# Patient Record
Sex: FEMALE | Race: WHITE | NOT HISPANIC OR LATINO | Employment: OTHER | URBAN - METROPOLITAN AREA
[De-identification: names, ages, dates, MRNs, and addresses within clinical notes are randomized per-mention and may not be internally consistent; named-entity substitution may affect disease eponyms.]

---

## 2017-03-03 ENCOUNTER — CONVERSION ENCOUNTER (OUTPATIENT)
Dept: RADIOLOGY | Facility: IMAGING CENTER | Age: 82
End: 2017-03-03

## 2017-05-29 ENCOUNTER — HOSPITAL ENCOUNTER (OUTPATIENT)
Dept: RADIOLOGY | Facility: MEDICAL CENTER | Age: 82
Discharge: HOME | End: 2017-05-29
Admitting: FAMILY MEDICINE
Payer: MEDICARE

## 2017-05-29 ENCOUNTER — OFFICE VISIT (OUTPATIENT)
Dept: URGENT CARE | Facility: MEDICAL CENTER | Age: 82
End: 2017-05-29
Payer: MEDICARE

## 2017-05-29 DIAGNOSIS — R06.02 SHORTNESS OF BREATH: ICD-10-CM

## 2017-05-29 DIAGNOSIS — Z85.3 PERSONAL HISTORY OF MALIGNANT NEOPLASM OF BREAST: ICD-10-CM

## 2017-05-29 DIAGNOSIS — J90 PLEURAL EFFUSION, NOT ELSEWHERE CLASSIFIED: ICD-10-CM

## 2017-05-29 DIAGNOSIS — C34.90 MALIGNANT NEOPLASM OF UNSPECIFIED PART OF UNSPECIFIED BRONCHUS OR LUNG (HCC): ICD-10-CM

## 2017-05-29 PROCEDURE — 99213 OFFICE O/P EST LOW 20 MIN: CPT

## 2017-05-29 PROCEDURE — 71020 HB CHEST X-RAY 2VW FRONTAL&LATL: CPT

## 2017-05-29 PROCEDURE — G0463 HOSPITAL OUTPT CLINIC VISIT: HCPCS

## 2017-06-02 ENCOUNTER — GENERIC CONVERSION - ENCOUNTER (OUTPATIENT)
Dept: OTHER | Facility: OTHER | Age: 82
End: 2017-06-02

## 2017-06-08 ENCOUNTER — ALLSCRIPTS OFFICE VISIT (OUTPATIENT)
Dept: OTHER | Facility: OTHER | Age: 82
End: 2017-06-08

## 2017-06-08 ENCOUNTER — TRANSCRIBE ORDERS (OUTPATIENT)
Dept: ADMINISTRATIVE | Facility: HOSPITAL | Age: 82
End: 2017-06-08

## 2017-06-08 DIAGNOSIS — Z85.3 PERSONAL HISTORY OF MALIGNANT NEOPLASM OF BREAST: Primary | ICD-10-CM

## 2017-06-12 ENCOUNTER — APPOINTMENT (OUTPATIENT)
Dept: LAB | Age: 82
End: 2017-06-12
Payer: MEDICARE

## 2017-06-12 ENCOUNTER — TRANSCRIBE ORDERS (OUTPATIENT)
Dept: ADMINISTRATIVE | Age: 82
End: 2017-06-12

## 2017-06-12 ENCOUNTER — HOSPITAL ENCOUNTER (OUTPATIENT)
Dept: RADIOLOGY | Age: 82
Discharge: HOME | End: 2017-06-12
Payer: MEDICARE

## 2017-06-12 ENCOUNTER — APPOINTMENT (OUTPATIENT)
Dept: RADIOLOGY | Age: 82
End: 2017-06-12
Payer: MEDICARE

## 2017-06-12 DIAGNOSIS — J90 PLEURAL EFFUSION: Primary | ICD-10-CM

## 2017-06-12 DIAGNOSIS — Z85.3 PERSONAL HISTORY OF MALIGNANT NEOPLASM OF BREAST: ICD-10-CM

## 2017-06-12 DIAGNOSIS — J90 PLEURAL EFFUSION, NOT ELSEWHERE CLASSIFIED: ICD-10-CM

## 2017-06-12 DIAGNOSIS — C34.90 CARCINOMA OF LUNG, UNSPECIFIED LATERALITY (HCC): ICD-10-CM

## 2017-06-12 DIAGNOSIS — J90 PLEURAL EFFUSION: ICD-10-CM

## 2017-06-12 DIAGNOSIS — Z85.3 PERSONAL HISTORY OF MALIGNANT NEOPLASM OF BREAST: Primary | ICD-10-CM

## 2017-06-12 LAB
ALBUMIN SERPL BCP-MCNC: 3.2 G/DL (ref 3.5–5)
ALP SERPL-CCNC: 70 U/L (ref 46–116)
ALT SERPL W P-5'-P-CCNC: 42 U/L (ref 12–78)
ANION GAP SERPL CALCULATED.3IONS-SCNC: 9 MMOL/L (ref 4–13)
AST SERPL W P-5'-P-CCNC: 36 U/L (ref 5–45)
BASOPHILS # BLD AUTO: 0.06 THOUSANDS/ΜL (ref 0–0.1)
BASOPHILS NFR BLD AUTO: 1 % (ref 0–1)
BILIRUB SERPL-MCNC: 0.4 MG/DL (ref 0.2–1)
BUN SERPL-MCNC: 15 MG/DL (ref 5–25)
CALCIUM SERPL-MCNC: 9 MG/DL (ref 8.3–10.1)
CANCER AG27-29 SERPL-ACNC: 32.4 U/ML (ref 0–42.3)
CHLORIDE SERPL-SCNC: 108 MMOL/L (ref 100–108)
CO2 SERPL-SCNC: 23 MMOL/L (ref 21–32)
CREAT SERPL-MCNC: 0.57 MG/DL (ref 0.6–1.3)
EOSINOPHIL # BLD AUTO: 0.64 THOUSAND/ΜL (ref 0–0.61)
EOSINOPHIL NFR BLD AUTO: 5 % (ref 0–6)
ERYTHROCYTE [DISTWIDTH] IN BLOOD BY AUTOMATED COUNT: 13.5 % (ref 11.6–15.1)
GFR SERPL CREATININE-BSD FRML MDRD: >60 ML/MIN/1.73SQ M
GLUCOSE SERPL-MCNC: 119 MG/DL (ref 65–140)
GLUCOSE SERPL-MCNC: 131 MG/DL (ref 65–140)
HCT VFR BLD AUTO: 42.4 % (ref 34.8–46.1)
HGB BLD-MCNC: 13.5 G/DL (ref 11.5–15.4)
LYMPHOCYTES # BLD AUTO: 2.99 THOUSANDS/ΜL (ref 0.6–4.47)
LYMPHOCYTES NFR BLD AUTO: 23 % (ref 14–44)
MCH RBC QN AUTO: 30.1 PG (ref 26.8–34.3)
MCHC RBC AUTO-ENTMCNC: 31.8 G/DL (ref 31.4–37.4)
MCV RBC AUTO: 95 FL (ref 82–98)
MONOCYTES # BLD AUTO: 0.87 THOUSAND/ΜL (ref 0.17–1.22)
MONOCYTES NFR BLD AUTO: 7 % (ref 4–12)
NEUTROPHILS # BLD AUTO: 8.48 THOUSANDS/ΜL (ref 1.85–7.62)
NEUTS SEG NFR BLD AUTO: 64 % (ref 43–75)
NRBC BLD AUTO-RTO: 0 /100 WBCS
PLATELET # BLD AUTO: 403 THOUSANDS/UL (ref 149–390)
PMV BLD AUTO: 10.9 FL (ref 8.9–12.7)
POTASSIUM SERPL-SCNC: 4.3 MMOL/L (ref 3.5–5.3)
PROT SERPL-MCNC: 6.6 G/DL (ref 6.4–8.2)
RBC # BLD AUTO: 4.48 MILLION/UL (ref 3.81–5.12)
SODIUM SERPL-SCNC: 140 MMOL/L (ref 136–145)
WBC # BLD AUTO: 13.12 THOUSAND/UL (ref 4.31–10.16)

## 2017-06-12 PROCEDURE — 80053 COMPREHEN METABOLIC PANEL: CPT

## 2017-06-12 PROCEDURE — 86300 IMMUNOASSAY TUMOR CA 15-3: CPT

## 2017-06-12 PROCEDURE — 81162 BRCA1&2 GEN FULL SEQ DUP/DEL: CPT

## 2017-06-12 PROCEDURE — A9552 F18 FDG: HCPCS

## 2017-06-12 PROCEDURE — 36415 COLL VENOUS BLD VENIPUNCTURE: CPT

## 2017-06-12 PROCEDURE — 71020 HB CHEST X-RAY 2VW FRONTAL&LATL: CPT

## 2017-06-12 PROCEDURE — 85025 COMPLETE CBC W/AUTO DIFF WBC: CPT

## 2017-06-12 PROCEDURE — 82948 REAGENT STRIP/BLOOD GLUCOSE: CPT

## 2017-06-12 PROCEDURE — 78815 PET IMAGE W/CT SKULL-THIGH: CPT

## 2017-06-12 RX ADMIN — IOHEXOL 5 ML: 240 INJECTION, SOLUTION INTRATHECAL; INTRAVASCULAR; INTRAVENOUS; ORAL at 09:50

## 2017-06-20 ENCOUNTER — APPOINTMENT (OUTPATIENT)
Dept: RADIOLOGY | Age: 82
End: 2017-06-20
Payer: MEDICARE

## 2017-06-20 ENCOUNTER — GENERIC CONVERSION - ENCOUNTER (OUTPATIENT)
Dept: OTHER | Facility: OTHER | Age: 82
End: 2017-06-20

## 2017-06-20 ENCOUNTER — TRANSCRIBE ORDERS (OUTPATIENT)
Dept: ADMINISTRATIVE | Age: 82
End: 2017-06-20

## 2017-06-20 DIAGNOSIS — J90 PLEURAL EFFUSION, NOT ELSEWHERE CLASSIFIED: ICD-10-CM

## 2017-06-20 PROCEDURE — 71020 HB CHEST X-RAY 2VW FRONTAL&LATL: CPT

## 2017-06-26 LAB — MISCELLANEOUS LAB TEST RESULT: NORMAL

## 2017-06-27 ENCOUNTER — GENERIC CONVERSION - ENCOUNTER (OUTPATIENT)
Dept: OTHER | Facility: OTHER | Age: 82
End: 2017-06-27

## 2017-06-28 ENCOUNTER — ALLSCRIPTS OFFICE VISIT (OUTPATIENT)
Dept: OTHER | Facility: OTHER | Age: 82
End: 2017-06-28

## 2017-06-30 ENCOUNTER — TRANSCRIBE ORDERS (OUTPATIENT)
Dept: ADMINISTRATIVE | Age: 82
End: 2017-06-30

## 2017-06-30 ENCOUNTER — APPOINTMENT (OUTPATIENT)
Dept: LAB | Age: 82
End: 2017-06-30
Payer: MEDICARE

## 2017-06-30 DIAGNOSIS — C34.90 MALIGNANT NEOPLASM OF UNSPECIFIED PART OF UNSPECIFIED BRONCHUS OR LUNG (HCC): ICD-10-CM

## 2017-06-30 LAB
ALBUMIN SERPL BCP-MCNC: 3.4 G/DL (ref 3.5–5)
ALP SERPL-CCNC: 69 U/L (ref 46–116)
ALT SERPL W P-5'-P-CCNC: 29 U/L (ref 12–78)
ANION GAP SERPL CALCULATED.3IONS-SCNC: 9 MMOL/L (ref 4–13)
AST SERPL W P-5'-P-CCNC: 23 U/L (ref 5–45)
BASOPHILS # BLD AUTO: 0.07 THOUSANDS/ΜL (ref 0–0.1)
BASOPHILS NFR BLD AUTO: 1 % (ref 0–1)
BILIRUB SERPL-MCNC: 0.44 MG/DL (ref 0.2–1)
BUN SERPL-MCNC: 15 MG/DL (ref 5–25)
CALCIUM SERPL-MCNC: 9.2 MG/DL (ref 8.3–10.1)
CEA SERPL-MCNC: 0.7 NG/ML (ref 0–3)
CHLORIDE SERPL-SCNC: 108 MMOL/L (ref 100–108)
CO2 SERPL-SCNC: 23 MMOL/L (ref 21–32)
CREAT SERPL-MCNC: 0.63 MG/DL (ref 0.6–1.3)
EOSINOPHIL # BLD AUTO: 0.85 THOUSAND/ΜL (ref 0–0.61)
EOSINOPHIL NFR BLD AUTO: 7 % (ref 0–6)
ERYTHROCYTE [DISTWIDTH] IN BLOOD BY AUTOMATED COUNT: 13.4 % (ref 11.6–15.1)
GFR SERPL CREATININE-BSD FRML MDRD: >60 ML/MIN/1.73SQ M
GLUCOSE SERPL-MCNC: 129 MG/DL (ref 65–140)
HCT VFR BLD AUTO: 43.3 % (ref 34.8–46.1)
HGB BLD-MCNC: 14 G/DL (ref 11.5–15.4)
LYMPHOCYTES # BLD AUTO: 3.84 THOUSANDS/ΜL (ref 0.6–4.47)
LYMPHOCYTES NFR BLD AUTO: 30 % (ref 14–44)
MCH RBC QN AUTO: 30.4 PG (ref 26.8–34.3)
MCHC RBC AUTO-ENTMCNC: 32.3 G/DL (ref 31.4–37.4)
MCV RBC AUTO: 94 FL (ref 82–98)
MONOCYTES # BLD AUTO: 1.19 THOUSAND/ΜL (ref 0.17–1.22)
MONOCYTES NFR BLD AUTO: 9 % (ref 4–12)
NEUTROPHILS # BLD AUTO: 7.03 THOUSANDS/ΜL (ref 1.85–7.62)
NEUTS SEG NFR BLD AUTO: 53 % (ref 43–75)
NRBC BLD AUTO-RTO: 0 /100 WBCS
PLATELET # BLD AUTO: 285 THOUSANDS/UL (ref 149–390)
PMV BLD AUTO: 12.2 FL (ref 8.9–12.7)
POTASSIUM SERPL-SCNC: 4 MMOL/L (ref 3.5–5.3)
PROT SERPL-MCNC: 6.7 G/DL (ref 6.4–8.2)
RBC # BLD AUTO: 4.61 MILLION/UL (ref 3.81–5.12)
SODIUM SERPL-SCNC: 140 MMOL/L (ref 136–145)
WBC # BLD AUTO: 13.03 THOUSAND/UL (ref 4.31–10.16)

## 2017-06-30 PROCEDURE — 82378 CARCINOEMBRYONIC ANTIGEN: CPT

## 2017-06-30 PROCEDURE — 80053 COMPREHEN METABOLIC PANEL: CPT

## 2017-06-30 PROCEDURE — 36415 COLL VENOUS BLD VENIPUNCTURE: CPT

## 2017-06-30 PROCEDURE — 85025 COMPLETE CBC W/AUTO DIFF WBC: CPT

## 2017-07-12 ENCOUNTER — GENERIC CONVERSION - ENCOUNTER (OUTPATIENT)
Dept: OTHER | Facility: OTHER | Age: 82
End: 2017-07-12

## 2017-07-14 ENCOUNTER — GENERIC CONVERSION - ENCOUNTER (OUTPATIENT)
Dept: OTHER | Facility: OTHER | Age: 82
End: 2017-07-14

## 2017-07-19 ENCOUNTER — APPOINTMENT (OUTPATIENT)
Dept: RADIOLOGY | Age: 82
End: 2017-07-19
Payer: MEDICARE

## 2017-07-19 ENCOUNTER — TRANSCRIBE ORDERS (OUTPATIENT)
Dept: ADMINISTRATIVE | Age: 82
End: 2017-07-19

## 2017-07-19 ENCOUNTER — GENERIC CONVERSION - ENCOUNTER (OUTPATIENT)
Dept: OTHER | Facility: OTHER | Age: 82
End: 2017-07-19

## 2017-07-19 DIAGNOSIS — J90 PLEURAL EFFUSION, NOT ELSEWHERE CLASSIFIED: ICD-10-CM

## 2017-07-19 DIAGNOSIS — C34.90 MALIGNANT NEOPLASM OF UNSPECIFIED PART OF UNSPECIFIED BRONCHUS OR LUNG (HCC): ICD-10-CM

## 2017-07-19 DIAGNOSIS — C34.90 MALIGNANT NEOPLASM OF LUNG, UNSPECIFIED LATERALITY, UNSPECIFIED PART OF LUNG (HCC): Primary | ICD-10-CM

## 2017-07-19 PROCEDURE — 71020 HB CHEST X-RAY 2VW FRONTAL&LATL: CPT

## 2017-07-20 ENCOUNTER — ALLSCRIPTS OFFICE VISIT (OUTPATIENT)
Dept: OTHER | Facility: OTHER | Age: 82
End: 2017-07-20

## 2017-07-20 ENCOUNTER — GENERIC CONVERSION - ENCOUNTER (OUTPATIENT)
Dept: OTHER | Facility: OTHER | Age: 82
End: 2017-07-20

## 2017-07-21 ENCOUNTER — HOSPITAL ENCOUNTER (OUTPATIENT)
Dept: INFUSION CENTER | Facility: CLINIC | Age: 82
Discharge: HOME | End: 2017-07-21
Payer: MEDICARE

## 2017-07-21 ENCOUNTER — HOSPITAL ENCOUNTER (OUTPATIENT)
Dept: RADIOLOGY | Facility: HOSPITAL | Age: 82
Discharge: HOME | End: 2017-07-21
Attending: INTERNAL MEDICINE | Admitting: RADIOLOGY
Payer: MEDICARE

## 2017-07-21 VITALS
SYSTOLIC BLOOD PRESSURE: 133 MMHG | HEART RATE: 60 BPM | RESPIRATION RATE: 18 BRPM | DIASTOLIC BLOOD PRESSURE: 62 MMHG | OXYGEN SATURATION: 96 %

## 2017-07-21 VITALS
RESPIRATION RATE: 16 BRPM | DIASTOLIC BLOOD PRESSURE: 85 MMHG | HEART RATE: 60 BPM | SYSTOLIC BLOOD PRESSURE: 142 MMHG | TEMPERATURE: 96.4 F

## 2017-07-21 DIAGNOSIS — J90 PLEURAL EFFUSION: ICD-10-CM

## 2017-07-21 LAB
ALBUMIN SERPL BCP-MCNC: 3.8 G/DL (ref 3.2–5)
ALP SERPL-CCNC: 65 U/L (ref 46–116)
ALT SERPL W P-5'-P-CCNC: 22 U/L (ref 12–78)
ANION GAP SERPL CALCULATED.3IONS-SCNC: 10 MMOL/L (ref 4–13)
AST SERPL W P-5'-P-CCNC: 33 U/L (ref 5–45)
BASOPHILS # BLD AUTO: 0.28 THOUSAND/UL (ref 0–0.1)
BASOPHILS NFR MAR MANUAL: 2 % (ref 0–1)
BILIRUB SERPL-MCNC: 0.6 MG/DL (ref 0.2–1)
BUN SERPL-MCNC: 16 MG/DL (ref 5–25)
CALCIUM SERPL-MCNC: 9.8 MG/DL (ref 8.3–10.1)
CHLORIDE SERPL-SCNC: 102 MMOL/L (ref 98–108)
CO2 SERPL-SCNC: 22 MMOL/L (ref 21–32)
CREAT SERPL-MCNC: 0.8 MG/DL (ref 0.6–1.3)
EOSINOPHIL # BLD AUTO: 0.28 THOUSAND/UL (ref 0–0.61)
EOSINOPHIL NFR BLD MANUAL: 2 % (ref 0–6)
ERYTHROCYTE [DISTWIDTH] IN BLOOD BY AUTOMATED COUNT: 13.6 % (ref 11.6–15.1)
GFR SERPL CREATININE-BSD FRML MDRD: >60 ML/MIN/1.73SQ M
GLUCOSE SERPL-MCNC: 143 MG/DL (ref 65–140)
HCT VFR BLD AUTO: 46.6 % (ref 34.8–46.1)
HGB BLD-MCNC: 14.9 G/DL (ref 11.5–15.4)
LYMPHOCYTES # BLD AUTO: 28 % (ref 14–44)
LYMPHOCYTES # BLD AUTO: 3.89 THOUSAND/UL (ref 0.6–4.47)
MCH RBC QN AUTO: 28.5 PG (ref 26.8–34.3)
MCHC RBC AUTO-ENTMCNC: 32.1 G/DL (ref 31.4–37.4)
MCV RBC AUTO: 89 FL (ref 82–98)
MONOCYTES # BLD AUTO: 0.42 THOUSAND/UL (ref 0–1.22)
MONOCYTES NFR BLD AUTO: 3 % (ref 4–12)
NEUTS BAND NFR BLD MANUAL: 7 % (ref 0–8)
NEUTS SEG # BLD: 9.04 THOUSAND/UL (ref 1.81–6.82)
NEUTS SEG NFR BLD AUTO: 58 % (ref 43–75)
PLATELET # BLD AUTO: 290 THOUSANDS/UL (ref 149–390)
PLATELET BLD QL SMEAR: ADEQUATE
PMV BLD AUTO: 8.5 FL (ref 8.9–12.7)
POTASSIUM SERPL-SCNC: 4.2 MMOL/L (ref 3.5–5.3)
PROT SERPL-MCNC: 7 G/DL (ref 6.4–8.2)
RBC # BLD AUTO: 5.25 MILLION/UL (ref 3.81–5.12)
RBC MORPH BLD: NORMAL
SODIUM SERPL-SCNC: 134 MMOL/L (ref 136–145)
TOTAL CELLS COUNTED SPEC: 100
WBC # BLD AUTO: 13.9 THOUSAND/UL (ref 4.31–10.16)
WBC NRBC COR # BLD: 13.9 THOUSAND/UL (ref 4.31–10.16)

## 2017-07-21 PROCEDURE — 80053 COMPREHEN METABOLIC PANEL: CPT | Performed by: INTERNAL MEDICINE

## 2017-07-21 PROCEDURE — 32555 ASPIRATE PLEURA W/ IMAGING: CPT

## 2017-07-21 PROCEDURE — 85027 COMPLETE CBC AUTOMATED: CPT | Performed by: INTERNAL MEDICINE

## 2017-07-21 PROCEDURE — 85007 BL SMEAR W/DIFF WBC COUNT: CPT | Performed by: INTERNAL MEDICINE

## 2017-07-21 PROCEDURE — 88342 IMHCHEM/IMCYTCHM 1ST ANTB: CPT | Performed by: INTERNAL MEDICINE

## 2017-07-21 PROCEDURE — 88341 IMHCHEM/IMCYTCHM EA ADD ANTB: CPT | Performed by: INTERNAL MEDICINE

## 2017-07-21 PROCEDURE — 96372 THER/PROPH/DIAG INJ SC/IM: CPT

## 2017-07-21 PROCEDURE — 88305 TISSUE EXAM BY PATHOLOGIST: CPT | Performed by: INTERNAL MEDICINE

## 2017-07-21 PROCEDURE — 88112 CYTOPATH CELL ENHANCE TECH: CPT | Performed by: INTERNAL MEDICINE

## 2017-07-21 RX ORDER — METOPROLOL TARTRATE 50 MG/1
25 TABLET, FILM COATED ORAL EVERY 12 HOURS SCHEDULED
COMMUNITY
End: 2018-08-29 | Stop reason: SDUPTHER

## 2017-07-21 RX ORDER — CYANOCOBALAMIN 1000 UG/ML
1000 INJECTION INTRAMUSCULAR; SUBCUTANEOUS ONCE
Status: COMPLETED | OUTPATIENT
Start: 2017-07-21 | End: 2017-07-21

## 2017-07-21 RX ORDER — OMEGA-3/DHA/EPA/FISH OIL 60 MG-90MG
CAPSULE ORAL DAILY
COMMUNITY
End: 2018-09-20 | Stop reason: ALTCHOICE

## 2017-07-21 RX ORDER — FOLIC ACID 1 MG/1
1 TABLET ORAL DAILY
COMMUNITY
End: 2019-02-15 | Stop reason: HOSPADM

## 2017-07-21 RX ORDER — LANOLIN ALCOHOL/MO/W.PET/CERES
1 CREAM (GRAM) TOPICAL DAILY
COMMUNITY
End: 2017-07-24

## 2017-07-21 RX ORDER — SODIUM CHLORIDE 9 MG/ML
20 INJECTION, SOLUTION INTRAVENOUS CONTINUOUS
Status: DISCONTINUED | OUTPATIENT
Start: 2017-07-24 | End: 2017-07-27 | Stop reason: HOSPADM

## 2017-07-21 RX ADMIN — CYANOCOBALAMIN 1000 MCG: 1000 INJECTION, SOLUTION INTRAMUSCULAR at 13:51

## 2017-07-21 NOTE — PROGRESS NOTES
Patient arrived on unit for Vitamin B12 injection  Patient to start Alimta/Carbo on 7/24/17  Patient had thoracentesis and has pain in L back area  Dr Conn Showyudelka ordered pain medication and daughter to  script from physician's office  Encouraged patient to take pain med and note effectiveness  Encouraged to notify physician if pain unrelieved or increases or any other S/S  Patient verbalized understanding

## 2017-07-24 ENCOUNTER — HOSPITAL ENCOUNTER (OUTPATIENT)
Dept: INFUSION CENTER | Facility: CLINIC | Age: 82
Discharge: HOME | End: 2017-07-24
Payer: MEDICARE

## 2017-07-24 VITALS
DIASTOLIC BLOOD PRESSURE: 75 MMHG | HEIGHT: 61 IN | WEIGHT: 167.55 LBS | BODY MASS INDEX: 31.63 KG/M2 | TEMPERATURE: 97.6 F | SYSTOLIC BLOOD PRESSURE: 126 MMHG | RESPIRATION RATE: 18 BRPM | HEART RATE: 80 BPM

## 2017-07-24 PROCEDURE — 96413 CHEMO IV INFUSION 1 HR: CPT

## 2017-07-24 PROCEDURE — 96375 TX/PRO/DX INJ NEW DRUG ADDON: CPT

## 2017-07-24 PROCEDURE — 96411 CHEMO IV PUSH ADDL DRUG: CPT

## 2017-07-24 RX ORDER — ALPRAZOLAM 0.5 MG/1
0.5 TABLET ORAL
COMMUNITY
End: 2018-11-01 | Stop reason: SDUPTHER

## 2017-07-24 RX ORDER — PROCHLORPERAZINE MALEATE 10 MG
10 TABLET ORAL EVERY 6 HOURS PRN
COMMUNITY
End: 2017-08-17 | Stop reason: ALTCHOICE

## 2017-07-24 RX ORDER — ASPIRIN 81 MG/1
81 TABLET ORAL DAILY
Status: ON HOLD | COMMUNITY
End: 2018-07-23 | Stop reason: SDDI

## 2017-07-24 RX ORDER — HYDROCODONE BITARTRATE AND ACETAMINOPHEN 5; 300 MG/1; MG/1
1 TABLET ORAL EVERY 6 HOURS PRN
COMMUNITY
End: 2017-08-17 | Stop reason: ALTCHOICE

## 2017-07-24 RX ORDER — CITALOPRAM 20 MG/1
20 TABLET ORAL DAILY
COMMUNITY
End: 2019-02-15 | Stop reason: HOSPADM

## 2017-07-24 RX ADMIN — CARBOPLATIN 492 MG: 10 INJECTION, SOLUTION INTRAVENOUS at 14:38

## 2017-07-24 RX ADMIN — SODIUM CHLORIDE 900 MG: 9 INJECTION, SOLUTION INTRAVENOUS at 14:19

## 2017-07-24 RX ADMIN — DEXAMETHASONE SODIUM PHOSPHATE: 10 INJECTION, SOLUTION INTRAMUSCULAR; INTRAVENOUS at 13:45

## 2017-07-24 RX ADMIN — SODIUM CHLORIDE 20 ML/HR: 0.9 INJECTION, SOLUTION INTRAVENOUS at 13:45

## 2017-07-24 NOTE — PLAN OF CARE
Problem: Potential for Falls  Goal: Patient will remain free of falls  INTERVENTIONS:  - Assess patient frequently for physical needs  -  Identify cognitive and physical deficits and behaviors that affect risk of falls    -  [place] fall precautions as indicated by assessment   - Educate patient/family on patient safety including physical limitations  - Instruct patient to call for assistance with activity based on assessment  - Modify environment to reduce risk of injury  - Consider OT/PT consult to assist with strengthening/mobility   Outcome: Progressing

## 2017-07-24 NOTE — PROGRESS NOTES
Patient tolerated chemotherapy infusion well without complications  Pt and daughter are aware of follow up appointment with Dr Bryan Winkler  Pt left the infusion center in stable condition

## 2017-07-27 ENCOUNTER — HOSPITAL ENCOUNTER (OUTPATIENT)
Dept: INFUSION CENTER | Facility: CLINIC | Age: 82
Discharge: HOME | End: 2017-07-27
Payer: MEDICARE

## 2017-07-27 VITALS
SYSTOLIC BLOOD PRESSURE: 125 MMHG | DIASTOLIC BLOOD PRESSURE: 65 MMHG | RESPIRATION RATE: 16 BRPM | TEMPERATURE: 97.5 F | HEART RATE: 62 BPM

## 2017-07-27 PROCEDURE — 96361 HYDRATE IV INFUSION ADD-ON: CPT

## 2017-07-27 PROCEDURE — 96365 THER/PROPH/DIAG IV INF INIT: CPT

## 2017-07-27 RX ORDER — ACETAMINOPHEN 325 MG/1
650 TABLET ORAL ONCE
Status: COMPLETED | OUTPATIENT
Start: 2017-07-27 | End: 2017-07-27

## 2017-07-27 RX ADMIN — ONDANSETRON 8 MG: 2 INJECTION INTRAMUSCULAR; INTRAVENOUS at 13:15

## 2017-07-27 RX ADMIN — ACETAMINOPHEN 650 MG: 325 TABLET, FILM COATED ORAL at 13:45

## 2017-07-27 RX ADMIN — SODIUM CHLORIDE 1000 ML: 0.9 INJECTION, SOLUTION INTRAVENOUS at 13:15

## 2017-07-27 NOTE — PLAN OF CARE
Problem: Potential for Falls  Goal: Patient will remain free of falls  INTERVENTIONS:  - Assess patient frequently for physical needs  -  Identify cognitive and physical deficits and behaviors that affect risk of falls    -  [place] fall precautions as indicated by assessment   - Educate patient/family on patient safety including physical limitations  - Instruct patient to call for assistance with activity based on assessment  - Modify environment to reduce risk of injury  - Consider OT/PT consult to assist with strengthening/mobility   Outcome: Progressing

## 2017-07-27 NOTE — PROGRESS NOTES
Tolerated hydration  Denies any nausea or headache on D/C  AVS given to patient   Aware of next appointment

## 2017-07-27 NOTE — PROGRESS NOTES
Patient arrived on unit  Complaining of feeling nauseated and has a headache  1 Liter NSS initiated  Zofran administered as ordered  Spoke with Sabrina Medina RN with Dr Elizabeth Krueger and made aware of headache  Order received and Tylenol administered   Will note effectiveness

## 2017-08-01 ENCOUNTER — GENERIC CONVERSION - ENCOUNTER (OUTPATIENT)
Dept: OTHER | Facility: OTHER | Age: 82
End: 2017-08-01

## 2017-08-11 ENCOUNTER — TRANSCRIBE ORDERS (OUTPATIENT)
Dept: ADMINISTRATIVE | Age: 82
End: 2017-08-11

## 2017-08-11 ENCOUNTER — APPOINTMENT (OUTPATIENT)
Dept: LAB | Age: 82
End: 2017-08-11
Payer: MEDICARE

## 2017-08-11 DIAGNOSIS — C34.90 MALIGNANT NEOPLASM OF UNSPECIFIED PART OF UNSPECIFIED BRONCHUS OR LUNG (HCC): ICD-10-CM

## 2017-08-11 LAB
ALBUMIN SERPL BCP-MCNC: 3 G/DL (ref 3.5–5)
ALP SERPL-CCNC: 71 U/L (ref 46–116)
ALT SERPL W P-5'-P-CCNC: 31 U/L (ref 12–78)
ANION GAP SERPL CALCULATED.3IONS-SCNC: 9 MMOL/L (ref 4–13)
AST SERPL W P-5'-P-CCNC: 28 U/L (ref 5–45)
BASOPHILS # BLD AUTO: 0.12 THOUSANDS/ΜL (ref 0–0.1)
BASOPHILS NFR BLD AUTO: 1 % (ref 0–1)
BILIRUB SERPL-MCNC: 0.25 MG/DL (ref 0.2–1)
BUN SERPL-MCNC: 18 MG/DL (ref 5–25)
CALCIUM SERPL-MCNC: 9.6 MG/DL (ref 8.3–10.1)
CHLORIDE SERPL-SCNC: 106 MMOL/L (ref 100–108)
CO2 SERPL-SCNC: 23 MMOL/L (ref 21–32)
CREAT SERPL-MCNC: 0.8 MG/DL (ref 0.6–1.3)
EOSINOPHIL # BLD AUTO: 2.06 THOUSAND/ΜL (ref 0–0.61)
EOSINOPHIL NFR BLD AUTO: 19 % (ref 0–6)
ERYTHROCYTE [DISTWIDTH] IN BLOOD BY AUTOMATED COUNT: 13.7 % (ref 11.6–15.1)
GFR SERPL CREATININE-BSD FRML MDRD: 69 ML/MIN/1.73SQ M
GLUCOSE SERPL-MCNC: 166 MG/DL (ref 65–140)
HCT VFR BLD AUTO: 42.1 % (ref 34.8–46.1)
HGB BLD-MCNC: 13.7 G/DL (ref 11.5–15.4)
LYMPHOCYTES # BLD AUTO: 3.41 THOUSANDS/ΜL (ref 0.6–4.47)
LYMPHOCYTES NFR BLD AUTO: 32 % (ref 14–44)
MCH RBC QN AUTO: 29.7 PG (ref 26.8–34.3)
MCHC RBC AUTO-ENTMCNC: 32.5 G/DL (ref 31.4–37.4)
MCV RBC AUTO: 91 FL (ref 82–98)
MONOCYTES # BLD AUTO: 1.07 THOUSAND/ΜL (ref 0.17–1.22)
MONOCYTES NFR BLD AUTO: 10 % (ref 4–12)
NEUTROPHILS # BLD AUTO: 4.14 THOUSANDS/ΜL (ref 1.85–7.62)
NEUTS SEG NFR BLD AUTO: 38 % (ref 43–75)
NRBC BLD AUTO-RTO: 0 /100 WBCS
PLATELET # BLD AUTO: 369 THOUSANDS/UL (ref 149–390)
PMV BLD AUTO: 10.4 FL (ref 8.9–12.7)
POTASSIUM SERPL-SCNC: 4 MMOL/L (ref 3.5–5.3)
PROT SERPL-MCNC: 7.5 G/DL (ref 6.4–8.2)
RBC # BLD AUTO: 4.61 MILLION/UL (ref 3.81–5.12)
SODIUM SERPL-SCNC: 138 MMOL/L (ref 136–145)
WBC # BLD AUTO: 10.84 THOUSAND/UL (ref 4.31–10.16)

## 2017-08-11 PROCEDURE — 80053 COMPREHEN METABOLIC PANEL: CPT

## 2017-08-11 PROCEDURE — 85025 COMPLETE CBC W/AUTO DIFF WBC: CPT

## 2017-08-11 PROCEDURE — 36415 COLL VENOUS BLD VENIPUNCTURE: CPT

## 2017-08-11 RX ORDER — CYANOCOBALAMIN 1000 UG/ML
1000 INJECTION INTRAMUSCULAR; SUBCUTANEOUS ONCE
Status: COMPLETED | OUTPATIENT
Start: 2017-08-14 | End: 2017-08-14

## 2017-08-11 RX ORDER — SODIUM CHLORIDE 9 MG/ML
20 INJECTION, SOLUTION INTRAVENOUS CONTINUOUS
Status: DISCONTINUED | OUTPATIENT
Start: 2017-08-14 | End: 2017-08-17 | Stop reason: HOSPADM

## 2017-08-14 ENCOUNTER — HOSPITAL ENCOUNTER (OUTPATIENT)
Dept: INFUSION CENTER | Facility: CLINIC | Age: 82
Discharge: HOME | End: 2017-08-14
Payer: MEDICARE

## 2017-08-14 VITALS
WEIGHT: 163.14 LBS | TEMPERATURE: 95.8 F | SYSTOLIC BLOOD PRESSURE: 143 MMHG | BODY MASS INDEX: 30.8 KG/M2 | RESPIRATION RATE: 18 BRPM | HEART RATE: 55 BPM | HEIGHT: 61 IN | DIASTOLIC BLOOD PRESSURE: 76 MMHG

## 2017-08-14 PROCEDURE — 96372 THER/PROPH/DIAG INJ SC/IM: CPT

## 2017-08-14 PROCEDURE — 96375 TX/PRO/DX INJ NEW DRUG ADDON: CPT

## 2017-08-14 PROCEDURE — 96411 CHEMO IV PUSH ADDL DRUG: CPT

## 2017-08-14 PROCEDURE — 96413 CHEMO IV INFUSION 1 HR: CPT

## 2017-08-14 RX ORDER — DEXAMETHASONE 4 MG/1
4 TABLET ORAL
COMMUNITY
End: 2018-07-23 | Stop reason: ALTCHOICE

## 2017-08-14 RX ADMIN — CYANOCOBALAMIN 1000 MCG: 1000 INJECTION, SOLUTION INTRAMUSCULAR at 14:17

## 2017-08-14 RX ADMIN — DEXAMETHASONE SODIUM PHOSPHATE: 10 INJECTION, SOLUTION INTRAMUSCULAR; INTRAVENOUS at 12:57

## 2017-08-14 RX ADMIN — SODIUM CHLORIDE 20 ML/HR: 0.9 INJECTION, SOLUTION INTRAVENOUS at 12:57

## 2017-08-14 RX ADMIN — CARBOPLATIN 446 MG: 10 INJECTION, SOLUTION INTRAVENOUS at 14:15

## 2017-08-14 RX ADMIN — SODIUM CHLORIDE 900 MG: 9 INJECTION, SOLUTION INTRAVENOUS at 13:51

## 2017-08-14 NOTE — PROGRESS NOTES
Pt tolerated chemo infusion and B12 injection well  Denies any discomforts  Aware of future appointments

## 2017-08-14 NOTE — PLAN OF CARE
Problem: Potential for Falls  Goal: Patient will remain free of falls  INTERVENTIONS:  - Assess patient frequently for physical needs  -  Identify cognitive and physical deficits and behaviors that affect risk of falls  -  [place] fall precautions as indicated by assessment   - Educate patient/family on patient safety including physical limitations  - Instruct patient to call for assistance with activity based on assessment  - Modify environment to reduce risk of injury  - Consider OT/PT consult to assist with strengthening/mobility   Outcome: Progressing      Problem: Knowledge Deficit  Goal: Patient/family/caregiver demonstrates understanding of disease process, treatment plan, medications, and discharge instructions  Complete learning assessment and assess knowledge base    Interventions:  - Provide teaching at level of understanding  - Provide teaching via preferred learning methods   Outcome: Progressing

## 2017-08-15 ENCOUNTER — HOSPITAL ENCOUNTER (OUTPATIENT)
Dept: INFUSION CENTER | Facility: CLINIC | Age: 82
Discharge: HOME | End: 2017-08-15
Payer: MEDICARE

## 2017-08-15 VITALS
SYSTOLIC BLOOD PRESSURE: 135 MMHG | RESPIRATION RATE: 18 BRPM | HEART RATE: 55 BPM | TEMPERATURE: 97.5 F | DIASTOLIC BLOOD PRESSURE: 70 MMHG

## 2017-08-15 PROCEDURE — 96360 HYDRATION IV INFUSION INIT: CPT

## 2017-08-15 PROCEDURE — 96361 HYDRATE IV INFUSION ADD-ON: CPT

## 2017-08-15 RX ADMIN — SODIUM CHLORIDE 1000 ML: 0.9 INJECTION, SOLUTION INTRAVENOUS at 12:00

## 2017-08-15 NOTE — PROGRESS NOTES
Pt arrived to unit without complaint, denies any nausea or other discomfort  IV hydration administered as ordered, pt tolerated well, left unit in stable condition  Pt declines AVS, pt has MD appt tomorrow and treatment plan will be determined at that time  Pt states she may or may not be coming on Friday for additional hydration, she states she will notify infusion center tomorrow

## 2017-08-16 ENCOUNTER — ALLSCRIPTS OFFICE VISIT (OUTPATIENT)
Dept: OTHER | Facility: OTHER | Age: 82
End: 2017-08-16

## 2017-08-16 RX ORDER — ACETAMINOPHEN 325 MG/1
650 TABLET ORAL ONCE
Status: DISCONTINUED | OUTPATIENT
Start: 2017-08-16 | End: 2017-08-16 | Stop reason: CLARIF

## 2017-08-17 ENCOUNTER — HOSPITAL ENCOUNTER (OUTPATIENT)
Dept: INFUSION CENTER | Facility: CLINIC | Age: 82
Discharge: HOME | End: 2017-08-17
Payer: MEDICARE

## 2017-08-17 PROCEDURE — 96361 HYDRATE IV INFUSION ADD-ON: CPT

## 2017-08-17 PROCEDURE — 96360 HYDRATION IV INFUSION INIT: CPT

## 2017-08-17 RX ORDER — HYDROCODONE BITARTRATE AND ACETAMINOPHEN 5; 300 MG/1; MG/1
1 TABLET ORAL EVERY 6 HOURS PRN
Status: ON HOLD | COMMUNITY
End: 2018-09-05

## 2017-08-17 RX ORDER — ONDANSETRON 4 MG/1
4 TABLET, FILM COATED ORAL EVERY 8 HOURS PRN
COMMUNITY
End: 2018-08-01 | Stop reason: HOSPADM

## 2017-08-17 RX ADMIN — SODIUM CHLORIDE 1000 ML: 0.9 INJECTION, SOLUTION INTRAVENOUS at 11:30

## 2017-08-18 ENCOUNTER — GENERIC CONVERSION - ENCOUNTER (OUTPATIENT)
Dept: OTHER | Facility: OTHER | Age: 82
End: 2017-08-18

## 2017-09-05 ENCOUNTER — TRANSCRIBE ORDERS (OUTPATIENT)
Dept: ADMINISTRATIVE | Age: 82
End: 2017-09-05

## 2017-09-05 ENCOUNTER — APPOINTMENT (OUTPATIENT)
Dept: LAB | Age: 82
End: 2017-09-05
Payer: MEDICARE

## 2017-09-05 DIAGNOSIS — C34.90 MALIGNANT NEOPLASM OF UNSPECIFIED PART OF UNSPECIFIED BRONCHUS OR LUNG (HCC): ICD-10-CM

## 2017-09-05 LAB
ALBUMIN SERPL BCP-MCNC: 3.1 G/DL (ref 3.5–5)
ALP SERPL-CCNC: 90 U/L (ref 46–116)
ALT SERPL W P-5'-P-CCNC: 40 U/L (ref 12–78)
ANION GAP SERPL CALCULATED.3IONS-SCNC: 7 MMOL/L (ref 4–13)
AST SERPL W P-5'-P-CCNC: 42 U/L (ref 5–45)
BASOPHILS # BLD AUTO: 0.06 THOUSANDS/ΜL (ref 0–0.1)
BASOPHILS NFR BLD AUTO: 1 % (ref 0–1)
BILIRUB SERPL-MCNC: 0.31 MG/DL (ref 0.2–1)
BUN SERPL-MCNC: 16 MG/DL (ref 5–25)
CALCIUM SERPL-MCNC: 9.1 MG/DL (ref 8.3–10.1)
CHLORIDE SERPL-SCNC: 106 MMOL/L (ref 100–108)
CO2 SERPL-SCNC: 26 MMOL/L (ref 21–32)
CREAT SERPL-MCNC: 0.69 MG/DL (ref 0.6–1.3)
EOSINOPHIL # BLD AUTO: 0.56 THOUSAND/ΜL (ref 0–0.61)
EOSINOPHIL NFR BLD AUTO: 6 % (ref 0–6)
ERYTHROCYTE [DISTWIDTH] IN BLOOD BY AUTOMATED COUNT: 14.7 % (ref 11.6–15.1)
GFR SERPL CREATININE-BSD FRML MDRD: 81 ML/MIN/1.73SQ M
GLUCOSE SERPL-MCNC: 120 MG/DL (ref 65–140)
HCT VFR BLD AUTO: 42.6 % (ref 34.8–46.1)
HGB BLD-MCNC: 14.1 G/DL (ref 11.5–15.4)
LYMPHOCYTES # BLD AUTO: 3.63 THOUSANDS/ΜL (ref 0.6–4.47)
LYMPHOCYTES NFR BLD AUTO: 40 % (ref 14–44)
MCH RBC QN AUTO: 30.3 PG (ref 26.8–34.3)
MCHC RBC AUTO-ENTMCNC: 33.1 G/DL (ref 31.4–37.4)
MCV RBC AUTO: 91 FL (ref 82–98)
MONOCYTES # BLD AUTO: 1.28 THOUSAND/ΜL (ref 0.17–1.22)
MONOCYTES NFR BLD AUTO: 14 % (ref 4–12)
NEUTROPHILS # BLD AUTO: 3.59 THOUSANDS/ΜL (ref 1.85–7.62)
NEUTS SEG NFR BLD AUTO: 39 % (ref 43–75)
NRBC BLD AUTO-RTO: 0 /100 WBCS
PLATELET # BLD AUTO: 397 THOUSANDS/UL (ref 149–390)
PMV BLD AUTO: 10.6 FL (ref 8.9–12.7)
POTASSIUM SERPL-SCNC: 4 MMOL/L (ref 3.5–5.3)
PROT SERPL-MCNC: 7.2 G/DL (ref 6.4–8.2)
RBC # BLD AUTO: 4.66 MILLION/UL (ref 3.81–5.12)
SODIUM SERPL-SCNC: 139 MMOL/L (ref 136–145)
WBC # BLD AUTO: 9.16 THOUSAND/UL (ref 4.31–10.16)

## 2017-09-05 PROCEDURE — 80053 COMPREHEN METABOLIC PANEL: CPT

## 2017-09-05 PROCEDURE — 85025 COMPLETE CBC W/AUTO DIFF WBC: CPT

## 2017-09-05 PROCEDURE — 36415 COLL VENOUS BLD VENIPUNCTURE: CPT

## 2017-09-07 ENCOUNTER — GENERIC CONVERSION - ENCOUNTER (OUTPATIENT)
Dept: OTHER | Facility: OTHER | Age: 82
End: 2017-09-07

## 2017-09-07 ENCOUNTER — HOSPITAL ENCOUNTER (OUTPATIENT)
Dept: INFUSION CENTER | Facility: CLINIC | Age: 82
Discharge: HOME | End: 2017-09-07
Payer: MEDICARE

## 2017-09-07 VITALS
DIASTOLIC BLOOD PRESSURE: 73 MMHG | HEART RATE: 72 BPM | TEMPERATURE: 96.1 F | BODY MASS INDEX: 29.9 KG/M2 | WEIGHT: 162.48 LBS | HEIGHT: 62 IN | RESPIRATION RATE: 16 BRPM | SYSTOLIC BLOOD PRESSURE: 133 MMHG

## 2017-09-07 DIAGNOSIS — C34.90 MALIGNANT NEOPLASM OF UNSPECIFIED PART OF UNSPECIFIED BRONCHUS OR LUNG (HCC): ICD-10-CM

## 2017-09-07 DIAGNOSIS — J90 PLEURAL EFFUSION, NOT ELSEWHERE CLASSIFIED: ICD-10-CM

## 2017-09-07 PROCEDURE — 96413 CHEMO IV INFUSION 1 HR: CPT

## 2017-09-07 PROCEDURE — 96367 TX/PROPH/DG ADDL SEQ IV INF: CPT

## 2017-09-07 PROCEDURE — 96372 THER/PROPH/DIAG INJ SC/IM: CPT

## 2017-09-07 PROCEDURE — 96417 CHEMO IV INFUS EACH ADDL SEQ: CPT

## 2017-09-07 RX ORDER — SODIUM CHLORIDE 9 MG/ML
20 INJECTION, SOLUTION INTRAVENOUS CONTINUOUS
Status: DISCONTINUED | OUTPATIENT
Start: 2017-09-07 | End: 2017-09-10 | Stop reason: HOSPADM

## 2017-09-07 RX ORDER — CYANOCOBALAMIN 1000 UG/ML
1000 INJECTION INTRAMUSCULAR; SUBCUTANEOUS ONCE
Status: COMPLETED | OUTPATIENT
Start: 2017-09-07 | End: 2017-09-07

## 2017-09-07 RX ADMIN — SODIUM CHLORIDE 900 MG: 9 INJECTION, SOLUTION INTRAVENOUS at 11:56

## 2017-09-07 RX ADMIN — CARBOPLATIN 446 MG: 10 INJECTION, SOLUTION INTRAVENOUS at 12:18

## 2017-09-07 RX ADMIN — CYANOCOBALAMIN 1000 MCG: 1000 INJECTION, SOLUTION INTRAMUSCULAR at 13:18

## 2017-09-07 RX ADMIN — ONDANSETRON HYDROCHLORIDE: 2 INJECTION, SOLUTION INTRAMUSCULAR; INTRAVENOUS at 11:35

## 2017-09-07 RX ADMIN — SODIUM CHLORIDE 20 ML/HR: 0.9 INJECTION, SOLUTION INTRAVENOUS at 11:33

## 2017-09-08 ENCOUNTER — HOSPITAL ENCOUNTER (OUTPATIENT)
Dept: INFUSION CENTER | Facility: CLINIC | Age: 82
Discharge: HOME | End: 2017-09-08
Payer: MEDICARE

## 2017-09-08 VITALS
DIASTOLIC BLOOD PRESSURE: 60 MMHG | TEMPERATURE: 96.6 F | HEART RATE: 50 BPM | RESPIRATION RATE: 18 BRPM | SYSTOLIC BLOOD PRESSURE: 138 MMHG

## 2017-09-08 PROCEDURE — 96360 HYDRATION IV INFUSION INIT: CPT

## 2017-09-08 PROCEDURE — 96361 HYDRATE IV INFUSION ADD-ON: CPT

## 2017-09-08 RX ORDER — SODIUM CHLORIDE 9 MG/ML
500 INJECTION, SOLUTION INTRAVENOUS ONCE
Status: COMPLETED | OUTPATIENT
Start: 2017-09-09 | End: 2017-09-09

## 2017-09-08 RX ORDER — SODIUM CHLORIDE 9 MG/ML
500 INJECTION, SOLUTION INTRAVENOUS ONCE
Status: DISCONTINUED | OUTPATIENT
Start: 2017-09-09 | End: 2017-09-08 | Stop reason: SDDI

## 2017-09-08 RX ADMIN — SODIUM CHLORIDE 1000 ML: 0.9 INJECTION, SOLUTION INTRAVENOUS at 11:55

## 2017-09-08 NOTE — PROGRESS NOTES
Pt arrived to unit without complaint, denied nausea, declined Zofran as ordered to be given prn  IV hydration administered as ordered, pt tolerated well, left unit in stable condition without question or concern  Pt declined AVS, aware of appt tomorrow at [facility] for IV hydration  Pt left unit with NSL intact, covered with sterile cling and stockinette  Pt knowledgeable re: care of NSL at home

## 2017-09-08 NOTE — PLAN OF CARE
Problem: Potential for Falls  Goal: Patient will remain free of falls  INTERVENTIONS:  - Assess patient frequently for physical needs  -  Identify cognitive and physical deficits and behaviors that affect risk of falls    -  [place] fall precautions as indicated by assessment   - Educate patient/family on patient safety including physical limitations  - Instruct patient to call for assistance with activity based on assessment  - Modify environment to reduce risk of injury  - Consider OT/PT consult to assist with strengthening/mobility   Outcome: Progressing

## 2017-09-09 ENCOUNTER — HOSPITAL ENCOUNTER (OUTPATIENT)
Dept: INFUSION CENTER | Facility: HOSPITAL | Age: 82
Discharge: HOME | End: 2017-09-09
Payer: MEDICARE

## 2017-09-09 VITALS
RESPIRATION RATE: 18 BRPM | SYSTOLIC BLOOD PRESSURE: 163 MMHG | DIASTOLIC BLOOD PRESSURE: 70 MMHG | HEART RATE: 49 BPM | TEMPERATURE: 97.3 F

## 2017-09-09 PROCEDURE — 96360 HYDRATION IV INFUSION INIT: CPT

## 2017-09-09 PROCEDURE — 96361 HYDRATE IV INFUSION ADD-ON: CPT

## 2017-09-09 RX ADMIN — SODIUM CHLORIDE 500 ML/HR: 0.9 INJECTION, SOLUTION INTRAVENOUS at 10:09

## 2017-09-11 ENCOUNTER — HOSPITAL ENCOUNTER (OUTPATIENT)
Dept: INFUSION CENTER | Facility: CLINIC | Age: 82
Discharge: HOME | End: 2017-09-11
Payer: MEDICARE

## 2017-09-11 VITALS
HEART RATE: 56 BPM | SYSTOLIC BLOOD PRESSURE: 148 MMHG | DIASTOLIC BLOOD PRESSURE: 82 MMHG | RESPIRATION RATE: 16 BRPM | TEMPERATURE: 96.6 F

## 2017-09-11 PROCEDURE — 96365 THER/PROPH/DIAG IV INF INIT: CPT

## 2017-09-11 PROCEDURE — 96361 HYDRATE IV INFUSION ADD-ON: CPT

## 2017-09-11 RX ADMIN — ONDANSETRON 8 MG: 2 INJECTION INTRAMUSCULAR; INTRAVENOUS at 10:57

## 2017-09-11 RX ADMIN — SODIUM CHLORIDE 1000 ML: 0.9 INJECTION, SOLUTION INTRAVENOUS at 10:56

## 2017-09-11 NOTE — PLAN OF CARE
Problem: Potential for Falls  Goal: Patient will remain free of falls  INTERVENTIONS:  - Assess patient frequently for physical needs  -  Identify cognitive and physical deficits and behaviors that affect risk of falls    -  [place] fall precautions as indicated by assessment   - Educate patient/family on patient safety including physical limitations  - Instruct patient to call for assistance with activity based on assessment  - Modify environment to reduce risk of injury  - Consider OT/PT consult to assist with strengthening/mobility   Outcome: Progressing

## 2017-09-11 NOTE — PROGRESS NOTES
Patient tolerated Hydration  Zofran effective for "queasy feeling"  Denies any other discomforts  Aware of next appointment  AVS given to patient   Aware of next appointment

## 2017-09-19 ENCOUNTER — HOSPITAL ENCOUNTER (OUTPATIENT)
Dept: CT IMAGING | Facility: HOSPITAL | Age: 82
Discharge: HOME | End: 2017-09-19
Attending: INTERNAL MEDICINE
Payer: MEDICARE

## 2017-09-19 DIAGNOSIS — J90 PLEURAL EFFUSION, NOT ELSEWHERE CLASSIFIED: ICD-10-CM

## 2017-09-19 PROCEDURE — 71260 CT THORAX DX C+: CPT

## 2017-09-19 RX ADMIN — IOHEXOL 85 ML: 350 INJECTION, SOLUTION INTRAVENOUS at 09:00

## 2017-09-21 ENCOUNTER — GENERIC CONVERSION - ENCOUNTER (OUTPATIENT)
Dept: OTHER | Facility: OTHER | Age: 82
End: 2017-09-21

## 2017-09-29 ENCOUNTER — APPOINTMENT (OUTPATIENT)
Dept: LAB | Age: 82
End: 2017-09-29
Payer: MEDICARE

## 2017-09-29 ENCOUNTER — TRANSCRIBE ORDERS (OUTPATIENT)
Dept: ADMINISTRATIVE | Age: 82
End: 2017-09-29

## 2017-09-29 DIAGNOSIS — C34.90 MALIGNANT NEOPLASM OF UNSPECIFIED PART OF UNSPECIFIED BRONCHUS OR LUNG (HCC): ICD-10-CM

## 2017-09-29 LAB
ALBUMIN SERPL BCP-MCNC: 3.2 G/DL (ref 3.5–5)
ALP SERPL-CCNC: 83 U/L (ref 46–116)
ALT SERPL W P-5'-P-CCNC: 41 U/L (ref 12–78)
ANION GAP SERPL CALCULATED.3IONS-SCNC: 9 MMOL/L (ref 4–13)
AST SERPL W P-5'-P-CCNC: 33 U/L (ref 5–45)
BASOPHILS # BLD AUTO: 0.05 THOUSANDS/ΜL (ref 0–0.1)
BASOPHILS NFR BLD AUTO: 1 % (ref 0–1)
BILIRUB SERPL-MCNC: 0.44 MG/DL (ref 0.2–1)
BUN SERPL-MCNC: 15 MG/DL (ref 5–25)
CALCIUM SERPL-MCNC: 9.8 MG/DL (ref 8.3–10.1)
CHLORIDE SERPL-SCNC: 107 MMOL/L (ref 100–108)
CO2 SERPL-SCNC: 25 MMOL/L (ref 21–32)
CREAT SERPL-MCNC: 0.67 MG/DL (ref 0.6–1.3)
EOSINOPHIL # BLD AUTO: 0.46 THOUSAND/ΜL (ref 0–0.61)
EOSINOPHIL NFR BLD AUTO: 6 % (ref 0–6)
ERYTHROCYTE [DISTWIDTH] IN BLOOD BY AUTOMATED COUNT: 15.5 % (ref 11.6–15.1)
GFR SERPL CREATININE-BSD FRML MDRD: 82 ML/MIN/1.73SQ M
GLUCOSE SERPL-MCNC: 124 MG/DL (ref 65–140)
HCT VFR BLD AUTO: 43.3 % (ref 34.8–46.1)
HGB BLD-MCNC: 14.7 G/DL (ref 11.5–15.4)
LYMPHOCYTES # BLD AUTO: 2.9 THOUSANDS/ΜL (ref 0.6–4.47)
LYMPHOCYTES NFR BLD AUTO: 41 % (ref 14–44)
MCH RBC QN AUTO: 30.9 PG (ref 26.8–34.3)
MCHC RBC AUTO-ENTMCNC: 33.9 G/DL (ref 31.4–37.4)
MCV RBC AUTO: 91 FL (ref 82–98)
MONOCYTES # BLD AUTO: 1.04 THOUSAND/ΜL (ref 0.17–1.22)
MONOCYTES NFR BLD AUTO: 14 % (ref 4–12)
NEUTROPHILS # BLD AUTO: 2.8 THOUSANDS/ΜL (ref 1.85–7.62)
NEUTS SEG NFR BLD AUTO: 38 % (ref 43–75)
NRBC BLD AUTO-RTO: 0 /100 WBCS
PLATELET # BLD AUTO: 401 THOUSANDS/UL (ref 149–390)
PMV BLD AUTO: 10.8 FL (ref 8.9–12.7)
POTASSIUM SERPL-SCNC: 4.1 MMOL/L (ref 3.5–5.3)
PROT SERPL-MCNC: 7.2 G/DL (ref 6.4–8.2)
RBC # BLD AUTO: 4.75 MILLION/UL (ref 3.81–5.12)
SODIUM SERPL-SCNC: 141 MMOL/L (ref 136–145)
WBC # BLD AUTO: 7.3 THOUSAND/UL (ref 4.31–10.16)

## 2017-09-29 PROCEDURE — 85025 COMPLETE CBC W/AUTO DIFF WBC: CPT

## 2017-09-29 PROCEDURE — 80053 COMPREHEN METABOLIC PANEL: CPT

## 2017-09-29 PROCEDURE — 36415 COLL VENOUS BLD VENIPUNCTURE: CPT

## 2017-09-29 RX ORDER — SODIUM CHLORIDE 9 MG/ML
20 INJECTION, SOLUTION INTRAVENOUS CONTINUOUS
Status: DISCONTINUED | OUTPATIENT
Start: 2017-10-02 | End: 2017-10-05 | Stop reason: HOSPADM

## 2017-09-29 RX ORDER — CYANOCOBALAMIN 1000 UG/ML
1000 INJECTION INTRAMUSCULAR; SUBCUTANEOUS ONCE
Status: COMPLETED | OUTPATIENT
Start: 2017-10-02 | End: 2017-10-02

## 2017-10-02 ENCOUNTER — HOSPITAL ENCOUNTER (OUTPATIENT)
Dept: INFUSION CENTER | Facility: CLINIC | Age: 82
Discharge: HOME | End: 2017-10-02
Payer: MEDICARE

## 2017-10-02 VITALS
BODY MASS INDEX: 30.26 KG/M2 | RESPIRATION RATE: 18 BRPM | DIASTOLIC BLOOD PRESSURE: 78 MMHG | TEMPERATURE: 97.6 F | SYSTOLIC BLOOD PRESSURE: 156 MMHG | HEIGHT: 62 IN | WEIGHT: 164.46 LBS | HEART RATE: 55 BPM

## 2017-10-02 PROCEDURE — 96367 TX/PROPH/DG ADDL SEQ IV INF: CPT

## 2017-10-02 PROCEDURE — 96417 CHEMO IV INFUS EACH ADDL SEQ: CPT

## 2017-10-02 PROCEDURE — 96413 CHEMO IV INFUSION 1 HR: CPT

## 2017-10-02 PROCEDURE — 96372 THER/PROPH/DIAG INJ SC/IM: CPT

## 2017-10-02 RX ADMIN — DEXAMETHASONE SODIUM PHOSPHATE: 10 INJECTION, SOLUTION INTRAMUSCULAR; INTRAVENOUS at 11:18

## 2017-10-02 RX ADMIN — CARBOPLATIN 491 MG: 10 INJECTION, SOLUTION INTRAVENOUS at 12:16

## 2017-10-02 RX ADMIN — CYANOCOBALAMIN 1000 MCG: 1000 INJECTION, SOLUTION INTRAMUSCULAR at 11:29

## 2017-10-02 RX ADMIN — SODIUM CHLORIDE 900 MG: 9 INJECTION, SOLUTION INTRAVENOUS at 11:57

## 2017-10-02 RX ADMIN — SODIUM CHLORIDE 20 ML/HR: 0.9 INJECTION, SOLUTION INTRAVENOUS at 11:18

## 2017-10-02 NOTE — PROGRESS NOTES
Pt tolerated chemo today without incident  Pt requests to keep her IV for hydration tomorrow  IV wrapped  Pt aware of appt tomorrow    Pt declined AVS

## 2017-10-02 NOTE — PLAN OF CARE
Problem: Potential for Falls  Goal: Patient will remain free of falls  INTERVENTIONS:  - Assess patient frequently for physical needs  -  Identify cognitive and physical deficits and behaviors that affect risk of falls    -  [place] fall precautions as indicated by assessment   - Educate patient/family on patient safety including physical limitations  - Instruct patient to call for assistance with activity based on assessment  - Modify environment to reduce risk of injury  - Consider OT/PT consult to assist with strengthening/mobility   Outcome: Progressing

## 2017-10-03 ENCOUNTER — HOSPITAL ENCOUNTER (OUTPATIENT)
Dept: INFUSION CENTER | Facility: CLINIC | Age: 82
Discharge: HOME | End: 2017-10-03
Payer: MEDICARE

## 2017-10-03 VITALS
HEART RATE: 72 BPM | RESPIRATION RATE: 16 BRPM | SYSTOLIC BLOOD PRESSURE: 128 MMHG | DIASTOLIC BLOOD PRESSURE: 62 MMHG | TEMPERATURE: 98.3 F

## 2017-10-03 PROCEDURE — 96361 HYDRATE IV INFUSION ADD-ON: CPT

## 2017-10-03 PROCEDURE — 96360 HYDRATION IV INFUSION INIT: CPT

## 2017-10-03 RX ADMIN — SODIUM CHLORIDE 1000 ML: 0.9 INJECTION, SOLUTION INTRAVENOUS at 11:50

## 2017-10-03 NOTE — PROGRESS NOTES
Pt  Denies new symptoms or concerns at this time  Rt hand IV access intact  IV flushed with excellent blood return  Hydration ordered today  , 1000ml NSS over 2 hrs

## 2017-10-03 NOTE — PLAN OF CARE
Problem: PAIN - ADULT  Goal: Verbalizes/displays adequate comfort level or baseline comfort level  Interventions:  - Encourage patient to monitor pain and request assistance  - Assess pain using appropriate pain scale  - Administer analgesics based on type and severity of pain and evaluate response  - Implement non-pharmacological measures as appropriate and evaluate response  - Consider cultural and social influences on pain and pain management  - Notify physician/advanced practitioner if interventions unsuccessful or patient reports new pain  Outcome: Progressing      Problem: INFECTION - ADULT  Goal: Absence or prevention of progression during hospitalization  INTERVENTIONS:  - Assess and monitor for signs and symptoms of infection  - Monitor lab/diagnostic results  - Monitor all insertion sites, i e  indwelling lines, tubes, and drains  - Monitor endotracheal (as able) and nasal secretions for changes in amount and color  - [place] appropriate cooling/warming therapies per order  - Administer medications as ordered  - Instruct and encourage patient and family to use good hand hygiene technique  - Identify and instruct in appropriate isolation precautions for identified infection/condition  Outcome: Progressing    Goal: Absence of fever/infection during neutropenic period  INTERVENTIONS:  - Monitor WBC  - Implement neutropenic guidelines  Outcome: Progressing      Problem: Knowledge Deficit  Goal: Patient/family/caregiver demonstrates understanding of disease process, treatment plan, medications, and discharge instructions  Complete learning assessment and assess knowledge base    Interventions:  - Provide teaching at level of understanding  - Provide teaching via preferred learning methods  Outcome: Progressing

## 2017-10-03 NOTE — PROGRESS NOTES
Pt  Tolerated hydration without adverse event  Future appointment reviewed  AVS provided   IV reinforced/secured for appointment scheduled 10/4/2017

## 2017-10-04 ENCOUNTER — HOSPITAL ENCOUNTER (OUTPATIENT)
Dept: INFUSION CENTER | Facility: CLINIC | Age: 82
Discharge: HOME | End: 2017-10-04
Payer: MEDICARE

## 2017-10-04 VITALS
HEART RATE: 68 BPM | SYSTOLIC BLOOD PRESSURE: 128 MMHG | TEMPERATURE: 98.8 F | RESPIRATION RATE: 18 BRPM | DIASTOLIC BLOOD PRESSURE: 60 MMHG

## 2017-10-04 PROCEDURE — 96365 THER/PROPH/DIAG IV INF INIT: CPT

## 2017-10-04 PROCEDURE — 96361 HYDRATE IV INFUSION ADD-ON: CPT

## 2017-10-04 RX ADMIN — SODIUM CHLORIDE 1000 ML: 0.9 INJECTION, SOLUTION INTRAVENOUS at 11:50

## 2017-10-04 RX ADMIN — ONDANSETRON 8 MG: 2 INJECTION INTRAMUSCULAR; INTRAVENOUS at 12:00

## 2017-10-04 NOTE — PROGRESS NOTES
1435 Tolerated iv hydration well, will return 10/5 for hydration/ zofran  Iv site flushed & dsd applied

## 2017-10-04 NOTE — PLAN OF CARE
Problem: Potential for Falls  Goal: Patient will remain free of falls  INTERVENTIONS:  - Assess patient frequently for physical needs  -  Identify cognitive and physical deficits and behaviors that affect risk of falls    -  [place] fall precautions as indicated by assessment   - Educate patient/family on patient safety including physical limitations  - Instruct patient to call for assistance with activity based on assessment  - Modify environment to reduce risk of injury  - Consider OT/PT consult to assist with strengthening/mobility   Outcome: Progressing

## 2017-10-05 ENCOUNTER — HOSPITAL ENCOUNTER (OUTPATIENT)
Dept: INFUSION CENTER | Facility: CLINIC | Age: 82
Discharge: HOME | End: 2017-10-05
Payer: MEDICARE

## 2017-10-05 VITALS
HEART RATE: 76 BPM | TEMPERATURE: 98.4 F | DIASTOLIC BLOOD PRESSURE: 70 MMHG | RESPIRATION RATE: 18 BRPM | SYSTOLIC BLOOD PRESSURE: 128 MMHG

## 2017-10-05 PROCEDURE — 96374 THER/PROPH/DIAG INJ IV PUSH: CPT

## 2017-10-05 PROCEDURE — 96361 HYDRATE IV INFUSION ADD-ON: CPT

## 2017-10-05 RX ADMIN — ONDANSETRON 8 MG: 2 INJECTION INTRAMUSCULAR; INTRAVENOUS at 11:30

## 2017-10-05 RX ADMIN — SODIUM CHLORIDE 1000 ML: 0.9 INJECTION, SOLUTION INTRAVENOUS at 11:10

## 2017-10-05 NOTE — PROGRESS NOTES
Patient arrived to the infusion for hydration  Pt c/o of mild nausea without vomiting  Hydration and PRN Zofran given  Pt tolerated well  Pt is aware of next appointment

## 2017-10-20 ENCOUNTER — APPOINTMENT (OUTPATIENT)
Dept: LAB | Age: 82
End: 2017-10-20
Payer: MEDICARE

## 2017-10-20 DIAGNOSIS — C34.90 MALIGNANT NEOPLASM OF UNSPECIFIED PART OF UNSPECIFIED BRONCHUS OR LUNG (HCC): ICD-10-CM

## 2017-10-20 LAB
ALBUMIN SERPL BCP-MCNC: 3.5 G/DL (ref 3.5–5)
ALP SERPL-CCNC: 80 U/L (ref 46–116)
ALT SERPL W P-5'-P-CCNC: 44 U/L (ref 12–78)
ANION GAP SERPL CALCULATED.3IONS-SCNC: 8 MMOL/L (ref 4–13)
AST SERPL W P-5'-P-CCNC: 35 U/L (ref 5–45)
BASOPHILS # BLD AUTO: 0.02 THOUSANDS/ΜL (ref 0–0.1)
BASOPHILS NFR BLD AUTO: 0 % (ref 0–1)
BILIRUB SERPL-MCNC: 0.49 MG/DL (ref 0.2–1)
BUN SERPL-MCNC: 15 MG/DL (ref 5–25)
CALCIUM SERPL-MCNC: 9.8 MG/DL (ref 8.3–10.1)
CHLORIDE SERPL-SCNC: 107 MMOL/L (ref 100–108)
CO2 SERPL-SCNC: 25 MMOL/L (ref 21–32)
CREAT SERPL-MCNC: 0.68 MG/DL (ref 0.6–1.3)
EOSINOPHIL # BLD AUTO: 0.22 THOUSAND/ΜL (ref 0–0.61)
EOSINOPHIL NFR BLD AUTO: 3 % (ref 0–6)
ERYTHROCYTE [DISTWIDTH] IN BLOOD BY AUTOMATED COUNT: 16 % (ref 11.6–15.1)
GFR SERPL CREATININE-BSD FRML MDRD: 82 ML/MIN/1.73SQ M
GLUCOSE SERPL-MCNC: 106 MG/DL (ref 65–140)
HCT VFR BLD AUTO: 42 % (ref 34.8–46.1)
HGB BLD-MCNC: 14 G/DL (ref 11.5–15.4)
LYMPHOCYTES # BLD AUTO: 2.65 THOUSANDS/ΜL (ref 0.6–4.47)
LYMPHOCYTES NFR BLD AUTO: 35 % (ref 14–44)
MCH RBC QN AUTO: 30.8 PG (ref 26.8–34.3)
MCHC RBC AUTO-ENTMCNC: 33.3 G/DL (ref 31.4–37.4)
MCV RBC AUTO: 92 FL (ref 82–98)
MONOCYTES # BLD AUTO: 0.84 THOUSAND/ΜL (ref 0.17–1.22)
MONOCYTES NFR BLD AUTO: 11 % (ref 4–12)
NEUTROPHILS # BLD AUTO: 3.74 THOUSANDS/ΜL (ref 1.85–7.62)
NEUTS SEG NFR BLD AUTO: 51 % (ref 43–75)
NRBC BLD AUTO-RTO: 0 /100 WBCS
PLATELET # BLD AUTO: 297 THOUSANDS/UL (ref 149–390)
PMV BLD AUTO: 10.8 FL (ref 8.9–12.7)
POTASSIUM SERPL-SCNC: 4 MMOL/L (ref 3.5–5.3)
PROT SERPL-MCNC: 7.2 G/DL (ref 6.4–8.2)
RBC # BLD AUTO: 4.55 MILLION/UL (ref 3.81–5.12)
SODIUM SERPL-SCNC: 140 MMOL/L (ref 136–145)
WBC # BLD AUTO: 7.49 THOUSAND/UL (ref 4.31–10.16)

## 2017-10-20 PROCEDURE — 36415 COLL VENOUS BLD VENIPUNCTURE: CPT

## 2017-10-20 PROCEDURE — 80053 COMPREHEN METABOLIC PANEL: CPT

## 2017-10-20 PROCEDURE — 85025 COMPLETE CBC W/AUTO DIFF WBC: CPT

## 2017-10-20 RX ORDER — CYANOCOBALAMIN 1000 UG/ML
1000 INJECTION INTRAMUSCULAR; SUBCUTANEOUS ONCE
Status: COMPLETED | OUTPATIENT
Start: 2017-10-23 | End: 2017-10-23

## 2017-10-20 RX ORDER — SODIUM CHLORIDE 9 MG/ML
20 INJECTION, SOLUTION INTRAVENOUS CONTINUOUS
Status: DISCONTINUED | OUTPATIENT
Start: 2017-10-23 | End: 2017-10-26 | Stop reason: HOSPADM

## 2017-10-23 ENCOUNTER — HOSPITAL ENCOUNTER (OUTPATIENT)
Dept: INFUSION CENTER | Facility: CLINIC | Age: 82
Discharge: HOME | End: 2017-10-23
Payer: MEDICARE

## 2017-10-23 VITALS
HEART RATE: 60 BPM | DIASTOLIC BLOOD PRESSURE: 77 MMHG | BODY MASS INDEX: 30.59 KG/M2 | WEIGHT: 166.23 LBS | HEIGHT: 62 IN | OXYGEN SATURATION: 96 % | SYSTOLIC BLOOD PRESSURE: 145 MMHG | RESPIRATION RATE: 16 BRPM | TEMPERATURE: 97.1 F

## 2017-10-23 PROCEDURE — 96372 THER/PROPH/DIAG INJ SC/IM: CPT

## 2017-10-23 PROCEDURE — 96413 CHEMO IV INFUSION 1 HR: CPT

## 2017-10-23 PROCEDURE — 96411 CHEMO IV PUSH ADDL DRUG: CPT

## 2017-10-23 PROCEDURE — 96367 TX/PROPH/DG ADDL SEQ IV INF: CPT

## 2017-10-23 RX ADMIN — CARBOPLATIN 491 MG: 10 INJECTION, SOLUTION INTRAVENOUS at 13:45

## 2017-10-23 RX ADMIN — CYANOCOBALAMIN 1000 MCG: 1000 INJECTION, SOLUTION INTRAMUSCULAR at 14:49

## 2017-10-23 RX ADMIN — DEXAMETHASONE SODIUM PHOSPHATE: 10 INJECTION, SOLUTION INTRAMUSCULAR; INTRAVENOUS at 12:18

## 2017-10-23 RX ADMIN — SODIUM CHLORIDE 20 ML/HR: 0.9 INJECTION, SOLUTION INTRAVENOUS at 12:14

## 2017-10-23 RX ADMIN — SODIUM CHLORIDE 900 MG: 9 INJECTION, SOLUTION INTRAVENOUS at 13:19

## 2017-10-23 NOTE — PLAN OF CARE
Problem: Potential for Falls  Goal: Patient will remain free of falls  INTERVENTIONS:  - Assess patient frequently for physical needs  -  Identify cognitive and physical deficits and behaviors that affect risk of falls    -  [place] fall precautions as indicated by assessment   - Educate patient/family on patient safety including physical limitations  - Instruct patient to call for assistance with activity based on assessment  - Modify environment to reduce risk of injury  - Consider OT/PT consult to assist with strengthening/mobility   Outcome: Progressing

## 2017-10-23 NOTE — PROGRESS NOTES
Patient tolerated chemotherapy  Denies any discomforts  RA IV access remained intact for tomorrow's treatment per patient request- wrapped with cling and netting   AVS given to patient

## 2017-10-23 NOTE — PROGRESS NOTES
Patient arrived on unit  Exertional dyspnea noted however resolves when patient sits  Pulse ox- 96% on RA  VSS   Cleared for chemotherapy as per  lab parameters on orders- labs drawn on 10/20/17

## 2017-10-24 ENCOUNTER — HOSPITAL ENCOUNTER (OUTPATIENT)
Dept: INFUSION CENTER | Facility: CLINIC | Age: 82
Discharge: HOME | End: 2017-10-24
Payer: MEDICARE

## 2017-10-24 VITALS
DIASTOLIC BLOOD PRESSURE: 70 MMHG | RESPIRATION RATE: 18 BRPM | SYSTOLIC BLOOD PRESSURE: 130 MMHG | HEART RATE: 50 BPM | OXYGEN SATURATION: 95 % | TEMPERATURE: 97.6 F

## 2017-10-24 PROCEDURE — 96361 HYDRATE IV INFUSION ADD-ON: CPT

## 2017-10-24 PROCEDURE — 96360 HYDRATION IV INFUSION INIT: CPT

## 2017-10-24 RX ADMIN — SODIUM CHLORIDE 1000 ML: 0.9 INJECTION, SOLUTION INTRAVENOUS at 12:10

## 2017-10-24 NOTE — PROGRESS NOTES
Patient denies nausea and tolerated her NSS well without adverse reaction  Patient aware of next appointment

## 2017-10-24 NOTE — PLAN OF CARE
Problem: Potential for Falls  Goal: Patient will remain free of falls  INTERVENTIONS:  - Assess patient frequently for physical needs  -  Identify cognitive and physical deficits and behaviors that affect risk of falls    -  [place] fall precautions as indicated by assessment   - Educate patient/family on patient safety including physical limitations  - Instruct patient to call for assistance with activity based on assessment  - Modify environment to reduce risk of injury  - Consider OT/PT consult to assist with strengthening/mobility   Outcome: Progressing

## 2017-10-25 ENCOUNTER — HOSPITAL ENCOUNTER (OUTPATIENT)
Dept: INFUSION CENTER | Facility: CLINIC | Age: 82
Discharge: HOME | End: 2017-10-25
Payer: MEDICARE

## 2017-10-25 VITALS
TEMPERATURE: 98.1 F | RESPIRATION RATE: 18 BRPM | DIASTOLIC BLOOD PRESSURE: 72 MMHG | HEART RATE: 62 BPM | SYSTOLIC BLOOD PRESSURE: 152 MMHG

## 2017-10-25 PROCEDURE — 96361 HYDRATE IV INFUSION ADD-ON: CPT

## 2017-10-25 PROCEDURE — 96360 HYDRATION IV INFUSION INIT: CPT

## 2017-10-25 RX ADMIN — SODIUM CHLORIDE 1000 ML: 0.9 INJECTION, SOLUTION INTRAVENOUS at 11:52

## 2017-10-25 NOTE — PLAN OF CARE

## 2017-10-26 ENCOUNTER — HOSPITAL ENCOUNTER (OUTPATIENT)
Dept: INFUSION CENTER | Facility: CLINIC | Age: 82
Discharge: HOME | End: 2017-10-26
Payer: MEDICARE

## 2017-10-26 ENCOUNTER — ALLSCRIPTS OFFICE VISIT (OUTPATIENT)
Dept: OTHER | Facility: OTHER | Age: 82
End: 2017-10-26

## 2017-10-26 PROCEDURE — 96361 HYDRATE IV INFUSION ADD-ON: CPT

## 2017-10-26 PROCEDURE — 96365 THER/PROPH/DIAG IV INF INIT: CPT

## 2017-10-26 RX ADMIN — ONDANSETRON 8 MG: 2 INJECTION INTRAMUSCULAR; INTRAVENOUS at 11:38

## 2017-10-26 RX ADMIN — SODIUM CHLORIDE 1000 ML: 0.9 INJECTION, SOLUTION INTRAVENOUS at 11:19

## 2017-10-26 NOTE — PLAN OF CARE
Problem: Potential for Falls  Goal: Patient will remain free of falls  INTERVENTIONS:  - Assess patient frequently for physical needs  -  Identify cognitive and physical deficits and behaviors that affect risk of falls    -  [place] fall precautions as indicated by assessment   - Educate patient/family on patient safety including physical limitations  - Instruct patient to call for assistance with activity based on assessment  - Modify environment to reduce risk of injury  - Consider OT/PT consult to assist with strengthening/mobility   Outcome: Progressing

## 2017-10-27 NOTE — PROGRESS NOTES
Assessment  1  Lung cancer (162 9) (C34 90)   2  Pleural effusion (511 9) (J90)    Plan  Lung cancer    · (1) CBC/PLT/DIFF; Status:Active; Requested UAF:[id number];    Perform:[facility]; WYU:[id number]; Ordered; For:Lung cancer; Ordered By:Faroun, Levonia Jolly Carolan Carrel);   · (1) COMPREHENSIVE METABOLIC PANEL; Status:Active; Requested BTJ:[id number];    Perform:[facility]; KEZ:[id number]; Ordered; For:Lung cancer; Ordered By:Faroun, Levonia Jolly Carolan Carrel); · Follow-up visit in 3 weeks Evaluation and Treatment  Follow-up  Status: Complete  Done:  [id number]   Ordered; For: Lung cancer; Ordered By: Jose Shore) Performed:  Due: 97VSM2094; Last Updated By: Kacey Lamar; 10/26/2017 2:22:07 PM    Discussion/Summary  Discussion Summary:   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, biopsy was small, molecular tests by serum analysis were negative except for RB 1 mutationProceed with cycle 6  Out of 6 of Alimta / carboplatin, and repeat CT/PET scan that after, she had a good response, she will continue to be on Alimta maintenance therapy thereaftercontinue folic acid 1 mg p  o  daily and vitamin B12 1000 mcg IM shot every chemotherapy        Chief Complaint  Chief Complaint Free Text Note Form: Malignant left sided pleural effusion      History of Present Illness  HPI: 80-year-old  female who presented [facility] with dyspnea for the past 4-5 days and pleurisy, with occasional dry coughscan of the thorax showed no evidence of PE, it showed a large loculated left side pleural effusion with nodularity concerning for metastatic disease, there is a concern for a mass versus pneumonia in the collapsed left lower lobe lungunderwent left thoracentesis Ã2 yielding 1 4 L of bloody fluid, pathology showed adenocarcinoma, positive for TTF-1, CK 7 most likely consistent with non-small cell lung cancerpatient had a history of left-sided breast cancer in 1992 status post mastectomy she told me she had told lymph node involvement, she was not treated with either chemotherapy, radiation therapy or hormonal therapyhas extensive family history of cancerwas diagnosed with breast cancer, sister was diagnosed with breast cancer at age 36, another sister was diagnosed with breast cancer at age 72, a brother diagnosed with pancreatic cancer and another brother with lung cancerniece was found to have BRCA gene mutationis allergic to sulfa, atorvastatin, latex   Current Therapy: Carboplatin AUC 5, Alimta 500 mg/mÂ² every 3 weeks initiated in July 2017, she finished 5 cycles out of 6 in October 2017   Tumor Markers: BRCA1/2 is negative, CA-27-29 is normal, CEA is normaltest for EGFR mutation is negative, ALK is negative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       Review of Systems  Complete-Female:   Constitutional: no fever,-- not feeling poorly,-- no chills,-- not feeling tired-- and-- no recent weight loss  Eyes: No complaints of eye pain, no red eyes, no eyesight problems, no discharge, no dry eyes, no itching of eyes  ENT: no sore throat-- and-- no hoarseness  Cardiovascular: No complaints of slow heart rate, no fast heart rate, no chest pain, no palpitations, no leg claudication, no lower extremity edema  Respiratory: shortness of breath during exertion-- and-- shortness of breath during exertion, but-- no cough,-- no orthopnea,-- no wheezing,-- no PND,-- not short of breath at rest, shortness of breath does not worsen with lying down and no stridor  Gastrointestinal: no abdominal pain  Genitourinary: No complaints of dysuria, no incontinence, no pelvic pain, no dysmenorrhea, no vaginal discharge or bleeding  Musculoskeletal: No complaints of arthralgias, no myalgias, no joint swelling or stiffness, no limb pain or swelling  Integumentary: No complaints of skin rash or lesions, no itching, no skin wounds, no breast pain or lump     Neurological: No complaints of headache, no confusion, no convulsions, no numbness, no dizziness or fainting, no tingling, no limb weakness, no difficulty walking  Psychiatric: no sleep disturbances,-- no depression-- and-- no emotional problems  Endocrine: No complaints of proptosis, no hot flashes, no muscle weakness, no deepening of the voice, no feelings of weakness  Hematologic/Lymphatic: no swollen glands,-- no tendency for easy bleeding-- and-- no swollen glands in the neck  Active Problems  1  Contusion of face (920) (S00 83XA)   2  Contusion of hand, left (923 20) (S60 222A)   3  Fall (E888 9) [facility])   4  Fracture, metacarpal (815 00) (S62 309A)   5  History of left breast cancer (V10 3) (Z85 3)   6  Hypertension (401 9) (I10)   7  Injury of chest wall (959 11) (S29 9XXA)   8  Lung cancer (162 9) (C34 90)   9  Pleural effusion (511 9) (J90)   10  Shortness of breath (786 05) (R06 02)   11  SOB (shortness of breath) (786 05) (R06 02)    Past Medical History  1  History of Anxiety and depression (300 4) (F41 8)   2  History of breast cancer (V10 3) (Z85 3)   3  History of hyperglycemia (V12 29) (Z86 39)    Surgical History  1  History of Radical Mastectomy Left Breast    Social History   · Non-smoker (V49 89) (Z78 9)    Current Meds   1  Calcium Citrate + D 315-250 MG-UNIT Oral Tablet; Therapy: (Recorded:37Xmj1491) to Recorded   2  Citalopram Hydrobromide 20 MG Oral Tablet; Therapy: (Recorded:97Nan8545) to Recorded   3  Dexamethasone 4 MG Oral Tablet; 1  tab po bid for 3 days  Start day before treatment  Take with food; Therapy: 08Aug2017 to (Last Rx:08Aug2017)  Requested for: 10Aug2017 Ordered   4  Fish Oil OIL; Therapy: (Recorded:91Rix3549) to Recorded   5  MetFORMIN HCl - 500 MG Oral Tablet; Therapy: (Recorded:01Fhh3585) to Recorded   6  Metoprolol Tartrate TABS; 50 MG BID; Therapy: (Roya Cinnamon) to Recorded   7  Ondansetron HCl - 4 MG Oral Tablet; A5uqJHU for nausea;    Therapy: 13GQS8625 to (Last Rx:68Ixz5396)  Requested for: 18Xao1623 Ordered   8  Prochlorperazine Maleate 10 MG Oral Tablet; TAKE 1 TABLET EVERY 6 HOURS AS   NEEDED FOR NAUSEA; Therapy: 92WWX8183 to (Evaluate:20Lrc0143)  Requested for: 29Nct0989; Last   Rx:71Tgs4777 Ordered   9  Vicodin 5-300 MG Oral Tablet; TAKE 1 TABLET EVERY 4 TO 6 HOURS AS NEEDED FOR   PAIN;   Therapy: 95JAL7911 to (Evaluate:21Dty0628); Last Rx:48Tgt8514 Ordered   10  Vitamin B-12 1000 MCG Oral Tablet; Therapy: (Recorded:41Mbp8724) to Recorded    Allergies  1  Sulfa Drugs    Vitals  Vital Signs    Recorded: 77GYO6019 02:00PM   Temperature 97 2 F   Heart Rate 83   Respiration 16   Systolic 181   Diastolic 82   Height 5 ft 2 in   Weight 167 lb 6 0 oz   BMI Calculated 30 61   BSA Calculated 1 77   O2 Saturation 96     Physical Exam    Constitutional   General appearance: No acute distress, well appearing and well nourished  Eyes   Conjunctiva and lids: No swelling, erythema or discharge  Pupils and irises: Equal, round and reactive to light  Ears, Nose, Mouth, and Throat   External inspection of ears and nose: Normal     Oropharynx: Normal with no erythema, edema, exudate or lesions  Pulmonary   Auscultation of lungs: Abnormal   rales/crackles over the left base  Cardiovascular   Auscultation of heart: Normal rate and rhythm, normal S1 and S2, without murmurs  Examination of extremities for edema and/or varicosities: Normal     Carotid pulses: Normal     Abdomen   Abdomen: Non-tender, no masses  Liver and spleen: No hepatomegaly or splenomegaly  Lymphatic   Palpation of lymph nodes in neck: No lymphadenopathy  Musculoskeletal   Gait and station: Normal     Digits and nails: Normal without clubbing or cyanosis  Inspection/palpation of joints, bones, and muscles: Normal     Skin   Skin and subcutaneous tissue: Normal without rashes or lesions  Neurologic   Cranial nerves: Cranial nerves 2-12 intact      Psychiatric Orientation to person, place, and time: Normal     Mood and affect: Normal         ECOG 1       Results/Data  (1) CBC/PLT/DIFF 20Oct2017 12:08PM Marco Alba [facility])    Order Number: [id number]     Test Name Result Flag Reference   WBC COUNT 7 49 Thousand/uL  4 31-10 16   RBC COUNT 4 55 Million/uL  3 81-5 12   HEMOGLOBIN 14 0 g/dL  11 5-15 4   HEMATOCRIT 42 0 %  34 8-46  1   MCV 92 fL  82-98   MCH 30 8 pg  26 8-34 3   MCHC 33 3 g/dL  31 4-37 4   RDW 16 0 % H 11 6-15 1   MPV 10 8 fL  8 9-12 7   PLATELET COUNT 209 Thousands/uL  149-390   nRBC AUTOMATED 0 /100 WBCs     NEUTROPHILS RELATIVE PERCENT 51 %  43-75   LYMPHOCYTES RELATIVE PERCENT 35 %  14-44   MONOCYTES RELATIVE PERCENT 11 %  4-12   EOSINOPHILS RELATIVE PERCENT 3 %  0-6   BASOPHILS RELATIVE PERCENT 0 %  0-1   NEUTROPHILS ABSOLUTE COUNT 3 74 Thousands/? ??L  1 85-7 62   LYMPHOCYTES ABSOLUTE COUNT 2 65 Thousands/? ??L  0 60-4 47   MONOCYTES ABSOLUTE COUNT 0 84 Thousand/? ??L  0 17-1 22   EOSINOPHILS ABSOLUTE COUNT 0 22 Thousand/? ??L  0 00-0 61   BASOPHILS ABSOLUTE COUNT 0 02 Thousands/? ??L  0 00-0 10     (1) COMPREHENSIVE METABOLIC PANEL 51ZCL2296 17:76RX [facility]    Order Number: [id number]     Test Name Result Flag Reference   GLUCOSE,RANDM 106 mg/dL     If the patient is fasting, the ADA then defines impaired fasting glucose as > 100 mg/dL and diabetes as > or equal to 123 mg/dL  Specimen collection should occur prior to Sulfasalazine administration due to the potential for falsely depressed results  Specimen collection should occur prior to Sulfapyridine administration due to the potential for falsely elevated results     SODIUM 140 mmol/L  136-145   POTASSIUM 4 0 mmol/L  3 5-5 3   CHLORIDE 107 mmol/L  100-108   CARBON DIOXIDE 25 mmol/L  21-32   ANION GAP (CALC) 8 mmol/L  4-13   BLOOD UREA NITROGEN 15 mg/dL  5-25   CREATININE 0 68 mg/dL  0 60-1 30   Standardized to IDMS reference method   CALCIUM 9 8 mg/dL  8 3-10 1 BILI, TOTAL 0 49 mg/dL  0 20-1 00   ALK PHOSPHATAS 80 U/L     ALT (SGPT) 44 U/L  12-78   Specimen collection should occur prior to Sulfasalazine and/or Sulfapyridine administration due to the potential for falsely depressed results  AST(SGOT) 35 U/L  5-45   Specimen collection should occur prior to Sulfasalazine administration due to the potential for falsely depressed results  ALBUMIN 3 5 g/dL  3 5-5 0   TOTAL PROTEIN 7 2 g/dL  6 4-8 2   eGFR 82 ml/min/1 73sq m     National Kidney Disease Education Program recommendations are as follows:  GFR calculation is accurate only with a steady state creatinine  Chronic Kidney disease less than 60 ml/min/1 73 sq  meters  Kidney failure less than 15 ml/min/1 73 sq  meters  Signatures   Electronically signed by :  CHAMP Shearer ; Oct 26 2017  4:38PM EST                       (Author)

## 2017-11-09 ENCOUNTER — APPOINTMENT (OUTPATIENT)
Dept: LAB | Age: 82
End: 2017-11-09
Payer: MEDICARE

## 2017-11-09 ENCOUNTER — TRANSCRIBE ORDERS (OUTPATIENT)
Dept: ADMINISTRATIVE | Age: 82
End: 2017-11-09

## 2017-11-09 DIAGNOSIS — C34.90 MALIGNANT NEOPLASM OF UNSPECIFIED PART OF UNSPECIFIED BRONCHUS OR LUNG (HCC): ICD-10-CM

## 2017-11-09 LAB
ALBUMIN SERPL BCP-MCNC: 3.4 G/DL (ref 3.5–5)
ALP SERPL-CCNC: 96 U/L (ref 46–116)
ALT SERPL W P-5'-P-CCNC: 37 U/L (ref 12–78)
ANION GAP SERPL CALCULATED.3IONS-SCNC: 7 MMOL/L (ref 4–13)
AST SERPL W P-5'-P-CCNC: 29 U/L (ref 5–45)
BASOPHILS # BLD AUTO: 0.02 THOUSANDS/ΜL (ref 0–0.1)
BASOPHILS NFR BLD AUTO: 0 % (ref 0–1)
BILIRUB SERPL-MCNC: 0.42 MG/DL (ref 0.2–1)
BUN SERPL-MCNC: 17 MG/DL (ref 5–25)
CALCIUM SERPL-MCNC: 9.6 MG/DL (ref 8.3–10.1)
CHLORIDE SERPL-SCNC: 105 MMOL/L (ref 100–108)
CO2 SERPL-SCNC: 26 MMOL/L (ref 21–32)
CREAT SERPL-MCNC: 0.64 MG/DL (ref 0.6–1.3)
EOSINOPHIL # BLD AUTO: 0.15 THOUSAND/ΜL (ref 0–0.61)
EOSINOPHIL NFR BLD AUTO: 2 % (ref 0–6)
ERYTHROCYTE [DISTWIDTH] IN BLOOD BY AUTOMATED COUNT: 15.6 % (ref 11.6–15.1)
GFR SERPL CREATININE-BSD FRML MDRD: 83 ML/MIN/1.73SQ M
GLUCOSE SERPL-MCNC: 89 MG/DL (ref 65–140)
HCT VFR BLD AUTO: 40 % (ref 34.8–46.1)
HGB BLD-MCNC: 13.2 G/DL (ref 11.5–15.4)
LYMPHOCYTES # BLD AUTO: 2.92 THOUSANDS/ΜL (ref 0.6–4.47)
LYMPHOCYTES NFR BLD AUTO: 37 % (ref 14–44)
MCH RBC QN AUTO: 31.3 PG (ref 26.8–34.3)
MCHC RBC AUTO-ENTMCNC: 33 G/DL (ref 31.4–37.4)
MCV RBC AUTO: 95 FL (ref 82–98)
MONOCYTES # BLD AUTO: 0.97 THOUSAND/ΜL (ref 0.17–1.22)
MONOCYTES NFR BLD AUTO: 12 % (ref 4–12)
NEUTROPHILS # BLD AUTO: 3.74 THOUSANDS/ΜL (ref 1.85–7.62)
NEUTS SEG NFR BLD AUTO: 49 % (ref 43–75)
NRBC BLD AUTO-RTO: 0 /100 WBCS
PLATELET # BLD AUTO: 252 THOUSANDS/UL (ref 149–390)
PMV BLD AUTO: 11 FL (ref 8.9–12.7)
POTASSIUM SERPL-SCNC: 4.2 MMOL/L (ref 3.5–5.3)
PROT SERPL-MCNC: 7.1 G/DL (ref 6.4–8.2)
RBC # BLD AUTO: 4.22 MILLION/UL (ref 3.81–5.12)
SODIUM SERPL-SCNC: 138 MMOL/L (ref 136–145)
WBC # BLD AUTO: 7.82 THOUSAND/UL (ref 4.31–10.16)

## 2017-11-09 PROCEDURE — 80053 COMPREHEN METABOLIC PANEL: CPT

## 2017-11-09 PROCEDURE — 85025 COMPLETE CBC W/AUTO DIFF WBC: CPT

## 2017-11-09 PROCEDURE — 36415 COLL VENOUS BLD VENIPUNCTURE: CPT

## 2017-11-10 RX ORDER — CYANOCOBALAMIN 1000 UG/ML
1000 INJECTION INTRAMUSCULAR; SUBCUTANEOUS ONCE
Status: COMPLETED | OUTPATIENT
Start: 2017-11-13 | End: 2017-11-13

## 2017-11-10 RX ORDER — SODIUM CHLORIDE 9 MG/ML
20 INJECTION, SOLUTION INTRAVENOUS CONTINUOUS
Status: DISCONTINUED | OUTPATIENT
Start: 2017-11-13 | End: 2017-11-16 | Stop reason: HOSPADM

## 2017-11-13 ENCOUNTER — HOSPITAL ENCOUNTER (OUTPATIENT)
Dept: INFUSION CENTER | Facility: CLINIC | Age: 82
Discharge: HOME | End: 2017-11-13
Payer: MEDICARE

## 2017-11-13 VITALS
TEMPERATURE: 96.8 F | HEART RATE: 55 BPM | OXYGEN SATURATION: 94 % | BODY MASS INDEX: 30.43 KG/M2 | SYSTOLIC BLOOD PRESSURE: 152 MMHG | HEIGHT: 62 IN | DIASTOLIC BLOOD PRESSURE: 85 MMHG | RESPIRATION RATE: 18 BRPM | WEIGHT: 165.34 LBS

## 2017-11-13 PROCEDURE — 96372 THER/PROPH/DIAG INJ SC/IM: CPT

## 2017-11-13 PROCEDURE — 96417 CHEMO IV INFUS EACH ADDL SEQ: CPT

## 2017-11-13 PROCEDURE — 96413 CHEMO IV INFUSION 1 HR: CPT

## 2017-11-13 PROCEDURE — 96367 TX/PROPH/DG ADDL SEQ IV INF: CPT

## 2017-11-13 RX ADMIN — DEXAMETHASONE SODIUM PHOSPHATE: 10 INJECTION, SOLUTION INTRAMUSCULAR; INTRAVENOUS at 12:04

## 2017-11-13 RX ADMIN — SODIUM CHLORIDE 900 MG: 9 INJECTION, SOLUTION INTRAVENOUS at 12:48

## 2017-11-13 RX ADMIN — SODIUM CHLORIDE 20 ML/HR: 0.9 INJECTION, SOLUTION INTRAVENOUS at 11:55

## 2017-11-13 RX ADMIN — CYANOCOBALAMIN 1000 MCG: 1000 INJECTION, SOLUTION INTRAMUSCULAR at 14:15

## 2017-11-13 RX ADMIN — CARBOPLATIN 490 MG: 10 INJECTION, SOLUTION INTRAVENOUS at 13:11

## 2017-11-13 NOTE — PROGRESS NOTES
Pt arrived to unit without complaint, tolerated Alimta/Carbo as ordered without any adverse reaction  Pt left unit in stable condition without question or concern  RA IV access remained intact for tomorrow's treatment per patient request- wrapped with sterile gauze and stockinette   AVS given to patient

## 2017-11-13 NOTE — PLAN OF CARE
Problem: Potential for Falls  Goal: Patient will remain free of falls  INTERVENTIONS:  - Assess patient frequently for physical needs  -  Identify cognitive and physical deficits and behaviors that affect risk of falls    -  [place] fall precautions as indicated by assessment   - Educate patient/family on patient safety including physical limitations  - Instruct patient to call for assistance with activity based on assessment  - Modify environment to reduce risk of injury  - Consider OT/PT consult to assist with strengthening/mobility   Outcome: Progressing

## 2017-11-13 NOTE — PLAN OF CARE
Problem: Potential for Falls  Goal: Patient will remain free of falls  INTERVENTIONS:  - Assess patient frequently for physical needs  -  Identify cognitive and physical deficits and behaviors that affect risk of falls    -  [place] fall precautions as indicated by assessment   - Educate patient/family on patient safety including physical limitations  - Instruct patient to call for assistance with activity based on assessment  - Modify environment to reduce risk of injury  - Consider OT/PT consult to assist with strengthening/mobility   Outcome: Progressing

## 2017-11-14 ENCOUNTER — HOSPITAL ENCOUNTER (OUTPATIENT)
Dept: INFUSION CENTER | Facility: CLINIC | Age: 82
Discharge: HOME | End: 2017-11-14
Payer: MEDICARE

## 2017-11-14 VITALS
SYSTOLIC BLOOD PRESSURE: 142 MMHG | DIASTOLIC BLOOD PRESSURE: 80 MMHG | RESPIRATION RATE: 18 BRPM | TEMPERATURE: 96.6 F | HEART RATE: 57 BPM

## 2017-11-14 PROCEDURE — 96360 HYDRATION IV INFUSION INIT: CPT

## 2017-11-14 RX ADMIN — SODIUM CHLORIDE 1000 ML: 0.9 INJECTION, SOLUTION INTRAVENOUS at 12:06

## 2017-11-14 NOTE — PROGRESS NOTES
Pt stated she feels well today and declined Zofran  No blood return noted from IV site however was flushed with 30cc NSS without difficulty and without any evident complications or pt reports of discomfort  At [address] was talking with patient and noted puffiness and coolness at IV insertion site  NSS infusion stopped and site was dc'd  New site obtained and NSS was restarted  Pt completed infusion without additional concerns  Declined AVS and will report tomorrow as scheduled

## 2017-11-14 NOTE — PLAN OF CARE
Problem: Potential for Falls  Goal: Patient will remain free of falls  INTERVENTIONS:  - Assess patient frequently for physical needs  -  Identify cognitive and physical deficits and behaviors that affect risk of falls    -  [place] fall precautions as indicated by assessment   - Educate patient/family on patient safety including physical limitations  - Instruct patient to call for assistance with activity based on assessment  - Modify environment to reduce risk of injury  - Consider OT/PT consult to assist with strengthening/mobility   Outcome: Progressing

## 2017-11-15 ENCOUNTER — HOSPITAL ENCOUNTER (OUTPATIENT)
Dept: INFUSION CENTER | Facility: CLINIC | Age: 82
Discharge: HOME | End: 2017-11-15
Payer: MEDICARE

## 2017-11-15 VITALS
RESPIRATION RATE: 18 BRPM | DIASTOLIC BLOOD PRESSURE: 74 MMHG | HEART RATE: 54 BPM | SYSTOLIC BLOOD PRESSURE: 150 MMHG | TEMPERATURE: 97 F

## 2017-11-15 PROCEDURE — 96360 HYDRATION IV INFUSION INIT: CPT

## 2017-11-15 PROCEDURE — 96361 HYDRATE IV INFUSION ADD-ON: CPT

## 2017-11-15 RX ADMIN — SODIUM CHLORIDE 1000 ML: 0.9 INJECTION, SOLUTION INTRAVENOUS at 11:50

## 2017-11-15 NOTE — PROGRESS NOTES
Patient declines Zofran today, reports feeling well and she took one this morning  No blood return noted from IV but flushes well, caused no pain upon flushing and infusing well  Patient aware to inform us if IV site starts to bother her

## 2017-11-15 NOTE — PLAN OF CARE
Problem: Potential for Falls  Goal: Patient will remain free of falls  INTERVENTIONS:  - Assess patient frequently for physical needs  -  Identify cognitive and physical deficits and behaviors that affect risk of falls    -  [place] fall precautions as indicated by assessment   - Educate patient/family on patient safety including physical limitations  - Instruct patient to call for assistance with activity based on assessment  - Modify environment to reduce risk of injury  - Consider OT/PT consult to assist with strengthening/mobility   Outcome: Progressing

## 2017-11-15 NOTE — PROGRESS NOTES
Patient tolerated hydration without complication  Patient provided with AVS, left unit in stable condition

## 2017-11-16 ENCOUNTER — HOSPITAL ENCOUNTER (OUTPATIENT)
Dept: INFUSION CENTER | Facility: CLINIC | Age: 82
Discharge: HOME | End: 2017-11-16
Payer: MEDICARE

## 2017-11-16 VITALS
DIASTOLIC BLOOD PRESSURE: 76 MMHG | TEMPERATURE: 98 F | RESPIRATION RATE: 18 BRPM | HEART RATE: 76 BPM | SYSTOLIC BLOOD PRESSURE: 146 MMHG

## 2017-11-16 PROCEDURE — 96361 HYDRATE IV INFUSION ADD-ON: CPT

## 2017-11-16 PROCEDURE — 96365 THER/PROPH/DIAG IV INF INIT: CPT

## 2017-11-16 RX ADMIN — SODIUM CHLORIDE 1000 ML: 0.9 INJECTION, SOLUTION INTRAVENOUS at 11:39

## 2017-11-16 RX ADMIN — ONDANSETRON 8 MG: 2 INJECTION INTRAMUSCULAR; INTRAVENOUS at 12:00

## 2017-11-16 NOTE — PROGRESS NOTES
Tolerated hydration  Nausea improved after IV Zofran  Aware of next appointment   Left unit in stable comdition

## 2017-11-16 NOTE — PLAN OF CARE
Problem: Potential for Falls  Goal: Patient will remain free of falls  INTERVENTIONS:  - Assess patient frequently for physical needs  -  Identify cognitive and physical deficits and behaviors that affect risk of falls    -  [place] fall precautions as indicated by assessment   - Educate patient/family on patient safety including physical limitations  - Instruct patient to call for assistance with activity based on assessment  - Modify environment to reduce risk of injury  - Consider OT/PT consult to assist with strengthening/mobility   Outcome: Progressing

## 2017-11-16 NOTE — PROGRESS NOTES
Patient arrived on unit  Stated she threw up X1 today  Denies any other discomforts  IV hydration initiated   Will give prn Zofran today

## 2017-11-29 ENCOUNTER — APPOINTMENT (OUTPATIENT)
Dept: LAB | Age: 82
End: 2017-11-29
Payer: MEDICARE

## 2017-11-29 DIAGNOSIS — J90 PLEURAL EFFUSION, NOT ELSEWHERE CLASSIFIED: ICD-10-CM

## 2017-11-29 LAB
ALBUMIN SERPL BCP-MCNC: 3.6 G/DL (ref 3.5–5)
ALP SERPL-CCNC: 91 U/L (ref 46–116)
ALT SERPL W P-5'-P-CCNC: 46 U/L (ref 12–78)
ANION GAP SERPL CALCULATED.3IONS-SCNC: 6 MMOL/L (ref 4–13)
AST SERPL W P-5'-P-CCNC: 43 U/L (ref 5–45)
BASOPHILS # BLD AUTO: 0.02 THOUSANDS/ΜL (ref 0–0.1)
BASOPHILS NFR BLD AUTO: 0 % (ref 0–1)
BILIRUB SERPL-MCNC: 0.37 MG/DL (ref 0.2–1)
BUN SERPL-MCNC: 24 MG/DL (ref 5–25)
CALCIUM SERPL-MCNC: 10.1 MG/DL (ref 8.3–10.1)
CHLORIDE SERPL-SCNC: 106 MMOL/L (ref 100–108)
CO2 SERPL-SCNC: 28 MMOL/L (ref 21–32)
CREAT SERPL-MCNC: 0.75 MG/DL (ref 0.6–1.3)
EOSINOPHIL # BLD AUTO: 0.07 THOUSAND/ΜL (ref 0–0.61)
EOSINOPHIL NFR BLD AUTO: 1 % (ref 0–6)
ERYTHROCYTE [DISTWIDTH] IN BLOOD BY AUTOMATED COUNT: 14.8 % (ref 11.6–15.1)
GFR SERPL CREATININE-BSD FRML MDRD: 74 ML/MIN/1.73SQ M
GLUCOSE SERPL-MCNC: 89 MG/DL (ref 65–140)
HCT VFR BLD AUTO: 38.5 % (ref 34.8–46.1)
HGB BLD-MCNC: 12.6 G/DL (ref 11.5–15.4)
LYMPHOCYTES # BLD AUTO: 3.65 THOUSANDS/ΜL (ref 0.6–4.47)
LYMPHOCYTES NFR BLD AUTO: 39 % (ref 14–44)
MCH RBC QN AUTO: 31.5 PG (ref 26.8–34.3)
MCHC RBC AUTO-ENTMCNC: 32.7 G/DL (ref 31.4–37.4)
MCV RBC AUTO: 96 FL (ref 82–98)
MONOCYTES # BLD AUTO: 1.02 THOUSAND/ΜL (ref 0.17–1.22)
MONOCYTES NFR BLD AUTO: 11 % (ref 4–12)
NEUTROPHILS # BLD AUTO: 4.59 THOUSANDS/ΜL (ref 1.85–7.62)
NEUTS SEG NFR BLD AUTO: 49 % (ref 43–75)
NRBC BLD AUTO-RTO: 0 /100 WBCS
PLATELET # BLD AUTO: 279 THOUSANDS/UL (ref 149–390)
PMV BLD AUTO: 10.4 FL (ref 8.9–12.7)
POTASSIUM SERPL-SCNC: 4.3 MMOL/L (ref 3.5–5.3)
PROT SERPL-MCNC: 7.5 G/DL (ref 6.4–8.2)
RBC # BLD AUTO: 4 MILLION/UL (ref 3.81–5.12)
SODIUM SERPL-SCNC: 140 MMOL/L (ref 136–145)
WBC # BLD AUTO: 9.39 THOUSAND/UL (ref 4.31–10.16)

## 2017-11-29 PROCEDURE — 85025 COMPLETE CBC W/AUTO DIFF WBC: CPT

## 2017-11-29 PROCEDURE — 80053 COMPREHEN METABOLIC PANEL: CPT

## 2017-11-29 PROCEDURE — 36415 COLL VENOUS BLD VENIPUNCTURE: CPT

## 2017-11-30 ENCOUNTER — ALLSCRIPTS OFFICE VISIT (OUTPATIENT)
Dept: OTHER | Facility: OTHER | Age: 82
End: 2017-11-30

## 2017-11-30 DIAGNOSIS — C34.90 MALIGNANT NEOPLASM OF UNSPECIFIED PART OF UNSPECIFIED BRONCHUS OR LUNG (HCC): ICD-10-CM

## 2017-12-04 ENCOUNTER — HOSPITAL ENCOUNTER (OUTPATIENT)
Dept: INFUSION CENTER | Facility: CLINIC | Age: 82
Discharge: HOME | End: 2017-12-04
Payer: MEDICARE

## 2017-12-04 VITALS
BODY MASS INDEX: 30.02 KG/M2 | TEMPERATURE: 96.7 F | WEIGHT: 163.14 LBS | HEART RATE: 64 BPM | DIASTOLIC BLOOD PRESSURE: 70 MMHG | SYSTOLIC BLOOD PRESSURE: 142 MMHG | HEIGHT: 62 IN | RESPIRATION RATE: 18 BRPM

## 2017-12-04 PROCEDURE — 96409 CHEMO IV PUSH SNGL DRUG: CPT

## 2017-12-04 PROCEDURE — 96372 THER/PROPH/DIAG INJ SC/IM: CPT

## 2017-12-04 PROCEDURE — 96367 TX/PROPH/DG ADDL SEQ IV INF: CPT

## 2017-12-04 RX ORDER — CYANOCOBALAMIN 1000 UG/ML
1000 INJECTION INTRAMUSCULAR; SUBCUTANEOUS ONCE
Status: COMPLETED | OUTPATIENT
Start: 2017-12-04 | End: 2017-12-04

## 2017-12-04 RX ORDER — SODIUM CHLORIDE 9 MG/ML
20 INJECTION, SOLUTION INTRAVENOUS CONTINUOUS
Status: DISCONTINUED | OUTPATIENT
Start: 2017-12-04 | End: 2017-12-07 | Stop reason: HOSPADM

## 2017-12-04 RX ADMIN — SODIUM CHLORIDE 900 MG: 9 INJECTION, SOLUTION INTRAVENOUS at 12:02

## 2017-12-04 RX ADMIN — CYANOCOBALAMIN 1000 MCG: 1000 INJECTION, SOLUTION INTRAMUSCULAR at 12:18

## 2017-12-04 RX ADMIN — SODIUM CHLORIDE 20 ML/HR: 0.9 INJECTION, SOLUTION INTRAVENOUS at 11:36

## 2017-12-04 RX ADMIN — ONDANSETRON 8 MG: 2 INJECTION INTRAMUSCULAR; INTRAVENOUS at 11:37

## 2017-12-04 NOTE — PLAN OF CARE
Problem: Potential for Falls  Goal: Patient will remain free of falls  INTERVENTIONS:  - Assess patient frequently for physical needs  -  Identify cognitive and physical deficits and behaviors that affect risk of falls  -  [place] fall precautions as indicated by assessment   - Educate patient/family on patient safety including physical limitations  - Instruct patient to call for assistance with activity based on assessment  - Modify environment to reduce risk of injury  - Consider OT/PT consult to assist with strengthening/mobility   Outcome: Progressing      Problem: SAFETY ADULT  Goal: Patient will remain free of falls  INTERVENTIONS:  - Assess patient frequently for physical needs  -  Identify cognitive and physical deficits and behaviors that affect risk of falls  -  [place] fall precautions as indicated by assessment   - Educate patient/family on patient safety including physical limitations  - Instruct patient to call for assistance with activity based on assessment  - Modify environment to reduce risk of injury  - Consider OT/PT consult to assist with strengthening/mobility   Outcome: Progressing      Problem: Knowledge Deficit  Goal: Patient/family/caregiver demonstrates understanding of disease process, treatment plan, medications, and discharge instructions  Complete learning assessment and assess knowledge base    Interventions:  - Provide teaching at level of understanding  - Provide teaching via preferred learning methods  Outcome: Progressing

## 2017-12-05 NOTE — PROGRESS NOTES
Assessment    1  Pleural effusion (511 9) (J90)   2  Lung cancer (162 9) (C34 90)    Plan  Lung cancer    · (1) CBC/PLT/DIFF; Status:Active; Requested for:30Nov2017;    Perform:[facility]; Due:30Nov2018; Ordered; For:Lung cancer; Ordered By:Elizabeth Burgos);   · (1) COMPREHENSIVE METABOLIC PANEL; Status:Active; Requested for:30Nov2017;    Perform:[facility]; Due:30Nov2018; Ordered; For:Lung cancer; Ordered By:Sammi Burgos);   · CT CHEST W CONTRAST; Status:Active; Requested for:14Dec2017 03:00PM;    Perform:[facility]; Due:14Dec2018; Last Updated By:Belinda Faustin; 11/30/2017 1:57:09 PM;Ordered; For:Lung cancer; Ordered By:Elizabeth Burgos); Discussion/Summary  Discussion Summary:   1  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 biopsy was so minute, 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 in May 2017 with dyspnea for the past 4-5 days and pleurisy, with occasional dry cough  CT scan of the thorax showed no evidence of PE, it showed a large loculated left side pleural effusion with nodularity concerning for metastatic disease, there is a concern for a mass versus pneumonia in the collapsed left lower lobe lung  She underwent left thoracentesis Ã2 yielding 1 4 L of bloody fluid, pathology showed adenocarcinoma, positive for TTF-1, CK 7 most likely consistent with non-small cell lung cancer  The patient had a history of left-sided breast cancer in 1992 status post mastectomy she told me she had told lymph node involvement, she was not treated with either chemotherapy, radiation therapy or hormonal therapy  She has extensive family history of cancer  Father was diagnosed with breast cancer, sister was diagnosed with breast cancer at age 36, another sister was diagnosed with breast cancer at age 72, a brother diagnosed with pancreatic cancer and another brother with lung cancer  Her niece was found to have BRCA gene mutation  She is allergic to sulfa, atorvastatin, latex   Current Therapy: Carboplatin AUC 5, Alimta 500 mg/m^2 every 3 weeks initiated in July 2017, she finished 6 cycles out of 6 in November 2017   Tumor Markers: BRCA1/2 is negative, CA-27-29 is normal, CEA is normal  Serum test for EGFR mutation is negative, ALK is negative  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       Review of Systems  Complete-Female:   Constitutional: no fever, not feeling poorly, no chills, not feeling tired and no recent weight loss  Eyes: No complaints of eye pain, no red eyes, no eyesight problems, no discharge, no dry eyes, no itching of eyes  ENT: no sore throat and no hoarseness  Cardiovascular: No complaints of slow heart rate, no fast heart rate, no chest pain, no palpitations, no leg claudication, no lower extremity edema  Respiratory: shortness of breath during exertion and shortness of breath during exertion, but no cough, no orthopnea, no wheezing, no PND, not short of breath at rest, shortness of breath does not worsen with lying down and no stridor  Gastrointestinal: no abdominal pain  Genitourinary: No complaints of dysuria, no incontinence, no pelvic pain, no dysmenorrhea, no vaginal discharge or bleeding     Musculoskeletal: No complaints of arthralgias, no myalgias, no joint swelling or stiffness, no limb pain or swelling  Integumentary: No complaints of skin rash or lesions, no itching, no skin wounds, no breast pain or lump  Neurological: No complaints of headache, no confusion, no convulsions, no numbness, no dizziness or fainting, no tingling, no limb weakness, no difficulty walking  Psychiatric: no sleep disturbances, no depression and no emotional problems  Endocrine: No complaints of proptosis, no hot flashes, no muscle weakness, no deepening of the voice, no feelings of weakness  Hematologic/Lymphatic: no swollen glands, no tendency for easy bleeding and no swollen glands in the neck  Active Problems    1  Contusion of face (920) (S00 83XA)   2  Contusion of hand, left (923 20) (S60 222A)   3  Fall (E888 9) [facility])   4  Fracture, metacarpal (815 00) (S62 309A)   5  History of left breast cancer (V10 3) (Z85 3)   6  Hypertension (401 9) (I10)   7  Injury of chest wall (959 11) (S29 9XXA)   8  Lung cancer (162 9) (C34 90)   9  Pleural effusion (511 9) (J90)   10  Shortness of breath (786 05) (R06 02)   11  SOB (shortness of breath) (786 05) (R06 02)    Past Medical History    1  History of Anxiety and depression (300 4) (F41 8)   2  History of breast cancer (V10 3) (Z85 3)   3  History of hyperglycemia (V12 29) (Z86 39)    Surgical History    1  History of Radical Mastectomy Left Breast    Social History    · Non-smoker (V49 89) (Z78 9)    Current Meds   1  Calcium Citrate + D 315-250 MG-UNIT Oral Tablet; Therapy: (Recorded:24Tif7712) to Recorded   2  Citalopram Hydrobromide 20 MG Oral Tablet; Therapy: (Recorded:55Fci2666) to Recorded   3  Dexamethasone 4 MG Oral Tablet; 1  tab po bid for 3 days  Start day before treatment  Take with food; Therapy: 08Aug2017 to (Last Rx:08Aug2017)  Requested for: 10Aug2017 Ordered   4  Fish Oil OIL; Therapy: (Recorded:07Dia6499) to Recorded   5  MetFORMIN HCl - 500 MG Oral Tablet; Therapy: (Recorded:22Ecp6266) to Recorded   6   Metoprolol Tartrate TABS; 50 MG BID; Therapy: (Eulalio Sargentinter) to Recorded   7  Ondansetron HCl - 4 MG Oral Tablet; TAKE 1 TABLET BY MOUTH EVERY 6 HOURS AS   NEEDED FOR NAUSEA; Therapy: 57NLA2309 to (Enid Rodriges)  Requested for: 91FIN4418; Last   Rx:57Ysj6022 Ordered   8  Prochlorperazine Maleate 10 MG Oral Tablet; TAKE 1 TABLET EVERY 6 HOURS AS   NEEDED FOR NAUSEA; Therapy: 04AJY5148 to (Evaluate:32Rhl6063)  Requested for: 55Uvc4243; Last   Rx:98Zjm8458 Ordered   9  Vicodin 5-300 MG Oral Tablet; TAKE 1 TABLET EVERY 4 TO 6 HOURS AS NEEDED FOR   PAIN;   Therapy: 59QZF4151 to (Evaluate:72Lhx1511); Last Rx:85Rrd0539 Ordered   10  Vitamin B-12 1000 MCG Oral Tablet; Therapy: (Recorded:63Tdo3588) to Recorded    Allergies    1  Sulfa Drugs    Vitals  Vital Signs    Recorded: 58JSV8814 01:25PM   Temperature 97 9 F   Heart Rate 83   Respiration 16   Systolic 114   Diastolic 70   Height 5 ft 2 in   Weight 162 lb 6 0 oz   BMI Calculated 29 7   BSA Calculated 1 75   O2 Saturation 96     Physical Exam    Constitutional   General appearance: No acute distress, well appearing and well nourished  Eyes   Conjunctiva and lids: No swelling, erythema or discharge  Pupils and irises: Equal, round and reactive to light  Ears, Nose, Mouth, and Throat   External inspection of ears and nose: Normal     Oropharynx: Normal with no erythema, edema, exudate or lesions  Pulmonary   Auscultation of lungs: Abnormal   rales/crackles over the left base  Cardiovascular   Auscultation of heart: Normal rate and rhythm, normal S1 and S2, without murmurs  Examination of extremities for edema and/or varicosities: Normal     Carotid pulses: Normal     Abdomen   Abdomen: Non-tender, no masses  Liver and spleen: No hepatomegaly or splenomegaly  Lymphatic   Palpation of lymph nodes in neck: No lymphadenopathy  Musculoskeletal   Gait and station: Normal     Digits and nails: Normal without clubbing or cyanosis      Inspection/palpation of joints, bones, and muscles: Normal     Skin   Skin and subcutaneous tissue: Normal without rashes or lesions  Neurologic   Cranial nerves: Cranial nerves 2-12 intact  Psychiatric   Orientation to person, place, and time: Normal     Mood and affect: Normal         ECOG 1       Results/Data  (1) CBC/PLT/DIFF 58HFS9440 02:05PM Andra Wilkinson    Order Number: [id number]     Test Name Result Flag Reference   WBC COUNT 9 39 Thousand/uL  4 31-10 16   RBC COUNT 4 00 Million/uL  3 81-5 12   HEMOGLOBIN 12 6 g/dL  11 5-15 4   HEMATOCRIT 38 5 %  34 8-46  1   MCV 96 fL  82-98   MCH 31 5 pg  26 8-34 3   MCHC 32 7 g/dL  31 4-37 4   RDW 14 8 %  11 6-15 1   MPV 10 4 fL  8 9-12 7   PLATELET COUNT 080 Thousands/uL  149-390   nRBC AUTOMATED 0 /100 WBCs     NEUTROPHILS RELATIVE PERCENT 49 %  43-75   LYMPHOCYTES RELATIVE PERCENT 39 %  14-44   MONOCYTES RELATIVE PERCENT 11 %  4-12   EOSINOPHILS RELATIVE PERCENT 1 %  0-6   BASOPHILS RELATIVE PERCENT 0 %  0-1   NEUTROPHILS ABSOLUTE COUNT 4 59 Thousands/? ??L  1 85-7 62   LYMPHOCYTES ABSOLUTE COUNT 3 65 Thousands/? ??L  0 60-4 47   MONOCYTES ABSOLUTE COUNT 1 02 Thousand/? ??L  0 17-1 22   EOSINOPHILS ABSOLUTE COUNT 0 07 Thousand/? ??L  0 00-0 61   BASOPHILS ABSOLUTE COUNT 0 02 Thousands/? ??L  0 00-0 10     (1) COMPREHENSIVE METABOLIC PANEL 90BCB4856 53:25HJ Andra Wilkinson    Order Number: [id number]     Test Name Result Flag Reference   GLUCOSE,RANDM 89 mg/dL     If the patient is fasting, the ADA then defines impaired fasting glucose as > 100 mg/dL and diabetes as > or equal to 123 mg/dL  Specimen collection should occur prior to Sulfasalazine administration due to the potential for falsely depressed results  Specimen collection should occur prior to Sulfapyridine administration due to the potential for falsely elevated results     SODIUM 140 mmol/L  136-145   POTASSIUM 4 3 mmol/L  3 5-5 3   CHLORIDE 106 mmol/L  100-108   CARBON DIOXIDE 28 mmol/L 21-32   ANION GAP (CALC) 6 mmol/L  4-13   BLOOD UREA NITROGEN 24 mg/dL  5-25   CREATININE 0 75 mg/dL  0 60-1 30   Standardized to IDMS reference method   CALCIUM 10 1 mg/dL  8 3-10 1   BILI, TOTAL 0 37 mg/dL  0 20-1 00   ALK PHOSPHATAS 91 U/L     ALT (SGPT) 46 U/L  12-78   Specimen collection should occur prior to Sulfasalazine and/or Sulfapyridine administration due to the potential for falsely depressed results  AST(SGOT) 43 U/L  5-45   Specimen collection should occur prior to Sulfasalazine administration due to the potential for falsely depressed results  ALBUMIN 3 6 g/dL  3 5-5 0   TOTAL PROTEIN 7 5 g/dL  6 4-8 2   eGFR 74 ml/min/1 73sq [facility] Disease Education Program recommendations are as follows:  GFR calculation is accurate only with a steady state creatinine  Chronic Kidney disease less than 60 ml/min/1 73 sq  meters  Kidney failure less than 15 ml/min/1 73 sq  meters  Future Appointments    Date/Time Provider Specialty Site   12/21/2017 10:20 AM CH[facility] [place]  Hematology Oncology [facility]     Signatures   Electronically signed by :  CHAMP Coyle ; Nov 30 2017  5:34PM EST                       (Author)

## 2017-12-14 ENCOUNTER — HOSPITAL ENCOUNTER (OUTPATIENT)
Dept: CT IMAGING | Facility: HOSPITAL | Age: 82
Discharge: HOME | End: 2017-12-14
Attending: INTERNAL MEDICINE
Payer: MEDICARE

## 2017-12-14 DIAGNOSIS — C34.90 MALIGNANT NEOPLASM OF UNSPECIFIED PART OF UNSPECIFIED BRONCHUS OR LUNG (HCC): ICD-10-CM

## 2017-12-14 PROCEDURE — 71260 CT THORAX DX C+: CPT

## 2017-12-14 RX ADMIN — IOHEXOL 85 ML: 350 INJECTION, SOLUTION INTRAVENOUS at 15:25

## 2017-12-21 ENCOUNTER — GENERIC CONVERSION - ENCOUNTER (OUTPATIENT)
Dept: OTHER | Facility: OTHER | Age: 82
End: 2017-12-21

## 2017-12-22 ENCOUNTER — APPOINTMENT (OUTPATIENT)
Dept: LAB | Age: 82
End: 2017-12-22
Payer: MEDICARE

## 2017-12-22 ENCOUNTER — TRANSCRIBE ORDERS (OUTPATIENT)
Dept: ADMINISTRATIVE | Age: 82
End: 2017-12-22

## 2017-12-22 DIAGNOSIS — C34.90 MALIGNANT NEOPLASM OF UNSPECIFIED PART OF UNSPECIFIED BRONCHUS OR LUNG (HCC): ICD-10-CM

## 2017-12-22 LAB
ALBUMIN SERPL BCP-MCNC: 3.3 G/DL (ref 3.5–5)
ALP SERPL-CCNC: 86 U/L (ref 46–116)
ALT SERPL W P-5'-P-CCNC: 43 U/L (ref 12–78)
ANION GAP SERPL CALCULATED.3IONS-SCNC: 8 MMOL/L (ref 4–13)
AST SERPL W P-5'-P-CCNC: 39 U/L (ref 5–45)
BASOPHILS # BLD AUTO: 0.03 THOUSANDS/ΜL (ref 0–0.1)
BASOPHILS NFR BLD AUTO: 0 % (ref 0–1)
BILIRUB SERPL-MCNC: 0.37 MG/DL (ref 0.2–1)
BUN SERPL-MCNC: 17 MG/DL (ref 5–25)
CALCIUM SERPL-MCNC: 9.9 MG/DL (ref 8.3–10.1)
CEA SERPL-MCNC: 1.9 NG/ML (ref 0–3)
CHLORIDE SERPL-SCNC: 108 MMOL/L (ref 100–108)
CO2 SERPL-SCNC: 26 MMOL/L (ref 21–32)
CREAT SERPL-MCNC: 0.69 MG/DL (ref 0.6–1.3)
EOSINOPHIL # BLD AUTO: 0.18 THOUSAND/ΜL (ref 0–0.61)
EOSINOPHIL NFR BLD AUTO: 2 % (ref 0–6)
ERYTHROCYTE [DISTWIDTH] IN BLOOD BY AUTOMATED COUNT: 15.2 % (ref 11.6–15.1)
GFR SERPL CREATININE-BSD FRML MDRD: 81 ML/MIN/1.73SQ M
GLUCOSE SERPL-MCNC: 105 MG/DL (ref 65–140)
HCT VFR BLD AUTO: 40.6 % (ref 34.8–46.1)
HGB BLD-MCNC: 12.8 G/DL (ref 11.5–15.4)
LYMPHOCYTES # BLD AUTO: 2.61 THOUSANDS/ΜL (ref 0.6–4.47)
LYMPHOCYTES NFR BLD AUTO: 30 % (ref 14–44)
MCH RBC QN AUTO: 30.9 PG (ref 26.8–34.3)
MCHC RBC AUTO-ENTMCNC: 31.5 G/DL (ref 31.4–37.4)
MCV RBC AUTO: 98 FL (ref 82–98)
MONOCYTES # BLD AUTO: 0.99 THOUSAND/ΜL (ref 0.17–1.22)
MONOCYTES NFR BLD AUTO: 11 % (ref 4–12)
NEUTROPHILS # BLD AUTO: 4.97 THOUSANDS/ΜL (ref 1.85–7.62)
NEUTS SEG NFR BLD AUTO: 57 % (ref 43–75)
NRBC BLD AUTO-RTO: 0 /100 WBCS
PLATELET # BLD AUTO: 353 THOUSANDS/UL (ref 149–390)
PMV BLD AUTO: 10.6 FL (ref 8.9–12.7)
POTASSIUM SERPL-SCNC: 4.4 MMOL/L (ref 3.5–5.3)
PROT SERPL-MCNC: 7.3 G/DL (ref 6.4–8.2)
RBC # BLD AUTO: 4.14 MILLION/UL (ref 3.81–5.12)
SODIUM SERPL-SCNC: 142 MMOL/L (ref 136–145)
WBC # BLD AUTO: 8.82 THOUSAND/UL (ref 4.31–10.16)

## 2017-12-22 PROCEDURE — 85025 COMPLETE CBC W/AUTO DIFF WBC: CPT

## 2017-12-22 PROCEDURE — 82378 CARCINOEMBRYONIC ANTIGEN: CPT

## 2017-12-22 PROCEDURE — 36415 COLL VENOUS BLD VENIPUNCTURE: CPT

## 2017-12-22 PROCEDURE — 80053 COMPREHEN METABOLIC PANEL: CPT

## 2017-12-22 RX ORDER — CYANOCOBALAMIN 1000 UG/ML
1000 INJECTION INTRAMUSCULAR; SUBCUTANEOUS ONCE
Status: COMPLETED | OUTPATIENT
Start: 2017-12-26 | End: 2017-12-26

## 2017-12-22 RX ORDER — SODIUM CHLORIDE 9 MG/ML
20 INJECTION, SOLUTION INTRAVENOUS CONTINUOUS
Status: DISCONTINUED | OUTPATIENT
Start: 2017-12-26 | End: 2017-12-29 | Stop reason: HOSPADM

## 2017-12-26 ENCOUNTER — HOSPITAL ENCOUNTER (OUTPATIENT)
Dept: INFUSION CENTER | Facility: CLINIC | Age: 82
Discharge: HOME | End: 2017-12-28
Payer: MEDICARE

## 2017-12-26 VITALS
WEIGHT: 165.57 LBS | RESPIRATION RATE: 18 BRPM | HEIGHT: 62 IN | HEART RATE: 71 BPM | BODY MASS INDEX: 30.47 KG/M2 | SYSTOLIC BLOOD PRESSURE: 162 MMHG | TEMPERATURE: 96.2 F | DIASTOLIC BLOOD PRESSURE: 80 MMHG

## 2017-12-26 PROCEDURE — 96375 TX/PRO/DX INJ NEW DRUG ADDON: CPT

## 2017-12-26 PROCEDURE — 96372 THER/PROPH/DIAG INJ SC/IM: CPT

## 2017-12-26 PROCEDURE — 96409 CHEMO IV PUSH SNGL DRUG: CPT

## 2017-12-26 RX ADMIN — SODIUM CHLORIDE 20 ML/HR: 0.9 INJECTION, SOLUTION INTRAVENOUS at 14:45

## 2017-12-26 RX ADMIN — CYANOCOBALAMIN 1000 MCG: 1000 INJECTION, SOLUTION INTRAMUSCULAR at 15:41

## 2017-12-26 RX ADMIN — SODIUM CHLORIDE 700 MG: 900 INJECTION, SOLUTION INTRAVENOUS at 15:09

## 2017-12-26 RX ADMIN — ONDANSETRON 8 MG: 2 INJECTION INTRAMUSCULAR; INTRAVENOUS at 14:46

## 2017-12-26 NOTE — PROGRESS NOTES
Tolerated chemotherapy  Denies any discomforts   IV angiocath remained intact per patient request for Day #2

## 2017-12-26 NOTE — PLAN OF CARE
Problem: Potential for Falls  Goal: Patient will remain free of falls  INTERVENTIONS:  - Assess patient frequently for physical needs  -  Identify cognitive and physical deficits and behaviors that affect risk of falls    -  [place] fall precautions as indicated by assessment   - Educate patient/family on patient safety including physical limitations  - Instruct patient to call for assistance with activity based on assessment  - Modify environment to reduce risk of injury  - Consider OT/PT consult to assist with strengthening/mobility   Outcome: Progressing

## 2017-12-27 ENCOUNTER — HOSPITAL ENCOUNTER (OUTPATIENT)
Dept: INFUSION CENTER | Facility: CLINIC | Age: 82
Discharge: HOME | End: 2017-12-29
Payer: MEDICARE

## 2017-12-27 VITALS
SYSTOLIC BLOOD PRESSURE: 120 MMHG | HEART RATE: 60 BPM | TEMPERATURE: 97.6 F | RESPIRATION RATE: 18 BRPM | DIASTOLIC BLOOD PRESSURE: 58 MMHG

## 2017-12-27 PROCEDURE — 96360 HYDRATION IV INFUSION INIT: CPT

## 2017-12-27 RX ADMIN — SODIUM CHLORIDE 500 ML: 0.9 INJECTION, SOLUTION INTRAVENOUS at 14:29

## 2017-12-27 NOTE — PROGRESS NOTES
Pt tolerated hydration well, upon completion c/o slight burning at IV site  #24 intracath removed from r forearm  Discharged without c/o

## 2017-12-28 ENCOUNTER — HOSPITAL ENCOUNTER (OUTPATIENT)
Dept: INFUSION CENTER | Facility: CLINIC | Age: 82
Discharge: HOME | End: 2017-12-30
Payer: MEDICARE

## 2017-12-28 VITALS
DIASTOLIC BLOOD PRESSURE: 85 MMHG | TEMPERATURE: 96.9 F | SYSTOLIC BLOOD PRESSURE: 150 MMHG | HEART RATE: 69 BPM | RESPIRATION RATE: 18 BRPM

## 2017-12-28 PROCEDURE — 96360 HYDRATION IV INFUSION INIT: CPT

## 2017-12-28 RX ADMIN — SODIUM CHLORIDE 500 ML: 0.9 INJECTION, SOLUTION INTRAVENOUS at 11:45

## 2017-12-28 NOTE — PROGRESS NOTES
Pt without complaint, tolerated IV hydration well, declines AVS today  Pt states she will be here tomorrow for day 3 hydration

## 2017-12-28 NOTE — PLAN OF CARE
Problem: Potential for Falls  Goal: Patient will remain free of falls  INTERVENTIONS:  - Assess patient frequently for physical needs  -  Identify cognitive and physical deficits and behaviors that affect risk of falls    -  [place] fall precautions as indicated by assessment   - Educate patient/family on patient safety including physical limitations  - Instruct patient to call for assistance with activity based on assessment  - Modify environment to reduce risk of injury  - Consider OT/PT consult to assist with strengthening/mobility   Outcome: Progressing

## 2017-12-29 ENCOUNTER — HOSPITAL ENCOUNTER (OUTPATIENT)
Dept: INFUSION CENTER | Facility: CLINIC | Age: 82
Discharge: HOME | End: 2017-12-29

## 2018-01-09 ENCOUNTER — APPOINTMENT (OUTPATIENT)
Dept: PHYSICAL THERAPY | Facility: CLINIC | Age: 83
End: 2018-01-09
Payer: MEDICARE

## 2018-01-09 PROCEDURE — 97112 NEUROMUSCULAR REEDUCATION: CPT

## 2018-01-09 PROCEDURE — 97162 PT EVAL MOD COMPLEX 30 MIN: CPT

## 2018-01-09 PROCEDURE — G8978 MOBILITY CURRENT STATUS: HCPCS

## 2018-01-09 PROCEDURE — G8979 MOBILITY GOAL STATUS: HCPCS

## 2018-01-10 NOTE — MISCELLANEOUS
Message  Spoke to Fern from [facility] who started the ER, MA and Her 2 will be signed out today  There is a small about of stains left for the Cancer Genetics and it will be sent out by Monday  She stated it will take about 4 days to come back  We ask that EGFR, ALK and PDL1 be the main priority  Was told it would take about 4-5 days for the results to come back  Active Problems    1  Contusion of face (920) (S00 83XA)   2  Contusion of hand, left (923 20) (S60 222A)   3  Fall (E888 9) [facility])   4  Fracture, metacarpal (815 00) (S62 309A)   5  History of left breast cancer (V10 3) (Z85 3)   6  Hypertension (401 9) (I10)   7  Injury of chest wall (959 11) (S29 9XXA)   8  Lung cancer (162 9) (C34 90)   9  Pleural effusion (511 9) (J90)   10  Shortness of breath (786 05) (R06 02)   11  SOB (shortness of breath) (786 05) (R06 02)    Current Meds   1  Calcium Citrate + D 315-250 MG-UNIT Oral Tablet; Therapy: (Recorded:51Ijk0674) to Recorded   2  Citalopram Hydrobromide 20 MG Oral Tablet; Therapy: (Recorded:83Cjk3818) to Recorded   3  Fish Oil OIL; Therapy: (Recorded:19Feo0719) to Recorded   4  MetFORMIN HCl - 500 MG Oral Tablet; Therapy: (Recorded:64Dwm3260) to Recorded   5  Metoprolol Tartrate TABS; 50 MG BID; Therapy: (Careliliana Austin) to Recorded   6  Vitamin B-12 1000 MCG Oral Tablet;    Therapy: (Recorded:37Ydd0169) to Recorded    Signatures   Electronically signed by : Aicha Galvez, ; Jul 14 2017 10:31AM EST                       (Author)

## 2018-01-11 NOTE — MISCELLANEOUS
Message  Spoke with Kamla Aparicio at [facility], molecular testing sent out yesterday prioritizing to EGFR ALK and PD L 1  Reults will be available in 1 week  Active Problems    1  Contusion of face (920) (S00 83XA)   2  Contusion of hand, left (923 20) (S60 222A)   3  Fall (E888 9) [facility])   4  Fracture, metacarpal (815 00) (S62 309A)   5  History of left breast cancer (V10 3) (Z85 3)   6  Hypertension (401 9) (I10)   7  Injury of chest wall (959 11) (S29 9XXA)   8  Lung cancer (162 9) (C34 90)   9  Pleural effusion (511 9) (J90)   10  Shortness of breath (786 05) (R06 02)   11  SOB (shortness of breath) (786 05) (R06 02)    Current Meds   1  Calcium Citrate + D 315-250 MG-UNIT Oral Tablet; Therapy: (Recorded:71Nnu1784) to Recorded   2  Citalopram Hydrobromide 20 MG Oral Tablet; Therapy: (Recorded:26Piw1556) to Recorded   3  Fish Oil OIL; Therapy: (Recorded:40Wcf9545) to Recorded   4  MetFORMIN HCl - 500 MG Oral Tablet; Therapy: (Recorded:14Gmt0656) to Recorded   5  Metoprolol Tartrate TABS; 50 MG BID; Therapy: (Caretha Austin) to Recorded   6  Vitamin B-12 1000 MCG Oral Tablet;    Therapy: (Recorded:77Hbq9878) to Recorded    Signatures   Electronically signed by : Aicha Galvez, ; Jul 19 2017  9:12AM EST                       (Author)

## 2018-01-11 NOTE — MISCELLANEOUS
Message  Called Timi Gardner from [facility] to see if she received my fax from Monday  She stated she did and she will try to expedite the testing as best as she can  She is estimating the ER SC HER 2 to be done by Monday and Cancer genetics hopefully by 7/20  Active Problems    1  Contusion of face (920) (S00 83XA)   2  Contusion of hand, left (923 20) (S60 222A)   3  Fall (E888 9) [facility])   4  Fracture, metacarpal (815 00) (S62 309A)   5  History of left breast cancer (V10 3) (Z85 3)   6  Hypertension (401 9) (I10)   7  Injury of chest wall (959 11) (S29 9XXA)   8  Lung cancer (162 9) (C34 90)   9  Pleural effusion (511 9) (J90)   10  Shortness of breath (786 05) (R06 02)   11  SOB (shortness of breath) (786 05) (R06 02)    Current Meds   1  Calcium Citrate + D 315-250 MG-UNIT Oral Tablet; Therapy: (Recorded:81Qsd0957) to Recorded   2  Citalopram Hydrobromide 20 MG Oral Tablet; Therapy: (Recorded:45Vno2789) to Recorded   3  Fish Oil OIL; Therapy: (Recorded:44Bbp4780) to Recorded   4  MetFORMIN HCl - 500 MG Oral Tablet; Therapy: (Recorded:21Csf9223) to Recorded   5  Metoprolol Tartrate TABS; 50 MG BID; Therapy: (Ketan Gutiérrez) to Recorded   6  Vitamin B-12 1000 MCG Oral Tablet;    Therapy: (Recorded:01Ugb4229) to Recorded    Signatures   Electronically signed by : Court Aguirre, ; Jul 12 2017  8:33AM EST                       (Author)

## 2018-01-12 NOTE — CONSULTS
Chief Complaint  Malignant left sided pleural effusion      History of Present Illness  29-year-old  female who presented to [facility] in May 2017 with dyspnea for the past 4-5 days and pleurisy, with occasional dry cough  CT scan of the thorax showed no evidence of PE, it showed a large loculated left side pleural effusion with nodularity concerning for metastatic disease, there is a concern for a mass versus pneumonia in the collapsed left lower lobe lung  She underwent left thoracentesis Ã2 yielding 1 4 L of bloody fluid, pathology showed adenocarcinoma, positive for TTF-1, CK 7 most likely consistent with non-small cell lung cancer  The patient had a history of left-sided breast cancer in 1992 status post mastectomy she told me she had told lymph node involvement, she was not treated with either chemotherapy, radiation therapy or hormonal therapy  She has extensive family history of cancer  Father was diagnosed with breast cancer, sister was diagnosed with breast cancer at age 36, another sister was diagnosed with breast cancer at age 72, a brother diagnosed with pancreatic cancer and another brother with lung cancer  Her niece was found to have BRCA gene mutation  She is allergic to sulfa, atorvastatin, latex      Review of Systems    Constitutional: recent 2 lb weight loss, but no fever, no chills and not feeling tired  Eyes: No complaints of eye pain, no red eyes, no eyesight problems, no discharge, no dry eyes, no itching of eyes  ENT: no complaints of earache, no loss of hearing, no nose bleeds, no nasal discharge, no sore throat, no hoarseness  Cardiovascular: No complaints of slow heart rate, no fast heart rate, no chest pain, no palpitations, no leg claudication, no lower extremity edema     Respiratory: cough, shortness of breath during exertion and shortness of breath during exertion, but not short of breath at rest, shortness of breath does not worsen with lying down and no stridor  Gastrointestinal: no abdominal pain  Genitourinary: No complaints of dysuria, no incontinence, no pelvic pain, no dysmenorrhea, no vaginal discharge or bleeding  Musculoskeletal: No complaints of arthralgias, no myalgias, no joint swelling or stiffness, no limb pain or swelling  Integumentary: No complaints of skin rash or lesions, no itching, no skin wounds, no breast pain or lump  Neurological: No complaints of headache, no confusion, no convulsions, no numbness, no dizziness or fainting, no tingling, no limb weakness, no difficulty walking  Psychiatric: no sleep disturbances, no depression and no emotional problems  Endocrine: No complaints of proptosis, no hot flashes, no muscle weakness, no deepening of the voice, no feelings of weakness  Hematologic/Lymphatic: no swollen glands, no tendency for easy bleeding and no swollen glands in the neck  Active Problems    1  Contusion of face (920) (S00 83XA)   2  Contusion of hand, left (923 20) (S60 222A)   3  Fall (E888 9) [facility])   4  Fracture, metacarpal (815 00) (S62 309A)   5  Hypertension (401 9) (I10)   6  Injury of chest wall (959 11) (S29 9XXA)   7  Pleural effusion (511 9) (J90)   8  Shortness of breath (786 05) (R06 02)   9  SOB (shortness of breath) (786 05) (R06 02)    Past Medical History    · History of Anxiety and depression (300 4) (F41 8)   · History of breast cancer (V10 3) (Z85 3)   · History of hyperglycemia (V12 29) (Z86 39)    The active problems and past medical history were reviewed and updated today  Surgical History    · History of Radical Mastectomy Left Breast    The surgical history was reviewed and updated today  Family History    The family history was reviewed and updated today  Social History    · Non-smoker (V49 89) (Z78 9)  The social history was reviewed and updated today  Current Meds   1  Calcium Citrate + D 315-250 MG-UNIT Oral Tablet;    Therapy: (Recorded:20Wut2832) to Recorded   2  Citalopram Hydrobromide 20 MG Oral Tablet; Therapy: (Recorded:66Hne4893) to Recorded   3  Fish Oil OIL; Therapy: (Recorded:53Vpz9795) to Recorded   4  HydroCHLOROthiazide 25 MG Oral Tablet; Therapy: (Recorded:30Tdr9314) to Recorded   5  Lisinopril 10 MG Oral Tablet; Therapy: (Recorded:02Yqq7856) to Recorded   6  MetFORMIN HCl - 500 MG Oral Tablet; Therapy: (Recorded:83Lkt3348) to Recorded   7  Metoprolol Tartrate TABS; Therapy: (Recorded:07Gmp7402) to Recorded   8  Vitamin B-12 1000 MCG Oral Tablet; Therapy: (Recorded:18Zkn7212) to Recorded    The medication list was reviewed and updated today  Vitals   Recorded: 08Jun2017 03:42PM   Temperature 98 2 F   Heart Rate 91   Respiration 16   Systolic 970   Diastolic 80   Height 5 ft 2 in   Weight 167 lb 8 0 oz   BMI Calculated 30 64   BSA Calculated 1 77   O2 Saturation 93     Physical Exam    Constitutional   General appearance: No acute distress, well appearing and well nourished  Eyes   Conjunctiva and lids: No swelling, erythema or discharge  Pupils and irises: Equal, round and reactive to light  Ears, Nose, Mouth, and Throat   External inspection of ears and nose: Normal     Oropharynx: Normal with no erythema, edema, exudate or lesions  Pulmonary   Auscultation of lungs: Abnormal   rales/crackles over the left base  Cardiovascular   Auscultation of heart: Normal rate and rhythm, normal S1 and S2, without murmurs  Examination of extremities for edema and/or varicosities: Normal     Carotid pulses: Normal     Abdomen   Abdomen: Non-tender, no masses  Liver and spleen: No hepatomegaly or splenomegaly  Lymphatic   Palpation of lymph nodes in neck: No lymphadenopathy  Musculoskeletal   Gait and station: Normal     Digits and nails: Normal without clubbing or cyanosis  Inspection/palpation of joints, bones, and muscles: Normal     Skin   Skin and subcutaneous tissue: Normal without rashes or lesions  Neurologic   Cranial nerves: Cranial nerves 2-12 intact  Psychiatric   Orientation to person, place, and time: Normal     Mood and affect: Normal         ECOG 1       Assessment    1  SOB (shortness of breath) (786 05) (R06 02)   2  Pleural effusion (511 9) (J90)    Plan  History of left breast cancer    · (LC) BRCAssure Comprehensive Test; Status:Active; Requested YLO:[id number];    Perform:[facility]; HSX:[id number]; Ordered;  For:History of left breast cancer; Ordered By:Jerrod Burgos Minus Nonda Mita);   · * NM PET CT SKULL BASE TO MID THIGH; Status:Need Information - Financial  Authorization; Requested LGT:[id number] 08:00AM;    Perform:[facility]; Order Comments:NO FOOD AFTER MIDNIGHT  PLEASE DRINK WATER; Due:11Lwh9068; Last Updated By:Prema Johnson; 6/8/2017 4:25:27 PM;Ordered;  For:History of left breast cancer; Ordered By:Loretta, Firmmei Minus Nonda Mita); · Follow-up visit in 1 week Evaluation and Treatment  Follow-up  Status: Complete  Done:  16SHS1148 01:20PM   Ordered; For: History of left breast cancer; Ordered By: Andrew Reyes) Performed:  Due: 29YJJ4720; Last Updated By: Joanne Guardado; 6/8/2017 4:32:42 PM  History of left breast cancer, Pleural effusion    · (1) CA 27 29; Status:Active; Requested RSY:[id number];    Perform:[facility]; HXW:50PJN1473; Ordered;  For:History of left breast cancer, Pleural effusion; Ordered By:Loretta, Firmmei Minus Nonda Mita); Pleural effusion    · (1) CBC/PLT/DIFF; Status:Active; Requested THC:[id number];    Perform:[facility]; KMP:[id number]; Ordered; For:Pleural effusion; Ordered By:Farisrael, Firman Minus Nonda Mita);   · (1) COMPREHENSIVE METABOLIC PANEL; Status:Active; Requested QFA:[id number];    Perform:[facility]; GQN:[id number]; Ordered; For:Pleural effusion; Ordered By:Jerrod Burgos Minus Myron Fan); Discussion/Summary    #1   Left sided malignant pleural effusion status post thoracentesis Ã2 in May 2017 at [facility], pathology showed adenocarcinoma positive for TTF-1, CK 7, consistent with lung adenocarcinoma in a patient who never smoked in her life, CAT scan showed loculated left-sided pleural effusion with atelectasis or collapse of the left lower lobe rule out mass in that area versus atelectasis, the patient had previous history of left-sided breast cancer status post mastectomy in 1992, she told me she had 2 lymph node involvement, she did not receive any radiation or chemotherapy or hormonal therapy  Family history significant for breast cancer in her father, sister, sister, brother with pancreatic cancer, niece with BRCA gene mutation  #1  Rule out metastatic breast cancer versus primary lung cancer patient to have CA-27-29, CT/PET scan, I will request second pathological reading in our institution and will add ER, CA, HER-2 staining to the cell block  #2  BRCA1/2 blood test  #3  Reevaluate in one to 2 weeks or when necessary if progression of symptoms such as dyspnea, pleurisy and arrange for thoracenteses or Pleura catheter  #4  She might require pleuroscopy or VATS for definitive diagnosis however depends on the PET scan imaging  Signatures   Electronically signed by :  CHAMP Moon ; Jun 8 2017  5:08PM EST                       (Author)

## 2018-01-13 VITALS
TEMPERATURE: 98.2 F | BODY MASS INDEX: 30.82 KG/M2 | HEART RATE: 91 BPM | DIASTOLIC BLOOD PRESSURE: 80 MMHG | OXYGEN SATURATION: 93 % | SYSTOLIC BLOOD PRESSURE: 130 MMHG | HEIGHT: 62 IN | RESPIRATION RATE: 16 BRPM | WEIGHT: 167.5 LBS

## 2018-01-13 NOTE — MISCELLANEOUS
Message  Return to work or school:   Shanell Sylvester is under my professional care  She was seen in my office on 7/20/17       Please consider providing a wheelchair for this patient as she is undergoing chemotherapy  She is experiencing significant fatigue and activity intolerance          Signatures   Electronically signed by : Edda Elliott, ; Aug  1 2017  3:52PM EST                       (Author)

## 2018-01-13 NOTE — CONSULTS
Assessment    1  SOB (shortness of breath) (786 05) (R06 02)   2  Pleural effusion (511 9) (J90)    Plan  History of left breast cancer    · (LC) BRCAssure Comprehensive Test; Status:Active; Requested TKS:[id number];    Perform:[facility]; DDF:[id number]; Ordered;  For:History of left breast cancer; Ordered By:Elsie Burgos);   · * NM PET CT SKULL BASE TO MID THIGH; Status:Need Information - Financial  Authorization; Requested FJF:[id number] 08:00AM;    Perform:[facility] Radiology; Order Comments:NO FOOD AFTER MIDNIGHT  PLEASE DRINK WATER; Due:15Xir1996; Last Updated By:Jasvir Johnson; 2017 4:25:27 PM;Ordered;  For:History of left breast cancer; Ordered By:Elsie Burgos); · Follow-up visit in 1 week Evaluation and Treatment  Follow-up  Status: Complete  Done:  09GMR6156 01:20PM   Ordered; For: History of left breast cancer; Ordered By: Lynne Short) Performed:  Due: 10HBZ8923; Last Updated By: Marcelo Sparks; 2017 4:32:42 PM  History of left breast cancer, Pleural effusion    · (1) CA 27 29; Status:Active; Requested YGV:[id number];    Perform:[facility]; [id number]; Ordered;  For:History of left breast cancer, Pleural effusion; Ordered By:Elsie Burgos); Pleural effusion    · (1) CBC/PLT/DIFF; Status:Active; Requested EBS:[id number];    Perform:[facility]; DUB:29PSG8301; Ordered; For:Pleural effusion; Ordered By:Elsie Burgos);   · (1) COMPREHENSIVE METABOLIC PANEL; Status:Active; Requested GNJ:[id number];    Perform:[facility]; DGZ:[id number]; Ordered; For:Pleural effusion; Ordered By:Elsie Burgos; Discussion/Summary    #1   Left sided malignant pleural effusion status post thoracentesis Ã2 in May 2017 at [facility], pathology showed adenocarcinoma positive for TTF-1, CK 7, consistent with lung adenocarcinoma in a patient who never smoked in her life, CAT scan showed loculated left-sided pleural effusion with atelectasis or collapse of the left lower lobe rule out mass in that area versus atelectasis, the patient had previous history of left-sided breast cancer status post mastectomy in 1992, she told me she had 2 lymph node involvement, she did not receive any radiation or chemotherapy or hormonal therapy  Family history significant for breast cancer in her father, sister, sister, brother with pancreatic cancer, niece with BRCA gene mutation  #1  Rule out metastatic breast cancer versus primary lung cancer patient to have CA-27-29, CT/PET scan, I will request second pathological reading in our institution and will add ER, MT, HER-2 staining to the cell block  #2  BRCA1/2 blood test  #3  Reevaluate in one to 2 weeks or when necessary if progression of symptoms such as dyspnea, pleurisy and arrange for thoracenteses or Pleura catheter  #4  She might require pleuroscopy or VATS for definitive diagnosis however depends on the PET scan imaging        Chief Complaint  Malignant left sided pleural effusion      History of Present Illness  80-year-old  female who presented to [facility] in May 2017 with dyspnea for the past 4-5 days and pleurisy, with occasional dry cough  CT scan of the thorax showed no evidence of PE, it showed a large loculated left side pleural effusion with nodularity concerning for metastatic disease, there is a concern for a mass versus pneumonia in the collapsed left lower lobe lung  She underwent left thoracentesis Ã2 yielding 1 4 L of bloody fluid, pathology showed adenocarcinoma, positive for TTF-1, CK 7 most likely consistent with non-small cell lung cancer  The patient had a history of left-sided breast cancer in 1992 status post mastectomy she told me she had told lymph node involvement, she was not treated with either chemotherapy, radiation therapy or hormonal therapy  She has extensive family history of cancer  Father was diagnosed with breast cancer, sister was diagnosed with breast cancer at age 36, another sister was diagnosed with breast cancer at age 72, a brother diagnosed with pancreatic cancer and another brother with lung cancer  Her niece was found to have BRCA gene mutation  She is allergic to sulfa, atorvastatin, latex      Review of Systems    Constitutional: recent 2 lb weight loss, but no fever, no chills and not feeling tired  Eyes: No complaints of eye pain, no red eyes, no eyesight problems, no discharge, no dry eyes, no itching of eyes  ENT: no complaints of earache, no loss of hearing, no nose bleeds, no nasal discharge, no sore throat, no hoarseness  Cardiovascular: No complaints of slow heart rate, no fast heart rate, no chest pain, no palpitations, no leg claudication, no lower extremity edema  Respiratory: cough, shortness of breath during exertion and shortness of breath during exertion, but not short of breath at rest, shortness of breath does not worsen with lying down and no stridor  Gastrointestinal: no abdominal pain  Genitourinary: No complaints of dysuria, no incontinence, no pelvic pain, no dysmenorrhea, no vaginal discharge or bleeding  Musculoskeletal: No complaints of arthralgias, no myalgias, no joint swelling or stiffness, no limb pain or swelling  Integumentary: No complaints of skin rash or lesions, no itching, no skin wounds, no breast pain or lump  Neurological: No complaints of headache, no confusion, no convulsions, no numbness, no dizziness or fainting, no tingling, no limb weakness, no difficulty walking  Psychiatric: no sleep disturbances, no depression and no emotional problems  Endocrine: No complaints of proptosis, no hot flashes, no muscle weakness, no deepening of the voice, no feelings of weakness  Hematologic/Lymphatic: no swollen glands, no tendency for easy bleeding and no swollen glands in the neck  Active Problems    1  Contusion of face (920) (S00 83XA)   2   Contusion of hand, left (923 20) (O53 523B)   [facility]  [facility]   4  Fracture, metacarpal (815 00) (S62 309A)   5  Hypertension (401 9) (I10)   6  Injury of chest wall (959 11) (S29 9XXA)   7  Pleural effusion (511 9) (J90)   8  Shortness of breath (786 05) (R06 02)   9  SOB (shortness of breath) (786 05) (R06 02)    Past Medical History    1  History of Anxiety and depression (300 4) (F41 8)   2  History of breast cancer (V10 3) (Z85 3)   3  History of hyperglycemia (V12 29) (Z86 39)    The active problems and past medical history were reviewed and updated today  Surgical History    1  History of Radical Mastectomy Left Breast    The surgical history was reviewed and updated today  Family History    The family history was reviewed and updated today  Social History    · Non-smoker (V49 89) (Z78 9)  The social history was reviewed and updated today  Current Meds   1  Calcium Citrate + D 315-250 MG-UNIT Oral Tablet; Therapy: (Recorded:94Zow6096) to Recorded   2  Citalopram Hydrobromide 20 MG Oral Tablet; Therapy: (Recorded:46Qrf0084) to Recorded   3  Fish Oil OIL; Therapy: (Recorded:90Mbh6113) to Recorded   4  HydroCHLOROthiazide 25 MG Oral Tablet; Therapy: (Recorded:81Mfm7285) to Recorded   5  Lisinopril 10 MG Oral Tablet; Therapy: (Recorded:97Buu3124) to Recorded   6  MetFORMIN HCl - 500 MG Oral Tablet; Therapy: (Recorded:54Mjo6221) to Recorded   7  Metoprolol Tartrate TABS; Therapy: (Recorded:63Khf7600) to Recorded   8  Vitamin B-12 1000 MCG Oral Tablet; Therapy: (Recorded:67Kbz1783) to Recorded    The medication list was reviewed and updated today        Vitals  Vital Signs    Recorded: 08Jun2017 03:42PM   Temperature 98 2 F   Heart Rate 91   Respiration 16   Systolic 193   Diastolic 80   Height 5 ft 2 in   Weight 167 lb 8 0 oz   BMI Calculated 30 64   BSA Calculated 1 77   O2 Saturation 93     Physical Exam    Constitutional   General appearance: No acute distress, well appearing and well nourished  Eyes   Conjunctiva and lids: No swelling, erythema or discharge  Pupils and irises: Equal, round and reactive to light  Ears, Nose, Mouth, and Throat   External inspection of ears and nose: Normal     Oropharynx: Normal with no erythema, edema, exudate or lesions  Pulmonary   Auscultation of lungs: Abnormal   rales/crackles over the left base  Cardiovascular   Auscultation of heart: Normal rate and rhythm, normal S1 and S2, without murmurs  Examination of extremities for edema and/or varicosities: Normal     Carotid pulses: Normal     Abdomen   Abdomen: Non-tender, no masses  Liver and spleen: No hepatomegaly or splenomegaly  Lymphatic   Palpation of lymph nodes in neck: No lymphadenopathy  Musculoskeletal   Gait and station: Normal     Digits and nails: Normal without clubbing or cyanosis  Inspection/palpation of joints, bones, and muscles: Normal     Skin   Skin and subcutaneous tissue: Normal without rashes or lesions  Neurologic   Cranial nerves: Cranial nerves 2-12 intact  Psychiatric   Orientation to person, place, and time: Normal     Mood and affect: Normal         ECOG 1       Future Appointments    Date/Time Provider Specialty Site   06/28/2017 01:20 PM Rajesh [facility] M D  Hematology Oncology [facility] MEDICAL ONCOLOGY     Signatures   Electronically signed by :  CHAMP Redd ; Jun 8 2017  5:08PM EST                       (Author)

## 2018-01-14 ENCOUNTER — APPOINTMENT (OUTPATIENT)
Dept: LAB | Age: 83
End: 2018-01-14
Payer: MEDICARE

## 2018-01-14 ENCOUNTER — TRANSCRIBE ORDERS (OUTPATIENT)
Dept: ADMINISTRATIVE | Age: 83
End: 2018-01-14

## 2018-01-14 VITALS
RESPIRATION RATE: 16 BRPM | HEART RATE: 88 BPM | DIASTOLIC BLOOD PRESSURE: 60 MMHG | WEIGHT: 167 LBS | BODY MASS INDEX: 30.54 KG/M2 | TEMPERATURE: 98 F | SYSTOLIC BLOOD PRESSURE: 110 MMHG

## 2018-01-14 VITALS
WEIGHT: 162.38 LBS | RESPIRATION RATE: 16 BRPM | OXYGEN SATURATION: 96 % | SYSTOLIC BLOOD PRESSURE: 120 MMHG | HEART RATE: 83 BPM | BODY MASS INDEX: 29.88 KG/M2 | DIASTOLIC BLOOD PRESSURE: 70 MMHG | TEMPERATURE: 97.9 F | HEIGHT: 62 IN

## 2018-01-14 VITALS
WEIGHT: 167.38 LBS | SYSTOLIC BLOOD PRESSURE: 120 MMHG | DIASTOLIC BLOOD PRESSURE: 82 MMHG | TEMPERATURE: 97.2 F | OXYGEN SATURATION: 96 % | RESPIRATION RATE: 16 BRPM | HEART RATE: 83 BPM | BODY MASS INDEX: 30.8 KG/M2 | HEIGHT: 62 IN

## 2018-01-14 DIAGNOSIS — C34.90 MALIGNANT NEOPLASM OF UNSPECIFIED PART OF UNSPECIFIED BRONCHUS OR LUNG (HCC): ICD-10-CM

## 2018-01-14 DIAGNOSIS — K85.90 PLEURAL EFFUSION ASSOCIATED WITH PANCREATITIS: Primary | ICD-10-CM

## 2018-01-14 DIAGNOSIS — J91.8 PLEURAL EFFUSION ASSOCIATED WITH PANCREATITIS: Primary | ICD-10-CM

## 2018-01-14 LAB
ALBUMIN SERPL BCP-MCNC: 3.3 G/DL (ref 3.5–5)
ALP SERPL-CCNC: 87 U/L (ref 46–116)
ALT SERPL W P-5'-P-CCNC: 40 U/L (ref 12–78)
ANION GAP SERPL CALCULATED.3IONS-SCNC: 6 MMOL/L (ref 4–13)
AST SERPL W P-5'-P-CCNC: 42 U/L (ref 5–45)
BASOPHILS # BLD AUTO: 0.05 THOUSANDS/ΜL (ref 0–0.1)
BASOPHILS NFR BLD AUTO: 1 % (ref 0–1)
BILIRUB SERPL-MCNC: 0.52 MG/DL (ref 0.2–1)
BUN SERPL-MCNC: 13 MG/DL (ref 5–25)
CALCIUM SERPL-MCNC: 9.6 MG/DL (ref 8.3–10.1)
CHLORIDE SERPL-SCNC: 104 MMOL/L (ref 100–108)
CO2 SERPL-SCNC: 28 MMOL/L (ref 21–32)
CREAT SERPL-MCNC: 0.75 MG/DL (ref 0.6–1.3)
EOSINOPHIL # BLD AUTO: 0.28 THOUSAND/ΜL (ref 0–0.61)
EOSINOPHIL NFR BLD AUTO: 3 % (ref 0–6)
ERYTHROCYTE [DISTWIDTH] IN BLOOD BY AUTOMATED COUNT: 14.3 % (ref 11.6–15.1)
GFR SERPL CREATININE-BSD FRML MDRD: 74 ML/MIN/1.73SQ M
GLUCOSE SERPL-MCNC: 136 MG/DL (ref 65–140)
HCT VFR BLD AUTO: 40.8 % (ref 34.8–46.1)
HGB BLD-MCNC: 13.8 G/DL (ref 11.5–15.4)
LYMPHOCYTES # BLD AUTO: 2.34 THOUSANDS/ΜL (ref 0.6–4.47)
LYMPHOCYTES NFR BLD AUTO: 24 % (ref 14–44)
MCH RBC QN AUTO: 32.1 PG (ref 26.8–34.3)
MCHC RBC AUTO-ENTMCNC: 33.8 G/DL (ref 31.4–37.4)
MCV RBC AUTO: 95 FL (ref 82–98)
MONOCYTES # BLD AUTO: 1.47 THOUSAND/ΜL (ref 0.17–1.22)
MONOCYTES NFR BLD AUTO: 15 % (ref 4–12)
NEUTROPHILS # BLD AUTO: 5.55 THOUSANDS/ΜL (ref 1.85–7.62)
NEUTS SEG NFR BLD AUTO: 57 % (ref 43–75)
NRBC BLD AUTO-RTO: 0 /100 WBCS
PLATELET # BLD AUTO: 361 THOUSANDS/UL (ref 149–390)
PMV BLD AUTO: 10.7 FL (ref 8.9–12.7)
POTASSIUM SERPL-SCNC: 4.2 MMOL/L (ref 3.5–5.3)
PROT SERPL-MCNC: 7.6 G/DL (ref 6.4–8.2)
RBC # BLD AUTO: 4.3 MILLION/UL (ref 3.81–5.12)
SODIUM SERPL-SCNC: 138 MMOL/L (ref 136–145)
WBC # BLD AUTO: 9.73 THOUSAND/UL (ref 4.31–10.16)

## 2018-01-14 PROCEDURE — 85025 COMPLETE CBC W/AUTO DIFF WBC: CPT

## 2018-01-14 PROCEDURE — 80053 COMPREHEN METABOLIC PANEL: CPT

## 2018-01-14 PROCEDURE — 36415 COLL VENOUS BLD VENIPUNCTURE: CPT

## 2018-01-15 ENCOUNTER — APPOINTMENT (OUTPATIENT)
Dept: PHYSICAL THERAPY | Facility: CLINIC | Age: 83
End: 2018-01-15
Payer: MEDICARE

## 2018-01-15 PROCEDURE — 97112 NEUROMUSCULAR REEDUCATION: CPT

## 2018-01-15 RX ORDER — CYANOCOBALAMIN 1000 UG/ML
1000 INJECTION INTRAMUSCULAR; SUBCUTANEOUS ONCE
Status: COMPLETED | OUTPATIENT
Start: 2018-01-16 | End: 2018-01-16

## 2018-01-15 RX ORDER — SODIUM CHLORIDE 9 MG/ML
20 INJECTION, SOLUTION INTRAVENOUS CONTINUOUS
Status: DISCONTINUED | OUTPATIENT
Start: 2018-01-16 | End: 2018-01-19 | Stop reason: HOSPADM

## 2018-01-15 NOTE — PROGRESS NOTES
Assessment    1  Lung cancer (162 9) (C34 90)   2  Pleural effusion (511 9) (J90)    Plan  Lung cancer    · (1) CBC/PLT/DIFF; Status:Active - Retrospective By Protocol Authorization; Requested  for:[id number];    [facility]; Due:28Aug2018; Last Updated By:Nawaf Willson; 8/16/2017 2:11:31 PM;Ordered; For:Lung cancer; Ordered By:Faroun, Delano Dubin Lynna Nations);   · (1) COMPREHENSIVE METABOLIC PANEL; Status:Active - Retrospective By Protocol  Authorization; Requested for:[id number];    [facility]; Due:07Vaq6187; Last Updated By:Nawaf Willson; 8/16/2017 2:11:31 PM;Ordered; For:Lung cancer; Ordered By:Faroun, Delano Dubin Lynna Nations); Pleural effusion    · CT CHEST W CONTRAST; Status:Hold For - Scheduling; Requested for:57Rdl2424;    Perform:[facility]; Due:99Jbq7675; Ordered; For:Pleural effusion; Ordered By:Faroun, Delano Dubin Lynna Nations); · Follow-up visit in 3 weeks Evaluation and Treatment  Follow-up  Status: Hold For -  Scheduling  Requested for: 17KXS0043   Ordered; For: Pleural effusion; Ordered By: Tami Baum) Performed:  Due: 80Yaj3323    Discussion/Summary  Discussion Summary:   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, by serum analysis is negative for ALK, EGFR, negative for K-danielle, HER-2, ROS-1  #1  Proceed with cycle #3 of Alimta 500 mg/m^2, carboplatin AUC 5 and then CAT scan of the chest  #2  Patient because of dyspnea, fatigue she is in need for wheelchair because she cannot Propel herself  #3  Follow-up in 3 weeks        Chief Complaint  Chief Complaint Free Text Note Form: Malignant left sided pleural effusion      History of Present Illness  HPI: 55-year-old  female who presented to [facility] 2017 with dyspnea for the past 4-5 days and pleurisy, with occasional dry cough  CT scan of the thorax showed no evidence of PE, it showed a large loculated left side pleural effusion with nodularity concerning for metastatic disease, there is a concern for a mass versus pneumonia in the collapsed left lower lobe lung  She underwent left thoracentesis Ã2 yielding 1 4 L of bloody fluid, pathology showed adenocarcinoma, positive for TTF-1, CK 7 most likely consistent with non-small cell lung cancer  The patient had a history of left-sided breast cancer in 1992 status post mastectomy she told me she had told lymph node involvement, she was not treated with either chemotherapy, radiation therapy or hormonal therapy  She has extensive family history of cancer  Father was diagnosed with breast cancer, sister was diagnosed with breast cancer at age 36, another sister was diagnosed with breast cancer at age 72, a brother diagnosed with pancreatic cancer and another brother with lung cancer  Her niece was found to have BRCA gene mutation  She is allergic to sulfa, atorvastatin, latex   Current Therapy: Carboplatin AUC 5, Alimta 500 mg/m^2 every 3 weeks she received 2 cycles   Interval History: Unfortunately after one month of back and forth between us and [facility] to send the slides for molecular tests, we got a phone call 2 days ago from [facility] seeing they have not enough material for testing   Tumor Markers: BRCA1/2 is negative, CA-27-29 is normal, CEA is normal  Serum test for EGFR mutation is negative, ALK is negative  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       Review of Systems  Complete-Female:   Constitutional: recent 0 lb weight loss, but no fever, no chills and not feeling tired  Eyes: No complaints of eye pain, no red eyes, no eyesight problems, no discharge, no dry eyes, no itching of eyes  ENT: no sore throat and no hoarseness  Cardiovascular: No complaints of slow heart rate, no fast heart rate, no chest pain, no palpitations, no leg claudication, no lower extremity edema     Respiratory: cough, shortness of breath during exertion and shortness of breath during exertion, but no orthopnea, no wheezing, no PND, not short of breath at rest, shortness of breath does not worsen with lying down and no stridor  Gastrointestinal: no abdominal pain  Genitourinary: No complaints of dysuria, no incontinence, no pelvic pain, no dysmenorrhea, no vaginal discharge or bleeding  Musculoskeletal: No complaints of arthralgias, no myalgias, no joint swelling or stiffness, no limb pain or swelling  Integumentary: No complaints of skin rash or lesions, no itching, no skin wounds, no breast pain or lump  Neurological: No complaints of headache, no confusion, no convulsions, no numbness, no dizziness or fainting, no tingling, no limb weakness, no difficulty walking  Psychiatric: no sleep disturbances, no depression and no emotional problems  Endocrine: No complaints of proptosis, no hot flashes, no muscle weakness, no deepening of the voice, no feelings of weakness  Hematologic/Lymphatic: no swollen glands, no tendency for easy bleeding and no swollen glands in the neck  Active Problems    1  Contusion of face (920) (S00 83XA)   2  Contusion of hand, left (923 20) (S60 222A)   3  Fall (E888 9) [facility])   4  Fracture, metacarpal (815 00) (S62 309A)   5  History of left breast cancer (V10 3) (Z85 3)   6  Hypertension (401 9) (I10)   7  Injury of chest wall (959 11) (S29 9XXA)   8  Lung cancer (162 9) (C34 90)   9  Pleural effusion (511 9) (J90)   10  Shortness of breath (786 05) (R06 02)   11  SOB (shortness of breath) (786 05) (R06 02)    Past Medical History    1  History of Anxiety and depression (300 4) (F41 8)   2  History of breast cancer (V10 3) (Z85 3)   3  History of hyperglycemia (V12 29) (Z86 39)    Surgical History    1  History of Radical Mastectomy Left Breast    Social History    · Non-smoker (V49 89) (Z78 9)    Current Meds   1  Calcium Citrate + D 315-250 MG-UNIT Oral Tablet; Therapy: (Recorded:26Ucs2991) to Recorded   2   Citalopram Hydrobromide 20 MG Oral Tablet; Therapy: (Recorded:58Vld1291) to Recorded   3  Dexamethasone 4 MG Oral Tablet; 1  tab po bid for 3 days  Start day before treatment  Take with food; Therapy: 98Ksy8445 to (Last Rx:93Fyv5039)  Requested for: 10Aug2017 Ordered   4  Fish Oil OIL; Therapy: (Recorded:34Dow4271) to Recorded   5  MetFORMIN HCl - 500 MG Oral Tablet; Therapy: (Recorded:92Olq3959) to Recorded   6  Metoprolol Tartrate TABS; 50 MG BID; Therapy: (Mark Noel) to Recorded   7  Mirtazapine 15 MG Oral Tablet; TAKE 1 TABLET AT BEDTIME; Therapy: 82KBH9259 to (761 209 173)  Requested for: 85Yzh5836; Last   Rx:72Jhn7519 Ordered   8  Ondansetron HCl - 4 MG Oral Tablet; X0cwBKO for nausea; Therapy: 72OQD7860 to (Last Rx:49Ycq1888)  Requested for: 52Wvm4505 Ordered   9  Prochlorperazine Maleate 10 MG Oral Tablet; TAKE 1 TABLET EVERY 6 HOURS AS   NEEDED FOR NAUSEA; Therapy: 76BGH8467 to (Evaluate:29Hof4671)  Requested for: 21Jul2017; Last   Rx:98Bpf6943 Ordered   10  Vicodin 5-300 MG Oral Tablet; TAKE 1 TABLET EVERY 4 TO 6 HOURS AS NEEDED FOR    PAIN;    Therapy: 53LYV6059 to (Evaluate:94Zst6526); Last Rx:57Eyl6909 Ordered   11  Vitamin B-12 1000 MCG Oral Tablet; Therapy: (Recorded:26Wzn3323) to Recorded    Vitals  Vital Signs    Recorded: 16Aug2017 01:44PM   Temperature 98 F   Heart Rate 78   Respiration 16   Systolic 008   Diastolic 88   Height 5 ft 2 in   Weight 167 lb    BMI Calculated 30 54   BSA Calculated 1 77   O2 Saturation 91     Physical Exam    Constitutional   General appearance: No acute distress, well appearing and well nourished  Eyes   Conjunctiva and lids: No swelling, erythema or discharge  Pupils and irises: Equal, round and reactive to light  Ears, Nose, Mouth, and Throat   External inspection of ears and nose: Normal     Oropharynx: Normal with no erythema, edema, exudate or lesions  Pulmonary   Auscultation of lungs: Abnormal   rales/crackles over the left base     Cardiovascular Auscultation of heart: Normal rate and rhythm, normal S1 and S2, without murmurs  Examination of extremities for edema and/or varicosities: Normal     Carotid pulses: Normal     Abdomen   Abdomen: Non-tender, no masses  Liver and spleen: No hepatomegaly or splenomegaly  Lymphatic   Palpation of lymph nodes in neck: No lymphadenopathy  Musculoskeletal   Gait and station: Normal     Digits and nails: Normal without clubbing or cyanosis  Inspection/palpation of joints, bones, and muscles: Normal     Skin   Skin and subcutaneous tissue: Normal without rashes or lesions  Neurologic   Cranial nerves: Cranial nerves 2-12 intact  Psychiatric   Orientation to person, place, and time: Normal     Mood and affect: Normal         ECOG 1       Results/Data  (1) CBC/PLT/DIFF 39HOK7436 03:03PM [facility] [place] [facility] Number: [id number]     Test Name Result Flag Reference   WBC COUNT 10 84 Thousand/uL H 4 31-10 16   RBC COUNT 4 61 Million/uL  3 81-5 12   HEMOGLOBIN 13 7 g/dL  11 5-15 4   HEMATOCRIT 42 1 %  34 8-46  1   MCV 91 fL  82-98   MCH 29 7 pg  26 8-34 3   MCHC 32 5 g/dL  31 4-37 4   RDW 13 7 %  11 6-15 1   MPV 10 4 fL  8 9-12 7   PLATELET COUNT 283 Thousands/uL  149-390   nRBC AUTOMATED 0 /100 WBCs     NEUTROPHILS RELATIVE PERCENT 38 % L 43-75   LYMPHOCYTES RELATIVE PERCENT 32 %  14-44   MONOCYTES RELATIVE PERCENT 10 %  4-12   EOSINOPHILS RELATIVE PERCENT 19 % H 0-6   BASOPHILS RELATIVE PERCENT 1 %  0-1   NEUTROPHILS ABSOLUTE COUNT 4 14 Thousands/? ??L  1 85-7 62   LYMPHOCYTES ABSOLUTE COUNT 3 41 Thousands/? ??L  0 60-4 47   MONOCYTES ABSOLUTE COUNT 1 07 Thousand/? ??L  0 17-1 22   EOSINOPHILS ABSOLUTE COUNT 2 06 Thousand/? ??L H 0 00-0 61   BASOPHILS ABSOLUTE COUNT 0 12 Thousands/? ??L H 0 00-0 10     (1) COMPREHENSIVE METABOLIC PANEL 85GPM5979 93:53UV [facility] [place] Order Number: [id number]     Test Name Result Flag Reference   GLUCOSE,RANDM 166 mg/dL H    If the patient is fasting, the ADA then defines impaired fasting glucose as > 100 mg/dL and diabetes as > or equal to 123 mg/dL  Specimen collection should occur prior to Sulfasalazine administration due to the potential for falsely depressed results  Specimen collection should occur prior to Sulfapyridine administration due to the potential for falsely elevated results  SODIUM 138 mmol/L  136-145   POTASSIUM 4 0 mmol/L  3 5-5 3   CHLORIDE 106 mmol/L  100-108   CARBON DIOXIDE 23 mmol/L  21-32   ANION GAP (CALC) 9 mmol/L  4-13   BLOOD UREA NITROGEN 18 mg/dL  5-25   CREATININE 0 80 mg/dL  0 60-1 30   Standardized to IDMS reference method   CALCIUM 9 6 mg/dL  8 3-10 1   BILI, TOTAL 0 25 mg/dL  0 20-1 00   ALK PHOSPHATAS 71 U/L     ALT (SGPT) 31 U/L  12-78   Specimen collection should occur prior to Sulfasalazine and/or Sulfapyridine administration due to the potential for falsely depressed results  AST(SGOT) 28 U/L  5-45   Specimen collection should occur prior to Sulfasalazine administration due to the potential for falsely depressed results  ALBUMIN 3 0 g/dL L 3 5-5 0   TOTAL PROTEIN 7 5 g/dL  6 4-8 2   eGFR 69 ml/min/1 73sq m     National Kidney Disease Education Program recommendations are as follows:  GFR calculation is accurate only with a steady state creatinine  Chronic Kidney disease less than 60 ml/min/1 73 sq  meters  Kidney failure less than 15 ml/min/1 73 sq  meters  (1) NON-GYNECOLOGIC CYTOLOGY 03Pig3636 01:33PM Darshanavi [facility])     Test Name Result Flag Reference   LAB AP CASE REPORT (Report)     Non-gynecologic Cytology             Case: [id number]                  Authorizing Provider:  Janet Wells MD     Collected:      07/21/2017 1333        Ordering Location:   [facility]    Received:      07/21/2017 [address]                                         Radiology                                   Pathologist:      Faye Ngeron MD Specimens:  A) - Thoracentesis, left                                        B) - Thoracentesis, left, cell button   LAB AP CYTO FINAL DIAGNOSIS (Report)     A,B  Thoracentesis, left (ThinPrpe and cell block preparations):  Conclusive Evidence of Malignancy  Metastatic adenocarcinoma, consistent with lung as primary site of origin  Satisfactory for evaluation  Electronically signed by Vidhya Chiu MD on 7/26/2017 at 8:56 AM   LAB AP NONGYN NOTE (Report)     Immunohistochemical stains are performed using appropriate positive   controls  The tumor cells are   positive for MOC31, TTF-1 and napsin A and are negative for mammoglobin,   GATA3 and p40, supporting the   above diagnosis of metastatic lung adenocarcinoma  Intradepartmental consultation is in agreement  A copy of the final report is faxed by Dr Yasmeen Rodrigues to Dr Alexandre [facility]   office on 07/26/17 at 8:55 am     Interpretation performed at [facility], [address] [place]   [address]   LAB AP GROSS DESCRIPTION      A  Thoracentesis, left: 100ml  Bloody, received in CytoLyt    B  Thoracentesis, left, cell button: minimal cell button, red   LAB AP NONGYN ADDITIONAL INFORMATION (Report)     [employer] FDA approved ,  and ThinPrep Imaging System are   utilized with strict adherence to the 's instruction manual to   prepare gynecologic and non-gynecologic cytology specimens for the   production of ThinPrep slides as well as for gynecologic ThinPrep imaging  These processes have been validated by our laboratory and/or by the     These tests were developed and their performance characteristics   determined by [facility] or [facility]  They may not be cleared or approved by the [place]  Food and   Drug Administration  The FDA has determined that such clearance or   approval is not necessary  These tests are used for clinical purposes     They should not be regarded as investigational or for research  This   laboratory has been approved by [place] [address], designated as a high-complexity   laboratory and is qualified to perform these tests  Please cell block for EGFR ALK AND PDL1 per Dr Courtney Stephenson     (1) CEA 74VJX1289 01:42PM Trey Millin Lock[facility] Sinks)   TW Order Number: [id number]     Test Name Result Flag Reference   CEA 0 7 ng/mL  0 0-3 0   Normal/Non-Smoker: 0 0-3 0 ng/mL   Normal/Smoker:     3 1-5 0 ng/mL     Signatures   Electronically signed by :  CHAMP Moon ; Aug 16 2017  2:14PM EST                       (Author)

## 2018-01-15 NOTE — MISCELLANEOUS
Message  Spoke to [employer] from pathology who stated they do now have the slides  But the see a message from [employer] from 10 days ago who stated the slides were sent  We will be following up  Active Problems    1  Contusion of face (920) (S00 83XA)   2  Contusion of hand, left (923 20) (S60 222A)   3  Fall (E888 9) [facility])   4  Fracture, metacarpal (815 00) (S62 309A)   5  History of left breast cancer (V10 3) (Z85 3)   6  Hypertension (401 9) (I10)   7  Injury of chest wall (959 11) (S29 9XXA)   8  Lung cancer (162 9) (C34 90)   9  Pleural effusion (511 9) (J90)   10  Shortness of breath (786 05) (R06 02)   11  SOB (shortness of breath) (786 05) (R06 02)    Current Meds   1  Calcium Citrate + D 315-250 MG-UNIT Oral Tablet; Therapy: (Recorded:48Mqo7836) to Recorded   2  Citalopram Hydrobromide 20 MG Oral Tablet; Therapy: (Recorded:55Ujc3220) to Recorded   3  Fish Oil OIL; Therapy: (Recorded:47Vjw8468) to Recorded   4  HydroCHLOROthiazide 25 MG Oral Tablet; Therapy: (Recorded:09Xrz7242) to Recorded   5  Lisinopril 10 MG Oral Tablet; Therapy: (Recorded:24Lvo3237) to Recorded   6  MetFORMIN HCl - 500 MG Oral Tablet; Therapy: (Recorded:84Uvo0379) to Recorded   7  Metoprolol Tartrate TABS; Therapy: (Recorded:36Kmi8896) to Recorded   8  Vitamin B-12 1000 MCG Oral Tablet;    Therapy: (Recorded:34Tdo5005) to Recorded    Signatures   Electronically signed by : Flo Shearer, ; Jun 27 2017  8:53AM EST                       (Author)

## 2018-01-15 NOTE — CONSULTS
Chief Complaint  Malignant left sided pleural effusion      History of Present Illness  27-year-old  female who presented to [facility] in May 2017 with dyspnea for the past 4-5 days and pleurisy, with occasional dry cough  CT scan of the thorax showed no evidence of PE, it showed a large loculated left side pleural effusion with nodularity concerning for metastatic disease, there is a concern for a mass versus pneumonia in the collapsed left lower lobe lung  She underwent left thoracentesis Ã2 yielding 1 4 L of bloody fluid, pathology showed adenocarcinoma, positive for TTF-1, CK 7 most likely consistent with non-small cell lung cancer  The patient had a history of left-sided breast cancer in 1992 status post mastectomy she told me she had told lymph node involvement, she was not treated with either chemotherapy, radiation therapy or hormonal therapy  She has extensive family history of cancer  Father was diagnosed with breast cancer, sister was diagnosed with breast cancer at age 36, another sister was diagnosed with breast cancer at age 72, a brother diagnosed with pancreatic cancer and another brother with lung cancer  Her niece was found to have BRCA gene mutation  She is allergic to sulfa, atorvastatin, latex   Unfortunately after one month of back and forth between us and [facility] to send the slides for molecular tests, we got a phone call 2 days ago from [facility] seeing they have not enough material for testing   BRCA1/2 is negative, CA-27-29 is normal, CEA is normal       Review of Systems    Constitutional: recent 0 lb weight loss, but no fever, no chills and not feeling tired  Eyes: No complaints of eye pain, no red eyes, no eyesight problems, no discharge, no dry eyes, no itching of eyes  ENT: no sore throat and no hoarseness     Cardiovascular: No complaints of slow heart rate, no fast heart rate, no chest pain, no palpitations, no leg claudication, no lower extremity edema  Respiratory: cough, shortness of breath during exertion and shortness of breath during exertion, but no orthopnea, no wheezing, no PND, not short of breath at rest, shortness of breath does not worsen with lying down and no stridor  Gastrointestinal: no abdominal pain  Genitourinary: No complaints of dysuria, no incontinence, no pelvic pain, no dysmenorrhea, no vaginal discharge or bleeding  Musculoskeletal: No complaints of arthralgias, no myalgias, no joint swelling or stiffness, no limb pain or swelling  Integumentary: No complaints of skin rash or lesions, no itching, no skin wounds, no breast pain or lump  Neurological: No complaints of headache, no confusion, no convulsions, no numbness, no dizziness or fainting, no tingling, no limb weakness, no difficulty walking  Psychiatric: no sleep disturbances, no depression and no emotional problems  Endocrine: No complaints of proptosis, no hot flashes, no muscle weakness, no deepening of the voice, no feelings of weakness  Hematologic/Lymphatic: no swollen glands, no tendency for easy bleeding and no swollen glands in the neck  Active Problems    1  Contusion of face (920) (S00 83XA)   2  Contusion of hand, left (923 20) (S60 222A)   3  Fall (E888 9) [facility])   4  Fracture, metacarpal (815 00) (S62 309A)   5  History of left breast cancer (V10 3) (Z85 3)   6  Hypertension (401 9) (I10)   7  Injury of chest wall (959 11) (S29 9XXA)   8  Lung cancer (162 9) (C34 90)   9  Pleural effusion (511 9) (J90)   10  Shortness of breath (786 05) (R06 02)   11  SOB (shortness of breath) (786 05) (R06 02)    Past Medical History    · History of Anxiety and depression (300 4) (F41 8)   · History of breast cancer (V10 3) (Z85 3)   · History of hyperglycemia (V12 29) (Z86 39)    Surgical History    · History of Radical Mastectomy Left Breast    Social History    · Non-smoker (V49 89) (Z78 9)    Current Meds   1   Calcium Citrate + D 315-250 MG-UNIT Oral Tablet; Therapy: (Recorded:02Rrm8239) to Recorded   2  Citalopram Hydrobromide 20 MG Oral Tablet; Therapy: (Recorded:46Uet2398) to Recorded   3  Fish Oil OIL; Therapy: (Recorded:80Flk1231) to Recorded   4  MetFORMIN HCl - 500 MG Oral Tablet; Therapy: (Recorded:45Wxu1745) to Recorded   5  Metoprolol Tartrate TABS; 50 MG BID; Therapy: (Juana Fernandez) to Recorded   6  Vitamin B-12 1000 MCG Oral Tablet; Therapy: (Recorded:92Rqj1870) to Recorded    Vitals   Recorded: 91Jex4480 04:05PM   Temperature 98 F   Heart Rate 72   Respiration 16   Systolic 880   Diastolic 80   Height 5 ft 2 in   Weight 166 lb    BMI Calculated 30 36   BSA Calculated 1 77     Physical Exam    Constitutional   General appearance: No acute distress, well appearing and well nourished  Eyes   Conjunctiva and lids: No swelling, erythema or discharge  Pupils and irises: Equal, round and reactive to light  Ears, Nose, Mouth, and Throat   External inspection of ears and nose: Normal     Oropharynx: Normal with no erythema, edema, exudate or lesions  Pulmonary   Auscultation of lungs: Abnormal   rales/crackles over the left base  Cardiovascular   Auscultation of heart: Normal rate and rhythm, normal S1 and S2, without murmurs  Examination of extremities for edema and/or varicosities: Normal     Carotid pulses: Normal     Abdomen   Abdomen: Non-tender, no masses  Liver and spleen: No hepatomegaly or splenomegaly  Lymphatic   Palpation of lymph nodes in neck: No lymphadenopathy  Musculoskeletal   Gait and station: Normal     Digits and nails: Normal without clubbing or cyanosis  Inspection/palpation of joints, bones, and muscles: Normal     Skin   Skin and subcutaneous tissue: Normal without rashes or lesions  Neurologic   Cranial nerves: Cranial nerves 2-12 intact      Psychiatric   Orientation to person, place, and time: Normal     Mood and affect: Normal         ECOG 1 Results/Data  * XR CHEST PA & LATERAL 10YSS0087 06:38PM Alejandro Ruff    Order Number: [id number]     Test Name Result Flag Reference   XR CHEST PA & LATERAL (Report)     CHEST     INDICATION: Follow-up left pleural effusion  COMPARISON: 6/20/2017  VIEWS: Frontal and lateral projections; 2 images     FINDINGS:         The cardiomediastinal silhouette is unremarkable  Small left pleural effusion is identified increased from previous exam  Interval development of loculated left fissure fluid is noted  Osseous structures are age appropriate  IMPRESSION:     Interval increase in left pleural effusion with development of loculated fluid within the left fissure  Workstation performed: [id number]     Signed by: Mukesh Pérez MD   7/20/17     Assessment    1  Lung cancer (162 9) (C34 90)   2  Pleural effusion (511 9) (J90)    Plan  Lung cancer    · *1- [facility] Co-Management  IR for thoracentesis left side   Status: Active - Retrospective By Protocol Authorization  Requested for: 38JWG6960  01:30PM   Ordered; For: Lung cancer; Ordered By: Janett Garcia) Performed:  Order Comments: IR THORACENTESIS LEFT SIDE PLEASE SEND FLUID FOR CYTOLOGY CELL BLOCK FOR EGFR  ALK AND PDL1 Due: 92ELZ9775; Last Updated By: Allie Zamarripa; 7/20/2017 4:42:26 PM  Care Summary provided  : Yes  Pleural effusion    · (1) CBC/PLT/DIFF; Status:Active; Requested for:95Svd1861;    Perform:[facility]; Due:53Vdv9545; Ordered; For:Pleural effusion; Ordered By:Amber Burgos);   · (1) COMPREHENSIVE METABOLIC PANEL; Status:Active; Requested for:84Jyo3421;    Perform:[facility]; Due:70Zsj9544; Ordered; For:Pleural effusion; Ordered By:Amber Burgos); · Follow-up visit in 3 weeks Evaluation and Treatment  Follow-up  Status: Hold For -  Scheduling  Requested for: 86Abh6721   Ordered;  For: Pleural effusion; Ordered By: Janett Garcia) Performed: Due: 97Crn7558    Discussion/Summary    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, stage IV by definition, PET scan showed no distant activity, the patient had a history of breast cancer, tested negative for BRCA1/2 because of strong family history of breast cancer, biopsy did not show any evidence of breast cancer  There was not enough material to test for mutation analysis  Most recent chest x-ray showed reaccumulation of left pleural effusion  We'll ask IR for thoracentesis and cytology and send the spin down said the block for mutation analysis including EGFR mutation, ALK and PDL 1 if the patient tested negative was treated with Alimta 500 mg/m^2, carboplatin AUC 5 every 21 days  Vitamin B 12 1000 Âµg IM shot Ã1  We will send for blood test for EGFR mutation analysis as well through [facility], This test done at home  The patient and her family were shown the chest x-ray  They are aware the thoracentesis might test negative as well  Signatures   Electronically signed by :  CHAMP Little ; Jul 20 2017  4:54PM EST                       (Author)

## 2018-01-15 NOTE — MISCELLANEOUS
Message   Recorded as Task   Date: 06/20/2017 10:23 AM, Created By: Julián Sandoval   Task Name: Call Back   Assigned To: Anastasiya Ware   Regarding Patient: Leonard Flores, Status: In Progress   Hareshjohn Yusuf - 20 Jun 2017 10:23 AM     TASK CREATED  Caller: Jaden, Adult Child; [phone]  Pt's daughter called her mom was tapped in hospital when she had pleural effusion, daughter feels mom is filling up again may need to be tapped again  Anastasiya Ware - 20 Jun 2017 10:35 AM     TASK IN PROGRESS   Anastasiya Ware - 20 Jun 2017 10:48 AM     TASK EDITED  CXR ordered   patient will go today and call me tomorrow for result        Active Problems    1  Contusion of face (920) (S00 83XA)   2  Contusion of hand, left (923 20) (S60 222A)   3  Fall (E888 9) [facility])   4  Fracture, metacarpal (815 00) (S62 309A)   5  History of left breast cancer (V10 3) (Z85 3)   6  Hypertension (401 9) (I10)   7  Injury of chest wall (959 11) (S29 9XXA)   8  Lung cancer (162 9) (C34 90)   9  Pleural effusion (511 9) (J90)   10  Shortness of breath (786 05) (R06 02)   11  SOB (shortness of breath) (786 05) (R06 02)    Current Meds   1  Calcium Citrate + D 315-250 MG-UNIT Oral Tablet; Therapy: (Recorded:65Rdu6297) to Recorded   2  Citalopram Hydrobromide 20 MG Oral Tablet; Therapy: (Recorded:23Kau2501) to Recorded   3  Fish Oil OIL; Therapy: (Recorded:61Jvb2906) to Recorded   4  HydroCHLOROthiazide 25 MG Oral Tablet; Therapy: (Recorded:67Xpx7921) to Recorded   5  Lisinopril 10 MG Oral Tablet; Therapy: (Recorded:12Eak9587) to Recorded   6  MetFORMIN HCl - 500 MG Oral Tablet; Therapy: (Recorded:24Rit2951) to Recorded   7  Metoprolol Tartrate TABS; Therapy: (Recorded:27Pus5136) to Recorded   8  Vitamin B-12 1000 MCG Oral Tablet; Therapy: (Recorded:11Aga6846) to Recorded    Plan  Pleural effusion    · * XR CHEST PA & LATERAL; Status:Active - Retrospective By Protocol Authorization;   Requested HGV:88XWJ4720;     Signatures Electronically signed by : Rebecca Carter, ; Jun 20 2017 10:49AM EST                       (Author)

## 2018-01-16 ENCOUNTER — HOSPITAL ENCOUNTER (OUTPATIENT)
Dept: INFUSION CENTER | Facility: CLINIC | Age: 83
Discharge: HOME | End: 2018-01-16
Payer: MEDICARE

## 2018-01-16 VITALS
RESPIRATION RATE: 16 BRPM | WEIGHT: 166.01 LBS | HEIGHT: 62 IN | BODY MASS INDEX: 30.55 KG/M2 | DIASTOLIC BLOOD PRESSURE: 70 MMHG | SYSTOLIC BLOOD PRESSURE: 132 MMHG | TEMPERATURE: 97 F | HEART RATE: 64 BPM

## 2018-01-16 PROCEDURE — 96409 CHEMO IV PUSH SNGL DRUG: CPT

## 2018-01-16 PROCEDURE — 96367 TX/PROPH/DG ADDL SEQ IV INF: CPT

## 2018-01-16 PROCEDURE — 96361 HYDRATE IV INFUSION ADD-ON: CPT

## 2018-01-16 PROCEDURE — 96372 THER/PROPH/DIAG INJ SC/IM: CPT

## 2018-01-16 RX ADMIN — SODIUM CHLORIDE 700 MG: 9 INJECTION, SOLUTION INTRAVENOUS at 15:09

## 2018-01-16 RX ADMIN — ONDANSETRON 8 MG: 2 INJECTION INTRAMUSCULAR; INTRAVENOUS at 14:41

## 2018-01-16 RX ADMIN — SODIUM CHLORIDE 20 ML/HR: 9 INJECTION, SOLUTION INTRAVENOUS at 14:40

## 2018-01-16 RX ADMIN — CYANOCOBALAMIN 1000 MCG: 1000 INJECTION, SOLUTION INTRAMUSCULAR at 16:07

## 2018-01-16 RX ADMIN — SODIUM CHLORIDE 500 ML: 0.9 INJECTION, SOLUTION INTRAVENOUS at 15:26

## 2018-01-16 NOTE — MISCELLANEOUS
Message   Recorded as Task   Date: 07/20/2017 02:45 PM, Created By: Evette Larry   Task Name: Care Coordination   Assigned To: Anastasiya Ware   Regarding Patient: Erwin Coon, Status: Active   Comment:    Karen Ruiz - 20 Jul 2017 2:45 PM     TASK CREATED  Caller: Alistair; Care Coordination; [phone]  RE: EGFR ALK and PD L 1 testing  They do not have enough specimen to do the tests  Call back with questions  Anastasiya Ware - 20 Jul 2017 2:49 PM     TASK EDITED  spoke with Sylvia Zuniga, confirmed thatthere is not enough tissue for EGFR/PDL1/ALK TESTING        Active Problems    1  Contusion of face (920) (S00 83XA)   2  Contusion of hand, left (923 20) (S60 222A)   3  Fall (E888 9) [facility])   4  Fracture, metacarpal (815 00) (S62 309A)   5  History of left breast cancer (V10 3) (Z85 3)   6  Hypertension (401 9) (I10)   7  Injury of chest wall (959 11) (S29 9XXA)   8  Lung cancer (162 9) (C34 90)   9  Pleural effusion (511 9) (J90)   10  Shortness of breath (786 05) (R06 02)   11  SOB (shortness of breath) (786 05) (R06 02)    Current Meds   1  Calcium Citrate + D 315-250 MG-UNIT Oral Tablet; Therapy: (Recorded:77Ajh3488) to Recorded   2  Citalopram Hydrobromide 20 MG Oral Tablet; Therapy: (Recorded:83Trs5348) to Recorded   3  Fish Oil OIL; Therapy: (Recorded:93Esj7466) to Recorded   4  MetFORMIN HCl - 500 MG Oral Tablet; Therapy: (Recorded:74Evr5371) to Recorded   5  Metoprolol Tartrate TABS; 50 MG BID; Therapy: (Shoaib Read) to Recorded   6  Vitamin B-12 1000 MCG Oral Tablet;    Therapy: (Recorded:28Dxh0649) to Recorded    Plan  Lung cancer    · *1- [facility] Co-Management  IR for thoracentesis left side  please send fluid for CYTOLOGY  Status: Hold For - Scheduling,Retrospective By  Protocol Authorization  Requested for: [address] Summary provided  : Yes    Signatures   Electronically signed by : Samir Francisco, ; Jul 20 2017  2:49PM EST                       (Author)

## 2018-01-16 NOTE — PROGRESS NOTES
Arrived on unit for chemotherapy  Denies any discomforts  Cleared for chemotherapy as per lab parameters on orders  Patient requested hydration today instead of tomorrow due to impending snow  Spoke with Alton Greenwood Rn and made aware  Order to be sent   Patient aware

## 2018-01-16 NOTE — MISCELLANEOUS
Message  patient to IR today for thoracentesis, 100 ml obtained  patient rates pain 7/10  d/w dr Tatiana Herbert  will treat with alimta/carboplatin on 7/24/17  vicodin script given for pain  patient and family verbalize understanding of plan      Active Problems    1  Contusion of face (920) (S00 83XA)   2  Contusion of hand, left (923 20) (S60 222A)   3  Fall (E888 9) [facility])   4  Fracture, metacarpal (815 00) (S62 309A)   5  History of left breast cancer (V10 3) (Z85 3)   6  Hypertension (401 9) (I10)   7  Injury of chest wall (959 11) (S29 9XXA)   8  Lung cancer (162 9) (C34 90)   9  Pleural effusion (511 9) (J90)   10  Shortness of breath (786 05) (R06 02)   11  SOB (shortness of breath) (786 05) (R06 02)    Current Meds   1  Calcium Citrate + D 315-250 MG-UNIT Oral Tablet; Therapy: (Recorded:34Doa8069) to Recorded   2  Citalopram Hydrobromide 20 MG Oral Tablet; Therapy: (Recorded:31Gmr9974) to Recorded   3  Fish Oil OIL; Therapy: (Recorded:38Fke6282) to Recorded   4  MetFORMIN HCl - 500 MG Oral Tablet; Therapy: (Recorded:13Vbb5010) to Recorded   5  Metoprolol Tartrate TABS; 50 MG BID; Therapy: (Karen Kong) to Recorded   6  Vitamin B-12 1000 MCG Oral Tablet; Therapy: (Recorded:07Fui4204) to Recorded    Plan  Health Maintenance    · Prochlorperazine Maleate 10 MG Oral Tablet; TAKE 1 TABLET EVERY 6 HOURS  AS NEEDED FOR NAUSEA  Lung cancer    · Vicodin 5-300 MG Oral Tablet; TAKE 1 TABLET EVERY 4 TO 6 HOURS AS  NEEDED FOR PAIN   · (1) CBC/PLT/DIFF; Status:Active - Retrospective By Protocol Authorization; Requested  for:80Qnl0636;    · (1) COMPREHENSIVE METABOLIC PANEL; Status:Active - Retrospective By Protocol  Authorization; Requested for:12Ngx7922;    · *1- [facility] Co-Management  IR for thoracentesis left side   Status: Active  Requested for: 59Igg3933 01:30PM  Care Summary provided  : Yes  Pleural effusion    · (1) CBC/PLT/DIFF; Status:Active;  Requested for:82Dsq1762;    · (1) COMPREHENSIVE METABOLIC PANEL; Status:Active;  Requested for:21Vzt1443;     Signatures   Electronically signed by : Kevyn De, ; Jul 21 2017  3:18PM EST                       (Author)

## 2018-01-16 NOTE — PLAN OF CARE
Problem: Potential for Falls  Goal: Patient will remain free of falls  INTERVENTIONS:  - Assess patient frequently for physical needs  -  Identify cognitive and physical deficits and behaviors that affect risk of falls    -  [place] fall precautions as indicated by assessment   - Educate patient/family on patient safety including physical limitations  - Instruct patient to call for assistance with activity based on assessment  - Modify environment to reduce risk of injury  - Consider OT/PT consult to assist with strengthening/mobility   Outcome: Progressing

## 2018-01-16 NOTE — MISCELLANEOUS
Message   Recorded as Task   Date: 08/18/2017 01:34 PM, Created By: Roxana Nascimento   Task Name: Follow Up   Assigned To: Olayinka Byrd   Regarding Patient: Leonard Flores, Status: Active   CommentIda Hedrick - 18 Aug 2017 1:34 PM     TASK CREATED  Caller: [address] [place] [address] ; Other  Andersonamalia Chapaer from [address] [place] [address] has an order for transport chair and needs to have this added to the order:    Due to patient's dx she is unable to use a regular wheelchair or lightweight, therefore transport chair is needed  fax:  [phone]  phone  [phone]   Anastasiya Ware - 18 Aug 2017 2:08 PM     TASK EDITED  spoke with Jay Olmedo will get the w/c out to patient ASAP  no need for furhter documentation        Active Problems    1  Contusion of face (920) (S00 83XA)   2  Contusion of hand, left (923 20) (S60 222A)   3  Fall (E888 9) [facility])   4  Fracture, metacarpal (815 00) (S62 309A)   5  History of left breast cancer (V10 3) (Z85 3)   6  Hypertension (401 9) (I10)   7  Injury of chest wall (959 11) (S29 9XXA)   8  Lung cancer (162 9) (C34 90)   9  Pleural effusion (511 9) (J90)   10  Shortness of breath (786 05) (R06 02)   11  SOB (shortness of breath) (786 05) (R06 02)    Current Meds   1  Calcium Citrate + D 315-250 MG-UNIT Oral Tablet; Therapy: (Recorded:98Kvb3921) to Recorded   2  Citalopram Hydrobromide 20 MG Oral Tablet; Therapy: (Recorded:62Mof9650) to Recorded   3  Dexamethasone 4 MG Oral Tablet; 1  tab po bid for 3 days  Start day before treatment  Take with food; Therapy: 08Aug2017 to (Last Rx:68Pnd1278)  Requested for: 10Aug2017 Ordered   4  Fish Oil OIL; Therapy: (Recorded:03Rip1528) to Recorded   5  MetFORMIN HCl - 500 MG Oral Tablet; Therapy: (Recorded:80Uba1349) to Recorded   6  Metoprolol Tartrate TABS; 50 MG BID; Therapy: (Roya Anthony) to Recorded   7  Mirtazapine 15 MG Oral Tablet; TAKE 1 TABLET AT BEDTIME;    Therapy: 35VLT6035 to ([phone])  Requested for: 09TTA1814; Last   Rx:77Kip4417 Ordered   8  Ondansetron HCl - 4 MG Oral Tablet; T1mpHIH for nausea; Therapy: 85FGP5140 to (Last Rx:92Ojm5744)  Requested for: 63Xoe3267 Ordered   9  Prochlorperazine Maleate 10 MG Oral Tablet; TAKE 1 TABLET EVERY 6 HOURS AS   NEEDED FOR NAUSEA; Therapy: 94QCB6558 to (Evaluate:20Eox2312)  Requested for: 18Ttp8565; Last   Rx:68Jtx4722 Ordered   10  Vicodin 5-300 MG Oral Tablet; TAKE 1 TABLET EVERY 4 TO 6 HOURS AS NEEDED    FOR PAIN;    Therapy: 10WFP6330 to (Evaluate:94Rlu9860); Last Rx:16Kgb3781 Ordered   11  Vitamin B-12 1000 MCG Oral Tablet;     Therapy: (Recorded:83Cwe5582) to Recorded    Signatures   Electronically signed by : Alla Garcias, ; Aug 18 2017  2:08PM EST                       (Author)

## 2018-01-16 NOTE — PROGRESS NOTES
Assessment    1  Lung cancer (162 9) (C34 90)   2  Pleural effusion (511 9) (J90)    Plan  Lung cancer    · *1- [facility] INTERVENTIONAL RADIOLOGY Co-Management  IR for thoracentesis left side   Status: Active - Retrospective By Protocol Authorization  Requested for: [id number]  01:30PM   Ordered; For: Lung cancer; Ordered By: Rigoberto Jones) Performed:  Order Comments: IR THORACENTESIS LEFT SIDE PLEASE SEND FLUID FOR CYTOLOGY CELL BLOCK FOR EGFR  ALK AND PDL1 Due: 94HYZ1019; Last Updated By: Susan Ocampo; 7/20/2017 4:42:26 PM  Care Summary provided  : Yes  Pleural effusion    · (1) CBC/PLT/DIFF; Status:Active; Requested for:24Oop9374;    Perform:[facility]; Due:69Nsf4170; Ordered; For:Pleural effusion; Ordered By:Faroun, Marylen Risser [place]);   · (1) COMPREHENSIVE METABOLIC PANEL; Status:Active; Requested for:95Inx9489;    Perform:[facility]; Due:30Ozt8546; Ordered; For:Pleural effusion; Ordered By:Faroun, Marylen Risser [place]); · Follow-up visit in 3 weeks Evaluation and Treatment  Follow-up  Status: Hold For -  Scheduling  Requested for: 80Pqg2701   Ordered;  For: Pleural effusion; Ordered By: Rigoberto Jones) Performed:  Due: 09QYG1588    Discussion/Summary  Discussion Summary:   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, stage IV by definition, PET scan showed no distant activity, the patient had a history of breast cancer, tested negative for BRCA1/2 because of strong family history of breast cancer, biopsy did not show any evidence of breast cancer  There was not enough material to test for mutation analysis  Most recent chest x-ray showed reaccumulation of left pleural effusion  We'll ask IR for thoracentesis and cytology and send the spin down said the block for mutation analysis including EGFR mutation, ALK and PDL 1 if the patient tested negative was treated with Alimta 500 mg/m^2, carboplatin AUC 5 every 21 days  Vitamin B 12 1000 Âµg IM shot Ã1  We will send for blood test for EGFR mutation analysis as well through [employer], This test done at home  The patient and her family were shown the chest x-ray  They are aware the thoracentesis might test negative as well        Chief Complaint  Chief Complaint Free Text Note Form: Malignant left sided pleural effusion      History of Present Illness  HPI: 51-year-old  female who presented to [facility] in May 2017 with dyspnea for the past 4-5 days and pleurisy, with occasional dry cough  CT scan of the thorax showed no evidence of PE, it showed a large loculated left side pleural effusion with nodularity concerning for metastatic disease, there is a concern for a mass versus pneumonia in the collapsed left lower lobe lung  She underwent left thoracentesis Ã2 yielding 1 4 L of bloody fluid, pathology showed adenocarcinoma, positive for TTF-1, CK 7 most likely consistent with non-small cell lung cancer  The patient had a history of left-sided breast cancer in 1992 status post mastectomy she told me she had told lymph node involvement, she was not treated with either chemotherapy, radiation therapy or hormonal therapy  She has extensive family history of cancer  Father was diagnosed with breast cancer, sister was diagnosed with breast cancer at age 36, another sister was diagnosed with breast cancer at age 72, a brother diagnosed with pancreatic cancer and another brother with lung cancer  Her niece was found to have BRCA gene mutation  She is allergic to sulfa, atorvastatin, latex   Interval History: Unfortunately after one month of back and forth between us and [facility] to send the slides for molecular tests, we got a phone call 2 days ago from [facility] seeing they have not enough material for testing   Tumor Markers: BRCA1/2 is negative, CA-27-29 is normal, CEA is normal       Review of Systems  Complete-Female:   Constitutional: recent 0 lb weight loss, but no fever, no chills and not feeling tired  Eyes: No complaints of eye pain, no red eyes, no eyesight problems, no discharge, no dry eyes, no itching of eyes  ENT: no sore throat and no hoarseness  Cardiovascular: No complaints of slow heart rate, no fast heart rate, no chest pain, no palpitations, no leg claudication, no lower extremity edema  Respiratory: cough, shortness of breath during exertion and shortness of breath during exertion, but no orthopnea, no wheezing, no PND, not short of breath at rest, shortness of breath does not worsen with lying down and no stridor  Gastrointestinal: no abdominal pain  Genitourinary: No complaints of dysuria, no incontinence, no pelvic pain, no dysmenorrhea, no vaginal discharge or bleeding  Musculoskeletal: No complaints of arthralgias, no myalgias, no joint swelling or stiffness, no limb pain or swelling  Integumentary: No complaints of skin rash or lesions, no itching, no skin wounds, no breast pain or lump  Neurological: No complaints of headache, no confusion, no convulsions, no numbness, no dizziness or fainting, no tingling, no limb weakness, no difficulty walking  Psychiatric: no sleep disturbances, no depression and no emotional problems  Endocrine: No complaints of proptosis, no hot flashes, no muscle weakness, no deepening of the voice, no feelings of weakness  Hematologic/Lymphatic: no swollen glands, no tendency for easy bleeding and no swollen glands in the neck  Active Problems    1  Contusion of face (920) (S00 83XA)   2  Contusion of hand, left (923 20) (S60 222A)   3  Fall (E888 9) [facility])   4  Fracture, metacarpal (815 00) (S62 309A)   5  History of left breast cancer (V10 3) (Z85 3)   6  Hypertension (401 9) (I10)   7  Injury of chest wall (959 11) (S29 9XXA)   8  Lung cancer (162 9) (C34 90)   9  Pleural effusion (511 9) (J90)   10  Shortness of breath (786 05) (R06 02)   11   SOB (shortness of breath) (786 05) (R06 02)    Past Medical History    1  History of Anxiety and depression (300 4) (F41 8)   2  History of breast cancer (V10 3) (Z85 3)   3  History of hyperglycemia (V12 29) (Z86 39)    Surgical History    1  History of Radical Mastectomy Left Breast    Social History    · Non-smoker (V49 89) (Z78 9)    Current Meds   1  Calcium Citrate + D 315-250 MG-UNIT Oral Tablet; Therapy: (Recorded:41Mxs1766) to Recorded   2  Citalopram Hydrobromide 20 MG Oral Tablet; Therapy: (Recorded:40Oae3367) to Recorded   3  Fish Oil OIL; Therapy: (Recorded:65Jui6702) to Recorded   4  MetFORMIN HCl - 500 MG Oral Tablet; Therapy: (Recorded:36Qzq7067) to Recorded   5  Metoprolol Tartrate TABS; 50 MG BID; Therapy: (Darline Seay) to Recorded   6  Vitamin B-12 1000 MCG Oral Tablet; Therapy: (Recorded:09Pna2539) to Recorded    Vitals  Vital Signs    Recorded: 20Jul2017 04:05PM   Temperature 98 F   Heart Rate 72   Respiration 16   Systolic 273   Diastolic 80   Height 5 ft 2 in   Weight 166 lb    BMI Calculated 30 36   BSA Calculated 1 77     Physical Exam    Constitutional   General appearance: No acute distress, well appearing and well nourished  Eyes   Conjunctiva and lids: No swelling, erythema or discharge  Pupils and irises: Equal, round and reactive to light  Ears, Nose, Mouth, and Throat   External inspection of ears and nose: Normal     Oropharynx: Normal with no erythema, edema, exudate or lesions  Pulmonary   Auscultation of lungs: Abnormal   rales/crackles over the left base  Cardiovascular   Auscultation of heart: Normal rate and rhythm, normal S1 and S2, without murmurs  Examination of extremities for edema and/or varicosities: Normal     Carotid pulses: Normal     Abdomen   Abdomen: Non-tender, no masses  Liver and spleen: No hepatomegaly or splenomegaly  Lymphatic   Palpation of lymph nodes in neck: No lymphadenopathy      Musculoskeletal   Gait and station: Normal     Digits and nails: Normal without clubbing or cyanosis  Inspection/palpation of joints, bones, and muscles: Normal     Skin   Skin and subcutaneous tissue: Normal without rashes or lesions  Neurologic   Cranial nerves: Cranial nerves 2-12 intact  Psychiatric   Orientation to person, place, and time: Normal     Mood and affect: Normal         ECOG 1       Results/Data  * XR CHEST PA & LATERAL [id number] 06:38PM Joanne Vivas    Order Number: [id number]     Test Name Result Flag Reference   XR CHEST PA & LATERAL (Report)     CHEST     INDICATION: Follow-up left pleural effusion  COMPARISON: 6/20/2017  VIEWS: Frontal and lateral projections; 2 images     FINDINGS:         The cardiomediastinal silhouette is unremarkable  Small left pleural effusion is identified increased from previous exam  Interval development of loculated left fissure fluid is noted  Osseous structures are age appropriate  IMPRESSION:     Interval increase in left pleural effusion with development of loculated fluid within the left fissure  Workstation performed: [id number]     Signed by: Richie Kapadia MD   7/20/17     Future Appointments    Date/Time Provider Specialty Site   08/16/2017 01:30 PM CHAMP Be  Hematology Oncology [facility] MEDICAL ONCOLOGY     Signatures   Electronically signed by :  CHAMP Mayfield ; Jul 20 2017  4:54PM EST                       (Author)

## 2018-01-17 ENCOUNTER — HOSPITAL ENCOUNTER (OUTPATIENT)
Dept: INFUSION CENTER | Facility: CLINIC | Age: 83
Discharge: HOME | End: 2018-01-17

## 2018-01-18 ENCOUNTER — HOSPITAL ENCOUNTER (OUTPATIENT)
Dept: INFUSION CENTER | Facility: CLINIC | Age: 83
Discharge: HOME | End: 2018-01-18
Payer: MEDICARE

## 2018-01-18 VITALS
DIASTOLIC BLOOD PRESSURE: 76 MMHG | TEMPERATURE: 97.2 F | SYSTOLIC BLOOD PRESSURE: 148 MMHG | HEART RATE: 62 BPM | RESPIRATION RATE: 18 BRPM

## 2018-01-18 PROCEDURE — 96360 HYDRATION IV INFUSION INIT: CPT

## 2018-01-18 RX ADMIN — SODIUM CHLORIDE 500 ML: 0.9 INJECTION, SOLUTION INTRAVENOUS at 14:33

## 2018-01-18 NOTE — PLAN OF CARE
Problem: Potential for Falls  Goal: Patient will remain free of falls  INTERVENTIONS:  - Assess patient frequently for physical needs  -  Identify cognitive and physical deficits and behaviors that affect risk of falls    -  [place] fall precautions as indicated by assessment   - Educate patient/family on patient safety including physical limitations  - Instruct patient to call for assistance with activity based on assessment  - Modify environment to reduce risk of injury  - Consider OT/PT consult to assist with strengthening/mobility   Outcome: Progressing

## 2018-01-19 ENCOUNTER — HOSPITAL ENCOUNTER (OUTPATIENT)
Dept: INFUSION CENTER | Facility: CLINIC | Age: 83
Discharge: HOME | End: 2018-01-19

## 2018-01-22 VITALS
SYSTOLIC BLOOD PRESSURE: 110 MMHG | BODY MASS INDEX: 30.82 KG/M2 | HEART RATE: 64 BPM | WEIGHT: 166 LBS | TEMPERATURE: 98 F | DIASTOLIC BLOOD PRESSURE: 70 MMHG | OXYGEN SATURATION: 93 % | RESPIRATION RATE: 16 BRPM

## 2018-01-22 VITALS
HEIGHT: 62 IN | TEMPERATURE: 97.7 F | DIASTOLIC BLOOD PRESSURE: 82 MMHG | SYSTOLIC BLOOD PRESSURE: 140 MMHG | WEIGHT: 165 LBS | OXYGEN SATURATION: 93 % | HEART RATE: 58 BPM | BODY MASS INDEX: 30.36 KG/M2 | RESPIRATION RATE: 16 BRPM

## 2018-01-22 VITALS
HEART RATE: 78 BPM | DIASTOLIC BLOOD PRESSURE: 88 MMHG | OXYGEN SATURATION: 91 % | HEIGHT: 62 IN | WEIGHT: 167 LBS | TEMPERATURE: 98 F | SYSTOLIC BLOOD PRESSURE: 140 MMHG | RESPIRATION RATE: 16 BRPM | BODY MASS INDEX: 30.73 KG/M2

## 2018-01-22 VITALS
BODY MASS INDEX: 30.55 KG/M2 | TEMPERATURE: 98 F | WEIGHT: 166 LBS | HEART RATE: 72 BPM | DIASTOLIC BLOOD PRESSURE: 80 MMHG | RESPIRATION RATE: 16 BRPM | HEIGHT: 62 IN | SYSTOLIC BLOOD PRESSURE: 150 MMHG

## 2018-01-24 VITALS
TEMPERATURE: 97.5 F | RESPIRATION RATE: 16 BRPM | WEIGHT: 163 LBS | HEART RATE: 91 BPM | OXYGEN SATURATION: 95 % | HEIGHT: 62 IN | BODY MASS INDEX: 30 KG/M2 | SYSTOLIC BLOOD PRESSURE: 130 MMHG | DIASTOLIC BLOOD PRESSURE: 80 MMHG

## 2018-01-27 ENCOUNTER — OFFICE VISIT (OUTPATIENT)
Dept: URGENT CARE | Age: 83
End: 2018-01-27
Payer: MEDICARE

## 2018-01-27 VITALS
SYSTOLIC BLOOD PRESSURE: 131 MMHG | OXYGEN SATURATION: 96 % | TEMPERATURE: 98.4 F | BODY MASS INDEX: 30.58 KG/M2 | HEART RATE: 89 BPM | RESPIRATION RATE: 18 BRPM | DIASTOLIC BLOOD PRESSURE: 83 MMHG | HEIGHT: 61 IN | WEIGHT: 162 LBS

## 2018-01-27 DIAGNOSIS — R07.81 RIB PAIN ON RIGHT SIDE: ICD-10-CM

## 2018-01-27 DIAGNOSIS — S20.211A CHEST WALL CONTUSION, RIGHT, INITIAL ENCOUNTER: ICD-10-CM
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
## 2018-01-27 PROCEDURE — 99213 OFFICE O/P EST LOW 20 MIN: CPT | Performed by: FAMILY MEDICINE

## 2018-01-27 PROCEDURE — 71101 X-RAY EXAM UNILAT RIBS/CHEST: CPT

## 2018-01-27 PROCEDURE — G0463 HOSPITAL OUTPT CLINIC VISIT: HCPCS | Performed by: FAMILY MEDICINE

## 2018-01-27 NOTE — PATIENT INSTRUCTIONS
No obvious fracture identified on chest x-ray  Small effusion noted left lower lung field but seems to be less than what was noted on an x-ray from July 2017  Warm or cold compresses against chest wall for comfort as needed  Deep breathing exercise every hour while awake  May use your home medication as needed for pain  Follow up with family doctor in the next 5-7 days for further evaluation  If worsening chest pain, shortness of breath proceed to emergency room for immediate attention

## 2018-01-27 NOTE — PROGRESS NOTES
Assessment/Plan:    No problem-specific Assessment & Plan notes found for this encounter  Diagnoses and all orders for this visit:    Fall, initial encounter    Rib pain on right side  -     X-ray ribs right with PA chest    Chest wall contusion, right, initial encounter          Subjective:      Patient ID: Tabatha Cardoso is a 80 y o  female  27-year-old female with right rib cage pain  She fell on Thursday while going to sit down on the [employer] "  She says that she has a history of vertigo and while trying to sit she fell forward landing on her right arm  History of broken right arm with pins and screws  She says her arm is not hurting  Her pain is in the right anterolateral lower ribs  It is hard to breathe  Take a deep breath hurts  She has not been doing any coughing or sneezing  She does have history of lung cancer on the left side  She has had radiation but no surgery or chemo  No swelling in the legs  Patient does have some left over narcotic pain medicine and that is helping her sleep  She is unable to lay on her right side  The following portions of the patient's history were reviewed and updated as appropriate: allergies, current medications, past family history, past medical history, past social history, past surgical history and problem list       Review of Systems   Constitutional: Negative for chills and fever  Respiratory: Negative for chest tightness  Cardiovascular: Positive for chest pain  Musculoskeletal: Positive for arthralgias and back pain  Back pain is not new  It is not worse since falling  Objective:     Physical Exam   Constitutional: She is oriented to person, place, and time  She appears well-developed and well-nourished  No distress  Neck: Normal range of motion  Neck supple  Cardiovascular: Normal rate, regular rhythm and normal heart sounds  Exam reveals no gallop and no friction rub  No murmur heard    Pulmonary/Chest: Effort normal and breath sounds normal  No respiratory distress  She has no wheezes  She has no rales  She exhibits tenderness  Right anterolateral inferior ribs with tenderness to palpation  Unable to do AP compression test due to patient's back pain  No obvious contusion of the chest wall  Neurological: She is alert and oriented to person, place, and time  Skin: Skin is warm and dry  She is not diaphoretic  Contusion right elbow   Psychiatric: She has a normal mood and affect  XR RIBS RIGHT W PA CHEST MIN 3 VIEWS  Preliminary report with review by treating provider does not see any obvious rib fractures  Small effusion noted left lower lung field  Masses in left lung field appear less prominent than July 2017 chest x-ray  Will await radiologist's final report

## 2018-01-28 ENCOUNTER — TELEPHONE (OUTPATIENT)
Dept: URGENT CARE | Age: 83
End: 2018-01-28

## 2018-01-28 NOTE — TELEPHONE ENCOUNTER
Call to patient to inform that final report on chest x-ray rib series showed no fractured ribs  Patient voices understanding

## 2018-01-30 ENCOUNTER — APPOINTMENT (OUTPATIENT)
Dept: PHYSICAL THERAPY | Facility: CLINIC | Age: 83
End: 2018-01-30
Payer: MEDICARE

## 2018-02-04 ENCOUNTER — APPOINTMENT (OUTPATIENT)
Dept: LAB | Age: 83
End: 2018-02-04
Payer: MEDICARE

## 2018-02-04 DIAGNOSIS — K85.90 PLEURAL EFFUSION ASSOCIATED WITH PANCREATITIS: ICD-10-CM

## 2018-02-04 DIAGNOSIS — J91.8 PLEURAL EFFUSION ASSOCIATED WITH PANCREATITIS: ICD-10-CM

## 2018-02-04 LAB
ALBUMIN SERPL BCP-MCNC: 3.3 G/DL (ref 3.5–5)
ALP SERPL-CCNC: 97 U/L (ref 46–116)
ALT SERPL W P-5'-P-CCNC: 36 U/L (ref 12–78)
ANION GAP SERPL CALCULATED.3IONS-SCNC: 6 MMOL/L (ref 4–13)
AST SERPL W P-5'-P-CCNC: 33 U/L (ref 5–45)
BASOPHILS # BLD AUTO: 0.04 THOUSANDS/ΜL (ref 0–0.1)
BASOPHILS NFR BLD AUTO: 0 % (ref 0–1)
BILIRUB SERPL-MCNC: 0.7 MG/DL (ref 0.2–1)
BUN SERPL-MCNC: 18 MG/DL (ref 5–25)
CALCIUM SERPL-MCNC: 9.4 MG/DL (ref 8.3–10.1)
CHLORIDE SERPL-SCNC: 103 MMOL/L (ref 100–108)
CO2 SERPL-SCNC: 30 MMOL/L (ref 21–32)
CREAT SERPL-MCNC: 0.7 MG/DL (ref 0.6–1.3)
EOSINOPHIL # BLD AUTO: 0.42 THOUSAND/ΜL (ref 0–0.61)
EOSINOPHIL NFR BLD AUTO: 5 % (ref 0–6)
ERYTHROCYTE [DISTWIDTH] IN BLOOD BY AUTOMATED COUNT: 14.2 % (ref 11.6–15.1)
GFR SERPL CREATININE-BSD FRML MDRD: 81 ML/MIN/1.73SQ M
GLUCOSE SERPL-MCNC: 105 MG/DL (ref 65–140)
HCT VFR BLD AUTO: 42.8 % (ref 34.8–46.1)
HGB BLD-MCNC: 13.8 G/DL (ref 11.5–15.4)
LYMPHOCYTES # BLD AUTO: 2.54 THOUSANDS/ΜL (ref 0.6–4.47)
LYMPHOCYTES NFR BLD AUTO: 27 % (ref 14–44)
MCH RBC QN AUTO: 31.7 PG (ref 26.8–34.3)
MCHC RBC AUTO-ENTMCNC: 32.2 G/DL (ref 31.4–37.4)
MCV RBC AUTO: 98 FL (ref 82–98)
MONOCYTES # BLD AUTO: 1.52 THOUSAND/ΜL (ref 0.17–1.22)
MONOCYTES NFR BLD AUTO: 16 % (ref 4–12)
NEUTROPHILS # BLD AUTO: 4.83 THOUSANDS/ΜL (ref 1.85–7.62)
NEUTS SEG NFR BLD AUTO: 52 % (ref 43–75)
NRBC BLD AUTO-RTO: 0 /100 WBCS
PLATELET # BLD AUTO: 434 THOUSANDS/UL (ref 149–390)
PMV BLD AUTO: 10.5 FL (ref 8.9–12.7)
POTASSIUM SERPL-SCNC: 4.2 MMOL/L (ref 3.5–5.3)
PROT SERPL-MCNC: 7.7 G/DL (ref 6.4–8.2)
RBC # BLD AUTO: 4.35 MILLION/UL (ref 3.81–5.12)
SODIUM SERPL-SCNC: 139 MMOL/L (ref 136–145)
WBC # BLD AUTO: 9.41 THOUSAND/UL (ref 4.31–10.16)

## 2018-02-04 PROCEDURE — 85025 COMPLETE CBC W/AUTO DIFF WBC: CPT

## 2018-02-04 PROCEDURE — 80053 COMPREHEN METABOLIC PANEL: CPT

## 2018-02-04 PROCEDURE — 36415 COLL VENOUS BLD VENIPUNCTURE: CPT

## 2018-02-05 RX ORDER — CYANOCOBALAMIN 1000 UG/ML
1000 INJECTION INTRAMUSCULAR; SUBCUTANEOUS ONCE
Status: COMPLETED | OUTPATIENT
Start: 2018-02-06 | End: 2018-02-06

## 2018-02-05 RX ORDER — SODIUM CHLORIDE 9 MG/ML
20 INJECTION, SOLUTION INTRAVENOUS CONTINUOUS
Status: DISCONTINUED | OUTPATIENT
Start: 2018-02-06 | End: 2018-02-09 | Stop reason: HOSPADM

## 2018-02-06 ENCOUNTER — HOSPITAL ENCOUNTER (OUTPATIENT)
Dept: INFUSION CENTER | Facility: CLINIC | Age: 83
Discharge: HOME | End: 2018-02-06
Payer: MEDICARE

## 2018-02-06 VITALS
HEART RATE: 68 BPM | RESPIRATION RATE: 16 BRPM | SYSTOLIC BLOOD PRESSURE: 142 MMHG | BODY MASS INDEX: 32.25 KG/M2 | HEIGHT: 60 IN | TEMPERATURE: 97.3 F | WEIGHT: 164.24 LBS | DIASTOLIC BLOOD PRESSURE: 64 MMHG

## 2018-02-06 PROCEDURE — 96372 THER/PROPH/DIAG INJ SC/IM: CPT

## 2018-02-06 PROCEDURE — 96367 TX/PROPH/DG ADDL SEQ IV INF: CPT

## 2018-02-06 PROCEDURE — 96409 CHEMO IV PUSH SNGL DRUG: CPT

## 2018-02-06 RX ADMIN — ONDANSETRON 8 MG: 2 INJECTION INTRAMUSCULAR; INTRAVENOUS at 14:15

## 2018-02-06 RX ADMIN — CYANOCOBALAMIN 1000 MCG: 1000 INJECTION, SOLUTION INTRAMUSCULAR at 14:19

## 2018-02-06 RX ADMIN — SODIUM CHLORIDE 20 ML/HR: 0.9 INJECTION, SOLUTION INTRAVENOUS at 14:05

## 2018-02-06 RX ADMIN — SODIUM CHLORIDE 700 MG: 9 INJECTION, SOLUTION INTRAVENOUS at 14:43

## 2018-02-06 NOTE — PROGRESS NOTES
Patient arrived to unit without complaints  She states she has had a fall recently  Patient states she became dizzy and fell on 1-27-18 and went to urgent care  Patient states her ribs are sore, but fractures not found  Patient does not have an upcoming appt with Dr Esequiel Hager  Spoke with RAIN Melendez who is covering [place] today and made aware for Dr Whitney Valenzuela about patient's recent fall and the office will also reach out to patient to make appt  Patient aware  Patient ok for chemo today  Patient tolerated treatment well without complication    Patient returns next 3 days for hydration and requested to keep IV in   Declined AVS

## 2018-02-06 NOTE — PLAN OF CARE
Problem: Potential for Falls  Goal: Patient will remain free of falls  INTERVENTIONS:  - Assess patient frequently for physical needs  -  Identify cognitive and physical deficits and behaviors that affect risk of falls    -  [place] fall precautions as indicated by assessment   - Educate patient/family on patient safety including physical limitations  - Instruct patient to call for assistance with activity based on assessment  - Modify environment to reduce risk of injury  - Consider OT/PT consult to assist with strengthening/mobility   Outcome: Progressing

## 2018-02-07 ENCOUNTER — HOSPITAL ENCOUNTER (OUTPATIENT)
Dept: INFUSION CENTER | Facility: CLINIC | Age: 83
Discharge: HOME | End: 2018-02-07

## 2018-02-08 ENCOUNTER — HOSPITAL ENCOUNTER (OUTPATIENT)
Dept: INFUSION CENTER | Facility: CLINIC | Age: 83
Discharge: HOME | End: 2018-02-08
Payer: MEDICARE

## 2018-02-08 VITALS
RESPIRATION RATE: 18 BRPM | DIASTOLIC BLOOD PRESSURE: 70 MMHG | HEART RATE: 55 BPM | SYSTOLIC BLOOD PRESSURE: 154 MMHG | TEMPERATURE: 96 F

## 2018-02-08 PROCEDURE — 96360 HYDRATION IV INFUSION INIT: CPT

## 2018-02-08 RX ADMIN — SODIUM CHLORIDE 500 ML: 0.9 INJECTION, SOLUTION INTRAVENOUS at 14:30

## 2018-02-08 NOTE — PLAN OF CARE
Problem: PAIN - ADULT  Goal: Verbalizes/displays adequate comfort level or baseline comfort level  Interventions:  - Encourage patient to monitor pain and request assistance  - Assess pain using appropriate pain scale  - Administer analgesics based on type and severity of pain and evaluate response  - Implement non-pharmacological measures as appropriate and evaluate response  - Consider cultural and social influences on pain and pain management  - Notify physician/advanced practitioner if interventions unsuccessful or patient reports new pain  Outcome: Progressing      Problem: INFECTION - ADULT  Goal: Absence or prevention of progression during hospitalization  INTERVENTIONS:  - Assess and monitor for signs and symptoms of infection  - Monitor lab/diagnostic results  - Monitor all insertion sites, i e  indwelling lines, tubes, and drains  - Monitor endotracheal (as able) and nasal secretions for changes in amount and color  - [place] appropriate cooling/warming therapies per order  - Administer medications as ordered  - Instruct and encourage patient and family to use good hand hygiene technique  - Identify and instruct in appropriate isolation precautions for identified infection/condition  Outcome: Progressing    Goal: Absence of fever/infection during neutropenic period  INTERVENTIONS:  - Monitor WBC  - Implement neutropenic guidelines  Outcome: Progressing      Problem: Knowledge Deficit  Goal: Patient/family/caregiver demonstrates understanding of disease process, treatment plan, medications, and discharge instructions  Complete learning assessment and assess knowledge base    Interventions:  - Provide teaching at level of understanding  - Provide teaching via preferred learning methods  Outcome: Progressing

## 2018-02-08 NOTE — PROGRESS NOTES
IV access from 2/6/2018  Is tender and appears red and edematous  Area felt hardened  Pt  Noted the redness and tenderness started last evening  IV removed and heat applied  Hydration ordered today

## 2018-02-08 NOTE — PROGRESS NOTES
Pt  Tolerated hydration without adverse event  IV access removed  Pt  Will return tomorrow for hydration    Declined AVS

## 2018-02-09 ENCOUNTER — HOSPITAL ENCOUNTER (OUTPATIENT)
Dept: INFUSION CENTER | Facility: CLINIC | Age: 83
Discharge: HOME | End: 2018-02-09
Payer: MEDICARE

## 2018-02-09 PROCEDURE — 96360 HYDRATION IV INFUSION INIT: CPT

## 2018-02-09 RX ADMIN — SODIUM CHLORIDE 500 ML: 0.9 INJECTION, SOLUTION INTRAVENOUS at 14:00

## 2018-02-09 NOTE — PROGRESS NOTES
NSS hydation infused as ordered  Patient tolerated well  Denies any discomfort  Pt is aware of next infusion appointment at [facility] infusion center tomorrow

## 2018-02-09 NOTE — PLAN OF CARE
Problem: Potential for Falls  Goal: Patient will remain free of falls  INTERVENTIONS:  - Assess patient frequently for physical needs  -  Identify cognitive and physical deficits and behaviors that affect risk of falls    -  [place] fall precautions as indicated by assessment   - Educate patient/family on patient safety including physical limitations  - Instruct patient to call for assistance with activity based on assessment  - Modify environment to reduce risk of injury  - Consider OT/PT consult to assist with strengthening/mobility   Outcome: Progressing

## 2018-02-10 ENCOUNTER — HOSPITAL ENCOUNTER (OUTPATIENT)
Dept: INFUSION CENTER | Facility: HOSPITAL | Age: 83
Discharge: HOME | End: 2018-02-10
Payer: MEDICARE

## 2018-02-10 VITALS
SYSTOLIC BLOOD PRESSURE: 132 MMHG | TEMPERATURE: 97.4 F | DIASTOLIC BLOOD PRESSURE: 64 MMHG | RESPIRATION RATE: 18 BRPM | HEART RATE: 76 BPM

## 2018-02-10 PROCEDURE — 96360 HYDRATION IV INFUSION INIT: CPT

## 2018-02-10 RX ADMIN — SODIUM CHLORIDE 500 ML: 0.9 INJECTION, SOLUTION INTRAVENOUS at 13:15

## 2018-02-10 NOTE — PLAN OF CARE
Problem: Potential for Falls  Goal: Patient will remain free of falls  INTERVENTIONS:  - Assess patient frequently for physical needs  -  Identify cognitive and physical deficits and behaviors that affect risk of falls    -  [place] fall precautions as indicated by assessment   - Educate patient/family on patient safety including physical limitations  - Instruct patient to call for assistance with activity based on assessment  - Modify environment to reduce risk of injury  - Consider OT/PT consult to assist with strengthening/mobility   Outcome: Progressing

## 2018-02-16 DIAGNOSIS — C78.00 MALIGNANT NEOPLASM METASTATIC TO LUNG, UNSPECIFIED LATERALITY (HCC): Primary | ICD-10-CM

## 2018-02-19 ENCOUNTER — OFFICE VISIT (OUTPATIENT)
Dept: URGENT CARE | Age: 83
End: 2018-02-19
Payer: MEDICARE

## 2018-02-19 VITALS
HEART RATE: 98 BPM | OXYGEN SATURATION: 95 % | WEIGHT: 165 LBS | TEMPERATURE: 98 F | RESPIRATION RATE: 20 BRPM | DIASTOLIC BLOOD PRESSURE: 80 MMHG | SYSTOLIC BLOOD PRESSURE: 140 MMHG | HEIGHT: 61 IN | BODY MASS INDEX: 31.15 KG/M2

## 2018-02-19 DIAGNOSIS — C34.92 MALIGNANT NEOPLASM OF LEFT LUNG, UNSPECIFIED PART OF LUNG (HCC): ICD-10-CM

## 2018-02-19 DIAGNOSIS — J20.9 ACUTE BRONCHITIS, UNSPECIFIED ORGANISM: Primary | ICD-10-CM

## 2018-02-19 PROCEDURE — G0463 HOSPITAL OUTPT CLINIC VISIT: HCPCS | Performed by: FAMILY MEDICINE

## 2018-02-19 PROCEDURE — 87798 DETECT AGENT NOS DNA AMP: CPT | Performed by: PHYSICIAN ASSISTANT

## 2018-02-19 PROCEDURE — 99213 OFFICE O/P EST LOW 20 MIN: CPT | Performed by: FAMILY MEDICINE

## 2018-02-19 RX ORDER — AZITHROMYCIN 250 MG/1
TABLET, FILM COATED ORAL
Qty: 6 TABLET | Refills: 0 | Status: SHIPPED | OUTPATIENT
Start: 2018-02-19 | End: 2018-02-19

## 2018-02-19 RX ORDER — MOMETASONE FUROATE 1 MG/G
CREAM TOPICAL
COMMUNITY
Start: 2017-03-20 | End: 2018-07-23 | Stop reason: ALTCHOICE

## 2018-02-19 RX ORDER — PROCHLORPERAZINE MALEATE 10 MG
1 TABLET ORAL EVERY 6 HOURS PRN
COMMUNITY
Start: 2017-07-21 | End: 2018-02-19 | Stop reason: ALTCHOICE

## 2018-02-19 RX ORDER — ACETAMINOPHEN 500 MG
500 TABLET ORAL
COMMUNITY
Start: 2016-09-16 | End: 2019-02-15 | Stop reason: HOSPADM

## 2018-02-19 RX ORDER — MECLIZINE HCL 12.5 MG/1
TABLET ORAL
Status: ON HOLD | COMMUNITY
Start: 2018-01-05 | End: 2018-09-05

## 2018-02-19 NOTE — PROGRESS NOTES
[facility] [facility]        NAME: Yumiko Frazier is a 80 y o  female  : 1935    MRN: [id number]  DATE: 2018  TIME: 5:17 PM    Assessment and Plan   Acute bronchitis, unspecified organism [J20 9]  1  Acute bronchitis, unspecified organism  azithromycin (ZITHROMAX) 250 mg tablet    Influenza A/B and RSV by PCR (Indicated for patients > 2 mo of age)   2  Malignant neoplasm of left lung, unspecified part of lung [facility])       Patient is having repeat chest CT scan in 2 days  She is to follow with pulmonology/Oncology/primary care doctor    Patient Instructions     Patient Instructions   Influenza testing initiated  Results take approximately 24 hours to get back  If you have not heard from provider by tomorrow evening, please call phone number atop of clinical summary for your results  Start antibiotic as directed  May continue Coricidin HBP for symptom relief  Rest   Fluids  Follow up with her family doctor as needed  If develops any increased chest pain or difficulty breathing, seek immediate emergency medical attention  Chief Complaint     Chief Complaint   Patient presents with    Cough     x 1 week         History of Present Illness   Yumiko Frazier presents to the clinic c/o    77-year-old female for cough x1 week  She says she is coughing a lot  Nonproductive  Known history of left lung cancer  Gets routine chemotherapy and is supposed to have a repeat CT scan of her chest on    She was seen here a couple weeks ago after falling and injuring her chest wall  She says it does hurt to cough  Denies any runny nose or nasal drainage  Some postnasal drip  Tired  No specific fever or chills  She has some dyspnea on exertion otherwise no shortness of breath  Review of Systems   Review of Systems   Constitutional: Positive for activity change, appetite change and fatigue  Negative for chills, diaphoresis and fever     HENT: Positive for postnasal drip  Negative for dental problem, ear discharge, ear pain, rhinorrhea, sinus pain, sinus pressure, sneezing, sore throat and trouble swallowing  Respiratory: Positive for cough and shortness of breath  Cardiovascular: Positive for chest pain           Current Medications     Long-Term Prescriptions   Medication Sig Dispense Refill    ALPRAZolam (XANAX) 0 5 mg tablet Take 0 5 mg by mouth daily at bedtime as needed for anxiety      aspirin (ECOTRIN LOW STRENGTH) 81 mg EC tablet Take 81 mg by mouth daily      citalopram (CeleXA) 20 mg tablet Take 20 mg by mouth daily      cyanocobalamin 1000 MCG tablet Take 100 mcg by mouth daily      folic acid (FOLVITE) 1 mg tablet Take 1 mg by mouth daily      meclizine (ANTIVERT) 12 5 MG tablet 1-2 tabs 3 x daily as needed for dizziness      metFORMIN (GLUCOPHAGE) 500 mg tablet Take 500 mg by mouth daily with breakfast      metoprolol tartrate (LOPRESSOR) 50 mg tablet Take 25 mg by mouth every 12 (twelve) hours      mometasone (ELOCON) 0 1 % cream Apply topically      Omega-3 Fatty Acids (FISH OIL) 500 MG CAPS Take by mouth daily      ondansetron (ZOFRAN) 4 mg tablet Take 4 mg by mouth every 8 (eight) hours as needed for nausea or vomiting         Current Allergies     Allergies as of 02/19/2018 - Reviewed 02/19/2018   Allergen Reaction Noted    Atorvastatin Other (See Comments) 01/16/2015    Benzonatate Other (See Comments) 02/20/2015    Sulfa antibiotics  09/21/2017    Bactrim [sulfamethoxazole-trimethoprim] Rash 07/21/2017    Latex Rash 09/14/2016    Other Rash 07/21/2017            The following portions of the patient's history were reviewed and updated as appropriate: allergies, current medications, past family history, past medical history, past social history, past surgical history and problem list     Objective   /80   Pulse 98   Temp 98 °F (36 7 °C) (Temporal)   Resp 20   Ht 5' 1" (1 549 m)   Wt 74 8 kg (165 lb)   SpO2 95%   BMI 31 18 kg/m²        Physical Exam     Physical Exam   Constitutional: She is oriented to person, place, and time  She appears well-developed and well-nourished  No distress  Sitting on exam room table with surgical mask covering mouth  Able to talk in full sentences without difficulty  No coughing during exam    HENT:   Head: Normocephalic  Mild cobblestoning of the posterior pharynx   Eyes: Conjunctivae are normal  Pupils are equal, round, and reactive to light  Right eye exhibits no discharge  Left eye exhibits no discharge  Neck: Normal range of motion  Neck supple  Cardiovascular: Normal rate and regular rhythm  Exam reveals friction rub  Exam reveals no gallop  No murmur heard  Pulmonary/Chest: Effort normal  No respiratory distress  She has no wheezes  Decreased breath sounds throughout left lung field  Patient does have known pleural effusion in the bases as well as left lung cancer mass / masses  Lymphadenopathy:     She has no cervical adenopathy  Neurological: She is alert and oriented to person, place, and time  Skin: Skin is warm and dry  She is not diaphoretic  Psychiatric: She has a normal mood and affect

## 2018-02-19 NOTE — PATIENT INSTRUCTIONS
Influenza testing initiated  Results take approximately 24 hours to get back  If you have not heard from provider by tomorrow evening, please call phone number atop of clinical summary for your results  Start antibiotic as directed  May continue Coricidin HBP for symptom relief  Rest   Fluids  Follow up with her family doctor as needed  If develops any increased chest pain or difficulty breathing, seek immediate emergency medical attention

## 2018-02-20 ENCOUNTER — TELEPHONE (OUTPATIENT)
Dept: URGENT CARE | Age: 83
End: 2018-02-20

## 2018-02-20 LAB
FLUAV AG SPEC QL: NORMAL
FLUBV AG SPEC QL: NORMAL
RSV B RNA SPEC QL NAA+PROBE: NORMAL

## 2018-02-20 NOTE — TELEPHONE ENCOUNTER
Call to patient  Informed that influenza testing was negative  She is instructed to follow up with her family doctor  She expresses understanding  She is to continue the antibiotic that was prescribed

## 2018-02-21 DIAGNOSIS — R05.9 COUGH: Primary | ICD-10-CM

## 2018-02-21 RX ORDER — METHYLPREDNISOLONE 4 MG/1
TABLET ORAL
Qty: 21 TABLET | Refills: 0 | Status: SHIPPED | OUTPATIENT
Start: 2018-02-21 | End: 2018-07-23 | Stop reason: ALTCHOICE

## 2018-02-26 ENCOUNTER — OFFICE VISIT (OUTPATIENT)
Dept: PHYSICAL THERAPY | Facility: CLINIC | Age: 83
End: 2018-02-26
Payer: MEDICARE

## 2018-02-26 DIAGNOSIS — H81.13 BPPV (BENIGN PAROXYSMAL POSITIONAL VERTIGO), BILATERAL: Primary | ICD-10-CM

## 2018-02-26 PROCEDURE — 97112 NEUROMUSCULAR REEDUCATION: CPT | Performed by: PHYSICAL THERAPIST

## 2018-02-26 PROCEDURE — 97164 PT RE-EVAL EST PLAN CARE: CPT | Performed by: PHYSICAL THERAPIST

## 2018-02-26 PROCEDURE — G8979 MOBILITY GOAL STATUS: HCPCS | Performed by: PHYSICAL THERAPIST

## 2018-02-26 PROCEDURE — G8978 MOBILITY CURRENT STATUS: HCPCS | Performed by: PHYSICAL THERAPIST

## 2018-02-26 NOTE — LETTER
2018    Merry Louis DO  [address] [facility] [address]    Patient: Kwabena Mitchell   YOB: 1935   Date of Visit: 2018     Encounter Diagnosis     ICD-10-CM    1  BPPV (benign paroxysmal positional vertigo), bilateral H81 13        Dear Dr Young Diaz:    Please review the attached Plan of Care from [address] [place] [address] recent visit  Please verify that you agree therapy should continue by signing the attached document and sending it back to our office  If you have any questions or concerns, please don't hesitate to call  Sincerely,    Mary Anne Espinoza, PT      Referring Provider:      I certify that I have read the below Plan of Care and certify the need for these services furnished under this plan of treatment while under my care  Merry Louis DO  [facility] [id number]  VIA Facsimile: [phone]          PT Evaluation     Today's date: 2018  Patient name: Kwabena Mitchell  : 1935  MRN: [id number]  Referring provider: Nora Alves DO  Dx:   Encounter Diagnosis     ICD-10-CM    1  BPPV (benign paroxysmal positional vertigo), bilateral H81 13                   Assessment    Assessment details: Patient presents for RE with new symptoms of dizziness  Pt had positive left natalie hallpike indicating left posterior canalithiasis  2x Epley completed x2 with second natalie hallpike negative  Plan to complete further occulomotor and balance testing at next session if BPPV has resolved  Understanding of Dx/Px/POC: good  Goals  STG:  Pt will have negative natalie hallpike within 2 weeks - not met  Pt will repor tno near falls within 2 weeks - not met    LTG:  Pt will deny dizziness within 4 weeks -   Pt will report no falls within 4 weeks        Subjective Evaluation    History of Present Illness  Mechanism of injury: Pt as treated for BPPV about one month ago   She fell in the bathroom 2x after getting dizzy and had to cancel appointments due to being sore  She denied all dizziness after second visiti to therapy and now has had dizziness once again  Pain  No pain reported          Objective        Dysequilibrium: No  Lightheadedness: Yes  Vertigo: Yes  Rocking or Swaying: No         Oscillopsia: No  Diplopia: No  Motion sickness: No  Floating, Swimming, Disconnected: No    Exacerbation Factors:  Bending over: No  Turning Head: No  Rolling in bed: Yes  Walking: No  Looking up: Yes  Supine to/from sitting: Yes  Optokinetic movement: No  Walking in busy environment: No    Duration of Symptoms: few seconds     Concurrent Complaints:  Tinnitus:Yes  Aural Fullness: Yes  Known hearing loss:Yes  Nausea, Vomiting: No  Altered Vision: No  Poor Concentration: No  Memory Loss: No  Peripheral Neuropathy:No  Cervical Pain: No   Headache: No      PHYSICAL FINDINGS:      Positional testing: Right Left   [place] Rosario pike negative upbeating torsional nystagmus   Roll test: negative negative             Flowsheet Rows    Flowsheet Row Most Recent Value   PT/OT G-Codes   Current Score  56   Projected Score  72   FOTO information reviewed  Yes   Assessment Type  Evaluation   G code set  Mobility: Walking & Moving Around   Mobility: Walking and Moving Around Current Status ()  CK   Mobility: Walking and Moving Around Goal Status ()  CJ          Precautions: fall risk, [place]    Daily Treatment Diary     Manual  2/27            epley  x2 left posterior canal                                                                    Exercise Diary                                                                                                                                                                                                                                                                                      Modalities

## 2018-02-26 NOTE — PROGRESS NOTES
PT Evaluation     Today's date: 2018  Patient name: Nila Canales  : 1935  MRN: [id number]  Referring provider: Karlee Mccray DO  Dx:   Encounter Diagnosis     ICD-10-CM    1  BPPV (benign paroxysmal positional vertigo), bilateral H81 13                   Assessment    Assessment details: Patient presents for RE with new symptoms of dizziness  Pt had positive left natalie hallpike indicating left posterior canalithiasis  2x Epley completed x2 with second natalie hallpike negative  Plan to complete further occulomotor and balance testing at next session if BPPV has resolved  Understanding of Dx/Px/POC: good  Goals  STG:  Pt will have negative natalie hallpike within 2 weeks - not met  Pt will repor tno near falls within 2 weeks - not met    LTG:  Pt will deny dizziness within 4 weeks -   Pt will report no falls within 4 weeks        Subjective Evaluation    History of Present Illness  Mechanism of injury: Pt as treated for BPPV about one month ago  She fell in the bathroom 2x after getting dizzy and had to cancel appointments due to being sore  She denied all dizziness after second visiti to therapy and now has had dizziness once again  Pain  No pain reported          Objective        Dysequilibrium: No  Lightheadedness: Yes  Vertigo: Yes  Rocking or Swaying: No         Oscillopsia: No  Diplopia: No  Motion sickness: No  Floating, Swimming, Disconnected: No    Exacerbation Factors:  Bending over: No  Turning Head: No  Rolling in bed: Yes  Walking: No  Looking up: Yes  Supine to/from sitting: Yes  Optokinetic movement: No  Walking in busy environment: No    Duration of Symptoms: few seconds     Concurrent Complaints:  Tinnitus:Yes  Aural Fullness: Yes  Known hearing loss:Yes  Nausea, Vomiting: No  Altered Vision: No  Poor Concentration: No  Memory Loss: No  Peripheral Neuropathy:No  Cervical Pain: No   Headache: No      PHYSICAL FINDINGS:      Positional testing: Right Left   [facility] pike negative upbeating torsional nystagmus   Roll test: negative negative             Flowsheet Rows    Flowsheet Row Most Recent Value   PT/OT G-Codes   Current Score  56   Projected Score  72   FOTO information reviewed  Yes   Assessment Type  Evaluation   G code set  Mobility: Walking & Moving Around   Mobility: Walking and Moving Around Current Status ()  CK   Mobility: Walking and Moving Around Goal Status ()  CJ          Precautions: fall risk, CA    Daily Treatment Diary     Manual  2/27            epley  x2 left posterior canal                                                                    Exercise Diary                                                                                                                                                                                                                                                                                      Modalities

## 2018-02-27 ENCOUNTER — HOSPITAL ENCOUNTER (OUTPATIENT)
Dept: INFUSION CENTER | Facility: CLINIC | Age: 83
Discharge: HOME | End: 2018-02-27

## 2018-02-28 ENCOUNTER — OFFICE VISIT (OUTPATIENT)
Dept: PHYSICAL THERAPY | Facility: CLINIC | Age: 83
End: 2018-02-28
Payer: MEDICARE

## 2018-02-28 DIAGNOSIS — H81.13 BPPV (BENIGN PAROXYSMAL POSITIONAL VERTIGO), BILATERAL: Primary | ICD-10-CM

## 2018-02-28 PROCEDURE — 97112 NEUROMUSCULAR REEDUCATION: CPT | Performed by: PHYSICAL THERAPIST

## 2018-02-28 NOTE — PROGRESS NOTES
Daily Note     Today's date: 2018  Patient name: Dhaval Bustillos  : 1935  MRN: [id number]  Referring provider: Dillon Haji DO  Dx:   Encounter Diagnosis     ICD-10-CM    1  BPPV (benign paroxysmal positional vertigo), bilateral H81 13                   Subjective: Pt reports continued dizziness upon return from supine position but has not had violent dizziness  Objective: See treatment diary below      Assessment: Pt had negative testing for BPPV this session for all canals  She did have positive VOR cx and head thrust tests  Unable to pass MCTSIB testing  Due to lack of time further testing was unable to be completed  Plan next session to complete balance and VOR exercises      Plan: Continue per plan of care       Precautions CA, fall risk, chemo therapy    Specialty Daily Treatment Diary     Manual                                                     Exercise Diary         VOR x 1        FTEC floor        HT/HN standing floor        NUstep        Semi tandem stance        Backwards walking                                                                                                                            Modalities

## 2018-03-01 ENCOUNTER — APPOINTMENT (OUTPATIENT)
Dept: PHYSICAL THERAPY | Facility: CLINIC | Age: 83
End: 2018-03-01
Payer: MEDICARE

## 2018-03-05 ENCOUNTER — APPOINTMENT (OUTPATIENT)
Dept: PHYSICAL THERAPY | Facility: CLINIC | Age: 83
End: 2018-03-05
Payer: MEDICARE

## 2018-03-05 ENCOUNTER — TRANSCRIBE ORDERS (OUTPATIENT)
Dept: ADMINISTRATIVE | Facility: HOSPITAL | Age: 83
End: 2018-03-05

## 2018-03-05 ENCOUNTER — TRANSCRIBE ORDERS (OUTPATIENT)
Dept: ADMINISTRATIVE | Age: 83
End: 2018-03-05

## 2018-03-05 ENCOUNTER — TELEPHONE (OUTPATIENT)
Dept: HEMATOLOGY ONCOLOGY | Facility: CLINIC | Age: 83
End: 2018-03-05

## 2018-03-05 ENCOUNTER — APPOINTMENT (OUTPATIENT)
Dept: LAB | Age: 83
End: 2018-03-05
Payer: MEDICARE

## 2018-03-05 DIAGNOSIS — C34.90 MALIGNANT NEOPLASM OF LUNG, UNSPECIFIED LATERALITY, UNSPECIFIED PART OF LUNG (HCC): Primary | ICD-10-CM

## 2018-03-05 DIAGNOSIS — C34.90 MALIGNANT NEOPLASM OF LUNG, UNSPECIFIED LATERALITY, UNSPECIFIED PART OF LUNG (HCC): ICD-10-CM

## 2018-03-05 DIAGNOSIS — H90.A22 SENSORINEURAL HEARING LOSS, UNILATERAL, LEFT EAR, WITH RESTRICTED HEARING ON THE CONTRALATERAL SIDE: Primary | ICD-10-CM

## 2018-03-05 DIAGNOSIS — H93.12 LEFT-SIDED TINNITUS: ICD-10-CM

## 2018-03-05 LAB
ALBUMIN SERPL BCP-MCNC: 3.4 G/DL (ref 3.5–5)
ALP SERPL-CCNC: 113 U/L (ref 46–116)
ALT SERPL W P-5'-P-CCNC: 46 U/L (ref 12–78)
ANION GAP SERPL CALCULATED.3IONS-SCNC: 9 MMOL/L (ref 4–13)
AST SERPL W P-5'-P-CCNC: 35 U/L (ref 5–45)
BASOPHILS # BLD AUTO: 0.04 THOUSANDS/ΜL (ref 0–0.1)
BASOPHILS NFR BLD AUTO: 0 % (ref 0–1)
BILIRUB SERPL-MCNC: 0.64 MG/DL (ref 0.2–1)
BUN SERPL-MCNC: 21 MG/DL (ref 5–25)
CALCIUM SERPL-MCNC: 9.7 MG/DL (ref 8.3–10.1)
CEA SERPL-MCNC: 1.8 NG/ML (ref 0–3)
CHLORIDE SERPL-SCNC: 104 MMOL/L (ref 100–108)
CO2 SERPL-SCNC: 25 MMOL/L (ref 21–32)
CREAT SERPL-MCNC: 0.76 MG/DL (ref 0.6–1.3)
EOSINOPHIL # BLD AUTO: 0.48 THOUSAND/ΜL (ref 0–0.61)
EOSINOPHIL NFR BLD AUTO: 4 % (ref 0–6)
ERYTHROCYTE [DISTWIDTH] IN BLOOD BY AUTOMATED COUNT: 14 % (ref 11.6–15.1)
GFR SERPL CREATININE-BSD FRML MDRD: 73 ML/MIN/1.73SQ M
GLUCOSE SERPL-MCNC: 148 MG/DL (ref 65–140)
HCT VFR BLD AUTO: 44 % (ref 34.8–46.1)
HGB BLD-MCNC: 14.4 G/DL (ref 11.5–15.4)
LYMPHOCYTES # BLD AUTO: 1.84 THOUSANDS/ΜL (ref 0.6–4.47)
LYMPHOCYTES NFR BLD AUTO: 13 % (ref 14–44)
MCH RBC QN AUTO: 31.6 PG (ref 26.8–34.3)
MCHC RBC AUTO-ENTMCNC: 32.7 G/DL (ref 31.4–37.4)
MCV RBC AUTO: 97 FL (ref 82–98)
MONOCYTES # BLD AUTO: 0.51 THOUSAND/ΜL (ref 0.17–1.22)
MONOCYTES NFR BLD AUTO: 4 % (ref 4–12)
NEUTROPHILS # BLD AUTO: 10.76 THOUSANDS/ΜL (ref 1.85–7.62)
NEUTS SEG NFR BLD AUTO: 79 % (ref 43–75)
NRBC BLD AUTO-RTO: 0 /100 WBCS
PLATELET # BLD AUTO: 299 THOUSANDS/UL (ref 149–390)
PMV BLD AUTO: 11.1 FL (ref 8.9–12.7)
POTASSIUM SERPL-SCNC: 4.2 MMOL/L (ref 3.5–5.3)
PROT SERPL-MCNC: 7.8 G/DL (ref 6.4–8.2)
RBC # BLD AUTO: 4.55 MILLION/UL (ref 3.81–5.12)
SODIUM SERPL-SCNC: 138 MMOL/L (ref 136–145)
WBC # BLD AUTO: 13.75 THOUSAND/UL (ref 4.31–10.16)

## 2018-03-05 PROCEDURE — 80053 COMPREHEN METABOLIC PANEL: CPT

## 2018-03-05 PROCEDURE — 85025 COMPLETE CBC W/AUTO DIFF WBC: CPT

## 2018-03-05 PROCEDURE — 36415 COLL VENOUS BLD VENIPUNCTURE: CPT

## 2018-03-05 PROCEDURE — 82378 CARCINOEMBRYONIC ANTIGEN: CPT

## 2018-03-05 RX ORDER — CYANOCOBALAMIN 1000 UG/ML
1000 INJECTION INTRAMUSCULAR; SUBCUTANEOUS ONCE
Status: COMPLETED | OUTPATIENT
Start: 2018-03-06 | End: 2018-03-06

## 2018-03-05 RX ORDER — SODIUM CHLORIDE 9 MG/ML
20 INJECTION, SOLUTION INTRAVENOUS CONTINUOUS
Status: DISCONTINUED | OUTPATIENT
Start: 2018-03-06 | End: 2018-03-09 | Stop reason: HOSPADM

## 2018-03-05 NOTE — TELEPHONE ENCOUNTER
Added Ct chest ordered by Dr Julio Cesar Chowdary to the CT head that was already scheduled for 3 15 18    Appointment will be back to back at 3pm

## 2018-03-06 ENCOUNTER — HOSPITAL ENCOUNTER (OUTPATIENT)
Dept: INFUSION CENTER | Facility: CLINIC | Age: 83
Discharge: HOME | End: 2018-03-06
Payer: MEDICARE

## 2018-03-06 PROCEDURE — 96367 TX/PROPH/DG ADDL SEQ IV INF: CPT

## 2018-03-06 PROCEDURE — 96372 THER/PROPH/DIAG INJ SC/IM: CPT

## 2018-03-06 PROCEDURE — 96361 HYDRATE IV INFUSION ADD-ON: CPT

## 2018-03-06 PROCEDURE — 96409 CHEMO IV PUSH SNGL DRUG: CPT

## 2018-03-06 RX ADMIN — CYANOCOBALAMIN 1000 MCG: 1000 INJECTION, SOLUTION INTRAMUSCULAR at 15:34

## 2018-03-06 RX ADMIN — SODIUM CHLORIDE 700 MG: 9 INJECTION, SOLUTION INTRAVENOUS at 15:34

## 2018-03-06 RX ADMIN — SODIUM CHLORIDE 20 ML/HR: 0.9 INJECTION, SOLUTION INTRAVENOUS at 14:55

## 2018-03-06 RX ADMIN — ONDANSETRON 8 MG: 2 INJECTION INTRAMUSCULAR; INTRAVENOUS at 15:14

## 2018-03-06 RX ADMIN — SODIUM CHLORIDE 500 ML: 0.9 INJECTION, SOLUTION INTRAVENOUS at 14:55

## 2018-03-06 NOTE — PROGRESS NOTES
Pt  Denies new symptoms or concerns at this time  Pt  Is scheduled for CTscan requiring BUN/Creatinine  Copy of labs given to patient at this time  Labs reviewed and within normal parameters for Alimta ordered today

## 2018-03-06 NOTE — PROGRESS NOTES
Pt  Tolerated Alimta, B12 and Hydration as ordered today  Pt  Understands she received hydration today and will not need to come to infusion tomorrow 3/7/18  Pt  Understands she is scheduled for Hydration on 3/8 and 3/9/18  AVS provided  Pt  Discharged to home with her Grandaughter

## 2018-03-06 NOTE — PLAN OF CARE
Problem: PAIN - ADULT  Goal: Verbalizes/displays adequate comfort level or baseline comfort level  Interventions:  - Encourage patient to monitor pain and request assistance  - Assess pain using appropriate pain scale  - Administer analgesics based on type and severity of pain and evaluate response  - Implement non-pharmacological measures as appropriate and evaluate response  - Consider cultural and social influences on pain and pain management  - Notify physician/advanced practitioner if interventions unsuccessful or patient reports new pain  Outcome: Progressing      Problem: INFECTION - ADULT  Goal: Absence or prevention of progression during hospitalization  INTERVENTIONS:  - Assess and monitor for signs and symptoms of infection  - Monitor lab/diagnostic results  - Monitor all insertion sites, i e  indwelling lines, tubes, and drains  - Monitor endotracheal (as able) and nasal secretions for changes in amount and color  - [place] appropriate cooling/warming therapies per order  - Administer medications as ordered  - Instruct and encourage patient and family to use good hand hygiene technique  - Identify and instruct in appropriate isolation precautions for identified infection/condition  Outcome: Progressing    Goal: Absence of fever/infection during neutropenic period  INTERVENTIONS:  - Monitor WBC  - Implement neutropenic guidelines  Outcome: Progressing      Problem: Knowledge Deficit  Goal: Patient/family/caregiver demonstrates understanding of disease process, treatment plan, medications, and discharge instructions  Complete learning assessment and assess knowledge base    Interventions:  - Provide teaching at level of understanding  - Provide teaching via preferred learning methods  Outcome: Progressing

## 2018-03-08 ENCOUNTER — HOSPITAL ENCOUNTER (OUTPATIENT)
Dept: INFUSION CENTER | Facility: CLINIC | Age: 83
Discharge: HOME | End: 2018-03-08
Payer: MEDICARE

## 2018-03-08 VITALS
TEMPERATURE: 97.4 F | RESPIRATION RATE: 18 BRPM | DIASTOLIC BLOOD PRESSURE: 64 MMHG | HEART RATE: 55 BPM | SYSTOLIC BLOOD PRESSURE: 141 MMHG

## 2018-03-08 PROCEDURE — 96360 HYDRATION IV INFUSION INIT: CPT

## 2018-03-08 RX ADMIN — SODIUM CHLORIDE 500 ML: 0.9 INJECTION, SOLUTION INTRAVENOUS at 14:32

## 2018-03-08 NOTE — PLAN OF CARE
Problem: Potential for Falls  Goal: Patient will remain free of falls  INTERVENTIONS:  - Assess patient frequently for physical needs  -  Identify cognitive and physical deficits and behaviors that affect risk of falls    -  [place] fall precautions as indicated by assessment   - Educate patient/family on patient safety including physical limitations  - Instruct patient to call for assistance with activity based on assessment  - Modify environment to reduce risk of injury  - Consider OT/PT consult to assist with strengthening/mobility   Outcome: Progressing

## 2018-03-09 ENCOUNTER — HOSPITAL ENCOUNTER (OUTPATIENT)
Dept: INFUSION CENTER | Facility: CLINIC | Age: 83
Discharge: HOME | End: 2018-03-09
Payer: MEDICARE

## 2018-03-09 VITALS
RESPIRATION RATE: 18 BRPM | HEART RATE: 54 BPM | SYSTOLIC BLOOD PRESSURE: 130 MMHG | DIASTOLIC BLOOD PRESSURE: 70 MMHG | TEMPERATURE: 97.9 F

## 2018-03-09 PROCEDURE — 96360 HYDRATION IV INFUSION INIT: CPT

## 2018-03-09 RX ADMIN — SODIUM CHLORIDE 500 ML: 0.9 INJECTION, SOLUTION INTRAVENOUS at 14:42

## 2018-03-09 NOTE — PLAN OF CARE
Problem: Potential for Falls  Goal: Patient will remain free of falls  INTERVENTIONS:  - Assess patient frequently for physical needs  -  Identify cognitive and physical deficits and behaviors that affect risk of falls    -  [place] fall precautions as indicated by assessment   - Educate patient/family on patient safety including physical limitations  - Instruct patient to call for assistance with activity based on assessment  - Modify environment to reduce risk of injury  - Consider OT/PT consult to assist with strengthening/mobility   Outcome: Progressing

## 2018-03-09 NOTE — PROGRESS NOTES
Pt arrived to unit without complaint, tolerated IV hydration without incident, left unit in stable condition without question or concern   AVS provided

## 2018-03-15 ENCOUNTER — HOSPITAL ENCOUNTER (OUTPATIENT)
Dept: CT IMAGING | Facility: HOSPITAL | Age: 83
Discharge: HOME | End: 2018-03-15
Attending: OTOLARYNGOLOGY
Payer: MEDICARE

## 2018-03-15 DIAGNOSIS — H90.A22 SENSORINEURAL HEARING LOSS, UNILATERAL, LEFT EAR, WITH RESTRICTED HEARING ON THE CONTRALATERAL SIDE: ICD-10-CM

## 2018-03-15 DIAGNOSIS — H93.12 LEFT-SIDED TINNITUS: ICD-10-CM

## 2018-03-15 PROCEDURE — 71260 CT THORAX DX C+: CPT

## 2018-03-15 PROCEDURE — 70470 CT HEAD/BRAIN W/O & W/DYE: CPT

## 2018-03-15 RX ADMIN — IOHEXOL 85 ML: 350 INJECTION, SOLUTION INTRAVENOUS at 15:41

## 2018-03-23 ENCOUNTER — OFFICE VISIT (OUTPATIENT)
Dept: HEMATOLOGY ONCOLOGY | Facility: CLINIC | Age: 83
End: 2018-03-23
Payer: MEDICARE

## 2018-03-23 VITALS
TEMPERATURE: 98 F | RESPIRATION RATE: 20 BRPM | SYSTOLIC BLOOD PRESSURE: 130 MMHG | WEIGHT: 164 LBS | BODY MASS INDEX: 30.99 KG/M2 | HEART RATE: 80 BPM | DIASTOLIC BLOOD PRESSURE: 80 MMHG

## 2018-03-23 DIAGNOSIS — C34.32 MALIGNANT NEOPLASM OF LOWER LOBE OF LEFT LUNG (HCC): Primary | ICD-10-CM

## 2018-03-23 PROCEDURE — 99214 OFFICE O/P EST MOD 30 MIN: CPT | Performed by: INTERNAL MEDICINE

## 2018-03-23 NOTE — PROGRESS NOTES
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   At this time I will send the patient to interventional Radiology for core biopsy of the large left pleural mass,  To test for mutational status on the sample of the biopsy  Of the patient did not have any mutation status I will rechallenge her with carboplatin/ Alimta as before  Follow-up after the core biopsy, will sent for Caris DX          HPI:   44-year-old  female who presented [facility] 2017 with dyspnea for the past 4-5 days and pleurisy, with occasional dry cough scan of the thorax showed no evidence of PE, it showed a large loculated left side pleural effusion with nodularity concerning for metastatic disease, there is a concern for a mass versus pneumonia in the collapsed left lower lobe lung underwent left thoracentesis Ã--2 yielding 1 4 L of bloody fluid, pathology showed adenocarcinoma, positive for TTF-1, CK 7 most likely consistent with non-small cell lung cancer patient had a history of left-sided breast cancer in 1992 status post mastectomy she told me she had told lymph node involvement, she was not treated with either chemotherapy, radiation therapy or hormonal therapy has extensive family history of cancer was diagnosed with breast cancer, sister was diagnosed with breast cancer at age 36, another sister was diagnosed with breast cancer at age 72, a brother diagnosed with pancreatic cancer and another brother with lung cancer niece was found to have BRCA gene mutation is allergic to sulfa, atorvastatin, latex    Current Therapy: Carboplatin AUC 5, Alimta 500 mg/mÂ² every 3 weeks initiated in July 2017, she finished 6 cycles out of 6 in November 2017   CURRENTLY ON MAINTENANCE ALIMTA 500 MILLIGRAM/METER SQUARED EVERY 3 WEEKS   Tumor Markers: BRCA1/2 is negative, CA-27-29 is normal, CEA is normal test for EGFR mutation is negative, ALK is negative on the blood sample however the tissue was 2 minutes you to get mutational status, 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     Interval History:   No changes from the last visit              Test Results:    Imaging: Ct Head W Wo Contrast    Result Date: 3/16/2018  Narrative: CT BRAIN - WITH AND WITHOUT CONTRAST INDICATION:   Follow-up lung cancer  Left-sided tinnitus  COMPARISON:  None  TECHNIQUE:  CT examination of the brain was performed both prior to and after the administration of intravenous contrast   In addition to axial images, coronal reformatted images were created and submitted for interpretation  Radiation dose length product (DLP) for this visit:  2334 mGy-cm   This examination, like all CT scans performed in the [facility], was performed utilizing techniques to minimize radiation dose exposure, including the use of iterative reconstruction and automated exposure control  IV Contrast:  85 mL of iohexol (OMNIPAQUE)  IMAGE QUALITY:  Diagnostic  FINDINGS: PARENCHYMA:  Scattered decreased attenuation is noted in the supratentorial white matter demonstrating an appearance most consistent with moderate microangiopathic change  No intracranial mass, mass effect or midline shift  No acute intracranial hemorrhage  No CT signs of acute infarction  VENTRICLES AND EXTRA-AXIAL SPACES:  Normal for patient's age  VISUALIZED ORBITS AND PARANASAL SINUSES:  Unremarkable   CALVARIUM:  Normal      Impression: No evidence of metastatic disease  Moderate chronic microangiopathic changes are noted  Workstation performed: [id number]     Ct Chest W Contrast    Result Date: 3/21/2018  Narrative: CT CHEST WITH IV CONTRAST INDICATION:   C78 00: Secondary malignant neoplasm of unspecified lung  Follow-up lung cancer  COMPARISON: 12/14/2017 TECHNIQUE: CT examination of the chest was performed  Axial, sagittal, and coronal 2D reformatted images were created from the source data and submitted for interpretation  Radiation dose length product (DLP) for this visit:  427 mGy-cm   This examination, like all CT scans performed in the [facility], was performed utilizing techniques to minimize radiation dose exposure, including the use of iterative reconstruction and automated exposure control  IV Contrast:  85 mL of iohexol (OMNIPAQUE) FINDINGS: LUNGS:  No acute consolidation  Mild background reticular interstitial thickening mostly in the periphery of the lungs appearing stable  There is also mild background of centrilobular emphysema in the upper lungs  PLEURA:  Interval progression in both number and size of the prior left-sided metastatic pleural implants  Specifically, the largest is again seen in the posterior lateral lung base currently measuring 3 9 x 2 1 cm this is becoming increasingly confluent with multiple adjacent smaller nodules in the posterior costophrenic angle  There are numerous small new lesions seen throughout the pleura along the lateral and posterior aspect of the upper thorax  The nodule previously measured on image 25  in the posterior mid upper thorax has increased from 8 mm to 15 mm  New large lesions are also seen along the medial aspect of the thorax abutting the mediastinal pleura, 2 5 x 1 6 cm  HEART/GREAT VESSELS:  Unremarkable for patient's age   MEDIASTINUM AND RAE:  Increasing adenopathy within the superior mediastinum, precarinal and AP window having previously been mostly normal there are now multiple nodes ranging from 10 mm to the largest which is subcarinal in position, 1 8 cm  Pleural implants are also seen partially encasing the left suprahilar region  CHEST WALL AND LOWER NECK:   Prior left mastectomy and implant reconstruction  VISUALIZED STRUCTURES IN THE UPPER ABDOMEN:  Diffuse low hepatic attenuation consistent with steatosis  Stable small nodular foci adjacent to the gallbladder appearing stable and favoring focal fatty sparing  Borderline abnormal 10 mm lymph node with central low attenuation and appears significantly increased in size since the prior  This is seen along the left lateral aspect of the SMA  Likely representing metastatic disease  OSSEOUS STRUCTURES:  New round sclerotic focus at T6 posterior superior endplate  Impression: Interval worsening of metastatic pleural implants throughout the left hemithorax  New mediastinal and left hilar adenopathy  Likely new abdominal metastatic lymph node as well  New sclerotic focus in T6 superior endplate  Hepatic steatosis  Workstation performed: [id number]       Labs:   Lab Results   Component Value Date    WBC 13 75 (H) 03/05/2018    HGB 14 4 03/05/2018    HCT 44 0 03/05/2018    MCV 97 03/05/2018     03/05/2018     Lab Results   Component Value Date     03/05/2018    K 4 2 03/05/2018     03/05/2018    CO2 25 03/05/2018    ANIONGAP 9 03/05/2018    BUN 21 03/05/2018    CREATININE 0 76 03/05/2018    GLUCOSE 148 (H) 03/05/2018    CALCIUM 9 7 03/05/2018    AST 35 03/05/2018    ALT 46 03/05/2018    ALKPHOS 113 03/05/2018    PROT 7 8 03/05/2018    BILITOT 0 64 03/05/2018    EGFR 73 03/05/2018       No results found for: IRON, TIBC, FERRITIN    No results found for: [id number]      ROS:   Review of Systems   Constitutional: Negative  HENT: Negative  Eyes: Negative for photophobia, pain and redness  Respiratory: Negative  Cardiovascular: Negative  Gastrointestinal: Negative      Endocrine: Negative  Genitourinary: Negative  Musculoskeletal: Negative  Skin: Negative  Neurological: Negative  Hematological: Negative  All other systems reviewed and are negative  Current Medications: Reviewed  Allergies: Reviewed  PMH/FH/SH:  Reviewed      Physical Exam:    Body surface area is 1 74 meters squared  Wt Readings from Last 3 Encounters:   03/23/18 74 4 kg (164 lb)   02/19/18 74 8 kg (165 lb)   02/06/18 74 5 kg (164 lb 3 9 oz)        Temp Readings from Last 3 Encounters:   03/23/18 98 °F (36 7 °C)   03/09/18 97 9 °F (36 6 °C) (Tympanic)   03/08/18 (!) 97 4 °F (36 3 °C) (Tympanic)        BP Readings from Last 3 Encounters:   03/23/18 130/80   03/09/18 130/70   03/08/18 141/64         Pulse Readings from Last 3 Encounters:   03/23/18 80   03/09/18 (!) 54   03/08/18 55        Physical Exam   Constitutional: She is oriented to person, place, and time  She appears well-developed and well-nourished  No distress  HENT:   Head: Normocephalic and atraumatic  Mouth/Throat: Oropharynx is clear and moist  No oropharyngeal exudate  Eyes: Conjunctivae and EOM are normal  Pupils are equal, round, and reactive to light  Neck: Normal range of motion  Neck supple  No tracheal deviation present  No thyromegaly present  Cardiovascular: Normal rate and regular rhythm  Exam reveals no gallop and no friction rub  No murmur heard  Pulmonary/Chest: Effort normal and breath sounds normal  No respiratory distress  She has no wheezes  She has no rales  She exhibits no tenderness  Abdominal: Soft  Bowel sounds are normal  She exhibits no distension and no mass  There is no tenderness  There is no rebound and no guarding  Musculoskeletal: Normal range of motion  Lymphadenopathy:     She has no cervical adenopathy  Neurological: She is alert and oriented to person, place, and time  Skin: Skin is warm and dry  No rash noted  She is not diaphoretic  No erythema  No pallor     Psychiatric: She has a normal mood and affect  Her behavior is normal  Judgment and thought content normal    Vitals reviewed  Goals and Barriers:  Current Goal: Minimize effects of disease  Barriers: None  Patient's Capacity to Self Care:  Patient is able to self care      Code Status: [unfilled]

## 2018-03-23 NOTE — LETTER
March 23, 2018     Jose Garcia DO  [address] [facility] [address]    Patient: Nasima Redmond   YOB: 1935   Date of Visit: 3/23/2018       Dear Dr Barb Harvey: Thank you for referring Per Kaur to me for evaluation  Below are my notes for this consultation  If you have questions, please do not hesitate to call me  I look forward to following your patient along with you           Sincerely,        Esther Mixon MD        CC: No Recipients  Esther Mixon MD  3/23/2018  2:12 PM  Sign at close encounter  Hematology Outpatient Follow - Up Note  Nasima Redmond 80 y o  female MRN: @ Encounter: [id number]        Date:  3/23/2018        Assessment/ Plan:   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 in a patient who never smoked in her life, she received induction Alimta /carboplatin followed by maintenance Alimta for almost a year, now with progression of disease with increasing in the left pleural based masses, left hilar and mediastinal lymphadenopathy  The patient was tested negative on the plasma samples for EGFR mutation, ALK gene rearrangement, Ross 1 mutation 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   At this time I will send the patient to interventional Radiology for core biopsy of the large left pleural mass,  To test for mutational status on the sample of the biopsy  Of the patient did not have any mutation status I will rechallenge her with carboplatin/ Alimta as before  Follow-up after the core biopsy, will sent for Caris DX          HPI:   28-year-old  female who presented to [facility] in May 2017 with dyspnea for the past 4-5 days and pleurisy, with occasional dry cough scan of the thorax showed no evidence of PE, it showed a large loculated left side pleural effusion with nodularity concerning for metastatic disease, there is a concern for a mass versus pneumonia in the collapsed left lower lobe lung underwent left thoracentesis Ã2 yielding 1 4 L of bloody fluid, pathology showed adenocarcinoma, positive for TTF-1, CK 7 most likely consistent with non-small cell lung cancer patient had a history of left-sided breast cancer in 1992 status post mastectomy she told me she had told lymph node involvement, she was not treated with either chemotherapy, radiation therapy or hormonal therapy has extensive family history of cancer was diagnosed with breast cancer, sister was diagnosed with breast cancer at age 36, another sister was diagnosed with breast cancer at age 72, a brother diagnosed with pancreatic cancer and another brother with lung cancer niece was found to have BRCA gene mutation is allergic to sulfa, atorvastatin, latex    Current Therapy: Carboplatin AUC 5, Alimta 500 mg/mÂ² every 3 weeks initiated in July 2017, she finished 6 cycles out of 6 in November 2017   CURRENTLY ON MAINTENANCE ALIMTA 500 MILLIGRAM/METER SQUARED EVERY 3 WEEKS   Tumor Markers: BRCA1/2 is negative, CA-27-29 is normal, CEA is normal test for EGFR mutation is negative, ALK is negative on the blood sample however the tissue was 2 minutes you to get mutational status, 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     Interval History:   No changes from the last visit              Test Results:    Imaging: Ct Head W Wo Contrast    Result Date: 3/16/2018  Narrative: CT BRAIN - WITH AND WITHOUT CONTRAST INDICATION:   Follow-up lung cancer  Left-sided tinnitus  COMPARISON:  None  TECHNIQUE:  CT examination of the brain was performed both prior to and after the administration of intravenous contrast   In addition to axial images, coronal reformatted images were created and submitted for interpretation  Radiation dose length product (DLP) for this visit:  2334 mGy-cm     This examination, like all CT scans performed in the [facility], was performed utilizing techniques to minimize radiation dose exposure, including the use of iterative reconstruction and automated exposure control  IV Contrast:  85 mL of iohexol (OMNIPAQUE)  IMAGE QUALITY:  Diagnostic  FINDINGS: PARENCHYMA:  Scattered decreased attenuation is noted in the supratentorial white matter demonstrating an appearance most consistent with moderate microangiopathic change  No intracranial mass, mass effect or midline shift  No acute intracranial hemorrhage  No CT signs of acute infarction  VENTRICLES AND EXTRA-AXIAL SPACES:  Normal for patient's age  VISUALIZED ORBITS AND PARANASAL SINUSES:  Unremarkable  CALVARIUM:  Normal      Impression: No evidence of metastatic disease  Moderate chronic microangiopathic changes are noted  Workstation performed: [id number]     Ct Chest W Contrast    Result Date: 3/21/2018  Narrative: CT CHEST WITH IV CONTRAST INDICATION:   C78 00: Secondary malignant neoplasm of unspecified lung  Follow-up lung cancer  COMPARISON: 12/14/2017 TECHNIQUE: CT examination of the chest was performed  Axial, sagittal, and coronal 2D reformatted images were created from the source data and submitted for interpretation  Radiation dose length product (DLP) for this visit:  427 mGy-cm   This examination, like all CT scans performed in the [facility], was performed utilizing techniques to minimize radiation dose exposure, including the use of iterative reconstruction and automated exposure control  IV Contrast:  85 mL of iohexol (OMNIPAQUE) FINDINGS: LUNGS:  No acute consolidation  Mild background reticular interstitial thickening mostly in the periphery of the lungs appearing stable  There is also mild background of centrilobular emphysema in the upper lungs  PLEURA:  Interval progression in both number and size of the prior left-sided metastatic pleural implants    Specifically, the largest is again seen in the posterior lateral lung base currently measuring 3 9 x 2 1 cm this is becoming increasingly confluent with multiple adjacent smaller nodules in the posterior costophrenic angle  There are numerous small new lesions seen throughout the pleura along the lateral and posterior aspect of the upper thorax  The nodule previously measured on image 25  in the posterior mid upper thorax has increased from 8 mm to 15 mm  New large lesions are also seen along the medial aspect of the thorax abutting the mediastinal pleura, 2 5 x 1 6 cm  HEART/GREAT VESSELS:  Unremarkable for patient's age  MEDIASTINUM AND RAE:  Increasing adenopathy within the superior mediastinum, precarinal and AP window having previously been mostly normal there are now multiple nodes ranging from 10 mm to the largest which is subcarinal in position, 1 8 cm  Pleural implants are also seen partially encasing the left suprahilar region  CHEST WALL AND LOWER NECK:   Prior left mastectomy and implant reconstruction  VISUALIZED STRUCTURES IN THE UPPER ABDOMEN:  Diffuse low hepatic attenuation consistent with steatosis  Stable small nodular foci adjacent to the gallbladder appearing stable and favoring focal fatty sparing  Borderline abnormal 10 mm lymph node with central low attenuation and appears significantly increased in size since the prior  This is seen along the left lateral aspect of the SMA  Likely representing metastatic disease  OSSEOUS STRUCTURES:  New round sclerotic focus at T6 posterior superior endplate  Impression: Interval worsening of metastatic pleural implants throughout the left hemithorax  New mediastinal and left hilar adenopathy  Likely new abdominal metastatic lymph node as well  New sclerotic focus in T6 superior endplate  Hepatic steatosis   Workstation performed: [id number]       Labs:   Lab Results   Component Value Date    WBC 13 75 (H) 03/05/2018    HGB 14 4 03/05/2018    HCT 44 0 03/05/2018    MCV 97 03/05/2018     03/05/2018     Lab Results   Component Value Date     03/05/2018    K 4 2 03/05/2018     03/05/2018    CO2 25 03/05/2018    ANIONGAP 9 03/05/2018    BUN 21 03/05/2018    CREATININE 0 76 03/05/2018    GLUCOSE 148 (H) 03/05/2018    CALCIUM 9 7 03/05/2018    AST 35 03/05/2018    ALT 46 03/05/2018    ALKPHOS 113 03/05/2018    PROT 7 8 03/05/2018    BILITOT 0 64 03/05/2018    EGFR 73 03/05/2018       No results found for: IRON, TIBC, FERRITIN    No results found for: [id number]      ROS:   Review of Systems   Constitutional: Negative  HENT: Negative  Eyes: Negative for photophobia, pain and redness  Respiratory: Negative  Cardiovascular: Negative  Gastrointestinal: Negative  Endocrine: Negative  Genitourinary: Negative  Musculoskeletal: Negative  Skin: Negative  Neurological: Negative  Hematological: Negative  All other systems reviewed and are negative  Current Medications: Reviewed  Allergies: Reviewed  PMH/FH/SH:  Reviewed      Physical Exam:    Body surface area is 1 74 meters squared  Wt Readings from Last 3 Encounters:   03/23/18 74 4 kg (164 lb)   02/19/18 74 8 kg (165 lb)   02/06/18 74 5 kg (164 lb 3 9 oz)        Temp Readings from Last 3 Encounters:   03/23/18 98 °F (36 7 °C)   03/09/18 97 9 °F (36 6 °C) (Tympanic)   03/08/18 (!) 97 4 °F (36 3 °C) (Tympanic)        BP Readings from Last 3 Encounters:   03/23/18 130/80   03/09/18 130/70   03/08/18 141/64         Pulse Readings from Last 3 Encounters:   03/23/18 80   03/09/18 (!) 54   03/08/18 55        Physical Exam   Constitutional: She is oriented to person, place, and time  She appears well-developed and well-nourished  No distress  HENT:   Head: Normocephalic and atraumatic  Mouth/Throat: Oropharynx is clear and moist  No oropharyngeal exudate  Eyes: Conjunctivae and EOM are normal  Pupils are equal, round, and reactive to light  Neck: Normal range of motion  Neck supple  No tracheal deviation present  No thyromegaly present  Cardiovascular: Normal rate and regular rhythm  Exam reveals no gallop and no friction rub      No murmur heard   Pulmonary/Chest: Effort normal and breath sounds normal  No respiratory distress  She has no wheezes  She has no rales  She exhibits no tenderness  Abdominal: Soft  Bowel sounds are normal  She exhibits no distension and no mass  There is no tenderness  There is no rebound and no guarding  Musculoskeletal: Normal range of motion  Lymphadenopathy:     She has no cervical adenopathy  Neurological: She is alert and oriented to person, place, and time  Skin: Skin is warm and dry  No rash noted  She is not diaphoretic  No erythema  No pallor  Psychiatric: She has a normal mood and affect  Her behavior is normal  Judgment and thought content normal    Vitals reviewed  Goals and Barriers:  Current Goal: Minimize effects of disease  Barriers: None  Patient's Capacity to Self Care:  Patient is able to self care      Code Status: [unfilled]

## 2018-03-28 ENCOUNTER — HOSPITAL ENCOUNTER (OUTPATIENT)
Dept: RADIOLOGY | Facility: HOSPITAL | Age: 83
Discharge: HOME | End: 2018-03-28
Attending: INTERNAL MEDICINE | Admitting: RADIOLOGY
Payer: MEDICARE

## 2018-03-28 VITALS
SYSTOLIC BLOOD PRESSURE: 131 MMHG | OXYGEN SATURATION: 97 % | DIASTOLIC BLOOD PRESSURE: 75 MMHG | HEIGHT: 61 IN | BODY MASS INDEX: 30.96 KG/M2 | TEMPERATURE: 97.9 F | HEART RATE: 85 BPM | WEIGHT: 164 LBS | RESPIRATION RATE: 18 BRPM

## 2018-03-28 DIAGNOSIS — C34.32 MALIGNANT NEOPLASM OF LOWER LOBE OF LEFT LUNG (HCC): ICD-10-CM

## 2018-03-28 LAB
GLUCOSE SERPL-MCNC: 134 MG/DL (ref 65–140)
INR PPP: 1.08 (ref 0.86–1.16)
PROTHROMBIN TIME: 14 SECONDS (ref 12.1–14.4)

## 2018-03-28 PROCEDURE — 88333 PATH CONSLTJ SURG CYTO XM 1: CPT | Performed by: PATHOLOGY

## 2018-03-28 PROCEDURE — 88342 IMHCHEM/IMCYTCHM 1ST ANTB: CPT | Performed by: PATHOLOGY

## 2018-03-28 PROCEDURE — 99153 MOD SED SAME PHYS/QHP EA: CPT

## 2018-03-28 PROCEDURE — 88341 IMHCHEM/IMCYTCHM EA ADD ANTB: CPT | Performed by: PATHOLOGY

## 2018-03-28 PROCEDURE — 85610 PROTHROMBIN TIME: CPT | Performed by: RADIOLOGY

## 2018-03-28 PROCEDURE — 32405 PR BIOPSY LUNG/MEDIASTINUM PERCUTANEOUS NEEDLE: CPT | Performed by: RADIOLOGY

## 2018-03-28 PROCEDURE — 77012 CT SCAN FOR NEEDLE BIOPSY: CPT

## 2018-03-28 PROCEDURE — 99152 MOD SED SAME PHYS/QHP 5/>YRS: CPT

## 2018-03-28 PROCEDURE — 82948 REAGENT STRIP/BLOOD GLUCOSE: CPT

## 2018-03-28 PROCEDURE — 88305 TISSUE EXAM BY PATHOLOGIST: CPT | Performed by: PATHOLOGY

## 2018-03-28 PROCEDURE — 77012 CT SCAN FOR NEEDLE BIOPSY: CPT | Performed by: RADIOLOGY

## 2018-03-28 PROCEDURE — 99152 MOD SED SAME PHYS/QHP 5/>YRS: CPT | Performed by: RADIOLOGY

## 2018-03-28 PROCEDURE — 32405 HB PERCUT BX LUNG/MEDIASTINUM: CPT

## 2018-03-28 RX ORDER — SODIUM CHLORIDE 9 MG/ML
75 INJECTION, SOLUTION INTRAVENOUS CONTINUOUS
Status: DISCONTINUED | OUTPATIENT
Start: 2018-03-28 | End: 2018-03-28 | Stop reason: HOSPADM

## 2018-03-28 RX ORDER — MIDAZOLAM HYDROCHLORIDE 1 MG/ML
INJECTION INTRAMUSCULAR; INTRAVENOUS CODE/TRAUMA/SEDATION MEDICATION
Status: DISCONTINUED | OUTPATIENT
Start: 2018-03-28 | End: 2018-03-28 | Stop reason: HOSPADM

## 2018-03-28 RX ORDER — FENTANYL CITRATE 50 UG/ML
INJECTION, SOLUTION INTRAMUSCULAR; INTRAVENOUS CODE/TRAUMA/SEDATION MEDICATION
Status: DISCONTINUED | OUTPATIENT
Start: 2018-03-28 | End: 2018-03-28 | Stop reason: HOSPADM

## 2018-03-28 RX ADMIN — MIDAZOLAM 1 MG: 1 INJECTION INTRAMUSCULAR; INTRAVENOUS at 10:14

## 2018-03-28 RX ADMIN — SODIUM CHLORIDE 75 ML/HR: 0.9 INJECTION, SOLUTION INTRAVENOUS at 08:08

## 2018-03-28 RX ADMIN — FENTANYL CITRATE 50 MCG: 50 INJECTION, SOLUTION INTRAMUSCULAR; INTRAVENOUS at 10:14

## 2018-03-28 RX ADMIN — FENTANYL CITRATE 50 MCG: 50 INJECTION, SOLUTION INTRAMUSCULAR; INTRAVENOUS at 10:37

## 2018-03-28 RX ADMIN — MIDAZOLAM 1 MG: 1 INJECTION INTRAMUSCULAR; INTRAVENOUS at 10:37

## 2018-03-28 RX ADMIN — FENTANYL CITRATE 50 MCG: 50 INJECTION, SOLUTION INTRAMUSCULAR; INTRAVENOUS at 10:21

## 2018-03-28 RX ADMIN — MIDAZOLAM 1 MG: 1 INJECTION INTRAMUSCULAR; INTRAVENOUS at 10:21

## 2018-03-28 NOTE — BRIEF OP NOTE (RAD/CATH)
INTERVENTIONAL RADIOLOGY PROCEDURE NOTE    Preoperative diagnosis:  Left pleural based lung lesion    Postoperative diagnosis: Same    Procedure:  CT-guided lung biopsy    Surgeon: Ovi Farris MD     Assistant: None  No qualified resident was available  Blood loss:  Minimal    Specimens:  five 18 g cores    Findings:  Adequate specimen      Complications:  None immediate    Anesthesia: IV conscious sedation

## 2018-03-28 NOTE — DISCHARGE INSTRUCTIONS
Needle Biopsy of the Lung    WHAT YOU NEED TO KNOW:  A needle biopsy of the lung is a procedure to remove cells or tissue from your lung  You may have a fine needle aspiration biopsy (FNAB), or a core needle biopsy (CNB)  A FNAB is used to remove cells through a thin needle  CNB uses a thicker needle to remove lung tissue  The samples are collected and tested for inflammation, infection, or cancer  DISCHARGE INSTRUCTIONS:   Resume your normal diet  Small sips of flat soda will help with nausea  Limit your activity for 24 hours  Wound Care:      - Remove band aid in 24 hours      Contact Interventional Radiology at [phone] Oscar PATIENTS: Contact Interventional Radiology at [phone]) Jj Cano PATIENTS: Contact Interventional Radiology at [phone]) if any of the following occur:    - You have a fever greater than 101*    - You cough up large amounts of bright red blood     - You have chest pain with breathing    - You have shortness of breath    -You have persistent nausea and vomiting    - You have pus, redness or swelling around your biopsy site    - You have questions or concerns about your condition or care

## 2018-04-11 ENCOUNTER — TELEPHONE (OUTPATIENT)
Dept: HEMATOLOGY ONCOLOGY | Facility: CLINIC | Age: 83
End: 2018-04-11

## 2018-04-11 NOTE — TELEPHONE ENCOUNTER
Dr Omer Barnes has a call into pathologist to discuss patient's case patient updated   Patient is not symptomatic, will wait for pathologist opinion , patient may need another biopsy

## 2018-04-17 ENCOUNTER — APPOINTMENT (OUTPATIENT)
Dept: RADIOLOGY | Age: 83
End: 2018-04-17
Payer: MEDICARE

## 2018-04-17 DIAGNOSIS — J90 PLEURAL EFFUSION: Primary | ICD-10-CM

## 2018-04-17 DIAGNOSIS — J90 PLEURAL EFFUSION: ICD-10-CM

## 2018-04-17 PROCEDURE — 71046 X-RAY EXAM CHEST 2 VIEWS: CPT

## 2018-04-18 ENCOUNTER — TELEPHONE (OUTPATIENT)
Dept: HEMATOLOGY ONCOLOGY | Facility: CLINIC | Age: 83
End: 2018-04-18

## 2018-04-18 ENCOUNTER — APPOINTMENT (OUTPATIENT)
Dept: LAB | Facility: HOSPITAL | Age: 83
End: 2018-04-18
Attending: INTERNAL MEDICINE
Payer: MEDICARE

## 2018-04-18 ENCOUNTER — OFFICE VISIT (OUTPATIENT)
Dept: HEMATOLOGY ONCOLOGY | Facility: CLINIC | Age: 83
End: 2018-04-18
Payer: MEDICARE

## 2018-04-18 DIAGNOSIS — C34.32 MALIGNANT NEOPLASM OF LOWER LOBE OF LEFT LUNG (HCC): ICD-10-CM

## 2018-04-18 DIAGNOSIS — C34.32 MALIGNANT NEOPLASM OF LOWER LOBE OF LEFT LUNG (HCC): Primary | ICD-10-CM

## 2018-04-18 PROBLEM — C34.90 LUNG CANCER (HCC): Status: ACTIVE | Noted: 2018-04-18

## 2018-04-18 LAB
ALBUMIN SERPL BCP-MCNC: 3.3 G/DL (ref 3.5–5)
ALP SERPL-CCNC: 91 U/L (ref 46–116)
ALT SERPL W P-5'-P-CCNC: 26 U/L (ref 12–78)
ANION GAP SERPL CALCULATED.3IONS-SCNC: 9 MMOL/L (ref 4–13)
AST SERPL W P-5'-P-CCNC: 30 U/L (ref 5–45)
BASOPHILS # BLD AUTO: 0.06 THOUSANDS/ΜL (ref 0–0.1)
BASOPHILS NFR BLD AUTO: 0 % (ref 0–1)
BILIRUB SERPL-MCNC: 0.5 MG/DL (ref 0.2–1)
BUN SERPL-MCNC: 13 MG/DL (ref 5–25)
CALCIUM SERPL-MCNC: 9.6 MG/DL (ref 8.3–10.1)
CHLORIDE SERPL-SCNC: 108 MMOL/L (ref 100–108)
CO2 SERPL-SCNC: 24 MMOL/L (ref 21–32)
CREAT SERPL-MCNC: 0.6 MG/DL (ref 0.6–1.3)
EOSINOPHIL # BLD AUTO: 1.01 THOUSAND/ΜL (ref 0–0.61)
EOSINOPHIL NFR BLD AUTO: 8 % (ref 0–6)
ERYTHROCYTE [DISTWIDTH] IN BLOOD BY AUTOMATED COUNT: 13.3 % (ref 11.6–15.1)
GFR SERPL CREATININE-BSD FRML MDRD: 85 ML/MIN/1.73SQ M
GLUCOSE P FAST SERPL-MCNC: 101 MG/DL (ref 65–99)
HCT VFR BLD AUTO: 47.2 % (ref 34.8–46.1)
HGB BLD-MCNC: 14.9 G/DL (ref 11.5–15.4)
LYMPHOCYTES # BLD AUTO: 3.43 THOUSANDS/ΜL (ref 0.6–4.47)
LYMPHOCYTES NFR BLD AUTO: 25 % (ref 14–44)
MCH RBC QN AUTO: 31 PG (ref 26.8–34.3)
MCHC RBC AUTO-ENTMCNC: 31.6 G/DL (ref 31.4–37.4)
MCV RBC AUTO: 98 FL (ref 82–98)
MONOCYTES # BLD AUTO: 1.31 THOUSAND/ΜL (ref 0.17–1.22)
MONOCYTES NFR BLD AUTO: 10 % (ref 4–12)
NEUTROPHILS # BLD AUTO: 7.62 THOUSANDS/ΜL (ref 1.85–7.62)
NEUTS SEG NFR BLD AUTO: 57 % (ref 43–75)
NRBC BLD AUTO-RTO: 0 /100 WBCS
PLATELET # BLD AUTO: 256 THOUSANDS/UL (ref 149–390)
PMV BLD AUTO: 11.8 FL (ref 8.9–12.7)
POTASSIUM SERPL-SCNC: 4 MMOL/L (ref 3.5–5.3)
PROT SERPL-MCNC: 7.5 G/DL (ref 6.4–8.2)
RBC # BLD AUTO: 4.8 MILLION/UL (ref 3.81–5.12)
SODIUM SERPL-SCNC: 141 MMOL/L (ref 136–145)
WBC # BLD AUTO: 13.5 THOUSAND/UL (ref 4.31–10.16)

## 2018-04-18 PROCEDURE — 36415 COLL VENOUS BLD VENIPUNCTURE: CPT

## 2018-04-18 PROCEDURE — 85025 COMPLETE CBC W/AUTO DIFF WBC: CPT

## 2018-04-18 PROCEDURE — 80053 COMPREHEN METABOLIC PANEL: CPT

## 2018-04-18 PROCEDURE — 99215 OFFICE O/P EST HI 40 MIN: CPT | Performed by: INTERNAL MEDICINE

## 2018-04-18 RX ORDER — SODIUM CHLORIDE 9 MG/ML
20 INJECTION, SOLUTION INTRAVENOUS CONTINUOUS
Status: DISCONTINUED | OUTPATIENT
Start: 2018-04-19 | End: 2018-04-22 | Stop reason: HOSPADM

## 2018-04-18 RX ORDER — CYANOCOBALAMIN 1000 UG/ML
1000 INJECTION INTRAMUSCULAR; SUBCUTANEOUS ONCE
Status: COMPLETED | OUTPATIENT
Start: 2018-04-19 | End: 2018-04-19

## 2018-04-18 NOTE — TELEPHONE ENCOUNTER
Sarah at  has pt scheduled for Lung bx on Friday, April 20th tentative arrival is 8:30 in [place] with Dr Irina Gonsalves  Phone consult to pt will be tomorrow anytime between 8 - 4 and nurse will provide specific arrival time and prep during that call

## 2018-04-18 NOTE — PROGRESS NOTES
Hematology Outpatient Follow - Up Note  Obi Vaca 80 y o  female MRN: @ Encounter: [id number]        Date:  4/18/2018        Assessment/ Plan:   1  Stage IV adenocarcinoma of the lung primary in the left lower lobe of the lung with malignant pleural effusion, patient never smoked in her life when she presented in May 2017 with dyspnea, biopsy showed adenocarcinoma it was too small for molecular tests, 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   She was treated with 6 cycles of carboplatin / Alimta with excellent clinical response finished in November 2017, and subsequently had been on Alimta maintenance 500 milligram/meter squared every 3 weeks, however in March 2018 CT scan showed more studying of the left pleura consistent with progression of disease, she went to IR a core biopsy confirmed adenocarcinoma of the lung positive for TTF1 however again the biopsy was small and we sent for molecular test the test failed because of lack of cancer tissue in the specimen  The patient now is symptomatic  2  I will treat the patient again with Alimta /carboplatin, there is a risk of allergic reaction to carboplatin beyond cycle 6 about 27 percent, the patient and her family is aware of that hoping to have response with a combination  2  I will request a 2nd core biopsy by IR by Dr Sindy Carrel and will send for molecular tests as soon as feasible  3  Dexamethasone the day before a day on a day after treatment with Alimta and 4 milligram on day 3  And 4 milligram on day 4   4  Hydration with IV fluid on day 1  And 2  5  Vitamin B12 1000 microgram IM shot every chemotherapy, folic acid 1 milligram p  O  Daily  6   Follow-up in 3 weeks with CBC, CMP          HPI:  80-year-old  female who presented to [facility] in May 2017 with dyspnea for the past 4-5 days and pleurisy, with occasional dry cough scan of the thorax showed no evidence of PE, it showed a large loculated left side pleural effusion with nodularity concerning for metastatic disease, there is a concern for a mass versus pneumonia in the collapsed left lower lobe lung underwent left thoracentesis Ã--2 yielding 1 4 L of bloody fluid, pathology showed adenocarcinoma, positive for TTF-1, CK 7 most likely consistent with non-small cell lung cancer patient had a history of left-sided breast cancer in 1992 status post mastectomy she told me she had told lymph node involvement, she was not treated with either chemotherapy, radiation therapy or hormonal therapy has extensive family history of cancer was diagnosed with breast cancer, sister was diagnosed with breast cancer at age 36, another sister was diagnosed with breast cancer at age 72, a brother diagnosed with pancreatic cancer and another brother with lung cancer niece was found to have BRCA gene mutation is allergic to sulfa, atorvastatin, latex    Current Therapy: Carboplatin AUC 5, Alimta 500 mg/mÂ² every 3 weeks initiated in July 2017, she finished 6 cycles out of 6 in November 2017   CURRENTLY ON MAINTENANCE ALIMTA 500 MILLIGRAM/METER SQUARED EVERY 3 WEEKS   Tumor Markers: BRCA1/2 is negative, CA-27-29 is normal, CEA is normal test for EGFR mutation is negative, ALK is negative on the blood sample however the tissue was 2 minutes you to get mutational status, 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     Interval History:   Progression of dyspnea, she had a core biopsy of the left pleural based mass unfortunately it diagnosed lung adenocarcinoma however not sufficient tissue to send for molecular tests      Previous Treatment:         Test Results:    Imaging: Xr Chest Pa & Lateral    Result Date: 4/17/2018  Narrative: CHEST INDICATION:   J90: Pleural effusion, not elsewhere classified  Shortness of breath  On chemotherapy for lung cancer  History of breast cancer  Former smoker   COMPARISON:  1/27/2018 EXAM PERFORMED/VIEWS:  XR CHEST PA & LATERAL  The frontal view was performed utilizing dual energy radiographic technique  FINDINGS: Cardiomediastinal silhouette appears stable  Studding of the pleura in the left hemithorax in keeping with pleural carcinomatosis and left pleural effusion essentially unchanged  Osseous structures appear within normal limits for patient age  Impression: Studding of the pleura in the left hemithorax in keeping with pleural carcinomatosis and left pleural effusion essentially unchanged  Workstation performed: [id number]     Ct Needle Biopsy Lung    Result Date: 3/28/2018  Narrative: CT-scan guided core biopsy of a left lung lesion History: Patient with a history of left-sided breast cancer and numerous pleural-based lung masses on the left  Concious sedation time: 30 minutes Technique: This examination, like all CT scans performed in the [facility], was performed utilizing techniques to minimize radiation dose exposure, including the use of iterative reconstruction and automated exposure control  The patient was brought to the CT scanner and placed prone on the table  After axial images were obtained through the region of interest an area of the skin was then marked, prepped, and draped in usual sterile fashion  Lidocaine was administered to the skin and a small skin incision was made  A 17 gauge cannula was advanced up to the lesion  Through the cannula, an18-gauge core biopsy was performed  3 additional passes were made  The specimen was found to be adequate for evaluation  The patient tolerated the procedure well and suffered no complications  Impression: Impression: Successful percutaneous core biopsy of a left-sided pleural-based lung lesion  A full pathology report will follow   Workstation performed: [id number]       Labs:   Lab Results   Component Value Date    WBC 13 75 (H) 03/05/2018    HGB 14 4 03/05/2018    HCT 44 0 03/05/2018    MCV 97 03/05/2018     03/05/2018     Lab Results   Component Value Date     03/05/2018    K 4 2 03/05/2018     03/05/2018    CO2 25 03/05/2018    ANIONGAP 9 03/05/2018    BUN 21 03/05/2018    CREATININE 0 76 03/05/2018    GLUCOSE 148 (H) 03/05/2018    CALCIUM 9 7 03/05/2018    AST 35 03/05/2018    ALT 46 03/05/2018    ALKPHOS 113 03/05/2018    PROT 7 8 03/05/2018    BILITOT 0 64 03/05/2018    EGFR 73 03/05/2018       No results found for: IRON, TIBC, FERRITIN    No results found for: [id number]      ROS:   Review of Systems   Constitutional: Negative for activity change, appetite change, diaphoresis, fatigue, fever and unexpected weight change  HENT: Negative for facial swelling, hearing loss, rhinorrhea, sinus pain, sinus pressure, sneezing, sore throat and tinnitus  Eyes: Negative for photophobia, pain, discharge, redness, itching and visual disturbance  Respiratory: Positive for shortness of breath  Negative for apnea and chest tightness  Cardiovascular: Positive for chest pain ( pleuritic like chest pain on the left lateral area)  Negative for palpitations and leg swelling  Gastrointestinal: Negative for abdominal distention, abdominal pain, blood in stool, constipation, diarrhea, nausea, rectal pain and vomiting  Endocrine: Negative for cold intolerance, heat intolerance, polydipsia and polyphagia  Genitourinary: Negative for difficulty urinating, dyspareunia, frequency, hematuria, pelvic pain and urgency  Musculoskeletal: Negative for arthralgias, back pain, gait problem, joint swelling and myalgias  Skin: Negative for color change, pallor and rash  Allergic/Immunologic: Negative for environmental allergies and food allergies  Neurological: Negative for dizziness, tremors, seizures, syncope, speech difficulty, numbness and headaches  Hematological: Negative for adenopathy  Does not bruise/bleed easily  Psychiatric/Behavioral: Negative for agitation, confusion, dysphoric mood, hallucinations and suicidal ideas           Current Medications: Reviewed  Allergies: Reviewed  PMH/FH/SH:  Reviewed      Physical Exam:    There is no height or weight on file to calculate BSA  Wt Readings from Last 3 Encounters:   03/28/18 74 4 kg (164 lb)   03/23/18 74 4 kg (164 lb)   02/19/18 74 8 kg (165 lb)        Temp Readings from Last 3 Encounters:   03/28/18 97 9 °F (36 6 °C) (Oral)   03/23/18 98 °F (36 7 °C)   03/09/18 97 9 °F (36 6 °C) (Tympanic)        BP Readings from Last 3 Encounters:   03/28/18 131/75   03/23/18 130/80   03/09/18 130/70         Pulse Readings from Last 3 Encounters:   03/28/18 85   03/23/18 80   03/09/18 (!) 54        Physical Exam   Constitutional: She is oriented to person, place, and time  She appears well-developed and well-nourished  No distress  HENT:   Head: Normocephalic and atraumatic  Mouth/Throat: Oropharynx is clear and moist  No oropharyngeal exudate  Eyes: Conjunctivae and EOM are normal  Pupils are equal, round, and reactive to light  Neck: Normal range of motion  Neck supple  No tracheal deviation present  No thyromegaly present  Cardiovascular: Normal rate and regular rhythm  Exam reveals no gallop and no friction rub  No murmur heard  Pulmonary/Chest: Effort normal  No respiratory distress  She has no wheezes  She has no rales  She exhibits no tenderness  Decreased breath sounds in the left base   Abdominal: Soft  Bowel sounds are normal  She exhibits no distension and no mass  There is no tenderness  There is no rebound and no guarding  Musculoskeletal: Normal range of motion  Lymphadenopathy:     She has no cervical adenopathy  Neurological: She is alert and oriented to person, place, and time  Skin: Skin is warm and dry  No rash noted  She is not diaphoretic  No erythema  No pallor  Psychiatric: She has a normal mood and affect  Her behavior is normal  Judgment and thought content normal    Vitals reviewed  Goals and Barriers:  Current Goal: Minimize effects of disease  Barriers: None        Patient's Capacity to Self Care:  Patient is able to self care      Code Status: [unfilled]

## 2018-04-18 NOTE — LETTER
April 18, 2018     Sola Radha, DO  [address] 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, patient never smoked in her life when she presented in May 2017 with dyspnea, biopsy showed adenocarcinoma it was too small for molecular tests, 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   She was treated with 6 cycles of carboplatin / Alimta with excellent clinical response finished in November 2017, and subsequently had been on Alimta maintenance 500 milligram/meter squared every 3 weeks, however in March 2018 CT scan showed more studying of the left pleura consistent with progression of disease, she went to IR a core biopsy confirmed adenocarcinoma of the lung positive for TTF1 however again the biopsy was small and we sent for molecular test the test failed because of lack of cancer tissue in the specimen  The patient now is symptomatic  2  I will treat the patient again with Alimta /carboplatin, there is a risk of allergic reaction to carboplatin beyond cycle 6 about 27 percent, the patient and her family is aware of that hoping to have response with a combination  2  I will request a 2nd core biopsy by IR by Dr Sly Odonnell and will send for molecular tests as soon as feasible  3   Dexamethasone the day before a day on a day after treatment with Alimta and 4 milligram on day 3  And 4 milligram on day 4   4  Hydration with IV fluid on day 1  And 2  5  Vitamin B12 1000 microgram IM shot every chemotherapy, folic acid 1 milligram p  O  Daily  6   Follow-up in 3 weeks with CBC, CMP          HPI:  49-year-old  female who presented to [facility] in May 2017 with dyspnea for the past 4-5 days and pleurisy, with occasional dry cough scan of the thorax showed no evidence of PE, it showed a large loculated left side pleural effusion with nodularity concerning for metastatic disease, there is a concern for a mass versus pneumonia in the collapsed left lower lobe lung underwent left thoracentesis Ã2 yielding 1 4 L of bloody fluid, pathology showed adenocarcinoma, positive for TTF-1, CK 7 most likely consistent with non-small cell lung cancer patient had a history of left-sided breast cancer in 1992 status post mastectomy she told me she had told lymph node involvement, she was not treated with either chemotherapy, radiation therapy or hormonal therapy has extensive family history of cancer was diagnosed with breast cancer, sister was diagnosed with breast cancer at age 36, another sister was diagnosed with breast cancer at age 72, a brother diagnosed with pancreatic cancer and another brother with lung cancer niece was found to have BRCA gene mutation is allergic to sulfa, atorvastatin, latex    Current Therapy: Carboplatin AUC 5, Alimta 500 mg/mÂ² every 3 weeks initiated in July 2017, she finished 6 cycles out of 6 in November 2017   CURRENTLY ON MAINTENANCE ALIMTA 500 MILLIGRAM/METER SQUARED EVERY 3 WEEKS   Tumor Markers: BRCA1/2 is negative, CA-27-29 is normal, CEA is normal test for EGFR mutation is negative, ALK is negative on the blood sample however the tissue was 2 minutes you to get mutational status, 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     Interval History:   Progression of dyspnea, she had a core biopsy of the left pleural based mass unfortunately it diagnosed lung adenocarcinoma however not sufficient tissue to send for molecular tests      Previous Treatment:         Test Results:    Imaging: Xr Chest Pa & Lateral    Result Date: 4/17/2018  Narrative: CHEST INDICATION:   J90: Pleural effusion, not elsewhere classified  Shortness of breath  On chemotherapy for lung cancer  History of breast cancer  Former smoker  COMPARISON:  1/27/2018 EXAM PERFORMED/VIEWS:  XR CHEST PA & LATERAL  The frontal view was performed utilizing dual energy radiographic technique  FINDINGS: Cardiomediastinal silhouette appears stable  Studding of the pleura in the left hemithorax in keeping with pleural carcinomatosis and left pleural effusion essentially unchanged  Osseous structures appear within normal limits for patient age  Impression: Studding of the pleura in the left hemithorax in keeping with pleural carcinomatosis and left pleural effusion essentially unchanged  Workstation performed: [id number]     Ct Needle Biopsy Lung    Result Date: 3/28/2018  Narrative: CT-scan guided core biopsy of a left lung lesion History: Patient with a history of left-sided breast cancer and numerous pleural-based lung masses on the left  Concious sedation time: 30 minutes Technique: This examination, like all CT scans performed in the [facility], was performed utilizing techniques to minimize radiation dose exposure, including the use of iterative reconstruction and automated exposure control  The patient was brought to the CT scanner and placed prone on the table  After axial images were obtained through the region of interest an area of the skin was then marked, prepped, and draped in usual sterile fashion  Lidocaine was administered to the skin and a small skin incision was made  A 17 gauge cannula was advanced up to the lesion  Through the cannula, an18-gauge core biopsy was performed    3 additional passes were made  The specimen was found to be adequate for evaluation  The patient tolerated the procedure well and suffered no complications  Impression: Impression: Successful percutaneous core biopsy of a left-sided pleural-based lung lesion  A full pathology report will follow  Workstation performed: [id number]       Labs:   Lab Results   Component Value Date    WBC 13 75 (H) 03/05/2018    HGB 14 4 03/05/2018    HCT 44 0 03/05/2018    MCV 97 03/05/2018     03/05/2018     Lab Results   Component Value Date     03/05/2018    K 4 2 03/05/2018     03/05/2018    CO2 25 03/05/2018    ANIONGAP 9 03/05/2018    BUN 21 03/05/2018    CREATININE 0 76 03/05/2018    GLUCOSE 148 (H) 03/05/2018    CALCIUM 9 7 03/05/2018    AST 35 03/05/2018    ALT 46 03/05/2018    ALKPHOS 113 03/05/2018    PROT 7 8 03/05/2018    BILITOT 0 64 03/05/2018    EGFR 73 03/05/2018       No results found for: IRON, TIBC, FERRITIN    No results found for: [id number]      ROS:   Review of Systems   Constitutional: Negative for activity change, appetite change, diaphoresis, fatigue, fever and unexpected weight change  HENT: Negative for facial swelling, hearing loss, rhinorrhea, sinus pain, sinus pressure, sneezing, sore throat and tinnitus  Eyes: Negative for photophobia, pain, discharge, redness, itching and visual disturbance  Respiratory: Positive for shortness of breath  Negative for apnea and chest tightness  Cardiovascular: Positive for chest pain ( pleuritic like chest pain on the left lateral area)  Negative for palpitations and leg swelling  Gastrointestinal: Negative for abdominal distention, abdominal pain, blood in stool, constipation, diarrhea, nausea, rectal pain and vomiting  Endocrine: Negative for cold intolerance, heat intolerance, polydipsia and polyphagia  Genitourinary: Negative for difficulty urinating, dyspareunia, frequency, hematuria, pelvic pain and urgency     Musculoskeletal: Negative for arthralgias, back pain, gait problem, joint swelling and myalgias  Skin: Negative for color change, pallor and rash  Allergic/Immunologic: Negative for environmental allergies and food allergies  Neurological: Negative for dizziness, tremors, seizures, syncope, speech difficulty, numbness and headaches  Hematological: Negative for adenopathy  Does not bruise/bleed easily  Psychiatric/Behavioral: Negative for agitation, confusion, dysphoric mood, hallucinations and suicidal ideas  Current Medications: Reviewed  Allergies: Reviewed  PMH/FH/SH:  Reviewed      Physical Exam:    There is no height or weight on file to calculate BSA  Wt Readings from Last 3 Encounters:   03/28/18 74 4 kg (164 lb)   03/23/18 74 4 kg (164 lb)   02/19/18 74 8 kg (165 lb)        Temp Readings from Last 3 Encounters:   03/28/18 97 9 °F (36 6 °C) (Oral)   03/23/18 98 °F (36 7 °C)   03/09/18 97 9 °F (36 6 °C) (Tympanic)        BP Readings from Last 3 Encounters:   03/28/18 131/75   03/23/18 130/80   03/09/18 130/70         Pulse Readings from Last 3 Encounters:   03/28/18 85   03/23/18 80   03/09/18 (!) 54        Physical Exam   Constitutional: She is oriented to person, place, and time  She appears well-developed and well-nourished  No distress  HENT:   Head: Normocephalic and atraumatic  Mouth/Throat: Oropharynx is clear and moist  No oropharyngeal exudate  Eyes: Conjunctivae and EOM are normal  Pupils are equal, round, and reactive to light  Neck: Normal range of motion  Neck supple  No tracheal deviation present  No thyromegaly present  Cardiovascular: Normal rate and regular rhythm  Exam reveals no gallop and no friction rub  No murmur heard  Pulmonary/Chest: Effort normal  No respiratory distress  She has no wheezes  She has no rales  She exhibits no tenderness  Decreased breath sounds in the left base   Abdominal: Soft  Bowel sounds are normal  She exhibits no distension and no mass   There is no tenderness  There is no rebound and no guarding  Musculoskeletal: Normal range of motion  Lymphadenopathy:     She has no cervical adenopathy  Neurological: She is alert and oriented to person, place, and time  Skin: Skin is warm and dry  No rash noted  She is not diaphoretic  No erythema  No pallor  Psychiatric: She has a normal mood and affect  Her behavior is normal  Judgment and thought content normal    Vitals reviewed  Goals and Barriers:  Current Goal: Minimize effects of disease  Barriers: None  Patient's Capacity to Self Care:  Patient is able to self care      Code Status: @[facility]@

## 2018-04-19 ENCOUNTER — HOSPITAL ENCOUNTER (OUTPATIENT)
Dept: INFUSION CENTER | Facility: CLINIC | Age: 83
Discharge: HOME | End: 2018-04-19
Payer: MEDICARE

## 2018-04-19 VITALS
HEART RATE: 76 BPM | RESPIRATION RATE: 16 BRPM | WEIGHT: 164.02 LBS | HEIGHT: 62 IN | SYSTOLIC BLOOD PRESSURE: 130 MMHG | BODY MASS INDEX: 30.18 KG/M2 | DIASTOLIC BLOOD PRESSURE: 76 MMHG | TEMPERATURE: 96.4 F

## 2018-04-19 PROCEDURE — 96417 CHEMO IV INFUS EACH ADDL SEQ: CPT

## 2018-04-19 PROCEDURE — 96372 THER/PROPH/DIAG INJ SC/IM: CPT

## 2018-04-19 PROCEDURE — 96367 TX/PROPH/DG ADDL SEQ IV INF: CPT

## 2018-04-19 PROCEDURE — 96413 CHEMO IV INFUSION 1 HR: CPT

## 2018-04-19 RX ORDER — ACETAMINOPHEN 325 MG/1
650 TABLET ORAL ONCE
Status: COMPLETED | OUTPATIENT
Start: 2018-04-19 | End: 2018-04-19

## 2018-04-19 RX ADMIN — ACETAMINOPHEN 650 MG: 325 TABLET, FILM COATED ORAL at 15:18

## 2018-04-19 RX ADMIN — SODIUM CHLORIDE 20 ML/HR: 9 INJECTION, SOLUTION INTRAVENOUS at 14:43

## 2018-04-19 RX ADMIN — SODIUM CHLORIDE 900 MG: 9 INJECTION, SOLUTION INTRAVENOUS at 15:11

## 2018-04-19 RX ADMIN — CARBOPLATIN 488 MG: 10 INJECTION, SOLUTION INTRAVENOUS at 15:32

## 2018-04-19 RX ADMIN — ONDANSETRON 16 MG: 2 INJECTION INTRAMUSCULAR; INTRAVENOUS at 14:44

## 2018-04-19 RX ADMIN — CYANOCOBALAMIN 1000 MCG: 1000 INJECTION, SOLUTION INTRAMUSCULAR at 15:08

## 2018-04-19 NOTE — PROGRESS NOTES
Patient stated her pain in L side radiating to back is at a "8"  Spoke with Ping Jin RN who made Dr Fabien Araya aware  Tylenol order received and administered   Will monitor effectiveness

## 2018-04-19 NOTE — PROGRESS NOTES
Patient arrived on unit  Stated she has been having some R sided back pain and SOB on exertion  Patient seen by Dr Yessenia Curry yesterday and he is aware  Patient to have another lung biopsy tomorrow at IR and her hydration is scheduled for after biopsy in IR  Patient aware and understands  Cleared for chemotherapy as per lab parameters on orders

## 2018-04-19 NOTE — PROGRESS NOTES
Patient tolerated chemotherapy  Denies any discomforts  Aware of next appointment for hydration  Left unit in stable condition with daughter   AVS given to patient

## 2018-04-19 NOTE — PLAN OF CARE
Problem: Potential for Falls  Goal: Patient will remain free of falls  INTERVENTIONS:  - Assess patient frequently for physical needs  -  Identify cognitive and physical deficits and behaviors that affect risk of falls    -  [place] fall precautions as indicated by assessment   - Educate patient/family on patient safety including physical limitations  - Instruct patient to call for assistance with activity based on assessment  - Modify environment to reduce risk of injury  - Consider OT/PT consult to assist with strengthening/mobility   Outcome: Progressing

## 2018-04-20 ENCOUNTER — HOSPITAL ENCOUNTER (OUTPATIENT)
Dept: INFUSION CENTER | Facility: CLINIC | Age: 83
Discharge: HOME | End: 2018-04-20

## 2018-04-20 ENCOUNTER — HOSPITAL ENCOUNTER (OUTPATIENT)
Dept: RADIOLOGY | Facility: HOSPITAL | Age: 83
Discharge: HOME | End: 2018-04-20
Attending: INTERNAL MEDICINE | Admitting: RADIOLOGY
Payer: MEDICARE

## 2018-04-20 VITALS
WEIGHT: 165 LBS | HEART RATE: 60 BPM | HEIGHT: 62 IN | TEMPERATURE: 99.6 F | BODY MASS INDEX: 30.36 KG/M2 | RESPIRATION RATE: 18 BRPM | OXYGEN SATURATION: 97 % | DIASTOLIC BLOOD PRESSURE: 65 MMHG | SYSTOLIC BLOOD PRESSURE: 115 MMHG

## 2018-04-20 DIAGNOSIS — C34.32 MALIGNANT NEOPLASM OF LOWER LOBE OF LEFT LUNG (HCC): ICD-10-CM

## 2018-04-20 PROCEDURE — 88333 PATH CONSLTJ SURG CYTO XM 1: CPT | Performed by: PATHOLOGY

## 2018-04-20 PROCEDURE — 88305 TISSUE EXAM BY PATHOLOGIST: CPT | Performed by: PATHOLOGY

## 2018-04-20 PROCEDURE — 99152 MOD SED SAME PHYS/QHP 5/>YRS: CPT | Performed by: RADIOLOGY

## 2018-04-20 PROCEDURE — 77012 CT SCAN FOR NEEDLE BIOPSY: CPT | Performed by: RADIOLOGY

## 2018-04-20 PROCEDURE — 88381 MICRODISSECTION MANUAL: CPT

## 2018-04-20 PROCEDURE — 77012 CT SCAN FOR NEEDLE BIOPSY: CPT

## 2018-04-20 PROCEDURE — 32400 NEEDLE BIOPSY CHEST LINING: CPT

## 2018-04-20 PROCEDURE — 32405 PR BIOPSY LUNG/MEDIASTINUM PERCUTANEOUS NEEDLE: CPT | Performed by: RADIOLOGY

## 2018-04-20 PROCEDURE — 88360 TUMOR IMMUNOHISTOCHEM/MANUAL: CPT

## 2018-04-20 PROCEDURE — 99153 MOD SED SAME PHYS/QHP EA: CPT

## 2018-04-20 PROCEDURE — 99152 MOD SED SAME PHYS/QHP 5/>YRS: CPT

## 2018-04-20 RX ORDER — MIDAZOLAM HYDROCHLORIDE 1 MG/ML
INJECTION INTRAMUSCULAR; INTRAVENOUS CODE/TRAUMA/SEDATION MEDICATION
Status: COMPLETED | OUTPATIENT
Start: 2018-04-20 | End: 2018-04-20

## 2018-04-20 RX ORDER — FENTANYL CITRATE 50 UG/ML
INJECTION, SOLUTION INTRAMUSCULAR; INTRAVENOUS CODE/TRAUMA/SEDATION MEDICATION
Status: COMPLETED | OUTPATIENT
Start: 2018-04-20 | End: 2018-04-20

## 2018-04-20 RX ORDER — SODIUM CHLORIDE 9 MG/ML
75 INJECTION, SOLUTION INTRAVENOUS CONTINUOUS
Status: DISCONTINUED | OUTPATIENT
Start: 2018-04-20 | End: 2018-04-21 | Stop reason: HOSPADM

## 2018-04-20 RX ORDER — SODIUM CHLORIDE 9 MG/ML
500 INJECTION, SOLUTION INTRAVENOUS CONTINUOUS
Status: DISPENSED | OUTPATIENT
Start: 2018-04-21 | End: 2018-04-23

## 2018-04-20 RX ORDER — ACETAMINOPHEN 325 MG/1
975 TABLET ORAL ONCE
Status: DISCONTINUED | OUTPATIENT
Start: 2018-04-20 | End: 2018-04-21 | Stop reason: HOSPADM

## 2018-04-20 RX ADMIN — MIDAZOLAM 1 MG: 1 INJECTION INTRAMUSCULAR; INTRAVENOUS at 08:30

## 2018-04-20 RX ADMIN — FENTANYL CITRATE 50 MCG: 50 INJECTION INTRAMUSCULAR; INTRAVENOUS at 08:38

## 2018-04-20 RX ADMIN — FENTANYL CITRATE 50 MCG: 50 INJECTION INTRAMUSCULAR; INTRAVENOUS at 08:30

## 2018-04-20 RX ADMIN — SODIUM CHLORIDE 75 ML/HR: 0.9 INJECTION, SOLUTION INTRAVENOUS at 10:25

## 2018-04-20 RX ADMIN — MIDAZOLAM 1 MG: 1 INJECTION INTRAMUSCULAR; INTRAVENOUS at 08:38

## 2018-04-20 NOTE — PROGRESS NOTES
Assumed care of patient at this time  Report received from [address] in [address]  Patient in supine position with HOB elevated to 60 degrees  In NAD  Noted tolerated light snack PO  Pt's  [place] at bedside

## 2018-04-20 NOTE — BRIEF OP NOTE (RAD/CATH)
CT NEEDLE BIOPSY LUNG  Procedure Note    PATIENT NAME: Jannie Hernandez  : 1935  MRN: [id number]     Pre-op Diagnosis:   1  Malignant neoplasm of lower lobe of left lung (HCC)      Post-op Diagnosis:   1   Malignant neoplasm of lower lobe of left lung [facility])        Surgeon:   Maury Jurado MD  Assistants:     No qualified resident was available, Resident is only observing    Estimated Blood Loss: none  Findings: CT guided core biopsy of left pleural metastasis for genetic testing    Specimens: 18 gauge core x 5    Complications:  none    Anesthesia: Conscious sedation and Local    Maury Jurado MD     Date: 2018  Time: 9:00 AM

## 2018-04-20 NOTE — DISCHARGE INSTRUCTIONS
Needle Biopsy of the Lung    WHAT YOU NEED TO KNOW:  A needle biopsy of the lung is a procedure to remove cells or tissue from your lung  You may have a fine needle aspiration biopsy (FNAB), or a core needle biopsy (CNB)  A FNAB is used to remove cells through a thin needle  CNB uses a thicker needle to remove lung tissue  The samples are collected and tested for inflammation, infection, or cancer  DISCHARGE INSTRUCTIONS:   Resume your normal diet  Small sips of flat soda will help with nausea  Limit your activity for 24 hours  Wound Care:      - Remove band aid in 24 hours      Contact Interventional Radiology at [phone] [place] PATIENTS: Contact Interventional Radiology at [phone]) [place] PATIENTS: Contact Interventional Radiology at [phone]) if any of the following occur:    - You have a fever greater than 101*    - You cough up large amounts of bright red blood     - You have chest pain with breathing    - You have shortness of breath    -You have persistent nausea and vomiting    - You have pus, redness or swelling around your biopsy site    - You have questions or concerns about your condition or care

## 2018-04-21 ENCOUNTER — HOSPITAL ENCOUNTER (OUTPATIENT)
Dept: INFUSION CENTER | Facility: HOSPITAL | Age: 83
Discharge: HOME | End: 2018-04-21
Payer: MEDICARE

## 2018-04-21 VITALS
HEART RATE: 60 BPM | TEMPERATURE: 98.2 F | RESPIRATION RATE: 18 BRPM | DIASTOLIC BLOOD PRESSURE: 80 MMHG | SYSTOLIC BLOOD PRESSURE: 140 MMHG

## 2018-04-21 PROCEDURE — 96360 HYDRATION IV INFUSION INIT: CPT

## 2018-04-21 PROCEDURE — 96361 HYDRATE IV INFUSION ADD-ON: CPT

## 2018-04-21 RX ADMIN — SODIUM CHLORIDE 500 ML/HR: 0.9 INJECTION, SOLUTION INTRAVENOUS at 12:11

## 2018-04-21 NOTE — PLAN OF CARE
Problem: Potential for Falls  Goal: Patient will remain free of falls  INTERVENTIONS:  - Assess patient frequently for physical needs  -  Identify cognitive and physical deficits and behaviors that affect risk of falls    -  [place] fall precautions as indicated by assessment   - Educate patient/family on patient safety including physical limitations  - Instruct patient to call for assistance with activity based on assessment  - Modify environment to reduce risk of injury  - Consider OT/PT consult to assist with strengthening/mobility   Outcome: Progressing

## 2018-04-23 ENCOUNTER — HOSPITAL ENCOUNTER (OUTPATIENT)
Dept: INFUSION CENTER | Facility: CLINIC | Age: 83
Discharge: HOME | End: 2018-04-23
Payer: MEDICARE

## 2018-04-23 VITALS
SYSTOLIC BLOOD PRESSURE: 131 MMHG | TEMPERATURE: 97.9 F | DIASTOLIC BLOOD PRESSURE: 86 MMHG | RESPIRATION RATE: 18 BRPM | HEART RATE: 58 BPM

## 2018-04-23 PROCEDURE — 96361 HYDRATE IV INFUSION ADD-ON: CPT

## 2018-04-23 PROCEDURE — 96360 HYDRATION IV INFUSION INIT: CPT

## 2018-04-23 RX ADMIN — SODIUM CHLORIDE 1000 ML: 0.9 INJECTION, SOLUTION INTRAVENOUS at 14:32

## 2018-04-23 NOTE — PROGRESS NOTES
Patient arrived for hydration today  Tolerated well  Has no future appts, is waiting on biopsy results

## 2018-04-23 NOTE — PLAN OF CARE
Problem: Potential for Falls  Goal: Patient will remain free of falls  INTERVENTIONS:  - Assess patient frequently for physical needs  -  Identify cognitive and physical deficits and behaviors that affect risk of falls    -  [place] fall precautions as indicated by assessment   - Educate patient/family on patient safety including physical limitations  - Instruct patient to call for assistance with activity based on assessment  - Modify environment to reduce risk of injury  - Consider OT/PT consult to assist with strengthening/mobility   Outcome: Progressing

## 2018-04-24 NOTE — PLAN OF CARE
Problem: Potential for Falls  Goal: Patient will remain free of falls  INTERVENTIONS:  - Assess patient frequently for physical needs  -  Identify cognitive and physical deficits and behaviors that affect risk of falls    -  [place] fall precautions as indicated by assessment   - Educate patient/family on patient safety including physical limitations  - Instruct patient to call for assistance with activity based on assessment  - Modify environment to reduce risk of injury  - Consider OT/PT consult to assist with strengthening/mobility   Outcome: [facility]ing [facility]    Progress Note    Jorge Alberto Chou Patient Status:  Inpatient    3/1/1925 MRN [id number]   Location [facility] 2W/SW Attending Marina Woodard MD   Hosp Day # 2 PCP Greg Pascual MD     Subjective:     Constitutional: P have palliative care consult           Results:     Lab Results  Component Value Date   WBC 16.1 (H) 04/24/2018   HGB 7.9 (L) 04/24/2018   HCT 24.6 (L) 04/24/2018    04/24/2018   CREATSERUM 1.39 04/24/2018   BUN 48 (H) 04/24/2018    (L) 04/24/ branches; 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 Patient rotation towards the right limits evaluation. 2. Stable cardiomegaly with dual chamber pacemaker. No acute CHF.  3. Partial

## 2018-04-25 LAB — SCAN RESULT: NORMAL

## 2018-05-10 ENCOUNTER — APPOINTMENT (OUTPATIENT)
Dept: RADIOLOGY | Facility: MEDICAL CENTER | Age: 83
End: 2018-05-10
Payer: MEDICARE

## 2018-05-10 ENCOUNTER — APPOINTMENT (OUTPATIENT)
Dept: LAB | Facility: CLINIC | Age: 83
End: 2018-05-10
Payer: MEDICARE

## 2018-05-10 ENCOUNTER — OFFICE VISIT (OUTPATIENT)
Dept: HEMATOLOGY ONCOLOGY | Facility: CLINIC | Age: 83
End: 2018-05-10
Payer: MEDICARE

## 2018-05-10 VITALS
WEIGHT: 162 LBS | SYSTOLIC BLOOD PRESSURE: 132 MMHG | BODY MASS INDEX: 27.66 KG/M2 | HEIGHT: 64 IN | TEMPERATURE: 97.9 F | HEART RATE: 80 BPM | DIASTOLIC BLOOD PRESSURE: 80 MMHG | RESPIRATION RATE: 16 BRPM | OXYGEN SATURATION: 93 %

## 2018-05-10 DIAGNOSIS — C34.90 MALIGNANT NEOPLASM OF UNSPECIFIED PART OF UNSPECIFIED BRONCHUS OR LUNG (HCC): ICD-10-CM

## 2018-05-10 DIAGNOSIS — R06.02 SHORTNESS OF BREATH: ICD-10-CM

## 2018-05-10 DIAGNOSIS — C34.32 MALIGNANT NEOPLASM OF LOWER LOBE OF LEFT LUNG (HCC): Primary | ICD-10-CM

## 2018-05-10 DIAGNOSIS — C34.32 MALIGNANT NEOPLASM OF LOWER LOBE OF LEFT LUNG (HCC): ICD-10-CM

## 2018-05-10 LAB
ALBUMIN SERPL BCP-MCNC: 3.6 G/DL (ref 3.5–5.7)
ALP SERPL-CCNC: 95 U/L (ref 46–116)
ALT SERPL W P-5'-P-CCNC: 28 U/L (ref 12–78)
ANION GAP SERPL CALCULATED.3IONS-SCNC: 15 MMOL/L (ref 4–13)
ANISOCYTOSIS BLD QL SMEAR: PRESENT
AST SERPL W P-5'-P-CCNC: 30 U/L (ref 5–45)
BASOPHILS # BLD AUTO: 0 THOUSAND/UL (ref 0–0.1)
BASOPHILS NFR MAR MANUAL: 0 % (ref 0–1)
BILIRUB SERPL-MCNC: 0.7 MG/DL (ref 0.2–1)
BUN SERPL-MCNC: 13 MG/DL (ref 5–25)
CALCIUM SERPL-MCNC: 9.9 MG/DL (ref 8.3–10.1)
CHLORIDE SERPL-SCNC: 104 MMOL/L (ref 98–108)
CO2 SERPL-SCNC: 24 MMOL/L (ref 21–32)
CREAT SERPL-MCNC: 0.8 MG/DL (ref 0.6–1.3)
EOSINOPHIL # BLD AUTO: 0.08 THOUSAND/UL (ref 0–0.61)
EOSINOPHIL NFR BLD MANUAL: 1 % (ref 0–6)
ERYTHROCYTE [DISTWIDTH] IN BLOOD BY AUTOMATED COUNT: 14.1 % (ref 11.6–15.1)
GFR SERPL CREATININE-BSD FRML MDRD: 69 ML/MIN/1.73SQ M
GLUCOSE SERPL-MCNC: 204 MG/DL (ref 65–140)
HCT VFR BLD AUTO: 45.6 % (ref 34.8–46.1)
HGB BLD-MCNC: 14.2 G/DL (ref 11.5–15.4)
LYMPHOCYTES # BLD AUTO: 1.8 THOUSAND/UL (ref 0.6–4.47)
LYMPHOCYTES # BLD AUTO: 24 % (ref 14–44)
MCH RBC QN AUTO: 29.3 PG (ref 26.8–34.3)
MCHC RBC AUTO-ENTMCNC: 31.2 G/DL (ref 31.4–37.4)
MCV RBC AUTO: 94 FL (ref 82–98)
MONOCYTES # BLD AUTO: 0.3 THOUSAND/UL (ref 0–1.22)
MONOCYTES NFR BLD AUTO: 4 % (ref 4–12)
NEUTS BAND NFR BLD MANUAL: 11 % (ref 0–8)
NEUTS SEG # BLD: 5.25 THOUSAND/UL (ref 1.81–6.82)
NEUTS SEG NFR BLD AUTO: 59 % (ref 43–75)
PLATELET # BLD AUTO: 348 THOUSANDS/UL (ref 149–390)
PLATELET BLD QL SMEAR: ADEQUATE
PMV BLD AUTO: 7.5 FL (ref 8.9–12.7)
POTASSIUM SERPL-SCNC: 4.2 MMOL/L (ref 3.5–5.3)
PROT SERPL-MCNC: 7.5 G/DL (ref 6.4–8.2)
RBC # BLD AUTO: 4.86 MILLION/UL (ref 3.81–5.12)
SODIUM SERPL-SCNC: 143 MMOL/L (ref 136–145)
TOTAL CELLS COUNTED SPEC: 100
VARIANT LYMPHS # BLD AUTO: 1 % (ref 0–0)
WBC # BLD AUTO: 7.5 THOUSAND/UL (ref 4.31–10.16)
WBC NRBC COR # BLD: 7.5 THOUSAND/UL (ref 4.31–10.16)

## 2018-05-10 PROCEDURE — 85027 COMPLETE CBC AUTOMATED: CPT

## 2018-05-10 PROCEDURE — 71046 X-RAY EXAM CHEST 2 VIEWS: CPT

## 2018-05-10 PROCEDURE — 85007 BL SMEAR W/DIFF WBC COUNT: CPT

## 2018-05-10 PROCEDURE — 80053 COMPREHEN METABOLIC PANEL: CPT

## 2018-05-10 PROCEDURE — 99215 OFFICE O/P EST HI 40 MIN: CPT | Performed by: INTERNAL MEDICINE

## 2018-05-10 PROCEDURE — 36415 COLL VENOUS BLD VENIPUNCTURE: CPT

## 2018-05-10 RX ORDER — CYANOCOBALAMIN 1000 UG/ML
1000 INJECTION INTRAMUSCULAR; SUBCUTANEOUS ONCE
Status: DISCONTINUED | OUTPATIENT
Start: 2018-05-11 | End: 2018-05-11 | Stop reason: ALTCHOICE

## 2018-05-10 RX ORDER — SODIUM CHLORIDE 9 MG/ML
20 INJECTION, SOLUTION INTRAVENOUS CONTINUOUS
Status: DISCONTINUED | OUTPATIENT
Start: 2018-05-11 | End: 2018-05-11 | Stop reason: ALTCHOICE

## 2018-05-10 NOTE — PROGRESS NOTES
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 and studding of the left pleura in a patient who never smoked in her life when she presented in July 2017, she was treated with Alimta /carboplatin until November 2017 for total of 6 cycles and continued on maintenance Alimta with excellent response however she has progression of disease in the left pleura area in April 2018 with dyspnea, repeat biopsy confirmed adenocarcinoma the lung, negative for targeted mutation such as EGFR, ALK gene rearrangement, Ross 1, B Darian, PD L1 expression of 10%  She was re-initiated on Alimta 500 milligram/meter squared , carboplatin AUC 5 for cycle 1  And after having the results of the PD L1 expression of 10% will add Pembrolizumab 200 mg flat dose every 3 weeks with Alimta / carboplatin hoping to have response   I will order chest x-ray to rule out increasing pleural effusion or disease, she has more disease we might consider palliative radiation therapy to the pleural based large mass  Vitamin B12 1000 mcg with every chemotherapy  Folic acid 1 mg p o   Daily  She is not using the pain medicine          HPI: 70-year-old  female who presented to [facility] in May 2017 with dyspnea for the past 4-5 days and pleurisy, with occasional dry cough scan of the thorax showed no evidence of PE, it showed a large loculated left side pleural effusion with nodularity concerning for metastatic disease, there is a concern for a mass versus pneumonia in the collapsed left lower lobe lung underwent left thoracentesis yielding 1 4 L of bloody fluid, pathology showed adenocarcinoma, positive for TTF-1, CK 7 most likely consistent with non-small cell lung cancer patient had a history of left-sided breast cancer in 1992 status post mastectomy she told me she had told lymph node involvement, she was not treated with either chemotherapy, radiation therapy or hormonal therapy has extensive family history of cancer, sister was diagnosed with breast cancer at age 36, another sister was diagnosed with breast cancer at age 72, a brother diagnosed with pancreatic cancer and another brother with lung cancer niece was found to have BRCA gene mutation    sheis allergic to sulfa, atorvastatin, latex     previous therapy: Carboplatin AUC 5, Alimta 500 mg/m² every 3 weeks initiated in July 2017, she finished 6 cycles out of 6 in November 2017          Maintenance Alimta from November 2017 until April 2018 when she had progression of disease  She underwent core biopsy, she was found to have negative EGFR mutation, Ross 1 mutation, B Darian mutation, ALK gene rearrangement, PDL-1 expression of 10%, no evidence of BRCA 1 /2 mutation      Interval History:        Previous Treatment:         Test Results:    Imaging: Xr Chest Pa & Lateral    Result Date: 4/17/2018  Narrative: CHEST INDICATION:   J90: Pleural effusion, not elsewhere classified  Shortness of breath  On chemotherapy for lung cancer  History of breast cancer  Former smoker  COMPARISON:  1/27/2018 EXAM PERFORMED/VIEWS:  XR CHEST PA & LATERAL  The frontal view was performed utilizing dual energy radiographic technique  FINDINGS: Cardiomediastinal silhouette appears stable  Studding of the pleura in the left hemithorax in keeping with pleural carcinomatosis and left pleural effusion essentially unchanged  Osseous structures appear within normal limits for patient age  Impression: Studding of the pleura in the left hemithorax in keeping with pleural carcinomatosis and left pleural effusion essentially unchanged  Workstation performed: [id number]     Ct Needle Biopsy Lung    Result Date: 4/20/2018  Narrative: CT-guided lung biopsy Clinical History: Adenocarcinoma of lung origin requiring genetic testing  Previous sample was inadequate  Procedure: After explaining the risks and benefits of the procedure to the patient, informed consent was obtained  CT was used to localize the left lower lobe pleural mass   Radiation dose length product (DLP) for this visit:  1222 38 mGy-cm   This examination, like all CT scans performed in the [facility], was performed utilizing techniques to minimize radiation dose exposure, including the use of iterative reconstruction and automated exposure control  The overlying skin was prepped and draped in the usual sterile fashion  Local anesthesia was obtained with a 1% lidocaine solution  Using CT guidance, a 17-gauge coaxial needle was advance  5 passes with a 18 gauge core biopsy needle were performed  The  preliminary interpretation is malignant cells present   The patient tolerated the procedure well and left the department in stable condition  Impression: Impression: Successful biopsy of the left lower lobe pleural mass   Workstation performed: [id number]       Labs:   Lab Results   Component Value Date    WBC 7 50 05/10/2018    HGB 14 2 05/10/2018    HCT 45 6 05/10/2018    MCV 94 05/10/2018     05/10/2018     Lab Results   Component Value Date     05/10/2018    K 4 2 05/10/2018     05/10/2018    CO2 24 05/10/2018    ANIONGAP 15 (H) 05/10/2018    BUN 13 05/10/2018    CREATININE 0 80 05/10/2018    GLUCOSE 204 (H) 05/10/2018    GLUF 101 (H) 04/18/2018    CALCIUM 9 9 05/10/2018    AST 30 05/10/2018    ALT 28 05/10/2018    ALKPHOS 95 05/10/2018    PROT 7 5 05/10/2018    BILITOT 0 70 05/10/2018    EGFR 69 05/10/2018       No results found for: IRON, TIBC, FERRITIN    No results found for: [id number]      ROS:   Review of Systems   Constitutional: Positive for unexpected weight change ( 3 lb weight loss)  Negative for activity change, appetite change, diaphoresis, fatigue and fever     HENT: Negative for facial swelling, hearing loss, rhinorrhea, sinus pain, sinus pressure, sneezing, sore throat and tinnitus  Eyes: Negative for photophobia, pain, discharge, redness, itching and visual disturbance  Respiratory: Positive for shortness of breath ( exertional dyspnea)  Negative for apnea and chest tightness  Cardiovascular: Negative for chest pain, palpitations and leg swelling  Gastrointestinal: Negative for abdominal distention, abdominal pain, blood in stool, constipation, diarrhea, nausea, rectal pain and vomiting  Endocrine: Negative for cold intolerance, heat intolerance, polydipsia and polyphagia  Genitourinary: Negative for difficulty urinating, dyspareunia, frequency, hematuria, pelvic pain and urgency  Musculoskeletal: Negative for arthralgias, back pain, gait problem, joint swelling and myalgias  Skin: Negative for color change, pallor and rash  Allergic/Immunologic: Negative for environmental allergies and food allergies  Neurological: Negative for dizziness, tremors, seizures, syncope, speech difficulty, numbness and headaches  Hematological: Negative for adenopathy  Does not bruise/bleed easily  Psychiatric/Behavioral: Negative for agitation, confusion, dysphoric mood, hallucinations and suicidal ideas  Current Medications: Reviewed  Allergies: Reviewed  PMH/FH/SH:  Reviewed      Physical Exam:    Body surface area is 1 79 meters squared  Wt Readings from Last 3 Encounters:   05/10/18 73 5 kg (162 lb)   04/20/18 74 8 kg (165 lb)   04/19/18 74 4 kg (164 lb 0 4 oz)        Temp Readings from Last 3 Encounters:   05/10/18 97 9 °F (36 6 °C) (Tympanic)   04/23/18 97 9 °F (36 6 °C) (Tympanic)   04/21/18 98 2 °F (36 8 °C) (Temporal)        BP Readings from Last 3 Encounters:   05/10/18 132/80   04/23/18 131/86   04/21/18 140/80         Pulse Readings from Last 3 Encounters:   05/10/18 80   04/23/18 58   04/21/18 60        Physical Exam   Constitutional: She is oriented to person, place, and time   She appears well-developed and well-nourished  No distress  HENT:   Head: Normocephalic and atraumatic  Mouth/Throat: Oropharynx is clear and moist  No oropharyngeal exudate  Eyes: Conjunctivae and EOM are normal  Pupils are equal, round, and reactive to light  Neck: Normal range of motion  Neck supple  No tracheal deviation present  No thyromegaly present  Cardiovascular: Normal rate and regular rhythm  Exam reveals no gallop and no friction rub  No murmur heard  Pulmonary/Chest: Effort normal  No respiratory distress  She has no wheezes  She has no rales  She exhibits no tenderness  Decreased breath sounds in the left base   Abdominal: Soft  Bowel sounds are normal  She exhibits no distension and no mass  There is no tenderness  There is no rebound and no guarding  Musculoskeletal: Normal range of motion  Lymphadenopathy:     She has no cervical adenopathy  Neurological: She is alert and oriented to person, place, and time  Skin: Skin is warm and dry  No rash noted  She is not diaphoretic  No erythema  No pallor  Psychiatric: She has a normal mood and affect  Her behavior is normal  Judgment and thought content normal    Vitals reviewed  Goals and Barriers:  Current Goal: Minimize effects of disease  Barriers: None  Patient's Capacity to Self Care:  Patient is able to self care      Code Status: [unfilled]

## 2018-05-10 NOTE — LETTER
May 10, 2018     [facility] [id number]    Patient: Rupal Cruz   YOB: 1935   Date of Visit: 5/10/2018       Dear Dr Ivania Trujillo: Thank you for referring Zuleykaradha Chen to me for evaluation  Below are my notes for this consultation  If you have questions, please do not hesitate to call me  I look forward to following your patient along with you  Sincerely,        Raisa Clarke MD        CC: No Recipients  Raisa Clarke MD  5/10/2018  4:57 PM  Sign at close encounter  Hematology Outpatient Follow - Up Note  Rupal Cruz 80 y o  female MRN: @ Encounter: [id number]        Date:  5/10/2018        Assessment/ Plan:   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 effusion and studding of the left pleura in a patient who never smoked in her life when she presented in July 2017, she was treated with Alimta /carboplatin until November 2017 for total of 6 cycles and continued on maintenance Alimta with excellent response however she has progression of disease in the left pleura area in April 2018 with dyspnea, repeat biopsy confirmed adenocarcinoma the lung, negative for targeted mutation such as EGFR, ALK gene rearrangement, Ross 1, B Darian, PD L1 expression of 10%  She was re-initiated on Alimta 500 milligram/meter squared , carboplatin AUC 5 for cycle 1  And after having the results of the PD L1 expression of 10% will add Pembrolizumab 200 mg flat dose every 3 weeks with Alimta / carboplatin hoping to have response   I will order chest x-ray to rule out increasing pleural effusion or disease, she has more disease we might consider palliative radiation therapy to the pleural based large mass  Vitamin B12 1000 mcg with every chemotherapy  Folic acid 1 mg p o   Daily  She is not using the pain medicine          HPI: 80-year-old  female who presented to [facility] in May 2017 with dyspnea for the past 4-5 days and pleurisy, with occasional dry cough scan of the thorax showed no evidence of PE, it showed a large loculated left side pleural effusion with nodularity concerning for metastatic disease, there is a concern for a mass versus pneumonia in the collapsed left lower lobe lung underwent left thoracentesis yielding 1 4 L of bloody fluid, pathology showed adenocarcinoma, positive for TTF-1, CK 7 most likely consistent with non-small cell lung cancer patient had a history of left-sided breast cancer in 1992 status post mastectomy she told me she had told lymph node involvement, she was not treated with either chemotherapy, radiation therapy or hormonal therapy has extensive family history of cancer, sister was diagnosed with breast cancer at age 36, another sister was diagnosed with breast cancer at age 72, a brother diagnosed with pancreatic cancer and another brother with lung cancer niece was found to have BRCA gene mutation    sheis allergic to sulfa, atorvastatin, latex     previous therapy: Carboplatin AUC 5, Alimta 500 mg/m² every 3 weeks initiated in July 2017, she finished 6 cycles out of 6 in November 2017          Maintenance Alimta from November 2017 until April 2018 when she had progression of disease  She underwent core biopsy, she was found to have negative EGFR mutation, Ross 1 mutation, B Darian mutation, ALK gene rearrangement, PDL-1 expression of 10%, no evidence of BRCA 1 /2 mutation      Interval History:        Previous Treatment:         Test Results:    Imaging: Xr Chest Pa & Lateral    Result Date: 4/17/2018  Narrative: CHEST INDICATION:   J90: Pleural effusion, not elsewhere classified  Shortness of breath  On chemotherapy for lung cancer  History of breast cancer  Former smoker  COMPARISON:  1/27/2018 EXAM PERFORMED/VIEWS:  XR CHEST PA & LATERAL  The frontal view was performed utilizing dual energy radiographic technique   FINDINGS: Cardiomediastinal silhouette appears stable  Studding of the pleura in the left hemithorax in keeping with pleural carcinomatosis and left pleural effusion essentially unchanged  Osseous structures appear within normal limits for patient age  Impression: Studding of the pleura in the left hemithorax in keeping with pleural carcinomatosis and left pleural effusion essentially unchanged  Workstation performed: [id number]     Ct Needle Biopsy Lung    Result Date: 4/20/2018  Narrative: CT-guided lung biopsy Clinical History: Adenocarcinoma of lung origin requiring genetic testing  Previous sample was inadequate  Procedure: After explaining the risks and benefits of the procedure to the patient, informed consent was obtained  CT was used to localize the left lower lobe pleural mass   Radiation dose length product (DLP) for this visit:  1222 38 mGy-cm   This examination, like all CT scans performed in the [facility], was performed utilizing techniques to minimize radiation dose exposure, including the use of iterative reconstruction and automated exposure control  The overlying skin was prepped and draped in the usual sterile fashion  Local anesthesia was obtained with a 1% lidocaine solution  Using CT guidance, a 17-gauge coaxial needle was advance  5 passes with a 18 gauge core biopsy needle were performed  The  preliminary interpretation is malignant cells present   The patient tolerated the procedure well and left the department in stable condition  Impression: Impression: Successful biopsy of the left lower lobe pleural mass    Workstation performed: [id number]       Labs:   Lab Results   Component Value Date    WBC 7 50 05/10/2018    HGB 14 2 05/10/2018    HCT 45 6 05/10/2018    MCV 94 05/10/2018     05/10/2018     Lab Results   Component Value Date     05/10/2018    K 4 2 05/10/2018     05/10/2018    CO2 24 05/10/2018    ANIONGAP 15 (H) 05/10/2018    BUN 13 05/10/2018    CREATININE 0 80 05/10/2018 GLUCOSE 204 (H) 05/10/2018    GLUF 101 (H) 04/18/2018    CALCIUM 9 9 05/10/2018    AST 30 05/10/2018    ALT 28 05/10/2018    ALKPHOS 95 05/10/2018    PROT 7 5 05/10/2018    BILITOT 0 70 05/10/2018    EGFR 69 05/10/2018       No results found for: IRON, TIBC, FERRITIN    No results found for: [id number]      ROS:   Review of Systems   Constitutional: Positive for unexpected weight change ( 3 lb weight loss)  Negative for activity change, appetite change, diaphoresis, fatigue and fever  HENT: Negative for facial swelling, hearing loss, rhinorrhea, sinus pain, sinus pressure, sneezing, sore throat and tinnitus  Eyes: Negative for photophobia, pain, discharge, redness, itching and visual disturbance  Respiratory: Positive for shortness of breath ( exertional dyspnea)  Negative for apnea and chest tightness  Cardiovascular: Negative for chest pain, palpitations and leg swelling  Gastrointestinal: Negative for abdominal distention, abdominal pain, blood in stool, constipation, diarrhea, nausea, rectal pain and vomiting  Endocrine: Negative for cold intolerance, heat intolerance, polydipsia and polyphagia  Genitourinary: Negative for difficulty urinating, dyspareunia, frequency, hematuria, pelvic pain and urgency  Musculoskeletal: Negative for arthralgias, back pain, gait problem, joint swelling and myalgias  Skin: Negative for color change, pallor and rash  Allergic/Immunologic: Negative for environmental allergies and food allergies  Neurological: Negative for dizziness, tremors, seizures, syncope, speech difficulty, numbness and headaches  Hematological: Negative for adenopathy  Does not bruise/bleed easily  Psychiatric/Behavioral: Negative for agitation, confusion, dysphoric mood, hallucinations and suicidal ideas  Current Medications: Reviewed  Allergies: Reviewed  PMH/FH/SH:  Reviewed      Physical Exam:    Body surface area is 1 79 meters squared      Wt Readings from Last 3 Encounters:   05/10/18 73 5 kg (162 lb)   04/20/18 74 8 kg (165 lb)   04/19/18 74 4 kg (164 lb 0 4 oz)        Temp Readings from Last 3 Encounters:   05/10/18 97 9 °F (36 6 °C) (Tympanic)   04/23/18 97 9 °F (36 6 °C) (Tympanic)   04/21/18 98 2 °F (36 8 °C) (Temporal)        BP Readings from Last 3 Encounters:   05/10/18 132/80   04/23/18 131/86   04/21/18 140/80         Pulse Readings from Last 3 Encounters:   05/10/18 80   04/23/18 58   04/21/18 60        Physical Exam   Constitutional: She is oriented to person, place, and time  She appears well-developed and well-nourished  No distress  HENT:   Head: Normocephalic and atraumatic  Mouth/Throat: Oropharynx is clear and moist  No oropharyngeal exudate  Eyes: Conjunctivae and EOM are normal  Pupils are equal, round, and reactive to light  Neck: Normal range of motion  Neck supple  No tracheal deviation present  No thyromegaly present  Cardiovascular: Normal rate and regular rhythm  Exam reveals no gallop and no friction rub  No murmur heard  Pulmonary/Chest: Effort normal  No respiratory distress  She has no wheezes  She has no rales  She exhibits no tenderness  Decreased breath sounds in the left base   Abdominal: Soft  Bowel sounds are normal  She exhibits no distension and no mass  There is no tenderness  There is no rebound and no guarding  Musculoskeletal: Normal range of motion  Lymphadenopathy:     She has no cervical adenopathy  Neurological: She is alert and oriented to person, place, and time  Skin: Skin is warm and dry  No rash noted  She is not diaphoretic  No erythema  No pallor  Psychiatric: She has a normal mood and affect  Her behavior is normal  Judgment and thought content normal    Vitals reviewed  Goals and Barriers:  Current Goal: Minimize effects of disease  Barriers: None  Patient's Capacity to Self Care:  Patient is able to self care      Code Status: [unfilled]

## 2018-05-11 ENCOUNTER — HOSPITAL ENCOUNTER (OUTPATIENT)
Dept: RADIOLOGY | Facility: HOSPITAL | Age: 83
Discharge: HOME | End: 2018-05-11
Attending: INTERNAL MEDICINE | Admitting: RADIOLOGY
Payer: MEDICARE

## 2018-05-11 ENCOUNTER — HOSPITAL ENCOUNTER (OUTPATIENT)
Dept: INFUSION CENTER | Facility: CLINIC | Age: 83
Discharge: HOME | End: 2018-05-11

## 2018-05-11 VITALS
DIASTOLIC BLOOD PRESSURE: 67 MMHG | HEART RATE: 78 BPM | SYSTOLIC BLOOD PRESSURE: 142 MMHG | RESPIRATION RATE: 20 BRPM | OXYGEN SATURATION: 94 %

## 2018-05-11 DIAGNOSIS — J90 PLEURAL EFFUSION: ICD-10-CM

## 2018-05-11 DIAGNOSIS — J90 PLEURAL EFFUSION: Primary | ICD-10-CM

## 2018-05-11 PROCEDURE — 32555 ASPIRATE PLEURA W/ IMAGING: CPT | Performed by: RADIOLOGY

## 2018-05-11 PROCEDURE — 32555 ASPIRATE PLEURA W/ IMAGING: CPT

## 2018-05-11 RX ORDER — SODIUM CHLORIDE 9 MG/ML
20 INJECTION, SOLUTION INTRAVENOUS CONTINUOUS
Status: DISCONTINUED | OUTPATIENT
Start: 2018-05-14 | End: 2018-05-17 | Stop reason: HOSPADM

## 2018-05-11 RX ORDER — CYANOCOBALAMIN 1000 UG/ML
1000 INJECTION INTRAMUSCULAR; SUBCUTANEOUS ONCE
Status: COMPLETED | OUTPATIENT
Start: 2018-05-14 | End: 2018-05-14

## 2018-05-11 NOTE — SEDATION DOCUMENTATION
Called Dr Eva Gusman office for clarification on next step in the care for the patient  Talked to Doug Zaldivar RN from the office and the instructions for the patient was to go to her appointment at the \[facility]\"" Infusion center on Monday the 14th of May 2018 at 1300  Pt verbalized that she understood the instructions and her family was present as well

## 2018-05-12 ENCOUNTER — HOSPITAL ENCOUNTER (OUTPATIENT)
Dept: INFUSION CENTER | Facility: HOSPITAL | Age: 83
Discharge: HOME | End: 2018-05-12

## 2018-05-14 ENCOUNTER — HOSPITAL ENCOUNTER (OUTPATIENT)
Dept: INFUSION CENTER | Facility: CLINIC | Age: 83
Discharge: HOME | End: 2018-05-14
Payer: MEDICARE

## 2018-05-14 VITALS
HEIGHT: 62 IN | HEART RATE: 79 BPM | DIASTOLIC BLOOD PRESSURE: 90 MMHG | WEIGHT: 165.34 LBS | SYSTOLIC BLOOD PRESSURE: 160 MMHG | RESPIRATION RATE: 18 BRPM | OXYGEN SATURATION: 96 % | BODY MASS INDEX: 30.43 KG/M2 | TEMPERATURE: 97.8 F

## 2018-05-14 LAB — SCAN RESULT: NORMAL

## 2018-05-14 PROCEDURE — 96367 TX/PROPH/DG ADDL SEQ IV INF: CPT

## 2018-05-14 PROCEDURE — 96361 HYDRATE IV INFUSION ADD-ON: CPT

## 2018-05-14 PROCEDURE — 96413 CHEMO IV INFUSION 1 HR: CPT

## 2018-05-14 PROCEDURE — 96375 TX/PRO/DX INJ NEW DRUG ADDON: CPT

## 2018-05-14 PROCEDURE — 96372 THER/PROPH/DIAG INJ SC/IM: CPT

## 2018-05-14 PROCEDURE — 96417 CHEMO IV INFUS EACH ADDL SEQ: CPT

## 2018-05-14 RX ORDER — DIPHENHYDRAMINE HYDROCHLORIDE 50 MG/ML
25 INJECTION INTRAMUSCULAR; INTRAVENOUS
Status: ACTIVE | OUTPATIENT
Start: 2018-05-14 | End: 2018-05-14

## 2018-05-14 RX ADMIN — SODIUM CHLORIDE 20 ML/HR: 0.9 INJECTION, SOLUTION INTRAVENOUS at 13:46

## 2018-05-14 RX ADMIN — CYANOCOBALAMIN 1000 MCG: 1000 INJECTION, SOLUTION INTRAMUSCULAR at 14:10

## 2018-05-14 RX ADMIN — SODIUM CHLORIDE 200 MG: 9 INJECTION, SOLUTION INTRAVENOUS at 14:25

## 2018-05-14 RX ADMIN — HYDROCORTISONE SODIUM SUCCINATE 50 MG: 100 INJECTION, POWDER, FOR SOLUTION INTRAMUSCULAR; INTRAVENOUS at 16:28

## 2018-05-14 RX ADMIN — FAMOTIDINE 20 MG: 10 INJECTION, SOLUTION INTRAVENOUS at 16:01

## 2018-05-14 RX ADMIN — CARBOPLATIN 487 MG: 10 INJECTION, SOLUTION INTRAVENOUS at 15:22

## 2018-05-14 RX ADMIN — SODIUM CHLORIDE 500 ML: 0.9 INJECTION, SOLUTION INTRAVENOUS at 16:00

## 2018-05-14 RX ADMIN — DIPHENHYDRAMINE HYDROCHLORIDE 25 MG: 50 INJECTION, SOLUTION INTRAMUSCULAR; INTRAVENOUS at 16:00

## 2018-05-14 RX ADMIN — SODIUM CHLORIDE 900 MG: 9 INJECTION, SOLUTION INTRAVENOUS at 15:01

## 2018-05-14 RX ADMIN — ONDANSETRON 16 MG: 2 INJECTION INTRAMUSCULAR; INTRAVENOUS at 13:46

## 2018-05-14 NOTE — PROGRESS NOTES
bp at this time 168/88, temp of 98 5  Pt states she is starting to feel a little better  Angie Callejas, RN notified of drug reaction  Per Carolina Raymond for today  Add carboplatin to allergies list   Observe pt in infusion center until symptoms resolve and then can d/c home

## 2018-05-14 NOTE — PROGRESS NOTES
Temp 97 5, bp 170/90  Pt ambulated to the bathroom without difficulty and states she feels better at this time  Pt wishing to go home  Pt escorted home with her friend

## 2018-05-14 NOTE — PROGRESS NOTES
Pt c/o throat itching and flushing  Carboplatin stopped immediately, NSS wide open running  Hypersensitivity kit initiated per protocol  /96  Will continue to follow

## 2018-05-14 NOTE — PLAN OF CARE
Problem: Potential for Falls  Goal: Patient will remain free of falls  INTERVENTIONS:  - Assess patient frequently for physical needs  -  Identify cognitive and physical deficits and behaviors that affect risk of falls    -  [place] fall precautions as indicated by assessment   - Educate patient/family on patient safety including physical limitations  - Instruct patient to call for assistance with activity based on assessment  - Modify environment to reduce risk of injury  - Consider OT/PT consult to assist with strengthening/mobility   Outcome: Progressing

## 2018-05-15 ENCOUNTER — TELEPHONE (OUTPATIENT)
Dept: HEMATOLOGY ONCOLOGY | Facility: CLINIC | Age: 83
End: 2018-05-15

## 2018-05-15 ENCOUNTER — HOSPITAL ENCOUNTER (OUTPATIENT)
Dept: INFUSION CENTER | Facility: CLINIC | Age: 83
Discharge: HOME | End: 2018-05-15
Payer: MEDICARE

## 2018-05-15 VITALS
DIASTOLIC BLOOD PRESSURE: 62 MMHG | RESPIRATION RATE: 18 BRPM | TEMPERATURE: 97.7 F | SYSTOLIC BLOOD PRESSURE: 128 MMHG | HEART RATE: 62 BPM

## 2018-05-15 DIAGNOSIS — R52 PAIN: Primary | ICD-10-CM

## 2018-05-15 PROCEDURE — 96360 HYDRATION IV INFUSION INIT: CPT

## 2018-05-15 PROCEDURE — 96361 HYDRATE IV INFUSION ADD-ON: CPT

## 2018-05-15 RX ORDER — OXYCODONE HYDROCHLORIDE AND ACETAMINOPHEN 5; 325 MG/1; MG/1
1 TABLET ORAL EVERY 6 HOURS PRN
Qty: 60 TABLET | Refills: 0 | Status: SHIPPED | OUTPATIENT
Start: 2018-05-15 | End: 2018-07-23 | Stop reason: ALTCHOICE

## 2018-05-15 RX ADMIN — SODIUM CHLORIDE 1000 ML: 0.9 INJECTION, SOLUTION INTRAVENOUS at 14:28

## 2018-05-15 NOTE — PROGRESS NOTES
Patient tolerated hydration well  Denies any discomfort  Pt is aware to return tomorrow   Refused AVS

## 2018-05-15 NOTE — PLAN OF CARE
Problem: Potential for Falls  Goal: Patient will remain free of falls  INTERVENTIONS:  - Assess patient frequently for physical needs  -  Identify cognitive and physical deficits and behaviors that affect risk of falls    -  [place] fall precautions as indicated by assessment   - Educate patient/family on patient safety including physical limitations  - Instruct patient to call for assistance with activity based on assessment  - Modify environment to reduce risk of injury  - Consider OT/PT consult to assist with strengthening/mobility   Outcome: Progressing

## 2018-05-16 ENCOUNTER — HOSPITAL ENCOUNTER (OUTPATIENT)
Dept: INFUSION CENTER | Facility: CLINIC | Age: 83
Discharge: HOME | End: 2018-05-16
Payer: MEDICARE

## 2018-05-16 VITALS
DIASTOLIC BLOOD PRESSURE: 80 MMHG | TEMPERATURE: 95.1 F | SYSTOLIC BLOOD PRESSURE: 132 MMHG | RESPIRATION RATE: 18 BRPM | HEART RATE: 58 BPM | OXYGEN SATURATION: 97 %

## 2018-05-16 PROCEDURE — 96360 HYDRATION IV INFUSION INIT: CPT

## 2018-05-16 PROCEDURE — 96361 HYDRATE IV INFUSION ADD-ON: CPT

## 2018-05-16 RX ADMIN — SODIUM CHLORIDE 1000 ML: 0.9 INJECTION, SOLUTION INTRAVENOUS at 14:02

## 2018-05-16 NOTE — PLAN OF CARE
Problem: Potential for Falls  Goal: Patient will remain free of falls  INTERVENTIONS:  - Assess patient frequently for physical needs  -  Identify cognitive and physical deficits and behaviors that affect risk of falls    -  [place] fall precautions as indicated by assessment   - Educate patient/family on patient safety including physical limitations  - Instruct patient to call for assistance with activity based on assessment  - Modify environment to reduce risk of injury  - Consider OT/PT consult to assist with strengthening/mobility   Outcome: Progressing

## 2018-05-17 ENCOUNTER — HOSPITAL ENCOUNTER (OUTPATIENT)
Dept: INFUSION CENTER | Facility: CLINIC | Age: 83
Discharge: HOME | End: 2018-05-17
Payer: MEDICARE

## 2018-05-17 VITALS
SYSTOLIC BLOOD PRESSURE: 116 MMHG | RESPIRATION RATE: 18 BRPM | HEART RATE: 86 BPM | TEMPERATURE: 97.4 F | DIASTOLIC BLOOD PRESSURE: 73 MMHG

## 2018-05-17 PROCEDURE — 96361 HYDRATE IV INFUSION ADD-ON: CPT

## 2018-05-17 PROCEDURE — 96360 HYDRATION IV INFUSION INIT: CPT

## 2018-05-17 RX ADMIN — SODIUM CHLORIDE 1000 ML: 0.9 INJECTION, SOLUTION INTRAVENOUS at 14:33

## 2018-05-18 DIAGNOSIS — R52 PAIN: Primary | ICD-10-CM

## 2018-05-18 RX ORDER — FENTANYL 12 UG/H
1 PATCH TRANSDERMAL
Qty: 10 PATCH | Refills: 0 | Status: SHIPPED | OUTPATIENT
Start: 2018-05-18 | End: 2018-06-18 | Stop reason: SDUPTHER

## 2018-05-29 ENCOUNTER — TELEPHONE (OUTPATIENT)
Dept: HEMATOLOGY ONCOLOGY | Facility: CLINIC | Age: 83
End: 2018-05-29

## 2018-05-31 ENCOUNTER — EVALUATION (OUTPATIENT)
Dept: PHYSICAL THERAPY | Facility: CLINIC | Age: 83
End: 2018-05-31
Payer: MEDICARE

## 2018-05-31 ENCOUNTER — OFFICE VISIT (OUTPATIENT)
Dept: HEMATOLOGY ONCOLOGY | Facility: CLINIC | Age: 83
End: 2018-05-31
Payer: MEDICARE

## 2018-05-31 VITALS
DIASTOLIC BLOOD PRESSURE: 78 MMHG | OXYGEN SATURATION: 96 % | RESPIRATION RATE: 18 BRPM | TEMPERATURE: 98.4 F | WEIGHT: 160 LBS | SYSTOLIC BLOOD PRESSURE: 122 MMHG | HEIGHT: 62 IN | HEART RATE: 72 BPM | BODY MASS INDEX: 29.44 KG/M2

## 2018-05-31 DIAGNOSIS — C34.32 MALIGNANT NEOPLASM OF LOWER LOBE OF LEFT LUNG (HCC): Primary | ICD-10-CM

## 2018-05-31 DIAGNOSIS — R42 DIZZINESS: Primary | ICD-10-CM

## 2018-05-31 DIAGNOSIS — H81.13 BPPV (BENIGN PAROXYSMAL POSITIONAL VERTIGO), BILATERAL: ICD-10-CM

## 2018-05-31 PROCEDURE — G8978 MOBILITY CURRENT STATUS: HCPCS | Performed by: PHYSICAL THERAPIST

## 2018-05-31 PROCEDURE — 99214 OFFICE O/P EST MOD 30 MIN: CPT | Performed by: INTERNAL MEDICINE

## 2018-05-31 PROCEDURE — G8979 MOBILITY GOAL STATUS: HCPCS | Performed by: PHYSICAL THERAPIST

## 2018-05-31 PROCEDURE — 97162 PT EVAL MOD COMPLEX 30 MIN: CPT | Performed by: PHYSICAL THERAPIST

## 2018-05-31 PROCEDURE — 97140 MANUAL THERAPY 1/> REGIONS: CPT | Performed by: PHYSICAL THERAPIST

## 2018-05-31 NOTE — PROGRESS NOTES
PT Evaluation     Today's date: 2018  Patient name: Rupal Cruz  : 1935  MRN: [id number]  Referring provider: Gege Gibbs DO  Dx:   Encounter Diagnoses   Name Primary?  Dizziness Yes    BPPV (benign paroxysmal positional vertigo), bilateral        Start Time: 1605  Stop Time: 1700  Total time in clinic (min): 55 minutes    Subjective Evaluation    History of Present Illness  Date of onset: 2018  Mechanism of injury: About 1 week ago was getting out of bed and fell back on her bed  [place] the room was spinning  The other day was cooking at the stove and felt dizzy, grabbed onto oven door, otherwise would have fallen, felt spinning sensation  currently using SPC and RW in her home  Was using prior to dizziness incident     Currently with active lung cancer, seeking treatment, underwent chemo since last year every three week  Now doing immune therapy  suspicions that the patch was causing dizziness, however dizziness onset with position changes in the doctors offices  currently not driving, was able to drive prior     Pain  Current pain ratin  At best pain ratin  At worst pain ratin  Location: [facility]  Quality: dull ache  Relieving factors: medications    Social Support  Steps to enter house: yes (no rail)  1  Stairs in house: yes (to the baseement, hasn't been down since )   12  Lives in: one-story house  Lives with: spouse      Diagnostic Tests  CT scan: normal (head and neck from ENT (Dr Kathleen Nova))  Treatments  Previous treatment: physical therapy  Patient Goals  Patient goals for therapy: improved balance  Patient goal: Resolve dizziness         Objective  PT/OT Neuro Exam       Dysequilibrium: Yes  Lightheadedness: Yes  Vertigo: Yes  Rocking or Swaying: Yes         Oscillopsia: NO  Diplopia: No  Motion sickness: No  Floating, Swimming, Disconnected: No    Exacerbation Factors:  Bending over: Yes  Turning Head: Yes  Rolling in bed: Yes  Walking: No, is careful, tries to avoid falls  Looking up: No  Supine to/from sitting: Yes  Optokinetic movement: No  Walking in busy environment: No uses WC in mall, daughter goes grocery shopping for her     Duration of Symptoms: couple of seconds  Frequency of symptoms: three times per week/worse in the AM     Concurrent Complaints:  Tinnitus:Yes, left year - "birds tweeting"  Aural Fullness: Yes  Known hearing loss:Yes - left side - spontaneous loss 6-7 years ago  Nausea, Vomiting: Not due to dizziness, but after chemo   Altered Vision: No  Poor Concentration: No  Memory Loss: No  Peripheral Neuropathy:No  Cervical Pain: Yes   Headache: No      PHYSICAL FINDINGS:  Oculomotor ROM :  Resting nystagmus: No  Gaze holding nystagmus No   Smooth pursuit Normal    Vertical Saccades:Normal  Horizontal Saccades:Normal  Convergence: Normal    Cover/Uncover/Crosscover Test: NT    Head thrust (room light): Normal    Dynamic Visual Acuity:NT  Dynamic Head: 20/  Static Head: 20/      MCTSIB:NT      DHI: 48  0-30 mild , 30-60 moderate,  severe disability      Positional testing: Right Left   [facility] subjective symptoms of dizziness  (+) upward left torsional   Lasting 30seconds   Roll test: Neg Neg   Epley maneuver x 1   Left [place]-hallpike - Denied subjective of  Dizziness slightly nystagmus  Epley maneuver x 1 performed     Cervical ROM:  Flexion: WNL  Extension: WNL  Right rotation:70  Left rotation: 80  Right lateral flexion:35  Left lateral flexion:30    mVBI: WNL          Assessment  Impairments: lacks appropriate home exercise program and safety issue  Other impairment: dizziness   Understanding of Dx/Px/POC: excellent  Goals  STG  1  Patient will present with negative natalie-hallpike within 3 visits or less  2  Patient will demonstrate a reduction in [facility] [address] [facility] [address] score to 30 or less within 4 weeks   3   Patient will be independent with hep within 4 weeks       Plan  Patient would benefit from: skilled physical therapy  Planned therapy interventions: balance, canalith repositioning, patient education, therapeutic activities, neuromuscular re-education, gait training and home exercise program  Frequency: 2x week  Duration in weeks: 4  Treatment plan discussed with: patient and family        Precautions: Lung CA - active chemo, Fall Risk, Dizziness     Daily Treatment Diary     Manual                                                                                   Exercise Diary                                                                                                                                                                                                                                                                                      Modalities                                                               Flowsheet Rows      Most Recent Value   PT/OT G-Codes   Current Score  43   Projected Score  71   FOTO information reviewed  Yes   Assessment Type  Evaluation   G code set  Mobility: Walking & Moving Around   Mobility: Walking and Moving Around Current Status ()  CL   Mobility: Walking and Moving Around Goal Status ()  CJ

## 2018-05-31 NOTE — PROGRESS NOTES
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 in a patient who never smoked in her life, molecular test came back negative for EGFR mutation, ALK gene rearrangement, Ross 1 mutation, PD L1 expression of 10%, no evidence of B Darian or her 2 mutation  She was treated initially with Alimta /carboplatin with excellent response as mentioned in the history of present illness and maintained on Alimta with progression of disease in April 2018 clinically and radiographically, repeat biopsy showed no mutational target  She was re-treated with Alimta/carboplatin with allergic reaction to carboplatin  1  Continue Pembrolizumab 200 mg flat dose every 3 weeks proceed with dose 2    2  Fentanyl patch 12 mcg every 72 hr, pain is 3/10 in intensity  3   Follow-up in 3 weeks with CBC, CMP and TSH          HPI: 80-year-old  female who presented to [facility] in [facility] 2017 with dyspnea for the past 4-5 days and pleurisy, with occasional dry cough scan of the thorax showed no evidence of PE, it showed a large loculated left side pleural effusion with nodularity concerning for metastatic disease, there is a concern for a mass versus pneumonia in the collapsed left lower lobe lung underwent left thoracentesis yielding 1 4 L of bloody fluid, pathology showed adenocarcinoma, positive for TTF-1, CK 7 most likely consistent with non-small cell lung cancer patient had a history of left-sided breast cancer in 1992 status post mastectomy she told me she had told lymph node involvement, she was not treated with either chemotherapy, radiation therapy or hormonal therapy has extensive family history of cancer, sister was diagnosed with breast cancer at age 36, another sister was diagnosed with breast cancer at age 72, a brother diagnosed with pancreatic cancer and another brother with lung cancer niece was found to have BRCA gene mutation    sheis allergic to sulfa, atorvastatin, latex    Previous therapy: Carboplatin AUC 5, Alimta 500 mg/m² every 3 weeks initiated in July 2017, she finished    6 cycles out of 6 in November 2017          Maintenance Alimta from November 2017 until April 2018 when she had progression of disease  She underwent core biopsy, she was found to have negative EGFR mutation, Ross 1 mutation, B Darian mutation, ALK gene rearrangement, PDL-1 expression of 10%, no evidence of BRCA 1 /2 mutation  Treated again with carboplatin / Alimta with allergic reaction to carboplatin  Initiated on Pembrolizumab 200 mg flat dose every 3 weeks the 1st dose in mid May 2018     Interval History: fentanyl patch 12 mcg every 3 days       ECOG score 1        Test Results:    Imaging: Xr Chest Pa & Lateral    Result Date: 5/11/2018  Narrative: CHEST INDICATION:   C34 32: Malignant neoplasm of lower lobe, left bronchus or lung  Ages-year-old woman with shortness of breath x1 week  History of lung cancer  COMPARISON:  Chest radiograph 4/17/2018  Chest CT 3/15/2018  EXAM PERFORMED/VIEWS:  XR CHEST PA & LATERAL FINDINGS: Cardiomediastinal silhouette appears unremarkable  Right lung is clear  Left base opacity and multiple pleural-based nodules are unchanged since 4/17/2018  Osseous structures appear within normal limits for patient age  Impression: Left base opacity, likely combination of effusion and atelectasis, and multiple left pleural nodules are unchanged since 4/17/2018  Workstation performed: [id number]     Ir Thoracentesis    Result Date: 5/11/2018  Narrative: Ultrasound-guided thoracentesis Clinical History: Symptomatic left pleural effusion Procedure: After explaining the risks and benefits of the procedure to the patient, informed consent was obtained  Ultrasound was used to localize the pleural effusion   The overlying skin was prepped and draped in usual sterile fashion and local anesthesia was obtained with the 1% lidocaine solution  ULTRASOUND evaluation shows a small loculated pleural effusion with increased echoes suggesting complex fluid or scar tissue  A 5-[employer] [employer] needle was advanced into the suspected effusion with positioning confirmed by ultrasound  No fluid could be aspirated indicating this is pleural scarring rather than fluid  The patient tolerated the procedure well and left the department in stable condition  Impression: Impression: Attempted thoracentesis demonstrates pleural scarring rather than fluid Workstation performed: [id number]       Labs:   Lab Results   Component Value Date    WBC 7 50 05/10/2018    HGB 14 2 05/10/2018    HCT 45 6 05/10/2018    MCV 94 05/10/2018     05/10/2018     Lab Results   Component Value Date     05/10/2018    K 4 2 05/10/2018     05/10/2018    CO2 24 05/10/2018    ANIONGAP 15 (H) 05/10/2018    BUN 13 05/10/2018    CREATININE 0 80 05/10/2018    GLUCOSE 204 (H) 05/10/2018    GLUF 101 (H) 04/18/2018    CALCIUM 9 9 05/10/2018    AST 30 05/10/2018    ALT 28 05/10/2018    ALKPHOS 95 05/10/2018    PROT 7 5 05/10/2018    BILITOT 0 70 05/10/2018    EGFR 69 05/10/2018       No results found for: IRON, TIBC, FERRITIN    No results found for: [id number]      ROS:   Review of Systems   Constitutional: Negative for activity change, appetite change, diaphoresis, fatigue, fever and unexpected weight change  HENT: Negative for facial swelling, hearing loss, rhinorrhea, sinus pain, sinus pressure, sneezing, sore throat and tinnitus  Eyes: Negative for photophobia, pain, discharge, redness, itching and visual disturbance  Respiratory: Positive for chest tightness ( on the left lateral chest area) and shortness of breath  Negative for apnea  Cardiovascular: Negative for chest pain, palpitations and leg swelling     Gastrointestinal: Negative for abdominal distention, abdominal pain, blood in stool, constipation, diarrhea, nausea, rectal pain and vomiting  Endocrine: Negative for cold intolerance, heat intolerance, polydipsia and polyphagia  Genitourinary: Negative for difficulty urinating, dyspareunia, frequency, hematuria, pelvic pain and urgency  Musculoskeletal: Negative for arthralgias, back pain, gait problem, joint swelling and myalgias  Skin: Negative for color change, pallor and rash  Allergic/Immunologic: Negative for environmental allergies and food allergies  Neurological: Negative for dizziness, tremors, seizures, syncope, speech difficulty, numbness and headaches  Hematological: Negative for adenopathy  Does not bruise/bleed easily  Psychiatric/Behavioral: Negative for agitation, confusion, dysphoric mood, hallucinations and suicidal ideas  Current Medications: Reviewed  Allergies: Reviewed  PMH/FH/SH:  Reviewed      Physical Exam:    Body surface area is 1 73 meters squared  Wt Readings from Last 3 Encounters:   05/31/18 72 6 kg (160 lb)   05/14/18 75 kg (165 lb 5 5 oz)   05/10/18 73 5 kg (162 lb)        Temp Readings from Last 3 Encounters:   05/31/18 98 4 °F (36 9 °C)   05/17/18 (!) 97 4 °F (36 3 °C) (Tympanic)   05/16/18 (!) 95 1 °F (35 1 °C) (Tympanic)        BP Readings from Last 3 Encounters:   05/31/18 122/78   05/17/18 116/73   05/16/18 132/80         Pulse Readings from Last 3 Encounters:   05/31/18 72   05/17/18 86   05/16/18 58        Physical Exam   Constitutional: She is oriented to person, place, and time  She appears well-developed and well-nourished  No distress  HENT:   Head: Normocephalic and atraumatic  Mouth/Throat: Oropharynx is clear and moist  No oropharyngeal exudate  Eyes: Conjunctivae and EOM are normal  Pupils are equal, round, and reactive to light  Neck: Normal range of motion  Neck supple  No tracheal deviation present  No thyromegaly present  Cardiovascular: Normal rate and regular rhythm    Exam reveals no gallop and no friction rub  No murmur heard  Pulmonary/Chest: Effort normal  No respiratory distress  She has no wheezes  She has rales ( dry crackles of the left lower base)  She exhibits no tenderness  Abdominal: Soft  Bowel sounds are normal  She exhibits no distension and no mass  There is no tenderness  There is no rebound and no guarding  Musculoskeletal: Normal range of motion  Lymphadenopathy:     She has no cervical adenopathy  Neurological: She is alert and oriented to person, place, and time  Skin: Skin is warm and dry  No rash noted  She is not diaphoretic  No erythema  No pallor  Psychiatric: She has a normal mood and affect  Her behavior is normal  Judgment and thought content normal    Vitals reviewed  Goals and Barriers:  Current Goal: Minimize effects of disease  Barriers: None  Patient's Capacity to Self Care:  Patient is able to self care      Code Status: [unfilled]

## 2018-05-31 NOTE — LETTER
May 31, 2018     Manuel Pizarro DO  [address] [facility] [address]    Patient: Obi Vaca   YOB: 1935   Date of Visit: 5/31/2018       Dear Dr Cecilia Ronquillo: Thank you for referring Noam Oleary to me for evaluation  Below are my notes for this consultation  If you have questions, please do not hesitate to call me  I look forward to following your patient along with you  Sincerely,        Twyla Davidson MD        CC: No Recipients  Twyla Davidson MD  5/31/2018  3:03 PM  Sign at close encounter  Hematology Outpatient Follow - Up Note  Obi Vaca 80 y o  female MRN: @ Encounter: [id number]        Date:  5/31/2018        Assessment/ Plan:   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 in a patient who never smoked in her life, molecular test came back negative for EGFR mutation, ALK gene rearrangement, Ross 1 mutation, PD L1 expression of 10%, no evidence of B Darian or her 2 mutation  She was treated initially with Alimta /carboplatin with excellent response as mentioned in the history of present illness and maintained on Alimta with progression of disease in April 2018 clinically and radiographically, repeat biopsy showed no mutational target  She was re-treated with Alimta/carboplatin with allergic reaction to carboplatin  1  Continue Pembrolizumab 200 mg flat dose every 3 weeks proceed with dose 2    2  Fentanyl patch 12 mcg every 72 hr, pain is 3/10 in intensity  3   Follow-up in 3 weeks with CBC, CMP and TSH          HPI: 44-year-old  female who presented to [facility] in May 2017 with dyspnea for the past 4-5 days and pleurisy, with occasional dry cough scan of the thorax showed no evidence of PE, it showed a large loculated left side pleural effusion with nodularity concerning for metastatic disease, there is a concern for a mass versus pneumonia in the collapsed left lower lobe lung underwent left thoracentesis yielding 1 4 L of bloody fluid, pathology showed adenocarcinoma, positive for TTF-1, CK 7 most likely consistent with non-small cell lung cancer patient had a history of left-sided breast cancer in 1992 status post mastectomy she told me she had told lymph node involvement, she was not treated with either chemotherapy, radiation therapy or hormonal therapy has extensive family history of cancer, sister was diagnosed with breast cancer at age 36, another sister was diagnosed with breast cancer at age 72, a brother diagnosed with pancreatic cancer and another brother with lung cancer niece was found to have BRCA gene mutation    sheis allergic to sulfa, atorvastatin, latex    Previous therapy: Carboplatin AUC 5, Alimta 500 mg/m² every 3 weeks initiated in July 2017, she finished    6 cycles out of 6 in November 2017          Maintenance Alimta from November 2017 until April 2018 when she had progression of disease  She underwent core biopsy, she was found to have negative EGFR mutation, Ross 1 mutation, B Darian mutation, ALK gene rearrangement, PDL-1 expression of 10%, no evidence of BRCA 1 /2 mutation  Treated again with carboplatin / Alimta with allergic reaction to carboplatin  Initiated on Pembrolizumab 200 mg flat dose every 3 weeks the 1st dose in mid May 2018     Interval History: fentanyl patch 12 mcg every 3 days       ECOG score 1        Test Results:    Imaging: Xr Chest Pa & Lateral    Result Date: 5/11/2018  Narrative: CHEST INDICATION:   C34 32: Malignant neoplasm of lower lobe, left bronchus or lung  Ages-year-old woman with shortness of breath x1 week  History of lung cancer  COMPARISON:  Chest radiograph 4/17/2018  Chest CT 3/15/2018  EXAM PERFORMED/VIEWS:  XR CHEST PA & LATERAL FINDINGS: Cardiomediastinal silhouette appears unremarkable  Right lung is clear  Left base opacity and multiple pleural-based nodules are unchanged since 4/17/2018   Osseous structures appear within normal limits for patient age  Impression: Left base opacity, likely combination of effusion and atelectasis, and multiple left pleural nodules are unchanged since 4/17/2018  Workstation performed: [id number]     Ir Thoracentesis    Result Date: 5/11/2018  Narrative: Ultrasound-guided thoracentesis Clinical History: Symptomatic left pleural effusion Procedure: After explaining the risks and benefits of the procedure to the patient, informed consent was obtained  Ultrasound was used to localize the pleural effusion  The overlying skin was prepped and draped in usual sterile fashion and local anesthesia was obtained with the 1% lidocaine solution  ULTRASOUND evaluation shows a small loculated pleural effusion with increased echoes suggesting complex fluid or scar tissue  A 5-[place] [employer] needle was advanced into the suspected effusion with positioning confirmed by ultrasound  No fluid could be aspirated indicating this is pleural scarring rather than fluid  The patient tolerated the procedure well and left the department in stable condition       Impression: Impression: Attempted thoracentesis demonstrates pleural scarring rather than fluid Workstation performed: [id number]       Labs:   Lab Results   Component Value Date    WBC 7 50 05/10/2018    HGB 14 2 05/10/2018    HCT 45 6 05/10/2018    MCV 94 05/10/2018     05/10/2018     Lab Results   Component Value Date     05/10/2018    K 4 2 05/10/2018     05/10/2018    CO2 24 05/10/2018    ANIONGAP 15 (H) 05/10/2018    BUN 13 05/10/2018    CREATININE 0 80 05/10/2018    GLUCOSE 204 (H) 05/10/2018    GLUF 101 (H) 04/18/2018    CALCIUM 9 9 05/10/2018    AST 30 05/10/2018    ALT 28 05/10/2018    ALKPHOS 95 05/10/2018    PROT 7 5 05/10/2018    BILITOT 0 70 05/10/2018    EGFR 69 05/10/2018       No results found for: IRON, TIBC, FERRITIN    No results found for: [id number]      ROS:   Review of Systems   Constitutional: Negative for activity change, appetite change, diaphoresis, fatigue, fever and unexpected weight change  HENT: Negative for facial swelling, hearing loss, rhinorrhea, sinus pain, sinus pressure, sneezing, sore throat and tinnitus  Eyes: Negative for photophobia, pain, discharge, redness, itching and visual disturbance  Respiratory: Positive for chest tightness ( on the left lateral chest area) and shortness of breath  Negative for apnea  Cardiovascular: Negative for chest pain, palpitations and leg swelling  Gastrointestinal: Negative for abdominal distention, abdominal pain, blood in stool, constipation, diarrhea, nausea, rectal pain and vomiting  Endocrine: Negative for cold intolerance, heat intolerance, polydipsia and polyphagia  Genitourinary: Negative for difficulty urinating, dyspareunia, frequency, hematuria, pelvic pain and urgency  Musculoskeletal: Negative for arthralgias, back pain, gait problem, joint swelling and myalgias  Skin: Negative for color change, pallor and rash  Allergic/Immunologic: Negative for environmental allergies and food allergies  Neurological: Negative for dizziness, tremors, seizures, syncope, speech difficulty, numbness and headaches  Hematological: Negative for adenopathy  Does not bruise/bleed easily  Psychiatric/Behavioral: Negative for agitation, confusion, dysphoric mood, hallucinations and suicidal ideas  Current Medications: Reviewed  Allergies: Reviewed  PMH/FH/SH:  Reviewed      Physical Exam:    Body surface area is 1 73 meters squared      Wt Readings from Last 3 Encounters:   05/31/18 72 6 kg (160 lb)   05/14/18 75 kg (165 lb 5 5 oz)   05/10/18 73 5 kg (162 lb)        Temp Readings from Last 3 Encounters:   05/31/18 98 4 °F (36 9 °C)   05/17/18 (!) 97 4 °F (36 3 °C) (Tympanic)   05/16/18 (!) 95 1 °F (35 1 °C) (Tympanic)        BP Readings from Last 3 Encounters:   05/31/18 122/78   05/17/18 116/73   05/16/18 132/80         Pulse Readings from Last 3 Encounters:   05/31/18 72   05/17/18 86   05/16/18 58        Physical Exam   Constitutional: She is oriented to person, place, and time  She appears well-developed and well-nourished  No distress  HENT:   Head: Normocephalic and atraumatic  Mouth/Throat: Oropharynx is clear and moist  No oropharyngeal exudate  Eyes: Conjunctivae and EOM are normal  Pupils are equal, round, and reactive to light  Neck: Normal range of motion  Neck supple  No tracheal deviation present  No thyromegaly present  Cardiovascular: Normal rate and regular rhythm  Exam reveals no gallop and no friction rub  No murmur heard  Pulmonary/Chest: Effort normal  No respiratory distress  She has no wheezes  She has rales ( dry crackles of the left lower base)  She exhibits no tenderness  Abdominal: Soft  Bowel sounds are normal  She exhibits no distension and no mass  There is no tenderness  There is no rebound and no guarding  Musculoskeletal: Normal range of motion  Lymphadenopathy:     She has no cervical adenopathy  Neurological: She is alert and oriented to person, place, and time  Skin: Skin is warm and dry  No rash noted  She is not diaphoretic  No erythema  No pallor  Psychiatric: She has a normal mood and affect  Her behavior is normal  Judgment and thought content normal    Vitals reviewed  Goals and Barriers:  Current Goal: Minimize effects of disease  Barriers: None  Patient's Capacity to Self Care:  Patient is able to self care      Code Status: [unfilled]

## 2018-06-01 ENCOUNTER — OFFICE VISIT (OUTPATIENT)
Dept: PHYSICAL THERAPY | Facility: CLINIC | Age: 83
End: 2018-06-01
Payer: MEDICARE

## 2018-06-01 DIAGNOSIS — H81.13 BPPV (BENIGN PAROXYSMAL POSITIONAL VERTIGO), BILATERAL: Primary | ICD-10-CM

## 2018-06-01 PROCEDURE — 97112 NEUROMUSCULAR REEDUCATION: CPT | Performed by: PHYSICAL THERAPIST

## 2018-06-01 PROCEDURE — G8979 MOBILITY GOAL STATUS: HCPCS | Performed by: PHYSICAL THERAPIST

## 2018-06-01 PROCEDURE — G8978 MOBILITY CURRENT STATUS: HCPCS | Performed by: PHYSICAL THERAPIST

## 2018-06-01 RX ORDER — SODIUM CHLORIDE 9 MG/ML
20 INJECTION, SOLUTION INTRAVENOUS CONTINUOUS
Status: DISCONTINUED | OUTPATIENT
Start: 2018-06-04 | End: 2018-06-07 | Stop reason: HOSPADM

## 2018-06-01 NOTE — PROGRESS NOTES
Daily Note     Today's date: 2018  Patient name: Savi Rose  : 1935  MRN: [id number]  Referring provider: Jean Paul Coates DO  Dx:   Encounter Diagnosis     ICD-10-CM    1  BPPV (benign paroxysmal positional vertigo), bilateral H81 13                   Subjective Reports she was with increased dizziness last night and this morning upon waking used her RW this am        Objective: See treatment diary below  Daily Treatment Diary     Manual                                                     Exercise Diary         Cantrall-hallpike L (-), R (+)x2         epley R x 3                                                                                                                                                                     Assessment: Patient with negative testing to the left, however with subjective symptoms of doziness with testing to the right  R Epley performed  Retested slight dizizness 2nd epley performed - increased dizziness upon sitting up post maneuver  Retest x 3 patient without symptoms performed epley, with dizziness during last position change  Continue to assess at NV  Plan: Continue per plan of care

## 2018-06-04 ENCOUNTER — HOSPITAL ENCOUNTER (OUTPATIENT)
Dept: INFUSION CENTER | Facility: CLINIC | Age: 83
Discharge: HOME | End: 2018-06-04
Payer: MEDICARE

## 2018-06-04 VITALS
TEMPERATURE: 97.2 F | BODY MASS INDEX: 29.75 KG/M2 | RESPIRATION RATE: 16 BRPM | WEIGHT: 160.05 LBS | SYSTOLIC BLOOD PRESSURE: 118 MMHG | HEART RATE: 89 BPM | DIASTOLIC BLOOD PRESSURE: 76 MMHG

## 2018-06-04 PROCEDURE — 96413 CHEMO IV INFUSION 1 HR: CPT

## 2018-06-04 RX ADMIN — SODIUM CHLORIDE 200 MG: 9 INJECTION, SOLUTION INTRAVENOUS at 13:50

## 2018-06-04 RX ADMIN — SODIUM CHLORIDE 20 ML/HR: 0.9 INJECTION, SOLUTION INTRAVENOUS at 13:40

## 2018-06-04 NOTE — PLAN OF CARE
Problem: Potential for Falls  Goal: Patient will remain free of falls  INTERVENTIONS:  - Assess patient frequently for physical needs  -  Identify cognitive and physical deficits and behaviors that affect risk of falls    -  [place] fall precautions as indicated by assessment   - Educate patient/family on patient safety including physical limitations  - Instruct patient to call for assistance with activity based on assessment  - Modify environment to reduce risk of injury  - Consider OT/PT consult to assist with strengthening/mobility   Outcome: Progressing

## 2018-06-04 NOTE — PROGRESS NOTES
Patient tolerated infusion well  Denies any discomfort  Pt is aware to return tomorrow for hydration

## 2018-06-05 ENCOUNTER — HOSPITAL ENCOUNTER (OUTPATIENT)
Dept: INFUSION CENTER | Facility: CLINIC | Age: 83
Discharge: HOME | End: 2018-06-05
Payer: MEDICARE

## 2018-06-05 ENCOUNTER — OFFICE VISIT (OUTPATIENT)
Dept: PHYSICAL THERAPY | Facility: CLINIC | Age: 83
End: 2018-06-05
Payer: MEDICARE

## 2018-06-05 VITALS
RESPIRATION RATE: 16 BRPM | HEART RATE: 68 BPM | DIASTOLIC BLOOD PRESSURE: 82 MMHG | SYSTOLIC BLOOD PRESSURE: 130 MMHG | TEMPERATURE: 97.8 F

## 2018-06-05 DIAGNOSIS — R42 DIZZINESS: ICD-10-CM

## 2018-06-05 DIAGNOSIS — H81.13 BPPV (BENIGN PAROXYSMAL POSITIONAL VERTIGO), BILATERAL: Primary | ICD-10-CM

## 2018-06-05 PROCEDURE — 97112 NEUROMUSCULAR REEDUCATION: CPT | Performed by: PHYSICAL THERAPIST

## 2018-06-05 PROCEDURE — 96360 HYDRATION IV INFUSION INIT: CPT

## 2018-06-05 PROCEDURE — 96361 HYDRATE IV INFUSION ADD-ON: CPT

## 2018-06-05 RX ADMIN — SODIUM CHLORIDE 1000 ML: 0.9 INJECTION, SOLUTION INTRAVENOUS at 13:54

## 2018-06-05 NOTE — PROGRESS NOTES
Patient tolerated hydration well  Denies any discomfort  Pt is aware of all future appointments   Refused AVS

## 2018-06-05 NOTE — PROGRESS NOTES
Daily Note     Today's date: 2018  Patient name: Eric Christensen  : 1935  MRN: [id number]  Referring provider: Dona Vyas DO  Dx:   Encounter Diagnosis     ICD-10-CM    1  BPPV (benign paroxysmal positional vertigo), bilateral H81 13    2  Dizziness R42                   Subjective Reports she was with increased dizziness last night and this morning upon waking used her RW this am        Objective: See treatment diary below  Daily Treatment Diary     Manual                                                     Exercise Diary         Natalie-hallpike L (-), R (+)x2         epley R x 3                                                                                                                                                                     Assessment: Patient had positive left natalie hallpike testing  Completed epley x 1  Retested with continued positive  Completed semont x 2  Completed natalie hallpike and roll testing bilaterally with negative for nystagmus or symptoms  Plan to reassess at follow up session  Plan: Continue per plan of care

## 2018-06-05 NOTE — PLAN OF CARE
Problem: Potential for Falls  Goal: Patient will remain free of falls  INTERVENTIONS:  - Assess patient frequently for physical needs  -  Identify cognitive and physical deficits and behaviors that affect risk of falls    -  [place] fall precautions as indicated by assessment   - Educate patient/family on patient safety including physical limitations  - Instruct patient to call for assistance with activity based on assessment  - Modify environment to reduce risk of injury  - Consider OT/PT consult to assist with strengthening/mobility   Outcome: Progressing

## 2018-06-07 ENCOUNTER — APPOINTMENT (OUTPATIENT)
Dept: PHYSICAL THERAPY | Facility: CLINIC | Age: 83
End: 2018-06-07
Payer: MEDICARE

## 2018-06-07 ENCOUNTER — HOSPITAL ENCOUNTER (OUTPATIENT)
Dept: INFUSION CENTER | Facility: CLINIC | Age: 83
Discharge: HOME | End: 2018-06-07
Payer: MEDICARE

## 2018-06-07 VITALS
TEMPERATURE: 97.4 F | DIASTOLIC BLOOD PRESSURE: 73 MMHG | SYSTOLIC BLOOD PRESSURE: 117 MMHG | RESPIRATION RATE: 16 BRPM | HEART RATE: 87 BPM

## 2018-06-07 PROCEDURE — 96361 HYDRATE IV INFUSION ADD-ON: CPT

## 2018-06-07 PROCEDURE — 96360 HYDRATION IV INFUSION INIT: CPT

## 2018-06-07 RX ADMIN — SODIUM CHLORIDE 1000 ML: 0.9 INJECTION, SOLUTION INTRAVENOUS at 13:25

## 2018-06-07 NOTE — PLAN OF CARE
Problem: Potential for Falls  Goal: Patient will remain free of falls  INTERVENTIONS:  - Assess patient frequently for physical needs  -  Identify cognitive and physical deficits and behaviors that affect risk of falls    -  [place] fall precautions as indicated by assessment   - Educate patient/family on patient safety including physical limitations  - Instruct patient to call for assistance with activity based on assessment  - Modify environment to reduce risk of injury  - Consider OT/PT consult to assist with strengthening/mobility   Outcome: Progressing

## 2018-06-08 ENCOUNTER — OFFICE VISIT (OUTPATIENT)
Dept: PHYSICAL THERAPY | Facility: CLINIC | Age: 83
End: 2018-06-08
Payer: MEDICARE

## 2018-06-08 DIAGNOSIS — H81.13 BPPV (BENIGN PAROXYSMAL POSITIONAL VERTIGO), BILATERAL: Primary | ICD-10-CM

## 2018-06-08 DIAGNOSIS — R42 DIZZINESS: ICD-10-CM

## 2018-06-08 PROCEDURE — 97150 GROUP THERAPEUTIC PROCEDURES: CPT | Performed by: PHYSICAL THERAPIST

## 2018-06-08 PROCEDURE — 97112 NEUROMUSCULAR REEDUCATION: CPT | Performed by: PHYSICAL THERAPIST

## 2018-06-11 NOTE — PROGRESS NOTES
Daily Note     Today's date: 6/10/2018  Patient name: Simi Gaming  : 1935  MRN: [id number]  Referring provider: Luz Arriaza DO  Dx:   Encounter Diagnosis     ICD-10-CM    1  BPPV (benign paroxysmal positional vertigo), bilateral H81 13    2  Dizziness R42                   Subjective Reports some dizziness since last session      Objective: See treatment diary below  Daily Treatment Diary     Manual                                                     Exercise Diary         Vinay-hallpike L (-), R (+)x2         epley R x 3                                                                                                                                                                     Assessment: Patient had only lightheaded dizziness with no nystagmus in testing position  Pt will be placed on hold for one week and will reassess symptoms  Plan: Continue per plan of care

## 2018-06-15 ENCOUNTER — APPOINTMENT (OUTPATIENT)
Dept: PHYSICAL THERAPY | Facility: CLINIC | Age: 83
End: 2018-06-15
Payer: MEDICARE

## 2018-06-18 DIAGNOSIS — R52 PAIN: ICD-10-CM

## 2018-06-18 RX ORDER — FENTANYL 12 UG/H
1 PATCH TRANSDERMAL
Qty: 10 PATCH | Refills: 0 | Status: SHIPPED | OUTPATIENT
Start: 2018-06-18 | End: 2018-07-17 | Stop reason: SDUPTHER

## 2018-06-21 ENCOUNTER — TELEPHONE (OUTPATIENT)
Dept: HEMATOLOGY ONCOLOGY | Facility: CLINIC | Age: 83
End: 2018-06-21

## 2018-06-21 ENCOUNTER — APPOINTMENT (OUTPATIENT)
Dept: LAB | Facility: CLINIC | Age: 83
End: 2018-06-21
Payer: MEDICARE

## 2018-06-21 ENCOUNTER — OFFICE VISIT (OUTPATIENT)
Dept: HEMATOLOGY ONCOLOGY | Facility: CLINIC | Age: 83
End: 2018-06-21
Payer: MEDICARE

## 2018-06-21 VITALS
DIASTOLIC BLOOD PRESSURE: 72 MMHG | SYSTOLIC BLOOD PRESSURE: 138 MMHG | OXYGEN SATURATION: 98 % | RESPIRATION RATE: 14 BRPM | HEIGHT: 62 IN | TEMPERATURE: 98.3 F | HEART RATE: 82 BPM | BODY MASS INDEX: 29.44 KG/M2 | WEIGHT: 160 LBS

## 2018-06-21 DIAGNOSIS — C34.32 MALIGNANT NEOPLASM OF LOWER LOBE OF LEFT LUNG (HCC): Primary | ICD-10-CM

## 2018-06-21 DIAGNOSIS — C34.32 MALIGNANT NEOPLASM OF LOWER LOBE OF LEFT LUNG (HCC): ICD-10-CM

## 2018-06-21 LAB
ALBUMIN SERPL BCP-MCNC: 3.2 G/DL (ref 3.5–5.7)
ALP SERPL-CCNC: 78 U/L (ref 46–116)
ALT SERPL W P-5'-P-CCNC: 20 U/L (ref 12–78)
ANION GAP SERPL CALCULATED.3IONS-SCNC: 15 MMOL/L (ref 4–13)
ANISOCYTOSIS BLD QL SMEAR: PRESENT
AST SERPL W P-5'-P-CCNC: 37 U/L (ref 5–45)
BASOPHILS # BLD AUTO: 0 THOUSAND/UL (ref 0–0.1)
BASOPHILS NFR MAR MANUAL: 0 % (ref 0–1)
BILIRUB SERPL-MCNC: 0.7 MG/DL (ref 0.2–1)
BUN SERPL-MCNC: 11 MG/DL (ref 5–25)
CALCIUM SERPL-MCNC: 9.8 MG/DL (ref 8.3–10.1)
CHLORIDE SERPL-SCNC: 102 MMOL/L (ref 98–108)
CO2 SERPL-SCNC: 26 MMOL/L (ref 21–32)
CREAT SERPL-MCNC: 0.6 MG/DL (ref 0.6–1.3)
EOSINOPHIL # BLD AUTO: 0.24 THOUSAND/UL (ref 0–0.61)
EOSINOPHIL NFR BLD MANUAL: 2 % (ref 0–6)
ERYTHROCYTE [DISTWIDTH] IN BLOOD BY AUTOMATED COUNT: 16 % (ref 11.6–15.1)
GFR SERPL CREATININE-BSD FRML MDRD: 85 ML/MIN/1.73SQ M
GLUCOSE SERPL-MCNC: 116 MG/DL (ref 65–140)
HCT VFR BLD AUTO: 47.4 % (ref 34.8–46.1)
HGB BLD-MCNC: 14.6 G/DL (ref 11.5–15.4)
LYMPHOCYTES # BLD AUTO: 3.63 THOUSAND/UL (ref 0.6–4.47)
LYMPHOCYTES # BLD AUTO: 30 % (ref 14–44)
MCH RBC QN AUTO: 29.3 PG (ref 26.8–34.3)
MCHC RBC AUTO-ENTMCNC: 30.7 G/DL (ref 31.4–37.4)
MCV RBC AUTO: 95 FL (ref 82–98)
MONOCYTES # BLD AUTO: 0.48 THOUSAND/UL (ref 0–1.22)
MONOCYTES NFR BLD AUTO: 4 % (ref 4–12)
NEUTS BAND NFR BLD MANUAL: 0 % (ref 0–8)
NEUTS SEG # BLD: 7.74 THOUSAND/UL (ref 1.81–6.82)
NEUTS SEG NFR BLD AUTO: 64 % (ref 43–75)
OVALOCYTES BLD QL SMEAR: PRESENT
PLATELET # BLD AUTO: 208 THOUSANDS/UL (ref 149–390)
PLATELET BLD QL SMEAR: ADEQUATE
PMV BLD AUTO: 9 FL (ref 8.9–12.7)
POTASSIUM SERPL-SCNC: 4.2 MMOL/L (ref 3.5–5.3)
PROT SERPL-MCNC: 7.3 G/DL (ref 6.4–8.2)
RBC # BLD AUTO: 4.97 MILLION/UL (ref 3.81–5.12)
SODIUM SERPL-SCNC: 143 MMOL/L (ref 136–145)
TOTAL CELLS COUNTED SPEC: 100
TSH SERPL DL<=0.05 MIU/L-ACNC: 1.62 UIU/ML (ref 0.36–3.74)
WBC # BLD AUTO: 12.1 THOUSAND/UL (ref 4.31–10.16)
WBC NRBC COR # BLD: 12.1 THOUSAND/UL (ref 4.31–10.16)

## 2018-06-21 PROCEDURE — 84443 ASSAY THYROID STIM HORMONE: CPT

## 2018-06-21 PROCEDURE — 85007 BL SMEAR W/DIFF WBC COUNT: CPT

## 2018-06-21 PROCEDURE — 85027 COMPLETE CBC AUTOMATED: CPT

## 2018-06-21 PROCEDURE — 80053 COMPREHEN METABOLIC PANEL: CPT

## 2018-06-21 PROCEDURE — 36415 COLL VENOUS BLD VENIPUNCTURE: CPT

## 2018-06-21 PROCEDURE — 99214 OFFICE O/P EST MOD 30 MIN: CPT | Performed by: INTERNAL MEDICINE

## 2018-06-21 NOTE — PROGRESS NOTES
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, molecular test came back negative for EGFR mutation, ALK gene rearrangement, Ross 1 mutation, PD expression of 10%, no evidence of B Darian or her 2 mutation, she was treated initially with Alimta /carboplatin with excellent response and later on maintained on Alimta with progression of disease in April 2018 clinically and by CT scan, repeat biopsy showed no mutational target  Currently on Pembrolizumab 200 mg flat dose every 3 weeks, proceed with dose 3   2  CT scan of the chest in 1 month  3  CBC, CMP and TSH every 6 weeks  4   Fentanyl patch 12 mcg every 72 hours for pain management with excellent control           HPI:  41-year-old  female who presented to [facility] in May 2017 with dyspnea for the past 4-5 days and pleurisy, with occasional dry cough scan of the thorax showed no evidence of PE, it showed a large loculated left side pleural effusion with nodularity concerning for metastatic disease, there is a concern for a mass versus pneumonia in the collapsed left lower lobe lung underwent left thoracentesis yielding 1 4 L of bloody fluid, pathology showed adenocarcinoma, positive for TTF-1, CK 7 most likely consistent with non-small cell lung cancer patient had a history of left-sided breast cancer in 1992 status post mastectomy she told me she had told lymph node involvement, she was not treated with either chemotherapy, radiation therapy or hormonal therapy has extensive family history of cancer, sister was diagnosed with breast cancer at age 36, another sister was diagnosed with breast cancer at age 72, a brother diagnosed with pancreatic cancer and another brother with lung cancer niece was found to have BRCA gene mutation    sheis allergic to sulfa, atorvastatin, latex    Previous therapy: Carboplatin AUC 5, Alimta 500 mg/m² every 3 weeks initiated in July 2017, she finished    6 cycles out of 6 in November 2017          Maintenance Alimta from November 2017 until April 2018 when she had progression of disease  She underwent core biopsy, she was found to have negative EGFR mutation, Ross 1 mutation, B Darian mutation, ALK gene rearrangement, PDL-1 expression of 10%, no evidence of BRCA 1 /2 mutation  Treated again with carboplatin / Alimta with allergic reaction to carboplatin  Initiated on Pembrolizumab 200 mg flat dose every 3 weeks the 1st dose in mid May 2018   Fentanyl patch 12 mcg every 3 days with significant improvement in pain scale as well as stamina    Interval History: now she is very active, she goes to the [employer] every Sunday, she cooks for her family        Previous Treatment:         Test Results:    Imaging: No results found  Labs:   Lab Results   Component Value Date    WBC 7 50 05/10/2018    HGB 14 2 05/10/2018    HCT 45 6 05/10/2018    MCV 94 05/10/2018     05/10/2018     Lab Results   Component Value Date     05/10/2018    K 4 2 05/10/2018     05/10/2018    CO2 24 05/10/2018    ANIONGAP 15 (H) 05/10/2018    BUN 13 05/10/2018    CREATININE 0 80 05/10/2018    GLUCOSE 204 (H) 05/10/2018    GLUF 101 (H) 04/18/2018    CALCIUM 9 9 05/10/2018    AST 30 05/10/2018    ALT 28 05/10/2018    ALKPHOS 95 05/10/2018    PROT 7 5 05/10/2018    BILITOT 0 70 05/10/2018    EGFR 69 05/10/2018       No results found for: IRON, TIBC, FERRITIN    No results found for: [id number]      ROS:   Review of Systems   Constitutional: Negative for activity change, appetite change, diaphoresis, fatigue, fever and unexpected weight change  HENT: Negative for facial swelling, hearing loss, rhinorrhea, sinus pain, sinus pressure, sneezing, sore throat and tinnitus  Eyes: Negative for photophobia, pain, discharge, redness, itching and visual disturbance  Respiratory: Negative for apnea and chest tightness  Cardiovascular: Negative for chest pain, palpitations and leg swelling  Gastrointestinal: Negative for abdominal distention, abdominal pain, blood in stool, constipation, diarrhea, nausea, rectal pain and vomiting  Endocrine: Negative for cold intolerance, heat intolerance, polydipsia and polyphagia  Genitourinary: Negative for difficulty urinating, dyspareunia, frequency, hematuria, pelvic pain and urgency  Musculoskeletal: Negative for arthralgias, back pain, gait problem, joint swelling and myalgias  Skin: Negative for color change, pallor and rash  Allergic/Immunologic: Negative for environmental allergies and food allergies  Neurological: Negative for dizziness, tremors, seizures, syncope, speech difficulty, numbness and headaches  Hematological: Negative for adenopathy  Does not bruise/bleed easily  Psychiatric/Behavioral: Negative for agitation, confusion, dysphoric mood, hallucinations and suicidal ideas  Current Medications: Reviewed  Allergies: Reviewed  PMH/FH/SH:  Reviewed      Physical Exam:    Body surface area is 1 73 meters squared  Wt Readings from Last 3 Encounters:   06/21/18 72 6 kg (160 lb)   06/04/18 72 6 kg (160 lb 0 9 oz)   05/31/18 72 6 kg (160 lb)        Temp Readings from Last 3 Encounters:   06/21/18 98 3 °F (36 8 °C)   06/07/18 (!) 97 4 °F (36 3 °C) (Tympanic)   06/05/18 97 8 °F (36 6 °C) (Tympanic)        BP Readings from Last 3 Encounters:   06/21/18 138/72   06/07/18 117/73   06/05/18 130/82         Pulse Readings from Last 3 Encounters:   06/21/18 82   06/07/18 87   06/05/18 68        Physical Exam   Constitutional: She is oriented to person, place, and time  She appears well-developed and well-nourished  No distress  HENT:   Head: Normocephalic and atraumatic  Mouth/Throat: Oropharynx is clear and moist  No oropharyngeal exudate     Eyes: Conjunctivae and EOM are normal  Pupils are equal, round, and reactive to light  Neck: Normal range of motion  Neck supple  No tracheal deviation present  No thyromegaly present  Cardiovascular: Normal rate and regular rhythm  Exam reveals no gallop and no friction rub  No murmur heard  Pulmonary/Chest: Effort normal  No respiratory distress  She has no wheezes  She has no rales  She exhibits no tenderness  Decreased breath sounds in the left base   Abdominal: Soft  Bowel sounds are normal  She exhibits no distension and no mass  There is no tenderness  There is no rebound and no guarding  Musculoskeletal: Normal range of motion  She exhibits no edema or tenderness  Lymphadenopathy:     She has no cervical adenopathy  Neurological: She is alert and oriented to person, place, and time  Skin: Skin is warm and dry  No rash noted  She is not diaphoretic  No erythema  No pallor  Psychiatric: She has a normal mood and affect  Her behavior is normal  Judgment and thought content normal    Vitals reviewed  Goals and Barriers:  Current Goal: Minimize effects of disease  Barriers: None  Patient's Capacity to Self Care:  Patient is able to self care      Code Status: @[facility]@

## 2018-06-22 RX ORDER — SODIUM CHLORIDE 9 MG/ML
20 INJECTION, SOLUTION INTRAVENOUS CONTINUOUS
Status: DISCONTINUED | OUTPATIENT
Start: 2018-06-25 | End: 2018-06-28 | Stop reason: HOSPADM

## 2018-06-25 ENCOUNTER — HOSPITAL ENCOUNTER (OUTPATIENT)
Dept: INFUSION CENTER | Facility: CLINIC | Age: 83
Discharge: HOME | End: 2018-06-25
Payer: MEDICARE

## 2018-06-25 VITALS
HEART RATE: 80 BPM | DIASTOLIC BLOOD PRESSURE: 74 MMHG | TEMPERATURE: 97.8 F | RESPIRATION RATE: 18 BRPM | SYSTOLIC BLOOD PRESSURE: 110 MMHG

## 2018-06-25 PROCEDURE — 96413 CHEMO IV INFUSION 1 HR: CPT

## 2018-06-25 RX ADMIN — SODIUM CHLORIDE 20 ML/HR: 0.9 INJECTION, SOLUTION INTRAVENOUS at 13:30

## 2018-06-25 RX ADMIN — SODIUM CHLORIDE 200 MG: 9 INJECTION, SOLUTION INTRAVENOUS at 13:41

## 2018-06-25 NOTE — PLAN OF CARE
Problem: Potential for Falls  Goal: Patient will remain free of falls  INTERVENTIONS:  - Assess patient frequently for physical needs  -  Identify cognitive and physical deficits and behaviors that affect risk of falls    -  [place] fall precautions as indicated by assessment   - Educate patient/family on patient safety including physical limitations  - Instruct patient to call for assistance with activity based on assessment  - Modify environment to reduce risk of injury  - Consider OT/PT consult to assist with strengthening/mobility   Outcome: Progressing

## 2018-06-26 ENCOUNTER — HOSPITAL ENCOUNTER (OUTPATIENT)
Dept: INFUSION CENTER | Facility: CLINIC | Age: 83
Discharge: HOME | End: 2018-06-26
Payer: MEDICARE

## 2018-06-26 VITALS
HEART RATE: 69 BPM | DIASTOLIC BLOOD PRESSURE: 71 MMHG | RESPIRATION RATE: 18 BRPM | TEMPERATURE: 97.6 F | SYSTOLIC BLOOD PRESSURE: 133 MMHG

## 2018-06-26 PROCEDURE — 96365 THER/PROPH/DIAG IV INF INIT: CPT

## 2018-06-26 PROCEDURE — 96366 THER/PROPH/DIAG IV INF ADDON: CPT

## 2018-06-26 RX ADMIN — SODIUM CHLORIDE 1000 ML: 0.9 INJECTION, SOLUTION INTRAVENOUS at 13:24

## 2018-06-26 NOTE — PLAN OF CARE
Problem: Potential for Falls  Goal: Patient will remain free of falls  INTERVENTIONS:  - Assess patient frequently for physical needs  -  Identify cognitive and physical deficits and behaviors that affect risk of falls    -  [place] fall precautions as indicated by assessment   - Educate patient/family on patient safety including physical limitations  - Instruct patient to call for assistance with activity based on assessment  - Modify environment to reduce risk of injury  - Consider OT/PT consult to assist with strengthening/mobility   Outcome: Progressing

## 2018-06-27 ENCOUNTER — HOSPITAL ENCOUNTER (OUTPATIENT)
Dept: INFUSION CENTER | Facility: CLINIC | Age: 83
Discharge: HOME | End: 2018-06-27
Payer: MEDICARE

## 2018-06-27 VITALS
RESPIRATION RATE: 18 BRPM | DIASTOLIC BLOOD PRESSURE: 80 MMHG | SYSTOLIC BLOOD PRESSURE: 167 MMHG | TEMPERATURE: 98.6 F | HEART RATE: 63 BPM

## 2018-06-27 PROCEDURE — 96360 HYDRATION IV INFUSION INIT: CPT

## 2018-06-27 PROCEDURE — 96361 HYDRATE IV INFUSION ADD-ON: CPT

## 2018-06-27 RX ADMIN — SODIUM CHLORIDE 1000 ML: 0.9 INJECTION, SOLUTION INTRAVENOUS at 13:23

## 2018-07-13 RX ORDER — SODIUM CHLORIDE 9 MG/ML
20 INJECTION, SOLUTION INTRAVENOUS CONTINUOUS
Status: DISCONTINUED | OUTPATIENT
Start: 2018-07-16 | End: 2018-07-19 | Stop reason: HOSPADM

## 2018-07-16 ENCOUNTER — HOSPITAL ENCOUNTER (OUTPATIENT)
Dept: INFUSION CENTER | Facility: CLINIC | Age: 83
Discharge: HOME | End: 2018-07-16
Payer: MEDICARE

## 2018-07-16 VITALS
TEMPERATURE: 98.1 F | SYSTOLIC BLOOD PRESSURE: 130 MMHG | DIASTOLIC BLOOD PRESSURE: 76 MMHG | HEART RATE: 80 BPM | RESPIRATION RATE: 16 BRPM | OXYGEN SATURATION: 94 %

## 2018-07-16 PROCEDURE — 96413 CHEMO IV INFUSION 1 HR: CPT

## 2018-07-16 RX ADMIN — SODIUM CHLORIDE 20 ML/HR: 0.9 INJECTION, SOLUTION INTRAVENOUS at 14:10

## 2018-07-16 RX ADMIN — SODIUM CHLORIDE 200 MG: 9 INJECTION, SOLUTION INTRAVENOUS at 14:21

## 2018-07-16 NOTE — PLAN OF CARE
Problem: Potential for Falls  Goal: Patient will remain free of falls  INTERVENTIONS:  - Assess patient frequently for physical needs  -  Identify cognitive and physical deficits and behaviors that affect risk of falls    -  [place] fall precautions as indicated by assessment   - Educate patient/family on patient safety including physical limitations  - Instruct patient to call for assistance with activity based on assessment  - Modify environment to reduce risk of injury  - Consider OT/PT consult to assist with strengthening/mobility   Outcome: Progressing

## 2018-07-17 ENCOUNTER — HOSPITAL ENCOUNTER (OUTPATIENT)
Dept: INFUSION CENTER | Facility: CLINIC | Age: 83
Discharge: HOME | End: 2018-07-17

## 2018-07-17 DIAGNOSIS — R52 PAIN: ICD-10-CM

## 2018-07-17 RX ORDER — FENTANYL 12 UG/H
1 PATCH TRANSDERMAL
Qty: 10 PATCH | Refills: 0 | Status: SHIPPED | OUTPATIENT
Start: 2018-07-17 | End: 2018-09-10 | Stop reason: SDUPTHER

## 2018-07-23 ENCOUNTER — HOSPITAL ENCOUNTER (EMERGENCY)
Facility: HOSPITAL | Age: 83
DRG: 853 | End: 2018-07-23
Attending: EMERGENCY MEDICINE
Payer: MEDICARE

## 2018-07-23 ENCOUNTER — APPOINTMENT (EMERGENCY)
Dept: RADIOLOGY | Facility: HOSPITAL | Age: 83
DRG: 853 | End: 2018-07-23
Payer: MEDICARE

## 2018-07-23 ENCOUNTER — HOSPITAL ENCOUNTER (INPATIENT)
Facility: HOSPITAL | Age: 83
LOS: 9 days | Discharge: NURSING, REHAB OR LONG-TERM CARE | DRG: 853 | End: 2018-08-01
Attending: INTERNAL MEDICINE | Admitting: INTERNAL MEDICINE
Payer: MEDICARE

## 2018-07-23 ENCOUNTER — APPOINTMENT (INPATIENT)
Dept: RADIOLOGY | Facility: HOSPITAL | Age: 83
DRG: 853 | End: 2018-07-23
Payer: MEDICARE

## 2018-07-23 ENCOUNTER — APPOINTMENT (EMERGENCY)
Dept: CT IMAGING | Facility: HOSPITAL | Age: 83
DRG: 853 | End: 2018-07-23
Payer: MEDICARE

## 2018-07-23 VITALS
HEART RATE: 118 BPM | DIASTOLIC BLOOD PRESSURE: 64 MMHG | OXYGEN SATURATION: 92 % | SYSTOLIC BLOOD PRESSURE: 93 MMHG | TEMPERATURE: 97.4 F | RESPIRATION RATE: 20 BRPM

## 2018-07-23 DIAGNOSIS — I48.0 PAROXYSMAL ATRIAL FIBRILLATION (HCC): ICD-10-CM
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
## 2018-07-23 DIAGNOSIS — I31.3 PERICARDIAL EFFUSION: ICD-10-CM

## 2018-07-23 DIAGNOSIS — C34.32 MALIGNANT NEOPLASM OF LOWER LOBE OF LEFT LUNG (HCC): Primary | ICD-10-CM

## 2018-07-23 DIAGNOSIS — I31.3 PERICARDIAL EFFUSION: Primary | ICD-10-CM

## 2018-07-23 DIAGNOSIS — J18.9 HAP (HOSPITAL-ACQUIRED PNEUMONIA): ICD-10-CM

## 2018-07-23 DIAGNOSIS — I48.91 RAPID ATRIAL FIBRILLATION (HCC): ICD-10-CM

## 2018-07-23 PROBLEM — E11.9 DIABETES MELLITUS (HCC): Status: ACTIVE | Noted: 2018-07-23

## 2018-07-23 PROBLEM — I10 HYPERTENSION: Status: ACTIVE | Noted: 2018-07-23

## 2018-07-23 LAB
ALBUMIN SERPL BCP-MCNC: 3.4 G/DL (ref 3.5–5)
ALP SERPL-CCNC: 76 U/L (ref 46–116)
ALT SERPL W P-5'-P-CCNC: 51 U/L (ref 12–78)
ANION GAP SERPL CALCULATED.3IONS-SCNC: 17 MMOL/L (ref 4–13)
APTT PPP: 32 SECONDS (ref 24–36)
AST SERPL W P-5'-P-CCNC: 42 U/L (ref 5–45)
ATRIAL RATE: 416 BPM
BASOPHILS # BLD AUTO: 0.07 THOUSANDS/ΜL (ref 0–0.1)
BASOPHILS NFR BLD AUTO: 1 % (ref 0–1)
BILIRUB SERPL-MCNC: 1.72 MG/DL (ref 0.2–1)
BUN SERPL-MCNC: 19 MG/DL (ref 5–25)
CALCIUM SERPL-MCNC: 9.9 MG/DL (ref 8.3–10.1)
CHLORIDE SERPL-SCNC: 100 MMOL/L (ref 100–108)
CO2 SERPL-SCNC: 20 MMOL/L (ref 21–32)
CREAT SERPL-MCNC: 1.21 MG/DL (ref 0.6–1.3)
EOSINOPHIL # BLD AUTO: 0.27 THOUSAND/ΜL (ref 0–0.61)
EOSINOPHIL NFR BLD AUTO: 2 % (ref 0–6)
ERYTHROCYTE [DISTWIDTH] IN BLOOD BY AUTOMATED COUNT: 13.8 % (ref 11.6–15.1)
GFR SERPL CREATININE-BSD FRML MDRD: 42 ML/MIN/1.73SQ M
GLUCOSE SERPL-MCNC: 224 MG/DL (ref 65–140)
HCT VFR BLD AUTO: 45.3 % (ref 34.8–46.1)
HGB BLD-MCNC: 14.8 G/DL (ref 11.5–15.4)
INR PPP: 1.13 (ref 0.86–1.17)
LYMPHOCYTES # BLD AUTO: 2.68 THOUSANDS/ΜL (ref 0.6–4.47)
LYMPHOCYTES NFR BLD AUTO: 19 % (ref 14–44)
MAGNESIUM SERPL-MCNC: 1.9 MG/DL (ref 1.6–2.6)
MCH RBC QN AUTO: 31.2 PG (ref 26.8–34.3)
MCHC RBC AUTO-ENTMCNC: 32.7 G/DL (ref 31.4–37.4)
MCV RBC AUTO: 95 FL (ref 82–98)
MONOCYTES # BLD AUTO: 1.4 THOUSAND/ΜL (ref 0.17–1.22)
MONOCYTES NFR BLD AUTO: 10 % (ref 4–12)
NEUTROPHILS # BLD AUTO: 9.87 THOUSANDS/ΜL (ref 1.85–7.62)
NEUTS SEG NFR BLD AUTO: 69 % (ref 43–75)
NRBC BLD AUTO-RTO: 0 /100 WBCS
NT-PROBNP SERPL-MCNC: 339 PG/ML
PLATELET # BLD AUTO: 247 THOUSANDS/UL (ref 149–390)
PMV BLD AUTO: 11.9 FL (ref 8.9–12.7)
POTASSIUM SERPL-SCNC: 4.3 MMOL/L (ref 3.5–5.3)
PROT SERPL-MCNC: 8.1 G/DL (ref 6.4–8.2)
PROTHROMBIN TIME: 14.6 SECONDS (ref 11.8–14.2)
QRS AXIS: 0 DEGREES
QRSD INTERVAL: 74 MS
QT INTERVAL: 252 MS
QTC INTERVAL: 432 MS
RBC # BLD AUTO: 4.75 MILLION/UL (ref 3.81–5.12)
SODIUM SERPL-SCNC: 137 MMOL/L (ref 136–145)
T WAVE AXIS: -23 DEGREES
TROPONIN I SERPL-MCNC: 0.2 NG/ML
TROPONIN I SERPL-MCNC: 0.31 NG/ML
TSH SERPL DL<=0.05 MIU/L-ACNC: 2.19 UIU/ML (ref 0.36–3.74)
VENTRICULAR RATE: 177 BPM
WBC # BLD AUTO: 14.29 THOUSAND/UL (ref 4.31–10.16)

## 2018-07-23 PROCEDURE — 84484 ASSAY OF TROPONIN QUANT: CPT | Performed by: INTERNAL MEDICINE

## 2018-07-23 PROCEDURE — 84484 ASSAY OF TROPONIN QUANT: CPT | Performed by: EMERGENCY MEDICINE

## 2018-07-23 PROCEDURE — 99222 1ST HOSP IP/OBS MODERATE 55: CPT | Performed by: INTERNAL MEDICINE

## 2018-07-23 PROCEDURE — 96365 THER/PROPH/DIAG IV INF INIT: CPT

## 2018-07-23 PROCEDURE — 85025 COMPLETE CBC W/AUTO DIFF WBC: CPT | Performed by: EMERGENCY MEDICINE

## 2018-07-23 PROCEDURE — 96361 HYDRATE IV INFUSION ADD-ON: CPT

## 2018-07-23 PROCEDURE — 83735 ASSAY OF MAGNESIUM: CPT | Performed by: EMERGENCY MEDICINE

## 2018-07-23 PROCEDURE — 93010 ELECTROCARDIOGRAM REPORT: CPT | Performed by: INTERNAL MEDICINE

## 2018-07-23 PROCEDURE — 85610 PROTHROMBIN TIME: CPT | Performed by: EMERGENCY MEDICINE

## 2018-07-23 PROCEDURE — 85730 THROMBOPLASTIN TIME PARTIAL: CPT | Performed by: EMERGENCY MEDICINE

## 2018-07-23 PROCEDURE — 84443 ASSAY THYROID STIM HORMONE: CPT | Performed by: EMERGENCY MEDICINE

## 2018-07-23 PROCEDURE — 71045 X-RAY EXAM CHEST 1 VIEW: CPT

## 2018-07-23 PROCEDURE — 83880 ASSAY OF NATRIURETIC PEPTIDE: CPT | Performed by: EMERGENCY MEDICINE

## 2018-07-23 PROCEDURE — 96376 TX/PRO/DX INJ SAME DRUG ADON: CPT

## 2018-07-23 PROCEDURE — 80053 COMPREHEN METABOLIC PANEL: CPT | Performed by: EMERGENCY MEDICINE

## 2018-07-23 PROCEDURE — 99285 EMERGENCY DEPT VISIT HI MDM: CPT

## 2018-07-23 PROCEDURE — 96366 THER/PROPH/DIAG IV INF ADDON: CPT

## 2018-07-23 PROCEDURE — 93005 ELECTROCARDIOGRAM TRACING: CPT

## 2018-07-23 PROCEDURE — 36415 COLL VENOUS BLD VENIPUNCTURE: CPT | Performed by: EMERGENCY MEDICINE

## 2018-07-23 PROCEDURE — 71275 CT ANGIOGRAPHY CHEST: CPT

## 2018-07-23 RX ORDER — ALBUMIN, HUMAN INJ 5% 5 %
12.5 SOLUTION INTRAVENOUS ONCE
Status: DISCONTINUED | OUTPATIENT
Start: 2018-07-23 | End: 2018-07-23

## 2018-07-23 RX ORDER — ACETAMINOPHEN 325 MG/1
650 TABLET ORAL EVERY 6 HOURS PRN
Status: DISCONTINUED | OUTPATIENT
Start: 2018-07-23 | End: 2018-07-25

## 2018-07-23 RX ORDER — DOCUSATE SODIUM 100 MG/1
100 CAPSULE, LIQUID FILLED ORAL 2 TIMES DAILY
Status: DISCONTINUED | OUTPATIENT
Start: 2018-07-23 | End: 2018-08-01 | Stop reason: HOSPADM

## 2018-07-23 RX ORDER — CALCIUM CARBONATE 500(1250)
1 TABLET ORAL
Status: DISCONTINUED | OUTPATIENT
Start: 2018-07-24 | End: 2018-08-01 | Stop reason: HOSPADM

## 2018-07-23 RX ORDER — ONDANSETRON HYDROCHLORIDE 4 MG/5ML
4 SOLUTION ORAL EVERY 6 HOURS PRN
Status: DISCONTINUED | OUTPATIENT
Start: 2018-07-23 | End: 2018-07-25 | Stop reason: CLARIF

## 2018-07-23 RX ORDER — CHLORAL HYDRATE 500 MG
1000 CAPSULE ORAL DAILY
Status: DISCONTINUED | OUTPATIENT
Start: 2018-07-24 | End: 2018-08-01 | Stop reason: HOSPADM

## 2018-07-23 RX ORDER — METOPROLOL TARTRATE 5 MG/5ML
2.5 INJECTION INTRAVENOUS ONCE
Status: COMPLETED | OUTPATIENT
Start: 2018-07-23 | End: 2018-07-23

## 2018-07-23 RX ORDER — DILTIAZEM HYDROCHLORIDE 5 MG/ML
15 INJECTION INTRAVENOUS ONCE
Status: COMPLETED | OUTPATIENT
Start: 2018-07-23 | End: 2018-07-23

## 2018-07-23 RX ORDER — ALBUMIN, HUMAN INJ 5% 5 %
12.5 SOLUTION INTRAVENOUS ONCE
Status: COMPLETED | OUTPATIENT
Start: 2018-07-23 | End: 2018-07-23

## 2018-07-23 RX ORDER — FENTANYL CITRATE 50 UG/ML
25 INJECTION, SOLUTION INTRAMUSCULAR; INTRAVENOUS ONCE
Status: COMPLETED | OUTPATIENT
Start: 2018-07-23 | End: 2018-07-23

## 2018-07-23 RX ORDER — HYDROCODONE BITARTRATE AND ACETAMINOPHEN 5; 325 MG/1; MG/1
1 TABLET ORAL EVERY 6 HOURS PRN
Status: DISCONTINUED | OUTPATIENT
Start: 2018-07-23 | End: 2018-07-24

## 2018-07-23 RX ORDER — ONDANSETRON 2 MG/ML
4 INJECTION INTRAMUSCULAR; INTRAVENOUS EVERY 6 HOURS PRN
Status: DISCONTINUED | OUTPATIENT
Start: 2018-07-23 | End: 2018-08-01 | Stop reason: HOSPADM

## 2018-07-23 RX ORDER — ALPRAZOLAM 0.25 MG/1
0.5 TABLET ORAL
Status: DISCONTINUED | OUTPATIENT
Start: 2018-07-23 | End: 2018-08-01 | Stop reason: HOSPADM

## 2018-07-23 RX ORDER — ACETAMINOPHEN 325 MG/1
650 TABLET ORAL ONCE
Status: COMPLETED | OUTPATIENT
Start: 2018-07-23 | End: 2018-07-23

## 2018-07-23 RX ORDER — UBIDECARENONE 75 MG
100 CAPSULE ORAL DAILY
Status: DISCONTINUED | OUTPATIENT
Start: 2018-07-24 | End: 2018-08-01 | Stop reason: HOSPADM

## 2018-07-23 RX ORDER — CITALOPRAM 20 MG/1
20 TABLET ORAL DAILY
Status: DISCONTINUED | OUTPATIENT
Start: 2018-07-24 | End: 2018-08-01 | Stop reason: HOSPADM

## 2018-07-23 RX ORDER — FENTANYL 12 UG/H
1 PATCH TRANSDERMAL
Status: DISCONTINUED | OUTPATIENT
Start: 2018-07-23 | End: 2018-08-01 | Stop reason: HOSPADM

## 2018-07-23 RX ORDER — MECLIZINE HCL 12.5 MG/1
25 TABLET ORAL EVERY 8 HOURS SCHEDULED
Status: DISCONTINUED | OUTPATIENT
Start: 2018-07-23 | End: 2018-07-25

## 2018-07-23 RX ORDER — FOLIC ACID 1 MG/1
1 TABLET ORAL DAILY
Status: DISCONTINUED | OUTPATIENT
Start: 2018-07-24 | End: 2018-08-01 | Stop reason: HOSPADM

## 2018-07-23 RX ORDER — ONDANSETRON 2 MG/ML
4 INJECTION INTRAMUSCULAR; INTRAVENOUS ONCE
Status: COMPLETED | OUTPATIENT
Start: 2018-07-23 | End: 2018-07-23

## 2018-07-23 RX ADMIN — DILTIAZEM HYDROCHLORIDE 5 MG/HR: 5 INJECTION INTRAVENOUS at 12:46

## 2018-07-23 RX ADMIN — DILTIAZEM HYDROCHLORIDE 15 MG: 5 INJECTION INTRAVENOUS at 11:38

## 2018-07-23 RX ADMIN — FENTANYL CITRATE 25 MCG: 50 INJECTION INTRAMUSCULAR; INTRAVENOUS at 16:38

## 2018-07-23 RX ADMIN — DILTIAZEM HYDROCHLORIDE 15 MG/HR: 5 INJECTION INTRAVENOUS at 22:30

## 2018-07-23 RX ADMIN — ALBUMIN HUMAN 12.5 G: 0.05 INJECTION, SOLUTION INTRAVENOUS at 23:33

## 2018-07-23 RX ADMIN — MECLIZINE HCL 12.5 MG 25 MG: 12.5 TABLET ORAL at 22:26

## 2018-07-23 RX ADMIN — IODIXANOL 85 ML: 320 INJECTION, SOLUTION INTRAVASCULAR at 12:45

## 2018-07-23 RX ADMIN — FENTANYL 1 PATCH: 12 PATCH, EXTENDED RELEASE TRANSDERMAL at 19:51

## 2018-07-23 RX ADMIN — METOPROLOL TARTRATE 2.5 MG: 5 INJECTION, SOLUTION INTRAVENOUS at 23:38

## 2018-07-23 RX ADMIN — SODIUM CHLORIDE 250 ML: 9 INJECTION, SOLUTION INTRAVENOUS at 12:13

## 2018-07-23 RX ADMIN — ALPRAZOLAM 0.5 MG: 0.25 TABLET ORAL at 22:26

## 2018-07-23 RX ADMIN — ONDANSETRON 4 MG: 2 INJECTION INTRAMUSCULAR; INTRAVENOUS at 16:04

## 2018-07-23 RX ADMIN — ACETAMINOPHEN 650 MG: 325 TABLET, FILM COATED ORAL at 14:46

## 2018-07-23 NOTE — EMTALA/ACUTE CARE TRANSFER
[facility]   [address] [place] [address]  Dept: [phone]      EMTALA TRANSFER CONSENT    NAME Andrei Gomez                                         1935                              MRN [id number]    I have been informed of my rights regarding examination, treatment, and transfer   by Dr Lewis DO    Benefits: Specialized equipment and/or services available at the receiving facility (Include comment)________________________    Risks: Potential for delay in receiving treatment, Potential deterioration of medical condition, Possible worsening of condition or death during transfer      Transfer Request   I acknowledge that my medical condition has been evaluated and explained to me by the emergency department physician or other qualified medical person and/or my attending physician who has recommended and offered to me further medical examination and treatment  I understand the Hospital's obligation with respect to the treatment and stabilization of my emergency medical condition  I nevertheless request to be transferred  I release the Hospital, the doctor, and any other persons caring for me from all responsibility or liability for any injury or ill effects that may result from my transfer and agree to accept all responsibility for the consequences of my choice to transfer, rather than receive stabilizing treatment at the Hospital  I understand that because the transfer is my request, my insurance may not provide reimbursement for the services  The Hospital will assist and direct me and my family in how to make arrangements for transfer, but the hospital is not liable for any fees charged by the transport service  In spite of this understanding, I refuse to consent to further medical examination and treatment which has been offered to me, and request transfer to     I authorize the performance of emergency medical procedures and treatments upon me in both transit and upon arrival at the receiving facility  Additionally, I authorize the release of any and all medical records to the receiving facility and request they be transported with me, if possible  I authorize the performance of emergency medical procedures and treatments upon me in both transit and upon arrival at the receiving facility  Additionally, I authorize the release of any and all medical records to the receiving facility and request they be transported with me, if possible  I understand that the safest mode of transportation during a medical emergency is an ambulance and that the Hospital advocates the use of this mode of transport  Risks of traveling to the receiving facility by car, including absence of medical control, life sustaining equipment, such as oxygen, and medical personnel has been explained to me and I fully understand them  (BRUCE CORRECT BOX BELOW)  [  ]  I consent to the stated transfer and to be transported by ambulance/helicopter  [  ]  I consent to the stated transfer, but refuse transportation by ambulance and accept full responsibility for my transportation by car  I understand the risks of non-ambulance transfers and I exonerate the Hospital and its staff from any deterioration in my condition that results from this refusal     X___________________________________________    DATE  18  TIME________  Signature of patient or legally responsible individual signing on patient behalf           RELATIONSHIP TO PATIENT_________________________          Provider Certification    NAME Chaka Donahue                                        [facility] 1935                              MRN [id number]    A medical screening exam was performed on the above named patient  Based on the examination:    Condition Necessitating Transfer The primary encounter diagnosis was Pericardial effusion   A diagnosis of Rapid atrial fibrillation (HCC) was also pertinent to this visit  Patient Condition: The patient has been stabilized such that within reasonable medical probability, no material deterioration of the patient condition or the condition of the unborn child(bruna) is likely to result from the transfer    Reason for Transfer: Level of Care needed not available at this facility    Transfer Requirements: Facility     · Space available and qualified personnel available for treatment as acknowledged by    · Agreed to accept transfer and to provide appropriate medical treatment as acknowledged by       Ashok Pickard  · Appropriate medical records of the examination and treatment of the patient are provided at the time of transfer   [address] [facility], [address] _______  · Transfer will be performed by qualified personnel from    and appropriate transfer equipment as required, including the use of necessary and appropriate life support measures  Provider Certification: I have examined the patient and explained the following risks and benefits of being transferred/refusing transfer to the patient/family:  General risk, such as traffic hazards, adverse weather conditions, rough terrain or turbulence, possible failure of equipment (including vehicle or aircraft), or consequences of actions of persons outside the control of the transport personnel      Based on these reasonable risks and benefits to the patient and/or the unborn child(bruna), and based upon the information available at the time of the patients examination, I certify that the medical benefits reasonably to be expected from the provision of appropriate medical treatments at another medical facility outweigh the increasing risks, if any, to the individuals medical condition, and in the case of labor to the unborn child, from effecting the transfer      X____________________________________________ DATE 07/23/18        TIME_______      ORIGINAL - SEND TO MEDICAL RECORDS   COPY - SEND WITH PATIENT DURING TRANSFER

## 2018-07-23 NOTE — ED NOTES
Patient reports increased pain and anxiety  Dr Sanchez He made aware       Mikie Lozada RN  07/23/18 [id number]

## 2018-07-23 NOTE — EMTALA/ACUTE CARE TRANSFER
[facility] [address]  [facility] [place] [address]  Dept: [phone]      EMTALA TRANSFER CONSENT    NAME Idris Fernandez                                         1935                              MRN [id number]    I have been informed of my rights regarding examination, treatment, and transfer   by Dr Malathi Chacon DO    Benefits: Specialized equipment and/or services available at the receiving facility (Include comment)________________________    Risks: Potential for delay in receiving treatment, Potential deterioration of medical condition, Possible worsening of condition or death during transfer      Transfer Request   I acknowledge that my medical condition has been evaluated and explained to me by the emergency department physician or other qualified medical person and/or my attending physician who has recommended and offered to me further medical examination and treatment  I understand the Hospital's obligation with respect to the treatment and stabilization of my emergency medical condition  I nevertheless request to be transferred  I release the Hospital, the doctor, and any other persons caring for me from all responsibility or liability for any injury or ill effects that may result from my transfer and agree to accept all responsibility for the consequences of my choice to transfer, rather than receive stabilizing treatment at the Hospital  I understand that because the transfer is my request, my insurance may not provide reimbursement for the services  The Hospital will assist and direct me and my family in how to make arrangements for transfer, but the hospital is not liable for any fees charged by the transport service  In spite of this understanding, I refuse to consent to further medical examination and treatment which has been offered to me, and request transfer to  [address] : [facility]    I authorize the performance of emergency medical procedures and treatments upon me in both transit and upon arrival at the receiving facility  Additionally, I authorize the release of any and all medical records to the receiving facility and request they be transported with me, if possible  I authorize the performance of emergency medical procedures and treatments upon me in both transit and upon arrival at the receiving facility  Additionally, I authorize the release of any and all medical records to the receiving facility and request they be transported with me, if possible  I understand that the safest mode of transportation during a medical emergency is an ambulance and that the Hospital advocates the use of this mode of transport  Risks of traveling to the receiving facility by car, including absence of medical control, life sustaining equipment, such as oxygen, and medical personnel has been explained to me and I fully understand them  (BRUCE CORRECT BOX BELOW)  [ x ]  I consent to the stated transfer and to be transported by ambulance/helicopter  [  ]  I consent to the stated transfer, but refuse transportation by ambulance and accept full responsibility for my transportation by car  I understand the risks of non-ambulance transfers and I exonerate the Hospital and its staff from any deterioration in my condition that results from this refusal     X___________________________________________    DATE  18  TIME________  Signature of patient or legally responsible individual signing on patient behalf           RELATIONSHIP TO PATIENT_________________________          Provider Certification    NAME Shilpa Saenz                                        [facility] 1935                              MRN [id number]    A medical screening exam was performed on the above named patient  Based on the examination:    Condition Necessitating Transfer The primary encounter diagnosis was Pericardial effusion   A diagnosis of Rapid atrial fibrillation [facility]) was also pertinent to this visit  Patient Condition: The patient has been stabilized such that within reasonable medical probability, no material deterioration of the patient condition or the condition of the unborn child(bruna) is likely to result from the transfer    Reason for Transfer: Level of Care needed not available at this facility    Transfer Requirements: Facility [facility]    · Space available and qualified personnel available for treatment as acknowledged by    · Agreed to accept transfer and to provide appropriate medical treatment as acknowledged by       [place]  · Appropriate medical records of the examination and treatment of the patient are provided at the time of transfer   [address] _______  · Transfer will be performed by qualified personnel from    and appropriate transfer equipment as required, including the use of necessary and appropriate life support measures  Provider Certification: I have examined the patient and explained the following risks and benefits of being transferred/refusing transfer to the patient/family:  General risk, such as traffic hazards, adverse weather conditions, rough terrain or turbulence, possible failure of equipment (including vehicle or aircraft), or consequences of actions of persons outside the control of the transport personnel      Based on these reasonable risks and benefits to the patient and/or the unborn child(bruna), and based upon the information available at the time of the patients examination, I certify that the medical benefits reasonably to be expected from the provision of appropriate medical treatments at another medical facility outweigh the increasing risks, if any, to the individuals medical condition, and in the case of labor to the unborn child, from effecting the transfer      X____________________________________________ DATE 07/23/18        TIME_______      ORIGINAL - SEND TO MEDICAL RECORDS   COPY - SEND WITH PATIENT DURING TRANSFER

## 2018-07-23 NOTE — PROGRESS NOTES
Per patient, she receives chemotherapy Keytruda infusions every 28 days  Her last infusion was one week ago, Monday July 16, 2018  Per patient she tolerated infusion well

## 2018-07-23 NOTE — PLAN OF CARE
CARDIOVASCULAR - ADULT     Maintains optimal cardiac output and hemodynamic stability Progressing     Absence of cardiac dysrhythmias or at baseline rhythm Progressing        DISCHARGE PLANNING     Discharge to home or other facility with appropriate resources Progressing        INFECTION - ADULT     Absence or prevention of progression during hospitalization Progressing     Absence of fever/infection during neutropenic period Progressing        METABOLIC, FLUID AND ELECTROLYTES - ADULT     Electrolytes maintained within normal limits Progressing     Fluid balance maintained Progressing     Glucose maintained within target range Progressing        PAIN - ADULT     Verbalizes/displays adequate comfort level or baseline comfort level Progressing        Potential for Falls     Patient will remain free of falls Progressing        RESPIRATORY - ADULT     Achieves optimal ventilation and oxygenation Progressing        SAFETY ADULT     Maintain or return to baseline ADL function Progressing     Maintain or return mobility status to optimal level Progressing

## 2018-07-23 NOTE — ED NOTES
Fentanyl IV given to patient  Dr Livier Sweet aware patient has Fentanyl patch applied  Vitals monitored        Michelle Stephens RN  07/23/18 7090

## 2018-07-23 NOTE — ED PROVIDER NOTES
History  Chief Complaint   Patient presents with    Atrial Fibrillation     80 y o  F w/ h/o lung CA on chemo, DM, HTN p/w SOB x 3-4h  Sudden onset  Felt lightheaded and nauseous with it  States she feels better now  Found to be in rapid afib which is new per patient  History provided by:  Patient   used: No    Atrial Fibrillation   Location:  Heart  Duration:  3 hours  Timing:  Constant  Chronicity:  New  Associated symptoms: nausea and shortness of breath    Associated symptoms: no abdominal pain, no chest pain, no congestion, no cough, no fever and no vomiting        Prior to Admission Medications   Prescriptions Last Dose Informant Patient Reported? Taking?    ALPRAZolam (XANAX) 0 5 mg tablet 7/22/2018 at Unknown time  Yes Yes   Sig: Take 0 5 mg by mouth daily at bedtime as needed for anxiety   Calcium Carb-Cholecalciferol (CALCIUM 600+D3 PO) 7/22/2018 at Unknown time  Yes Yes   Sig: Take 1 tablet by mouth daily   Chlorphen-Pseudoephed-APAP (CORICIDIN D PO) 7/22/2018 at Unknown time  Yes Yes   Sig: Take by mouth   HYDROcodone-acetaminophen (XODOL) 5-300 MG per tablet Unknown at Unknown time  Yes No   Sig: Take 1 tablet by mouth every 6 (six) hours as needed for moderate pain   Omega-3 Fatty Acids (FISH OIL) 500 MG CAPS Unknown at Unknown time  Yes No   Sig: Take by mouth daily   acetaminophen (TYLENOL) 500 mg tablet 7/23/2018 at Unknown time  Yes Yes   Sig: Take 500 mg by mouth   aspirin (ECOTRIN LOW STRENGTH) 81 mg EC tablet Unknown at Unknown time  Yes No   Sig: Take 81 mg by mouth daily   citalopram (CeleXA) 20 mg tablet 7/22/2018 at Unknown time  Yes Yes   Sig: Take 20 mg by mouth daily   cyanocobalamin 1000 MCG tablet Past Week at Unknown time  Yes Yes   Sig: Take 100 mcg by mouth daily   fentaNYL (DURAGESIC) 12 mcg/hr TD 72 hr patch 7/23/2018 at Unknown time  No Yes   Sig: Place 1 patch on the skin every third day Max Daily Amount: 1 patch   folic acid (FOLVITE) 1 mg tablet Unknown at Unknown time  Yes No   Sig: Take 1 mg by mouth daily   meclizine (ANTIVERT) 12 5 MG tablet Unknown at Unknown time  Yes No   Si-2 tabs 3 x daily as needed for dizziness   metFORMIN (GLUCOPHAGE) 500 mg tablet 2018 at Unknown time  Yes Yes   Sig: Take 500 mg by mouth daily with breakfast   metoprolol tartrate (LOPRESSOR) 50 mg tablet 2018 at Unknown time  Yes Yes   Sig: Take 25 mg by mouth every 12 (twelve) hours   ondansetron (ZOFRAN) 4 mg tablet 2018 at Unknown time  Yes Yes   Sig: Take 4 mg by mouth every 8 (eight) hours as needed for nausea or vomiting      Facility-Administered Medications: None       Past Medical History:   Diagnosis Date    Breast cancer ([facility] )     Left    Diabetes mellitus ([facility] )     Hypertension     Lung cancer ([facility] )        Past Surgical History:   Procedure Laterality Date    FRACTURE SURGERY      R arm surgery    MASTECTOMY      L breast with implant    REDUCTION MAMMAPLASTY      R breast       Family History   Problem Relation Age of Onset    Cancer Mother     Heart attack Mother     Cancer Father     Cancer Sister     Cancer Brother      I have reviewed and agree with the history as documented  Social History   Substance Use Topics    Smoking status: Former Smoker    Smokeless tobacco: Former User      Comment: 50 years ago quit    Alcohol use No        Review of Systems   Constitutional: Negative for chills, diaphoresis and fever  HENT: Negative for congestion  Respiratory: Positive for shortness of breath  Negative for cough and chest tightness  Cardiovascular: Negative for chest pain, palpitations and leg swelling  Gastrointestinal: Positive for nausea  Negative for abdominal pain and vomiting  Musculoskeletal: Negative for back pain and neck pain  Skin: Negative for pallor  Neurological: Positive for light-headedness  Negative for dizziness, syncope, facial asymmetry, speech difficulty, weakness and numbness     All other systems reviewed and are negative  Physical Exam  Physical Exam   Constitutional: She is oriented to person, place, and time  She appears well-developed and well-nourished  Non-toxic appearance  She does not have a sickly appearance  She does not appear ill  No distress  HENT:   Head: Normocephalic and atraumatic  Mouth/Throat: Oropharynx is clear and moist    Eyes: EOM are normal  Pupils are equal, round, and reactive to light  Neck: Normal range of motion  Neck supple  No JVD present  Cardiovascular: S1 normal, S2 normal, normal heart sounds, intact distal pulses and normal pulses  An irregularly irregular rhythm present  Tachycardia present  Exam reveals no gallop and no friction rub  No murmur heard  Pulmonary/Chest: Effort normal and breath sounds normal  No respiratory distress  She has no wheezes  She has no rales  She exhibits no tenderness  Abdominal: Soft  Bowel sounds are normal  She exhibits no distension  There is no tenderness  There is no rebound and no guarding  Neurological: She is alert and oriented to person, place, and time  She has normal strength  No cranial nerve deficit or sensory deficit  Skin: Skin is warm and dry  No rash noted  She is not diaphoretic  No pallor  Nursing note and vitals reviewed        Vital Signs  ED Triage Vitals   Temperature Pulse Respirations Blood Pressure SpO2   07/23/18 1145 07/23/18 1116 07/23/18 1116 07/23/18 1116 07/23/18 1116   (!) 97 4 °F (36 3 °C) (!) 144 20 99/67 94 %      Temp Source Heart Rate Source Patient Position - Orthostatic VS BP Location FiO2 (%)   07/23/18 1145 07/23/18 1116 07/23/18 1116 07/23/18 1116 --   Oral Monitor Lying Left arm       Pain Score       07/23/18 1116       No Pain           Vitals:    07/23/18 1615 07/23/18 1633 07/23/18 1645 07/23/18 1700   BP: 91/64 101/68 101/68 93/64   Pulse: (!) 116 (!) 126 (!) 118 (!) 118   Patient Position - Orthostatic VS: Sitting Lying Lying Lying       Visual Acuity      ED Medications  Medications   diltiazem (CARDIZEM) injection 15 mg (15 mg Intravenous Given 7/23/18 1138)   sodium chloride 0 9 % bolus 250 mL (0 mL Intravenous Stopped 7/23/18 1251)   diltiazem (CARDIZEM) 125 mg in sodium chloride 0 9 % 125 mL infusion (15 mg/hr Intravenous Rate/Dose Change 7/23/18 1418)   iodixanol (VISIPAQUE) 320 MG/ML injection 85 mL (85 mL Intravenous Given 7/23/18 1245)   acetaminophen (TYLENOL) tablet 650 mg (650 mg Oral Given 7/23/18 1446)   ondansetron (ZOFRAN) injection 4 mg (4 mg Intravenous Given 7/23/18 1604)   fentanyl citrate (PF) 100 MCG/2ML 25 mcg (25 mcg Intravenous Given 7/23/18 1638)       Diagnostic Studies  Results Reviewed     Procedure Component Value Units Date/Time    TSH, 3rd generation with Free T4 reflex [[id number]]  (Normal) Collected:  07/23/18 1128    Lab Status:  Final result Specimen:  Blood from Arm, Right Updated:  07/23/18 1326     TSH 3RD GENERATON 2 185 uIU/mL     Narrative:         Patients undergoing fluorescein dye angiography may retain small amounts of fluorescein in the body for 48-72 hours post procedure  Samples containing fluorescein can produce falsely depressed TSH values  If the patient had this procedure,a specimen should be resubmitted post fluorescein clearance            The recommended reference ranges for TSH during pregnancy are as follows:  First trimester 0 1 to 2 5 uIU/mL  Second trimester  0 2 to 3 0 uIU/mL  Third trimester 0 3 to 3 0 uIU/m      Magnesium [[id number]]  (Normal) Collected:  07/23/18 1128    Lab Status:  Final result Specimen:  Blood from Arm, Right Updated:  07/23/18 1233     Magnesium 1 9 mg/dL     B-type natriuretic peptide [[id number]]  (Normal) Collected:  07/23/18 1128    Lab Status:  Final result Specimen:  Blood from Arm, Right Updated:  07/23/18 1233     NT-proBNP 339 pg/mL     Troponin I [[id number]]  (Abnormal) Collected:  07/23/18 1128    Lab Status:  Final result Specimen:  Blood from Arm, Right Updated:  07/23/18 115 Troponin I 0 20 (H) ng/mL     Protime-INR [[id number]]  (Abnormal) Collected:  07/23/18 1128    Lab Status:  Final result Specimen:  Blood from Arm, Right Updated:  07/23/18 1151     Protime 14 6 (H) seconds      INR 1 13    APTT [[id number]]  (Normal) Collected:  07/23/18 1128    Lab Status:  Final result Specimen:  Blood from Arm, Right Updated:  07/23/18 1151     PTT 32 seconds     Comprehensive metabolic panel [[id number]]  (Abnormal) Collected:  07/23/18 1128    Lab Status:  Final result Specimen:  Blood from Arm, Right Updated:  07/23/18 1150     Sodium 137 mmol/L      Potassium 4 3 mmol/L      Chloride 100 mmol/L      CO2 20 (L) mmol/L      Anion Gap 17 (H) mmol/L      BUN 19 mg/dL      Creatinine 1 21 mg/dL      Glucose 224 (H) mg/dL      Calcium 9 9 mg/dL      AST 42 U/L      ALT 51 U/L      Alkaline Phosphatase 76 U/L      Total Protein 8 1 g/dL      Albumin 3 4 (L) g/dL      Total Bilirubin 1 72 (H) mg/dL      eGFR 42 ml/min/1 73sq m     Narrative:         National Kidney Disease Education Program recommendations are as follows:  GFR calculation is accurate only with a steady state creatinine  Chronic Kidney disease less than 60 ml/min/1 73 sq  meters  Kidney failure less than 15 ml/min/1 73 sq  meters      CBC and differential [[id number]]  (Abnormal) Collected:  07/23/18 1128    Lab Status:  Final result Specimen:  Blood from Arm, Right Updated:  07/23/18 1134     WBC 14 29 (H) Thousand/uL      RBC 4 75 Million/uL      Hemoglobin 14 8 g/dL      Hematocrit 45 3 %      MCV 95 fL      MCH 31 2 pg      MCHC 32 7 g/dL      RDW 13 8 %      MPV 11 9 fL      Platelets 361 Thousands/uL      nRBC 0 /100 WBCs      Neutrophils Relative 69 %      Lymphocytes Relative 19 %      Monocytes Relative 10 %      Eosinophils Relative 2 %      Basophils Relative 1 %      Neutrophils Absolute 9 87 (H) Thousands/µL      Lymphocytes Absolute 2 68 Thousands/µL      Monocytes Absolute 1 40 (H) Thousand/µL      Eosinophils Absolute 0 27 Thousand/µL      Basophils Absolute 0 07 Thousands/µL                  CTA ED chest PE study   Final Result by Jeffery Stern MD (07/23 9148)      Limited study due to degradation by motion plus streak artifact from the dense contrast in the SVC   There is no occlusive thrombus in the main pulmonary artery and central lobar branches of the pulmonary arteries      Development of moderate to large pericardial effusion  Nodular pleural metastatic disease with the few foci of the nonmeasurable metastatic disease becoming less prominent however there is increasing infiltration in the anterior mediastinum adjacent to the anterior left pleura along with few nodules in the    superior mediastinum suggesting new metastatic disease and progression      Increasing sclerosis noted in the T6 vertebra   Paratracheal lymph nodes are decreasing in size          I personally discussed this study with EDY GARCIA on 7/23/2018 at 2:17 PM                    Workstation performed: [id number]         XR chest portable   Final Result by Alicia Gaucher, MD (07/23 2141)      Abnormal widening of the mediastinum may represent increasing adenopathy  Left basilar airspace opacity may reflect atelectasis or infiltrate  Nodular pleural thickening left chest compatible with known underlying lung malignancy              Workstation performed: [id number]                    Procedures  ECG 12 Lead Documentation  Date/Time: 7/23/2018 11:25 AM  Performed by: Iris Phoenix by: Johanne Fothergill     Indications / Diagnosis:  SOB  ECG reviewed by me, the ED Provider: yes    Patient location:  ED  Previous ECG:     Previous ECG:  Unavailable  Rate:     ECG rate:  177    ECG rate assessment: tachycardic    Rhythm:     Rhythm: atrial fibrillation    QRS:     QRS intervals:  Normal  ST segments:     ST segments:  Normal  T waves:     T waves: normal      CriticalCare Time  Performed by: Iris Phoenix by: RACHEL AWAN     Critical care provider statement:     Critical care time (minutes):  75    Critical care time was exclusive of:  Separately billable procedures and treating other patients and teaching time    Critical care was necessary to treat or prevent imminent or life-threatening deterioration of the following conditions:  Circulatory failure    Critical care was time spent personally by me on the following activities:  Blood draw for specimens, obtaining history from patient or surrogate, development of treatment plan with patient or surrogate, discussions with consultants, evaluation of patient's response to treatment, examination of patient, ordering and performing treatments and interventions, ordering and review of laboratory studies, ordering and review of radiographic studies and re-evaluation of patient's condition    I assumed direction of critical care for this patient from another provider in my specialty: no    Comments:      Pt with new onset afib and pericardial effusion requiring transfer for CT surgery consult  Phone Contacts  ED Phone Contact    ED Course  ED Course as of Jul 23 1721   Mon Jul 23, 2018   1203 Troponin I: (!) 0 20   1429 Pt with large pericardial effusion  Will require transfer to [facility] for CT surgery consult  Transfer request placed to PACS      1455 Accepted by [facility] at [address] Time    Disposition  Final diagnoses:   Pericardial effusion   Rapid atrial fibrillation ([facility] )     Time reflects when diagnosis was documented in both MDM as applicable and the Disposition within this note     Time User Action Codes Description Comment    7/23/2018  2:57 PM Dylan Awan 48 [I31 3] Pericardial effusion     7/23/2018  2:57 PM Rachel Awan Add [I48 91] Rapid atrial fibrillation [facility])       ED Disposition     ED Disposition Condition Comment    Transfer to Another Facility-In Network  Accepted by Keely Gates MD Documentation Most Recent Value   Patient Condition  The patient has been stabilized such that within reasonable medical probability, no material deterioration of the patient condition or the condition of the unborn child(bruna) is likely to result from the transfer   Reason for Transfer  Level of Care needed not available at this facility   Benefits of Transfer  Specialized equipment and/or services available at the receiving facility (Include comment)________________________   Risks of Transfer  Potential for delay in receiving treatment, Potential deterioration of medical condition, Possible worsening of condition or death during transfer   Accepting Physician  [address] Name, Georgette Awan   Provider Certification  General risk, such as traffic hazards, adverse weather conditions, rough terrain or turbulence, possible failure of equipment (including vehicle or aircraft), or consequences of actions of persons outside the control of the transport personnel      RN Documentation      [address] Name, [address]   [facility]       Follow-up Information    None         Patient's Medications   Discharge Prescriptions    No medications on file     No discharge procedures on file      ED Provider  Electronically Signed by           Jaqueline Swanson 24, DO  07/23/18 7671

## 2018-07-23 NOTE — ED NOTES
Fentanyl patch on patients left upper back  Patch was placed at home       Dolores Lee RN  07/23/18 4984

## 2018-07-23 NOTE — ED NOTES
Patient rang call bell, patient vomited x1 and reporting nausea       Francisco Haile RN  07/23/18 2248

## 2018-07-24 ENCOUNTER — ANESTHESIA (INPATIENT)
Dept: PERIOP | Facility: HOSPITAL | Age: 83
DRG: 853 | End: 2018-07-24
Payer: MEDICARE

## 2018-07-24 ENCOUNTER — APPOINTMENT (INPATIENT)
Dept: RADIOLOGY | Facility: HOSPITAL | Age: 83
DRG: 853 | End: 2018-07-24
Payer: MEDICARE

## 2018-07-24 ENCOUNTER — ANESTHESIA EVENT (INPATIENT)
Dept: PERIOP | Facility: HOSPITAL | Age: 83
DRG: 853 | End: 2018-07-24
Payer: MEDICARE

## 2018-07-24 ENCOUNTER — TELEPHONE (OUTPATIENT)
Dept: HEMATOLOGY ONCOLOGY | Facility: CLINIC | Age: 83
End: 2018-07-24

## 2018-07-24 ENCOUNTER — APPOINTMENT (INPATIENT)
Dept: NON INVASIVE DIAGNOSTICS | Facility: HOSPITAL | Age: 83
DRG: 853 | End: 2018-07-24
Payer: MEDICARE

## 2018-07-24 PROBLEM — I21.4 NSTEMI (NON-ST ELEVATED MYOCARDIAL INFARCTION) (HCC): Status: ACTIVE | Noted: 2018-07-24

## 2018-07-24 PROBLEM — R10.11 RIGHT UPPER QUADRANT PAIN: Status: ACTIVE | Noted: 2018-07-24
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
## 2018-07-24 PROBLEM — F11.20 CONTINUOUS OPIOID DEPENDENCE (HCC): Status: ACTIVE | Noted: 2018-07-24

## 2018-07-24 PROBLEM — A41.9 SEPSIS (HCC): Status: ACTIVE | Noted: 2018-07-24

## 2018-07-24 PROBLEM — J18.9 HAP (HOSPITAL-ACQUIRED PNEUMONIA): Status: ACTIVE | Noted: 2018-07-24

## 2018-07-24 PROBLEM — N17.9 AKI (ACUTE KIDNEY INJURY) (HCC): Status: ACTIVE | Noted: 2018-07-24

## 2018-07-24 LAB
ABO GROUP BLD: NORMAL
ALBUMIN SERPL BCP-MCNC: 3.4 G/DL (ref 3.5–5)
ALBUMIN SERPL BCP-MCNC: 3.4 G/DL (ref 3.5–5)
ALP SERPL-CCNC: 61 U/L (ref 46–116)
ALP SERPL-CCNC: 68 U/L (ref 46–116)
ALT SERPL W P-5'-P-CCNC: 40 U/L (ref 12–78)
ALT SERPL W P-5'-P-CCNC: 44 U/L (ref 12–78)
ANION GAP SERPL CALCULATED.3IONS-SCNC: 11 MMOL/L (ref 4–13)
ANION GAP SERPL CALCULATED.3IONS-SCNC: 13 MMOL/L (ref 4–13)
ANION GAP SERPL CALCULATED.3IONS-SCNC: 7 MMOL/L (ref 4–13)
ARTERIAL PATENCY WRIST A: YES
AST SERPL W P-5'-P-CCNC: 26 U/L (ref 5–45)
AST SERPL W P-5'-P-CCNC: 29 U/L (ref 5–45)
ATRIAL RATE: 102 BPM
ATRIAL RATE: 75 BPM
BASE EXCESS BLDA CALC-SCNC: -6 MMOL/L
BASOPHILS # BLD AUTO: 0.07 THOUSANDS/ΜL (ref 0–0.1)
BASOPHILS # BLD AUTO: 0.07 THOUSANDS/ΜL (ref 0–0.1)
BASOPHILS # BLD AUTO: 0.09 THOUSANDS/ΜL (ref 0–0.1)
BASOPHILS NFR BLD AUTO: 1 % (ref 0–1)
BILIRUB DIRECT SERPL-MCNC: 0.41 MG/DL (ref 0–0.2)
BILIRUB SERPL-MCNC: 1.02 MG/DL (ref 0.2–1)
BILIRUB SERPL-MCNC: 1.29 MG/DL (ref 0.2–1)
BLD GP AB SCN SERPL QL: NEGATIVE
BUN SERPL-MCNC: 25 MG/DL (ref 5–25)
BUN SERPL-MCNC: 25 MG/DL (ref 5–25)
BUN SERPL-MCNC: 26 MG/DL (ref 5–25)
CALCIUM SERPL-MCNC: 8.9 MG/DL (ref 8.3–10.1)
CALCIUM SERPL-MCNC: 8.9 MG/DL (ref 8.3–10.1)
CALCIUM SERPL-MCNC: 9 MG/DL (ref 8.3–10.1)
CHLORIDE SERPL-SCNC: 101 MMOL/L (ref 100–108)
CHLORIDE SERPL-SCNC: 101 MMOL/L (ref 100–108)
CHLORIDE SERPL-SCNC: 104 MMOL/L (ref 100–108)
CO2 SERPL-SCNC: 20 MMOL/L (ref 21–32)
CO2 SERPL-SCNC: 20 MMOL/L (ref 21–32)
CO2 SERPL-SCNC: 22 MMOL/L (ref 21–32)
CREAT SERPL-MCNC: 1.03 MG/DL (ref 0.6–1.3)
CREAT SERPL-MCNC: 1.14 MG/DL (ref 0.6–1.3)
CREAT SERPL-MCNC: 1.32 MG/DL (ref 0.6–1.3)
EOSINOPHIL # BLD AUTO: 0.03 THOUSAND/ΜL (ref 0–0.61)
EOSINOPHIL # BLD AUTO: 0.1 THOUSAND/ΜL (ref 0–0.61)
EOSINOPHIL # BLD AUTO: 0.17 THOUSAND/ΜL (ref 0–0.61)
EOSINOPHIL NFR BLD AUTO: 0 % (ref 0–6)
EOSINOPHIL NFR BLD AUTO: 1 % (ref 0–6)
EOSINOPHIL NFR BLD AUTO: 1 % (ref 0–6)
ERYTHROCYTE [DISTWIDTH] IN BLOOD BY AUTOMATED COUNT: 13.3 % (ref 11.6–15.1)
ERYTHROCYTE [DISTWIDTH] IN BLOOD BY AUTOMATED COUNT: 13.3 % (ref 11.6–15.1)
ERYTHROCYTE [DISTWIDTH] IN BLOOD BY AUTOMATED COUNT: 13.4 % (ref 11.6–15.1)
GFR SERPL CREATININE-BSD FRML MDRD: 38 ML/MIN/1.73SQ M
GFR SERPL CREATININE-BSD FRML MDRD: 45 ML/MIN/1.73SQ M
GFR SERPL CREATININE-BSD FRML MDRD: 51 ML/MIN/1.73SQ M
GLUCOSE SERPL-MCNC: 167 MG/DL (ref 65–140)
GLUCOSE SERPL-MCNC: 174 MG/DL (ref 65–140)
GLUCOSE SERPL-MCNC: 182 MG/DL (ref 65–140)
GLUCOSE SERPL-MCNC: 196 MG/DL (ref 65–140)
GLUCOSE SERPL-MCNC: 215 MG/DL (ref 65–140)
GLUCOSE SERPL-MCNC: 233 MG/DL (ref 65–140)
GLUCOSE SERPL-MCNC: 236 MG/DL (ref 65–140)
HCO3 BLDA-SCNC: 16.9 MMOL/L (ref 22–28)
HCT VFR BLD AUTO: 40.1 % (ref 34.8–46.1)
HCT VFR BLD AUTO: 40.4 % (ref 34.8–46.1)
HCT VFR BLD AUTO: 42.5 % (ref 34.8–46.1)
HGB BLD-MCNC: 12.7 G/DL (ref 11.5–15.4)
HGB BLD-MCNC: 12.7 G/DL (ref 11.5–15.4)
HGB BLD-MCNC: 13.3 G/DL (ref 11.5–15.4)
IMM GRANULOCYTES # BLD AUTO: 0.09 THOUSAND/UL (ref 0–0.2)
IMM GRANULOCYTES # BLD AUTO: 0.09 THOUSAND/UL (ref 0–0.2)
IMM GRANULOCYTES # BLD AUTO: 0.15 THOUSAND/UL (ref 0–0.2)
IMM GRANULOCYTES NFR BLD AUTO: 1 % (ref 0–2)
INR PPP: 1.22 (ref 0.86–1.17)
LACTATE SERPL-SCNC: 2 MMOL/L (ref 0.5–2)
LACTATE SERPL-SCNC: 2.5 MMOL/L (ref 0.5–2)
LACTATE SERPL-SCNC: 2.6 MMOL/L (ref 0.5–2)
LACTATE SERPL-SCNC: 2.6 MMOL/L (ref 0.5–2)
LACTATE SERPL-SCNC: 5.4 MMOL/L (ref 0.5–2)
LYMPHOCYTES # BLD AUTO: 2.11 THOUSANDS/ΜL (ref 0.6–4.47)
LYMPHOCYTES # BLD AUTO: 2.49 THOUSANDS/ΜL (ref 0.6–4.47)
LYMPHOCYTES # BLD AUTO: 4.27 THOUSANDS/ΜL (ref 0.6–4.47)
LYMPHOCYTES NFR BLD AUTO: 16 % (ref 14–44)
LYMPHOCYTES NFR BLD AUTO: 19 % (ref 14–44)
LYMPHOCYTES NFR BLD AUTO: 28 % (ref 14–44)
MAGNESIUM SERPL-MCNC: 1.8 MG/DL (ref 1.6–2.6)
MAGNESIUM SERPL-MCNC: 1.8 MG/DL (ref 1.6–2.6)
MCH RBC QN AUTO: 30 PG (ref 26.8–34.3)
MCHC RBC AUTO-ENTMCNC: 31.3 G/DL (ref 31.4–37.4)
MCHC RBC AUTO-ENTMCNC: 31.4 G/DL (ref 31.4–37.4)
MCHC RBC AUTO-ENTMCNC: 31.7 G/DL (ref 31.4–37.4)
MCV RBC AUTO: 95 FL (ref 82–98)
MCV RBC AUTO: 95 FL (ref 82–98)
MCV RBC AUTO: 96 FL (ref 82–98)
MONOCYTES # BLD AUTO: 1.28 THOUSAND/ΜL (ref 0.17–1.22)
MONOCYTES # BLD AUTO: 1.4 THOUSAND/ΜL (ref 0.17–1.22)
MONOCYTES # BLD AUTO: 2.14 THOUSAND/ΜL (ref 0.17–1.22)
MONOCYTES NFR BLD AUTO: 10 % (ref 4–12)
MONOCYTES NFR BLD AUTO: 11 % (ref 4–12)
MONOCYTES NFR BLD AUTO: 14 % (ref 4–12)
NASAL CANNULA: 4
NEUTROPHILS # BLD AUTO: 8.78 THOUSANDS/ΜL (ref 1.85–7.62)
NEUTROPHILS # BLD AUTO: 9.12 THOUSANDS/ΜL (ref 1.85–7.62)
NEUTROPHILS # BLD AUTO: 9.42 THOUSANDS/ΜL (ref 1.85–7.62)
NEUTS SEG NFR BLD AUTO: 55 % (ref 43–75)
NEUTS SEG NFR BLD AUTO: 69 % (ref 43–75)
NEUTS SEG NFR BLD AUTO: 70 % (ref 43–75)
NRBC BLD AUTO-RTO: 0 /100 WBCS
O2 CT BLDA-SCNC: 18.2 ML/DL (ref 16–23)
OXYHGB MFR BLDA: 90.8 % (ref 94–97)
P AXIS: 37 DEGREES
P AXIS: 40 DEGREES
PCO2 BLDA: 27.2 MM HG (ref 36–44)
PH BLDA: 7.41 [PH] (ref 7.35–7.45)
PHOSPHATE SERPL-MCNC: 3.5 MG/DL (ref 2.3–4.1)
PHOSPHATE SERPL-MCNC: 3.8 MG/DL (ref 2.3–4.1)
PLATELET # BLD AUTO: 234 THOUSANDS/UL (ref 149–390)
PLATELET # BLD AUTO: 234 THOUSANDS/UL (ref 149–390)
PLATELET # BLD AUTO: 252 THOUSANDS/UL (ref 149–390)
PMV BLD AUTO: 10.9 FL (ref 8.9–12.7)
PMV BLD AUTO: 11.6 FL (ref 8.9–12.7)
PMV BLD AUTO: 11.7 FL (ref 8.9–12.7)
PO2 BLDA: 63.6 MM HG (ref 75–129)
POTASSIUM SERPL-SCNC: 3.9 MMOL/L (ref 3.5–5.3)
POTASSIUM SERPL-SCNC: 4.2 MMOL/L (ref 3.5–5.3)
POTASSIUM SERPL-SCNC: 4.2 MMOL/L (ref 3.5–5.3)
PR INTERVAL: 138 MS
PR INTERVAL: 152 MS
PROCALCITONIN SERPL-MCNC: 0.43 NG/ML
PROT SERPL-MCNC: 7.1 G/DL (ref 6.4–8.2)
PROT SERPL-MCNC: 7.5 G/DL (ref 6.4–8.2)
PROTHROMBIN TIME: 15.5 SECONDS (ref 11.8–14.2)
QRS AXIS: -12 DEGREES
QRS AXIS: -15 DEGREES
QRSD INTERVAL: 72 MS
QRSD INTERVAL: 72 MS
QT INTERVAL: 336 MS
QT INTERVAL: 410 MS
QTC INTERVAL: 437 MS
QTC INTERVAL: 457 MS
RBC # BLD AUTO: 4.23 MILLION/UL (ref 3.81–5.12)
RBC # BLD AUTO: 4.24 MILLION/UL (ref 3.81–5.12)
RBC # BLD AUTO: 4.43 MILLION/UL (ref 3.81–5.12)
RH BLD: POSITIVE
SODIUM SERPL-SCNC: 132 MMOL/L (ref 136–145)
SODIUM SERPL-SCNC: 133 MMOL/L (ref 136–145)
SODIUM SERPL-SCNC: 134 MMOL/L (ref 136–145)
SPECIMEN EXPIRATION DATE: NORMAL
SPECIMEN SOURCE: ABNORMAL
T WAVE AXIS: 23 DEGREES
T WAVE AXIS: 43 DEGREES
TROPONIN I SERPL-MCNC: 0.29 NG/ML
TROPONIN I SERPL-MCNC: 0.31 NG/ML
VENTRICULAR RATE: 102 BPM
VENTRICULAR RATE: 75 BPM
WBC # BLD AUTO: 13.08 THOUSAND/UL (ref 4.31–10.16)
WBC # BLD AUTO: 13.32 THOUSAND/UL (ref 4.31–10.16)
WBC # BLD AUTO: 15.47 THOUSAND/UL (ref 4.31–10.16)

## 2018-07-24 PROCEDURE — 87040 BLOOD CULTURE FOR BACTERIA: CPT | Performed by: PHYSICIAN ASSISTANT

## 2018-07-24 PROCEDURE — 82948 REAGENT STRIP/BLOOD GLUCOSE: CPT

## 2018-07-24 PROCEDURE — 33025 INCISION OF HEART SAC: CPT | Performed by: THORACIC SURGERY (CARDIOTHORACIC VASCULAR SURGERY)

## 2018-07-24 PROCEDURE — 84100 ASSAY OF PHOSPHORUS: CPT | Performed by: STUDENT IN AN ORGANIZED HEALTH CARE EDUCATION/TRAINING PROGRAM

## 2018-07-24 PROCEDURE — 87081 CULTURE SCREEN ONLY: CPT | Performed by: NURSE PRACTITIONER

## 2018-07-24 PROCEDURE — 71045 X-RAY EXAM CHEST 1 VIEW: CPT

## 2018-07-24 PROCEDURE — 94760 N-INVAS EAR/PLS OXIMETRY 1: CPT

## 2018-07-24 PROCEDURE — 80053 COMPREHEN METABOLIC PANEL: CPT | Performed by: PHYSICIAN ASSISTANT

## 2018-07-24 PROCEDURE — 93010 ELECTROCARDIOGRAM REPORT: CPT | Performed by: INTERNAL MEDICINE

## 2018-07-24 PROCEDURE — 85610 PROTHROMBIN TIME: CPT | Performed by: PHYSICIAN ASSISTANT

## 2018-07-24 PROCEDURE — 83735 ASSAY OF MAGNESIUM: CPT | Performed by: STUDENT IN AN ORGANIZED HEALTH CARE EDUCATION/TRAINING PROGRAM

## 2018-07-24 PROCEDURE — 99222 1ST HOSP IP/OBS MODERATE 55: CPT | Performed by: INTERNAL MEDICINE

## 2018-07-24 PROCEDURE — 94762 N-INVAS EAR/PLS OXIMTRY CONT: CPT

## 2018-07-24 PROCEDURE — 88112 CYTOPATH CELL ENHANCE TECH: CPT | Performed by: PATHOLOGY

## 2018-07-24 PROCEDURE — 87205 SMEAR GRAM STAIN: CPT | Performed by: THORACIC SURGERY (CARDIOTHORACIC VASCULAR SURGERY)

## 2018-07-24 PROCEDURE — 99233 SBSQ HOSP IP/OBS HIGH 50: CPT | Performed by: NURSE PRACTITIONER

## 2018-07-24 PROCEDURE — 93306 TTE W/DOPPLER COMPLETE: CPT | Performed by: INTERNAL MEDICINE

## 2018-07-24 PROCEDURE — 93306 TTE W/DOPPLER COMPLETE: CPT

## 2018-07-24 PROCEDURE — 94002 VENT MGMT INPAT INIT DAY: CPT

## 2018-07-24 PROCEDURE — 80048 BASIC METABOLIC PNL TOTAL CA: CPT | Performed by: STUDENT IN AN ORGANIZED HEALTH CARE EDUCATION/TRAINING PROGRAM

## 2018-07-24 PROCEDURE — 99223 1ST HOSP IP/OBS HIGH 75: CPT | Performed by: THORACIC SURGERY (CARDIOTHORACIC VASCULAR SURGERY)

## 2018-07-24 PROCEDURE — 93005 ELECTROCARDIOGRAM TRACING: CPT

## 2018-07-24 PROCEDURE — 80053 COMPREHEN METABOLIC PANEL: CPT | Performed by: INTERNAL MEDICINE

## 2018-07-24 PROCEDURE — 86901 BLOOD TYPING SEROLOGIC RH(D): CPT | Performed by: PHYSICIAN ASSISTANT

## 2018-07-24 PROCEDURE — 86850 RBC ANTIBODY SCREEN: CPT | Performed by: PHYSICIAN ASSISTANT

## 2018-07-24 PROCEDURE — 82805 BLOOD GASES W/O2 SATURATION: CPT | Performed by: PHYSICIAN ASSISTANT

## 2018-07-24 PROCEDURE — 99024 POSTOP FOLLOW-UP VISIT: CPT | Performed by: STUDENT IN AN ORGANIZED HEALTH CARE EDUCATION/TRAINING PROGRAM

## 2018-07-24 PROCEDURE — 88305 TISSUE EXAM BY PATHOLOGIST: CPT | Performed by: PATHOLOGY

## 2018-07-24 PROCEDURE — 87075 CULTR BACTERIA EXCEPT BLOOD: CPT | Performed by: THORACIC SURGERY (CARDIOTHORACIC VASCULAR SURGERY)

## 2018-07-24 PROCEDURE — 84145 PROCALCITONIN (PCT): CPT | Performed by: PHYSICIAN ASSISTANT

## 2018-07-24 PROCEDURE — 85025 COMPLETE CBC W/AUTO DIFF WBC: CPT | Performed by: INTERNAL MEDICINE

## 2018-07-24 PROCEDURE — 84484 ASSAY OF TROPONIN QUANT: CPT | Performed by: PHYSICIAN ASSISTANT

## 2018-07-24 PROCEDURE — 84100 ASSAY OF PHOSPHORUS: CPT | Performed by: INTERNAL MEDICINE

## 2018-07-24 PROCEDURE — 85025 COMPLETE CBC W/AUTO DIFF WBC: CPT | Performed by: STUDENT IN AN ORGANIZED HEALTH CARE EDUCATION/TRAINING PROGRAM

## 2018-07-24 PROCEDURE — 99222 1ST HOSP IP/OBS MODERATE 55: CPT | Performed by: FAMILY MEDICINE

## 2018-07-24 PROCEDURE — 83735 ASSAY OF MAGNESIUM: CPT | Performed by: INTERNAL MEDICINE

## 2018-07-24 PROCEDURE — 83605 ASSAY OF LACTIC ACID: CPT | Performed by: PHYSICIAN ASSISTANT

## 2018-07-24 PROCEDURE — 86900 BLOOD TYPING SEROLOGIC ABO: CPT | Performed by: PHYSICIAN ASSISTANT

## 2018-07-24 PROCEDURE — 82248 BILIRUBIN DIRECT: CPT | Performed by: PHYSICIAN ASSISTANT

## 2018-07-24 PROCEDURE — 87070 CULTURE OTHR SPECIMN AEROBIC: CPT | Performed by: THORACIC SURGERY (CARDIOTHORACIC VASCULAR SURGERY)

## 2018-07-24 PROCEDURE — 85025 COMPLETE CBC W/AUTO DIFF WBC: CPT | Performed by: PHYSICIAN ASSISTANT

## 2018-07-24 PROCEDURE — 0W9D00Z DRAINAGE OF PERICARDIAL CAVITY WITH DRAINAGE DEVICE, OPEN APPROACH: ICD-10-PCS | Performed by: THORACIC SURGERY (CARDIOTHORACIC VASCULAR SURGERY)

## 2018-07-24 PROCEDURE — 83605 ASSAY OF LACTIC ACID: CPT | Performed by: NURSE PRACTITIONER

## 2018-07-24 PROCEDURE — 93005 ELECTROCARDIOGRAM TRACING: CPT | Performed by: PHYSICIAN ASSISTANT

## 2018-07-24 RX ORDER — ALBUMIN, HUMAN INJ 5% 5 %
12.5 SOLUTION INTRAVENOUS ONCE
Status: COMPLETED | OUTPATIENT
Start: 2018-07-24 | End: 2018-07-24

## 2018-07-24 RX ORDER — MORPHINE SULFATE 2 MG/ML
2 INJECTION, SOLUTION INTRAMUSCULAR; INTRAVENOUS EVERY 4 HOURS PRN
Status: DISCONTINUED | OUTPATIENT
Start: 2018-07-24 | End: 2018-07-24

## 2018-07-24 RX ORDER — MORPHINE SULFATE 2 MG/ML
2 INJECTION, SOLUTION INTRAMUSCULAR; INTRAVENOUS EVERY 4 HOURS PRN
Status: DISCONTINUED | OUTPATIENT
Start: 2018-07-24 | End: 2018-07-25

## 2018-07-24 RX ORDER — FENTANYL CITRATE/PF 50 MCG/ML
50 SYRINGE (ML) INJECTION
Status: DISCONTINUED | OUTPATIENT
Start: 2018-07-24 | End: 2018-07-24

## 2018-07-24 RX ORDER — SODIUM CHLORIDE 9 MG/ML
100 INJECTION, SOLUTION INTRAVENOUS CONTINUOUS
Status: DISCONTINUED | OUTPATIENT
Start: 2018-07-24 | End: 2018-07-24

## 2018-07-24 RX ORDER — VANCOMYCIN HYDROCHLORIDE 1 G/200ML
INJECTION, SOLUTION INTRAVENOUS
Status: DISPENSED
Start: 2018-07-24 | End: 2018-07-24

## 2018-07-24 RX ORDER — MORPHINE SULFATE 4 MG/ML
4 INJECTION, SOLUTION INTRAMUSCULAR; INTRAVENOUS EVERY 4 HOURS PRN
Status: DISCONTINUED | OUTPATIENT
Start: 2018-07-24 | End: 2018-07-24

## 2018-07-24 RX ORDER — TRAMADOL HYDROCHLORIDE 50 MG/1
50 TABLET ORAL EVERY 4 HOURS PRN
Status: DISCONTINUED | OUTPATIENT
Start: 2018-07-24 | End: 2018-07-25

## 2018-07-24 RX ORDER — ROCURONIUM BROMIDE 10 MG/ML
INJECTION, SOLUTION INTRAVENOUS AS NEEDED
Status: DISCONTINUED | OUTPATIENT
Start: 2018-07-24 | End: 2018-07-24 | Stop reason: SURG

## 2018-07-24 RX ORDER — GLYCOPYRROLATE 0.2 MG/ML
INJECTION INTRAMUSCULAR; INTRAVENOUS AS NEEDED
Status: DISCONTINUED | OUTPATIENT
Start: 2018-07-24 | End: 2018-07-24 | Stop reason: SURG

## 2018-07-24 RX ORDER — FENTANYL CITRATE/PF 50 MCG/ML
25 SYRINGE (ML) INJECTION
Status: DISCONTINUED | OUTPATIENT
Start: 2018-07-24 | End: 2018-07-24 | Stop reason: HOSPADM

## 2018-07-24 RX ORDER — FENTANYL CITRATE 50 UG/ML
INJECTION, SOLUTION INTRAMUSCULAR; INTRAVENOUS AS NEEDED
Status: DISCONTINUED | OUTPATIENT
Start: 2018-07-24 | End: 2018-07-24 | Stop reason: SURG

## 2018-07-24 RX ORDER — ONDANSETRON 2 MG/ML
4 INJECTION INTRAMUSCULAR; INTRAVENOUS ONCE AS NEEDED
Status: DISCONTINUED | OUTPATIENT
Start: 2018-07-24 | End: 2018-07-24

## 2018-07-24 RX ORDER — TRAMADOL HYDROCHLORIDE 50 MG/1
50 TABLET ORAL EVERY 6 HOURS PRN
Status: DISCONTINUED | OUTPATIENT
Start: 2018-07-24 | End: 2018-07-24

## 2018-07-24 RX ORDER — ONDANSETRON 2 MG/ML
INJECTION INTRAMUSCULAR; INTRAVENOUS AS NEEDED
Status: DISCONTINUED | OUTPATIENT
Start: 2018-07-24 | End: 2018-07-24 | Stop reason: SURG

## 2018-07-24 RX ORDER — MAGNESIUM SULFATE HEPTAHYDRATE 40 MG/ML
2 INJECTION, SOLUTION INTRAVENOUS ONCE
Status: COMPLETED | OUTPATIENT
Start: 2018-07-24 | End: 2018-07-24

## 2018-07-24 RX ORDER — LIDOCAINE HYDROCHLORIDE 10 MG/ML
INJECTION, SOLUTION INFILTRATION; PERINEURAL AS NEEDED
Status: DISCONTINUED | OUTPATIENT
Start: 2018-07-24 | End: 2018-07-24 | Stop reason: SURG

## 2018-07-24 RX ORDER — SODIUM CHLORIDE 9 MG/ML
50 INJECTION, SOLUTION INTRAVENOUS CONTINUOUS
Status: DISCONTINUED | OUTPATIENT
Start: 2018-07-24 | End: 2018-07-24

## 2018-07-24 RX ORDER — VANCOMYCIN HYDROCHLORIDE 1 G/200ML
12.5 INJECTION, SOLUTION INTRAVENOUS ONCE
Status: COMPLETED | OUTPATIENT
Start: 2018-07-24 | End: 2018-07-24

## 2018-07-24 RX ORDER — PROPOFOL 10 MG/ML
INJECTION, EMULSION INTRAVENOUS AS NEEDED
Status: DISCONTINUED | OUTPATIENT
Start: 2018-07-24 | End: 2018-07-24 | Stop reason: SURG

## 2018-07-24 RX ORDER — SUCCINYLCHOLINE CHLORIDE 20 MG/ML
INJECTION INTRAMUSCULAR; INTRAVENOUS AS NEEDED
Status: DISCONTINUED | OUTPATIENT
Start: 2018-07-24 | End: 2018-07-24 | Stop reason: SURG

## 2018-07-24 RX ADMIN — ONDANSETRON 4 MG: 2 INJECTION, SOLUTION INTRAMUSCULAR; INTRAVENOUS at 00:09

## 2018-07-24 RX ADMIN — SODIUM CHLORIDE 250 ML: 0.9 INJECTION, SOLUTION INTRAVENOUS at 03:33

## 2018-07-24 RX ADMIN — FENTANYL CITRATE 25 MCG: 50 INJECTION, SOLUTION INTRAMUSCULAR; INTRAVENOUS at 15:51

## 2018-07-24 RX ADMIN — MAGNESIUM SULFATE HEPTAHYDRATE 2 G: 40 INJECTION, SOLUTION INTRAVENOUS at 19:33

## 2018-07-24 RX ADMIN — ROCURONIUM BROMIDE 30 MG: 10 INJECTION INTRAVENOUS at 12:31

## 2018-07-24 RX ADMIN — FOLIC ACID 1 MG: 1 TABLET ORAL at 08:15

## 2018-07-24 RX ADMIN — TRAMADOL HYDROCHLORIDE 50 MG: 50 TABLET, FILM COATED ORAL at 23:35

## 2018-07-24 RX ADMIN — Medication 150 MG: at 12:14

## 2018-07-24 RX ADMIN — ALBUMIN HUMAN 12.5 G: 0.05 INJECTION, SOLUTION INTRAVENOUS at 00:55

## 2018-07-24 RX ADMIN — FENTANYL CITRATE 25 MCG: 50 INJECTION, SOLUTION INTRAMUSCULAR; INTRAVENOUS at 15:40

## 2018-07-24 RX ADMIN — Medication 1 TABLET: at 08:14

## 2018-07-24 RX ADMIN — SODIUM CHLORIDE 250 ML: 0.9 INJECTION, SOLUTION INTRAVENOUS at 05:17

## 2018-07-24 RX ADMIN — LIDOCAINE HYDROCHLORIDE 50 MG: 10 INJECTION, SOLUTION INFILTRATION; PERINEURAL at 12:14

## 2018-07-24 RX ADMIN — MORPHINE SULFATE 2 MG: 2 INJECTION, SOLUTION INTRAMUSCULAR; INTRAVENOUS at 17:52

## 2018-07-24 RX ADMIN — VANCOMYCIN HYDROCHLORIDE 1000 MG: 1 INJECTION, SOLUTION INTRAVENOUS at 06:49

## 2018-07-24 RX ADMIN — GLYCOPYRROLATE 0.4 MG: 0.2 INJECTION, SOLUTION INTRAMUSCULAR; INTRAVENOUS at 13:45

## 2018-07-24 RX ADMIN — MECLIZINE HCL 12.5 MG 25 MG: 12.5 TABLET ORAL at 06:46

## 2018-07-24 RX ADMIN — DOCUSATE SODIUM 100 MG: 100 CAPSULE, LIQUID FILLED ORAL at 19:33

## 2018-07-24 RX ADMIN — MECLIZINE HCL 12.5 MG 25 MG: 12.5 TABLET ORAL at 22:33

## 2018-07-24 RX ADMIN — PROPOFOL 40 MG: 10 INJECTION, EMULSION INTRAVENOUS at 14:07

## 2018-07-24 RX ADMIN — SODIUM CHLORIDE 100 ML/HR: 0.9 INJECTION, SOLUTION INTRAVENOUS at 05:22

## 2018-07-24 RX ADMIN — DOCUSATE SODIUM 100 MG: 100 CAPSULE, LIQUID FILLED ORAL at 08:15

## 2018-07-24 RX ADMIN — SUCCINYLCHOLINE CHLORIDE 80 MG: 20 INJECTION, SOLUTION INTRAMUSCULAR; INTRAVENOUS at 12:14

## 2018-07-24 RX ADMIN — CITALOPRAM HYDROBROMIDE 20 MG: 20 TABLET ORAL at 08:15

## 2018-07-24 RX ADMIN — VITAM B12 100 MCG: 100 TAB at 08:15

## 2018-07-24 RX ADMIN — SODIUM CHLORIDE 100 ML/HR: 0.9 INJECTION, SOLUTION INTRAVENOUS at 15:24

## 2018-07-24 RX ADMIN — NEOSTIGMINE METHYLSULFATE 3 MG: 1 INJECTION, SOLUTION INTRAMUSCULAR; INTRAVENOUS; SUBCUTANEOUS at 13:45

## 2018-07-24 RX ADMIN — FENTANYL CITRATE 100 MCG: 50 INJECTION, SOLUTION INTRAMUSCULAR; INTRAVENOUS at 12:14

## 2018-07-24 RX ADMIN — CEFEPIME HYDROCHLORIDE 2000 MG: 2 INJECTION, POWDER, FOR SOLUTION INTRAVENOUS at 23:36

## 2018-07-24 RX ADMIN — FENTANYL CITRATE 25 MCG: 50 INJECTION, SOLUTION INTRAMUSCULAR; INTRAVENOUS at 16:25

## 2018-07-24 RX ADMIN — TRAMADOL HYDROCHLORIDE 50 MG: 50 TABLET, FILM COATED ORAL at 19:33

## 2018-07-24 RX ADMIN — SODIUM CHLORIDE 250 ML: 0.9 INJECTION, SOLUTION INTRAVENOUS at 00:51

## 2018-07-24 RX ADMIN — ONDANSETRON 4 MG: 2 INJECTION INTRAMUSCULAR; INTRAVENOUS at 13:43

## 2018-07-24 RX ADMIN — CEFEPIME HYDROCHLORIDE 1000 MG: 1 INJECTION, SOLUTION INTRAVENOUS at 06:24

## 2018-07-24 NOTE — ASSESSMENT & PLAN NOTE
· Likely type 2 secondary to demand from PNA/sepsis, AFIB, TANYA, pericardial effusion  · Trop 0 31; 0 29; 0 31  · Cards consult pending  · Echo pending   · No complaints of CP   · Repeat EKG now that she converted to NSR

## 2018-07-24 NOTE — CONSULTS
Consultation - Palliative Care   Sancho Colbert 80 y o  female MRN: [id number]  Unit/Bed#: [facility] 717-01 Encounter: [id number]      Assessment/Plan     Assessment:  Lung cancer stage IV  Atrial fib  Pericardial effusion  Cancer related pain  Shortness of breath  Decreased appetite  Hx of Left breast cancer   HTN  DM II    Plan:  Symptom management:  Shortness of breath - will reassess the severity of this symptom after her pericardial effusion is drained    Decreased appetite - likely due to increased SOB  Again will assess after her pericardial effusion is drained  Cancer related pain - continue fentanyl patch  Will make PRN opioids available in the post operative period    Goals of Care:  Level 1 in this abhishek and post operative period  When window is complete, will readdress  Patient and family have done no advanced care planning  Pt lives at home with her  and has 2 children - 1 daughter and 1 son who live locally  5 Wishes given to the daughter to start looking over  History of Present Illness   Physician Requesting Consult: Nanda Gonsales MD  Reason for Consult / Principal Problem: symptom management  HPI: Sancho Colbert is a 80y o  year old female with stage IV left sided lung cancer since May 2017  She is on immunotherapy with Pembrolizumab managed by Dr Kerry Sanford  She presented to the ER with worsening shortness of breath and was found ton be in afib with a pericardial effusion  She is scheduled to go to the OR today for a subxiphoid pericardial window  Today her daughter reports that she is not acting like herself this morning  She is more quiet  She reports that she is not short of breath at rest and only gets short of breath with exertion  She denies anxiety or depression  She states that she has been eating less over the past week    She wears a fentanyl patch on her left chest       Inpatient consult to Palliative Care  Consult performed by: Jenifer Paredes ordered by: Derik Rowell        Review of Systems   Constitutional: Positive for activity change, appetite change and fatigue  Respiratory: Positive for cough and shortness of breath  Cardiovascular: Positive for chest pain  Neurological: Positive for weakness  All other systems reviewed and are negative        Historical Information   Past Medical History:   Diagnosis Date    TANYA (acute kidney injury) ([place] [address] ) 7/24/2018    Breast cancer, left ([place] [address] )     Diabetes mellitus ([place] [address] )     HAP (hospital-acquired pneumonia) 7/24/2018    Hypertension     Lung cancer ([place] [address] )     NSTEMI (non-ST elevated myocardial infarction) ([place] [address] ) 7/24/2018     Past Surgical History:   Procedure Laterality Date    FRACTURE SURGERY      R arm surgery    MASTECTOMY      L breast with implant    REDUCTION MAMMAPLASTY      R breast     Social History     Social History    Marital status: /Civil Union     Spouse name: N/A    Number of children: N/A    Years of education: N/A     Social History Main Topics    Smoking status: Former Smoker    Smokeless tobacco: Former User      Comment: 48 years ago quit    Alcohol use No    Drug use: No    Sexual activity: No     Other Topics Concern    None     Social History Narrative    None     Family History   Problem Relation Age of Onset    Cancer Mother     Heart attack Mother     Cancer Father     Cancer Sister     Cancer Brother      Meds/Allergies   all current active meds have been reviewed, current meds:   Current Facility-Administered Medications   Medication Dose Route Frequency    acetaminophen (TYLENOL) tablet 650 mg  650 mg Oral Q6H PRN    ALPRAZolam (XANAX) tablet 0 5 mg  0 5 mg Oral HS PRN    calcium carbonate (OYSTER SHELL,OSCAL) 500 mg tablet 1 tablet  1 tablet Oral Daily With Breakfast    citalopram (CeleXA) tablet 20 mg  20 mg Oral Daily    cyanocobalamin (VITAMIN B-12) tablet 100 mcg  100 mcg Oral Daily    docusate sodium (COLACE) capsule 100 mg 100 mg Oral BID    fentaNYL (DURAGESIC) 12 mcg/hr TD 72 hr patch 1 patch  1 patch Transdermal Q72H    fish oil capsule 1,000 mg  1,000 mg Oral Daily    folic acid (FOLVITE) tablet 1 mg  1 mg Oral Daily    HYDROcodone-acetaminophen (NORCO) 5-325 mg per tablet 1 tablet  1 tablet Oral Q6H PRN    insulin lispro (HumaLOG) 100 units/mL subcutaneous injection 2-12 Units  2-12 Units Subcutaneous TID AC    meclizine (ANTIVERT) tablet 25 mg  25 mg Oral Q8H [address]    ondansetron (ZOFRAN) injection 4 mg  4 mg Intravenous Q6H PRN    ondansetron (ZOFRAN) oral solution 4 mg  4 mg Oral Q6H PRN    sodium chloride 0 9 % infusion  100 mL/hr Intravenous Continuous    and PTA meds:   Prior to Admission Medications   Prescriptions Last Dose Informant Patient Reported? Taking?    ALPRAZolam (XANAX) 0 5 mg tablet   Yes No   Sig: Take 0 5 mg by mouth daily at bedtime as needed for anxiety   Calcium Carb-Cholecalciferol (CALCIUM 600+D3 PO)   Yes No   Sig: Take 1 tablet by mouth daily   HYDROcodone-acetaminophen (XODOL) 5-300 MG per tablet   Yes No   Sig: Take 1 tablet by mouth every 6 (six) hours as needed for moderate pain   Omega-3 Fatty Acids (FISH OIL) 500 MG CAPS   Yes No   Sig: Take by mouth daily   acetaminophen (TYLENOL) 500 mg tablet   Yes No   Sig: Take 500 mg by mouth   citalopram (CeleXA) 20 mg tablet   Yes No   Sig: Take 20 mg by mouth daily   cyanocobalamin 1000 MCG tablet   Yes No   Sig: Take 100 mcg by mouth daily   fentaNYL (DURAGESIC) 12 mcg/hr TD 72 hr patch   No No   Sig: Place 1 patch on the skin every third day Max Daily Amount: 1 patch   folic acid (FOLVITE) 1 mg tablet   Yes No   Sig: Take 1 mg by mouth daily   meclizine (ANTIVERT) 12 5 MG tablet   Yes No   Si-2 tabs 3 x daily as needed for dizziness   metFORMIN (GLUCOPHAGE) 500 mg tablet   Yes No   Sig: Take 500 mg by mouth daily with breakfast   metoprolol tartrate (LOPRESSOR) 50 mg tablet   Yes No   Sig: Take 25 mg by mouth every 12 (twelve) hours ondansetron (ZOFRAN) 4 mg tablet   Yes No   Sig: Take 4 mg by mouth every 8 (eight) hours as needed for nausea or vomiting      Facility-Administered Medications: None     Allergies   Allergen Reactions    Atorvastatin Other (See Comments)    Benzonatate Other (See Comments)    Carboplatin      Pt had allergic reaction during 8th carbo dose    Sulfa Antibiotics     Bactrim [Sulfamethoxazole-Trimethoprim] Rash    Latex Rash    Other Rash     Pt states she gets a rash from surgical tape, she is ok with paper tape     Objective   Vitals:    07/23/18 2347 07/24/18 0004 07/24/18 0725 07/24/18 0738   BP: 113/73  103/79    BP Location: Right arm  Right arm    Pulse:  94 99    Resp:   20    Temp:   97 9 °F (36 6 °C)    TempSrc:   Oral    SpO2:   93% 93%   Weight:       Height:         Physical Exam  Constitutional: Appears well-developed and well-nourished  In no acute distress  Head: Normocephalic and atraumatic  Eyes: EOM are normal  Right eye exhibits no discharge  Left eye exhibits no discharge  No scleral icterus  Pulmonary/Chest: Mildly labored respirations noted even at rest   Oxygen via NC which is not required at the patients baseline  Abdominal: No distension  Musculoskeletal: No edema  Neurological: Alert, oriented  Skin: Skin is dry, not diaphoretic  Psychiatric: Patient at times tries to re-direct the conversation away from goals and advanced care planning  Pleasant but avoidant  Vitals reviewed  Lab Results:   I have personally reviewed pertinent labs  , CBC:   Lab Results   Component Value Date    WBC 13 08 (H) 07/24/2018    HGB 12 7 07/24/2018    HCT 40 4 07/24/2018    MCV 95 07/24/2018     07/24/2018    MCH 30 0 07/24/2018    MCHC 31 4 07/24/2018    RDW 13 4 07/24/2018    MPV 11 7 07/24/2018    NRBC 0 07/24/2018   , CMP:   Lab Results   Component Value Date     (L) 07/24/2018    K 4 2 07/24/2018     07/24/2018    CO2 20 (L) 07/24/2018    ANIONGAP 13 07/24/2018    BUN 25 07/24/2018    CREATININE 1 14 07/24/2018    GLUCOSE 196 (H) 07/24/2018    CALCIUM 8 9 07/24/2018    AST 26 07/24/2018    ALT 40 07/24/2018    ALKPHOS 61 07/24/2018    PROT 7 1 07/24/2018    BILITOT 1 29 (H) 07/24/2018    EGFR 45 07/24/2018     Imaging Studies: I have personally reviewed pertinent reports  Code Status: Level 1 - Full Code  Advance Directive and Living Will:      Power of :    POLST:      Counseling / Coordination of Care  Total floor / unit time spent today 60 minutes  Greater than 50% of total time was spent with the patient and / or family counseling and / or coordination of care  A description of the counseling / coordination of care: introduction to palliative care services offered to patient and daughter Shanna Mckeon as outlined above in plan

## 2018-07-24 NOTE — PERIOPERATIVE NURSING NOTE
Patient awake, oriented x4, slow to respond  Pt does follow commands  Dr Daksha Trejo at bedside, is ok with patient to go to floor as level 1 stepdown at this time

## 2018-07-24 NOTE — ASSESSMENT & PLAN NOTE
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

## 2018-07-24 NOTE — ASSESSMENT & PLAN NOTE
· Likely prerenal in the setting of sepsis and hypoperfusion   · Creat improved from 1 32 to 1 14 with ivfs   · Avoid nephrotoxic drugs and hypotension   · Continue NSS at 100ml/hr

## 2018-07-24 NOTE — PHYSICAL THERAPY NOTE
Physical Therapy Cancellation Note        PT orders noted and chart reviewed  Pt is currently off unit in OR pool and not available for evaluation  Will continue to follow and evaluate as appropriate     Jer Hernandez, PT,DPT

## 2018-07-24 NOTE — CONSULTS
Consultation - Medical Oncology   Cathy Cuellar 80 y o  female MRN: [id number]  Unit/Bed#: [facility] 926-15 Encounter: [id number]      Assessment/Plan   1) Lung adenocarcinoma, Stage IV - Presented in May 2017 with dyspnea, pleurisy, and dry cough  CTA of the chest demonstrated a large loculated left-sided pleural effusion with nodularity concerning for metastatic lung cancer  Fluid from the thoracentesis was positive for adenocarcinoma  Pathology of the left lower lobe mass was positive for TTF 1 and CK7  From 07/2017-11/2017 she finished 6 cycles of carboplatin and Alimta every 3 weeks  From 11/2017-04/2018 she was maintained on Alimta until there was progression of the disease  Core biopsy in 04/2018 demonstrated PD L1 expression 10%, and was initiated on Keytruda at 200mg every 3 weeks starting in 05/2018 after she was started on Madhavi plus Alimta again but developed an allergic reaction    - The patient had been previously doing well, she last saw Dr Morey Shone, her oncologist, on 06/21/2018 and at that time was very active  She was going to [employer] every Sunday and cooking for family  - CTA of the chest on this admission demonstrated stable lymphadenopathy in the mediastinum and elysia but progression of nodular pleural metastatic disease in the anterior and superior mediastinum    - At this point, we would not recommend changing her chemo regimen  Previous core biopsy demonstrated no mutational target of therapy  Recommend palliative care input for symptomatic management, we will see her in the outpatient setting once her pericardial effusion has resolved      History of Present Illness   Physician Requesting Consult: Bernardo Tinoco MD  Reason for Consult / Principal Problem: NSCLC, Stage IV  HPI: Cathy Cuellar is a 80y o  year old female with history of breast cancer status post mastectomy (not treated with chemo, radiation, or hormonal therapy) as well as stage IV lung adenocarcinoma who presented with worsening shortness of breath to the [facility] ED  She was found to be in AFib and started on a Cardizem drip, CTA of the chest demonstrated a moderate to large pericardial effusion  She was then transferred to [place] for consideration for pericardial window or pericardiocentesis  Denies fevers, chills, night sweats, weight loss, fatigue, head aches, dizziness, chest pain, palpitations, wheezing, abdominal pain, N/V, diarrhea, blood in the stool, hematuria, dysuria, lower extremity swelling  Complains of SOB and cough  Inpatient consult to Oncology     Performed by  Renee Rojas     Authorized by Clifford Toth               Review of Systems   Constitutional: Positive for appetite change and unexpected weight change  Negative for chills and fever  Respiratory: Positive for cough and shortness of breath  Negative for wheezing  Cardiovascular: Negative for chest pain and palpitations  Gastrointestinal: Negative for abdominal pain, blood in stool, constipation, diarrhea, nausea and vomiting  Genitourinary: Negative for dysuria, frequency, hematuria and urgency  Musculoskeletal: Negative for arthralgias and myalgias  Skin: Negative for rash  Neurological: Negative for dizziness, weakness, light-headedness, numbness and headaches         Historical Information   Past Medical History:   Diagnosis Date    [facility] )     Left    Diabetes mellitus ([facility] )     Hypertension     Lung cancer ([facility] )      Past Surgical History:   Procedure Laterality Date    FRACTURE SURGERY      R arm surgery    MASTECTOMY      L breast with implant    REDUCTION MAMMAPLASTY      R breast     Social History   History   Alcohol Use No     History   Drug Use No     History   Smoking Status    Former Smoker   Smokeless Tobacco    Former User     Comment: 48 years ago quit     Family History:   Family History   Problem Relation Age of Onset    Cancer Mother     Heart attack Mother     Cancer Father     Cancer Sister     Cancer Brother        Meds/Allergies   all current active meds have been reviewed, current meds:   Current Facility-Administered Medications   Medication Dose Route Frequency    acetaminophen (TYLENOL) tablet 650 mg  650 mg Oral Q6H PRN    ALPRAZolam (XANAX) tablet 0 5 mg  0 5 mg Oral HS PRN    calcium carbonate (OYSTER SHELL,OSCAL) 500 mg tablet 1 tablet  1 tablet Oral Daily With Breakfast    citalopram (CeleXA) tablet 20 mg  20 mg Oral Daily    cyanocobalamin (VITAMIN B-12) tablet 100 mcg  100 mcg Oral Daily    diltiazem (CARDIZEM) 125 mg in sodium chloride 0 9 % 125 mL infusion  1-15 mg/hr Intravenous Titrated    docusate sodium (COLACE) capsule 100 mg  100 mg Oral BID    fentaNYL (DURAGESIC) 12 mcg/hr TD 72 hr patch 1 patch  1 patch Transdermal Q72H    fish oil capsule 1,000 mg  1,000 mg Oral Daily    folic acid (FOLVITE) tablet 1 mg  1 mg Oral Daily    HYDROcodone-acetaminophen (NORCO) 5-325 mg per tablet 1 tablet  1 tablet Oral Q6H PRN    insulin lispro (HumaLOG) 100 units/mL subcutaneous injection 2-12 Units  2-12 Units Subcutaneous TID AC    meclizine (ANTIVERT) tablet 25 mg  25 mg Oral Q8H [address]    metoprolol tartrate (LOPRESSOR) tablet 25 mg  25 mg Oral Q12H [address]    ondansetron (ZOFRAN) injection 4 mg  4 mg Intravenous Q6H PRN    ondansetron (ZOFRAN) oral solution 4 mg  4 mg Oral Q6H PRN    sodium chloride 0 9 % infusion  100 mL/hr Intravenous Continuous    and PTA meds:   Prior to Admission Medications   Prescriptions Last Dose Informant Patient Reported? Taking?    ALPRAZolam (XANAX) 0 5 mg tablet   Yes No   Sig: Take 0 5 mg by mouth daily at bedtime as needed for anxiety   Calcium Carb-Cholecalciferol (CALCIUM 600+D3 PO)   Yes No   Sig: Take 1 tablet by mouth daily   HYDROcodone-acetaminophen (XODOL) 5-300 MG per tablet   Yes No   Sig: Take 1 tablet by mouth every 6 (six) hours as needed for moderate pain   Omega-3 Fatty Acids (FISH OIL) 500 MG CAPS   Yes No Sig: Take by mouth daily   acetaminophen (TYLENOL) 500 mg tablet   Yes No   Sig: Take 500 mg by mouth   citalopram (CeleXA) 20 mg tablet   Yes No   Sig: Take 20 mg by mouth daily   cyanocobalamin 1000 MCG tablet   Yes No   Sig: Take 100 mcg by mouth daily   fentaNYL (DURAGESIC) 12 mcg/hr TD 72 hr patch   No No   Sig: Place 1 patch on the skin every third day Max Daily Amount: 1 patch   folic acid (FOLVITE) 1 mg tablet   Yes No   Sig: Take 1 mg by mouth daily   meclizine (ANTIVERT) 12 5 MG tablet   Yes No   Si-2 tabs 3 x daily as needed for dizziness   metFORMIN (GLUCOPHAGE) 500 mg tablet   Yes No   Sig: Take 500 mg by mouth daily with breakfast   metoprolol tartrate (LOPRESSOR) 50 mg tablet   Yes No   Sig: Take 25 mg by mouth every 12 (twelve) hours   ondansetron (ZOFRAN) 4 mg tablet   Yes No   Sig: Take 4 mg by mouth every 8 (eight) hours as needed for nausea or vomiting      Facility-Administered Medications: None     Allergies   Allergen Reactions    Atorvastatin Other (See Comments)    Benzonatate Other (See Comments)    Carboplatin      Pt had allergic reaction during 8th carbo dose    Sulfa Antibiotics     Bactrim [Sulfamethoxazole-Trimethoprim] Rash    Latex Rash    Other Rash     Pt states she gets a rash from surgical tape, she is ok with paper tape       Objective   Vitals: Blood pressure 103/79, pulse 99, temperature 97 9 °F (36 6 °C), temperature source Oral, resp  rate 20, height 5' 1" (1 549 m), weight 71 2 kg (156 lb 15 5 oz), SpO2 93 %  Intake/Output Summary (Last 24 hours) at 18 0827  Last data filed at 18 9158   Gross per 24 hour   Intake                0 ml   Output              275 ml   Net             -275 ml     Invasive Devices     Peripheral Intravenous Line            Peripheral IV 18 Right Antecubital 1 day    Peripheral IV 18 Right Antecubital less than 1 day                Physical Exam   Constitutional: She is oriented to person, place, and time  She appears well-nourished  No distress  HENT:   Head: Normocephalic and atraumatic  Eyes: Right eye exhibits no discharge  Left eye exhibits no discharge  Neck: Normal range of motion  Neck supple  Cardiovascular: Normal rate, regular rhythm and normal heart sounds  Exam reveals no gallop and no friction rub  No murmur heard  Pulmonary/Chest: Effort normal and breath sounds normal  No respiratory distress  She has no wheezes  She has no rales  Abdominal: Soft  Bowel sounds are normal  She exhibits no distension  There is tenderness  There is guarding  There is no rebound  New RUQ pain   Musculoskeletal: Normal range of motion  Lymphadenopathy:     She has no cervical adenopathy  Neurological: She is alert and oriented to person, place, and time  Skin: Skin is warm and dry  No rash noted  She is not diaphoretic  No pallor  Psychiatric: She has a normal mood and affect  Her behavior is normal        Lab Results: I have reviewed all pertinent labs    Lab Results   Component Value Date    WBC 13 08 (H) 07/24/2018    HGB 12 7 07/24/2018    HCT 40 4 07/24/2018    MCV 95 07/24/2018     07/24/2018     Lab Results   Component Value Date     (L) 07/24/2018    K 4 2 07/24/2018     07/24/2018    CO2 20 (L) 07/24/2018    ANIONGAP 13 07/24/2018    BUN 25 07/24/2018    CREATININE 1 14 07/24/2018    GLUCOSE 196 (H) 07/24/2018    GLUF 101 (H) 04/18/2018    CALCIUM 8 9 07/24/2018    AST 26 07/24/2018    ALT 40 07/24/2018    ALKPHOS 61 07/24/2018    PROT 7 1 07/24/2018    BILITOT 1 29 (H) 07/24/2018    EGFR 45 07/24/2018     Lab Results   Component Value Date    [id number] 2 185 07/23/2018         Imaging Studies: I have personally reviewed pertinent films in PACS   CTA - CHEST WITH IV CONTRAST - PULMONARY ANGIOGRAM     INDICATION:   SOB, new onset afib, cancer pt      COMPARISON: March 15, 2018     TECHNIQUE: CTA examination of the chest was performed using angiographic technique according to a protocol specifically tailored to evaluate for pulmonary embolism  Axial, sagittal, and coronal 2D reformatted images were created from the source data and   submitted for interpretation  In addition, coronal 3D MIP postprocessing was performed on the acquisition scanner        Radiation dose length product (DLP) for this visit:  379 mGy-cm   This examination, like all CT scans performed in the [facility], was performed utilizing techniques to minimize radiation dose exposure, including the use of iterative   reconstruction and automated exposure control      IV Contrast:  85 mL of iodixanol (VISIPAQUE)     FINDINGS:     PULMONARY ARTERIAL TREE:  Evaluation is degraded by streak artifact from the dense contrast in the SVC  There is no occlusive pulmonary embolism seen in the main pulmonary artery and their central lobar branches  Evaluation for segmental subsegmental vessels limited     LUNGS: No acute consolidation seen      PLEURA:  Patient is known to have nodular the metastatic disease in the left pleura  The nodular implants in the left upper lobe have become more confluent  A nodular implant seen in the left upper lung, in image 76 of series 2, previously is larger  There is increasing consolidation seen at the left lung base  Mild pleural thickening is also noted on the right side     HEART/AORTA:  There is development of moderate large pericardial effusion, new from the previous study this demonstrates attenuation of 13 Hounsfield unit     MEDIASTINUM AND RAE:  Anterior mediastinal lymph nodes are seen  The largest a anterior mediastinal lymph node measures about 8 7 mm    Nodularity in the anterior mediastinum noted at the anterior aspect of the left hemithorax, new from the previous   study  Distention of the superior pericardial recess is seen  Lower right paratracheal lymph nodes minimal 9 mm are stable  Lower left paratracheal lymph nodes are stable  Subcarinal lymph nodes measuring 15 mm are stable     CHEST WALL AND LOWER NECK:   Unremarkable      VISUALIZED STRUCTURES IN THE UPPER ABDOMEN:  Liver, spleen, pancreas, right adrenal gland appear unremarkable     OSSEOUS STRUCTURES:  No acute compression collapse of the vertebra seen  Sclerotic density seen within the in the T6 vertebra suggest bony metastatic disease, this has become larger from the previous study, extending into the left pedicle  Mild sclerosis in the bilateral 2nd ribs is also noted     IMPRESSION:     Limited study due to degradation by motion plus streak artifact from the dense contrast in the SVC  There is no occlusive thrombus in the main pulmonary artery and central lobar branches of the pulmonary arteries     Development of moderate to large pericardial effusion      Nodular pleural metastatic disease with the few foci of the nonmeasurable metastatic disease becoming less prominent however there is increasing infiltration in the anterior mediastinum adjacent to the anterior left pleura along with few nodules in the   superior mediastinum suggesting new metastatic disease and progression     Increasing sclerosis noted in the T6 vertebra  Paratracheal lymph nodes are decreasing in size      EKG, Pathology, and Other Studies: I have personally reviewed pertinent reports  VTE Prophylaxis: Sequential compression device (Venodyne)     Code Status: Level 1 - Full Code    Counseling / Coordination of Care  Total floor / unit time spent today 60 minutes  Greater than 50% of total time was spent with the patient and / or family counseling and / or coordination of care  A description of the counseling / coordination of care:  Natural history of disease, potential treatment options, mediation counseling, setting up follow-up care

## 2018-07-24 NOTE — ASSESSMENT & PLAN NOTE
No results found for: HGBA1C    No results for input(s): POCGLU in the last 72 hours  Blood Sugar Average: Last 72 hrs: Will obtain HgA1C  Fingerstick glucose monitoring  On metformin at home

## 2018-07-24 NOTE — CONSULTS
Consultation - Cardiology   Nehemias Mandel 80 y o  female MRN: [id number]  Unit/Bed#: [facility] 717-01 Encounter: [id number]    Assessment/Plan     1  Pericardial Effusion: likely due to malignancy  -Pericardial window would be the best management in her case, her pericardial effusion is likely to recur given her cancer    -followup echocardiogram  -Cardiothoracic surgery is consulted  2  Atrial Fibrillation: new onset Afib  -On ED admission rapid afib was discovered, HR at 144  -Afib rvr most likely a compensation   -consider anticoagulation chads score above 2, xarelto   -Maintain IV fluids  -avoid drugs that can drop BP  -discontinue diltiazem drip and metoprolol    3  Elevated troponin  Admission 0 20, plateau at 8 70 overnight  Possible Nstemi type 2  -Patient is not having any chest pain right now  4  Hypertension:  -hold metoprolol for now    5  Lung Cancer  -Stage 4 adenocarcinoma diagnosed June 2017  Tx with pembrolizumab, Dr Emily Christiansen is her oncologist     6  Possible Pneumonia  -elevated WBC 14 29 on admission  -imaging showed left infiltrate        History of Present Illness   Physician Requesting Consult: Juan Novoa MD  Reason for Consult / Principal Problem: Afib and Pericardial effusion  HPI: Nehemias Mandel is a 80y o  year old female with PMHx of lung cancer, HTN, DM, who presented with sudden onset SOB, to the [facility] ED  Patient stated she also felt nauseous and light headed  She was found to be in rapid afib and a CT scan of her chest showed a moderate pericardial effusion  She was transferred to [facility] for further management and Cardiothoracic surgery consult  Her afib was treated with a diltiazem drip, which dropped her rate from 140 to 110, but resulted in hypotension down to 89/65 overnight  Since then her BP has been soft but stable, HR around 94-99            Inpatient consult to Cardiology     Performed by  Sonam Caro by Glory Sas Review of Systems   Constitutional: Positive for fatigue  HENT: Negative for congestion, postnasal drip, sinus pain and sore throat  Respiratory: Negative for cough and chest tightness  Cardiovascular: Negative for chest pain, palpitations and leg swelling  Gastrointestinal: Positive for abdominal pain  Negative for abdominal distention and nausea  Musculoskeletal: Negative for arthralgias and back pain         Historical Information   Past Medical History:   Diagnosis Date    Breast cancer ([facility] )     Left    Diabetes mellitus ([facility] )     Hypertension     Lung cancer ([facility] )      Past Surgical History:   Procedure Laterality Date    FRACTURE SURGERY      R arm surgery    MASTECTOMY      L breast with implant    REDUCTION MAMMAPLASTY      R breast     History   Alcohol Use No     History   Drug Use No     History   Smoking Status    Former Smoker   Smokeless Tobacco    Former User     Comment: 48 years ago quit     Family History:   Family History   Problem Relation Age of Onset    Cancer Mother     Heart attack Mother     Cancer Father     Cancer Sister     Cancer Brother        Meds/Allergies   current meds:   Current Facility-Administered Medications   Medication Dose Route Frequency    acetaminophen (TYLENOL) tablet 650 mg  650 mg Oral Q6H PRN    ALPRAZolam (XANAX) tablet 0 5 mg  0 5 mg Oral HS PRN    calcium carbonate (OYSTER SHELL,OSCAL) 500 mg tablet 1 tablet  1 tablet Oral Daily With Breakfast    citalopram (CeleXA) tablet 20 mg  20 mg Oral Daily    cyanocobalamin (VITAMIN B-12) tablet 100 mcg  100 mcg Oral Daily    diltiazem (CARDIZEM) 125 mg in sodium chloride 0 9 % 125 mL infusion  1-15 mg/hr Intravenous Titrated    docusate sodium (COLACE) capsule 100 mg  100 mg Oral BID    fentaNYL (DURAGESIC) 12 mcg/hr TD 72 hr patch 1 patch  1 patch Transdermal Q72H    fish oil capsule 1,000 mg  1,000 mg Oral Daily    folic acid (FOLVITE) tablet 1 mg  1 mg Oral Daily    HYDROcodone-acetaminophen (NORCO) 5-325 mg per tablet 1 tablet  1 tablet Oral Q6H PRN    insulin lispro (HumaLOG) 100 units/mL subcutaneous injection 2-12 Units  2-12 Units Subcutaneous TID AC    meclizine (ANTIVERT) tablet 25 mg  25 mg Oral Q8H [facility]    metoprolol tartrate (LOPRESSOR) tablet 25 mg  25 mg Oral Q12H [facility]    ondansetron (ZOFRAN) injection 4 mg  4 mg Intravenous Q6H PRN    ondansetron (ZOFRAN) oral solution 4 mg  4 mg Oral Q6H PRN    sodium chloride 0 9 % infusion  100 mL/hr Intravenous Continuous    and PTA meds:   Prior to Admission Medications   Prescriptions Last Dose Informant Patient Reported? Taking?    ALPRAZolam (XANAX) 0 5 mg tablet   Yes No   Sig: Take 0 5 mg by mouth daily at bedtime as needed for anxiety   Calcium Carb-Cholecalciferol (CALCIUM 600+D3 PO)   Yes No   Sig: Take 1 tablet by mouth daily   HYDROcodone-acetaminophen (XODOL) 5-300 MG per tablet   Yes No   Sig: Take 1 tablet by mouth every 6 (six) hours as needed for moderate pain   Omega-3 Fatty Acids (FISH OIL) 500 MG CAPS   Yes No   Sig: Take by mouth daily   acetaminophen (TYLENOL) 500 mg tablet   Yes No   Sig: Take 500 mg by mouth   citalopram (CeleXA) 20 mg tablet   Yes No   Sig: Take 20 mg by mouth daily   cyanocobalamin 1000 MCG tablet   Yes No   Sig: Take 100 mcg by mouth daily   fentaNYL (DURAGESIC) 12 mcg/hr TD 72 hr patch   No No   Sig: Place 1 patch on the skin every third day Max Daily Amount: 1 patch   folic acid (FOLVITE) 1 mg tablet   Yes No   Sig: Take 1 mg by mouth daily   meclizine (ANTIVERT) 12 5 MG tablet   Yes No   Si-2 tabs 3 x daily as needed for dizziness   metFORMIN (GLUCOPHAGE) 500 mg tablet   Yes No   Sig: Take 500 mg by mouth daily with breakfast   metoprolol tartrate (LOPRESSOR) 50 mg tablet   Yes No   Sig: Take 25 mg by mouth every 12 (twelve) hours   ondansetron (ZOFRAN) 4 mg tablet   Yes No   Sig: Take 4 mg by mouth every 8 (eight) hours as needed for nausea or vomiting Facility-Administered Medications: None     Allergies   Allergen Reactions    Atorvastatin Other (See Comments)    Benzonatate Other (See Comments)    Carboplatin      Pt had allergic reaction during 8th carbo dose    Sulfa Antibiotics     Bactrim [Sulfamethoxazole-Trimethoprim] Rash    Latex Rash    Other Rash     Pt states she gets a rash from surgical tape, she is ok with paper tape       Objective   Vitals: Blood pressure 103/79, pulse 99, temperature 97 9 °F (36 6 °C), temperature source Oral, resp  rate 20, height 5' 1" (1 549 m), weight 71 2 kg (156 lb 15 5 oz), SpO2 93 %  Orthostatic Blood Pressures      Most Recent Value   Blood Pressure  103/79 filed at 07/24/2018 0725   Patient Position - Orthostatic VS  Lying filed at 07/24/2018 0725            Intake/Output Summary (Last 24 hours) at 07/24/18 0834  Last data filed at 07/24/18 0804   Gross per 24 hour   Intake                0 ml   Output              275 ml   Net             -275 ml       Invasive Devices     Peripheral Intravenous Line            Peripheral IV 07/23/18 Right Antecubital 1 day    Peripheral IV 07/23/18 Right Antecubital less than 1 day                Physical Exam   Constitutional: She is oriented to person, place, and time  She appears well-developed and well-nourished  HENT:   Head: Normocephalic  Neck: No JVD present  Cardiovascular: Normal rate, regular rhythm and intact distal pulses  Exam reveals friction rub  Exam reveals no gallop  Pulmonary/Chest: She has wheezes  She has no rales  She exhibits no tenderness  Abdominal: She exhibits no distension  There is tenderness  There is no rebound and no guarding  Musculoskeletal: She exhibits no edema or tenderness  Neurological: She is alert and oriented to person, place, and time         Lab Results:   CBC with diff:   Results from last 7 days  Lab Units 07/24/18  0556   WBC Thousand/uL 13 08*   RBC Million/uL 4 24   HEMOGLOBIN g/dL 12 7   HEMATOCRIT % 40 4 MCV fL 95   MCH pg 30 0   MCHC g/dL 31 4   RDW % 13 4   MPV fL 11 7   PLATELETS Thousands/uL 234     CMP:   Results from last 7 days  Lab Units 07/24/18  0556   SODIUM mmol/L 134*   POTASSIUM mmol/L 4 2   CHLORIDE mmol/L 101   CO2 mmol/L 20*   ANION GAP mmol/L 13   BUN mg/dL 25   CREATININE mg/dL 1 14   GLUCOSE RANDOM mg/dL 196*   CALCIUM mg/dL 8 9   AST U/L 26   ALT U/L 40   ALK PHOS U/L 61   TOTAL PROTEIN g/dL 7 1   BILIRUBIN TOTAL mg/dL 1 29*   EGFR ml/min/1 73sq m 45     Troponin:   0  Lab Value Date/Time   TROPONINI 0 31 (H) 07/24/2018 0041   TROPONINI 0 29 (H) 07/23/2018 2346   TROPONINI 0 31 (H) 07/23/2018 2048   TROPONINI 0 20 (H) 07/23/2018 1128     BNP:   Results from last 7 days  Lab Units 07/24/18  0556   SODIUM mmol/L 134*   POTASSIUM mmol/L 4 2   CHLORIDE mmol/L 101   CO2 mmol/L 20*   ANION GAP mmol/L 13   BUN mg/dL 25   CREATININE mg/dL 1 14   GLUCOSE RANDOM mg/dL 196*   CALCIUM mg/dL 8 9   EGFR ml/min/1 73sq m 45     Coags:   Results from last 7 days  Lab Units 07/24/18  0041 07/23/18  1128   PTT seconds  --  32   INR  1 22* 1 13     TSH:   Results from last 7 days  Lab Units 07/23/18  1128   TSH 3RD GENERATON uIU/mL 2 185     Magnesium:   Results from last 7 days  Lab Units 07/24/18  0556   MAGNESIUM mg/dL 1 8     Lipid Profile:     Imaging: I have personally reviewed pertinent reports      EKG: reviewed  VTE Prophylaxis: Sequential compression device Ambrocio Morris)     Code Status: Level 1 - Full Code  Advance Directive and Living Will:      Power of :    POLST:

## 2018-07-24 NOTE — ASSESSMENT & PLAN NOTE
· Likely secondary to PNA -- pt presented with leukocytosis, hypotension, tachycardia, tachypnea  · Follow cultures: blood, urine strep/legionella  · Check MRSA swab and sputum culture  · Follow lactic acid levels until 2 0/<  · C/w IVFs   · IV abxs for PNA  · Check RUQ US

## 2018-07-24 NOTE — PERIOPERATIVE NURSING NOTE
Patient tubed, patient attempting to situ up in stretcher  Dr Gerhardt Monks called and at bedside   Patient extubated, tolerated well  8 liter simple face mask applied to patient, patient 02 saturations mid 90s on simple face mask

## 2018-07-24 NOTE — ASSESSMENT & PLAN NOTE
· Hypotensive in the setting of sepsis and pericardial effusion   · Hold BP medications and monitor blood pressure closely

## 2018-07-24 NOTE — ANESTHESIA POSTPROCEDURE EVALUATION
Post-Op Assessment Note      CV Status:  Stable    Hydration Status:  Stable    PONV Controlled:  None    Airway Patency:  Patent  Airway: intubated    Post Op Vitals Reviewed: Yes          Staff: Anesthesiologist, CRNA           /65 (07/24/18 1424)    Temp 97 7 °F (36 5 °C) (07/24/18 1424)    Pulse 100 (07/24/18 1424)   Resp (!) 11 (07/24/18 1424)    SpO2 95 % (07/24/18 1424)

## 2018-07-24 NOTE — ANESTHESIA PROCEDURE NOTES
Arterial Line Insertion  Date/Time: 7/24/2018 12:22 PM  Performed by: Bisi Washington  Authorized by: Bisi Washington   Consent: Verbal consent obtained  Written consent obtained  Risks and benefits: risks, benefits and alternatives were discussed  Consent given by: patient  Patient understanding: patient states understanding of the procedure being performed  Patient consent: the patient's understanding of the procedure matches consent given  Procedure consent: procedure consent matches procedure scheduled  Patient identity confirmed: arm band  Time out: Immediately prior to procedure a "time out" was called to verify the correct patient, procedure, equipment, support staff and site/side marked as required  Preparation: Patient was prepped and draped in the usual sterile fashion    Indications: hemodynamic monitoring  Orientation:  Right  Location: radial artery  Sedation:  Patient sedated: yes  Sedation type: (GETA)      Procedure Details:  Needle gauge: 20  Seldinger technique: Seldinger technique used  Number of attempts: 1    Post-procedure:  Post-procedure: dressing applied  Waveform: good waveform  Patient tolerance: Patient tolerated the procedure well with no immediate complications

## 2018-07-24 NOTE — ASSESSMENT & PLAN NOTE
· Thoracic surgery consult appreciated  · CT scan revealed moderate to large pericardial effusion  · ECHO pending  · Pericardial effusion likely malignant  · Plan for pericardial window today   · Send fluid for cytology and culture

## 2018-07-24 NOTE — OCCUPATIONAL THERAPY NOTE
Occupational Therapy         Patient Name: Simi Gaming  USDNK'L Date: 7/24/2018      OT consult received and chart review completed  Pt  cx'd this PM as pt  In OR will cont  To follow       MICHAEL Mejia, OTR/L

## 2018-07-24 NOTE — PROGRESS NOTES
Patient had been off cardizem gtt for approx 20 mins upon waiting for new bag via pharmacy  SLIM made aware  Upon bag being restarted patient heart rate remained 150s and sustaining  SLIM will contact cardiology  Patient has no complaints at this time  Will continue to monitor

## 2018-07-24 NOTE — ASSESSMENT & PLAN NOTE
· initially on Cardizem gtt, pt converted around midnight and GTT was discontinued at that time   · Cardiology consult pending   · Monitor on telemetry

## 2018-07-24 NOTE — OP NOTE
OPERATIVE REPORT  PATIENT NAME: Geronimo Short    :  1935  MRN: [id number]  Pt Location: [facility] OR ROOM 09    SURGERY DATE: 2018    Surgeon(s) and Role:     * Lydia Betancur MD - Primary     * Jefferson Ganser, MD - Assisting    Preop Diagnosis:  Pericardial effusion [I31 3]    Post-Op Diagnosis Codes:     * Pericardial effusion [I31 3]    Procedure(s) (LRB):  WINDOW PERICARDIAL  Subxyphoid approach  (N/A)    Specimen(s):  ID Type Source Tests Collected by Time Destination   1 : PERICARDIAL FLUID Body Fluid Pericardial Fluid NON-GYNECOLOGIC CYTOLOGY Lydia Betancur MD 2018 1308    A : PERICARDIAL FUID Body Fluid Pericardial Fluid ANAEROBIC CULTURE AND GRAM STAIN, BODY FLUID CULTURE AND GRAM STAIN Lydia Betancur MD 2018 1318        Estimated Blood Loss:   10 mL    Drains:  Closed/Suction Drain Midline Chest Other (Comment) 24 Fr  (Active)   Number of days: 0       Anesthesia Type:   General    Operative Indications:  Pericardial effusion [I31 3]  Rachel vences is a 72-year-old female with past medical history of stage IV left lower lobe adenocarcinoma on chemotherapy  She presented yesterday with shortness of breath, rapid atrial fibrillation and hypotension  She was stabilized medically, started on a Cardizem drip and given IV fluid resuscitation  CT showed a moderate pericardial effusion  Transthoracic echocardiogram today showed a large pericardial effusion with minimal right ventricle collapse  There is no significant tamponade as of yet  Extensive discussions were had with the patient and her family  We recommended surgical intervention for this symptomatic, likely malignant, pericardial effusion  All risks and benefits were explained to the patient and she consented to an operative pericardial window  Operative Findings:  500 mL of a bloody pericardial effusion was drained  This was sent for cytology, Gram stain and culture      Complications:   None    Procedure and Technique:  After obtaining informed consent from the patient she was transferred to the operating room  Bilateral SCDs were placed and general anesthesia was induced  A single-lumen endotracheal tube was placed without incident  Anesthesia placed an arterial line for invasive monitoring  The patient was prepped with ChloraPrep and time was allowed for this to dry  She was draped in standard surgical fashion  A formal time-out was performed at this time verifying patient, date of birth, procedure, perioperative antibiotics, beta-blockers as indicated and the surgical plan  A 4 cm midline subxiphoid incision was made  Electrocautery was used through the soft tissue down to the anterior rectus fascia  Midline linea alba was identified and the fascia was  with electrocautery  The rectus muscles were split in the midline and the peritoneum was pushed away bluntly  A small portion of the patient's xiphoid was removed to aid in visualization  She had a high riding liver requiring mobilization and pushing this structure further down  With a sponge stick retracted down where able to identify the pericardium  This was sharply incised returning a bloody pericardial fluid  50 mL of this was obtained with a Lukens tube and sent for cytology, Gram stain and culture  The pericardial window was opened to about 1 cm x 1 cm  With gentle suction the remainder of the pericardium was suctioned out  In total 500 mL of a bloody pericardial fluid was removed  Satisfied with our drainage a 25 Bel[place]ian channel drain was placed through a separate right lateral stab incision and tunneled into our incision and into the base of the pericardial space  The anterior rectus fascia was then closed over this in a continuous 0 Vicryl layer  Soft tissue was closed in layers with running 3 0 Vicryl and 4 Monocryl  Steri-Strips were placed over the incision as well as a Tegaderm    The pericardial drain was secured with a drain stitch and drain sponge was placed  All needle, instrument, and sponge counts were correct at this time  The drapes were removed and the patient was attempted to be extubated at this time  After some time in the operating room the patient was spontaneously breathing but pulling poor tidal volumes  Anesthesia felt she needed more time to x-ray and was transported to the post operative care unit intubated  Family was updated at this time  I was present for the entire procedure    Patient Disposition:  intubated and hemodynamically stable      SIGNATURE: Stephenie Thibodeaux MD  DATE: July 24, 2018  TIME: 2:34 PM

## 2018-07-24 NOTE — ASSESSMENT & PLAN NOTE
· Pt recieves chemo every 3 weeks-- most recent 7/16  · Chest xray and CT scan concerning for LLL infiltrate   · Given 1x dose of cefepime and vanco this am   · Will continue with cefepime and vanco   · procalcitonin level 0 43-- recheck in am   · Follow cultures: Blood, urine strep/legionella, MRSA swab  · Check sputum culture   · Continuous pulse ox   · Aspiration precautions

## 2018-07-24 NOTE — ASSESSMENT & PLAN NOTE
Noted in [place] ED, with low blood pressures and tachycardic to 144  Started on diltiazem drip, currently rate about 110   Blood pressure is soft  Okay to continue drip at current rate, if blood pressure drops <90 or heart rate is >140s discuss with cardiology  Monitor on telemetry  If symptomatic dizziness, headache, chest pain

## 2018-07-24 NOTE — CONSULTS
Consultation - Genaro Au 80 y o  female MRN: [id number]  Unit/Bed#: [facility] 703-01 Encounter: [id number]    Assessment/Plan     Assessment:  Patient is an 80F With a moderate to large pericardial effusion seen on CTA and new onset shortness breath    Plan: Will discuss case with attending-in rapid afib -110, but vitals currently stable on 2L NC with no respiratory distress or symptoms currently, no emergent surgery needed  Will likely need ECHO to evaluate for tamponade physiology  F/u cardiology  F/u oncology  F/u palliative  Primary care per SLIM      History of Present Illness     HPI:  Jabari Shelby is a 80 y o  female who presents with diabetes,  History of breast cancer status post mastectomy and radiation in 1992, with stage IV adenocarcinoma of her left lung who presents with two days of shortness breath and dizziness with ambulation  She said she also had episode of back and left shoulder pain and clamminess yesterday which resolved on its own, she does have a history of chronic left shoulder pain for which she gets injections  She does not wear oxygen at home, but has had several episodes of shortness of breath in the past   She is currently undergoing chemotherapy for her lung cancer with Pembrolizumab   CTA shows a moderate to large pericardial effusion, she said she has had a 7 lb weight loss last several months  She has new onset of AFib with RVR this admission  She has had a history of malignant pleural effusion in the past which is need to be drained  Inpatient consult to Cardiothoracic Surgery     Date/Time 7/23/2018 9:06 PM     Performed by  Bert ANGUIANO     Authorized by Henry Concepcion              Review of Systems   Constitutional: Positive for activity change and unexpected weight change  Negative for appetite change and fever  HENT: Negative  Eyes: Negative  Respiratory: Positive for shortness of breath   Negative for chest tightness and wheezing  Cardiovascular: Negative for chest pain, palpitations and leg swelling  Gastrointestinal: Positive for vomiting  Negative for abdominal distention, abdominal pain and nausea  Genitourinary: Negative  Musculoskeletal: Positive for arthralgias  Skin: Negative  Neurological: Positive for dizziness  Negative for syncope and numbness  Psychiatric/Behavioral: Negative for agitation         Historical Information   Past Medical History:   Diagnosis Date    [facility] cancer ([facility] )     Left    Diabetes mellitus ([facility] )     Hypertension     Lung cancer ([facility] )      Past Surgical History:   Procedure Laterality Date    FRACTURE SURGERY      R arm surgery    MASTECTOMY      L breast with implant    REDUCTION MAMMAPLASTY      R breast     Social History   History   Alcohol Use No     History   Drug Use No     History   Smoking Status    Former Smoker   Smokeless Tobacco    Former User     Comment: 50 years ago quit     Family History: non-contributory    Meds/Allergies   all current active meds have been reviewed  Allergies   Allergen Reactions    Atorvastatin Other (See Comments)    Benzonatate Other (See Comments)    Carboplatin      Pt had allergic reaction during 8th carbo dose    Sulfa Antibiotics     Bactrim [Sulfamethoxazole-Trimethoprim] Rash    Latex Rash    Other Rash     Pt states she gets a rash from surgical tape, she is ok with paper tape       Objective   First Vitals:   Blood Pressure: 101/69 (07/23/18 1819)  Pulse: (!) 123 (07/23/18 1819)  Temperature: 98 3 °F (36 8 °C) (07/23/18 1819)  Temp Source: Oral (07/23/18 1819)  Respirations: 20 (07/23/18 1819)  Height: 5' 1" (154 9 cm) (07/23/18 1819)  Weight - Scale: 71 2 kg (156 lb 15 5 oz) (Bed scale ) (07/23/18 1819)  SpO2: 95 % (07/23/18 1819)    Current Vitals:   Blood Pressure: 100/76 (07/23/18 2050)  Pulse: 98 (07/23/18 2050)  Temperature: 98 1 °F (36 7 °C) (07/23/18 1908)  Temp Source: Oral (07/23/18 1908)  Respirations: 22 (07/23/18 2050)  Height: 5' 1" (154 9 cm) (07/23/18 1819)  Weight - Scale: 71 2 kg (156 lb 15 5 oz) (Bed scale ) (07/23/18 1819)  SpO2: 95 % (07/23/18 2050)    No intake or output data in the 24 hours ending 07/23/18 2106    Invasive Devices     Peripheral Intravenous Line            Peripheral IV 07/23/18 Right Antecubital less than 1 day    Peripheral IV 07/23/18 Right Antecubital less than 1 day                Physical Exam   Constitutional: She appears well-developed and well-nourished  HENT:   Head: Normocephalic and atraumatic  Eyes: Pupils are equal, round, and reactive to light  Neck: Normal range of motion  Cardiovascular:   Tachycardic, irregularly irregular   Pulmonary/Chest: Effort normal  No respiratory distress  2L NC   Abdominal: Soft  She exhibits no distension  There is no tenderness  Musculoskeletal: She exhibits no edema  Neurological: She is alert  Skin: Skin is warm and dry  Lab Results:   I have personally reviewed pertinent lab results  , CBC:   Lab Results   Component Value Date    WBC 14 29 (H) 07/23/2018    HGB 14 8 07/23/2018    HCT 45 3 07/23/2018    MCV 95 07/23/2018     07/23/2018    MCH 31 2 07/23/2018    MCHC 32 7 07/23/2018    RDW 13 8 07/23/2018    MPV 11 9 07/23/2018    NRBC 0 07/23/2018   , CMP:   Lab Results   Component Value Date     07/23/2018    K 4 3 07/23/2018     07/23/2018    CO2 20 (L) 07/23/2018    ANIONGAP 17 (H) 07/23/2018    BUN 19 07/23/2018    CREATININE 1 21 07/23/2018    GLUCOSE 224 (H) 07/23/2018    CALCIUM 9 9 07/23/2018    AST 42 07/23/2018    ALT 51 07/23/2018    ALKPHOS 76 07/23/2018    PROT 8 1 07/23/2018    BILITOT 1 72 (H) 07/23/2018    EGFR 42 07/23/2018     Imaging: I have personally reviewed pertinent reports  EKG, Pathology, and Other Studies: I have personally reviewed pertinent reports

## 2018-07-24 NOTE — ANESTHESIA PREPROCEDURE EVALUATION
Review of Systems/Medical History  Patient summary reviewed  Chart reviewed  No history of anesthetic complications     Cardiovascular  Hypertension , Dysrhythmias (new-onset) , atrial fibrillation,   Comment: Pericardial effusion,  Pulmonary    Comment: Stage IV left lower lobe adenocarcinoma     GI/Hepatic            Endo/Other  Diabetes type 2 Oral agent,      GYN    Breast cancer        Hematology   Musculoskeletal       Neurology   Psychology           Physical Exam    Airway    Mallampati score: II  TM Distance: >3 FB  Neck ROM: full     Dental       Cardiovascular  Rhythm: regular, No murmur,     Pulmonary  Breath sounds clear to auscultation,     Other Findings      7/24/18 TTE:  LEFT VENTRICLE: The cavity was small  Systolic function was vigorous  Ejection fraction was estimated to be 70 %  Although no diagnostic regional wall motion abnormality was identified, this possibility cannot be completely excluded on the  basis of this study  Wall thickness was mildly increased  The changes were consistent with concentric remodeling (increased wall thickness with normal wall mass)  DOPPLER: Doppler parameters were consistent with abnormal left ventricular  relaxation (grade 1 diastolic dysfunction)      RIGHT VENTRICLE: The size was normal  Systolic function was normal      LEFT ATRIUM: Size was normal      RIGHT ATRIUM: Size was normal      MITRAL VALVE: Valve structure was normal  There was normal leaflet separation  DOPPLER: The transmitral velocity was within the normal range  There was no evidence for stenosis  There was trace regurgitation      AORTIC VALVE: The valve was probably trileaflet  Leaflets exhibited normal cuspal separation and sclerosis  DOPPLER: Transaortic velocity was within the normal range  There was no evidence for stenosis  There was no regurgitation      TRICUSPID VALVE: DOPPLER: The transtricuspid velocity was within the normal range  There was no evidence for stenosis   There was trace regurgitation  The tricuspid jet envelope definition was inadequate for estimation of RV systolic  pressure      PULMONIC VALVE: DOPPLER: The transpulmonic velocity was within the normal range  There was no evidence for stenosis  There was no significant regurgitation      PERICARDIUM: A large, loculated pericardial effusion was identified circumferential to the heart  There was an associated hematomas at the apex and along the right atrium  There was minimal evidence of hemodynamic compromise with very  early diastolic collapse of the right ventricle      AORTA: The root exhibited normal size      SYSTEMIC VEINS: IVC: The inferior vena cava was dilated  Respirophasic changes were blunted (less than 50% variation)  Anesthesia Plan  ASA Score- 3 Emergent    Anesthesia Type- general with ASA Monitors  Additional Monitors: arterial line  Airway Plan: ETT  Comment: Ketamine induction  Plan Factors-    Induction- intravenous  Postoperative Plan- Plan for postoperative opioid use  Planned trial extubation    Informed Consent- Anesthetic plan and risks discussed with patient

## 2018-07-24 NOTE — ASSESSMENT & PLAN NOTE
No results found for: HGBA1C    Recent Labs      07/24/18   0024  07/24/18   0653   POCGLU  215*  233*       Blood Sugar Average: Last 72 hrs:  (P) 224    · Check HgA1C  · SSI and accuchecks   · Hold OHG: metformin

## 2018-07-24 NOTE — CASE MANAGEMENT
Initial Clinical Review    Admission: Date/Time/Statement: 7/23/18 @ 1753     Orders Placed This Encounter   Procedures    Inpatient Admission     Standing Status:   Standing     Number of Occurrences:   1     Order Specific Question:   Admitting Physician     Answer:   Peri Dougherty [[id number]]     Order Specific Question:   Level of Care     Answer:   Med Surg [[id number]]     Order Specific Question:   Estimated length of stay     Answer:   More than 2 Midnights     Order Specific Question:   Certification     Answer:   I certify that inpatient services are medically necessary for this patient for a duration of greater than two midnights  See H&P and MD Progress Notes for additional information about the patient's course of treatment  7/23/2018 (6 hours)  [facility] Emergency Department   Pericardial effusion   Rapid atrial fibrillation ([address] )   TRANSFER TO [facility]  IV DILTIAZEM GTT, IV ZOFRAN , IV FENTANYL, IVF   9%  MG      History of Illness:     Paul Peterson is a 80 y o  female who presents with sob, which was worse than before  She has lung cancer with malignant pleural effusion, follows with Dr Elva Jackson  She is on pembrolizumab as chemotherapy in June 2018  She was taken to [facility] ED  There she was found to be in atrial fibrillation  She was started on diltiazem drip    Her heart rate is under control       She was transferred to [place], for possible pericardial window or cardiocentesis           ED Vital Signs:   ED Triage Vitals [07/23/18 1819]   Temperature Pulse Respirations Blood Pressure SpO2   98 3 °F (36 8 °C) (!) 123 20 101/69 95 %   Pain Score       No Pain       07/23/18 71 2 kg (156 lb 15 5 oz)       Vital Signs (abnormal):    07/23/18 2312  98 3 °F (36 8 °C)   152  22   89/65  93 %  Nasal cannula  Lying       Abnormal Labs/Diagnostic Test Results    Troponin I 0 31 (H)     Troponin I 0 29 (H)     Troponin I 0 31 (H)         Radial, Right     pH, Arterial 7 412    pCO2, Arterial 27 2 (L) mm Hg    pO2, Arterial 63 6 (L) mm Hg    HCO3, Arterial 16 9 (L) mmol/L    O2 HGB,Arterial  90 8 (L) %     WBC 15 47 (H)     Bilirubin, Direct 0 41 (H)     Total Bilirubin 1 29 (H)       LACTIC ACID 2 5 (HH)      Procalcitonin 0 43 (H)     GREATER than 0 5 ng/mL:   increased likelihood for bacterial sepsis; antibiotics ENCOURAGED       CTA  CHEST PE STUDY   IMPRESSION:     Limited study due to degradation by motion plus streak artifact from the dense contrast in the SVC  There is no occlusive thrombus in the main pulmonary artery and central lobar branches of the pulmonary arteries     Development of moderate to large pericardial effusion      Nodular pleural metastatic disease with the few foci of the nonmeasurable metastatic disease becoming less prominent however there is increasing infiltration in the anterior mediastinum adjacent to the anterior left pleura along with few nodules in the superior mediastinum suggesting new metastatic disease and progression     Increasing sclerosis noted in the T6 vertebra  Paratracheal lymph nodes are decreasing in size      Past Medical History:   Diagnosis Date    [facility]ANYA [facility] ) 7/24/2018    [facility], left ([facility] )     Diabetes mellitus ([facility] )     HAP (hospital-acquired pneumonia) 7/24/2018    Hypertension     Lung cancer ([facility] )     NSTEMI (non-ST elevated myocardial infarction) ([facility] ) 7/24/2018       Admitting Diagnosis: Pericardial effusion [I31 3]    Age/Sex: 80 y o  female    Assessment/Plan:   Atrial fibrillation ([facility] )   Assessment & Plan     Noted in [place] ED, with low blood pressures and tachycardic to 144  Started on diltiazem drip, currently rate about 110   Blood pressure is soft  Okay to continue drip at current rate, if blood pressure drops <90 or heart rate is >140s discuss with cardiology  Monitor on telemetry  If symptomatic dizziness, headache, chest pain             Pericardial effusion   Assessment & Plan     She has lung cancer, this might be malignant  She is not feeling dizziness, or having chest pain  Continue with oxygen keep>92%  Monitor in and out closely  Cardiothoracic surgery consulted  Keeping NPO at midnight     Diabetes mellitus ([address] )   Assessment & Plan     No results found for: HGBA1C     No results for input(s): POCGLU in the last 72 hours      Blood Sugar Average: Last 72 hrs:     Will obtain HgA1C  Fingerstick glucose monitoring  On metformin at home           Lung cancer [facility])   Assessment & Plan     Follows with Dr Jessy Garcia outside, Stage IV adenocarcinoma of the lung   She is being treated with Pembrolizumab         CARDIOTHORACIC SURGERY   Assessment:  Patient is an 80F With a moderate to large pericardial effusion seen on CTA and new onset shortness breath     Plan: Will discuss case with attending-in rapid afib -110, but vitals currently stable on 2L NC with no respiratory distress or symptoms currently, no emergent surgery needed  Will likely need ECHO to evaluate for tamponade physiology  F/u cardiology  F/u oncology  F/u palliative  Primary care per SLIM       Anesthesia Start Date/Time: 07/24/18 1209   Procedure: WINDOW PERICARDIAL  Subxyphoid approach  (N/A Chest)   Anesthesia type: general   Diagnosis: Pericardial effusion [I31 3]       Admission Orders:    NPO   TELEMETRY  Case request operating room: WINDOW PERICARDIAL   Subxyphoid approach  ASPIRATION PRECAUTIONS  SEQUENTIAL COMPRESSION DEVICE  CONSULT ONCOLOGY  CONSULT PALLIATIVE CARE   CONTINUOUS PULSE OXIMETRY         Scheduled Meds:   IV ALBUMIN X2  IV CEFEPIME  Q12

## 2018-07-24 NOTE — PERIOPERATIVE NURSING NOTE
Daughter and son at bedside to see patient  Patient able to say son's name, pt admits to being too tired to answer further questions at this time

## 2018-07-24 NOTE — PROGRESS NOTES
Progress Note - Thoracic Surgery   Nick Concepcion 80 y o  female MRN: [id number]  Unit/Bed#: [facility] 717-01 Encounter: [id number]    Assessment:  Pt is an 82y  o  F moderate to large pericardial effusion seen on CTA and new onset shortness breath    Plan:  F/u ECHO to evaluate for tamponade physiology  Monitor vital signs closely  F/u cardiology  F/u oncology  F/u palliative  F/u blood cx and workup for a pneumonia, trend lactic acid  Consider RUQ U/S for new RUQ pain in setting of leukocytosis   Primary per SLIM    Subjective/Objective   Chief Complaint:     Subjective: Patient started the night on Cardizem drip and given dose of lopressor for afib with RVR, which has since resolved and patient is in sinus rhythm  She also had an episode of lethargy and clamminess which resolved with an increase in NC from 2L->5L, repeat labs show worsening TANYA, leukocytosis, a base deficit of -6, and lactic acidosis, increased troponin up to 0 31  She was given 2X 5% albumin and 500cc NS for hypotension  She denies subjective SOB and coughing, but has new onset RUQ abdominal pain this am      Objective:     Blood pressure 113/73, pulse 94, temperature 98 3 °F (36 8 °C), temperature source Oral, resp  rate 22, height 5' 1" (1 549 m), weight 71 2 kg (156 lb 15 5 oz), SpO2 93 %  ,Body mass index is 29 66 kg/m²  Intake/Output Summary (Last 24 hours) at 07/24/18 0454  Last data filed at 07/23/18 2201   Gross per 24 hour   Intake                0 ml   Output              125 ml   Net             -125 ml       Invasive Devices     Peripheral Intravenous Line            Peripheral IV 07/23/18 Right Antecubital 1 day    Peripheral IV 07/23/18 Right Antecubital less than 1 day                Physical Exam: NAD  AAOX3  Normal respiratory effort, 5L NC  RRR  Soft, TTP RUQ, ND  No c/c/e      Lab, Imaging and other studies:  I have personally reviewed pertinent lab results    , CBC:   Lab Results   Component Value Date    WBC 15 47 (H) 07/24/2018    HGB 13 3 07/24/2018    HCT 42 5 07/24/2018    MCV 96 07/24/2018     07/24/2018    MCH 30 0 07/24/2018    MCHC 31 3 (L) 07/24/2018    RDW 13 3 07/24/2018    MPV 11 6 07/24/2018    NRBC 0 07/24/2018   , CMP:   Lab Results   Component Value Date     (L) 07/24/2018    K 4 2 07/24/2018     07/24/2018    CO2 20 (L) 07/24/2018    ANIONGAP 11 07/24/2018    BUN 25 07/24/2018    CREATININE 1 32 (H) 07/24/2018    GLUCOSE 236 (H) 07/24/2018    CALCIUM 9 0 07/24/2018    AST 29 07/24/2018    ALT 44 07/24/2018    ALKPHOS 68 07/24/2018    PROT 7 5 07/24/2018    BILITOT 1 02 (H) 07/24/2018    EGFR 38 07/24/2018     VTE Mechanical Prophylaxis: sequential compression device

## 2018-07-24 NOTE — PROGRESS NOTES
Notified by nursing that pt is lethargic and clammy, unable to obtain manual BP reading  Pt seen and examined  States she feels sleepy  On exam, palpable radial pulses, extremities warm to touch  Pt AAOx3 however falls back asleep quickly  Cardiac RRR, lungs with inspiratory crackles at bases b/l  Moving all 4 extremities, no focal deficits noted  Previously on 2L NC now 5L satting 92%  CBC, CMP, CXR, ECG, lactic acid, trop, ABG ordered given elevated AG noted on most recent BMP  ABG 7 4/27/63/16 9  WBC 15 47 from 14 this AM however pt afebrile  CMP shows worsening TANYA Cr 1 32, baseline appears to be ~0 6  ECG NSR 75 bpm   BP now 106/62 s/p 12 5 g 5% albumin and 250 cc NS bolus  Cardizem gtt on hold  Lactic acid obtained 5 4 however informed that tourniquet was used  Will redraw lactic acid and give additional 250 cc bolus  If repeat lactic elevated will obtain BCs and administer dose of empiric antibiotics  Update level of care to SD2, continuous pulse ox  A/P: Hypotensive episode possibly d/t cardizem gtt and 1x dose of lopressor for Afib RVR however concern for possible infectious process given elevated WBC  Pt also evaluated by thoracic surgery earlier this evening for pericardial effusion on CT chest however no emergent interventions warranted  Addendum: Lactic acid 2 5, will start IVF, f/u repeat lactic  Concern for pneumonia given leukocytosis, worsening L lung base opacity on CXR and hypoxia, will administer dose of empiric antibiotics

## 2018-07-24 NOTE — PROGRESS NOTES
Progress Note - Cathy Cuellar 1935, 80 y o  female MRN: [id number]    Unit/Bed#: [facility] 717-01 Encounter: [id number]    Primary Care Provider: Romaine Lopez DO   Date and time admitted to hospital: 7/23/2018  5:53 PM        * Pericardial effusion   Assessment & Plan    · Thoracic surgery consult appreciated  · CT scan revealed moderate to large pericardial effusion  · ECHO pending  · Pericardial effusion likely malignant  · Plan for pericardial window today   · Send fluid for cytology and culture         HAP (hospital-acquired pneumonia)   Assessment & Plan    · Pt recieves chemo every 3 weeks-- most recent 7/16  · Chest xray and CT scan concerning for LLL infiltrate   · Given 1x dose of cefepime and vanco this am   · Will continue with cefepime and vanco   · procalcitonin level 0 43-- recheck in am   · Follow cultures: Blood, urine strep/legionella, MRSA swab  · Check sputum culture   · Continuous pulse ox   · Aspiration precautions         Sepsis ([facility] )   Assessment & Plan    · Likely secondary to PNA -- pt presented with leukocytosis, hypotension, tachycardia, tachypnea  · Follow cultures: blood, urine strep/legionella  · Check MRSA swab and sputum culture  · Follow lactic acid levels until 2 0/<  · C/w IVFs   · IV abxs for PNA  · Check RUQ US         Atrial fibrillation ([facility] )   Assessment & Plan    · initially on Cardizem gtt, pt converted around midnight and GTT was discontinued at that time   · Cardiology consult pending   · Monitor on telemetry        Continuous opioid dependence ([facility] )   Assessment & Plan    · C/w home regimen: fentanyl patch and norco   · Palliative care consulted         TANYA (acute kidney injury) ([facility] )   Assessment & Plan    · Likely prerenal in the setting of sepsis and hypoperfusion   · Creat improved from 1 32 to 1 14 with ivfs   · Avoid nephrotoxic drugs and hypotension   · Continue NSS at 100ml/hr         NSTEMI (non-ST elevated myocardial infarction) [facility])   Assessment & Plan    · Likely type 2 secondary to demand from PNA/sepsis, AFIB, TANYA, pericardial effusion  · Trop 0 31; 0 29; 0 31  · Cards consult pending  · Echo pending   · No complaints of CP   · Repeat EKG now that she converted to NSR         Right upper quadrant pain   Assessment & Plan    · Check right upper quadrant US   · T bili: 1 29  · Follow LFTs        Hypertension   Assessment & Plan    · Hypotensive in the setting of sepsis and pericardial effusion   · Hold BP medications and monitor blood pressure closely         Diabetes mellitus ([facility] )   Assessment & Plan    No results found for: HGBA1C    Recent Labs      07/24/18   0024  07/24/18   0653   POCGLU  215*  233*       Blood Sugar Average: Last 72 hrs:  (P) 224    · Check HgA1C  · SSI and accuchecks   · Hold OHG: metformin        Lung cancer ([facility] )   Assessment & Plan    · 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  · Follows with Dr Francis Klein   · treated with Chip Wolf every 3 weeks-- last infusion 7/16  · Palliative care consulted               VTE Pharmacologic Prophylaxis:   Pharmacologic: plan for sx today  Mechanical VTE Prophylaxis in Place: Yes    Patient Centered Rounds: I have performed bedside rounds with nursing staff today  Discussions with Specialists or Other Care Team Provider: d/w RN  D/w thoracic surgery     Education and Discussions with Family / Patient: d/w patient and daughter at bedside     Time Spent for Care: 45 minutes  More than 50% of total time spent on counseling and coordination of care as described above  Current Length of Stay: 1 day(s)    Current Patient Status: Inpatient   Certification Statement: The patient will continue to require additional inpatient hospital stay due to pericardial effusion     Discharge Plan: not stable for discharge     Code Status: Level 1 - Full Code      Subjective:   Pt reports she is feeling better now, just tired  Denies any complaints currently  Objective:     Vitals:   Temp (24hrs), Av °F (36 7 °C), Min:97 4 °F (36 3 °C), Max:98 3 °F (36 8 °C)    HR:  [] 99  Resp:  [18-28] 20  BP: ()/(56-86) 103/79  SpO2:  [92 %-95 %] 93 %  Body mass index is 29 66 kg/m²  Input and Output Summary (last 24 hours): Intake/Output Summary (Last 24 hours) at 18 1109  Last data filed at 18 0900   Gross per 24 hour   Intake                0 ml   Output              575 ml   Net             -575 ml       Physical Exam:     Physical Exam   Constitutional: No distress  Cardiovascular: Normal rate, regular rhythm, normal heart sounds and intact distal pulses  No murmur heard  Pulmonary/Chest: Accessory muscle usage present  No respiratory distress  She has decreased breath sounds in the right lower field and the left lower field  She has no wheezes  She has no rales  Abdominal: Soft  Bowel sounds are normal  She exhibits no distension  There is tenderness (right upper quadrant )  There is no rebound  Musculoskeletal: She exhibits no edema or tenderness  Neurological: She is alert  Skin: Skin is warm and dry  No rash noted  She is not diaphoretic  No erythema  Psychiatric: She has a normal mood and affect         Additional Data:     Labs:      Results from last 7 days  Lab Units 18  0556   WBC Thousand/uL 13 08*   HEMOGLOBIN g/dL 12 7   HEMATOCRIT % 40 4   PLATELETS Thousands/uL 234   NEUTROS PCT % 69   LYMPHS PCT % 19   MONOS PCT % 10   EOS PCT % 0       Results from last 7 days  Lab Units 18  0556   SODIUM mmol/L 134*   POTASSIUM mmol/L 4 2   CHLORIDE mmol/L 101   CO2 mmol/L 20*   BUN mg/dL 25   CREATININE mg/dL 1 14   CALCIUM mg/dL 8 9   TOTAL PROTEIN g/dL 7 1   BILIRUBIN TOTAL mg/dL 1 29*   ALK PHOS U/L 61   ALT U/L 40   AST U/L 26   GLUCOSE RANDOM mg/dL 196*       Results from last 7 days  Lab Units 18  0041   INR  1 22*       Results from last 7 days  Lab Units 18  0653 18  0024   POC GLUCOSE mg/dl 233* 215*             * I Have Reviewed All Lab Data Listed Above  * Additional Pertinent Lab Tests Reviewed: All Labs Within Last 24 Hours Reviewed    Imaging:    Imaging Reports Reviewed Today Include: chest xrays, CTA chest     Recent Cultures (last 7 days):           Last 24 Hours Medication List:     Current Facility-Administered Medications:  acetaminophen 650 mg Oral Q6H PRN Nilay Morrell MD    ALPRAZolam 0 5 mg Oral HS PRN Nilay Morrell MD    calcium carbonate 1 tablet Oral Daily With Breakfast Nilay Morrell MD    citalopram 20 mg Oral Daily Nilay Morrell MD    cyanocobalamin 100 mcg Oral Daily Nilay Morrell MD    docusate sodium 100 mg Oral BID Nilay Morrell MD    fentaNYL 1 patch Transdermal Q72H Nilay Morrell MD    fish oil 1,000 mg Oral Daily Nilay Morrell MD    folic acid 1 mg Oral Daily Nilay Morrell MD    HYDROcodone-acetaminophen 1 tablet Oral Q6H PRN Nilay Morrell MD    insulin lispro 2-12 Units Subcutaneous TID AC Nilay Morrell MD    meclizine 25 mg Oral Q8H [facility] Nilay Morrell MD    ondansetron 4 mg Intravenous Q6H PRN Nilay Morrell MD    ondansetron 4 mg Oral Q6H PRN Nilay Morrell MD    sodium chloride 100 mL/hr Intravenous Continuous Sixto Pearce PA-C Last Rate: 100 mL/hr (07/24/18 0522)        Today, Patient Was Seen By: SAMANTHA Cano    ** Please Note: Dictation voice to text software may have been used in the creation of this document   **

## 2018-07-24 NOTE — ASSESSMENT & PLAN NOTE
Follows with Dr Kerry Sanford outside, Stage IV adenocarcinoma of the lung   She is being treated with Pembrolizumab

## 2018-07-24 NOTE — ASSESSMENT & PLAN NOTE
She has lung cancer, this might be malignant  She is not feeling dizziness, or having chest pain  Continue with oxygen keep>92%  Monitor in and out closely  Cardiothoracic surgery consulted  Keeping NPO at midnight

## 2018-07-24 NOTE — MEDICAL STUDENT
79 yo female with sig PMH of stage IV adenocarcinoma of the lung (currently receiving chemo), malignant pleural effusions, Breast CA with mastectomy, and DM admitted 7/23 with pericardial effusion associated with A-fib with RVR  Overnight - pt was found to be lethargic, clammy, increased O2 requirements from 2L NC to 5L NC and hypotensive with BP 89/65  Diltiazem infusion was stopped  NSS bolus 750 ml and 5% Albumin 500 ml total given   ml/hr started  Sepsis protocol initiated  BP after IVF bolus 113/73    Pt states her breathing feels better  She does not remember events from last night  She said she is tired and did not sleep well  Reports an episode of nausea after being given 'a handful of pills '  Denied dyspnea, CP, palpitations, dizziness, syncope, fever, chills, nausea, vomiting, dysuria, abd pain  Physical Exam  T 97 9, HR 99, RR 24, /79, SpO2 93% 5LNC  Gen: well developed, well nourished  CV: S1, S2, regular rate and rhythm, no murmur, rub or gallop, peripheral pulses +2  Resp: b/l bases coarse ronchi, diminished throughout, regular effort, RR 24  Abd: tenderness RUQ, no rebound, normoactive bowel sounds, soft, round  MSK: strength appropriate  Neuro: no focal deficits  Skin: warm, well perfused  Psych: pleasant affect and mood    Labs  Cr 1 14 (1 32)  T Bili 1 29 (1 02)  WBC 13 08 (15 47)  Procalcitonin 0 43  Lactic Acid 2 6 (2 5)  Trop 0 31  POCT -236  Blood Cultures - pending  UA CS - ordered  Urine strep pneum, legionella - ordered  Sputum Culture - ordered    Imaging  7/24 ECHO pending  7/24 RUQ US - ordered  7/24 CXR Pulmonary vasculature appears more prominent than prior study suspicious for mild pulmonary vascular congestion  No other change since earlier study    7/23 CXR No significant interval change since the study performed earlier today  7/23 CXR Abnormal widening of the mediastinum may represent increasing adenopathy    Left basilar airspace opacity may reflect atelectasis or infiltrate    Nodular pleural thickening left chest compatible with known underlying lung malignancy    7/23 CTA PE study   1  There is no occlusive thrombus in the main pulmonary artery and central lobar branches of the pulmonary arteries  2  Development of moderate to large pericardial effusion    3  Nodular pleural metastatic disease with the few foci of the nonmeasurable metastatic disease becoming less prominent however there is increasing infiltration in the anterior mediastinum adjacent to the anterior left pleura along with few nodules in the   superior mediastinum suggesting new metastatic disease and progression   4   Increasing sclerosis noted in the T6 vertebra  Paratracheal lymph nodes are decreasing in size    Tele  NSR - no events  Afib 's - converted around midnight    Pericardial Effusion   - CT scan revealed moderate to large pericardial effusion   - Thoracic surgery following: plan for OR today - subxyphoid pericardial window with drain placement   - suspect malignant contribution with history of pleural effusions requiring thoracentesis   - send fluid for cytology and culture   - no emergent signs of tamponade currently   - required IVF bolus overnight due to hypotension, lethargy, and clammy appearance   - lactic acid 2 6    Atrial Fibrillation with Rapid Ventricular Response   - converted to NSR overnight with associated hypotension   - repeat EKG   - Diltiazem infusion stopped   - home regimen Metoprolol 25 mg Q12, hold for SBP <110, dose held this am   - Cardiology consulted   - suspect pericardial effusion vs malignant involvement given no cardiac history    NSTEMI likely type 2 due to demand of sepsis/PNA, Afib, TANYA, pericardial effusion   - Trop 0 31peak   - Cardiology consulted   - ECHO pending   - no c/o CP   - EKG on admission reveal A-fib 's   - check repeat EKG    Hospital Associated Pneumonia   - Chemo infusion every 3 weeks, most recent 7/16   - peripheral IV access, no central port   - CXR - concern for Left lower lobe infiltrate    - increased O2 requirements, leukocytosis, hypotensive episode, afebrile   - procalcitonin 0 43   - given Cefepime 1,000 mg IV x1, Vancomycin 1,000 mg IV x1   - Sputum culture ordered   - Blood cultures pending   - UA C/S, strep pneum, and legionella ordered   - Obtain MRSA swab   - Order Cefepime 2 gm IV Q12, Vancomycin 1,250 mg IV Q24   - Continuous pulse ox, aspiration precautions    Sepsis with leukocytosis, hypotension, tachycardia, tachypnea in setting of suspected HAP   - empiric IV Abx ordered   - cultures ordered and pending   - remains afebrile   - procalcitonin 0 43, recheck in am   - trend WBC    TANYA likely pre-renal sepsis with hypoperfusion   - Cr 1 14 (1 32 peak), baseline 0 6   - Cr improved with IVF and bolus   - strict I/O   - avoid nephrotoxic drugs and hypotension   - continue  ml/hr    Right Upper Quadrant Pain   - pain reproducible with palpation only   - TBili 1 29 (1 02)   - LFT's normal   - One episode of nausea associated with bulk of medications and NPO status   - RUQ US ordered   - trend CMP    Hypotension with history of hypertension   - hypotensive episode overnight   - hold hypertensive meds   - cont IVF    Diabetes Mellitus Type 2   - no available Hgb A1C   - home regimen Metformin 500 mg PO daily   - hold oral hypoglycemics   - Accuchecks with SSI Alg 1 Q6 with NPO status    Adenocarcinoma Stage IV Lung   - follows with Dr Ramirez Files outpatient   - Oncology and Palliative Care consulted   - receives Keytruda Chemo infusion every 3 weeks, last infusion 7/16     Continuous Opioid Dependence in setting of Lung Cancer   - continue home regimen   - Palliative Care consulted   - Fentanyl Patch 12 mcg/hr TD 72 hr patch   - Norco 5-325 mg PO Q 6 prn    DVT   - none   - order Sequential Compression Device   - hold pharmacologic prophylaxis pre-op    Code Status   - Full Code    Disposition   - Inpatient: Step Down 2, OR today, sepsis

## 2018-07-25 ENCOUNTER — TELEPHONE (OUTPATIENT)
Dept: HEMATOLOGY ONCOLOGY | Facility: CLINIC | Age: 83
End: 2018-07-25

## 2018-07-25 ENCOUNTER — APPOINTMENT (INPATIENT)
Dept: RADIOLOGY | Facility: HOSPITAL | Age: 83
DRG: 853 | End: 2018-07-25
Payer: MEDICARE

## 2018-07-25 PROBLEM — J18.9 HCAP (HEALTHCARE-ASSOCIATED PNEUMONIA): Status: ACTIVE | Noted: 2018-07-25

## 2018-07-25 LAB
ALBUMIN SERPL BCP-MCNC: 3 G/DL (ref 3.5–5)
ALP SERPL-CCNC: 56 U/L (ref 46–116)
ALT SERPL W P-5'-P-CCNC: 29 U/L (ref 12–78)
ANION GAP SERPL CALCULATED.3IONS-SCNC: 11 MMOL/L (ref 4–13)
AST SERPL W P-5'-P-CCNC: 18 U/L (ref 5–45)
BILIRUB SERPL-MCNC: 0.96 MG/DL (ref 0.2–1)
BUN SERPL-MCNC: 26 MG/DL (ref 5–25)
CALCIUM SERPL-MCNC: 9.2 MG/DL (ref 8.3–10.1)
CHLORIDE SERPL-SCNC: 103 MMOL/L (ref 100–108)
CO2 SERPL-SCNC: 22 MMOL/L (ref 21–32)
CREAT SERPL-MCNC: 0.85 MG/DL (ref 0.6–1.3)
ERYTHROCYTE [DISTWIDTH] IN BLOOD BY AUTOMATED COUNT: 13.4 % (ref 11.6–15.1)
EST. AVERAGE GLUCOSE BLD GHB EST-MCNC: 146 MG/DL
GFR SERPL CREATININE-BSD FRML MDRD: 64 ML/MIN/1.73SQ M
GLUCOSE SERPL-MCNC: 131 MG/DL (ref 65–140)
GLUCOSE SERPL-MCNC: 138 MG/DL (ref 65–140)
GLUCOSE SERPL-MCNC: 145 MG/DL (ref 65–140)
GLUCOSE SERPL-MCNC: 182 MG/DL (ref 65–140)
GLUCOSE SERPL-MCNC: 187 MG/DL (ref 65–140)
HBA1C MFR BLD: 6.7 % (ref 4.2–6.3)
HCT VFR BLD AUTO: 39.8 % (ref 34.8–46.1)
HGB BLD-MCNC: 12.7 G/DL (ref 11.5–15.4)
MCH RBC QN AUTO: 30.5 PG (ref 26.8–34.3)
MCHC RBC AUTO-ENTMCNC: 31.9 G/DL (ref 31.4–37.4)
MCV RBC AUTO: 95 FL (ref 82–98)
MRSA NOSE QL CULT: NORMAL
PLATELET # BLD AUTO: 242 THOUSANDS/UL (ref 149–390)
PMV BLD AUTO: 11.7 FL (ref 8.9–12.7)
POTASSIUM SERPL-SCNC: 4.1 MMOL/L (ref 3.5–5.3)
PROCALCITONIN SERPL-MCNC: 0.51 NG/ML
PROT SERPL-MCNC: 6.6 G/DL (ref 6.4–8.2)
RBC # BLD AUTO: 4.17 MILLION/UL (ref 3.81–5.12)
SODIUM SERPL-SCNC: 136 MMOL/L (ref 136–145)
WBC # BLD AUTO: 16.83 THOUSAND/UL (ref 4.31–10.16)

## 2018-07-25 PROCEDURE — 82948 REAGENT STRIP/BLOOD GLUCOSE: CPT

## 2018-07-25 PROCEDURE — 99024 POSTOP FOLLOW-UP VISIT: CPT | Performed by: THORACIC SURGERY (CARDIOTHORACIC VASCULAR SURGERY)

## 2018-07-25 PROCEDURE — 76705 ECHO EXAM OF ABDOMEN: CPT

## 2018-07-25 PROCEDURE — 99232 SBSQ HOSP IP/OBS MODERATE 35: CPT | Performed by: INTERNAL MEDICINE

## 2018-07-25 PROCEDURE — 99024 POSTOP FOLLOW-UP VISIT: CPT | Performed by: STUDENT IN AN ORGANIZED HEALTH CARE EDUCATION/TRAINING PROGRAM

## 2018-07-25 PROCEDURE — 85027 COMPLETE CBC AUTOMATED: CPT | Performed by: PHYSICIAN ASSISTANT

## 2018-07-25 PROCEDURE — 83036 HEMOGLOBIN GLYCOSYLATED A1C: CPT | Performed by: NURSE PRACTITIONER

## 2018-07-25 PROCEDURE — 80053 COMPREHEN METABOLIC PANEL: CPT | Performed by: NURSE PRACTITIONER

## 2018-07-25 PROCEDURE — 84145 PROCALCITONIN (PCT): CPT | Performed by: INTERNAL MEDICINE

## 2018-07-25 PROCEDURE — 99232 SBSQ HOSP IP/OBS MODERATE 35: CPT | Performed by: FAMILY MEDICINE

## 2018-07-25 RX ORDER — TRAMADOL HYDROCHLORIDE 50 MG/1
100 TABLET ORAL EVERY 4 HOURS PRN
Status: DISCONTINUED | OUTPATIENT
Start: 2018-07-25 | End: 2018-07-27

## 2018-07-25 RX ORDER — TRAMADOL HYDROCHLORIDE 50 MG/1
100 TABLET ORAL EVERY 6 HOURS PRN
Status: DISCONTINUED | OUTPATIENT
Start: 2018-07-25 | End: 2018-07-25

## 2018-07-25 RX ORDER — MORPHINE SULFATE 2 MG/ML
2 INJECTION, SOLUTION INTRAMUSCULAR; INTRAVENOUS EVERY 4 HOURS PRN
Status: DISCONTINUED | OUTPATIENT
Start: 2018-07-25 | End: 2018-08-01 | Stop reason: HOSPADM

## 2018-07-25 RX ORDER — KETOROLAC TROMETHAMINE 30 MG/ML
15 INJECTION, SOLUTION INTRAMUSCULAR; INTRAVENOUS ONCE
Status: COMPLETED | OUTPATIENT
Start: 2018-07-25 | End: 2018-07-25

## 2018-07-25 RX ORDER — MORPHINE SULFATE 2 MG/ML
1 INJECTION, SOLUTION INTRAMUSCULAR; INTRAVENOUS EVERY 4 HOURS PRN
Status: DISCONTINUED | OUTPATIENT
Start: 2018-07-25 | End: 2018-07-25

## 2018-07-25 RX ORDER — MECLIZINE HCL 12.5 MG/1
25 TABLET ORAL EVERY 8 HOURS PRN
Status: DISCONTINUED | OUTPATIENT
Start: 2018-07-25 | End: 2018-08-01 | Stop reason: HOSPADM

## 2018-07-25 RX ORDER — KETOROLAC TROMETHAMINE 30 MG/ML
15 INJECTION, SOLUTION INTRAMUSCULAR; INTRAVENOUS EVERY 6 HOURS PRN
Status: DISPENSED | OUTPATIENT
Start: 2018-07-25 | End: 2018-07-27

## 2018-07-25 RX ORDER — ONDANSETRON 4 MG/1
4 TABLET, ORALLY DISINTEGRATING ORAL EVERY 6 HOURS PRN
Status: DISCONTINUED | OUTPATIENT
Start: 2018-07-25 | End: 2018-08-01 | Stop reason: HOSPADM

## 2018-07-25 RX ORDER — KETOROLAC TROMETHAMINE 30 MG/ML
15 INJECTION, SOLUTION INTRAMUSCULAR; INTRAVENOUS ONCE
Status: DISCONTINUED | OUTPATIENT
Start: 2018-07-25 | End: 2018-07-25 | Stop reason: CLARIF

## 2018-07-25 RX ORDER — ACETAMINOPHEN 325 MG/1
650 TABLET ORAL EVERY 6 HOURS SCHEDULED
Status: DISCONTINUED | OUTPATIENT
Start: 2018-07-25 | End: 2018-07-27

## 2018-07-25 RX ORDER — KETOROLAC TROMETHAMINE 10 MG/1
15 TABLET, FILM COATED ORAL ONCE
Status: DISCONTINUED | OUTPATIENT
Start: 2018-07-25 | End: 2018-07-25

## 2018-07-25 RX ADMIN — FOLIC ACID 1 MG: 1 TABLET ORAL at 09:48

## 2018-07-25 RX ADMIN — Medication 1000 MG: at 09:49

## 2018-07-25 RX ADMIN — MORPHINE SULFATE 1 MG: 2 INJECTION, SOLUTION INTRAMUSCULAR; INTRAVENOUS at 02:22

## 2018-07-25 RX ADMIN — ACETAMINOPHEN 650 MG: 325 TABLET, FILM COATED ORAL at 05:55

## 2018-07-25 RX ADMIN — DOCUSATE SODIUM 100 MG: 100 CAPSULE, LIQUID FILLED ORAL at 17:58

## 2018-07-25 RX ADMIN — CEFEPIME HYDROCHLORIDE 2000 MG: 2 INJECTION, POWDER, FOR SOLUTION INTRAVENOUS at 12:15

## 2018-07-25 RX ADMIN — VANCOMYCIN HYDROCHLORIDE 1250 MG: 1 INJECTION, POWDER, LYOPHILIZED, FOR SOLUTION INTRAVENOUS at 14:21

## 2018-07-25 RX ADMIN — DOCUSATE SODIUM 100 MG: 100 CAPSULE, LIQUID FILLED ORAL at 09:48

## 2018-07-25 RX ADMIN — ACETAMINOPHEN 650 MG: 325 TABLET, FILM COATED ORAL at 02:22

## 2018-07-25 RX ADMIN — MECLIZINE HCL 12.5 MG 25 MG: 12.5 TABLET ORAL at 14:19

## 2018-07-25 RX ADMIN — KETOROLAC TROMETHAMINE 15 MG: 30 INJECTION, SOLUTION INTRAMUSCULAR at 12:16

## 2018-07-25 RX ADMIN — Medication 1 TABLET: at 09:48

## 2018-07-25 RX ADMIN — SODIUM CHLORIDE 500 ML: 0.9 INJECTION, SOLUTION INTRAVENOUS at 05:55

## 2018-07-25 RX ADMIN — ACETAMINOPHEN 650 MG: 325 TABLET, FILM COATED ORAL at 12:14

## 2018-07-25 RX ADMIN — VITAM B12 100 MCG: 100 TAB at 09:49

## 2018-07-25 RX ADMIN — ACETAMINOPHEN 650 MG: 325 TABLET, FILM COATED ORAL at 17:58

## 2018-07-25 RX ADMIN — CITALOPRAM HYDROBROMIDE 20 MG: 20 TABLET ORAL at 09:48

## 2018-07-25 RX ADMIN — INSULIN LISPRO 2 UNITS: 100 INJECTION, SOLUTION INTRAVENOUS; SUBCUTANEOUS at 06:39

## 2018-07-25 RX ADMIN — ALPRAZOLAM 0.5 MG: 0.25 TABLET ORAL at 19:59

## 2018-07-25 NOTE — ASSESSMENT & PLAN NOTE
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  Follows with Dr Juju Sheth   treated with Zarina Moody every 3 weeks-- last infusion 7/16  Palliative care on board

## 2018-07-25 NOTE — ASSESSMENT & PLAN NOTE
Symptoms have resolved  US abdomen -- Gallbladder sludge  Hepatomegaly and hepatic steatosis  LFTs improved

## 2018-07-25 NOTE — ASSESSMENT & PLAN NOTE
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  Follows with Dr Castro Hardy   treated with Tonja Lint every 3 weeks-- last infusion 7/16  Palliative care consulted

## 2018-07-25 NOTE — ASSESSMENT & PLAN NOTE
Pt recieves chemo every 3 weeks-- most recent 7/16  Chest xray and CT scan concerning for LLL infiltrate   Continue IV cefepime -- day 7/10 -- get transition to p  o  upon discharge  Discontinue IV vancomycin as MRSA is negative  Procalcitonin trending down at 0 60  Follow blood cultures x 2 no growth so far    Continuous pulse ox, Aspiration precautions

## 2018-07-25 NOTE — ASSESSMENT & PLAN NOTE
Cardiology on board  Continue lopressor, with holding parameters  High risk for bleed hence continue with aspirin alone  Monitor on telemetry

## 2018-07-25 NOTE — ASSESSMENT & PLAN NOTE
Pt recieves chemo every 3 weeks-- most recent 7/16  Chest xray and CT scan concerning for LLL infiltrate   Continue IV Vancomycin + cefepime -- day 2  Procalcitonin elevated at 0 53, repeat in AM   Follow blood cultures x 2 no growth so far    Follow up urine strep/legionella, MRSA swab, sputum culture   Continuous pulse ox, Aspiration precautions

## 2018-07-25 NOTE — PROGRESS NOTES
Progress Note - Yessy Christy 1935, 80 y o  female MRN: [id number]  Unit/Bed#: [facility] 422-01 Encounter: [id number]  Primary Care Provider: Brooke Butt DO   Date and time admitted to hospital: 7/23/2018  5:53 PM        * Pericardial effusion   Assessment & Plan    06/24: S/P subxiphoid pericardial window  Follow up pericardial fluid test/cs results  Thoracic surgery on board  Pericardial effusion likely malignant          HAP (hospital-acquired pneumonia)   Assessment & Plan    Pt recieves chemo every 3 weeks-- most recent 7/16  Chest xray and CT scan concerning for LLL infiltrate   Continue IV Vancomycin + cefepime -- day 2  Procalcitonin elevated at 0 53, repeat in AM   Follow blood cultures x 2 no growth so far  Follow up urine strep/legionella, MRSA swab, sputum culture   Continuous pulse ox, Aspiration precautions         Sepsis ([facility] [address] )   Assessment & Plan    Likely secondary to PNA -- pt presented with leukocytosis, hypotension, tachycardia, tachypnea  Follow cultures of urine strep/legionella, MRSA swab and sputum culture  Refer above  Lung cancer [facility])   Assessment & Plan    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  Follows with Dr Lucas Maher   treated with [place] every 3 weeks-- last infusion 7/16  Palliative care consulted           Diabetes mellitus [facility])   Assessment & Plan    Lab Results   Component Value Date    HGBA1C 6 7 (H) 07/25/2018       Recent Labs      07/24/18   0653  07/24/18   1112  07/24/18   1434  07/25/18   0609   POCGLU  233*  182*  167*  187*       Blood Sugar Average: Last 72 hrs:  (P) 196 8    HgA1C at 6 7  SSI and accuchecks   Hold OHG: metformin        Hypertension   Assessment & Plan    Hypotensive in the setting of sepsis and pericardial effusion   BP improving  Atrial fibrillation [facility])   Assessment & Plan    Cardiology on board  Continue lopressor, with holding parameters  Monitor on telemetry  NSTEMI (non-ST elevated myocardial infarction) [facility])   Assessment & Plan    Likely type 2 secondary to demand from PNA/sepsis, AFIB, TANYA, pericardial effusion  Trop 0 31; 0 29; 0 31  Cardiology on board          Right upper quadrant pain   Assessment & Plan    Check right upper quadrant US   T bili: 1 29  Follow LFTs        TANYA (acute kidney injury) ([facility] [address] )   Assessment & Plan    Likely prerenal in the setting of sepsis and hypoperfusion   TANYA has resolved, creatinine at baseline  Continuous opioid dependence ([facility] [address] )   Assessment & Plan    C/w home regimen: fentanyl patch and norco   Palliative care on board  ______________________________________________________________________    SUBJECTIVE:   Patient seen and examined  Chronically ill  She appears confused  Complaining of pain in operated site  [facility] at bedside  Discussed with Thoracic surgery  OBJECTIVE:     Vitals:   HR:  [] 87  Resp:  [10-26] 18  BP: (103-142)/(65-76) 108/68  SpO2:  [90 %-98 %] 94 %  Temp (24hrs), Av 3 °F (36 8 °C), Min:97 6 °F (36 4 °C), Max:99 2 °F (37 3 °C)  Current: Temperature: 97 6 °F (36 4 °C)    Intake/Output Summary (Last 24 hours) at 18 1133  Last data filed at 18 0600   Gross per 24 hour   Intake              400 ml   Output              860 ml   Net             -460 ml       Physical Exam:   GENERAL: AAO x 1, confused, chronically ill  HEENT: atraumatic, normocephalic  Oral mucosa moist, no icterus, pallor  PERRLA +  Neck supple, no JVD, no lymphadenopathy, no thryomegaly  CHEST: B/L breath sounds heard, occasional wheezing  Chest drain +  CVS: S1, S2   ABDOMEN: Soft/obese/NT/BS heard  NEUROLOGICAL: CN II -XI grossly intact  No focal motor or sensory deficits  No signs of meningeal irritation or cerebellar dysfunction  EXTREMITIES: mild B/L pitting pedal edema      LABS:   2018 05:01   eGFR 64   Sodium 136   Potassium 4 1   Chloride 103   CO2 22   Anion Gap 11   BUN 26 (H) Creatinine 0 85   Glucose 182 (H)   Calcium 9 2   AST 18   ALT 29   Alkaline Phosphatase 56   Total Protein 6 6   Albumin 3 0 (L)   Total Bilirubin 0 96   WBC 16 83 (H)   RBC 4 17   Hemoglobin 12 7   Hematocrit 39 8   MCV 95   MCH 30 5   MCHC 31 9   RDW 13 4   Platelets 076   MPV 56 8   Hemoglobin A1C 6 7 (H)      Procalcitonin 0 51 (H)     Scheduled Meds:    Current Facility-Administered Medications:  acetaminophen 650 mg Oral Q6H Sallie Santiago MD    ALPRAZolam 0 5 mg Oral HS PRN Dominique Gardner MD    calcium carbonate 1 tablet Oral Daily With Breakfast Dominique Gardner MD    cefepime 2,000 mg Intravenous Q12H SAMANTHA Collazo Last Rate: 2,000 mg (07/24/18 2336)   citalopram 20 mg Oral Daily Dominique Gardner MD    cyanocobalamin 100 mcg Oral Daily Dominique Gardner MD    docusate sodium 100 mg Oral BID Dominique Gardner MD    fentaNYL 1 patch Transdermal Q72H Dominique Gardner MD    fish oil 1,000 mg Oral Daily Dominique Gardner MD    folic acid 1 mg Oral Daily Dominique Gardner MD    insulin lispro 2-12 Units Subcutaneous TID AC Dominique Gardner MD    meclizine 25 mg Oral Q8H [facility] Dominique Gardner MD    morphine injection 1 mg Intravenous Q4H PRN Jeraldene Soulier, MD    morphine injection 2 mg Intravenous Q4H PRN Jeraldene Soulier, MD    ondansetron 4 mg Intravenous Q6H PRN Dominique Gardner MD    ondansetron 4 mg Oral Q6H PRN Moni Muñoz MD    traMADol 100 mg Oral Q6H PRN Krish Peter MD    vancomycin 1,250 mg Intravenous Q24H SAMANTHA Todd      PRN Meds:  ALPRAZolam    morphine injection    morphine injection    ondansetron    ondansetron    traMADol    VTE prophylaxis: SCDs      Education and Discussions with Family / Patient: Víctor Roach    Current Length of Stay: Day 2     Current Patient Status: Inpatient     Certification Statement: The patient will continue to require additional inpatient hospital stay due to ongoing goals of care discussion      Discharge Plan / Estimated Discharge Date:  Plan for today discussed with case management and RN      Code Status: Level 1 - Full Code    Time Spent for Care: 20 minutes   More than 50% of total time spent on counseling and coordination of care as described above  [address], MD  HOSPITALIST SERVICES  7/25/2018    PLEASE NOTE:  This encounter was completed utilizing the [employer] Direct Speech Voice Recognition Software  Grammatical errors, random word insertions, pronoun errors and incomplete sentences are occasional consequences of the system due to software limitations, ambient noise and hardware issues  These may be missed by proof reading prior to affixing electronic signature  Any questions or concerns about the content, text or information contained within the body of this dictation should be directly addressed to the physician for clarification  Please do not hesitate to call me directly if you have any any questions or concerns

## 2018-07-25 NOTE — PROGRESS NOTES
Pt complaining of 10/10 pain  Pt PRN pain medication currently unavailable  Pt drowsy  PT /61 with a HR of 110, O2 sat 90% on 6LNC  Notified Tiny with [facility]  Hold off on administering pain medication until pt LOC increases and maintain O2 sat greater than 88%

## 2018-07-25 NOTE — PROGRESS NOTES
Progress Note - Cardiology   Oliva Lawton 80 y o  female MRN: [id number]  Unit/Bed#: [facility] 422-01 Encounter: [id number]    Assessment and Plan:  79 y/o female s/p subxiphoid pericardial window day 1  Presented with pericardial effusion and afib rvr, converted to sinus rhythm with diltiazem and metoprolol  Diltiazem and metoprolol was d/c due to hypotensive episodes  1  Pericardial Effusion  -Cardiothoracic surgery placed subxiphoid pericardial window yesterday, 24 [place] channel drain in until at least 7/27  -drained 500ml of bloody fluid   -awaiting cytology, gram stain, culture  2  Atrial fibrillation resolved  -no episodes of hypotension overnight  -HR overnight averaged between , with max around 112   -sinus rhythm  -continue to hold metoprolol    3  NSTEMI type 2 secondary to sepsis, pericardial effusion  -trops have plateau at 4 14-5 95-9 91   -no chest pain currently              Subjective/Objective    Chief Complaint: Pericardial effusion    Subjective:   Patient reported 10/10 pain overnight around the area of incision, that kept her from sleeping  She also states it is difficult to breathe, secondary to pain  Patient did not get much sleep last night due to pain, she is very fatigued         Objective:    Vitals: /68 (BP Location: Right arm) Comment: [id number]  Pulse 87   Temp 97 6 °F (36 4 °C) (Oral)   Resp 18   Ht 5' 1" (1 549 m)   Wt 71 2 kg (156 lb 15 5 oz) Comment: Bed scale   SpO2 94%   BMI 29 66 kg/m²   Vitals:    07/23/18 1819   Weight: 71 2 kg (156 lb 15 5 oz)     Orthostatic Blood Pressures      Most Recent Value   Blood Pressure  108/68 [[email or web address]] filed at 07/25/2018 7880   Patient Position - Orthostatic VS  Lying filed at 07/25/2018 0657            Intake/Output Summary (Last 24 hours) at 07/25/18 0715  Last data filed at 07/25/18 0205   Gross per 24 hour   Intake              400 ml   Output             1290 ml   Net             -890 ml       Invasive Devices Peripheral Intravenous Line            Peripheral IV 07/24/18 Right Antecubital less than 1 day          Arterial Line            Arterial Line 07/24/18 Right Radial less than 1 day          Drain            Closed/Suction Drain Midline Chest Other (Comment) 24 Fr  less than 1 day                Review of Systems: General ROS: negative for - chills or fever, positive for fatigue  Cardiovascular ROS: positive for - shortness of breath, negative for chest pain  Gastrointestinal ROS: positive for - abdominal pain in RUQ      Physical Exam: /68 (BP Location: Right arm) Comment: [email or web address]  Pulse 87   Temp 97 6 °F (36 4 °C) (Oral)   Resp 18   Ht 5' 1" (1 549 m)   Wt 71 5 kg (157 lb 10 1 oz)   SpO2 94%   BMI 29 78 kg/m²     General Appearance:    Alert, cooperative, no distress, appears stated age   Head:    Normocephalic, without obvious abnormality, atraumatic                   Neck:   Supple, symmetrical, trachea midline, no adenopathy;     thyroid:  no enlargement/tenderness/nodules; no carotid    bruit or JVD       Lungs:     Clear to auscultation bilaterally, no rales or crackles   Chest Wall:    No tenderness or deformity,    Heart:    Regular rate and rhythm, S1 and S2 normal, no murmur, rub   or gallop       Abdomen:     RUQ tenderness, bowel sounds active all four quadrants,     no masses           Extremities:   Extremities normal, atraumatic, no cyanosis or edema   Pulses:   2+ and symmetric all extremities                   Lab Results:   CBC with diff:   Results from last 7 days  Lab Units 07/25/18  0501   WBC Thousand/uL 16 83*   RBC Million/uL 4 17   HEMOGLOBIN g/dL 12 7   HEMATOCRIT % 39 8   MCV fL 95   MCH pg 30 5   MCHC g/dL 31 9   RDW % 13 4   MPV fL 11 7   PLATELETS Thousands/uL 242     CMP:   Results from last 7 days  Lab Units 07/25/18  0501   SODIUM mmol/L 136   POTASSIUM mmol/L 4 1   CHLORIDE mmol/L 103   CO2 mmol/L 22   ANION GAP mmol/L 11   BUN mg/dL 26*   CREATININE mg/dL 0 85   GLUCOSE RANDOM mg/dL 182*   CALCIUM mg/dL 9 2   AST U/L 18   ALT U/L 29   ALK PHOS U/L 56   TOTAL PROTEIN g/dL 6 6   BILIRUBIN TOTAL mg/dL 0 96   EGFR ml/min/1 73sq m 64     Troponin:   0  Lab Value Date/Time   TROPONINI 0 31 (H) 07/24/2018 0041   TROPONINI 0 29 (H) 07/23/2018 2346   TROPONINI 0 31 (H) 07/23/2018 2048   TROPONINI 0 20 (H) 07/23/2018 1128     BNP:   Results from last 7 days  Lab Units 07/25/18  0501   SODIUM mmol/L 136   POTASSIUM mmol/L 4 1   CHLORIDE mmol/L 103   CO2 mmol/L 22   ANION GAP mmol/L 11   BUN mg/dL 26*   CREATININE mg/dL 0 85   GLUCOSE RANDOM mg/dL 182*   CALCIUM mg/dL 9 2   EGFR ml/min/1 73sq m 64     Coags:   Results from last 7 days  Lab Units 07/24/18  0041 07/23/18  1128   PTT seconds  --  32   INR  1 22* 1 13     TSH:   Results from last 7 days  Lab Units 07/23/18  1128   TSH 3RD GENERATON uIU/mL 2 185     Magnesium:   Results from last 7 days  Lab Units 07/24/18  1436   MAGNESIUM mg/dL 1 8     Lipid Profile:     Imaging: I have personally reviewed pertinent reports        VTE Pharmacologic Prophylaxis: Sequential compression device (Venodyne)   VTE Mechanical Prophylaxis: sequential compression device

## 2018-07-25 NOTE — SOCIAL WORK
CM met w/ pt to obtain info  Pt reported she resides w/ her  only in a 1-story house w/ 12 steps down to basement and 2 steps to enter house at front door  Pt's daughter Annabel Adams ([phone]) is primary contact  Pt reported she is independent at baseline w/ performing her ADLS, but uses a cane to ambulate short distances and a rolling walker to ambulate further distances  Pt reported only additional DME in home is a unused portable commode  Pt reported she has recent hx of [address] services in Feb 2018 w/ [facility]  Pt reported no hx of i/p rehab placements  Pt reported no hx of mental health issues nor D&A issues  Pt reported her PCP is Dr Manuel Pizarro through [address] located in [facility]  Pt reported she uses [facility] located on [address] in [facility] for her Rx needs  Pt reported she receives transOMIC benefits as a source of income  Pt is enrolled in Medicare for healthcare coverage and also has supplemental insurance through [facility] for secondary coverage and Rx benefits  Pt reported she does not have a legally pre-designated medical POA appointed for herself  Pt reported her family members can provide transportation for her at time of d/c     CM reviewed d/c planning process including the following: identifying help at home, patient preference for d/c planning needs, Discharge Lounge, [facility] Meds to Bed program, availability of treatment team to discuss questions or concerns patient and/or family may have regarding understanding medications and recognizing signs and symptoms once discharged  CM also encouraged patient to follow up with all recommended appointments after discharge  Patient advised of importance for patient and family to participate in managing patients medical well being  Patient/caregiver received discharge checklist  Content reviewed  Patient/caregiver encouraged to participate in discharge plan of care prior to discharge home      ROCÍO will reassess pt for d/c needs once recommendations for her aftercare are made by the tx team  CM to follow

## 2018-07-25 NOTE — ASSESSMENT & PLAN NOTE
Lab Results   Component Value Date    HGBA1C 6 7 (H) 07/25/2018       Recent Labs      07/24/18   0653  07/24/18   1112  07/24/18   1434  07/25/18   0609   POCGLU  233*  182*  167*  187*       Blood Sugar Average: Last 72 hrs:  (P) 196 8    HgA1C at 6 7    SSI and accuchecks   Hold OHG: metformin

## 2018-07-25 NOTE — ASSESSMENT & PLAN NOTE
This has resolved  Likely secondary to PNA -- pt presented with leukocytosis, hypotension, tachycardia, tachypnea  MRSA swab negative, urine for strep pneumoniae, Legionella antigen awaited  Refer above

## 2018-07-25 NOTE — ASSESSMENT & PLAN NOTE
Likely prerenal in the setting of sepsis and hypoperfusion   TANYA has resolved, creatinine at baseline

## 2018-07-25 NOTE — ASSESSMENT & PLAN NOTE
07/24/18: S/P subxiphoid pericardial window with drain  07/27/18: Pericardial drain discontinued  Follow up pericardial fluid test/cs/cytology -- Metastatic adenocarcinoma  Thoracic surgery have signed off the case  Continue p r n  pain management

## 2018-07-25 NOTE — PROGRESS NOTES
Progress Note - Rupal Cruz 80 y o  female MRN: [id number]    Unit/Bed#: [facility] 422-01 Encounter: [id number]      Assessment/Plan:  1) Lung adenocarcinoma, Stage IV - Presented in May 2017 with dyspnea, pleurisy, and dry cough  CTA of the chest demonstrated a large loculated left-sided pleural effusion with nodularity concerning for metastatic lung cancer  Fluid from the thoracentesis was positive for adenocarcinoma  Pathology of the left lower lobe mass was positive for TTF 1 and CK7  From 07/2017-11/2017 she finished 6 cycles of carboplatin and Alimta every 3 weeks  From 11/2017-04/2018 she was maintained on Alimta until there was progression of the disease  Core biopsy in 04/2018 demonstrated PD L1 expression 10%, and was initiated on Keytruda at 200mg every 3 weeks starting in 05/2018 after she was started on Madhavi plus Alimta again but developed an allergic reaction               - The patient had been previously doing well, she last saw Dr Elizabeth Syed, her oncologist, on 06/21/2018 and at that time was very active  She was going to [employer] every Sunday and cooking for family  - CTA of the chest on this admission demonstrated stable lymphadenopathy in the mediastinum and elysia but progression of nodular pleural metastatic disease in the anterior and superior mediastinum               - At this point, we would not recommend changing her chemo regimen  Previous core biopsy demonstrated no mutational target of therapy  Palliative care input on board for symptomatic management and establishing goals of care  - S/p pericardial window, we will see her in the outpatient setting once discharged  Subjective:   Patient is doing well, no events overnight, complains of pain from surgery    Denies fevers, chills, night sweats, weight loss, fatigue, head aches, dizziness, chest pain, palpitations, SOB, cough, wheezing, abdominal pain, N/V, diarrhea, blood in the stool, hematuria, dysuria, lower extremity swelling  Objective:     Vitals: Blood pressure 108/68, pulse 87, temperature 97 6 °F (36 4 °C), temperature source Oral, resp  rate 18, height 5' 1" (1 549 m), weight 71 5 kg (157 lb 10 1 oz), SpO2 94 %  ,Body mass index is 29 78 kg/m²  Intake/Output Summary (Last 24 hours) at 07/25/18 1101  Last data filed at 07/25/18 0600   Gross per 24 hour   Intake              400 ml   Output              860 ml   Net             -460 ml       Physical Exam: /68 (BP Location: Right arm) Comment: [id number]  Pulse 87   Temp 97 6 °F (36 4 °C) (Oral)   Resp 18   Ht 5' 1" (1 549 m)   Wt 71 5 kg (157 lb 10 1 oz)   SpO2 94%   BMI 29 78 kg/m²     General Appearance:    Alert, cooperative, no distress, appears stated age   Head:    Normocephalic, without obvious abnormality, atraumatic   Neck:   Supple, symmetrical, trachea midline, no adenopathy;     thyroid:  no enlargement/tenderness/nodules; no carotid    bruit or JVD   Lungs:     Clear to auscultation bilaterally, respirations unlabored    Heart:    Regular rate and rhythm, S1 and S2 normal, no murmur, rub   or gallop   Abdomen:     Soft, non-tender, bowel sounds active all four quadrants,     no masses, no organomegaly   Skin:   Skin color, texture, turgor normal, no rashes or lesions   Lymph nodes:   Cervical, supraclavicular, and axillary nodes normal        Invasive Devices     Peripheral Intravenous Line            Peripheral IV 07/24/18 Right Antecubital less than 1 day          Arterial Line            Arterial Line 07/24/18 Right Radial less than 1 day          Drain            Closed/Suction Drain Midline Chest Other (Comment) 24 Fr  less than 1 day                Lab, Imaging and other studies: I have personally reviewed pertinent reports       Lab Results   Component Value Date    WBC 16 83 (H) 07/25/2018    HGB 12 7 07/25/2018    HCT 39 8 07/25/2018    MCV 95 07/25/2018     07/25/2018     Lab Results   Component Value Date     07/25/2018 K 4 1 07/25/2018     07/25/2018    CO2 22 07/25/2018    ANIONGAP 11 07/25/2018    BUN 26 (H) 07/25/2018    CREATININE 0 85 07/25/2018    GLUCOSE 182 (H) 07/25/2018    GLUF 101 (H) 04/18/2018    CALCIUM 9 2 07/25/2018    AST 18 07/25/2018    ALT 29 07/25/2018    ALKPHOS 56 07/25/2018    PROT 6 6 07/25/2018    BILITOT 0 96 07/25/2018    EGFR 64 07/25/2018         VTE Pharmacologic Prophylaxis: Sequential compression device (Venodyne)   VTE Mechanical Prophylaxis: sequential compression device

## 2018-07-25 NOTE — ASSESSMENT & PLAN NOTE
Lab Results   Component Value Date    HGBA1C 6 7 (H) 07/25/2018       Recent Labs      07/24/18   1434  07/25/18   0609  07/25/18   1123  07/25/18   1602   POCGLU  167*  187*  138  131       Blood Sugar Average: Last 72 hrs:  (P) 179  HgA1C at 6 7    SSI and accuchecks   Hold OHG: metformin

## 2018-07-25 NOTE — TELEPHONE ENCOUNTER
Spoke with Jess Stone  Patient is inpatient s/p pericardial window  Emotional support offered  Jess Stone will call the office for f/u appt with Dr Pattie Arias once the patient is discharged

## 2018-07-25 NOTE — PROGRESS NOTES
Progress note - Palliative and Supportive Care   Nick Concepcion 80 y o  female [id number]    Assessment:  Lung cancer stage IV  Atrial fib  Pericardial effusion-cytology pending  Cancer related pain  Shortness of breath  Decreased appetite  Hx of Left breast cancer   HTN  DM II    Plan:  1  Symptom management    - currently using tylenol, tramadol, IV morphine and has fentanyl 12mcg/hr patch  - tramadol dose may be a little excessive for age; suggest max 300mg/day   - for dyspnea she might benefit from albuteral, though not wheezing today   - may use morphine for dyspnea as well, either 1 mg IV or 2-4mg po    2  Goals - family, including  not available this afternoon  Phone system is down, so I left a message with the daughter via mobile and asked that she speak with the pt nurse to connect with CM so we can set up a family meeting to be able to address goals of care  This might be better held off until the results of the pericardial cytology is known [not yet available]  Aware 5 Wishes was given to daughter Shanna Mckeon yesterday pre-procedure  -     Code Status: Level 1 - Full Code    Reason for visit: follow up pain and GOC    Interval history:  Had pericaridal window yesterday with placement of a drain  Cytology pending  Output since return from surgery 175ml  Reviewed nursing notes  Patient complains of dyspnea still but markedly improved from yesterday  Rates the dyspnea at 6 out of 10 severity  Pain is also better, and again she is rating it at a "6"  The pain was mostly in the RUQ, but largely gone at the time of my visit        MEDICATIONS / ALLERGIES:    all current active meds have been reviewed and current meds:   Current Facility-Administered Medications   Medication Dose Route Frequency    acetaminophen (TYLENOL) tablet 650 mg  650 mg Oral Q6H [facility]    ALPRAZolam (XANAX) tablet 0 5 mg  0 5 mg Oral HS PRN    calcium carbonate (OYSTER SHELL,OSCAL) 500 mg tablet 1 tablet  1 tablet Oral Daily With Breakfast    cefepime (MAXIPIME) 2,000 mg in dextrose 5 % 50 mL IVPB  2,000 mg Intravenous Q12H    citalopram (CeleXA) tablet 20 mg  20 mg Oral Daily    cyanocobalamin (VITAMIN B-12) tablet 100 mcg  100 mcg Oral Daily    docusate sodium (COLACE) capsule 100 mg  100 mg Oral BID    fentaNYL (DURAGESIC) 12 mcg/hr TD 72 hr patch 1 patch  1 patch Transdermal Q72H    fish oil capsule 1,000 mg  1,000 mg Oral Daily    folic acid (FOLVITE) tablet 1 mg  1 mg Oral Daily    insulin lispro (HumaLOG) 100 units/mL subcutaneous injection 2-12 Units  2-12 Units Subcutaneous TID AC    meclizine (ANTIVERT) tablet 25 mg  25 mg Oral Q8H [address]    morphine injection 1 mg  1 mg Intravenous Q4H PRN    morphine injection 2 mg  2 mg Intravenous Q4H PRN    ondansetron (ZOFRAN) injection 4 mg  4 mg Intravenous Q6H PRN    ondansetron (ZOFRAN-ODT) dispersible tablet 4 mg  4 mg Oral Q6H PRN    traMADol (ULTRAM) tablet 100 mg  100 mg Oral Q6H PRN    vancomycin (VANCOCIN) 1,250 mg in sodium chloride 0 9 % 250 mL IVPB  1,250 mg Intravenous Q24H       Allergies   Allergen Reactions    Atorvastatin Other (See Comments)    Benzonatate Other (See Comments)    Carboplatin      Pt had allergic reaction during 8th carbo dose    Sulfa Antibiotics     Bactrim [Sulfamethoxazole-Trimethoprim] Rash    Latex Rash    Other Rash     Pt states she gets a rash from surgical tape, she is ok with paper tape       OBJECTIVE:    Physical Exam    Blood pressure 126/71, pulse 90, temperature 97 6 °F (36 4 °C), temperature source Oral, resp  rate 18, height 5' 1" (1 549 m), weight 71 5 kg (157 lb 10 1 oz), SpO2 94 %  Intake/Output Summary (Last 24 hours) at 07/25/18 1436  Last data filed at 07/25/18 1422   Gross per 24 hour   Intake              400 ml   Output              435 ml   Net              -35 ml       Physical Exam   Constitutional: She is oriented to person, place, and time  She appears well-developed     HENT:   Head: Normocephalic and atraumatic  Right Ear: External ear normal    Left Ear: External ear normal    Nose: Nose normal    Eyes: Conjunctivae and EOM are normal  No scleral icterus  Neck: No tracheal deviation present  Cardiovascular: Normal rate  Pulmonary/Chest: No stridor  Dyspnea with conversation,  Productive cough  Using "heart" pillow on chest to cough   Neurological: She is alert and oriented to person, place, and time  Skin: Skin is warm and dry  Psychiatric: She has a normal mood and affect  Her behavior is normal        Lab Results:   I have personally reviewed pertinent labs  , CBC:   Lab Results   Component Value Date    WBC 16 83 (H) 07/25/2018    HGB 12 7 07/25/2018    HCT 39 8 07/25/2018    MCV 95 07/25/2018     07/25/2018    MCH 30 5 07/25/2018    MCHC 31 9 07/25/2018    RDW 13 4 07/25/2018    MPV 11 7 07/25/2018   , CMP:   Lab Results   Component Value Date     07/25/2018    K 4 1 07/25/2018     07/25/2018    CO2 22 07/25/2018    ANIONGAP 11 07/25/2018    BUN 26 (H) 07/25/2018    CREATININE 0 85 07/25/2018    GLUCOSE 182 (H) 07/25/2018    CALCIUM 9 2 07/25/2018    AST 18 07/25/2018    ALT 29 07/25/2018    ALKPHOS 56 07/25/2018    PROT 6 6 07/25/2018    BILITOT 0 96 07/25/2018    EGFR 64 07/25/2018   procalcitonin 0 51  HgbA1c 6 7  Albumin: 3  GFR 64  Imaging Studies: US RUQ:  Mild hepatomegaly, echogenicity consistent with fatty infiltration, no mass  GB with some sludge, left renal cyst present  CXR with enlarged heart, mild left pleural thickening, streaky patchy opacity in LLL thought to be atelectasis or  Pneumonia

## 2018-07-25 NOTE — PROGRESS NOTES
Pt has decreased urine output all night  Pt's output [id number] at 2100 and 150 ml at 0200  Pt bladder scanned for 330 ml  Pt has no current urge to urinate  Notified Tiny with [facility], will give pt light hydration and more time to void  No further action at this time, will continue to monitor

## 2018-07-25 NOTE — ASSESSMENT & PLAN NOTE
06/24: S/P subxiphoid pericardial window  Follow up pericardial fluid test/cs results  Thoracic surgery on board    Pericardial effusion likely malignant

## 2018-07-25 NOTE — PROGRESS NOTES
PGY2 Post-Op Check Note    S: complains of pain  Shortness of breath mildly improved but still present  Did void post op at approx 9pm    O:   Vitals:    07/25/18 0400   BP:    Pulse: 96   Resp:    Temp:    SpO2:        I/O last 3 completed shifts: In: 400 [I V :400]  Out: 1085 [Urine:575; Drains:50; Blood:460]  I/O this shift:   In: 0   Out: 330 [Urine:250; Drains:80]    PE:  NAD  Norm resp effort on RA  Mildly tachycardic  Abd soft, NT/ND  Pericardial drain in place draining serosang fluid   Subxiphoid incision cdi    Lab Results   Component Value Date    WBC 13 32 (H) 07/24/2018    HGB 12 7 07/24/2018    HCT 40 1 07/24/2018    MCV 95 07/24/2018     07/24/2018     Lab Results   Component Value Date    GLUCOSE 174 (H) 07/24/2018    CALCIUM 8 9 07/24/2018     (L) 07/24/2018    K 3 9 07/24/2018    CO2 22 07/24/2018     07/24/2018    BUN 26 (H) 07/24/2018    CREATININE 1 03 07/24/2018         A/P: 80 y o  female w/pericardial effusion likely malignant in the setting of metastatic lung CA s/p subxiphoid pericardial window 7/24  - diet as tolerated  - wean O2 as tolerated  - prn pain control, added low dose morphine  - pericardial drain to stay until at least 7/27  - PT/OT  - SQH/SCDs

## 2018-07-25 NOTE — ASSESSMENT & PLAN NOTE
Likely type 2 secondary to demand from PNA/sepsis, AFIB, TANYA, pericardial effusion  Trop 0 31; 0 29; 0 31  Cardiology on board

## 2018-07-25 NOTE — PROGRESS NOTES
Progress Note - Thoracic Surgery   Santosh Jhaveri 80 y o  female MRN: [id number]  Unit/Bed#: [facility] 422-01 Encounter: [id number]    Assessment:  82F w/pericardial effusion likely 2/2 metastatic lung CA s/p subxiphoid pericardial window 7/24  Plan:  - diet as tolerated  - better pain control, will start scheduled tylenol and increase tramadol  - wean O2 as tolerated  - dc A line  - transfer to [facility]  - transfer back to [facility]      Subjective/Objective   Chief Complaint:     Subjective: Went to OR for pericardial window, continued SOB following procedure     Objective:     Blood pressure 108/68, pulse 87, temperature 97 6 °F (36 4 °C), temperature source Oral, resp  rate 18, height 5' 1" (1 549 m), weight 71 5 kg (157 lb 10 1 oz), SpO2 94 %  ,Body mass index is 29 78 kg/m²  Intake/Output Summary (Last 24 hours) at 07/25/18 0801  Last data filed at 07/25/18 0400   Gross per 24 hour   Intake              400 ml   Output             1160 ml   Net             -760 ml       Invasive Devices     Peripheral Intravenous Line            Peripheral IV 07/24/18 Right Antecubital less than 1 day          Arterial Line            Arterial Line 07/24/18 Right Radial less than 1 day          Drain            Closed/Suction Drain Midline Chest Other (Comment) 24 Fr  less than 1 day                Physical Exam:   NAD  Norm resp effort on 6LNC  Mildly tachy 100s but reg rhythm  Abd soft, NT ND  Subxiphoid incision dressing cdi  IGOR in place with serosang drainage      Lab, Imaging and other studies:  I have personally reviewed pertinent lab results    , CBC:   Lab Results   Component Value Date    WBC 16 83 (H) 07/25/2018    HGB 12 7 07/25/2018    HCT 39 8 07/25/2018    MCV 95 07/25/2018     07/25/2018    MCH 30 5 07/25/2018    MCHC 31 9 07/25/2018    RDW 13 4 07/25/2018    MPV 11 7 07/25/2018    NRBC 0 07/24/2018   , CMP:   Lab Results   Component Value Date     07/25/2018    K 4 1 07/25/2018     07/25/2018    CO2 22 07/25/2018    ANIONGAP 11 07/25/2018    BUN 26 (H) 07/25/2018    CREATININE 0 85 07/25/2018    GLUCOSE 182 (H) 07/25/2018    CALCIUM 9 2 07/25/2018    AST 18 07/25/2018    ALT 29 07/25/2018    ALKPHOS 56 07/25/2018    PROT 6 6 07/25/2018    BILITOT 0 96 07/25/2018    EGFR 64 07/25/2018     VTE Mechanical Prophylaxis: sequential compression device

## 2018-07-25 NOTE — ASSESSMENT & PLAN NOTE
Likely secondary to PNA -- pt presented with leukocytosis, hypotension, tachycardia, tachypnea  Follow cultures of urine strep/legionella, MRSA swab and sputum culture  Refer above

## 2018-07-26 ENCOUNTER — APPOINTMENT (INPATIENT)
Dept: RADIOLOGY | Facility: HOSPITAL | Age: 83
DRG: 853 | End: 2018-07-26
Payer: MEDICARE

## 2018-07-26 PROBLEM — R10.11 RIGHT UPPER QUADRANT PAIN: Status: RESOLVED | Noted: 2018-07-24 | Resolved: 2018-07-26

## 2018-07-26 PROBLEM — N17.9 AKI (ACUTE KIDNEY INJURY) (HCC): Status: RESOLVED | Noted: 2018-07-24 | Resolved: 2018-07-26

## 2018-07-26 LAB
ANION GAP SERPL CALCULATED.3IONS-SCNC: 7 MMOL/L (ref 4–13)
BASOPHILS # BLD AUTO: 0.05 THOUSANDS/ΜL (ref 0–0.1)
BASOPHILS NFR BLD AUTO: 0 % (ref 0–1)
BUN SERPL-MCNC: 30 MG/DL (ref 5–25)
CALCIUM SERPL-MCNC: 9.5 MG/DL (ref 8.3–10.1)
CHLORIDE SERPL-SCNC: 105 MMOL/L (ref 100–108)
CO2 SERPL-SCNC: 24 MMOL/L (ref 21–32)
CREAT SERPL-MCNC: 0.74 MG/DL (ref 0.6–1.3)
EOSINOPHIL # BLD AUTO: 0.55 THOUSAND/ΜL (ref 0–0.61)
EOSINOPHIL NFR BLD AUTO: 4 % (ref 0–6)
ERYTHROCYTE [DISTWIDTH] IN BLOOD BY AUTOMATED COUNT: 13.4 % (ref 11.6–15.1)
GFR SERPL CREATININE-BSD FRML MDRD: 76 ML/MIN/1.73SQ M
GLUCOSE SERPL-MCNC: 124 MG/DL (ref 65–140)
GLUCOSE SERPL-MCNC: 131 MG/DL (ref 65–140)
GLUCOSE SERPL-MCNC: 133 MG/DL (ref 65–140)
GLUCOSE SERPL-MCNC: 190 MG/DL (ref 65–140)
GLUCOSE SERPL-MCNC: 220 MG/DL (ref 65–140)
HCT VFR BLD AUTO: 38.3 % (ref 34.8–46.1)
HGB BLD-MCNC: 11.7 G/DL (ref 11.5–15.4)
IMM GRANULOCYTES # BLD AUTO: 0.07 THOUSAND/UL (ref 0–0.2)
IMM GRANULOCYTES NFR BLD AUTO: 1 % (ref 0–2)
LYMPHOCYTES # BLD AUTO: 2.19 THOUSANDS/ΜL (ref 0.6–4.47)
LYMPHOCYTES NFR BLD AUTO: 16 % (ref 14–44)
MCH RBC QN AUTO: 29.7 PG (ref 26.8–34.3)
MCHC RBC AUTO-ENTMCNC: 30.5 G/DL (ref 31.4–37.4)
MCV RBC AUTO: 97 FL (ref 82–98)
MONOCYTES # BLD AUTO: 1.41 THOUSAND/ΜL (ref 0.17–1.22)
MONOCYTES NFR BLD AUTO: 10 % (ref 4–12)
NEUTROPHILS # BLD AUTO: 9.66 THOUSANDS/ΜL (ref 1.85–7.62)
NEUTS SEG NFR BLD AUTO: 69 % (ref 43–75)
NRBC BLD AUTO-RTO: 0 /100 WBCS
PLATELET # BLD AUTO: 227 THOUSANDS/UL (ref 149–390)
PMV BLD AUTO: 11.6 FL (ref 8.9–12.7)
POTASSIUM SERPL-SCNC: 4 MMOL/L (ref 3.5–5.3)
PROCALCITONIN SERPL-MCNC: 0.89 NG/ML
RBC # BLD AUTO: 3.94 MILLION/UL (ref 3.81–5.12)
SODIUM SERPL-SCNC: 136 MMOL/L (ref 136–145)
WBC # BLD AUTO: 13.93 THOUSAND/UL (ref 4.31–10.16)

## 2018-07-26 PROCEDURE — 99232 SBSQ HOSP IP/OBS MODERATE 35: CPT | Performed by: INTERNAL MEDICINE

## 2018-07-26 PROCEDURE — 99024 POSTOP FOLLOW-UP VISIT: CPT | Performed by: THORACIC SURGERY (CARDIOTHORACIC VASCULAR SURGERY)

## 2018-07-26 PROCEDURE — G8978 MOBILITY CURRENT STATUS: HCPCS

## 2018-07-26 PROCEDURE — 99024 POSTOP FOLLOW-UP VISIT: CPT | Performed by: SURGERY

## 2018-07-26 PROCEDURE — 97163 PT EVAL HIGH COMPLEX 45 MIN: CPT

## 2018-07-26 PROCEDURE — 85025 COMPLETE CBC W/AUTO DIFF WBC: CPT | Performed by: INTERNAL MEDICINE

## 2018-07-26 PROCEDURE — 70450 CT HEAD/BRAIN W/O DYE: CPT

## 2018-07-26 PROCEDURE — G8979 MOBILITY GOAL STATUS: HCPCS

## 2018-07-26 PROCEDURE — 82948 REAGENT STRIP/BLOOD GLUCOSE: CPT

## 2018-07-26 PROCEDURE — 80048 BASIC METABOLIC PNL TOTAL CA: CPT | Performed by: INTERNAL MEDICINE

## 2018-07-26 PROCEDURE — 84145 PROCALCITONIN (PCT): CPT | Performed by: INTERNAL MEDICINE

## 2018-07-26 RX ORDER — DILTIAZEM HYDROCHLORIDE 5 MG/ML
10 INJECTION INTRAVENOUS ONCE
Status: COMPLETED | OUTPATIENT
Start: 2018-07-26 | End: 2018-07-26

## 2018-07-26 RX ORDER — METOPROLOL TARTRATE 5 MG/5ML
5 INJECTION INTRAVENOUS EVERY 6 HOURS PRN
Status: DISCONTINUED | OUTPATIENT
Start: 2018-07-26 | End: 2018-08-01 | Stop reason: HOSPADM

## 2018-07-26 RX ADMIN — KETOROLAC TROMETHAMINE 15 MG: 30 INJECTION, SOLUTION INTRAMUSCULAR at 02:30

## 2018-07-26 RX ADMIN — DEXTROSE 150 MG: 50 INJECTION, SOLUTION INTRAVENOUS at 12:36

## 2018-07-26 RX ADMIN — METOPROLOL TARTRATE 25 MG: 25 TABLET ORAL at 21:40

## 2018-07-26 RX ADMIN — ALPRAZOLAM 0.5 MG: 0.25 TABLET ORAL at 21:40

## 2018-07-26 RX ADMIN — Medication 1 TABLET: at 08:43

## 2018-07-26 RX ADMIN — FOLIC ACID 1 MG: 1 TABLET ORAL at 08:44

## 2018-07-26 RX ADMIN — SODIUM CHLORIDE 500 ML: 0.9 INJECTION, SOLUTION INTRAVENOUS at 09:05

## 2018-07-26 RX ADMIN — SODIUM CHLORIDE 250 ML: 0.9 INJECTION, SOLUTION INTRAVENOUS at 03:36

## 2018-07-26 RX ADMIN — DOCUSATE SODIUM 100 MG: 100 CAPSULE, LIQUID FILLED ORAL at 08:44

## 2018-07-26 RX ADMIN — INSULIN LISPRO 4 UNITS: 100 INJECTION, SOLUTION INTRAVENOUS; SUBCUTANEOUS at 17:38

## 2018-07-26 RX ADMIN — FENTANYL 1 PATCH: 12 PATCH, EXTENDED RELEASE TRANSDERMAL at 19:26

## 2018-07-26 RX ADMIN — DILTIAZEM HYDROCHLORIDE 10 MG: 5 INJECTION INTRAVENOUS at 01:56

## 2018-07-26 RX ADMIN — CEFEPIME HYDROCHLORIDE 2000 MG: 2 INJECTION, POWDER, FOR SOLUTION INTRAVENOUS at 00:31

## 2018-07-26 RX ADMIN — ACETAMINOPHEN 650 MG: 325 TABLET, FILM COATED ORAL at 05:26

## 2018-07-26 RX ADMIN — AMIODARONE HYDROCHLORIDE 1 MG/MIN: 50 INJECTION, SOLUTION INTRAVENOUS at 12:56

## 2018-07-26 RX ADMIN — VITAM B12 100 MCG: 100 TAB at 08:44

## 2018-07-26 RX ADMIN — CITALOPRAM HYDROBROMIDE 20 MG: 20 TABLET ORAL at 08:44

## 2018-07-26 RX ADMIN — DOCUSATE SODIUM 100 MG: 100 CAPSULE, LIQUID FILLED ORAL at 17:39

## 2018-07-26 RX ADMIN — ACETAMINOPHEN 650 MG: 325 TABLET, FILM COATED ORAL at 17:39

## 2018-07-26 RX ADMIN — CEFEPIME HYDROCHLORIDE 2000 MG: 2 INJECTION, POWDER, FOR SOLUTION INTRAVENOUS at 13:13

## 2018-07-26 RX ADMIN — ACETAMINOPHEN 650 MG: 325 TABLET, FILM COATED ORAL at 13:17

## 2018-07-26 NOTE — RESTORATIVE TECHNICIAN NOTE
Restorative Specialist Mobility Note       Activity: Stand at bedside     Assistive Device: Front wheel walker

## 2018-07-26 NOTE — PROGRESS NOTES
Pt  With still elevated HR this morning (rapid a-fib)  However BP low at 87/61  Dr Nelda Mcmahon with cardiology ordered lopressor  Dr Nelda Mcmahon made aware of HR and BP, was advised to hold lopressor/BP meds this morning

## 2018-07-26 NOTE — PROGRESS NOTES
Pt converted to rapid Afib HR sustaining 120's-150's  BP stable with SBP(100-110's)  Notified Cardiology fellow, Placed one time dose of IV Cardizem and a PRN Lopressor  Pt currently asymptomatic, No further action at this time  Will continue to monitor

## 2018-07-26 NOTE — PROGRESS NOTES
Progress Note - Cardiology   Barry Mendoza 80 y o  female MRN: [id number]  Unit/Bed#: [facility] 422-01 Encounter: [id number]    81 y/o female with pericardial effusion secondary to metastatic lung cancer s/p pericardial window day 2    Assessment and Plan:  1  Pericardial effusion  -500 ml of bloody fluid drained  -awaiting cytology, gram stain  -IGOR drain 60ml yesterday  -Thoracic surgery plans to remove IGOR Drain tomorrow  2  Rapid Afib  - patient is currently asymptomatic, no chest pain, palpitations, SOB   -Last night around midnight Hr was normal at 88, 133/85 BP  -this morning at 4am Hr 115, BP 99/67  -7AM Hr 126 and BP 87/61  -metoprolol was held this morning due to hypotension  -started on amiodarone for rhythm control        Subjective/Objective   Chief Complaint: Pericardial effusion and rapid Afib    Subjective: Pt  Denies any overnight events, she said she slept through the whole night, did not have any chest pain or palpitations that caused her to wake up      Objective:     Vitals: BP (!) 87/61 (BP Location: Right arm)   Pulse (!) 126   Temp 97 5 °F (36 4 °C) (Oral)   Resp 20   Ht 5' 1" (1 549 m)   Wt 71 kg (156 lb 8 4 oz)   SpO2 97%   BMI 29 58 kg/m²   Vitals:    07/25/18 0600 07/26/18 0600   Weight: 71 5 kg (157 lb 10 1 oz) 71 kg (156 lb 8 4 oz)     Orthostatic Blood Pressures      Most Recent Value   Blood Pressure   87/61 filed at 07/26/2018 8194   Patient Position - Orthostatic VS  Lying filed at 07/26/2018 2870            Intake/Output Summary (Last 24 hours) at 07/26/18 1050  Last data filed at 07/26/18 3138   Gross per 24 hour   Intake             1370 ml   Output              967 ml   Net              403 ml       Invasive Devices     Peripheral Intravenous Line            Peripheral IV 07/24/18 Right Antecubital 1 day          Drain            Closed/Suction Drain Midline Chest Other (Comment) 24 Fr  1 day                Review of Systems: General ROS: negative  Respiratory ROS: no cough, shortness of breath, or wheezing  Cardiovascular ROS: no chest pain or dyspnea on exertion  Gastrointestinal ROS: no abdominal pain, change in bowel habits, or black or bloody stools  Musculoskeletal ROS: negative    Physical Exam: General appearance: fatigued  Lungs: clear to auscultation bilaterally  Heart: irregularly irregular rhythm  Abdomen: soft, non-tender; bowel sounds normal; no masses,  no organomegaly  Extremities: extremities normal, warm and well-perfused; no cyanosis, clubbing, or edema, no edema, redness or tenderness in the calves or thighs and no ulcers, gangrene or trophic changes    Lab Results:   CBC with diff:   Results from last 7 days  Lab Units 07/26/18  0501   WBC Thousand/uL 13 93*   RBC Million/uL 3 94   HEMOGLOBIN g/dL 11 7   HEMATOCRIT % 38 3   MCV fL 97   MCH pg 29 7   MCHC g/dL 30 5*   RDW % 13 4   MPV fL 11 6   PLATELETS Thousands/uL 227     CMP:   Results from last 7 days  Lab Units 07/26/18  0501 07/25/18  0501   SODIUM mmol/L 136 136   POTASSIUM mmol/L 4 0 4 1   CHLORIDE mmol/L 105 103   CO2 mmol/L 24 22   ANION GAP mmol/L 7 11   BUN mg/dL 30* 26*   CREATININE mg/dL 0 74 0 85   GLUCOSE RANDOM mg/dL 124 182*   CALCIUM mg/dL 9 5 9 2   AST U/L  --  18   ALT U/L  --  29   ALK PHOS U/L  --  56   TOTAL PROTEIN g/dL  --  6 6   BILIRUBIN TOTAL mg/dL  --  0 96   EGFR ml/min/1 73sq m 76 64     Troponin:   0  Lab Value Date/Time   TROPONINI 0 31 (H) 07/24/2018 0041   TROPONINI 0 29 (H) 07/23/2018 2346   TROPONINI 0 31 (H) 07/23/2018 2048   TROPONINI 0 20 (H) 07/23/2018 1128     BNP:   Results from last 7 days  Lab Units 07/26/18  0501   SODIUM mmol/L 136   POTASSIUM mmol/L 4 0   CHLORIDE mmol/L 105   CO2 mmol/L 24   ANION GAP mmol/L 7   BUN mg/dL 30*   CREATININE mg/dL 0 74   GLUCOSE RANDOM mg/dL 124   CALCIUM mg/dL 9 5   EGFR ml/min/1 73sq m 76     Coags:   Results from last 7 days  Lab Units 07/24/18  0041 07/23/18  1128   PTT seconds  --  32   INR  1 22* 1 13     TSH:   Results from last 7 days  Lab Units 07/23/18  1128   TSH 3RD GENERATON uIU/mL 2 185     Magnesium:   Results from last 7 days  Lab Units 07/24/18  1436   MAGNESIUM mg/dL 1 8     Lipid Profile:     Imaging: I have personally reviewed pertinent reports      EKG: rapid Afib  VTE Pharmacologic Prophylaxis: Sequential compression device (Venodyne)   VTE Mechanical Prophylaxis: sequential compression device

## 2018-07-26 NOTE — PROGRESS NOTES
Pt continuing in rapid Afib with 's-130's post administration of IV Cardizem  Pt BP currently 96/60  Notified Cardiology fellow, continue to monitor pt but okay with Pt HR sustaining in 110's-120's  Notified SLIM about Rapid Afib and Low BP, will administer one time 250ml Bolus  No further action at this time, will continue to monitor

## 2018-07-26 NOTE — PROGRESS NOTES
Pt  C/o feeling confused  Pt  Was re-oriented, chair alarm placed on pt  SLIM notified, I was advised this is pt 's base line and continue to monitor

## 2018-07-26 NOTE — PHYSICAL THERAPY NOTE
Physical Therapy Evaluation:    2 forms of pt ID verified:name,birthdate and pt ID [id number]    Patient's Name: Braulio Barger    Admitting Diagnosis  Pericardial effusion [I31 3]    Problem List  Patient Active Problem List   Diagnosis    Lung cancer ([facility] )    Diabetes mellitus ([facility] )    Hypertension    Pericardial effusion    [facility] fibrillation ([facility] )    Sepsis ([facility] )    HAP (hospital-acquired pneumonia)    NSTEMI (non-ST elevated myocardial infarction) ([facility] )    Continuous opioid dependence ([facility] )    HCAP (healthcare-associated pneumonia)       Past Medical History  Past Medical History:   Diagnosis Date    TANYA (acute kidney injury) ([facility] ) 7/24/2018    Breast cancer, left ([place] [id number] )     Diabetes mellitus ([facility] )     HAP (hospital-acquired pneumonia) 7/24/2018    Hypertension     Lung cancer ([facility] )     NSTEMI (non-ST elevated myocardial infarction) ([facility] ) 7/24/2018       Past Surgical History  Past Surgical History:   Procedure Laterality Date    FRACTURE SURGERY      R arm surgery    MASTECTOMY      L breast with implant    TX INCIS HEART SAC WINDW FOR DRAIN N/A 7/24/2018    Procedure: WINDOW PERICARDIAL  Subxyphoid approach ;  Surgeon: Aylin Flannery MD;  [facility]: [facility];  Service: Thoracic    REDUCTION MAMMAPLASTY      R breast      07/26/18 0940   Note Type   Note type Eval only   Pain Assessment   Pain Assessment 0-10   Pain Score 6   Pain Type Acute pain;Surgical pain   Pain Location Chest;Abdomen   Pain Orientation Mid;Lower   Hospital Pain Intervention(s) Repositioned; Ambulation/increased activity; Elevated; Emotional support; Environmental changes; Rest   Home Living   Type of [facility] in basement;Multi-level  (2 JEN)   [address] [facility]  (pt reports ownign [facility])   Additional Comments pt reports being completely I PTA,use of SPC for mobility at times,(+)JEN,reports living with  and reports no recent falls   Prior Function   Level of [place] Independent with ADLs and functional mobility  (per pt PTA)   Lives With Spouse   Receives Help From Family  (per pt PTA as needed)   ADL Assistance Independent   IADLs Independent   Falls in the last 6 months 0   Restrictions/Precautions   Other Precautions Hard of hearing;Pain; Fall Risk;Telemetry;Multiple lines  (body habitus)   General   Additional Pertinent History pericardial effusion;pericardial window with aspiration precautions;metastatic lung CA   Family/Caregiver Present No   Cognition   Overall Cognitive Status Impaired   Arousal/Participation Cooperative   Orientation Level Oriented to person;Oriented to place;Oriented to situation   Following Commands Follows one step commands with increased time or repetition  (2* inc pain and discomfort,slow mobility,larger body habitus)   RLE Assessment   RLE Assessment (3+/5 grossly througout as least)   LLE Assessment   LLE Assessment (3+/5 grossly throughout at least)   Coordination   Movements are Fluid and Coordinated 0   Coordination and Movement Description ataxic and unsteady,slow mobility,inc pain and discomfort,dec BLE step length,wide BRADEN   Sensation WFL   Light Touch   RLE Light Touch Grossly intact   LLE Light Touch Grossly intact   Bed Mobility   Rolling L 3  Moderate assistance   Additional items Assist x 1;Bedrails; Increased time required;Verbal cues;LE management   Sit to Supine 3  Moderate assistance   Additional items Assist x 1;HOB elevated; Bedrails; Increased time required;Verbal cues;LE management   Transfers   Sit to Stand 3  Moderate assistance   Additional items Assist x 1; Armrests; Increased time required;Verbal cues   Stand to Sit 3  Moderate assistance   Additional items Assist x 1;Bedrails; Increased time required;Verbal cues  (for safety,education and control descent)   Ambulation/Elevation   Gait pattern Poor UE support; Improper Weight shift; Antalgic; Forward Flexion; Wide BRADEN;Shuffling; Inconsistent mallika; Foward flexed; Short stride; Ataxia; Step to;Excessively slow   Gait Assistance 3  Moderate assist   Additional items Assist x 1;Verbal cues; Tactile cues   Assistive Device Rolling walker   Distance 5 forward steps and 5 sidesteps with use of RW on tile surface;limited mobility and gait distance 2* inc reports of dizziness,weakness,fatigue,SOB and B knee buckling;inc HR during mobility and static stand->NSG aware and restorative therapy aide present   Balance   Static Sitting Fair  (in chair and at EOB)   Dynamic Sitting Poor   Static Standing Poor   Dynamic Standing Poor   Ambulatory Poor   Endurance Deficit   Endurance Deficit Yes   Endurance Deficit Description weakness,recent surgical procedure,SOB,fatigue,use of NC O2,cont telemtry monitoring   Activity Tolerance   Activity Tolerance Patient limited by fatigue;Patient limited by pain;Treatment limited secondary to medical complications (Comment)  (poor)   Medical Staff Made Aware ROCÍO Leyva and Hema   Nurse Made Aware yes   Assessment   Prognosis Guarded   Problem List Decreased strength;Decreased endurance; Impaired balance;Decreased mobility; Decreased coordination;Decreased safety awareness; Impaired hearing;Obesity; Decreased skin integrity;Pain   Assessment Pt is a 81 y/o female admitted to \[facility]\"" 2* metastatic lung CA,pericardial effusion  Pt lives with  in 1 story home,(+)basement use,(+)JEN,owns and uses SPC for mobility PTA,reports being completely I PTA and reports no recent falls  pt currently is not at functional mobility baseline,needs A x1-2 for mobility,multiple lines,ataxic and unsteady gait pattern,IV medicine management,use of NC O2,reports of dizziness and weakness and ongoing medical care  Pt demonstrates moderate deficits during functional mobility and gait including dec endurance,dec balance,dec BLE strength,ataxic and unsteady gait pattern,inc pain and needs modAx1 for transfers,BM and gait with use of RW   Pt would cont to benefit from skilled inpt PT services to maximize functional independence  Barriers to Discharge Inaccessible home environment  (JEN,use of basement area)   Goals   Patient Goals to return to bed and rest   STG Expiration Date 08/05/18   Short Term Goal #1 in 7-10 days:pt will be able to ambulate >300 feet with use of RW on various surfaces and chair follow minAx1, activity tolerance;45mins/45mins,inc balance 1 grade,BM and transfers minAx1->S consistently to and from various surfaces,I with BLE ther ex HEP in various positions   Treatment Day 0   Plan   Treatment/Interventions Functional transfer training;LE strengthening/ROM; Therapeutic exercise; Endurance training;Patient/family training;Equipment eval/education; Bed mobility;Gait training;Spoke to nursing;Spoke to case management   PT Frequency Other (Comment)  (4-6x/week)   Recommendation   Recommendation Other (Comment)  (inpt rhb upon D/C recommended)   Equipment Recommended Walker  (RW)   Barthel Index   Feeding 10   Bathing 0   Grooming Score 0   Dressing Score 0   Bladder Score 10   Bowels Score 10   Toilet Use Score 5   Transfers (Bed/Chair) Score 5   Mobility (Level Surface) Score 0   Stairs Score 0   Barthel Index Score 40   Skilled PT recommended while in hospital and upon DC to progress pt toward treatment goals           Justin Ruiz, PT, DPT@

## 2018-07-26 NOTE — PROGRESS NOTES
07/26/18 1000   Clinical Encounter Type   Visited With Family   Referral From Family   Referral To Other (Comment)  Bruce Valverde)   Samaritan Encounters   Samaritan Needs ([address])   Sacramental Encounters   Communion Patient wants communion  (Requested by family)

## 2018-07-26 NOTE — PROGRESS NOTES
07/25/18 [address] [facility]   Spiritual Beliefs/Perceptions   Concept of God Accepting   Spiritual Strengths Summer; Prayer   Support Systems Children;Family members   Plan of Care   Comments Family stopped me in the foster, introduced themselves, and we began to establish a caring relationship  Family requested a visit from the  and communion  Pt is on list for communion  Assessment Completed by:  Other (Comment)  (Spontaneous visit)

## 2018-07-26 NOTE — PROGRESS NOTES
Progress Note - Cathy Cuellar 1935, 80 y o  female MRN: [id number]  Unit/Bed#: [facility] 422-01 Encounter: [id number]  Primary Care Provider: Romaine Lopez DO   Date and time admitted to hospital: 7/23/2018  5:53 PM        * Pericardial effusion   Assessment & Plan    06/24: S/P subxiphoid pericardial window with drain  Follow up pericardial fluid test/cs results  Thoracic surgery on board  Pericardial effusion likely malignant          HAP (hospital-acquired pneumonia)   Assessment & Plan    Pt recieves chemo every 3 weeks-- most recent 7/16  Chest xray and CT scan concerning for LLL infiltrate   Continue IV cefepime -- day 3  Discontinue IV vancomycin as MRSA is negative  Procalcitonin elevated at 0 89, repeat in am   Follow blood cultures x 2 no growth so far  Continuous pulse ox, Aspiration precautions         Sepsis ([facility] )   Assessment & Plan    Likely secondary to PNA -- pt presented with leukocytosis, hypotension, tachycardia, tachypnea  MRSA swab negative, urine for strep pneumoniae, Legionella antigen awaited  Refer above  Lung cancer [facility])   Assessment & Plan    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  Follows with Dr Morey Shone   treated with Prisca Mercury every 3 weeks-- last infusion 7/16  Palliative care on board  Diabetes mellitus [facility])   Assessment & Plan    Lab Results   Component Value Date    HGBA1C 6 7 (H) 07/25/2018       Recent Labs      07/24/18   1434  07/25/18   0609  07/25/18   1123  07/25/18   1602   POCGLU  167*  187*  138  131       Blood Sugar Average: Last 72 hrs:  (P) 179    HgA1C at 6 7  SSI and accuchecks   Hold OHG: metformin        Hypertension   Assessment & Plan    Hypotensive in the setting of sepsis and pericardial effusion   BP improving  Atrial fibrillation ([facility] ) with RVR   Assessment & Plan    Cardiology on board  Continue IV amiodarone gtt, lopressor, with holding parameters  ?  Repeat echocardiogram  Monitor on telemetry  NSTEMI (non-ST elevated myocardial infarction) [facility])   Assessment & Plan    Likely type 2 secondary to demand from PNA/sepsis, AFIB, TANYA, pericardial effusion  Trop 0 31; 0 29; 0 31  Cardiology on board      Right upper quadrant painresolved as of 2018   Assessment & Plan    Symptoms have resolved  US abdomen -- Gallbladder sludge  Hepatomegaly and hepatic steatosis  LFTs improved  TANYA (acute kidney injury) (HCC)resolved as of 2018   Assessment & Plan    Likely prerenal in the setting of sepsis and hypoperfusion   TANYA has resolved, creatinine at baseline  Continuous opioid dependence ([facility] )   Assessment & Plan    C/w home regimen: fentanyl patch and norco   Palliative care on board  ______________________________________________________________________    SUBJECTIVE:   Patient seen and examined  Chronically ill  Chest discomfort persist at operated site  HR uncontrolled  She appears little improved compared to yesterday  Continues to have cough  Intermittent confusion persists  OBJECTIVE:     Vitals:   HR:  [] 126  Resp:  [16-20] 20  BP: ()/(56-85) 87/61  SpO2:  [94 %-97 %] 97 %  Temp (24hrs), Av 6 °F (36 4 °C), Min:97 5 °F (36 4 °C), Max:97 7 °F (36 5 °C)  Current: Temperature: 97 5 °F (36 4 °C)    Intake/Output Summary (Last 24 hours) at 18 1056  Last data filed at 18 3823   Gross per 24 hour   Intake             1370 ml   Output              967 ml   Net              403 ml       Physical Exam:   GENERAL: AAO x 1, confused, chronically ill  HEENT: atraumatic, normocephalic  Oral mucosa moist, no icterus, pallor  PERRLA +  Neck supple, no JVD, no lymphadenopathy, no thryomegaly  CHEST: B/L breath sounds heard, occasional wheezing  Chest drain +  CVS: S1, S2   ABDOMEN: Soft/obese/NT/BS heard  NEUROLOGICAL: CN II -XI grossly intact  No focal motor or sensory deficits   No signs of meningeal irritation or cerebellar dysfunction  EXTREMITIES: mild B/L pitting pedal edema      LABS:   7/26/2018 05:01   eGFR 76   Sodium 136   Potassium 4 0   Chloride 105   CO2 24   Anion Gap 7   BUN 30 (H)   Creatinine 0 74   Glucose 124   Calcium 9 5   WBC 13 93 (H)   RBC 3 94   Hemoglobin 11 7   Hematocrit 38 3   MCV 97   MCH 29 7   MCHC 30 5 (L)   RDW 13 4   Platelets 722   MPV 28 9   nRBC 0   Neutrophils Relative 69   Lymphocytes Relative 16   Monocytes Relative 10   Eosinophils Relative 4   Basophils Relative 0   Neutrophils Absolute 9 66 (H)   Lymphocytes Absolute 2 19   Monocytes Absolute 1 41 (H)   Eosinophils Absolute 0 55   Basophils Absolute 0 05   Procalcitonin 0 89 (H)     Scheduled Meds:    Current Facility-Administered Medications:  acetaminophen 650 mg Oral Q6H Nikki Petit MD    ALPRAZolam 0 5 mg Oral HS PRN Thania Rendon MD    amiodarone (CORDARONE) IV bolus 150 mg Intravenous Once Debi Trujillo MD    Followed by        amiodarone 1 mg/min Intravenous Continuous Debi Trujillo MD    Followed by        amiodarone 0 5 mg/min Intravenous Continuous Debi Trujillo MD    calcium carbonate 1 tablet Oral Daily With Breakfast Thania Rendon MD    cefepime 2,000 mg Intravenous Q12H SAMANTHA Collazo Last Rate: 2,000 mg (07/26/18 0031)   citalopram 20 mg Oral Daily Thania Rendon MD    cyanocobalamin 100 mcg Oral Daily Thania Rendon MD    docusate sodium 100 mg Oral BID Thania Rendon MD    fentaNYL 1 patch Transdermal Q72H Thania Rendon MD    fish oil 1,000 mg Oral Daily Thania Rendon MD    folic acid 1 mg Oral Daily Thania Rendon MD    insulin lispro 2-12 Units Subcutaneous TID AC Thania Rendon MD    ketorolac 15 mg Intravenous Q6H PRN Sha Frost MD    meclizine 25 mg Oral Q8H PRN Adolph Weller PA-C    metoprolol 5 mg Intravenous Q6H PRN Alexis Gilbert MD    metoprolol tartrate 25 mg Oral Q12H Sultana Meadows MD    morphine injection 2 mg Intravenous Q4H PRN Jessica Nloasco MD Samantha    ondansetron 4 mg Intravenous Q6H PRN Mavis Roth MD    ondansetron 4 mg Oral Q6H PRN Tee Garcia MD    sodium chloride 500 mL Intravenous Once Ivan Hernadez MD Last Rate: 500 mL (07/26/18 0905)   traMADol 100 mg Oral Q4H PRN Ynes ePreyra PA-C      PRN Meds:  ALPRAZolam    ketorolac    meclizine    metoprolol    morphine injection    ondansetron    ondansetron    traMADol    VTE prophylaxis: SCDs  Education and Discussions with Family / Patient: Will call daughter in afternoon  Current Length of Stay: Day 3     Current Patient Status: Inpatient     Certification Statement: The patient will continue to require additional inpatient hospital stay due to ongoing goals of care discussion      Discharge Plan / Estimated Discharge Date:  Plan for today discussed with case management and RN      Code Status: Level 1 - Full Code    Time Spent for Care: 20 minutes   More than 50% of total time spent on counseling and coordination of care as described above  Katelynn Cooper MD  HOSPITALIST SERVICES  7/26/2018    PLEASE NOTE:  This encounter was completed utilizing the [employer] Direct Speech Voice Recognition Software  Grammatical errors, random word insertions, pronoun errors and incomplete sentences are occasional consequences of the system due to software limitations, ambient noise and hardware issues  These may be missed by proof reading prior to affixing electronic signature  Any questions or concerns about the content, text or information contained within the body of this dictation should be directly addressed to the physician for clarification  Please do not hesitate to call me directly if you have any any questions or concerns

## 2018-07-26 NOTE — PLAN OF CARE
Problem: PHYSICAL THERAPY ADULT  Goal: Performs mobility at highest level of function for planned discharge setting  See evaluation for individualized goals  Treatment/Interventions: Functional transfer training, LE strengthening/ROM, Therapeutic exercise, Endurance training, Patient/family training, Equipment eval/education, Bed mobility, Gait training, Spoke to nursing, Spoke to case management  Equipment Recommended: Adelia Villalpando (ERICA)       See flowsheet documentation for full assessment, interventions and recommendations  Prognosis: Guarded  Problem List: Decreased strength, Decreased endurance, Impaired balance, Decreased mobility, Decreased coordination, Decreased safety awareness, Impaired hearing, Obesity, Decreased skin integrity, Pain  Assessment: Pt is a 81 y/o female admitted to [facility] 2* metastatic lung CA,pericardial effusion  Pt lives with  in 1 story home,(+)basement use,(+)JEN,owns and uses SPC for mobility PTA,reports being completely I PTA and reports no recent falls  pt currently is not at functional mobility baseline,needs A x1-2 for mobility,multiple lines,ataxic and unsteady gait pattern,IV medicine management,use of NC O2,reports of dizziness and weakness and ongoing medical care  Pt demonstrates moderate deficits during functional mobility and gait including dec endurance,dec balance,dec BLE strength,ataxic and unsteady gait pattern,inc pain and needs modAx1 for transfers,BM and gait with use of RW  Pt would cont to benefit from skilled inpt PT services to maximize functional independence  Barriers to Discharge: Inaccessible home environment (JEN,use of basement area)     Recommendation: Other (Comment) (inpt rhb upon D/C recommended)          See flowsheet documentation for full assessment

## 2018-07-26 NOTE — PROGRESS NOTES
Progress Note - Thoracic Surgery   Kathleen Alexis 80 y o  female MRN: [id number]  Unit/Bed#: [facility] 422-01 Encounter: [id number]    Assessment:  82F w/ pericardial effusion likely 2/2 metastatic lung CA s/p xubxiphoid pericardial window 7/24    -A-line d/c'd yesterday  - IGOR out - 60mL approx yesterday    Plan:  - diet as tolerated  - pain control - Tylenol 650 scheduled, Tramadol 100 q6h  - wean 02 as tolerated  - Keep IGOR for 72hrs total - another day  - Continue care per SLIM    Subjective/Objective   Chief Complaint:     Subjective: Patient with no acute events  Pain control via Toradol works better than Tramadol and Tylenol however trying to hold off NSAID use in 81 yo patient  Patient went in to A-fib with RVR last night and received Cardizem however rate in 110-120's  Currently in A-fib  Pressures somewhat low  No complaints of shortness of breath or chest pain  Objective:     Blood pressure 99/67, pulse (!) 115, temperature 97 5 °F (36 4 °C), temperature source Oral, resp  rate 16, height 5' 1" (1 549 m), weight 71 5 kg (157 lb 10 1 oz), SpO2 96 %  ,Body mass index is 29 78 kg/m²  Intake/Output Summary (Last 24 hours) at 07/26/18 0455  Last data filed at 07/26/18 2473   Gross per 24 hour   Intake             1190 ml   Output              802 ml   Net              388 ml       Invasive Devices     Peripheral Intravenous Line            Peripheral IV 07/24/18 Right Antecubital 1 day          Drain            Closed/Suction Drain Midline Chest Other (Comment) 24 Fr  1 day                Physical Exam: General: AAOx3  Respiratory: BS b/l  IGOR - 60 serosanguinous out  Abdomen: Soft, NT, no rebound/guarding  Incision c/d/i  Heart: Tachycardia, Irregularly irregular rhythm  Ext: Warm no cyanosis    Lab, Imaging and other studies:  I have personally reviewed pertinent lab results    , CBC:   Lab Results   Component Value Date    WBC 16 83 (H) 07/25/2018    HGB 12 7 07/25/2018    HCT 39 8 07/25/2018    MCV 95 07/25/2018     07/25/2018    MCH 30 5 07/25/2018    MCHC 31 9 07/25/2018    RDW 13 4 07/25/2018    MPV 11 7 07/25/2018   , CMP:   Lab Results   Component Value Date     07/25/2018    K 4 1 07/25/2018     07/25/2018    CO2 22 07/25/2018    ANIONGAP 11 07/25/2018    BUN 26 (H) 07/25/2018    CREATININE 0 85 07/25/2018    GLUCOSE 182 (H) 07/25/2018    CALCIUM 9 2 07/25/2018    AST 18 07/25/2018    ALT 29 07/25/2018    ALKPHOS 56 07/25/2018    PROT 6 6 07/25/2018    BILITOT 0 96 07/25/2018    EGFR 64 07/25/2018     VTE Pharmacologic Prophylaxis: Sequential compression device (Venodyne)   VTE Mechanical Prophylaxis: sequential compression device

## 2018-07-27 LAB
BACTERIA SPEC ANAEROBE CULT: NO GROWTH
BACTERIA SPEC BFLD CULT: NO GROWTH
BACTERIA UR QL AUTO: ABNORMAL /HPF
BASOPHILS # BLD AUTO: 0.06 THOUSANDS/ΜL (ref 0–0.1)
BASOPHILS NFR BLD AUTO: 1 % (ref 0–1)
BILIRUB UR QL STRIP: NEGATIVE
CLARITY UR: CLEAR
COLOR UR: YELLOW
EOSINOPHIL # BLD AUTO: 0.82 THOUSAND/ΜL (ref 0–0.61)
EOSINOPHIL NFR BLD AUTO: 7 % (ref 0–6)
ERYTHROCYTE [DISTWIDTH] IN BLOOD BY AUTOMATED COUNT: 13.6 % (ref 11.6–15.1)
GLUCOSE SERPL-MCNC: 130 MG/DL (ref 65–140)
GLUCOSE SERPL-MCNC: 135 MG/DL (ref 65–140)
GLUCOSE SERPL-MCNC: 145 MG/DL (ref 65–140)
GLUCOSE SERPL-MCNC: 97 MG/DL (ref 65–140)
GLUCOSE UR STRIP-MCNC: NEGATIVE MG/DL
GRAM STN SPEC: NORMAL
HCT VFR BLD AUTO: 40.3 % (ref 34.8–46.1)
HGB BLD-MCNC: 12.4 G/DL (ref 11.5–15.4)
HGB UR QL STRIP.AUTO: NEGATIVE
HYALINE CASTS #/AREA URNS LPF: ABNORMAL /LPF
IMM GRANULOCYTES # BLD AUTO: 0.06 THOUSAND/UL (ref 0–0.2)
IMM GRANULOCYTES NFR BLD AUTO: 1 % (ref 0–2)
KETONES UR STRIP-MCNC: NEGATIVE MG/DL
LEUKOCYTE ESTERASE UR QL STRIP: NEGATIVE
LYMPHOCYTES # BLD AUTO: 2.74 THOUSANDS/ΜL (ref 0.6–4.47)
LYMPHOCYTES NFR BLD AUTO: 23 % (ref 14–44)
MCH RBC QN AUTO: 30.2 PG (ref 26.8–34.3)
MCHC RBC AUTO-ENTMCNC: 30.8 G/DL (ref 31.4–37.4)
MCV RBC AUTO: 98 FL (ref 82–98)
MONOCYTES # BLD AUTO: 1.16 THOUSAND/ΜL (ref 0.17–1.22)
MONOCYTES NFR BLD AUTO: 10 % (ref 4–12)
NEUTROPHILS # BLD AUTO: 7.14 THOUSANDS/ΜL (ref 1.85–7.62)
NEUTS SEG NFR BLD AUTO: 58 % (ref 43–75)
NITRITE UR QL STRIP: NEGATIVE
NON-SQ EPI CELLS URNS QL MICRO: ABNORMAL /HPF
NRBC BLD AUTO-RTO: 0 /100 WBCS
PH UR STRIP.AUTO: 5.5 [PH] (ref 4.5–8)
PLATELET # BLD AUTO: 267 THOUSANDS/UL (ref 149–390)
PMV BLD AUTO: 11.6 FL (ref 8.9–12.7)
PROCALCITONIN SERPL-MCNC: 0.6 NG/ML
PROT UR STRIP-MCNC: ABNORMAL MG/DL
RBC # BLD AUTO: 4.11 MILLION/UL (ref 3.81–5.12)
RBC #/AREA URNS AUTO: ABNORMAL /HPF
SP GR UR STRIP.AUTO: 1.02 (ref 1–1.03)
UROBILINOGEN UR QL STRIP.AUTO: 0.2 E.U./DL
WBC # BLD AUTO: 11.98 THOUSAND/UL (ref 4.31–10.16)
WBC #/AREA URNS AUTO: ABNORMAL /HPF

## 2018-07-27 PROCEDURE — 85025 COMPLETE CBC W/AUTO DIFF WBC: CPT | Performed by: INTERNAL MEDICINE

## 2018-07-27 PROCEDURE — 99232 SBSQ HOSP IP/OBS MODERATE 35: CPT | Performed by: INTERNAL MEDICINE

## 2018-07-27 PROCEDURE — 87449 NOS EACH ORGANISM AG IA: CPT | Performed by: PHYSICIAN ASSISTANT

## 2018-07-27 PROCEDURE — 81001 URINALYSIS AUTO W/SCOPE: CPT | Performed by: PHYSICIAN ASSISTANT

## 2018-07-27 PROCEDURE — 99024 POSTOP FOLLOW-UP VISIT: CPT | Performed by: THORACIC SURGERY (CARDIOTHORACIC VASCULAR SURGERY)

## 2018-07-27 PROCEDURE — 84145 PROCALCITONIN (PCT): CPT | Performed by: INTERNAL MEDICINE

## 2018-07-27 PROCEDURE — 82948 REAGENT STRIP/BLOOD GLUCOSE: CPT

## 2018-07-27 RX ORDER — ACETAMINOPHEN 325 MG/1
650 TABLET ORAL EVERY 6 HOURS PRN
Status: DISCONTINUED | OUTPATIENT
Start: 2018-07-27 | End: 2018-08-01 | Stop reason: HOSPADM

## 2018-07-27 RX ORDER — AMIODARONE HYDROCHLORIDE 200 MG/1
200 TABLET ORAL 2 TIMES DAILY WITH MEALS
Status: DISCONTINUED | OUTPATIENT
Start: 2018-07-27 | End: 2018-08-01 | Stop reason: HOSPADM

## 2018-07-27 RX ORDER — TRAMADOL HYDROCHLORIDE 50 MG/1
100 TABLET ORAL 3 TIMES DAILY PRN
Status: DISCONTINUED | OUTPATIENT
Start: 2018-07-27 | End: 2018-08-01 | Stop reason: HOSPADM

## 2018-07-27 RX ORDER — TRAMADOL HYDROCHLORIDE 50 MG/1
100 TABLET ORAL 3 TIMES DAILY PRN
Status: DISCONTINUED | OUTPATIENT
Start: 2018-07-27 | End: 2018-07-27

## 2018-07-27 RX ADMIN — FOLIC ACID 1 MG: 1 TABLET ORAL at 09:51

## 2018-07-27 RX ADMIN — Medication 1 TABLET: at 09:48

## 2018-07-27 RX ADMIN — AMIODARONE HYDROCHLORIDE 200 MG: 200 TABLET ORAL at 21:58

## 2018-07-27 RX ADMIN — VITAM B12 100 MCG: 100 TAB at 09:48

## 2018-07-27 RX ADMIN — CITALOPRAM HYDROBROMIDE 20 MG: 20 TABLET ORAL at 09:48

## 2018-07-27 RX ADMIN — METOPROLOL TARTRATE 25 MG: 25 TABLET ORAL at 09:51

## 2018-07-27 RX ADMIN — DOCUSATE SODIUM 100 MG: 100 CAPSULE, LIQUID FILLED ORAL at 09:48

## 2018-07-27 RX ADMIN — CEFEPIME HYDROCHLORIDE 2000 MG: 2 INJECTION, POWDER, FOR SOLUTION INTRAVENOUS at 00:51

## 2018-07-27 RX ADMIN — ACETAMINOPHEN 650 MG: 325 TABLET, FILM COATED ORAL at 05:04

## 2018-07-27 RX ADMIN — Medication 1000 MG: at 09:48

## 2018-07-27 RX ADMIN — ALPRAZOLAM 0.5 MG: 0.25 TABLET ORAL at 22:31

## 2018-07-27 RX ADMIN — CEFEPIME HYDROCHLORIDE 2000 MG: 2 INJECTION, POWDER, FOR SOLUTION INTRAVENOUS at 14:21

## 2018-07-27 RX ADMIN — METOPROLOL TARTRATE 25 MG: 25 TABLET ORAL at 21:58

## 2018-07-27 RX ADMIN — KETOROLAC TROMETHAMINE 15 MG: 30 INJECTION, SOLUTION INTRAMUSCULAR at 21:04

## 2018-07-27 RX ADMIN — TRAMADOL HYDROCHLORIDE 100 MG: 50 TABLET, FILM COATED ORAL at 05:07

## 2018-07-27 NOTE — PROGRESS NOTES
Interval note    Pericardial drain removed at bedside  Dressing applied  No immediate complications  Patient tolerated procedure without difficulty        Sharita Tejada MD

## 2018-07-27 NOTE — PROGRESS NOTES
Progress Note - Thoracic Surgery   Kathleen Remedies 80 y o  female MRN: [id number]  Unit/Bed#: [facility] 422-01 Encounter: [id number]    Assessment:  80 F w/ pericardial effusion likely 2/2 metastatic lung CA s/p subxiphoid pericardial window 7/24    - IGOR out 25 out serous fluid  - Pleural Fluid cytology back revealing metastatic adenocarcinoma      Plan:  - IGOR Out today  - Continue management per SLIM as primary  - Cardiology f/u for Paroxysmal A-fib   - Possible chemical cardioversion  - Wean 02 as tolerated      Subjective/Objective   Chief Complaint:     Subjective: Patient complains of tiredness and agitation  Pain at IGOR site in chest however denies shortness of breath or further chest pain  No nausea vomiting fevers or chills  Patient had elevated HR however at time of exam was in normal sinus rhythm on Amiodarone drip  Objective:     Blood pressure 120/62, pulse 68, temperature 98 °F (36 7 °C), temperature source Oral, resp  rate 18, height 5' 1" (1 549 m), weight 71 kg (156 lb 8 4 oz), SpO2 95 %  ,Body mass index is 29 58 kg/m²  Intake/Output Summary (Last 24 hours) at 07/27/18 0436  Last data filed at 07/27/18 0051   Gross per 24 hour   Intake          1923 89 ml   Output              960 ml   Net           963 89 ml       Invasive Devices     Peripheral Intravenous Line            Peripheral IV 07/26/18 Right Antecubital less than 1 day    Peripheral IV 07/26/18 Right Wrist less than 1 day          Drain            Closed/Suction Drain Midline Chest Other (Comment) 24 Fr  2 days                Physical Exam: General: AAOx3  Respiratory: BS b/l  Abdomen: Soft, NT, no rebound/guarding  Heart: RRR, S1s2 - on amiodarone drip  Ext: Warm no cyanosis    Lab, Imaging and other studies:  I have personally reviewed pertinent lab results    , CBC:   Lab Results   Component Value Date    WBC 13 93 (H) 07/26/2018    HGB 11 7 07/26/2018    HCT 38 3 07/26/2018    MCV 97 07/26/2018     07/26/2018    MCH 29 7 07/26/2018    MCHC 30 5 (L) 07/26/2018    RDW 13 4 07/26/2018    MPV 11 6 07/26/2018    NRBC 0 07/26/2018   , CMP:   Lab Results   Component Value Date     07/26/2018    K 4 0 07/26/2018     07/26/2018    CO2 24 07/26/2018    ANIONGAP 7 07/26/2018    BUN 30 (H) 07/26/2018    CREATININE 0 74 07/26/2018    GLUCOSE 124 07/26/2018    CALCIUM 9 5 07/26/2018    EGFR 76 07/26/2018     VTE Pharmacologic Prophylaxis: Sequential compression device (Venodyne)  - No PPx noted  VTE Mechanical Prophylaxis: sequential compression device

## 2018-07-27 NOTE — PROGRESS NOTES
Pt lethargic  Family at bedside  Unable to give PO meds at this time  SLIM notified  Was advised to hold all evening PO medications at this time  Cardiology made aware hold on Amiodarone  Will continue to monitor

## 2018-07-27 NOTE — PROGRESS NOTES
Progress Note - Cardiology   Jannie Hernandez 80 y o  female MRN: [id number]  Unit/Bed#: [facility] 422-01 Encounter: [id number]    Assessment:  Principal Problem:    Pericardial effusion  Active Problems:    Lung cancer ([place] [address] )    Diabetes mellitus ([place] [address] )    Hypertension    Atrial fibrillation ([facility] )    Sepsis ([place] [address] )    HAP (hospital-acquired pneumonia)    NSTEMI (non-ST elevated myocardial infarction) ([place] [address] )    Continuous opioid dependence ([place] [address] )    HCAP (healthcare-associated pneumonia)    Cardiac tamponade - fluid returned as malignant  S/p pericardial window  PAF in the setting of the above  Changed to oral amiodarone  Discontinue IV  Ultimately, may be candidate for anticoagulation given increased risk of CVA, however currently appears to have poor functional status  Will readdress tomorrow  Subjective/Objective     Subjective:   Feels very tired  With IV amiodarone, she converted to sinus rhythm  She is falling asleep as I spoke with her today  Objective:    Vitals: /67 (BP Location: Right arm) Comment: Map87  Pulse 60   Temp 97 9 °F (36 6 °C) (Oral)   Resp 19   Ht 5' 1" (1 549 m)   Wt 76 5 kg (168 lb 10 4 oz)   SpO2 98%   BMI 31 87 kg/m²   Vitals:    07/26/18 0600 07/27/18 0600   Weight: 71 kg (156 lb 8 4 oz) 76 5 kg (168 lb 10 4 oz)     Orthostatic Blood Pressures      Most Recent Value   Blood Pressure  118/67 [Map87] filed at 07/27/2018 1059   Patient Position - Orthostatic VS  Sitting filed at 07/27/2018 1059            Intake/Output Summary (Last 24 hours) at 07/27/18 1512  Last data filed at 07/27/18 1423   Gross per 24 hour   Intake          1162 09 ml   Output              920 ml   Net           242 09 ml     Physical Exam:   General appearance: Chronically ill appearing, in recliner at bedside  Head: Normocephalic, without obvious abnormality, atraumatic  Lungs: clear to auscultation bilaterally  Normal air entry  Normal effort  Heart: S1, S2 Regular    Abdomen: soft, non-tender; bowel sounds normal; no masses,  no organomegaly  Extremities: no edema  Skin: Skin color, texture, turgor normal  No rashes or lesions  Neurologic: Grossly normal  Alert and oriented      Medications:    Current Facility-Administered Medications:     acetaminophen (TYLENOL) tablet 650 mg, 650 mg, Oral, Q6H PRN, Shelia Cox DO    ALPRAZolam (XANAX) tablet 0 5 mg, 0 5 mg, Oral, HS PRN, Naz Small MD, 0 5 mg at 07/26/18 2140    amiodarone tablet 200 mg, 200 mg, Oral, BID With Meals, Darline Belle MD    calcium carbonate (OYSTER SHELL,OSCAL) 500 mg tablet 1 tablet, 1 tablet, Oral, Daily With Breakfast, Naz Small MD, 1 tablet at 07/27/18 0948    cefepime (MAXIPIME) 2,000 mg in dextrose 5 % 50 mL IVPB, 2,000 mg, Intravenous, Q12H, SAMANTHA Collazo, Last Rate: 100 mL/hr at 07/27/18 1421, 2,000 mg at 07/27/18 1421    citalopram (CeleXA) tablet 20 mg, 20 mg, Oral, Daily, Naz Small MD, 20 mg at 07/27/18 0948    cyanocobalamin (VITAMIN B-12) tablet 100 mcg, 100 mcg, Oral, Daily, Naz Small MD, 100 mcg at 07/27/18 0948    docusate sodium (COLACE) capsule 100 mg, 100 mg, Oral, BID, Naz Small MD, 100 mg at 07/27/18 0948    fentaNYL (DURAGESIC) 12 mcg/hr TD 72 hr patch 1 patch, 1 patch, Transdermal, Q72H, Naz Small MD, 1 patch at 07/26/18 1926    fish oil capsule 1,000 mg, 1,000 mg, Oral, Daily, Naz Small MD, 1,000 mg at 66/46/95 9581    folic acid (FOLVITE) tablet 1 mg, 1 mg, Oral, Daily, Naz Small MD, 1 mg at 07/27/18 0951    insulin lispro (HumaLOG) 100 units/mL subcutaneous injection 2-12 Units, 2-12 Units, Subcutaneous, TID AC, 4 Units at 07/26/18 1738 **AND** Fingerstick Glucose (POCT), , , TID AC, Naz Small MD    ketorolac (TORADOL) injection 15 mg, 15 mg, Intravenous, Q6H PRN, Dylon Noble MD, 15 mg at 07/26/18 0230    meclizine (ANTIVERT) tablet 25 mg, 25 mg, Oral, Q8H PRN, Eva Thao PA-C    metoprolol (LOPRESSOR) injection 5 mg, 5 mg, Intravenous, Q6H PRN, Jennifer Pickard MD    metoprolol tartrate (LOPRESSOR) tablet 25 mg, 25 mg, Oral, Q12H [address], Jennifer Pickard MD, 25 mg at 07/27/18 0951    morphine injection 2 mg, 2 mg, Intravenous, Q4H PRN, Aidee Herrera MD    ondansetron [facility]) injection 4 mg, 4 mg, Intravenous, Q6H PRN, Desiree Shine MD, 4 mg at 07/24/18 0009    ondansetron (ZOFRAN-ODT) dispersible tablet 4 mg, 4 mg, Oral, Q6H PRN, Bryan Ward MD    traMADol Alpheus Schillings) tablet 100 mg, 100 mg, Oral, TID PRN, Daniella Orr DO    Lab Results:    Results from last 7 days  Lab Units 07/24/18  0041 07/23/18  2346 07/23/18  2048   TROPONIN I ng/mL 0 31* 0 29* 0 31*       Results from last 7 days  Lab Units 07/27/18  0511 07/26/18  0501 07/25/18  0501   WBC Thousand/uL 11 98* 13 93* 16 83*   HEMOGLOBIN g/dL 12 4 11 7 12 7   HEMATOCRIT % 40 3 38 3 39 8   PLATELETS Thousands/uL 267 227 242           Results from last 7 days  Lab Units 07/26/18  0501 07/25/18  0501 07/24/18  1436 07/24/18  0556 07/24/18  0041   SODIUM mmol/L 136 136 133* 134* 132*   POTASSIUM mmol/L 4 0 4 1 3 9 4 2 4 2   CHLORIDE mmol/L 105 103 104 101 101   CO2 mmol/L 24 22 22 20* 20*   BUN mg/dL 30* 26* 26* 25 25   CREATININE mg/dL 0 74 0 85 1 03 1 14 1 32*   CALCIUM mg/dL 9 5 9 2 8 9 8 9 9 0   TOTAL PROTEIN g/dL  --  6 6  --  7 1 7 5   BILIRUBIN TOTAL mg/dL  --  0 96  --  1 29* 1 02*   ALK PHOS U/L  --  56  --  61 68   ALT U/L  --  29  --  40 44   AST U/L  --  18  --  26 29   GLUCOSE RANDOM mg/dL 124 182* 174* 196* 236*       Results from last 7 days  Lab Units 07/24/18  0041 07/23/18  1128   INR  1 22* 1 13   PTT seconds  --  32       Results from last 7 days  Lab Units 07/24/18  1436 07/24/18  0556 07/23/18  1128   MAGNESIUM mg/dL 1 8 1 8 1 9       Telemetry: Personally reviewed  Converted to sinus rhythm last night

## 2018-07-27 NOTE — PLAN OF CARE
CARDIOVASCULAR - ADULT     Maintains optimal cardiac output and hemodynamic stability Progressing     Absence of cardiac dysrhythmias or at baseline rhythm Progressing        DISCHARGE PLANNING     Discharge to home or other facility with appropriate resources Progressing        DISCHARGE PLANNING - CARE MANAGEMENT     Discharge to post-acute care or home with appropriate resources Progressing        INFECTION - ADULT     Absence or prevention of progression during hospitalization Progressing     Absence of fever/infection during neutropenic period Progressing        METABOLIC, FLUID AND ELECTROLYTES - ADULT     Electrolytes maintained within normal limits Progressing     Fluid balance maintained Progressing     Glucose maintained within target range Progressing        PAIN - ADULT     Verbalizes/displays adequate comfort level or baseline comfort level Progressing        Potential for Falls     Patient will remain free of falls Progressing        Prexisting or High Potential for Compromised Skin Integrity     Skin integrity is maintained or improved Progressing        RESPIRATORY - ADULT     Achieves optimal ventilation and oxygenation Progressing        SAFETY ADULT     Maintain or return to baseline ADL function Progressing     Maintain or return mobility status to optimal level Progressing

## 2018-07-27 NOTE — PROGRESS NOTES
07/27/18 1800   Congregation Information   Current Mandaen Involvement Other (Comment)  (Pt was a eucharistic )   Spiritual Beliefs/Perceptions   Concept of God Accepting   Relationship with God Close   Support Systems Children;Family members   Coping Responses   Family Coping Accepting; Sadness   Plan of Care   Care Plan Initiated Yes   Comments Request for pastoral care by family  Offered support  Pt has been made DNR/DNI  Daughter Richard Clark and her  present, also pt's son  Family asked for a , but Father Floresita Shaper was in earlier  Explained that to family     Assessment Completed by: Unit visit

## 2018-07-27 NOTE — PROGRESS NOTES
SHORT FOLLOW-UP NOTE  Extensive discussion with the patient's daughter, son-in-law, son -- all have decided in congruence  to change the code status to level 3 DNI/DNR  This was witnessed by bedside RN  Code status change to level 3 DNI DNR

## 2018-07-27 NOTE — PROGRESS NOTES
Spoke with Dr Patel Parent regarding pt  Status through the day  Pt  Lethargic/fatigued more today than yesterday  Pt  Also with continued forgetfulness  Pt  Has spent the day up in her chair but sleeping  Pt  Will open eyes and will converse, will follow commands, but not eating much today and just wants to rest  Family was updated from nursing standpoint  Dr Patel Parent also spoke with family regarding pt  s status  Per SLIM continue to monitor pt  At this time, no interventions needed now  Will continue to monitor

## 2018-07-27 NOTE — CASE MANAGEMENT
Continued Stay Review    Date: 07/27/2018    Vital Signs: /67 (BP Location: Right arm) Comment: Map87  Pulse 60   Temp 97 9 °F (36 6 °C) (Oral)   Resp 19   Ht 5' 1" (1 549 m)   Wt 76 5 kg (168 lb 10 4 oz)   SpO2 98%   BMI 31 87 kg/m²     Medications:   Scheduled Meds:   Current Facility-Administered Medications:  acetaminophen 650 mg Oral Q6H PRN Shelia Cox DO    ALPRAZolam 0 5 mg Oral HS PRN Tiburcio Serrano MD    amiodarone 0 5 mg/min Intravenous Continuous Jennifer Love MD Last Rate: 0 5 mg/min (07/26/18 1923)   calcium carbonate 1 tablet Oral Daily With Breakfast Tiburcio Serrano MD    cefepime 2,000 mg Intravenous Q12H SAMANTHA Collazo Last Rate: 2,000 mg (07/27/18 0051)   citalopram 20 mg Oral Daily Tiburcio Serrano MD    cyanocobalamin 100 mcg Oral Daily Tiburcio Serrano MD    docusate sodium 100 mg Oral BID Tiburcio Serrano MD    fentaNYL 1 patch Transdermal Q72H Tiburcio Serrano MD    fish oil 1,000 mg Oral Daily Tiburcio Serrano MD    folic acid 1 mg Oral Daily Tiburcio Serrano MD    insulin lispro 2-12 Units Subcutaneous TID AC Tiburcio Serrano MD    ketorolac 15 mg Intravenous Q6H PRN Sylwia Posey MD    meclizine 25 mg Oral Q8H PRN Natasha Eli PA-C    metoprolol 5 mg Intravenous Q6H PRN Prasanna Cruz MD    metoprolol tartrate 25 mg Oral Q12H [address], MD    morphine injection 2 mg Intravenous Q4H PRN Patti Valerio MD    ondansetron 4 mg Intravenous Q6H PRN Tiburcio Serrano MD    ondansetron 4 mg Oral Q6H PRN Sylwia Posey MD    traMADol 100 mg Oral TID PRN Coco Cox DO      Continuous Infusions:   amiodarone 0 5 mg/min Last Rate: 0 5 mg/min (07/26/18 1923)       Abnormal Labs/Diagnostic Results:     Age/Sex: 80 y o  female     Assessment/Plan:   * Pericardial effusion   Assessment & Plan     07/24: S/P subxiphoid pericardial window with drain  07/27: Pericardial drain discontinued    Follow up pericardial fluid test/cs -- no growth so far   Thoracic surgery on board  Pericardial effusion cytology -- Metastatic adenocarcinoma        HAP (hospital-acquired pneumonia)   Assessment & Plan     Pt recieves chemo every 3 weeks-- most recent 7/16  Chest xray and CT scan concerning for LLL infiltrate   Continue IV cefepime -- day 4/10  Discontinue IV vancomycin as MRSA is negative  Procalcitonin trending down at 0 60  Follow blood cultures x 2 no growth so far  Continuous pulse ox, Aspiration precautions        Sepsis ([facility] )   Assessment & Plan     Likely secondary to PNA -- pt presented with leukocytosis, hypotension, tachycardia, tachypnea  MRSA swab negative, urine for strep pneumoniae, Legionella antigen awaited  Refer above        L[facility] ([facility] )   Assessment & Plan     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  Follows with Dr Mariposa Bethea   treated with Ab Beams every 3 weeks-- last infusion 7/16  Palliative care on board        Diabetes mellitus [facility])   Assessment & Plan             Lab Results   Component Value Date     HGBA1C 6 7 (H) 07/25/2018                Recent Labs      07/24/18   1434  07/25/18   0609  07/25/18   1123  07/25/18   1602   POCGLU  167*  187*  138  131         Blood Sugar Average: Last 72 hrs:  (P) 179     HgA1C at 6 7  SSI and accuchecks   Hold OHG: metformin          Hypertension   Assessment & Plan     Hypotensive in the setting of sepsis and pericardial effusion   BP improving        Atrial fibrillation ([facility] ) with RVR   Assessment & Plan     Cardiology on board  Continue IV amiodarone gtt, lopressor, with holding parameters  ?  Repeat echocardiogram  Monitor on telemetry        NSTEMI (non-ST elevated myocardial infarction) [facility])   Assessment & Plan     Likely type 2 secondary to demand from PNA/sepsis, AFIB, TANYA, pericardial effusion  Trop 0 31; 0 29; 0 31  Cardiology on board       Right upper quadrant painresolved as of 7/26/2018   Assessment & Plan     Symptoms have resolved  US abdomen -- Gallbladder sludge  Hepatomegaly and hepatic steatosis  LFTs improved        TANYA (acute kidney injury) (HCC)resolved as of 7/26/2018   Assessment & Plan     Likely prerenal in the setting of sepsis and hypoperfusion   TANYA has resolved, creatinine at baseline        Continuous opioid dependence ([facility] )   Assessment & Plan     Palliative medicine on board, pain management as per them       Intermittent confusion  Possibly secondary to delirium, medication induced  CT head from 07/26 --no acute intracranial abnormalities  Continue fall precautions  VTE prophylaxis: SCDs      Current Length of Stay: Day 4     Current Patient Status: Inpatient      Certification Statement: The patient will continue to require additional inpatient hospital stay due to ongoing goals of care discussion      Discharge Plan / Estimated Discharge Date:  Plan for today discussed with case management and RN  Anticipate hospitalization of the weekend

## 2018-07-27 NOTE — RESTORATIVE TECHNICIAN NOTE
Restorative Specialist Mobility Note       Activity: Chair (for breakfast)     Assistive Device: Other (Comment) (A)

## 2018-07-27 NOTE — PROGRESS NOTES
Progress Note - Cathy Cuellar 1935, 80 y o  female MRN: [id number]  Unit/Bed#: [facility] 422-01 Encounter: [id number]  Primary Care Provider: Romaine Lopez DO   Date and time admitted to hospital: 7/23/2018  5:53 PM        * Pericardial effusion   Assessment & Plan    07/24: S/P subxiphoid pericardial window with drain  07/27: Pericardial drain discontinued  Follow up pericardial fluid test/cs -- no growth so far  Thoracic surgery on board  Pericardial effusion cytology -- Metastatic adenocarcinoma  HAP (hospital-acquired pneumonia)   Assessment & Plan    Pt recieves chemo every 3 weeks-- most recent 7/16  Chest xray and CT scan concerning for LLL infiltrate   Continue IV cefepime -- day 4/10  Discontinue IV vancomycin as MRSA is negative  Procalcitonin trending down at 0 60  Follow blood cultures x 2 no growth so far  Continuous pulse ox, Aspiration precautions       Sepsis ([facility] )   Assessment & Plan    Likely secondary to PNA -- pt presented with leukocytosis, hypotension, tachycardia, tachypnea  MRSA swab negative, urine for strep pneumoniae, Legionella antigen awaited  Refer above  Lung cancer [facility])   Assessment & Plan    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  Follows with Dr Morey Shone   treated with [facility] every 3 weeks-- last infusion 7/16  Palliative care on board  Diabetes mellitus [facility])   Assessment & Plan    Lab Results   Component Value Date    HGBA1C 6 7 (H) 07/25/2018       Recent Labs      07/24/18   1434  07/25/18   0609  07/25/18   1123  07/25/18   1602   POCGLU  167*  187*  138  131       Blood Sugar Average: Last 72 hrs:  (P) 179    HgA1C at 6 7  SSI and accuchecks   Hold OHG: metformin        Hypertension   Assessment & Plan    Hypotensive in the setting of sepsis and pericardial effusion   BP improving  Atrial fibrillation ([facility] [address] ) with RVR   Assessment & Plan    Cardiology on board    Continue IV amiodarone gtt, lopressor, with holding parameters  ?  Repeat echocardiogram  Monitor on telemetry  NSTEMI (non-ST elevated myocardial infarction) [facility])   Assessment & Plan    Likely type 2 secondary to demand from PNA/sepsis, AFIB, TANYA, pericardial effusion  Trop 0 31; 0 29; 0 31  Cardiology on board      Right upper quadrant painresolved as of 2018   Assessment & Plan    Symptoms have resolved  US abdomen -- Gallbladder sludge  Hepatomegaly and hepatic steatosis  LFTs improved  TANYA (acute kidney injury) (HCC)resolved as of 2018   Assessment & Plan    Likely prerenal in the setting of sepsis and hypoperfusion   TANYA has resolved, creatinine at baseline  Continuous opioid dependence [facility])   Assessment & Plan    Palliative medicine on board, pain management as per them  Intermittent confusion  Possibly secondary to delirium, medication induced  CT head from  --no acute intracranial abnormalities  Continue fall precautions  ______________________________________________________________________    SUBJECTIVE:   Patient seen and examined  Chronically ill  Chest discomfort much improved     HR better controlled  Intermittent confusion persists  Pericardial drain has been removed  OBJECTIVE:     Vitals:   HR:  [] 68  Resp:  [18-20] 19  BP: ()/(57-76) 107/64  SpO2:  [91 %-96 %] 94 %  Temp (24hrs), Av 9 °F (36 6 °C), Min:97 6 °F (36 4 °C), Max:98 2 °F (36 8 °C)  Current: Temperature: 97 7 °F (36 5 °C)    Intake/Output Summary (Last 24 hours) at 18 1101  Last data filed at 18 0543   Gross per 24 hour   Intake          1410 69 ml   Output             1180 ml   Net           230 69 ml       Physical Exam:   GENERAL: AAO x 1, confused, chronically ill  HEENT: atraumatic, normocephalic  Oral mucosa moist, no icterus, pallor  PERRLA +  Neck supple, no JVD, no lymphadenopathy, no thryomegaly  CHEST: B/L breath sounds heard, occasional wheezing     CVS: S1, S2   ABDOMEN: Soft/obese/NT/BS heard  NEUROLOGICAL: CN II -XI grossly intact  No focal motor or sensory deficits  No signs of meningeal irritation or cerebellar dysfunction  EXTREMITIES: mild B/L pitting pedal edema      LABS:   7/27/2018 05:11   WBC 11 98 (H)   RBC 4 11   Hemoglobin 12 4   Hematocrit 40 3   MCV 98   MCH 30 2   MCHC 30 8 (L)   RDW 13 6   Platelets 392   MPV 74 1   nRBC 0   Neutrophils Relative 58   Lymphocytes Relative 23   Monocytes Relative 10   Eosinophils Relative 7 (H)   Basophils Relative 1   Neutrophils Absolute 7 14   Lymphocytes Absolute 2 74   Monocytes Absolute 1 16   Eosinophils Absolute 0 82 (H)   Basophils Absolute 0 06     Scheduled Meds:    Current Facility-Administered Medications:  acetaminophen 650 mg Oral Q6H PRN Shelia Cox DO    ALPRAZolam 0 5 mg Oral HS PRN Mavis Roth MD    amiodarone 0 5 mg/min Intravenous Continuous Anupama Mendoza MD Last Rate: 0 5 mg/min (07/26/18 1923)   calcium carbonate 1 tablet Oral Daily With Breakfast Mavis Roth MD    cefepime 2,000 mg Intravenous Q12H SAMANTHA Collzao Last Rate: 2,000 mg (07/27/18 0051)   citalopram 20 mg Oral Daily Mavis Roth MD    cyanocobalamin 100 mcg Oral Daily Mavis Roth MD    docusate sodium 100 mg Oral BID Mavis Roth MD    fentaNYL 1 patch Transdermal Q72H Mavis Roth MD    fish oil 1,000 mg Oral Daily Mavis Roth MD    folic acid 1 mg Oral Daily Mavis Roth MD    insulin lispro 2-12 Units Subcutaneous TID AC Mavis Roth MD    ketorolac 15 mg Intravenous Q6H PRN Tee Garcia MD    meclizine 25 mg Oral Q8H PRN Peyton Amaya PA-C    metoprolol 5 mg Intravenous Q6H PRN Maria Del Carmen England MD    metoprolol tartrate 25 mg Oral Q12H Jeff Messina MD    morphine injection 2 mg Intravenous Q4H PRN Stephani Echavarria MD    ondansetron 4 mg Intravenous Q6H PRN Mavis Roth MD    ondansetron 4 mg Oral Q6H PRN Tee Garcia MD    traMADol 100 mg Oral TID PRN Donya Dietz DO [place]      PRN Meds:    acetaminophen    ALPRAZolam    ketorolac    meclizine    metoprolol    morphine injection    ondansetron    ondansetron    traMADol    VTE prophylaxis: SCDs  Education and Discussions with Family / Patient:  patient and daughter    Current Length of Stay: Day 4    Current Patient Status: Inpatient     Certification Statement: The patient will continue to require additional inpatient hospital stay due to ongoing goals of care discussion      Discharge Plan / Estimated Discharge Date:  Plan for today discussed with case management and RN  Anticipate hospitalization of the weekend      Code Status: Level 1 - Full Code    Time Spent for Care: 20 minutes   More than 50% of total time spent on counseling and coordination of care as described above  Salazar Jacobo MD  HOSPITALIST SERVICES  7/27/2018    PLEASE NOTE:  This encounter was completed utilizing the [employer] Direct Speech Voice Recognition Software  Grammatical errors, random word insertions, pronoun errors and incomplete sentences are occasional consequences of the system due to software limitations, ambient noise and hardware issues  These may be missed by proof reading prior to affixing electronic signature  Any questions or concerns about the content, text or information contained within the body of this dictation should be directly addressed to the physician for clarification  Please do not hesitate to call me directly if you have any any questions or concerns

## 2018-07-28 LAB
GLUCOSE SERPL-MCNC: 105 MG/DL (ref 65–140)
GLUCOSE SERPL-MCNC: 133 MG/DL (ref 65–140)
GLUCOSE SERPL-MCNC: 151 MG/DL (ref 65–140)
GLUCOSE SERPL-MCNC: 98 MG/DL (ref 65–140)
L PNEUMO1 AG UR QL IA.RAPID: NEGATIVE
S PNEUM AG UR QL: NEGATIVE

## 2018-07-28 PROCEDURE — 99232 SBSQ HOSP IP/OBS MODERATE 35: CPT | Performed by: INTERNAL MEDICINE

## 2018-07-28 PROCEDURE — 82948 REAGENT STRIP/BLOOD GLUCOSE: CPT

## 2018-07-28 RX ORDER — SODIUM PHOSPHATE, DIBASIC AND SODIUM PHOSPHATE, MONOBASIC 7; 19 G/133ML; G/133ML
1 ENEMA RECTAL DAILY PRN
Status: DISCONTINUED | OUTPATIENT
Start: 2018-07-28 | End: 2018-08-01 | Stop reason: HOSPADM

## 2018-07-28 RX ORDER — BISACODYL 10 MG
10 SUPPOSITORY, RECTAL RECTAL DAILY PRN
Status: DISCONTINUED | OUTPATIENT
Start: 2018-07-28 | End: 2018-08-01 | Stop reason: HOSPADM

## 2018-07-28 RX ADMIN — DOCUSATE SODIUM 100 MG: 100 CAPSULE, LIQUID FILLED ORAL at 09:38

## 2018-07-28 RX ADMIN — METOPROLOL TARTRATE 25 MG: 25 TABLET ORAL at 09:38

## 2018-07-28 RX ADMIN — AMIODARONE HYDROCHLORIDE 200 MG: 200 TABLET ORAL at 06:00

## 2018-07-28 RX ADMIN — DOCUSATE SODIUM 100 MG: 100 CAPSULE, LIQUID FILLED ORAL at 18:23

## 2018-07-28 RX ADMIN — BISACODYL 10 MG: 10 SUPPOSITORY RECTAL at 21:48

## 2018-07-28 RX ADMIN — INSULIN LISPRO 2 UNITS: 100 INJECTION, SOLUTION INTRAVENOUS; SUBCUTANEOUS at 13:13

## 2018-07-28 RX ADMIN — VITAM B12 100 MCG: 100 TAB at 09:43

## 2018-07-28 RX ADMIN — SODIUM PHOSPHATE 1 ENEMA: 7; 19 ENEMA RECTAL at 22:37

## 2018-07-28 RX ADMIN — CEFEPIME HYDROCHLORIDE 2000 MG: 2 INJECTION, POWDER, FOR SOLUTION INTRAVENOUS at 00:57

## 2018-07-28 RX ADMIN — CEFEPIME HYDROCHLORIDE 2000 MG: 2 INJECTION, POWDER, FOR SOLUTION INTRAVENOUS at 13:13

## 2018-07-28 RX ADMIN — AMIODARONE HYDROCHLORIDE 200 MG: 200 TABLET ORAL at 18:23

## 2018-07-28 RX ADMIN — Medication 1 TABLET: at 06:00

## 2018-07-28 RX ADMIN — CITALOPRAM HYDROBROMIDE 20 MG: 20 TABLET ORAL at 09:38

## 2018-07-28 RX ADMIN — METOPROLOL TARTRATE 12.5 MG: 25 TABLET ORAL at 21:17

## 2018-07-28 RX ADMIN — FOLIC ACID 1 MG: 1 TABLET ORAL at 09:38

## 2018-07-28 RX ADMIN — Medication 1000 MG: at 09:43

## 2018-07-28 NOTE — DISCHARGE INSTRUCTIONS
Pericardial Effusion   WHAT YOU NEED TO KNOW:   Pericardial effusion is a buildup of fluid in the pericardium  The pericardium is a 2-layer sac that surrounds the heart  The sac normally contains a small amount of clear fluid between its layers  This allows the heart to move smoothly against other organs in the chest as it beats  The buildup of fluid may affect how the heart works  DISCHARGE INSTRUCTIONS:   Follow up with your healthcare provider as directed:  Write down your questions so you remember to ask them during your visits  Contact your healthcare provider if:   · You have a fever  · You have questions or concerns about your condition or care  Seek care immediately or call 911 if:   · You feel lightheaded or faint  · You have swelling in your legs or feet  · You have shortness of breath  · Your chest pain does not get better or becomes worse  © 2017 [facility] Information is for End User's use only and may not be sold, redistributed or otherwise used for commercial purposes  All illustrations and images included in CareNotes® are the copyrighted property of A D [employer] M [employer]  or Roberto Harris  The above information is an  only  It is not intended as medical advice for individual conditions or treatments  Talk to your doctor, nurse or pharmacist before following any medical regimen to see if it is safe and effective for you

## 2018-07-28 NOTE — PROGRESS NOTES
Progress Note - Cardiology   Santosh Jhaveri 80 y o  female MRN: [id number]  Unit/Bed#: [facility] 422-01 Encounter: [id number]    Assessment:  Principal Problem:    Pericardial effusion  Active Problems:    Lung cancer ([place] [address] )    Diabetes mellitus ([place] [address] )    Hypertension    Atrial fibrillation ([facility] )    Sepsis ([place] [address] )    HAP (hospital-acquired pneumonia)    NSTEMI (non-ST elevated myocardial infarction) ([place] [address] )    Continuous opioid dependence ([place] [address] )    HCAP (healthcare-associated pneumonia)    Cardiac tamponade - fluid returned as malignant  S/p pericardial window  PAF in the setting of the above  Continue PO amiodarone  Decrease metoprolol  She was made DNR/DNI yesterday  I'm concerned about anticoagulation with the recent bloody effusion  Will hold off on this for now, and her overall status appears to be poor  Will continue to readdress  Strictly speaking, would be candidate for anticoagulation given increased risk of CVA  Subjective/Objective     Subjective:   Awake and interactive, but very fatigued  Says she continues to feel unwell overall  Was reportedly lethargic overnight  Heart rates have been in the 60s in sinus rhythm  No atrial fibrillation seen last 24 hours  Had infiltration of her IV, she was changed to p  O  amiodarone        Objective:    Vitals: /58   Pulse 69   Temp 98 2 °F (36 8 °C) (Oral)   Resp 15   Ht 5' 1" (1 549 m)   Wt 75 7 kg (166 lb 14 2 oz)   SpO2 94%   BMI 31 53 kg/m²   Vitals:    07/27/18 0600 07/28/18 0600   Weight: 76 5 kg (168 lb 10 4 oz) 75 7 kg (166 lb 14 2 oz)     Orthostatic Blood Pressures      Most Recent Value   Blood Pressure  113/58 filed at 07/28/2018 0157   Patient Position - Orthostatic VS  Sitting filed at 07/28/2018 0718            Intake/Output Summary (Last 24 hours) at 07/28/18 1112  Last data filed at 07/28/18 1038   Gross per 24 hour   Intake            338 4 ml   Output              765 ml   Net           -426 6 ml     Physical Exam:  GEN: Braulio Barger appears unwell, chronically ill appearing  HEENT: pupils equal, round, and reactive to light; extraocular muscles intact  NECK: supple, no carotid bruits   HEART: regular  No significant murmurs  LUNGS: clear to auscultation bilaterally; no wheezes, rales, or rhonchi   ABDOMEN: tender at surgical site    EXTREMITIES: no edema      Medications:    Current Facility-Administered Medications:     acetaminophen (TYLENOL) tablet 650 mg, 650 mg, Oral, Q6H PRN, Shelia Cox DO    ALPRAZolam (XANAX) tablet 0 5 mg, 0 5 mg, Oral, HS PRN, Arslan Roach MD, 0 5 mg at 07/27/18 2231    amiodarone tablet 200 mg, 200 mg, Oral, BID With Meals, Fabián Castillo MD, 200 mg at 07/28/18 0600    calcium carbonate (OYSTER SHELL,OSCAL) 500 mg tablet 1 tablet, 1 tablet, Oral, Daily With Breakfast, Arslan Roach MD, 1 tablet at 07/28/18 0600    cefepime (MAXIPIME) 2,000 mg in dextrose 5 % 50 mL IVPB, 2,000 mg, Intravenous, Q12H, SAMANTHA Collazo, Stopped at 07/28/18 0200    citalopram (CeleXA) tablet 20 mg, 20 mg, Oral, Daily, Arslan Roach MD, 20 mg at 07/28/18 5579    cyanocobalamin (VITAMIN B-12) tablet 100 mcg, 100 mcg, Oral, Daily, Arslan Roach MD, 100 mcg at 07/28/18 0943    docusate sodium (COLACE) capsule 100 mg, 100 mg, Oral, BID, Arslan Roach MD, 100 mg at 07/28/18 0938    fentaNYL (DURAGESIC) 12 mcg/hr TD 72 hr patch 1 patch, 1 patch, Transdermal, Q72H, Arslan Roach MD, 1 patch at 07/26/18 1926    fish oil capsule 1,000 mg, 1,000 mg, Oral, Daily, Arslan Roach MD, 1,000 mg at 89/16/76 6544    folic acid (FOLVITE) tablet 1 mg, 1 mg, Oral, Daily, Arslan Roach MD, 1 mg at 07/28/18 0938    insulin lispro (HumaLOG) 100 units/mL subcutaneous injection 2-12 Units, 2-12 Units, Subcutaneous, TID AC, 4 Units at 07/26/18 1738 **AND** Fingerstick Glucose (POCT), , , TID AC, Arslan Roach MD    meclizine (ANTIVERT) tablet 25 mg, 25 mg, Oral, Q8H PRN, Jayne Hastings, PA-C    metoprolol (LOPRESSOR) injection 5 mg, 5 mg, Intravenous, Q6H PRN, Dolores Benavidez MD    metoprolol tartrate (LOPRESSOR) partial tablet 12 5 mg, 12 5 mg, Oral, Q12H [address], Suellen Ricci MD    morphine injection 2 mg, 2 mg, Intravenous, Q4H PRN, Thad Bell MD    ondansetron [facility]) injection 4 mg, 4 mg, Intravenous, Q6H PRN, Marine Aguirre MD, 4 mg at 07/24/18 0009    ondansetron (ZOFRAN-ODT) dispersible tablet 4 mg, 4 mg, Oral, Q6H PRN, Ramyond Ovalle MD    traMADol Vivia Meiers) tablet 100 mg, 100 mg, Oral, TID PRN, Kirstin Lopez DO    Lab Results:    Results from last 7 days  Lab Units 07/24/18  0041 07/23/18  2346 07/23/18  2048   TROPONIN I ng/mL 0 31* 0 29* 0 31*       Results from last 7 days  Lab Units 07/27/18  0511 07/26/18  0501 07/25/18  0501   WBC Thousand/uL 11 98* 13 93* 16 83*   HEMOGLOBIN g/dL 12 4 11 7 12 7   HEMATOCRIT % 40 3 38 3 39 8   PLATELETS Thousands/uL 267 227 242           Results from last 7 days  Lab Units 07/26/18  0501 07/25/18  0501 07/24/18  1436 07/24/18  0556 07/24/18  0041   SODIUM mmol/L 136 136 133* 134* 132*   POTASSIUM mmol/L 4 0 4 1 3 9 4 2 4 2   CHLORIDE mmol/L 105 103 104 101 101   CO2 mmol/L 24 22 22 20* 20*   BUN mg/dL 30* 26* 26* 25 25   CREATININE mg/dL 0 74 0 85 1 03 1 14 1 32*   CALCIUM mg/dL 9 5 9 2 8 9 8 9 9 0   TOTAL PROTEIN g/dL  --  6 6  --  7 1 7 5   BILIRUBIN TOTAL mg/dL  --  0 96  --  1 29* 1 02*   ALK PHOS U/L  --  56  --  61 68   ALT U/L  --  29  --  40 44   AST U/L  --  18  --  26 29   GLUCOSE RANDOM mg/dL 124 182* 174* 196* 236*       Results from last 7 days  Lab Units 07/24/18  0041 07/23/18  1128   INR  1 22* 1 13   PTT seconds  --  32       Results from last 7 days  Lab Units 07/24/18  1436 07/24/18  0556 07/23/18  1128   MAGNESIUM mg/dL 1 8 1 8 1 9       Telemetry: Personally reviewed   Sinus rhythm/bradycardia

## 2018-07-29 PROBLEM — I21.A1 TYPE 2 MYOCARDIAL INFARCTION (HCC): Status: ACTIVE | Noted: 2018-07-24

## 2018-07-29 LAB
BACTERIA BLD CULT: NORMAL
BACTERIA BLD CULT: NORMAL
BASOPHILS # BLD AUTO: 0.04 THOUSANDS/ΜL (ref 0–0.1)
BASOPHILS NFR BLD AUTO: 0 % (ref 0–1)
EOSINOPHIL # BLD AUTO: 0.19 THOUSAND/ΜL (ref 0–0.61)
EOSINOPHIL NFR BLD AUTO: 1 % (ref 0–6)
ERYTHROCYTE [DISTWIDTH] IN BLOOD BY AUTOMATED COUNT: 13.3 % (ref 11.6–15.1)
GLUCOSE SERPL-MCNC: 102 MG/DL (ref 65–140)
GLUCOSE SERPL-MCNC: 139 MG/DL (ref 65–140)
GLUCOSE SERPL-MCNC: 146 MG/DL (ref 65–140)
GLUCOSE SERPL-MCNC: 158 MG/DL (ref 65–140)
HCT VFR BLD AUTO: 41.7 % (ref 34.8–46.1)
HGB BLD-MCNC: 13 G/DL (ref 11.5–15.4)
IMM GRANULOCYTES # BLD AUTO: 0.08 THOUSAND/UL (ref 0–0.2)
IMM GRANULOCYTES NFR BLD AUTO: 1 % (ref 0–2)
LYMPHOCYTES # BLD AUTO: 2.2 THOUSANDS/ΜL (ref 0.6–4.47)
LYMPHOCYTES NFR BLD AUTO: 16 % (ref 14–44)
MCH RBC QN AUTO: 29.5 PG (ref 26.8–34.3)
MCHC RBC AUTO-ENTMCNC: 31.2 G/DL (ref 31.4–37.4)
MCV RBC AUTO: 95 FL (ref 82–98)
MONOCYTES # BLD AUTO: 1.13 THOUSAND/ΜL (ref 0.17–1.22)
MONOCYTES NFR BLD AUTO: 8 % (ref 4–12)
NEUTROPHILS # BLD AUTO: 9.86 THOUSANDS/ΜL (ref 1.85–7.62)
NEUTS SEG NFR BLD AUTO: 74 % (ref 43–75)
NRBC BLD AUTO-RTO: 0 /100 WBCS
PLATELET # BLD AUTO: 274 THOUSANDS/UL (ref 149–390)
PMV BLD AUTO: 10.5 FL (ref 8.9–12.7)
RBC # BLD AUTO: 4.41 MILLION/UL (ref 3.81–5.12)
WBC # BLD AUTO: 13.5 THOUSAND/UL (ref 4.31–10.16)

## 2018-07-29 PROCEDURE — 85025 COMPLETE CBC W/AUTO DIFF WBC: CPT | Performed by: INTERNAL MEDICINE

## 2018-07-29 PROCEDURE — 82948 REAGENT STRIP/BLOOD GLUCOSE: CPT

## 2018-07-29 PROCEDURE — 99232 SBSQ HOSP IP/OBS MODERATE 35: CPT | Performed by: INTERNAL MEDICINE

## 2018-07-29 RX ORDER — ASPIRIN 81 MG/1
81 TABLET ORAL DAILY
Status: DISCONTINUED | OUTPATIENT
Start: 2018-07-29 | End: 2018-08-01 | Stop reason: HOSPADM

## 2018-07-29 RX ADMIN — CEFEPIME HYDROCHLORIDE 2000 MG: 2 INJECTION, POWDER, FOR SOLUTION INTRAVENOUS at 14:59

## 2018-07-29 RX ADMIN — VITAM B12 100 MCG: 100 TAB at 09:09

## 2018-07-29 RX ADMIN — DOCUSATE SODIUM 100 MG: 100 CAPSULE, LIQUID FILLED ORAL at 09:10

## 2018-07-29 RX ADMIN — AMIODARONE HYDROCHLORIDE 200 MG: 200 TABLET ORAL at 17:03

## 2018-07-29 RX ADMIN — Medication 1 TABLET: at 09:09

## 2018-07-29 RX ADMIN — ASPIRIN 81 MG: 81 TABLET, COATED ORAL at 12:41

## 2018-07-29 RX ADMIN — Medication 1000 MG: at 09:09

## 2018-07-29 RX ADMIN — ACETAMINOPHEN 650 MG: 325 TABLET, FILM COATED ORAL at 05:02

## 2018-07-29 RX ADMIN — AMIODARONE HYDROCHLORIDE 200 MG: 200 TABLET ORAL at 09:10

## 2018-07-29 RX ADMIN — ALPRAZOLAM 0.5 MG: 0.25 TABLET ORAL at 22:23

## 2018-07-29 RX ADMIN — FENTANYL 1 PATCH: 12 PATCH, EXTENDED RELEASE TRANSDERMAL at 22:24

## 2018-07-29 RX ADMIN — ONDANSETRON 4 MG: 2 INJECTION, SOLUTION INTRAMUSCULAR; INTRAVENOUS at 09:40

## 2018-07-29 RX ADMIN — METOPROLOL TARTRATE 12.5 MG: 25 TABLET ORAL at 09:09

## 2018-07-29 RX ADMIN — CEFEPIME HYDROCHLORIDE 2000 MG: 2 INJECTION, POWDER, FOR SOLUTION INTRAVENOUS at 01:50

## 2018-07-29 RX ADMIN — CITALOPRAM HYDROBROMIDE 20 MG: 20 TABLET ORAL at 09:10

## 2018-07-29 RX ADMIN — DOCUSATE SODIUM 100 MG: 100 CAPSULE, LIQUID FILLED ORAL at 17:03

## 2018-07-29 RX ADMIN — INSULIN LISPRO 2 UNITS: 100 INJECTION, SOLUTION INTRAVENOUS; SUBCUTANEOUS at 06:28

## 2018-07-29 RX ADMIN — METOPROLOL TARTRATE 12.5 MG: 25 TABLET ORAL at 22:22

## 2018-07-29 RX ADMIN — FOLIC ACID 1 MG: 1 TABLET ORAL at 09:10

## 2018-07-29 NOTE — PROGRESS NOTES
Progress Note - Cardiology   Kirstin Zambrano 80 y o  female MRN: [id number]  Unit/Bed#: [facility] 422-01 Encounter: [id number]    Assessment:  Principal Problem:    Pericardial effusion  Active Problems:    Lung cancer ([place] [address] )    Diabetes mellitus ([place] [address] )    Hypertension    Atrial fibrillation ([facility] )    Sepsis ([place] [address] )    HAP (hospital-acquired pneumonia)    Type 2 myocardial infarction ([place] [address] )    Continuous opioid dependence ([place] [address] )    HCAP (healthcare-associated pneumonia)    Cardiac tamponade - fluid returned as malignant  S/p pericardial window  PAF in the setting of the above  Continue PO amiodarone  Continue current dose of metoprolol  Given PAF felt to be in setting of above, and her current status, we discussed aspirin as opposed to anticoagulation at this time  Can be readdressed depending on her clinical course  Subjective/Objective     Subjective:   Felt a little better after having bowel movement yesterday  Did have some brief Vagal episode while disimpacting  Objective:    Vitals: /59 (BP Location: Right arm) Comment: Map 82  Pulse 67   Temp 98 8 °F (37 1 °C) (Oral)   Resp 18   Ht 5' 1" (1 549 m)   Wt 75 7 kg (166 lb 14 2 oz)   SpO2 93%   BMI 31 53 kg/m²   Vitals:    07/27/18 0600 07/28/18 0600   Weight: 76 5 kg (168 lb 10 4 oz) 75 7 kg (166 lb 14 2 oz)     Orthostatic Blood Pressures      Most Recent Value   Blood Pressure  111/59 [Map 82] filed at 07/29/2018 0805   Patient Position - Orthostatic VS  Sitting filed at 07/29/2018 0805          Intake/Output Summary (Last 24 hours) at 07/29/18 1024  Last data filed at 07/29/18 0931   Gross per 24 hour   Intake              375 ml   Output             1750 ml   Net            -1375 ml     Physical Exam:  GEN: Kirstin Zambrano seated in recliner  Slightly more awake and interactive than yesterday    NECK: supple, no carotid bruits   HEART: regular rhythm, normal S1 and S2, no murmurs, clicks, gallops or rubs   LUNGS: decreased air entry   ABDOMEN: soft, nontender    EXTREMITIES: peripheral pulses normal; no clubbing, cyanosis, or edema  SKIN: normal without suspicious lesions on exposed skin    Medications:    Current Facility-Administered Medications:     acetaminophen (TYLENOL) tablet 650 mg, 650 mg, Oral, Q6H PRN, Shelia Cox DO, 650 mg at 07/29/18 0502    ALPRAZolam (XANAX) tablet 0 5 mg, 0 5 mg, Oral, HS PRN, Desiree Shine MD, 0 5 mg at 07/27/18 2231    amiodarone tablet 200 mg, 200 mg, Oral, BID With Meals, Yulia Lilly MD, 200 mg at 07/29/18 0910    bisacodyl (DULCOLAX) rectal suppository 10 mg, 10 mg, Rectal, Daily PRN, Tahira Snyder PA-C, 10 mg at 07/28/18 2148    calcium carbonate (OYSTER SHELL,OSCAL) 500 mg tablet 1 tablet, 1 tablet, Oral, Daily With Breakfast, Desiree Shine MD, 1 tablet at 07/29/18 0909    cefepime (MAXIPIME) 2,000 mg in dextrose 5 % 50 mL IVPB, 2,000 mg, Intravenous, Q12H, SAMANTHA Collazo, Stopped at 07/29/18 0800    citalopram (CeleXA) tablet 20 mg, 20 mg, Oral, Daily, Desiree Shine MD, 20 mg at 07/29/18 0910    cyanocobalamin (VITAMIN B-12) tablet 100 mcg, 100 mcg, Oral, Daily, Desiree Shine MD, 100 mcg at 07/29/18 0909    docusate sodium (COLACE) capsule 100 mg, 100 mg, Oral, BID, Desiree Shine MD, 100 mg at 07/29/18 0910    fentaNYL (DURAGESIC) 12 mcg/hr TD 72 hr patch 1 patch, 1 patch, Transdermal, Q72H, Desiree Shine MD, 1 patch at 07/26/18 1926    fish oil capsule 1,000 mg, 1,000 mg, Oral, Daily, Desiree Shine MD, 1,000 mg at 29/95/50 7815    folic acid (FOLVITE) tablet 1 mg, 1 mg, Oral, Daily, Desiree Shine MD, 1 mg at 07/29/18 0910    insulin lispro (HumaLOG) 100 units/mL subcutaneous injection 2-12 Units, 2-12 Units, Subcutaneous, TID AC, 2 Units at 07/29/18 0628 **AND** Fingerstick Glucose (POCT), , , TID AC, Desiree Shine MD    meclizine (ANTIVERT) tablet 25 mg, 25 mg, Oral, Q8H PRN, Becky Cosme PA-C    metoprolol (LOPRESSOR) injection 5 mg, 5 mg, Intravenous, Q6H PRN, Orquidea Edmond MD    metoprolol tartrate (LOPRESSOR) partial tablet 12 5 mg, 12 5 mg, Oral, Q12H [facility], Deven Ruiz MD, 12 5 mg at 07/29/18 0909    morphine injection 2 mg, 2 mg, Intravenous, Q4H PRN, Tania Harris MD    ondansetron [facility]) injection 4 mg, 4 mg, Intravenous, Q6H PRN, Nilay Morrell MD, 4 mg at 07/29/18 0940    ondansetron (ZOFRAN-ODT) dispersible tablet 4 mg, 4 mg, Oral, Q6H PRN, Ramo Goins MD    sodium phosphate-biphosphate (FLEET) enema 1 enema, 1 enema, Rectal, Daily PRN, Tahira Snyder PA-C, 1 enema at 07/28/18 2237    traMADol (ULTRAM) tablet 100 mg, 100 mg, Oral, TID PRN, Marlene Meehan DO    Lab Results:    Results from last 7 days  Lab Units 07/24/18  0041 07/23/18  2346 07/23/18  2048   TROPONIN I ng/mL 0 31* 0 29* 0 31*       Results from last 7 days  Lab Units 07/29/18  0901 07/27/18  0511 07/26/18  0501   WBC Thousand/uL 13 50* 11 98* 13 93*   HEMOGLOBIN g/dL 13 0 12 4 11 7   HEMATOCRIT % 41 7 40 3 38 3   PLATELETS Thousands/uL 274 267 227           Results from last 7 days  Lab Units 07/26/18  0501 07/25/18  0501 07/24/18  1436 07/24/18  0556 07/24/18  0041   SODIUM mmol/L 136 136 133* 134* 132*   POTASSIUM mmol/L 4 0 4 1 3 9 4 2 4 2   CHLORIDE mmol/L 105 103 104 101 101   CO2 mmol/L 24 22 22 20* 20*   BUN mg/dL 30* 26* 26* 25 25   CREATININE mg/dL 0 74 0 85 1 03 1 14 1 32*   CALCIUM mg/dL 9 5 9 2 8 9 8 9 9 0   TOTAL PROTEIN g/dL  --  6 6  --  7 1 7 5   BILIRUBIN TOTAL mg/dL  --  0 96  --  1 29* 1 02*   ALK PHOS U/L  --  56  --  61 68   ALT U/L  --  29  --  40 44   AST U/L  --  18  --  26 29   GLUCOSE RANDOM mg/dL 124 182* 174* 196* 236*       Results from last 7 days  Lab Units 07/24/18  0041 07/23/18  1128   INR  1 22* 1 13   PTT seconds  --  32       Results from last 7 days  Lab Units 07/24/18  1436 07/24/18  0556 07/23/18  1128   MAGNESIUM mg/dL 1 8 1 8 1 9     Telemetry: Personally reviewed  Sinus rhythm  No afib last 24 hours

## 2018-07-29 NOTE — PROGRESS NOTES
Pt encouraged walking to the bathroom tonight several times and finally had a large BM  Pt felt a lot better  Still tired but not as much  Held giving xanax tonight d/t low intermittent HR and fatigue  Encouraged her more about the importance of walking a little further during the day  O2 weaned down to 1L from 3L and encouraged doing her deep breathing exercises  Tylenol for 5/10 pain instead of tramadol  Will monitor

## 2018-07-29 NOTE — PROGRESS NOTES
Progress Note - Shilpa Saenz 1935, 80 y o  female MRN: [id number]  Unit/Bed#: [facility] 422-01 Encounter: [id number]  Primary Care Provider: Eddie Pinzon DO   Date and time admitted to hospital: 7/23/2018  5:53 PM        * Pericardial effusion   Assessment & Plan    07/24: S/P subxiphoid pericardial window with drain  07/27: Pericardial drain discontinued  Follow up pericardial fluid test/cs -- no growth so far  Thoracic surgery on board  Pericardial effusion cytology -- Metastatic adenocarcinoma  Slow improvement  HAP (hospital-acquired pneumonia)   Assessment & Plan    Pt recieves chemo every 3 weeks-- most recent 7/16  Chest xray and CT scan concerning for LLL infiltrate   Continue IV cefepime -- day 6/10  Discontinue IV vancomycin as MRSA is negative  Procalcitonin trending down at 0 60  Follow blood cultures x 2 no growth so far  Continuous pulse ox, Aspiration precautions       Sepsis ([address] )   Assessment & Plan    Likely secondary to PNA -- pt presented with leukocytosis, hypotension, tachycardia, tachypnea  This has resolved  MRSA swab negative, urine for strep pneumoniae, Legionella antigen negative  Refer above  Lung cancer [facility])   Assessment & Plan    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  Follows with Dr Maria Victoria Barba   treated with Mg Likens every 3 weeks-- last infusion 7/16  Palliative care on board  Diabetes mellitus [facility])   Assessment & Plan    Lab Results   Component Value Date    HGBA1C 6 7 (H) 07/25/2018       Recent Labs      07/24/18   1434  07/25/18   0609  07/25/18   1123  07/25/18   1602   POCGLU  167*  187*  138  131       Blood Sugar Average: Last 72 hrs:  (P) 179    HgA1C at 6 7  SSI and accuchecks   Hold OHG: metformin        Hypertension   Assessment & Plan    Hypotensive in the setting of sepsis and pericardial effusion   BP improving        Atrial fibrillation ([address] ) with RVR   Assessment & Plan    Cardiology on board  Heart rate better controlled -- continue with amiodarone, aspirin, Lopressor  Anticoagulation need to be discussed as per Cardiology  NSTEMI (non-ST elevated myocardial infarction) [facility])   Assessment & Plan    Likely type 2 secondary to demand from PNA/sepsis, AFIB, TANYA, pericardial effusion  Trop 0 31; 0 29; 0 31  Cardiology on board      Right upper quadrant painresolved as of 2018   Assessment & Plan    Symptoms have resolved  US abdomen -- Gallbladder sludge  Hepatomegaly and hepatic steatosis  LFTs improved  TANYA (acute kidney injury) (HCC)resolved as of 2018   Assessment & Plan    Likely prerenal in the setting of sepsis and hypoperfusion   TANYA has resolved, creatinine at baseline  Continuous opioid dependence [facility])   Assessment & Plan    Palliative medicine on board, pain management as per them  Intermittent confusion  This has resolved  Patient is back to baseline  Possibly secondary to delirium, medication induced  CT head from  --no acute intracranial abnormalities  Continue fall precautions  ______________________________________________________________________    SUBJECTIVE:   Patient seen and examined  Chronically ill  Appears a lot comfortable today  She is awake alert oriented x3  Chest discomfort persists  Generalized weakness persists  Updated daughter Disha Jaime at bedside  Patient had a brief vasovagal episode when she was getting a suppository, and result spontaneously  Today morning while taking a medications she had a choking episode and heart rate transiently was elevated -- however this has subsided      OBJECTIVE:     Vitals:   HR:  [55-83] 67  Resp:  [17-18] 18  BP: (104-152)/(59-93) 111/59  SpO2:  [93 %-98 %] 93 %  Temp (24hrs), Av 6 °F (37 °C), Min:98 1 °F (36 7 °C), Max:99 3 °F (37 4 °C)  Current: Temperature: 98 8 °F (37 1 °C)    Intake/Output Summary (Last 24 hours) at 18 1130  Last data filed at 18 0971   Gross per 24 hour   Intake              375 ml   Output             1600 ml   Net            -1225 ml       Physical Exam:   GENERAL: AAO x 2, chronically ill  HEENT: atraumatic, normocephalic  Oral mucosa moist, no icterus, pallor  PERRLA +  Neck supple, no JVD, no lymphadenopathy, no thryomegaly  CHEST: B/L breath sounds heard, occasional wheezing  CVS: S1, S2   ABDOMEN: Soft/obese/NT/BS heard  NEUROLOGICAL: CN II -XI grossly intact  No focal motor or sensory deficits  No signs of meningeal irritation or cerebellar dysfunction  EXTREMITIES: mild B/L pitting pedal edema      LABS:   7/29/2018 09:01   WBC 13 50 (H)   RBC 4 41   Hemoglobin 13 0   Hematocrit 41 7   MCV 95   MCH 29 5   MCHC 31 2 (L)   RDW 13 3   Platelets 273   MPV 11 3   nRBC 0   Neutrophils Relative 74   Lymphocytes Relative 16   Monocytes Relative 8   Eosinophils Relative 1   Basophils Relative 0   Neutrophils Absolute 9 86 (H)   Lymphocytes Absolute 2 20   Monocytes Absolute 1 13   Eosinophils Absolute 0 19   Basophils Absolute 0 04     Scheduled Meds:    Current Facility-Administered Medications:  acetaminophen 650 mg Oral Q6H PRN Shelia Cox DO    ALPRAZolam 0 5 mg Oral HS PRN Francisco Coon MD    amiodarone 200 mg Oral BID With Meals Dale Max MD    aspirin 81 mg Oral Daily Dale Max MD    bisacodyl 10 mg Rectal Daily PRN Tahira Snyder PA-C    calcium carbonate 1 tablet Oral Daily With Breakfast Francisco Coon MD    cefepime 2,000 mg Intravenous Q12H SAMANTHA Collazo Last Rate: Stopped (07/29/18 0800)   citalopram 20 mg Oral Daily Francisco Coon MD    cyanocobalamin 100 mcg Oral Daily Francisco Coon MD    docusate sodium 100 mg Oral BID Francisco Coon MD    fentaNYL 1 patch Transdermal Q72H Francisco Coon MD    fish oil 1,000 mg Oral Daily Francisco Coon MD    folic acid 1 mg Oral Daily Francisco Coon MD    insulin lispro 2-12 Units Subcutaneous TID AC Francisco Coon MD    meclizine 25 mg Oral Q8H PRN Nikhil Ballard PA-C    metoprolol 5 mg Intravenous Q6H PRN Sravanthi Moody MD    metoprolol tartrate 12 5 mg Oral Q12H [facility] Jenny Garland MD    morphine injection 2 mg Intravenous Q4H PRN Ronny Gray MD    ondansetron 4 mg Intravenous Q6H PRN Yonas Diaz MD    ondansetron 4 mg Oral Q6H PRN Gio Foreman MD    sodium phosphate-biphosphate 1 enema Rectal Daily PRN Tahira Snyder PA-C    traMADol 100 mg Oral TID PRN Carolyn Ibarra DO      PRN Meds:    acetaminophen    ALPRAZolam    bisacodyl    meclizine    metoprolol    morphine injection    ondansetron    ondansetron    sodium phosphate-biphosphate    traMADol    VTE prophylaxis: SCDs  Education and Discussions with Family / Patient: Patient and daughter    Current Length of Stay: Day 6    Current Patient Status: Inpatient     Certification Statement: The patient will continue to require additional inpatient hospital stay due to ongoing goals of care discussion      Discharge Plan / Estimated Discharge Date:  Plan for today discussed with case management and RN  Anticipate hospitalization of the weekend      Code Status: Level 1 - Full Code    Time Spent for Care: 20 minutes   More than 50% of total time spent on counseling and coordination of care as described above  Sandy Barry MD  HOSPITALIST SERVICES  7/29/2018    PLEASE NOTE:  This encounter was completed utilizing the [email or web address] Direct Speech Voice Recognition Software  Grammatical errors, random word insertions, pronoun errors and incomplete sentences are occasional consequences of the system due to software limitations, ambient noise and hardware issues  These may be missed by proof reading prior to affixing electronic signature  Any questions or concerns about the content, text or information contained within the body of this dictation should be directly addressed to the physician for clarification   Please do not hesitate to call me directly if you have any any questions or concerns

## 2018-07-29 NOTE — PROGRESS NOTES
Noticed pt uncomfortable after being on the commode a couple of times within the hour  Found out that pt is very impacted through digital assessment  On call belen orosco, had ordered supp and enema without any luck  Disimpacted small amount from pt  HR went down to 45-low 50s intermittently after being given supp & enema  As of now, pt a little bit comfortable and was able to sleep  Will monitor closely

## 2018-07-29 NOTE — PROGRESS NOTES
Progress Note - Shilpa Saenz 1935, 80 y o  female MRN: [id number]  Unit/Bed#: [facility] 422-01 Encounter: [id number]  Primary Care Provider: Eddie Pinzon DO   Date and time admitted to hospital: 7/23/2018  5:53 PM        * Pericardial effusion   Assessment & Plan    07/24: S/P subxiphoid pericardial window with drain  07/27: Pericardial drain discontinued  Follow up pericardial fluid test/cs -- no growth so far  Thoracic surgery on board  Pericardial effusion cytology -- Metastatic adenocarcinoma  HAP (hospital-acquired pneumonia)   Assessment & Plan    Pt recieves chemo every 3 weeks-- most recent 7/16  Chest xray and CT scan concerning for LLL infiltrate   Continue IV cefepime -- day 5/10  Discontinue IV vancomycin as MRSA is negative  Procalcitonin trending down at 0 60  Follow blood cultures x 2 no growth so far  Continuous pulse ox, Aspiration precautions       Sepsis ([address] )   Assessment & Plan    Likely secondary to PNA -- pt presented with leukocytosis, hypotension, tachycardia, tachypnea  This has resolved  MRSA swab negative, urine for strep pneumoniae, Legionella antigen negative  Refer above  Lung cancer [facility])   Assessment & Plan    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  Follows with Dr Maria Victoria Barba   treated with Mg Likens every 3 weeks-- last infusion 7/16  Palliative care on board  Diabetes mellitus [facility])   Assessment & Plan    Lab Results   Component Value Date    HGBA1C 6 7 (H) 07/25/2018       Recent Labs      07/24/18   1434  07/25/18   0609  07/25/18   1123  07/25/18   1602   POCGLU  167*  187*  138  131       Blood Sugar Average: Last 72 hrs:  (P) 179    HgA1C at 6 7  SSI and accuchecks   Hold OHG: metformin        Hypertension   Assessment & Plan    Hypotensive in the setting of sepsis and pericardial effusion   BP improving  Atrial fibrillation ([address] ) with RVR   Assessment & Plan    Cardiology on board    Continue IV amiodarone gtt, lopressor, with holding parameters  ?  Repeat echocardiogram  Monitor on telemetry  NSTEMI (non-ST elevated myocardial infarction) [facility])   Assessment & Plan    Likely type 2 secondary to demand from PNA/sepsis, AFIB, TANYA, pericardial effusion  Trop 0 31; 0 29; 0 31  Cardiology on board      Right upper quadrant painresolved as of 2018   Assessment & Plan    Symptoms have resolved  US abdomen -- Gallbladder sludge  Hepatomegaly and hepatic steatosis  LFTs improved  TANYA (acute kidney injury) (HCC)resolved as of 2018   Assessment & Plan    Likely prerenal in the setting of sepsis and hypoperfusion   TANYA has resolved, creatinine at baseline  Continuous opioid dependence [facility])   Assessment & Plan    Palliative medicine on board, pain management as per them  Intermittent confusion  Possibly secondary to delirium, medication induced  CT head from  --no acute intracranial abnormalities  Continue fall precautions  ______________________________________________________________________    SUBJECTIVE:   Patient seen and examined  Chronically ill  Appears more comfortable compared to yesterday  Intermittent confusion persists  Updated patient's daughter at bedside  OBJECTIVE:     Vitals:   HR:  [55-83] 67  Resp:  [16-18] 18  BP: (104-152)/(59-93) 111/59  SpO2:  [93 %-98 %] 93 %  Temp (24hrs), Av 6 °F (37 °C), Min:98 1 °F (36 7 °C), Max:99 3 °F (37 4 °C)  Current: Temperature: 98 8 °F (37 1 °C)    Intake/Output Summary (Last 24 hours) at 18 1049  Last data filed at 18 0931   Gross per 24 hour   Intake              375 ml   Output             1600 ml   Net            -1225 ml       Physical Exam:   GENERAL: AAO x 1, confused, chronically ill  HEENT: atraumatic, normocephalic  Oral mucosa moist, no icterus, pallor  PERRLA +  Neck supple, no JVD, no lymphadenopathy, no thryomegaly  CHEST: B/L breath sounds heard, occasional wheezing     CVS: S1, S2   ABDOMEN: Soft/obese/NT/BS heard  NEUROLOGICAL: CN II -XI grossly intact  No focal motor or sensory deficits  No signs of meningeal irritation or cerebellar dysfunction  EXTREMITIES: mild B/L pitting pedal edema      LABS:   7/27/2018 05:11   WBC 11 98 (H)   RBC 4 11   Hemoglobin 12 4   Hematocrit 40 3   MCV 98   MCH 30 2   MCHC 30 8 (L)   RDW 13 6   Platelets 975   MPV 75 7   nRBC 0   Neutrophils Relative 58   Lymphocytes Relative 23   Monocytes Relative 10   Eosinophils Relative 7 (H)   Basophils Relative 1   Neutrophils Absolute 7 14   Lymphocytes Absolute 2 74   Monocytes Absolute 1 16   Eosinophils Absolute 0 82 (H)   Basophils Absolute 0 06     Scheduled Meds:    Current Facility-Administered Medications:  acetaminophen 650 mg Oral Q6H PRN Shelia Cox DO    ALPRAZolam 0 5 mg Oral HS PRN Aftab Kebede MD    amiodarone 200 mg Oral BID With Meals Doc Bower MD    aspirin 81 mg Oral Daily Doc Bower MD    bisacodyl 10 mg Rectal Daily PRN Tahira Snyder PA-C    calcium carbonate 1 tablet Oral Daily With Breakfast Aftab Kebede MD    cefepime 2,000 mg Intravenous Q12H SAMANTHA Collazo Last Rate: Stopped (07/29/18 0800)   citalopram 20 mg Oral Daily Aftab Kebede MD    cyanocobalamin 100 mcg Oral Daily Aftab Kebede MD    docusate sodium 100 mg Oral BID Aftab Kebede MD    fentaNYL 1 patch Transdermal Q72H Aftab Kbeede MD    fish oil 1,000 mg Oral Daily Aftab Kebede MD    folic acid 1 mg Oral Daily Aftab Kebede MD    insulin lispro 2-12 Units Subcutaneous TID AC Aftab Kebede MD    meclizine 25 mg Oral Q8H PRN Lennie Dacosta PA-C    metoprolol 5 mg Intravenous Q6H PRN Ju Blackwell MD    metoprolol tartrate 12 5 mg Oral Q12H [facility] Doc Bower MD    morphine injection 2 mg Intravenous Q4H PRN Pete German MD    ondansetron 4 mg Intravenous Q6H PRN Aftab Kebede MD    ondansetron 4 mg Oral Q6H PRN Conner Thomas MD    sodium phosphate-biphosphate 1 enema Rectal Daily PRN Tahira Snyder PA-C    traMADol 100 mg Oral TID PRN Kasey Mcclure DO      PRN Meds:    acetaminophen    ALPRAZolam    bisacodyl    meclizine    metoprolol    morphine injection    ondansetron    ondansetron    sodium phosphate-biphosphate    traMADol    VTE prophylaxis: SCDs  Education and Discussions with Family / Patient:  patient and daughter    Current Length of Stay: Day 5    Current Patient Status: Inpatient     Certification Statement: The patient will continue to require additional inpatient hospital stay due to ongoing goals of care discussion      Discharge Plan / Estimated Discharge Date:  Plan for today discussed with case management and RN  Anticipate hospitalization of the weekend      Code Status: Level 1 - Full Code    Time Spent for Care: 20 minutes   More than 50% of total time spent on counseling and coordination of care as described above  Nalini Portillo MD  HOSPITALIST SERVICES  7/28/2018    PLEASE NOTE:  This encounter was completed utilizing the [employer] Direct Speech Voice Recognition Software  Grammatical errors, random word insertions, pronoun errors and incomplete sentences are occasional consequences of the system due to software limitations, ambient noise and hardware issues  These may be missed by proof reading prior to affixing electronic signature  Any questions or concerns about the content, text or information contained within the body of this dictation should be directly addressed to the physician for clarification  Please do not hesitate to call me directly if you have any any questions or concerns

## 2018-07-29 NOTE — PROGRESS NOTES
Pt vomited undigested food with AM pills at 0945  Stated a pill got caught in her throat and made her nauseas  IV zofran given  Concern for aspiration after  Lung sounds unchanged since AM assessment  Still on RA satting 94%  However, pt tachycardic 100s  Sinus arrhythmia  This morning, pt sinus arrhythmia HR 60-70s  Pt sleeping, easy to arouse, Does not have any complaints at this time  Dr Neil Valle with [facility] made aware  Stated to continue to monitor  No interventions at this time but if tachycardia persists, will notify cards

## 2018-07-30 ENCOUNTER — APPOINTMENT (INPATIENT)
Dept: NON INVASIVE DIAGNOSTICS | Facility: HOSPITAL | Age: 83
DRG: 853 | End: 2018-07-30
Payer: MEDICARE

## 2018-07-30 ENCOUNTER — PATIENT OUTREACH (OUTPATIENT)
Dept: PALLIATIVE MEDICINE | Facility: HOSPITAL | Age: 83
End: 2018-07-30

## 2018-07-30 LAB
ANION GAP SERPL CALCULATED.3IONS-SCNC: 6 MMOL/L (ref 4–13)
BASOPHILS # BLD AUTO: 0.05 THOUSANDS/ΜL (ref 0–0.1)
BASOPHILS NFR BLD AUTO: 0 % (ref 0–1)
BUN SERPL-MCNC: 12 MG/DL (ref 5–25)
CALCIUM SERPL-MCNC: 8.6 MG/DL (ref 8.3–10.1)
CHLORIDE SERPL-SCNC: 107 MMOL/L (ref 100–108)
CO2 SERPL-SCNC: 27 MMOL/L (ref 21–32)
CREAT SERPL-MCNC: 0.62 MG/DL (ref 0.6–1.3)
EOSINOPHIL # BLD AUTO: 0.77 THOUSAND/ΜL (ref 0–0.61)
EOSINOPHIL NFR BLD AUTO: 7 % (ref 0–6)
ERYTHROCYTE [DISTWIDTH] IN BLOOD BY AUTOMATED COUNT: 13.8 % (ref 11.6–15.1)
GFR SERPL CREATININE-BSD FRML MDRD: 84 ML/MIN/1.73SQ M
GLUCOSE SERPL-MCNC: 110 MG/DL (ref 65–140)
GLUCOSE SERPL-MCNC: 114 MG/DL (ref 65–140)
GLUCOSE SERPL-MCNC: 124 MG/DL (ref 65–140)
GLUCOSE SERPL-MCNC: 178 MG/DL (ref 65–140)
HCT VFR BLD AUTO: 41.2 % (ref 34.8–46.1)
HGB BLD-MCNC: 12.8 G/DL (ref 11.5–15.4)
IMM GRANULOCYTES # BLD AUTO: 0.08 THOUSAND/UL (ref 0–0.2)
IMM GRANULOCYTES NFR BLD AUTO: 1 % (ref 0–2)
LYMPHOCYTES # BLD AUTO: 2.32 THOUSANDS/ΜL (ref 0.6–4.47)
LYMPHOCYTES NFR BLD AUTO: 20 % (ref 14–44)
MCH RBC QN AUTO: 29.7 PG (ref 26.8–34.3)
MCHC RBC AUTO-ENTMCNC: 31.1 G/DL (ref 31.4–37.4)
MCV RBC AUTO: 96 FL (ref 82–98)
MONOCYTES # BLD AUTO: 1.05 THOUSAND/ΜL (ref 0.17–1.22)
MONOCYTES NFR BLD AUTO: 9 % (ref 4–12)
NEUTROPHILS # BLD AUTO: 7.45 THOUSANDS/ΜL (ref 1.85–7.62)
NEUTS SEG NFR BLD AUTO: 63 % (ref 43–75)
NRBC BLD AUTO-RTO: 0 /100 WBCS
PLATELET # BLD AUTO: 310 THOUSANDS/UL (ref 149–390)
PMV BLD AUTO: 10.7 FL (ref 8.9–12.7)
POTASSIUM SERPL-SCNC: 4.3 MMOL/L (ref 3.5–5.3)
RBC # BLD AUTO: 4.31 MILLION/UL (ref 3.81–5.12)
SODIUM SERPL-SCNC: 140 MMOL/L (ref 136–145)
WBC # BLD AUTO: 11.72 THOUSAND/UL (ref 4.31–10.16)

## 2018-07-30 PROCEDURE — 93325 DOPPLER ECHO COLOR FLOW MAPG: CPT | Performed by: INTERNAL MEDICINE

## 2018-07-30 PROCEDURE — 93308 TTE F-UP OR LMTD: CPT

## 2018-07-30 PROCEDURE — 82948 REAGENT STRIP/BLOOD GLUCOSE: CPT

## 2018-07-30 PROCEDURE — 93321 DOPPLER ECHO F-UP/LMTD STD: CPT | Performed by: INTERNAL MEDICINE

## 2018-07-30 PROCEDURE — G8988 SELF CARE GOAL STATUS: HCPCS

## 2018-07-30 PROCEDURE — 85025 COMPLETE CBC W/AUTO DIFF WBC: CPT | Performed by: INTERNAL MEDICINE

## 2018-07-30 PROCEDURE — G8987 SELF CARE CURRENT STATUS: HCPCS

## 2018-07-30 PROCEDURE — 99232 SBSQ HOSP IP/OBS MODERATE 35: CPT | Performed by: INTERNAL MEDICINE

## 2018-07-30 PROCEDURE — 97167 OT EVAL HIGH COMPLEX 60 MIN: CPT

## 2018-07-30 PROCEDURE — 93308 TTE F-UP OR LMTD: CPT | Performed by: INTERNAL MEDICINE

## 2018-07-30 PROCEDURE — 80048 BASIC METABOLIC PNL TOTAL CA: CPT | Performed by: INTERNAL MEDICINE

## 2018-07-30 RX ADMIN — AMIODARONE HYDROCHLORIDE 200 MG: 200 TABLET ORAL at 08:30

## 2018-07-30 RX ADMIN — DOCUSATE SODIUM 100 MG: 100 CAPSULE, LIQUID FILLED ORAL at 08:32

## 2018-07-30 RX ADMIN — DOCUSATE SODIUM 100 MG: 100 CAPSULE, LIQUID FILLED ORAL at 17:00

## 2018-07-30 RX ADMIN — CEFEPIME HYDROCHLORIDE 2000 MG: 2 INJECTION, POWDER, FOR SOLUTION INTRAVENOUS at 13:25

## 2018-07-30 RX ADMIN — CITALOPRAM HYDROBROMIDE 20 MG: 20 TABLET ORAL at 08:32

## 2018-07-30 RX ADMIN — METOPROLOL TARTRATE 12.5 MG: 25 TABLET ORAL at 08:32

## 2018-07-30 RX ADMIN — CEFEPIME HYDROCHLORIDE 2000 MG: 2 INJECTION, POWDER, FOR SOLUTION INTRAVENOUS at 03:10

## 2018-07-30 RX ADMIN — FOLIC ACID 1 MG: 1 TABLET ORAL at 08:32

## 2018-07-30 RX ADMIN — ASPIRIN 81 MG: 81 TABLET, COATED ORAL at 08:31

## 2018-07-30 RX ADMIN — VITAM B12 100 MCG: 100 TAB at 08:33

## 2018-07-30 RX ADMIN — Medication 1 TABLET: at 08:30

## 2018-07-30 RX ADMIN — METOPROLOL TARTRATE 5 MG: 1 INJECTION, SOLUTION INTRAVENOUS at 03:19

## 2018-07-30 RX ADMIN — INSULIN LISPRO 2 UNITS: 100 INJECTION, SOLUTION INTRAVENOUS; SUBCUTANEOUS at 16:53

## 2018-07-30 RX ADMIN — METOPROLOL TARTRATE 12.5 MG: 25 TABLET ORAL at 21:57

## 2018-07-30 RX ADMIN — ONDANSETRON 4 MG: 2 INJECTION, SOLUTION INTRAMUSCULAR; INTRAVENOUS at 08:40

## 2018-07-30 RX ADMIN — ALPRAZOLAM 0.5 MG: 0.25 TABLET ORAL at 21:56

## 2018-07-30 RX ADMIN — AMIODARONE HYDROCHLORIDE 200 MG: 200 TABLET ORAL at 16:53

## 2018-07-30 NOTE — PLAN OF CARE
Problem: OCCUPATIONAL THERAPY ADULT  Goal: Performs self-care activities at highest level of function for planned discharge setting  See evaluation for individualized goals  Treatment Interventions: ADL retraining, Functional transfer training, Endurance training, Patient/family training, Equipment evaluation/education, Compensatory technique education, Energy conservation          See flowsheet documentation for full assessment, interventions and recommendations  Limitation: Decreased ADL status, Decreased endurance, Decreased self-care trans, Decreased high-level ADLs  Prognosis: Good  Assessment: Pt is a 80 y o  female who was [facility] to [facility] on 7/23/2018 with Pericardial effusion  Pt is s/p subxiphoid pericardial window  Pt's comorbidities include Afib, HTN, DM, h/o R UE fx and surgery, h/o L breast cancer, and lung cancer  At baseline pt was I w/ ADLs, IADLs, and used a SW for mobility  Pt lives w/ spouse in one level home w/ 2 JEN  Currently pt requires min A for overall ADLS and min A for functional mobility/transfers w/ RW  Pt SOB at start of session and required overall increased time to complete tasks  Pt currently presents with impairments in the following categories -steps to enter environment, limited home support, difficulty performing ADLS, difficulty performing IADLS, activity tolerance, endurance, standing balance/tolerance, sitting balance/tolerance and safety  These impairments, as well as pt's fatigue, pain, decreased caregiver support and risk for falls limit pt's ability to safely engage in all baseline areas of occupation, including grooming, bathing, dressing, toileting, functional mobility/transfers and leisure activities  From OT standpoint, recommend inpatient rehab upon D/C  OT will continue to follow to address the below stated goals  OT Discharge Recommendation: Short Term Rehab  OT - OK to Discharge:  Yes

## 2018-07-30 NOTE — PROGRESS NOTES
Progress Note - Kennedi Sampson 1935, 80 y o  female MRN: [id number]  Unit/Bed#: [facility] 422-01 Encounter: [id number]  Primary Care Provider: Melodie Woodson DO   Date and time admitted to hospital: 7/23/2018  5:53 PM        * Pericardial effusion   Assessment & Plan    07/24/18: S/P subxiphoid pericardial window with drain  07/27/18: Pericardial drain discontinued  Follow up pericardial fluid test/cs/cytology -- Metastatic adenocarcinoma  Thoracic surgery have signed off the case  Continue p r n  pain management  HAP (hospital-acquired pneumonia)   Assessment & Plan    Pt recieves chemo every 3 weeks-- most recent 7/16  Chest xray and CT scan concerning for LLL infiltrate   Continue IV cefepime -- day 7/10 -- get transition to p  o  upon discharge  Discontinue IV vancomycin as MRSA is negative  Procalcitonin trending down at 0 60  Follow blood cultures x 2 no growth so far  Continuous pulse ox, Aspiration precautions        Sepsis [facility])   Assessment & Plan    This has resolved  Likely secondary to PNA -- pt presented with leukocytosis, hypotension, tachycardia, tachypnea  MRSA swab negative, urine for strep pneumoniae, Legionella antigen awaited  Refer above  Lung cancer [facility])   Assessment & Plan    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  Follows with Dr Maldonado Rachna   treated with Jassi Cisco every 3 weeks-- last infusion 7/16  Palliative care on board  Diabetes mellitus [facility])   Assessment & Plan    Lab Results   Component Value Date    HGBA1C 6 7 (H) 07/25/2018       Recent Labs      07/24/18   1434  07/25/18   0609  07/25/18   1123  07/25/18   1602   POCGLU  167*  187*  138  131       Blood Sugar Average: Last 72 hrs:  (P) 179  HgA1C at 6 7  SSI and accuchecks   Hold OHG: metformin        Hypertension   Assessment & Plan    Blood pressure reasonable  Continue Lopressor, at lower doses than home          Atrial fibrillation ([address] ) Assessment & Plan    Cardiology on board  Continue lopressor, with holding parameters  High risk for bleed hence continue with aspirin alone  Monitor on telemetry  Type 2 myocardial infarction [facility])   Assessment & Plan    Likely type 2 secondary to demand from PNA/sepsis, AFIB, TANYA, pericardial effusion  Trop 0 31; 0 29; 0 31  Cardiology on board          Right upper quadrant painresolved as of 7/26/2018   Assessment & Plan    Symptoms have resolved  US abdomen -- Gallbladder sludge  Hepatomegaly and hepatic steatosis  LFTs improved  TANYA (acute kidney injury) (HCC)resolved as of 7/26/2018   Assessment & Plan    Likely prerenal in the setting of sepsis and hypoperfusion   TANYA has resolved, creatinine at baseline  Continuous opioid dependence [address] [address] )   Assessment & Plan    C/w home regimen: fentanyl patch and norco   Palliative care on board  Intermittent confusion  This has resolved  Patient is back to baseline  Possibly secondary to delirium, medication induced  CT head from 07/26 --no acute intracranial abnormalities  Continue fall precautions  SUBJECTIVE:   Patient seen and examined  Chronically ill  She does have choking episodes while taking her pills but she states that she has had this for a long time  Denies any dysphagia  Chest pain subsided  She continues to improve  Await PTOT evaluation        OBJECTIVE:   Vitals:    07/30/18 1053   BP: 104/54   Pulse: 80   Resp: 17   Temp: 98 5 °F (36 9 °C)   SpO2: 95%       Intake/Output Summary (Last 24 hours) at 07/30/18 1114  Last data filed at 07/30/18 1027   Gross per 24 hour   Intake              510 ml   Output             1200 ml   Net             -690 ml     Physical Exam:   GENERAL: AAO x 3, chronically ill  HEENT: atraumatic, normocephalic  Oral mucosa moist, no icterus, pallor  PERRLA +  Neck supple, no JVD, no lymphadenopathy, no thryomegaly  CHEST: B/L breath sounds heard, occasional wheezing     CVS: S1, S2   ABDOMEN: Soft/obese/NT/BS heard  NEUROLOGICAL: CN II -XI grossly intact  No focal motor or sensory deficits  No signs of meningeal irritation or cerebellar dysfunction    EXTREMITIES: mild B/L pitting pedal edema      LABS:   7/30/2018 04:57 7/30/2018 06:35 7/30/2018 08:19   POC Glucose  110    eGFR 84     Sodium 140     Potassium 4 3     Chloride 107     CO2 27     Anion Gap 6     BUN 12     Creatinine 0 62     Glucose 114     Calcium 8 6     WBC   11 72 (H)   RBC   4 31   Hemoglobin   12 8   Hematocrit   41 2   MCV   96   MCH   29 7   MCHC   31 1 (L)   RDW   13 8   Platelets   213   MPV   10 7   nRBC   0   Neutrophils Relative   63   Lymphocytes Relative   20   Monocytes Relative   9   Eosinophils Relative   7 (H)   Basophils Relative   0   Neutrophils Absolute   7 45   Lymphocytes Absolute   2 32   Monocytes Absolute   1 05   Eosinophils Absolute   0 77 (H)   Basophils Absolute   0 05   Immature Grans Absolute   0 08   Immat GRANS %   1     Scheduled Meds:  Current Facility-Administered Medications:  acetaminophen 650 mg Oral Q6H PRN Shelia Cox DO    ALPRAZolam 0 5 mg Oral HS PRN Lai Issa MD    amiodarone 200 mg Oral BID With Meals Tiffany Barnes MD    aspirin 81 mg Oral Daily Tiffany Barnes MD    bisacodyl 10 mg Rectal Daily PRN Tahira Snyder PA-C    calcium carbonate 1 tablet Oral Daily With Breakfast Lai Issa MD    cefepime 2,000 mg Intravenous Q12H SAMANTHA Collazo Last Rate: 2,000 mg (07/30/18 0310)   citalopram 20 mg Oral Daily Lai Issa MD    cyanocobalamin 100 mcg Oral Daily Lai Issa MD    docusate sodium 100 mg Oral BID Lai Issa MD    fentaNYL 1 patch Transdermal Q72H Lai Issa MD    fish oil 1,000 mg Oral Daily Lai Issa MD    folic acid 1 mg Oral Daily Lai Issa MD    insulin lispro 2-12 Units Subcutaneous TID AC Lai Issa MD    meclizine 25 mg Oral Q8H PRN Tano So PA-C    metoprolol 5 mg Intravenous Q6H PRN Jennifer Pickard MD    metoprolol tartrate 12 5 mg Oral Q12H [facility] Yulia Lilly MD    morphine injection 2 mg Intravenous Q4H PRN Aidee Herrera MD    ondansetron 4 mg Intravenous Q6H PRN Desiree Shine MD    ondansetron 4 mg Oral Q6H PRN Bryan Ward MD    sodium phosphate-biphosphate 1 enema Rectal Daily PRN Tahira Snyder PA-C    traMADol 100 mg Oral TID PRN Shelia Cox DO      PRN Meds:   acetaminophen    ALPRAZolam    bisacodyl    meclizine    metoprolol    morphine injection    ondansetron    ondansetron    sodium phosphate-biphosphate    traMADol     VTE prophylaxis: SCDs      Education and Discussions with Family / Patient: Patient and daughter     Current Length of Stay: Day 7     Current Patient Status: Inpatient      Certification Statement: The patient will continue to require additional inpatient hospital stay due to ongoing goals of care discussion      Discharge Plan / Estimated Discharge Date:  Plan for today discussed with case management and RN  Await PTOT evaluation  Patient most likely will require skilled nursing facility       Code Status: Level 3 - DNI/DNR     Time Spent for Care: 20 minutes   More than 50% of total time spent on counseling and coordination of care as described above      [address], MD  HOSPITALIST SERVICES  7/30/2018     PLEASE NOTE:  This encounter was completed utilizing the [employer] Direct Speech Voice Recognition Software  Grammatical errors, random word insertions, pronoun errors and incomplete sentences are occasional consequences of the system due to software limitations, ambient noise and hardware issues  These may be missed by proof reading prior to affixing electronic signature  Any questions or concerns about the content, text or information contained within the body of this dictation should be directly addressed to the physician for clarification   Please do not hesitate to call me directly if you have any any questions or concerns

## 2018-07-30 NOTE — PROGRESS NOTES
Progress note - Palliative and Supportive Care   Santosh Jhaveri 80 y o  female [id number]    Assessment:  · Lung cancer stage IV  · Atrial fib  · Pericardial effusion with conclusive evidence of malignancy (metastatic adenocarcinoma)  · Cancer related pain  · Shortness of breath  · Decreased appetite  · Hx of Left breast cancer   · HTN  · T2DM  · Type 2 NSTEMI    Plan:  1  Symptom management  · Analgesia: Continue current regimen  · Fentanyl 12 mcg TD patch/72H for cancer-related chest pain  · Tylenol 650 mg PO Q6H PRN for mild pain  · Tramadol 100 mg TID PRN for severe pain   · Recommend not exceeding 300 mg/day of tramadol due to advanced age & multiple co-morbidities; also recommend global delirium precautions     · Bowel regimen to prevent OIC:  · Colace 100 mg BID  · Dulcolax rectal supp  10 mg QD PRN  · Recommend close monitoring of patient's BM due to reported constipation    · Anti-emetic:   · Zofran 4 mg PO soln Q6H PRN for nausea/vomiting    · Depression/anxiety/insomnia:  · Alprazolam 0 5 mg PO QHS PRN for anxiety/insomnia  · Celexa 20 mg QD for depression    2  Goals of Care  · Continue disease-directed GOC, including Keytruda    3  Code Status: Level 3, DNAR/DNI   · Per records, code status changed 7/27/18 after primary team's discussion with daughter, son, & son-in-law  Today, patient states she is in agreement with DNAR/DNI status  · Advance Directive / Living Will / POLST:  No advanced directive in place  Patient states she had previously desired her daughter, Jess Stone, to be POA, but Jess Stone told patient she did not want to make "those decisions" for patient  Patient's daughter Jess Stone was given 5 wishes on 7/24/18 by Palliative team, but patient reports she has not reviewed it  Patient is able to verbalize her wishes and states she will have a discussion with her family regarding additional decisions for future care, such as tube feeding         Interval history:  Reports her pain is well-controlled on current regimen, denies needing any changes made  Used 1 dose of tylenol 650 mg 7/29/18 @ 0502, no tylenol used in past 24H  No tramadol needed since 7/27/18  States xanax helping for insomnia & anxiety  Given zofran 4 mg PO soln this a m  For nausea  Reports she would like to discuss her wishes with her family in greater detail      MEDICATIONS / ALLERGIES:    Current Facility-Administered Medications   Medication Dose Route Frequency    acetaminophen (TYLENOL) tablet 650 mg  650 mg Oral Q6H PRN    ALPRAZolam (XANAX) tablet 0 5 mg  0 5 mg Oral HS PRN    amiodarone tablet 200 mg  200 mg Oral BID With Meals    aspirin (ECOTRIN LOW STRENGTH) EC tablet 81 mg  81 mg Oral Daily    bisacodyl (DULCOLAX) rectal suppository 10 mg  10 mg Rectal Daily PRN    calcium carbonate (OYSTER SHELL,OSCAL) 500 mg tablet 1 tablet  1 tablet Oral Daily With Breakfast    cefepime (MAXIPIME) 2,000 mg in dextrose 5 % 50 mL IVPB  2,000 mg Intravenous Q12H    citalopram (CeleXA) tablet 20 mg  20 mg Oral Daily    cyanocobalamin (VITAMIN B-12) tablet 100 mcg  100 mcg Oral Daily    docusate sodium (COLACE) capsule 100 mg  100 mg Oral BID    fentaNYL (DURAGESIC) 12 mcg/hr TD 72 hr patch 1 patch  1 patch Transdermal Q72H    fish oil capsule 1,000 mg  1,000 mg Oral Daily    folic acid (FOLVITE) tablet 1 mg  1 mg Oral Daily    insulin lispro (HumaLOG) 100 units/mL subcutaneous injection 2-12 Units  2-12 Units Subcutaneous TID AC    meclizine (ANTIVERT) tablet 25 mg  25 mg Oral Q8H PRN    metoprolol (LOPRESSOR) injection 5 mg  5 mg Intravenous Q6H PRN    metoprolol tartrate (LOPRESSOR) partial tablet 12 5 mg  12 5 mg Oral Q12H DIPTI    morphine injection 2 mg  2 mg Intravenous Q4H PRN    ondansetron (ZOFRAN) injection 4 mg  4 mg Intravenous Q6H PRN    ondansetron (ZOFRAN-ODT) dispersible tablet 4 mg  4 mg Oral Q6H PRN    sodium phosphate-biphosphate (FLEET) enema 1 enema  1 enema Rectal Daily PRN    traMADol (ULTRAM) tablet 100 mg 100 mg Oral TID PRN       Allergies   Allergen Reactions    Atorvastatin Other (See Comments)    Benzonatate Other (See Comments)    Carboplatin      Pt had allergic reaction during 8th carbo dose    Sulfa Antibiotics     Bactrim [Sulfamethoxazole-Trimethoprim] Rash    Latex Rash    Other Rash     Pt states she gets a rash from surgical tape, she is ok with paper tape       OBJECTIVE:  /61 (BP Location: Right arm) Comment: Map80  Pulse 63   Temp 97 5 °F (36 4 °C) (Oral)   Resp 16   Ht 5' 1" (1 549 m)   Wt 76 4 kg (168 lb 6 9 oz)   SpO2 93%   BMI 31 82 kg/m²     Physical Exam   Constitutional: She is oriented to person, place, and time  She appears well-developed and well-nourished  HENT:   Head: Normocephalic and atraumatic  Eyes: Right eye exhibits no discharge  Left eye exhibits no discharge  Neck: Normal range of motion  Cardiovascular: Normal rate  Pulmonary/Chest: No respiratory distress  Abdominal: Soft  Bowel sounds are normal    Musculoskeletal:   Mild edema B/L LE   Neurological: She is alert and oriented to person, place, and time  Skin: Skin is warm and dry  Capillary refill takes less than 2 seconds  Psychiatric: She has a normal mood and affect  Her behavior is normal    Vitals reviewed        Lab Results:   CBC:   Lab Results   Component Value Date    WBC 11 72 (H) 07/30/2018    HGB 12 8 07/30/2018    HCT 41 2 07/30/2018    MCV 96 07/30/2018     07/30/2018    MCH 29 7 07/30/2018    MCHC 31 1 (L) 07/30/2018    RDW 13 8 07/30/2018    MPV 10 7 07/30/2018    NRBC 0 07/30/2018   CMP:   Lab Results   Component Value Date     07/30/2018    K 4 3 07/30/2018     07/30/2018    CO2 27 07/30/2018    ANIONGAP 6 07/30/2018    BUN 12 07/30/2018    CREATININE 0 62 07/30/2018    GLUCOSE 114 07/30/2018    CALCIUM 8 6 07/30/2018    EGFR 84 07/30/2018     Imaging Studies: No new imaging since 7/26/18  Cytology, Pathology, and Other Studies:   · 7/24/18 Blood cultures x2: NG x5d (result updated 7/29/18)  · 7/27/18 Strep pneumo urine ag: Negative (result updated 7/28/18)  · 7/24/18 Body fluid culture from pericardial fluid: No growth (result updated 7/27/18)  · 7/24/18 Anaerobic culture from pericardial fluid: No growth (result updated 7/27/18)    · 7/24/18 Pericardial fluid Cytology  Non-gynecologic Cytology                          Case: [id number]                                   Collected:           07/24/2018 1308            Received:            07/24/2018 1327        Specimens:   A) - Pericardial Fluid              B) - Pericardial Fluid, cell block                                                Final Diagnosis   A,B  Pericardial Fluid (ThinPrep and cell block preparations):  Conclusive Evidence of Malignancy  Metastatic adenocarcinoma  Satisfactory for evaluation           CHAMP Armstrong  PGY-2  [place] [id number]

## 2018-07-30 NOTE — OCCUPATIONAL THERAPY NOTE
[address] Evaluation     Patient Name: Nahun Landers  UHRDF'G Date: 7/30/2018  Problem List  Patient Active Problem List   Diagnosis    [facility]    Diabetes mellitus [facility]    Hypertension    Pericardial effusion    [facility]    Sepsis [facility] )    HAP (hospital-acquired pneumonia)    Type 2 myocardial infarction ([facility] )    Continuous opioid dependence ([facility] )    HCAP (healthcare-associated pneumonia)     Past Medical History  Past Medical History:   Diagnosis Date    TANYA (acute kidney injury) ([facility] ) 7/24/2018    Breast cancer, left ([facility] )     Diabetes mellitus ([facility] )     HAP (hospital-acquired pneumonia) 7/24/2018    Hypertension     Lung cancer [facility]     NSTEMI (non-ST elevated myocardial infarction) ([facility] ) 7/24/2018     Past Surgical History  Past Surgical History:   Procedure Laterality Date    FRACTURE SURGERY      R arm surgery    MASTECTOMY      L breast with implant    HI INCIS HEART SAC WINDW FOR DRAIN N/A 7/24/2018    Procedure: WINDOW PERICARDIAL   Subxyphoid approach ;  Surgeon: Rene Grimm MD;  [facility]: [facility];  Service: Thoracic    REDUCTION MAMMAPLASTY      R breast         07/30/18 0908   Note Type   Note type Eval/Treat   Restrictions/Precautions   Weight Bearing Precautions Per Order No   Other Precautions Fall Risk;Pain;Telemetry   Pain Assessment   Pain Assessment 0-10   Pain Score 6   Hospital Pain Intervention(s) Ambulation/increased activity   Response to Interventions tolerated   Home Living   Type of [address] One level   Bathroom Shower/Tub Tub/shower unit   Bathroom Toilet Raised   Bathroom Equipment Commode   Bathroom Accessibility Accessible   Home Equipment Cane;Walker  (SW)   Prior Function   Level of [place] Independent with ADLs and functional mobility   Lives With Spouse   Receives Help From Family   ADL Assistance Independent   IADLs Independent   Falls in the last 6 months 1 to 4  (1)   Vocational Retired   Lifestyle   Autonomy Pt I w/ ADLs, IADLs, and mobility w/ SW at baseline  (-) driving   Reciprocal Relationships Pt lives w/ spouse who is home full time   Service to Others Retired   [address] Enjoys time w/ family   Psychosocial   Psychosocial (WDL) WDL   ADL   Where Assessed Chair   Eating Assistance 5  Supervision/Setup   Eating Deficit Setup   Grooming Assistance 5  [address] [facility] 5  Supervision/Setup   [address] 4  [address] 5  Supervision/Setup    [facility] 4  [address]  3  Moderate Assistance   Toileting Deficit Perineal hygiene;Steadying   Functional Assistance 4  Minimal Assistance   Additional Comments Pt required assist for perineal hygiene s/p toileting 2* requiring B/L support of RW for to maintain balance while standing  Bed Mobility   Additional Comments Pt seated OOB in recliner   Transfers   Sit to Stand 4  Minimal assistance   Additional items Assist x 1   Stand to Sit 4  Minimal assistance   Additional items Assist x 1   Toilet transfer 4  Minimal assistance   Additional items Assist x 1   Functional Mobility   Additional items Rolling walker   Balance   Static Sitting Fair   Dynamic Sitting Fair   Static Standing Fair -   Dynamic Standing Poor +   Activity Tolerance   Activity Tolerance Patient limited by fatigue   Nurse Made Aware Okay to see per RN   RUE Assessment   RUE Assessment WFL   LUE Assessment   LUE Assessment WFL   Hand Function   Gross Motor Coordination Functional   Fine Motor Coordination Functional   Cognition   Overall Cognitive Status WFL   Arousal/Participation Alert; Responsive; Cooperative   Attention Within functional limits   Orientation Level Oriented X4   Memory Decreased recall of precautions   Following Commands Follows one step commands without difficulty   Comments Pt is pleasant and cooperative  She requires vc for RW safety  Able to appropriately answer questions and engage in conversation  Assessment   Limitation Decreased ADL status; Decreased endurance;Decreased self-care trans;Decreased high-level ADLs   Prognosis Good   Assessment Pt is a 80 y o  female who was [facility] to [facility] 7/23/2018 with Pericardial effusion  Pt is s/p subxiphoid pericardial window  Pt's comorbidities include Afib, HTN, DM, h/o R UE fx and surgery, h/o L breast cancer, and lung cancer  At baseline pt was I w/ ADLs, IADLs, and used a SW for mobility  Pt lives w/ spouse in one level home w/ 2 JEN  Pt's spouse also in the hospital and is d/c today, per pt's dtr  Currently pt requires min A for overall ADLS and min A for functional mobility/transfers w/ RW  Pt SOB at start of session and required overall increased time to complete tasks  Pt currently presents with impairments in the following categories -steps to enter environment, limited home support, difficulty performing ADLS, difficulty performing IADLS, activity tolerance, endurance, standing balance/tolerance, sitting balance/tolerance and safety  These impairments, as well as pt's fatigue, pain, decreased caregiver support and risk for falls limit pt's ability to safely engage in all baseline areas of occupation, including grooming, bathing, dressing, toileting, functional mobility/transfers and leisure activities  From OT standpoint, recommend inpatient rehab upon D/C  OT will continue to follow to address the below stated goals  Goals   Patient Goals to get better   LTG Time Frame 7-10   Long Term Goal see below goals    Plan   Treatment Interventions ADL retraining;Functional transfer training; Endurance training;Patient/family training;Equipment evaluation/education; Compensatory technique education; Energy conservation   Goal Expiration Date 08/09/18   OT Frequency 3-5x/wk   Recommendation   OT Discharge Recommendation Short Term Rehab   OT - OK to Discharge Yes   Barthel Index Feeding 10   Bathing 0   Grooming Score 5   Dressing Score 5   Bladder Score 10   Bowels Score 10   Toilet Use Score 5   Transfers (Bed/Chair) Score 10   Mobility (Level Surface) Score 0   Stairs Score 0   Barthel Index Score 55   Modified Custer Scale   Modified Custer Scale 4     LTG (7-10 DAYS)    Pt will perform all ADL's at mod I level with G balance and DME/AE as needed      Pt will perform functional transfers, including toilet transfer, at mod I level w/ use of DME/AE as needed       Pt will perform functional mobility at a mod I level w/ use of DME and G balance      Pt will demonstrate good carry over of RW safety and energy conservation techniques      Pt will demonstrate good carry over of pt/family education and training w/ 100% attention to task to assist w/ safe d/c planning      Pt will improve activity tolerance to G for 30 min treatment session      Pt will improve standing balance to G for 8-10 minutes of purposeful activity w/ G sophy Tamaoy, OTR/L

## 2018-07-30 NOTE — RESTORATIVE TECHNICIAN NOTE
Restorative Specialist Mobility Note       Activity: Ambulate in foster, [place] privileges, Chair     Assistive Device: Front wheel walker

## 2018-07-30 NOTE — SOCIAL WORK
Provided pt and family with snf rehab list  Pt  Is now agreeable  Their first choice is [facility] and referral sent  Will be ready for discharge tomorrow  Family will provide subsequent choices

## 2018-07-30 NOTE — PROGRESS NOTES
Cardiology Progress Note - Bernabe Sin 80 y o  female MRN: [id number]    Unit/Bed#: [facility] 422-01 Encounter: [id number]      Assessment:  Principal Problem:    Pericardial effusion  Active Problems:    Lung cancer ([place] [address] )    Diabetes mellitus ([place] [address] )    Hypertension    Atrial fibrillation ([place] [address] )    Sepsis ([place] [address] )    HAP (hospital-acquired pneumonia)    Type 2 myocardial infarction ([place] [address] )    Continuous opioid dependence ([place] [address] )    HCAP (healthcare-associated pneumonia)      Plan:  1  Pericardial effusion with tamponade secondary to malignant effusion(confirmed on cytology) s/p window on 7/24 with drainage of 500 cc  - Drain removed on 7/27  - f/u limited echo today  2   Paroxysmal atrial fibrillation in the setting of tamponade- no further episodes since 7/27  - on amiodarone 200 mg twice daily, metoprolol tartrate 12 5 mg twice daily   - given recent bloody effusion with overall poor prognosis, anticoagulation was deferred at the current time as AFib was in the setting of tamponade  She remains on at 81 mg aspirin  Depending on her further clinical course, anticoagulation can be readdressed due to high risk of stroke- CHADVASC-3    3  Stage IV adenocarcinoma of the lung on chemotherapy with Keytruda    4  Hospital-acquired pneumonia  - on antibiotics  Subjective:   Patient seen and examined  No significant events overnight  Denies symptoms of chest pain or shortness of breath at rest   No swelling of the lower extremities  No dizziness or lightheadedness  Reports fatigue  Telemetry-sinus rhythm  No arrhythmia is noted  No further episodes of AFib  Objective:     Vitals: Blood pressure 115/61, pulse 63, temperature 97 5 °F (36 4 °C), temperature source Oral, resp  rate 16, height 5' 1" (1 549 m), weight 76 4 kg (168 lb 6 9 oz), SpO2 93 %  , Body mass index is 31 82 kg/m² , Orthostatic Blood Pressures      Most Recent Value   Blood Pressure  115/61 [Map80] filed at 07/30/2018 0702   Patient Position - Orthostatic VS  Sitting filed at 07/30/2018 0702            Intake/Output Summary (Last 24 hours) at 07/30/18 1101  Last data filed at 07/30/18 1027   Gross per 24 hour   Intake              510 ml   Output             1200 ml   Net             -690 ml     Physical Exam:    GEN: Lele Sabillon appears well, alert and oriented x 3, pleasant and cooperative   HEENT: anicteric, mucous membranes moist  NECK: no jvd, carotid bruits   HEART: regular rhythm, normal S1 and S2, no murmurs, clicks, gallops or rubs   LUNGS: clear to auscultation bilaterally; no wheezes, rales, or rhonchi   ABDOMEN: normal bowel sounds, soft, no tenderness, no distention  EXTREMITIES: peripheral pulses normal; trace edema  NEURO: no focal findings   SKIN: normal without suspicious lesions on exposed skin      Current Facility-Administered Medications:     acetaminophen (TYLENOL) tablet 650 mg, 650 mg, Oral, Q6H PRN, Shelia Cox DO, 650 mg at 07/29/18 0502    ALPRAZolam (XANAX) tablet 0 5 mg, 0 5 mg, Oral, HS PRN, Jessica Arias MD, 0 5 mg at 07/29/18 2223    amiodarone tablet 200 mg, 200 mg, Oral, BID With Meals, Deisy Villanueva MD, 200 mg at 07/30/18 0830    aspirin (ECOTRIN LOW STRENGTH) EC tablet 81 mg, 81 mg, Oral, Daily, Deisy Villanueva MD, 81 mg at 07/30/18 0831    bisacodyl (DULCOLAX) rectal suppository 10 mg, 10 mg, Rectal, Daily PRN, Tahira Snyder PA-C, 10 mg at 07/28/18 2148    calcium carbonate (OYSTER SHELL,OSCAL) 500 mg tablet 1 tablet, 1 tablet, Oral, Daily With Breakfast, Jessica Arias MD, 1 tablet at 07/30/18 0830    cefepime (MAXIPIME) 2,000 mg in dextrose 5 % 50 mL IVPB, 2,000 mg, Intravenous, Q12H, SAMANTHA Collazo, Last Rate: 100 mL/hr at 07/30/18 0310, 2,000 mg at 07/30/18 0310    citalopram (CeleXA) tablet 20 mg, 20 mg, Oral, Daily, Jessica Arias MD, 20 mg at 07/30/18 4522    cyanocobalamin (VITAMIN B-12) tablet 100 mcg, 100 mcg, Oral, Daily, Jessica Arias MD, 100 mcg at 07/30/18 9025   docusate sodium (COLACE) capsule 100 mg, 100 mg, Oral, BID, Yonas Diaz MD, 100 mg at 07/30/18 0832    fentaNYL (DURAGESIC) 12 mcg/hr TD 72 hr patch 1 patch, 1 patch, Transdermal, Q72H, Yonas Diaz MD, 1 patch at 07/29/18 2224    fish oil capsule 1,000 mg, 1,000 mg, Oral, Daily, Yonas Diaz MD, 1,000 mg at 78/69/50 6988    folic acid (FOLVITE) tablet 1 mg, 1 mg, Oral, Daily, Yonas Diaz MD, 1 mg at 07/30/18 0832    insulin lispro (HumaLOG) 100 units/mL subcutaneous injection 2-12 Units, 2-12 Units, Subcutaneous, TID AC, 2 Units at 07/29/18 0628 **AND** Fingerstick Glucose (POCT), , , TID AC, Yonas Diaz MD    meclizine (ANTIVERT) tablet 25 mg, 25 mg, Oral, Q8H PRN, Nikhil Ballard PA-C    metoprolol (LOPRESSOR) injection 5 mg, 5 mg, Intravenous, Q6H PRN, Sravanthi Mooyd MD, 5 mg at 07/30/18 0319    metoprolol tartrate (LOPRESSOR) partial tablet 12 5 mg, 12 5 mg, Oral, Q12H [address], Jenny Garland MD, 12 5 mg at 07/30/18 9526    morphine injection 2 mg, 2 mg, Intravenous, Q4H PRN, Ronny Gray MD    ondansetron (ZOFRAN) injection 4 mg, 4 mg, Intravenous, Q6H PRN, Yonas Diaz MD, 4 mg at 07/30/18 0840    ondansetron (ZOFRAN-ODT) dispersible tablet 4 mg, 4 mg, Oral, Q6H PRN, Gio Foreman MD    sodium phosphate-biphosphate (FLEET) enema 1 enema, 1 enema, Rectal, Daily PRN, Tahira Snyder PA-C, 1 enema at 07/28/18 2237    traMADol (ULTRAM) tablet 100 mg, 100 mg, Oral, TID PRN, Carolyn Stacey, DO    Labs & Results:    Lab Results   Component Value Date    TROPONINI 0 31 (H) 07/24/2018    TROPONINI 0 29 (H) 07/23/2018    TROPONINI 0 31 (H) 07/23/2018       Lab Results   Component Value Date    GLUCOSE 114 07/30/2018    CALCIUM 8 6 07/30/2018     07/30/2018    K 4 3 07/30/2018    CO2 27 07/30/2018     07/30/2018    BUN 12 07/30/2018    CREATININE 0 62 07/30/2018       Lab Results   Component Value Date    WBC 11 72 (H) 07/30/2018    HGB 12 8 07/30/2018    HCT 41 2 07/30/2018    MCV 96 07/30/2018     07/30/2018       Results from last 7 days  Lab Units 07/24/18  0041   INR  1 22*       No results found for: CHOL  No results found for: HDL  No results found for: LDLCALC  No results found for: TRIG    Lab Results   Component Value Date    ALT 29 07/25/2018    AST 18 07/25/2018

## 2018-07-31 LAB
GLUCOSE SERPL-MCNC: 106 MG/DL (ref 65–140)
GLUCOSE SERPL-MCNC: 110 MG/DL (ref 65–140)
GLUCOSE SERPL-MCNC: 118 MG/DL (ref 65–140)
GLUCOSE SERPL-MCNC: 123 MG/DL (ref 65–140)
GLUCOSE SERPL-MCNC: 135 MG/DL (ref 65–140)

## 2018-07-31 PROCEDURE — 82948 REAGENT STRIP/BLOOD GLUCOSE: CPT

## 2018-07-31 PROCEDURE — 97530 THERAPEUTIC ACTIVITIES: CPT

## 2018-07-31 PROCEDURE — 97116 GAIT TRAINING THERAPY: CPT

## 2018-07-31 PROCEDURE — 99232 SBSQ HOSP IP/OBS MODERATE 35: CPT | Performed by: INTERNAL MEDICINE

## 2018-07-31 PROCEDURE — 99233 SBSQ HOSP IP/OBS HIGH 50: CPT | Performed by: NURSE PRACTITIONER

## 2018-07-31 PROCEDURE — 97535 SELF CARE MNGMENT TRAINING: CPT

## 2018-07-31 RX ADMIN — CEFEPIME HYDROCHLORIDE 2000 MG: 2 INJECTION, POWDER, FOR SOLUTION INTRAVENOUS at 01:25

## 2018-07-31 RX ADMIN — METOPROLOL TARTRATE 12.5 MG: 25 TABLET ORAL at 21:07

## 2018-07-31 RX ADMIN — CEFEPIME HYDROCHLORIDE 2000 MG: 2 INJECTION, POWDER, FOR SOLUTION INTRAVENOUS at 13:49

## 2018-07-31 RX ADMIN — ASPIRIN 81 MG: 81 TABLET, COATED ORAL at 08:55

## 2018-07-31 RX ADMIN — AMIODARONE HYDROCHLORIDE 200 MG: 200 TABLET ORAL at 08:55

## 2018-07-31 RX ADMIN — VITAM B12 100 MCG: 100 TAB at 08:58

## 2018-07-31 RX ADMIN — DOCUSATE SODIUM 100 MG: 100 CAPSULE, LIQUID FILLED ORAL at 08:57

## 2018-07-31 RX ADMIN — FOLIC ACID 1 MG: 1 TABLET ORAL at 08:57

## 2018-07-31 RX ADMIN — CITALOPRAM HYDROBROMIDE 20 MG: 20 TABLET ORAL at 08:56

## 2018-07-31 RX ADMIN — AMIODARONE HYDROCHLORIDE 200 MG: 200 TABLET ORAL at 17:17

## 2018-07-31 RX ADMIN — ALPRAZOLAM 0.5 MG: 0.25 TABLET ORAL at 21:09

## 2018-07-31 RX ADMIN — METOPROLOL TARTRATE 12.5 MG: 25 TABLET ORAL at 08:57

## 2018-07-31 RX ADMIN — DOCUSATE SODIUM 100 MG: 100 CAPSULE, LIQUID FILLED ORAL at 17:17

## 2018-07-31 NOTE — ASSESSMENT & PLAN NOTE
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  Follows with Dr Pattie Arias   treated with Jason Solis every 3 weeks-- last infusion 7/16  Palliative care on board

## 2018-07-31 NOTE — SOCIAL WORK
CM met w/ pt's daughter Ladora Soulier who requested additional referrals for short-term skilled rehab placement for the pt's aftercare  Daughter requested additional referrals in order of preference to 2) -[facility] and 3) [facility]  Daughter stated that 1) [facility] remains 1[facility] choice  CM made additional Ecin referrals to -[facility] and [facility]  CM to follow

## 2018-07-31 NOTE — CASE MANAGEMENT
Continued Stay Review    Date: 07/31/2018    Vital Signs: /55 (BP Location: Right arm)   Pulse 60   Temp 98 °F (36 7 °C) (Oral)   Resp 20   Ht 5' 1" (1 549 m)   Wt 74 5 kg (164 lb 3 9 oz)   SpO2 96%   BMI 31 03 kg/m²     Medications:   Scheduled Meds:   Current Facility-Administered Medications:  acetaminophen 650 mg Oral Q6H PRN Shelia CHAMP Cox, DO    ALPRAZolam 0 5 mg Oral HS PRN Lai Issa MD    amiodarone 200 mg Oral BID With Meals Tiffany Barnes MD    aspirin 81 mg Oral Daily Tiffany Barnes MD    bisacodyl 10 mg Rectal Daily PRN Tahira E Held, PA-C    calcium carbonate 1 tablet Oral Daily With Breakfast Lai Issa MD    cefepime 2,000 mg Intravenous Q12H SAMANTHA Collazo Last Rate: 2,000 mg (07/31/18 1349)   citalopram 20 mg Oral Daily Lai sIsa MD    cyanocobalamin 100 mcg Oral Daily Lai Issa MD    docusate sodium 100 mg Oral BID Lai Issa MD    fentaNYL 1 patch Transdermal Q72H Lai Issa MD    fish oil 1,000 mg Oral Daily Lai Issa MD    folic acid 1 mg Oral Daily Lai Issa MD    insulin lispro 2-12 Units Subcutaneous TID AC Lai Issa MD    meclizine 25 mg Oral Q8H PRN [place], PA-C    metoprolol 5 mg Intravenous Q6H PRN Angela Montes MD    metoprolol tartrate 12 5 mg Oral Q12H [address] Tiffany Barnes MD    morphine injection 2 mg Intravenous Q4H PRN Yousuf Gomez MD    ondansetron 4 mg Intravenous Q6H PRN Lai Issa MD    ondansetron 4 mg Oral Q6H PRN Cynthia Dasilva MD    sodium phosphate-biphosphate 1 enema Rectal Daily PRN Tahira E Held, PA-C    traMADol 100 mg Oral TID PRN Shelia M Bendas, DO      Continuous Infusions:    PRN Meds:   acetaminophen    ALPRAZolam    bisacodyl    meclizine    metoprolol    morphine injection    ondansetron    ondansetron    sodium phosphate-biphosphate    traMADol    Abnormal Labs/Diagnostic Results:     Age/Sex: 80 y o  female     Assessment/Plan: 1400, 1620,  * Pericardial effusion   Assessment & Plan     07/24/18: S/P subxiphoid pericardial window with drain  07/27/18: Pericardial drain discontinued  Follow up pericardial fluid test/cs/cytology -- Metastatic adenocarcinoma  Thoracic surgery have signed off the case  Continue p r n  pain management              HCAP (healthcare-associated pneumonia)   Assessment & Plan     Cefepime          Continuous opioid dependence (HCC)   Assessment & Plan     C/w home regimen: fentanyl patch and norco   Palliative care on board           Type 2 myocardial infarction [facility])   Assessment & Plan     Likely type 2 secondary to demand from PNA/sepsis, AFIB, TANYA, pericardial effusion  Trop 0 31; 0 29; 0 31  Cardiology on board             HAP (hospital-acquired pneumonia)   Assessment & Plan     Pt recieves chemo every 3 weeks-- most recent 7/16  Chest xray and CT scan concerning for LLL infiltrate   Continue IV cefepime -- day 8/10 -- get transition to p  o  upon discharge  Discontinue IV vancomycin as MRSA is negative     Procalcitonin trending down at 0 60  Follow blood cultures x 2 no growth so far  Continuous pulse ox, Aspiration precautions          Sepsis [facility])   Assessment & Plan     This has resolved  Likely secondary to PNA -- pt presented with leukocytosis, hypotension, tachycardia, tachypnea  MRSA swab negative, urine for strep pneumoniae, Legionella antigen awaited  Refer above           Atrial fibrillation [facility])   Assessment & Plan     Cardiology on board  Continue lopressor, with holding parameters  High risk for bleed hence continue with aspirin alone  Monitor on telemetry           Hypertension   Assessment & Plan     Blood pressure reasonable    Continue Lopressor, at lower doses than home           Diabetes mellitus [facility])   Assessment & Plan             Lab Results   Component Value Date     HGBA1C 6 7 (H) 07/25/2018                Recent Labs      07/30/18   1055  07/30/18   1543  07/30/18   2042  07/31/18 0606   POCGLU  123  178*  124  110         Blood Sugar Average: Last 72 hrs:  (P) 129  HgA1C at 6 7  SSI and accuchecks   Hold OHG: metformin          Lung cancer ([facility] )   Assessment & Plan     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  Follows with Dr Pattie Arias   treated with [place] every 3 weeks-- last infusion 7/16  Palliative care on board                    V  Mechanical VTE Prophylaxis in Place: No     Patient Centered Rounds: I have performed bedside rounds with nursing staff today         Time Spent for Care: 15 minutes  More than 50% of total time spent on counseling and coordination of care as described above      Current Length of Stay: 8 day(s)     Current Patient Status: Inpatient   Certification Statement: The patient will continue to require additional inpatient hospital stay due to Need to monitor cardiac status  Patient for tentative discharge to skilled nursing facility once bed available      Discharge Plan:  Possible discharge in next 24 hours  Will likely convert to oral antibiotics tomorrow  Will discuss with palliative care regarding goals of care      ----------------------------------------------------------------------------------------------------------------    07/31/2018  Progress Notes Cardio  Principal Problem:    Pericardial effusion  Active Problems:    Lung cancer (HCC)    Diabetes mellitus ([place] [address] )    Hypertension    Atrial fibrillation (HCC)    Sepsis (HCC)    HAP (hospital-acquired pneumonia)    Type 2 myocardial infarction ([place] [address] )    Continuous opioid dependence ([place] [address] )    HCAP (healthcare-associated pneumonia)        Plan:  1   Pericardial effusion with tamponade secondary to malignant effusion(confirmed on cytology) s/p window on 7/24 with drainage of 500 cc  - Drain removed on 7/27  - f/u limited echo done yesterday showed no recurrent effusion   - She will follow-up with me on August 29 at 4PM at [facility]      2   Paroxysmal atrial fibrillation in the setting of tamponade- no further episodes since 7/27  - on amiodarone 200 mg twice daily for two weeks- until August 3 and then 200mg daily, metoprolol tartrate 12 5 mg twice daily   - given recent bloody effusion with overall poor prognosis, anticoagulation was deferred at the current time as AFib was in the setting of tamponade  She remains on at 81 mg aspirin  Depending on her further clinical course, anticoagulation can be readdressed due to high risk of stroke- CHADVASC-3     3  Stage IV adenocarcinoma of the lung on chemotherapy with Keytruda     4  Hospital-acquired pneumonia  - on antibiotics       -------------------------------------------------------------------------------------------------------------------------------------------    07/31/2018  Progress Note Palliative & Support care  Assessment      Patient Active Problem List   Diagnosis    Lung cancer ([place] [address] )    Diabetes mellitus ([place] [address] )    Hypertension    Pericardial effusion    Atrial fibrillation (HCC)    Sepsis ([place] [address] )    HAP (hospital-acquired pneumonia)    Type 2 myocardial infarction ([place] [address] )    Continuous opioid dependence ([place] [address] )    HCAP (healthcare-associated pneumonia)         Plan:  Goals of Care:               - Met with patient and family at bedside and later with daughter and son-in-law in [facility]  Discussed again the role of palliative care and availability as outpatient  Patient is welcome to follow up in palliative care clinic  Reviewed again the idea of advance care planning and [facility] document, which family is planning to review more closely and complete  - Daughter is under the impression that patient and her  don't fully understand and/or are not fully accepting the severity of her condition  They request emotional support for patient as outpatient without the label of "therapy " Will attempt to connect patient with palliative care  in outpatient setting  - Current plan for rehab followed by outpatient follow up with Dr Ranjan Coronel  Tentatively plan for continuation of current oncologic plan of treatment      Symptom Management: pain- stable  Anxiety, insomnia  No changes to regimen                - fentanyl patch 12mcg/hr              - tramadol PRN              - bowel regimen              - alprazolam              - citalopram

## 2018-07-31 NOTE — OCCUPATIONAL THERAPY NOTE
Occupational Therapy Treatment Note       07/31/18 1808   Restrictions/Precautions   Weight Bearing Precautions Per Order No   Other Precautions Fall Risk;Pain   Lifestyle   Autonomy Pt I w/ ADLs, IADLs, and mobility w/ SW at baseline  (-) driving   Reciprocal Relationships Pt lives w/ spouse who is home full time   Service to Others Retired   [address] Enjoys time w/ family   Pain Assessment   Pain Assessment 0-10   Pain Score No Pain   ADL   Where Assessed Standing at sink  (with chair set up )   Grooming Assistance 5  Supervision/Setup   Grooming Deficit Wash/dry face; Wash/dry hands;Brushing hair   Toileting Assistance  4  Minimal Assistance   Toileting Deficit Perineal hygiene   Transfers   Sit to Stand 4  Minimal assistance   Additional items Assist x 1   Stand to Sit 4  Minimal assistance   Additional items Assist x 1   Toilet transfer 4  Minimal assistance   Additional items Assist x 1; Increased time required   Functional Mobility   Functional Mobility 4  Minimal assistance   Additional items Rolling walker   Cognition   Overall Cognitive Status Impaired   Arousal/Participation Alert   Attention Attends with cues to redirect   Orientation Level Oriented to person;Oriented to place;Oriented to situation   Memory Decreased recall of precautions   Following Commands Follows one step commands with increased time or repetition   Assessment   Assessment Pt participated in occupational therapy with focus on activity tolerance, functional transfers/mob, standing balance and tolerance for pt engagement in functional self-care task/grooming tasks and toilet/toilet transfers  Pt cleared by RN/Aleyda for pt participation in occupational therapy  Pt received sitting out of bed to bedside chair and pt agreeable to participate in OT session following pt identifiers confirmed  Pt reported her goal today to return to home and take walks again    Pt family members/granddakylee Castillo and Virginia Capps present supportive throughout session  Pt requires min A for functional transfers 2* pt decreased overall strength  Pt  Able to complete light grooming in stance at bathroom sink and one seated rest break  Pt recovered well following rest break  Pt will require in-pt rehab to continue to address pt deficits with decreased overall strength and activity tolerance which currently impair pt ADL and functional mob      Plan   Treatment Interventions ADL retraining   Goal Expiration Date 08/09/18   Treatment Day 1   OT Frequency 3-5x/wk   Recommendation   OT Discharge Recommendation Short Term Rehab   Barthel Index   Feeding 10   Bathing 0   Grooming Score 5   Dressing Score 5   Bladder Score 10   Bowels Score 10   Toilet Use Score 5   Transfers (Bed/Chair) Score 10   Mobility (Level Surface) Score 0   Stairs Score 0   Barthel Index Score 55   Modified Mike Scale   Modified [place] Scale 4     Leena HART/LEONORA

## 2018-07-31 NOTE — PROGRESS NOTES
Progress Note - Angel Luis Has 1935, 80 y o  female MRN: [id number]    Unit/Bed#: [facility] 422-01 Encounter: [id number]    Primary Care Provider: Sola Garcia DO   Date and time admitted to hospital: 7/23/2018  5:53 PM        * Pericardial effusion   Assessment & Plan    07/24/18: S/P subxiphoid pericardial window with drain  07/27/18: Pericardial drain discontinued  Follow up pericardial fluid test/cs/cytology -- Metastatic adenocarcinoma  Thoracic surgery have signed off the case  Continue p r n  pain management  HCAP (healthcare-associated pneumonia)   Assessment & Plan    Cefepime        Continuous opioid dependence ([facility] [address] )   Assessment & Plan    C/w home regimen: fentanyl patch and norco   Palliative care on board  Type 2 myocardial infarction [facility])   Assessment & Plan    Likely type 2 secondary to demand from PNA/sepsis, AFIB, TANYA, pericardial effusion  Trop 0 31; 0 29; 0 31  Cardiology on board          HAP (hospital-acquired pneumonia)   Assessment & Plan    Pt recieves chemo every 3 weeks-- most recent 7/16  Chest xray and CT scan concerning for LLL infiltrate   Continue IV cefepime -- day 8/10 -- get transition to p  o  upon discharge  Discontinue IV vancomycin as MRSA is negative  Procalcitonin trending down at 0 60  Follow blood cultures x 2 no growth so far  Continuous pulse ox, Aspiration precautions        Sepsis [facility])   Assessment & Plan    This has resolved  Likely secondary to PNA -- pt presented with leukocytosis, hypotension, tachycardia, tachypnea  MRSA swab negative, urine for strep pneumoniae, Legionella antigen awaited  Refer above  Atrial fibrillation [facility])   Assessment & Plan    Cardiology on board  Continue lopressor, with holding parameters  High risk for bleed hence continue with aspirin alone  Monitor on telemetry  Hypertension   Assessment & Plan    Blood pressure reasonable  Continue Lopressor, at lower doses than home  Diabetes mellitus [facility])   Assessment & Plan    Lab Results   Component Value Date    HGBA1C 6 7 (H) 2018       Recent Labs      18   1055  18   1543  18   2042  18   0606   POCGLU  123  178*  124  110       Blood Sugar Average: Last 72 hrs:  (P) 129  HgA1C at 6 7  SSI and accuchecks   Hold OHG: metformin        Lung cancer 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  Follows with Dr Ebonie Bee   treated with Aminta Reek every 3 weeks-- last infusion   Palliative care on board  V  Mechanical VTE Prophylaxis in Place: No    Patient Centered Rounds: I have performed bedside rounds with nursing staff today  Time Spent for Care: 15 minutes  More than 50% of total time spent on counseling and coordination of care as described above  Current Length of Stay: 8 day(s)    Current Patient Status: Inpatient   Certification Statement: The patient will continue to require additional inpatient hospital stay due to Need to monitor cardiac status  Patient for tentative discharge to skilled nursing facility once bed available  Discharge Plan:  Possible discharge in next 24 hours  Will likely convert to oral antibiotics tomorrow  Will discuss with palliative care regarding goals of care  Code Status: Level 3 - DNAR and DNI      Subjective:   nad    Objective:     Vitals:   Temp (24hrs), Av 1 °F (36 7 °C), Min:97 7 °F (36 5 °C), Max:98 5 °F (36 9 °C)    HR:  [60-80] 67  Resp:  [17-18] 18  BP: (104-139)/(54-69) 129/60  SpO2:  [90 %-95 %] 90 %  Body mass index is 31 03 kg/m²  Input and Output Summary (last 24 hours): Intake/Output Summary (Last 24 hours) at 18 0856  Last data filed at 18 0806   Gross per 24 hour   Intake              680 ml   Output             1170 ml   Net             -490 ml       Physical Exam:     Physical Exam   HENT:   Head: Normocephalic and atraumatic  Eyes: Conjunctivae are normal  Pupils are equal, round, and reactive to light  Neck: Normal range of motion  No tracheal deviation present  No thyromegaly present  Cardiovascular: Regular rhythm  No murmur heard  Pulmonary/Chest: Effort normal and breath sounds normal  No respiratory distress  Abdominal: Soft  Bowel sounds are normal    Skin: Skin is warm and dry  No erythema  Additional Data:     Labs:      Results from last 7 days  Lab Units 07/30/18  0819   WBC Thousand/uL 11 72*   HEMOGLOBIN g/dL 12 8   HEMATOCRIT % 41 2   PLATELETS Thousands/uL 310   NEUTROS PCT % 63   LYMPHS PCT % 20   MONOS PCT % 9   EOS PCT % 7*       Results from last 7 days  Lab Units 07/30/18  0457  07/25/18  0501   SODIUM mmol/L 140  < > 136   POTASSIUM mmol/L 4 3  < > 4 1   CHLORIDE mmol/L 107  < > 103   CO2 mmol/L 27  < > 22   BUN mg/dL 12  < > 26*   CREATININE mg/dL 0 62  < > 0 85   CALCIUM mg/dL 8 6  < > 9 2   TOTAL PROTEIN g/dL  --   --  6 6   BILIRUBIN TOTAL mg/dL  --   --  0 96   ALK PHOS U/L  --   --  56   ALT U/L  --   --  29   AST U/L  --   --  18   GLUCOSE RANDOM mg/dL 114  < > 182*   < > = values in this interval not displayed  * I Have Reviewed All Lab Data Listed Above  * Additional Pertinent Lab Tests Reviewed:  All Labs Within Last 24 Hours Reviewed      Recent Cultures (last 7 days):       Results from last 7 days  Lab Units 07/27/18  2221 07/24/18  1318   GRAM STAIN RESULT   --  Rare Polys  2+ RBC's  No bacteria seen   BODY FLUID CULTURE, STERILE   --  No growth   LEGIONELLA URINARY ANTIGEN  Negative  --        Last 24 Hours Medication List:     Current Facility-Administered Medications:  acetaminophen 650 mg Oral Q6H PRN Shelia Cox DO    ALPRAZolam 0 5 mg Oral HS PRN Bobo David MD    amiodarone 200 mg Oral BID With Meals Tez Monterroso MD    aspirin 81 mg Oral Daily Tez Monterroso MD    bisacodyl 10 mg Rectal Daily PRN Tahira Snyder PA-C    calcium carbonate 1 tablet Oral Daily With Breakfast Teddy Betancur MD    cefepime 2,000 mg Intravenous Q12H SAMANTHA Collazo Last Rate: 2,000 mg (07/31/18 0125)   citalopram 20 mg Oral Daily Teddy Betancur MD    cyanocobalamin 100 mcg Oral Daily Teddy Betancur MD    docusate sodium 100 mg Oral BID Teddy Betancur MD    fentaNYL 1 patch Transdermal Q72H Teddy Betancur MD    fish oil 1,000 mg Oral Daily Teddy Betancur MD    folic acid 1 mg Oral Daily Teddy Betancur MD    insulin lispro 2-12 Units Subcutaneous TID AC Teddy Betancur MD    meclizine 25 mg Oral Q8H PRN Enid Mccormack PA-C    metoprolol 5 mg Intravenous Q6H PRN Lorelei Adler MD    metoprolol tartrate 12 5 mg Oral Q12H [address] Mark Negrete MD    morphine injection 2 mg Intravenous Q4H PRN Ana Ariza MD    ondansetron 4 mg Intravenous Q6H PRN Teddy Betancur MD    ondansetron 4 mg Oral Q6H PRN Allyson Rai MD    sodium phosphate-biphosphate 1 enema Rectal Daily PRN Tahira Snyder PA-C    traMADol 100 mg Oral TID PRN Heide Sainz DO         Today, Patient Was Seen By: Chani Stark DO    ** Please Note: Dictation voice to text software may have been used in the creation of this document   **

## 2018-07-31 NOTE — PLAN OF CARE
Problem: PHYSICAL THERAPY ADULT  Goal: Performs mobility at highest level of function for planned discharge setting  See evaluation for individualized goals  Treatment/Interventions: Functional transfer training, LE strengthening/ROM, Therapeutic exercise, Endurance training, Patient/family training, Equipment eval/education, Bed mobility, Gait training, Spoke to nursing, Spoke to case management  Equipment Recommended: Kevyn Bloom (ERICA)       See flowsheet documentation for full assessment, interventions and recommendations  Outcome: Progressing  Prognosis: Fair  Problem List: Decreased strength, Decreased endurance, Impaired balance, Decreased mobility, Decreased safety awareness, Impaired hearing, Obesity, Decreased skin integrity, Pain  Assessment: Pt able to inc ambulation distance to 100 feet with use of RW on various surfaces needing minAx1  Pt needed x2 static stand rest breaks during mobility with rest time lasting 1-2 minutes each break  Pt reports fatigue and SOB during and following mobility and activity  Pt needed minAx1 for sit to stand transfers and verbal/physical instruction for proper B hand placement and to prevent premature stand to sit transfers  Dec safety awareness throughout mobility  Pt would cont to benefit from skilled inpt PT services to maximize functional independence  Barriers to Discharge: Inaccessible home environment (JEN,use of basement area)     Recommendation: Other (Comment) (inpt rhb upon D/C recommended)          See flowsheet documentation for full assessment

## 2018-07-31 NOTE — PROGRESS NOTES
Cardiology Progress Note - Nick Concepcion 80 y o  female MRN: [id number]    Unit/Bed#: [facility] 422-01 Encounter: [id number]      Assessment:  Principal Problem:    Pericardial effusion  Active Problems:    Lung cancer ([facility] )    Diabetes mellitus ([place] [address] )    Hypertension    Atrial fibrillation ([place] [address] )    Sepsis ([place] [address] )    HAP (hospital-acquired pneumonia)    Type 2 myocardial infarction ([place] [address] )    Continuous opioid dependence ([place] [address] )    HCAP (healthcare-associated pneumonia)      Plan:  1  Pericardial effusion with tamponade secondary to malignant effusion(confirmed on cytology) s/p window on 7/24 with drainage of 500 cc  - Drain removed on 7/27  - f/u limited echo done yesterday showed no recurrent effusion   - She will follow-up with me on August 29 at [address] at [facility]  2   Paroxysmal atrial fibrillation in the setting of tamponade- no further episodes since 7/27  - on amiodarone 200 mg twice daily for two weeks- until August 3 and then 200mg daily, metoprolol tartrate 12 5 mg twice daily   - given recent bloody effusion with overall poor prognosis, anticoagulation was deferred at the current time as AFib was in the setting of tamponade  She remains on at 81 mg aspirin  Depending on her further clinical course, anticoagulation can be readdressed due to high risk of stroke- CHADVASC-3    3  Stage IV adenocarcinoma of the lung on chemotherapy with Keytruda    4  Hospital-acquired pneumonia  - on antibiotics  We will sign off, please call with any questions  Subjective:   Patient seen and examined  No significant events overnight  Denies symptoms of chest pain or shortness of breath at rest   Reports swelling of the right upper extremity due to IV line  Reports fatigue  Telemetry-sinus rhythm  No arrhythmia is noted  No further episodes of AFib  Objective:     Vitals: Blood pressure 105/55, pulse 60, temperature 98 °F (36 7 °C), temperature source Oral, resp   rate 20, height 5' 1" (1 549 m), weight 74 5 kg (164 lb 3 9 oz), SpO2 96 %  , Body mass index is 31 03 kg/m² ,   Orthostatic Blood Pressures      Most Recent Value   Blood Pressure  105/55 filed at 07/31/2018 1124   Patient Position - Orthostatic VS  Sitting filed at 07/31/2018 1124            Intake/Output Summary (Last 24 hours) at 07/31/18 1300  Last data filed at 07/31/18 0930   Gross per 24 hour   Intake              500 ml   Output             1270 ml   Net             -770 ml     Physical Exam:    GEN: Stanley Long appears well, alert and oriented x 3, pleasant and cooperative   HEENT: anicteric, mucous membranes moist  NECK: no jvd, carotid bruits   HEART: regular rhythm, normal S1 and S2, no murmurs, clicks, gallops or rubs   LUNGS: clear to auscultation bilaterally; no wheezes, rales, or rhonchi   ABDOMEN: normal bowel sounds, soft, no tenderness, no distention  EXTREMITIES: peripheral pulses normal; trace edema, swelling of right upper extremity  NEURO: no focal findings   SKIN: normal without suspicious lesions on exposed skin      Current Facility-Administered Medications:     acetaminophen (TYLENOL) tablet 650 mg, 650 mg, Oral, Q6H PRN, Shelia Cox DO, 650 mg at 07/29/18 0502    ALPRAZolam (XANAX) tablet 0 5 mg, 0 5 mg, Oral, HS PRN, Aftab Kebede MD, 0 5 mg at 07/30/18 2156    amiodarone tablet 200 mg, 200 mg, Oral, BID With Meals, Doc Bower MD, 200 mg at 07/31/18 0855    aspirin (ECOTRIN LOW STRENGTH) EC tablet 81 mg, 81 mg, Oral, Daily, Doc Bower MD, 81 mg at 07/31/18 0855    bisacodyl (DULCOLAX) rectal suppository 10 mg, 10 mg, Rectal, Daily PRN, Tahira Snyder PA-C, 10 mg at 07/28/18 2148    calcium carbonate (OYSTER SHELL,OSCAL) 500 mg tablet 1 tablet, 1 tablet, Oral, Daily With Breakfast, Aftab Kebede MD, 1 tablet at 07/30/18 0830    cefepime (MAXIPIME) 2,000 mg in dextrose 5 % 50 mL IVPB, 2,000 mg, Intravenous, Q12H, SAMANTHA Collazo, Last Rate: 100 mL/hr at 07/31/18 0125, 2,000 mg at 07/31/18 0125   citalopram (CeleXA) tablet 20 mg, 20 mg, Oral, Daily, Nanda Gonsales MD, 20 mg at 07/31/18 0856    cyanocobalamin (VITAMIN B-12) tablet 100 mcg, 100 mcg, Oral, Daily, Nanda Gonsales MD, 100 mcg at 07/31/18 0858    docusate sodium (COLACE) capsule 100 mg, 100 mg, Oral, BID, Nanda Gonsales MD, 100 mg at 07/31/18 0857    fentaNYL (DURAGESIC) 12 mcg/hr TD 72 hr patch 1 patch, 1 patch, Transdermal, Q72H, Nanda Gonsales MD, 1 patch at 07/29/18 2224    fish oil capsule 1,000 mg, 1,000 mg, Oral, Daily, Nanda Gonsales MD, 1,000 mg at 29/74/76 7413    folic acid (FOLVITE) tablet 1 mg, 1 mg, Oral, Daily, Nanda Gonsales MD, 1 mg at 07/31/18 0857    insulin lispro (HumaLOG) 100 units/mL subcutaneous injection 2-12 Units, 2-12 Units, Subcutaneous, TID AC, 2 Units at 07/30/18 1653 **AND** Fingerstick Glucose (POCT), , , TID AC, Nanda Gonsales MD    meclizine (ANTIVERT) tablet 25 mg, 25 mg, Oral, Q8H PRN, Abhinav Lozano PA-C    metoprolol (LOPRESSOR) injection 5 mg, 5 mg, Intravenous, Q6H PRN, Carlos A Ge MD, 5 mg at 07/30/18 0319    metoprolol tartrate (LOPRESSOR) partial tablet 12 5 mg, 12 5 mg, Oral, Q12H [address], Marilyn Perez MD, 12 5 mg at 07/31/18 0857    morphine injection 2 mg, 2 mg, Intravenous, Q4H PRN, Ramonita Fuentes MD    ondansetron (ZOFRAN) injection 4 mg, 4 mg, Intravenous, Q6H PRN, Nanda Gonsales MD, 4 mg at 07/30/18 0840    ondansetron (ZOFRAN-ODT) dispersible tablet 4 mg, 4 mg, Oral, Q6H PRN, Lupe Lara MD    sodium phosphate-biphosphate (FLEET) enema 1 enema, 1 enema, Rectal, Daily PRN, Tahira Snyder PA-C, 1 enema at 07/28/18 2233    traMADol (ULTRAM) tablet 100 mg, 100 mg, Oral, TID PRN, Leoncio Lunsford DO    Labs & Results:    Lab Results   Component Value Date    TROPONINI 0 31 (H) 07/24/2018    TROPONINI 0 29 (H) 07/23/2018    TROPONINI 0 31 (H) 07/23/2018       Lab Results   Component Value Date    GLUCOSE 114 07/30/2018    CALCIUM 8 6 07/30/2018     07/30/2018    K 4 3 07/30/2018    CO2 27 07/30/2018     07/30/2018    BUN 12 07/30/2018    CREATININE 0 62 07/30/2018       Lab Results   Component Value Date    WBC 11 72 (H) 07/30/2018    HGB 12 8 07/30/2018    HCT 41 2 07/30/2018    MCV 96 07/30/2018     07/30/2018           No results found for: CHOL  No results found for: HDL  No results found for: LDLCALC  No results found for: TRIG    Lab Results   Component Value Date    ALT 29 07/25/2018    AST 18 07/25/2018

## 2018-07-31 NOTE — PLAN OF CARE
Problem: OCCUPATIONAL THERAPY ADULT  Goal: Performs self-care activities at highest level of function for planned discharge setting  See evaluation for individualized goals  Treatment Interventions: ADL retraining, Functional transfer training, Endurance training, Patient/family training, Equipment evaluation/education, Compensatory technique education, Energy conservation          See flowsheet documentation for full assessment, interventions and recommendations  Outcome: Progressing  Limitation: Decreased ADL status, Decreased endurance, Decreased self-care trans, Decreased high-level ADLs  Prognosis: Good  Assessment: Pt participated in occupational therapy with focus on activity tolerance, functional transfers/mob, standing balance and tolerance for pt engagement in functional self-care task/grooming tasks and toilet/toilet transfers  Pt cleared by RN/Aleyda for pt participation in occupational therapy  Pt received sitting out of bed to bedside chair and pt agreeable to participate in OT session following pt identifiers confirmed  Pt reported her goal today to return to home and take walks again  Pt family members/granddaughters Adelia Henderson and Trista Kapadia present supportive throughout session  Pt requires min A for functional transfers 2* pt decreased overall strength  Pt  Able to complete light grooming in stance at bathroom sink and one seated rest break  Pt recovered well following rest break  Pt will require in-pt rehab to continue to address pt deficits with decreased overall strength and activity tolerance which currently impair pt ADL and functional mob  OT Discharge Recommendation: Short Term Rehab  OT - OK to Discharge:  Yes

## 2018-07-31 NOTE — ASSESSMENT & PLAN NOTE
Lab Results   Component Value Date    HGBA1C 6 7 (H) 07/25/2018       Recent Labs      07/30/18   1055  07/30/18   1543  07/30/18   2042  07/31/18   0606   POCGLU  123  178*  124  110       Blood Sugar Average: Last 72 hrs:  (P) 129  HgA1C at 6 7    SSI and accuchecks   Hold OHG: metformin

## 2018-07-31 NOTE — ASSESSMENT & PLAN NOTE
Pt recieves chemo every 3 weeks-- most recent 7/16  Chest xray and CT scan concerning for LLL infiltrate   Continue IV cefepime -- day 8/10 -- get transition to p  o  upon discharge  Discontinue IV vancomycin as MRSA is negative  Procalcitonin trending down at 0 60  Follow blood cultures x 2 no growth so far    Continuous pulse ox, Aspiration precautions

## 2018-08-01 ENCOUNTER — TELEPHONE (OUTPATIENT)
Dept: HEMATOLOGY ONCOLOGY | Facility: CLINIC | Age: 83
End: 2018-08-01

## 2018-08-01 VITALS
SYSTOLIC BLOOD PRESSURE: 144 MMHG | OXYGEN SATURATION: 97 % | DIASTOLIC BLOOD PRESSURE: 66 MMHG | WEIGHT: 164.02 LBS | HEIGHT: 61 IN | HEART RATE: 65 BPM | RESPIRATION RATE: 18 BRPM | BODY MASS INDEX: 30.97 KG/M2 | TEMPERATURE: 97.7 F

## 2018-08-01 LAB
GLUCOSE SERPL-MCNC: 102 MG/DL (ref 65–140)
GLUCOSE SERPL-MCNC: 114 MG/DL (ref 65–140)

## 2018-08-01 PROCEDURE — 82948 REAGENT STRIP/BLOOD GLUCOSE: CPT

## 2018-08-01 PROCEDURE — 99238 HOSP IP/OBS DSCHRG MGMT 30/<: CPT | Performed by: INTERNAL MEDICINE

## 2018-08-01 RX ORDER — ONDANSETRON 4 MG/1
4 TABLET, ORALLY DISINTEGRATING ORAL EVERY 6 HOURS PRN
Qty: 20 TABLET | Refills: 0 | Status: SHIPPED | OUTPATIENT
Start: 2018-08-01 | End: 2018-09-04 | Stop reason: SDUPTHER

## 2018-08-01 RX ORDER — AMIODARONE HYDROCHLORIDE 200 MG/1
200 TABLET ORAL 2 TIMES DAILY WITH MEALS
Qty: 30 TABLET | Refills: 0 | Status: SHIPPED | OUTPATIENT
Start: 2018-08-01 | End: 2018-08-29 | Stop reason: SDUPTHER

## 2018-08-01 RX ORDER — CEFDINIR 300 MG/1
300 CAPSULE ORAL EVERY 12 HOURS SCHEDULED
Qty: 4 CAPSULE | Refills: 0 | Status: SHIPPED | OUTPATIENT
Start: 2018-08-01 | End: 2018-08-03

## 2018-08-01 RX ADMIN — ASPIRIN 81 MG: 81 TABLET, COATED ORAL at 08:34

## 2018-08-01 RX ADMIN — FOLIC ACID 1 MG: 1 TABLET ORAL at 08:37

## 2018-08-01 RX ADMIN — ONDANSETRON 4 MG: 2 INJECTION, SOLUTION INTRAMUSCULAR; INTRAVENOUS at 08:45

## 2018-08-01 RX ADMIN — VITAM B12 100 MCG: 100 TAB at 08:37

## 2018-08-01 RX ADMIN — DOCUSATE SODIUM 100 MG: 100 CAPSULE, LIQUID FILLED ORAL at 08:36

## 2018-08-01 RX ADMIN — METOPROLOL TARTRATE 12.5 MG: 25 TABLET ORAL at 08:35

## 2018-08-01 RX ADMIN — CITALOPRAM HYDROBROMIDE 20 MG: 20 TABLET ORAL at 08:35

## 2018-08-01 RX ADMIN — AMIODARONE HYDROCHLORIDE 200 MG: 200 TABLET ORAL at 08:33

## 2018-08-01 RX ADMIN — CEFEPIME HYDROCHLORIDE 2000 MG: 2 INJECTION, POWDER, FOR SOLUTION INTRAVENOUS at 02:01

## 2018-08-01 NOTE — ASSESSMENT & PLAN NOTE
Pt recieves chemo every 3 weeks-- most recent 7/16  Chest xray and CT scan concerning for LLL infiltrate   Continue IV cefepime -- day 9/10-- get transition to p  o  upon discharge  Discontinue IV vancomycin as MRSA is negative  Procalcitonin trending down at 0 60  Follow blood cultures x 2 no growth so far    Continuous pulse ox, Aspiration precautions

## 2018-08-01 NOTE — DISCHARGE SUMMARY
Discharge- Angel Luis Has 1935, 80 y o  female MRN: [id number]    Unit/Bed#: [facility] 422-01 Encounter: [id number]    Primary Care Provider: Sola Garcia DO   Date and time admitted to hospital: 7/23/2018  5:53 PM        * Pericardial effusion   Assessment & Plan    07/24/18: S/P subxiphoid pericardial window with drain  07/27/18: Pericardial drain discontinued  Follow up pericardial fluid test/cs/cytology -- Metastatic adenocarcinoma  Thoracic surgery have signed off the case  Continue p r n  pain management  HCAP (healthcare-associated pneumonia)   Assessment & Plan    Cefepime-transition to oral antibiotic        Continuous opioid dependence ([facility] )   Assessment & Plan    C/w home regimen: fentanyl patch and norco   Palliative care on board  Type 2 myocardial infarction [facility])   Assessment & Plan    Likely type 2 secondary to demand from PNA/sepsis, AFIB, TANYA, pericardial effusion  Trop 0 31; 0 29; 0 31  Cardiology on board          HAP (hospital-acquired pneumonia)   Assessment & Plan    Pt recieves chemo every 3 weeks-- most recent 7/16  Chest xray and CT scan concerning for LLL infiltrate   Continue IV cefepime -- day 9/10-- get transition to p  o  upon discharge  Discontinue IV vancomycin as MRSA is negative  Procalcitonin trending down at 0 60  Follow blood cultures x 2 no growth so far  Continuous pulse ox, Aspiration precautions        Sepsis [facility])   Assessment & Plan    This has resolved  Likely secondary to PNA -- pt presented with leukocytosis, hypotension, tachycardia, tachypnea  MRSA swab negative, urine for strep pneumoniae, Legionella antigen awaited  Refer above  Atrial fibrillation [facility])   Assessment & Plan    Follow-up with Cardiology  Hypertension   Assessment & Plan    Blood pressure reasonable  Continue Lopressor, at lower doses than home          Diabetes mellitus [facility])   Assessment & Plan    Lab Results   Component Value Date    HGBA1C 6 7 (H) 07/25/2018       Recent Labs      07/31/18   1055  07/31/18   1520  07/31/18   2106  08/01/18   0641   POCGLU  118  106  135  102       Blood Sugar Average: Last 72 hrs:  (P) 127  HgA1C at 6 7  SSI and accuchecks   Hold OHG: metformin        Lung cancer [facility])   Assessment & Plan    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  Follows with Dr Marine Gilmore   treated with [facility] every 3 weeks-- last infusion 7/16  Palliative care on board  Resolved Problems  Date Reviewed: 7/30/2018          Resolved    Right upper quadrant pain 7/26/2018     Resolved by  Antony Lion MD    TANYA (acute kidney injury) ([facility] ) 7/26/2018     Resolved by  Antony Lion MD          Admission Date:   Admission Orders     Ordered        07/23/18 1859  Inpatient Admission  Once               Admitting Diagnosis: Pericardial effusion [I31 3]        Procedures Performed: No orders of the defined types were placed in this encounter  Summary of Hospital Course80year-old lady with history of stage IV adenocarcinoma of the lung, admitted for atrial fibrillation with RVR secondary pericardial effusion and healthcare associated pneumonia   07/24/18: S/P subxiphoid pericardial window with drain  07/27/18: Pericardial drain discontinued  Cardiothoracic surgery following peripherally post drain removal   Last few days episodes of intermittent confusion which slowly clearing up -- she is now at baseline  After discussion with palliative care and family it was decided that the patient be transition to skilled nursing facility for further rehab needs  Will continue to complete antibiotic course          Condition at Discharge: fair         Discharge instructions/Information to patient and family:   See after visit summary for information provided to patient and family        Provisions for Follow-Up Care:  See after visit summary for information related to follow-up care and any pertinent home health orders  PCP: Yesy Wright DO    Disposition: Home    Planned Readmission: No    Discharge Statement   I spent 35 minutes discharging the patient  This time was spent on the day of discharge  I had direct contact with the patient on the day of discharge  Additional documentation is required if more than 30 minutes were spent on discharge  Discharge Medications:  See after visit summary for reconciled discharge medications provided to patient and family

## 2018-08-01 NOTE — ASSESSMENT & PLAN NOTE
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

## 2018-08-01 NOTE — RESTORATIVE TECHNICIAN NOTE
Restorative Specialist Mobility Note       Activity: Ambulate in foster, Chair     Assistive Device: Front wheel walker

## 2018-08-01 NOTE — TELEPHONE ENCOUNTER
patient transferred to Nursing Home from hospital  Treatment plan will need to be changed due to cancer progression  Bernardo Slater, patient's daughter will call me to set up f/u appt with Dr Deandre Keen

## 2018-08-01 NOTE — SOCIAL WORK
Pt is cleared for d/c by FELICITA Fonseca  RN Nicolette Falcon was notified of d/c  Pt is accepted for short-term skilled rehab placement at [facility] located in [address]  CM arranged BLS ambulance via [facility] for 1:00PM pickup this day to transport pt to SNF  The pt and her daughter Hui Davis were both informed of d/c  IMM signed by pt on 7/31/18  Medicare Bundle Letter given  Chart copy requested for SNF  CM to follow

## 2018-08-01 NOTE — ASSESSMENT & PLAN NOTE
Lab Results   Component Value Date    HGBA1C 6 7 (H) 07/25/2018       Recent Labs      07/31/18   1055  07/31/18   1520  07/31/18   2106  08/01/18   0641   POCGLU  118  106  135  102       Blood Sugar Average: Last 72 hrs:  (P) 127  HgA1C at 6 7    SSI and accuchecks   Hold OHG: metformin

## 2018-08-06 ENCOUNTER — HOSPITAL ENCOUNTER (OUTPATIENT)
Dept: INFUSION CENTER | Facility: CLINIC | Age: 83
Discharge: HOME | End: 2018-08-06

## 2018-08-07 ENCOUNTER — HOSPITAL ENCOUNTER (OUTPATIENT)
Dept: INFUSION CENTER | Facility: CLINIC | Age: 83
Discharge: HOME | End: 2018-08-07

## 2018-08-09 NOTE — PROGRESS NOTES
DISCHARGE:  Pt did not attend any follow up sessions after being placed on 30 day hold  At the time of last session BPPV was resolved and all goals were met  Pt will be discharged at this time

## 2018-08-13 ENCOUNTER — TELEPHONE (OUTPATIENT)
Dept: HEMATOLOGY ONCOLOGY | Facility: CLINIC | Age: 83
End: 2018-08-13

## 2018-08-13 NOTE — TELEPHONE ENCOUNTER
I called Michel Hickman and spoke about bringing in mother on Friday the 17th but mother is still in rehab until Thursday  Pt is to f/u with Dr Kamla Silva, pt can be added to Dr Dwight Landis schedule next week while he is rounding  Either Wed 8/22 or Thurs 8/23

## 2018-08-15 ENCOUNTER — OFFICE VISIT (OUTPATIENT)
Dept: CARDIAC SURGERY | Facility: CLINIC | Age: 83
End: 2018-08-15

## 2018-08-15 VITALS
DIASTOLIC BLOOD PRESSURE: 57 MMHG | BODY MASS INDEX: 29.57 KG/M2 | OXYGEN SATURATION: 91 % | SYSTOLIC BLOOD PRESSURE: 125 MMHG | HEIGHT: 61 IN | WEIGHT: 156.6 LBS | HEART RATE: 54 BPM | TEMPERATURE: 97.4 F

## 2018-08-15 DIAGNOSIS — C34.02 MALIGNANT NEOPLASM OF HILUS OF LEFT LUNG (HCC): ICD-10-CM

## 2018-08-15 DIAGNOSIS — I31.3 PERICARDIAL EFFUSION: Primary | ICD-10-CM

## 2018-08-15 PROCEDURE — 99024 POSTOP FOLLOW-UP VISIT: CPT | Performed by: THORACIC SURGERY (CARDIOTHORACIC VASCULAR SURGERY)

## 2018-08-15 RX ORDER — ASPIRIN 81 MG/1
81 TABLET ORAL DAILY
COMMUNITY
End: 2019-02-15 | Stop reason: HOSPADM

## 2018-08-15 NOTE — PROGRESS NOTES
Thoracic Follow-Up  Assessment/Plan:   61-year-old female with stage IV lung adequate no carcinoma complicated by a malignant pericardial effusion who is now 3 weeks status post subxiphoid pericardial window and drain placement  She has recovered nicely from this procedure and is improved symptomatically  She is no longer in atrial fibrillation and is no longer short of breath  From a thoracic surgical standpoint she has no current needs  I discussed her cancer diagnosis with associated complications at length with the family  I discussed potential recurrence of the pericardial effusion or a potential malignant pleural effusion with them  They understand this could happen and she could benefit from a repeat surgical procedure  Otherwise I advised them to follow up with Dr Lamar Shaikh in an Oncology  I also referred the patient to palliative Medicine as they can often be of assistance with overall needs in the coming future  She should follow up with thoracic surgery only as needed  Simple Tylenol as needed for pain currently  No dressings necessary to her subxiphoid incision  Diagnoses and all orders for this visit:    Pericardial effusion  -     Ambulatory referral to Palliative Care; Future    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 ([address] )  -     Ambulatory referral to Palliative Care; Future    Other orders  -     aspirin (ECOTRIN LOW STRENGTH) 81 mg EC tablet; Take 81 mg by mouth daily          Thoracic History          Subjective:    Patient ID: Simi Gaming is a 80 y o  female  STUART  Adina Horvath is a very pleasant 61-year-old female who I saw in the hospital for a symptomatic pericardial effusion  She has a history of stage IV lung cancer on chemotherapy  Due to her severe shortness of breath and atrial fibrillation she was taken to the OR on 07/24/2018 for a subxiphoid pericardial window and drain placement  Her pericardial drain was removed on 07/27    She was eventually discharged rehab on 08/01  Her pericardial fluid cytology was positive for metastatic adenocarcinoma  At rehab balloon states that she has been recovering nicely  She has minimal pain near her incision site  This is well controlled with oral Tylenol  She has been increasing her physical activity  She denies any chest pain or shortness of breath  This is significantly improved from preop  She is actually scheduled to be discharged from rehab to home tomorrow      The following portions of the patient's history were reviewed and updated as appropriate: allergies, current medications, past family history, past medical history, past social history, past surgical history and problem list     Review of Systems   Constitutional: Negative for activity change, appetite change, chills, diaphoresis, fatigue, fever and unexpected weight change  HENT: Negative  Eyes: Negative  Respiratory: Negative for apnea, cough, chest tightness, shortness of breath, wheezing and stridor  Cardiovascular: Negative for chest pain, palpitations and leg swelling  Gastrointestinal: Negative for abdominal distention, abdominal pain, blood in stool, constipation, diarrhea, nausea and vomiting  Endocrine: Negative  Genitourinary: Negative  Musculoskeletal: Positive for gait problem  Skin: Negative  Allergic/Immunologic: Negative  Neurological: Positive for weakness  Hematological: Negative  Psychiatric/Behavioral: Negative  Objective:   Physical Exam   Constitutional: She is oriented to person, place, and time  She appears well-developed and well-nourished  No distress  Frail appearing     HENT:   Head: Normocephalic and atraumatic  Mouth/Throat: Oropharynx is clear and moist  No oropharyngeal exudate  Eyes: Conjunctivae and EOM are normal  Pupils are equal, round, and reactive to light  No scleral icterus  Neck: Normal range of motion  Neck supple  No JVD present  No tracheal deviation present   No thyromegaly present  Cardiovascular: Normal rate, regular rhythm, normal heart sounds and intact distal pulses  Exam reveals no gallop and no friction rub  No murmur heard  subxyphoid incision is well healed  No erythema or drainage  Pulmonary/Chest: Effort normal and breath sounds normal  No respiratory distress  She has no wheezes  She has no rales  She exhibits no tenderness  Abdominal: Soft  Bowel sounds are normal  She exhibits no distension and no mass  There is no tenderness  There is no rebound and no guarding  Musculoskeletal: Normal range of motion  She exhibits no edema, tenderness or deformity  Neurological: She is alert and oriented to person, place, and time  Skin: Skin is warm and dry  No rash noted  She is not diaphoretic  No erythema  No pallor  Psychiatric: She has a normal mood and affect  Her behavior is normal  Judgment and thought content normal    Nursing note and vitals reviewed  /57 (BP Location: Right arm, Patient Position: Sitting, Cuff Size: Adult)   Pulse (!) 54   Temp (!) 97 4 °F (36 3 °C)   Ht 5' 1" (1 549 m)   Wt 71 kg (156 lb 9 6 oz)   SpO2 91%   BMI 29 59 kg/m²     Xr Chest Pa & Lateral    Result Date: 5/11/2018  Impression Left base opacity, likely combination of effusion and atelectasis, and multiple left pleural nodules are unchanged since 4/17/2018  Workstation performed: [id number]     Xr Chest Pa & Lateral    Result Date: 4/17/2018  Impression Studding of the pleura in the left hemithorax in keeping with pleural carcinomatosis and left pleural effusion essentially unchanged  Workstation performed: [id number]      Ct Chest W Contrast    Result Date: 3/21/2018  Narrative CT CHEST WITH IV CONTRAST INDICATION:   C78 00: Secondary malignant neoplasm of unspecified lung  Follow-up lung cancer  COMPARISON: 12/14/2017 TECHNIQUE: CT examination of the chest was performed   Axial, sagittal, and coronal 2D reformatted images were created from the source data and submitted for interpretation  Radiation dose length product (DLP) for this visit:  427 mGy-cm   This examination, like all CT scans performed in the [facility], was performed utilizing techniques to minimize radiation dose exposure, including the use of iterative reconstruction and automated exposure control  IV Contrast:  85 mL of iohexol (OMNIPAQUE) FINDINGS: LUNGS:  No acute consolidation  Mild background reticular interstitial thickening mostly in the periphery of the lungs appearing stable  There is also mild background of centrilobular emphysema in the upper lungs  PLEURA:  Interval progression in both number and size of the prior left-sided metastatic pleural implants  Specifically, the largest is again seen in the posterior lateral lung base currently measuring 3 9 x 2 1 cm this is becoming increasingly confluent with multiple adjacent smaller nodules in the posterior costophrenic angle  There are numerous small new lesions seen throughout the pleura along the lateral and posterior aspect of the upper thorax  The nodule previously measured on image 25  in the posterior mid upper thorax has increased from 8 mm to 15 mm  New large lesions are also seen along the medial aspect of the thorax abutting the mediastinal pleura, 2 5 x 1 6 cm  HEART/GREAT VESSELS:  Unremarkable for patient's age  MEDIASTINUM AND RAE:  Increasing adenopathy within the superior mediastinum, precarinal and AP window having previously been mostly normal there are now multiple nodes ranging from 10 mm to the largest which is subcarinal in position, 1 8 cm  Pleural implants are also seen partially encasing the left suprahilar region  CHEST WALL AND LOWER NECK:   Prior left mastectomy and implant reconstruction  VISUALIZED STRUCTURES IN THE UPPER ABDOMEN:  Diffuse low hepatic attenuation consistent with steatosis    Stable small nodular foci adjacent to the gallbladder appearing stable and favoring focal fatty sparing  Borderline abnormal 10 mm lymph node with central low attenuation and appears significantly increased in size since the prior  This is seen along the left lateral aspect of the SMA  Likely representing metastatic disease  OSSEOUS STRUCTURES:  New round sclerotic focus at T6 posterior superior endplate  Impression Interval worsening of metastatic pleural implants throughout the left hemithorax  New mediastinal and left hilar adenopathy  Likely new abdominal metastatic lymph node as well  New sclerotic focus in T6 superior endplate  Hepatic steatosis  Workstation performed: [id number]     Ct Chest W Contrast    Result Date: 12/19/2017  Narrative CT CHEST WITH IV CONTRAST INDICATION:  History of lung cancer with malignant left pleural effusion and remote history of breast cancer  Follow up exam  COMPARISON: CT chest 9/19/2017  TECHNIQUE: CT examination of the chest was performed  Reformatted images were created in axial, sagittal, and coronal planes  Radiation dose length product (DLP) for this visit:  283 mGy-cm   This examination, like all CT scans performed in the [facility], was performed utilizing techniques to minimize radiation dose exposure, including the use of iterative reconstruction and automated exposure control  IV Contrast:  85 mL of iohexol (OMNIPAQUE)     FINDINGS: LUNGS:  Soft tissue nodules studding the pleura of the left lung for example in the major fissure there is a nodule measuring 8 mm, in the left major fissure superiorly and posteriorly measuring 8 mm on image 2/25 and in the left lung base nodule measures 2 4 cm on image 2/49 and there is soft tissue enhancement along the medial pleura as seen on image 2/55 consistent with carcinomatosis  Findings are generally stable with slight changes in position due to decreasing left effusion  Stable left apical scarring  Previously noted subpleural nodule in the lingula has resolved   PLEURA:  Decreased but persistent left pleural effusion  HEART/GREAT VESSELS:  Unremarkable for patient's age  MEDIASTINUM AND RAE:  Unremarkable  CHEST WALL AND LOWER NECK:   Stable left breast implant  Stable right thyroid nodule  VISUALIZED STRUCTURES IN THE UPPER ABDOMEN:  Hepatomegaly with fatty infiltration  Upper abdomen otherwise unremarkable  OSSEOUS STRUCTURES:  No acute fracture  No destructive osseous lesion  Impression Soft tissue nodules studding the pleura of the left lung for example in the major fissure there is a nodule measuring 8 mm, in the left major fissure superiorly and posteriorly measuring 8 mm on image 2/25 and in the left lung base a nodule measures 2 4 cm on image 2/49 and there is soft tissue enhancement along the medial pleura as seen on image 2/55 consistent with pleural carcinomatosis  Findings are generally stable with slight changes in position due to decreasing left effusion  Stable hepatomegaly with fatty infiltration  Workstation performed: [id number]     Ct Chest W Contrast    Result Date: 9/21/2017  Narrative CT CHEST WITH IV CONTRAST INDICATION:  Malignant left pleural effusion, newly diagnosed lung carcinoma with remote history of breast cancer  COMPARISON: CT chest 6/2/2017 and 5/29/2017 from [facility] as well as PET/CT from 6/12/2017 TECHNIQUE: CT examination of the chest was performed  Reformatted images were created in axial, sagittal, and coronal planes  Radiation dose length product (DLP) for this visit:  318 mGy-cm   This examination, like all CT scans performed in the [facility], was performed utilizing techniques to minimize radiation dose exposure, including the use of iterative reconstruction and automated exposure control  IV Contrast:  85 mL of iohexol (OMNIPAQUE)     FINDINGS: LUNGS:  There is improving left lower lobe aeration with persistent atelectasis noted    There is a 2 1 x 2 3 cm nodular area of consolidation in the posterolateral left lower lobe which does not appear significantly different than areas of atelectasis more medially although could correspond to an area of abnormal FDG uptake on the prior PET/CT  By previous report this lesion measured 2 9 x 2 6 cm  There is a 4 mm subpleural nodular density in the lingula  This is difficult to accurately compare to prior studies as there was some atelectasis in that region and the study was performed without contrast  Linear scarring left apex again noted  The right lung is clear  Mild centrilobular emphysema  No endobronchial lesions  PLEURA:  Small left pleural effusion has significantly decreased  Small amount of intrafissural fluid  There is some subsegmental atelectasis seen along the medial left upper lobe versus focal pleural thickening  On series 2 images 13 and 14 there is suggestion of subtle plaque-like pleural thickening  HEART/GREAT VESSELS:  Atherosclerotic changes thoracic aorta and coronary arteries  The heart is normal size  No significant pericardial effusion  MEDIASTINUM AND RAE:  Stable shotty mediastinal lymph nodes  11 mm left hilar lymph node is probably stable  The esophagus is unremarkable  CHEST WALL AND LOWER NECK:   Small right thyroid hypodensity, stable  Left breast implant and ipsilateral axillary marialuisa dissection  No pathologic axillary adenopathy seen  VISUALIZED STRUCTURES IN THE UPPER ABDOMEN: Diffuse fatty infiltration with sparing adjacent to gallbladder fossa  OSSEOUS STRUCTURES:  No acute fracture  No destructive osseous lesion  Lumbar dextroscoliosis with multilevel disc disease  Impression Significant decrease in left pleural effusion which is now small in quantity  However, there is suggestion of subtle plaque-like pleural thickening which appears slightly more conspicuous and is suggestive of residual pleural malignancy   Nodular area of consolidation in the lateral left lower lobe does not appear significantly different than areas of atelectasis more medially, however, may correspond to an FDG avid lesion on the prior PET/CT  It does appear smaller in size if this is the corresponding abnormality  4 mm subpleural nodular density in the lingula which cannot be reliably compared given limitations on previous imaging  Stable mild left hilar adenopathy  No evidence of distant metastatic disease detected  Mild COPD  Fatty liver  Workstation performed: [id number]     No CT Chest,Abdomen,Pelvis results available for this patient  No NM PET CT results available for this patient  No Barium Swallow results available for this patient        Lydia Betancur MD  Thoracic Surgeon    [phone]

## 2018-08-21 ENCOUNTER — OFFICE VISIT (OUTPATIENT)
Dept: PALLIATIVE MEDICINE | Facility: CLINIC | Age: 83
End: 2018-08-21
Payer: MEDICARE

## 2018-08-21 ENCOUNTER — OFFICE VISIT (OUTPATIENT)
Dept: HEMATOLOGY ONCOLOGY | Facility: CLINIC | Age: 83
End: 2018-08-21
Payer: MEDICARE

## 2018-08-21 VITALS
RESPIRATION RATE: 18 BRPM | TEMPERATURE: 97.3 F | SYSTOLIC BLOOD PRESSURE: 108 MMHG | OXYGEN SATURATION: 95 % | HEART RATE: 55 BPM | DIASTOLIC BLOOD PRESSURE: 62 MMHG | BODY MASS INDEX: 29.07 KG/M2 | HEIGHT: 61 IN | WEIGHT: 154 LBS

## 2018-08-21 VITALS
RESPIRATION RATE: 18 BRPM | TEMPERATURE: 98 F | OXYGEN SATURATION: 94 % | DIASTOLIC BLOOD PRESSURE: 72 MMHG | SYSTOLIC BLOOD PRESSURE: 140 MMHG | HEART RATE: 59 BPM

## 2018-08-21 DIAGNOSIS — E11.9 TYPE 2 DIABETES MELLITUS WITHOUT COMPLICATION, WITHOUT LONG-TERM CURRENT USE OF INSULIN (HCC): ICD-10-CM

## 2018-08-21 DIAGNOSIS — C34.02 MALIGNANT NEOPLASM OF HILUS OF LEFT LUNG (HCC): ICD-10-CM

## 2018-08-21 DIAGNOSIS — Z71.89 COUNSELING AND COORDINATION OF CARE: Primary | ICD-10-CM

## 2018-08-21 DIAGNOSIS — C34.82 MALIGNANT NEOPLASM OF OVERLAPPING SITES OF LEFT LUNG (HCC): Primary | ICD-10-CM

## 2018-08-21 DIAGNOSIS — I48.0 PAROXYSMAL ATRIAL FIBRILLATION (HCC): Primary | ICD-10-CM

## 2018-08-21 DIAGNOSIS — I31.3 PERICARDIAL EFFUSION: ICD-10-CM

## 2018-08-21 PROBLEM — C78.2 PLEURAL METASTASIS (HCC): Status: ACTIVE | Noted: 2018-08-21

## 2018-08-21 PROCEDURE — 99499 UNLISTED E&M SERVICE: CPT

## 2018-08-21 PROCEDURE — 99214 OFFICE O/P EST MOD 30 MIN: CPT | Performed by: FAMILY MEDICINE

## 2018-08-21 PROCEDURE — 99215 OFFICE O/P EST HI 40 MIN: CPT | Performed by: INTERNAL MEDICINE

## 2018-08-21 RX ORDER — TRAMADOL HYDROCHLORIDE 50 MG/1
50 TABLET ORAL EVERY 6 HOURS PRN
Qty: 60 TABLET | Refills: 0 | Status: SHIPPED | OUTPATIENT
Start: 2018-08-21 | End: 2018-09-20 | Stop reason: ALTCHOICE

## 2018-08-21 RX ORDER — DEXAMETHASONE 1 MG
1 TABLET ORAL
Qty: 30 TABLET | Refills: 0 | Status: SHIPPED | OUTPATIENT
Start: 2018-08-21 | End: 2018-09-16 | Stop reason: SDUPTHER

## 2018-08-21 NOTE — PROGRESS NOTES
Mrs Hattie Winchester presents today for initial outpatient palliative consult with Dr Sammy English  She was accompanied with her  and daughter  All very pleasant and cooperative  LSW explained role of SW in palliative care and offered support  Pt  States she relies on her daughter for "remembering" things and joked she is like her "PCP "  Family appears very supportive  Pt  Has an appointment with Medical Oncology this afternoon to discuss treatment options  Provided contact information for all palliative social workers  All thankful for introduction

## 2018-08-21 NOTE — LETTER
August 21, 2018     Yesy ArguetaDO sherlyn  [address] [facility] [id number]    Patient: Stanley Long   YOB: 1935   Date of Visit: 8/21/2018       Dear Dr Jessica Darby: Thank you for referring Mor Martinez to me for evaluation  Below are my notes for this consultation  If you have questions, please do not hesitate to call me  I look forward to following your patient along with you  Sincerely,        Bishnu Hickman MD        CC: No Recipients  Bishnu Hickman MD  8/21/2018  2:34 PM  Sign at close encounter  Hematology Outpatient Follow - Up Note  Stanley Long 80 y o  female MRN: @ Encounter: [id number]        Date:  8/21/2018        Assessment/ Plan:   Heavily pretreated metastatic lung cancer primary in the left lower lobe of the lung with malignant pleural effusion presenting in April 2017 in a patient who never smoked in her life, molecular tests were negative for EGFR mutation, Ross 1 mutation, B Darian mutation, ALK gene rearrangement, PDA expression of 10%, at that time she was treated with Alimta / carboplatin for 6 cycles and then maintenance Alimta with good response clinically and radiologically however later on she had progression of disease in the left pleura with studding, treated with Alimta/carboplatin but she was allergic to carboplatin, with switch treatment to Pembrolizumab from May until August 2018 with progression of disease with malignant pericardial effusion, atrial fibrillation, more studding of the left pleura  Status post pericardiocentesis  1  The patient to be treated with Taxotere 15 milligram/meter squared weekly 3 weeks on 1 week off, side effects of Taxotere that are but not limited to alopecia, nausea, vomiting, fluid retention, pancytopenia with told he agree to proceed  2  Consult IR for Port-A-Cath placement  3  If the patient has progression of disease will treat with gemcitabine  4   Pain management by Dr Wolfgang Mayesr she is on fentanyl patch and dexamethasone 1 mg p o  Daily  5  Dexamethasone 10 mg prior to each Taxotere IV  6  CBC prior to each Taxotere  7  Filgrastim 300 mcg if ANC below 1000  8   Aranesp 200 mcg every month if hemoglobin below 9 9 for anemia induced by chemotherapy           HPI: 43-year-old  female who presented to [facility] in May 2017 with dyspnea for the past 4-5 days and pleurisy, with occasional dry cough scan of the thorax showed no evidence of PE, it showed a large loculated left side pleural effusion with nodularity concerning for metastatic disease, there is a concern for a mass versus pneumonia in the collapsed left lower lobe lung underwent left thoracentesis yielding 1 4 L of bloody fluid, pathology showed adenocarcinoma, positive for TTF-1, CK 7 most likely consistent with non-small cell lung cancer patient had a history of left-sided breast cancer in 1992 status post mastectomy she told me she had told lymph node involvement, she was not treated with either chemotherapy, radiation therapy or hormonal therapy has extensive family history of cancer, sister was diagnosed with breast cancer at age 36, another sister was diagnosed with breast cancer at age 72, a brother diagnosed with pancreatic cancer and another brother with lung cancer niece was found to have BRCA gene mutation      Interval History:        Previous Treatment:  Carboplatin AUC 5, Alimta 500 milligram/meter squared every 3 weeks initiated in July 2017 finished 6 cycles in November 2017 and then she received maintenance Alimta from November 2017 until April 2018 with progression of disease  A repeat core biopsy negative for EGFR mutation, Ross 1 mutation, B Darian mutation, ALK gene rearrangement, PDL expression of 10%, no evidence of BRCA1/ 2 mutation, she was treated again with short course of carboplatin and Alimta with allergic reaction to carboplatin then initiated on Pembrolizumab 200 mg flat dose every 3 weeks in May 2018 until August 2018 with admission to the hospital with malignant pleural effusion status post pericardiocentesis 400 mL, CT scan showed studding of the left pleura, more small new nodules of the pleura consistent with progression of disease on Pembrolizumab        Test Results:    Imaging: Xr Chest Portable    Result Date: 7/24/2018  Narrative: CHEST INDICATION:   Follow-up pericardial window  COMPARISON:  Chest x-ray from 7/24/2018 0106 hours EXAM PERFORMED/VIEWS:  XR CHEST PORTABLE  7/24/2018 1441 hours FINDINGS:  Patient is slightly rotated to the right  Scattered surgical clips noted in the left axilla  The heart is enlarged  Mild left pleural thickening versus pleural effusion, stable  Streaky patchy opacity in the left lower lung zone may be related to atelectasis or pneumonia  Osseous structures appear within normal limits for patient age  Impression: Streaky patchy opacity in the left lower lung zone may be related to atelectasis or pneumonia, appears unchanged  Mild left pleural thickening versus small effusion, stable  Workstation performed: [id number]     Xr Chest Portable    Result Date: 7/24/2018  Narrative: CHEST INDICATION:   Shortness of breath, tachypnea  COMPARISON:  7/23/2018 EXAM PERFORMED/VIEWS:  XR CHEST PORTABLE FINDINGS: Heart shadow is enlarged but unchanged from prior exam  Left-sided pleural disease is similar to the previous examination  Mediastinal contours are also unchanged  There is no pneumothorax  Pulmonary vasculature appears more prominent  Left humeral hardware noted     Impression: Pulmonary vasculature appears more prominent than prior study suspicious for mild pulmonary vascular congestion  No other change since earlier study Workstation performed: [id number]     Xr Chest Portable    Result Date: 7/23/2018  Narrative: CHEST INDICATION:   chest pain  Shortness of breath  History of lung carcinoma  COMPARISON:  May 10, 2018, April 17, 2018 and July 19, 2017   EXAM PERFORMED/VIEWS:  XR CHEST PORTABLE FINDINGS: There is widening of the mediastinum which appears to have progressed when compared to prior study  This may represent adenopathy or be secondary to vascular ectasia  Abnormal nodular pleural thickening again seen within the left chest compatible with patient's known history of underlying lung carcinoma  Airspace opacity within the left lung base may represent atelectasis or infiltrate  Right lung is clear  Osseous structures appear within normal limits for patient age  Impression: Abnormal widening of the mediastinum may represent increasing adenopathy  Left basilar airspace opacity may reflect atelectasis or infiltrate  Nodular pleural thickening left chest compatible with known underlying lung malignancy  Workstation performed: [id number]     Ct Head Wo Contrast    Result Date: 7/26/2018  Narrative: CT BRAIN - WITHOUT CONTRAST INDICATION:   Confusion/delirium, altered LOC, unexplained  COMPARISON:  CT brain 3/15/2018  TECHNIQUE:  CT examination of the brain was performed  In addition to axial images, coronal 2D reformatted images were created and submitted for interpretation  Radiation dose length product (DLP) for this visit:  1067 43 mGy-cm   This examination, like all CT scans performed in the [facility], was performed utilizing techniques to minimize radiation dose exposure, including the use of iterative reconstruction and automated exposure control  IMAGE QUALITY:  Diagnostic  FINDINGS: PARENCHYMA: Decreased attenuation is noted in periventricular and subcortical white matter demonstrating an appearance that is statistically most likely to represent mild microangiopathic change; this appearance is similar when compared to most recent prior examination  No CT signs of acute infarction  No intracranial mass, mass effect or midline shift  No acute parenchymal hemorrhage  VENTRICLES AND EXTRA-AXIAL SPACES:  Normal for the patient's age  VISUALIZED ORBITS AND PARANASAL SINUSES:  No acute abnormality involving the orbits  Mild scattered sinus mucosal thickening is noted  No fluid levels are seen  CALVARIUM AND EXTRACRANIAL SOFT TISSUES:  Normal      Impression: No acute intracranial abnormality  Workstation performed: [id number]     Xr Chest Pa Only    Result Date: 7/24/2018  Narrative: CHEST INDICATION:   shortness of breath  COMPARISON:  7/23/2018 at 1150 hours EXAM PERFORMED/VIEWS:  XR CHEST 1 VIEW FINDINGS: Heart shadow is enlarged but unchanged from prior exam  Left pleural disease is similar to the previous examination  Widening of mediastinum noted again similar to prior study  No pneumothorax  Right humeral hardware noted     Impression: No significant interval change since the study performed earlier today Workstation performed: [id number]     Us Right Upper Quadrant    Result Date: 7/25/2018  Narrative: RIGHT UPPER QUADRANT ULTRASOUND INDICATION:  Right upper quadrant pain  Elevated bilirubin  COMPARISON: None  TECHNIQUE:   Real-time ultrasound of the right upper quadrant was performed with a curvilinear transducer with both volumetric sweeps and still imaging techniques  FINDINGS: PANCREAS:  Visualized portions show no abnormality  AORTA AND IVC:  Visualized portions are normal for patient age  LIVER: Mild hepatomegaly is present  Liver is echogenic consistent with fatty infiltration  No evidence of mass  Normal directional flow is present within the portal vein  BILIARY: The gallbladder is normal in caliber  No wall thickening or pericholecystic fluid  Gallbladder sludge is present  No sonographic Silverio's sign  No intrahepatic biliary dilatation  CBD measures 2 mm  No choledocholithiasis  KIDNEY: Right kidney measures 11 1 x 5 6 cm  15 mm left renal cyst is noted  Left kidney is otherwise unremarkable    ASCITES:   None  Impression: Gallbladder sludge  Hepatomegaly and hepatic steatosis    Workstation performed: [id number] Cta Ed Chest Pe Study    Result Date: 7/23/2018  Narrative: CTA - CHEST WITH IV CONTRAST - PULMONARY ANGIOGRAM INDICATION:   SOB, new onset afib, cancer pt  COMPARISON: March 15, 2018 TECHNIQUE: CTA examination of the chest was performed using angiographic technique according to a protocol specifically tailored to evaluate for pulmonary embolism  Axial, sagittal, and coronal 2D reformatted images were created from the source data and  submitted for interpretation  In addition, coronal 3D MIP postprocessing was performed on the acquisition scanner  Radiation dose length product (DLP) for this visit:  379 mGy-cm   This examination, like all CT scans performed in the [facility], was performed utilizing techniques to minimize radiation dose exposure, including the use of iterative reconstruction and automated exposure control  IV Contrast:  85 mL of iodixanol (VISIPAQUE)  FINDINGS: PULMONARY ARTERIAL TREE:  Evaluation is degraded by streak artifact from the dense contrast in the SVC  There is no occlusive pulmonary embolism seen in the main pulmonary artery and their central lobar branches Evaluation for segmental subsegmental vessels limited LUNGS: No acute consolidation seen  PLEURA:  Patient is known to have nodular the metastatic disease in the left pleura  The nodular implants in the left upper lobe have become more confluent  A nodular implant seen in the left upper lung, in image 76 of series 2, previously is larger There is increasing consolidation seen at the left lung base Mild pleural thickening is also noted on the right side HEART/AORTA:  There is development of moderate large pericardial effusion, new from the previous study this demonstrates attenuation of 13 Hounsfield unit MEDIASTINUM AND RAE:  Anterior mediastinal lymph nodes are seen  The largest a anterior mediastinal lymph node measures about 8 7 mm    Nodularity in the anterior mediastinum noted at the anterior aspect of the left hemithorax, new from the previous study Distention of the superior pericardial recess is seen Lower right paratracheal lymph nodes minimal 9 mm are stable Lower left paratracheal lymph nodes are stable Subcarinal lymph nodes measuring 15 mm are stable CHEST WALL AND LOWER NECK:   Unremarkable  VISUALIZED STRUCTURES IN THE UPPER ABDOMEN:  Liver, spleen, pancreas, right adrenal gland appear unremarkable OSSEOUS STRUCTURES:  No acute compression collapse of the vertebra seen Sclerotic density seen within the in the T6 vertebra suggest bony metastatic disease, this has become larger from the previous study, extending into the left pedicle  Mild sclerosis in the bilateral 2nd ribs is also noted     Impression: Limited study due to degradation by motion plus streak artifact from the dense contrast in the SVC There is no occlusive thrombus in the main pulmonary artery and central lobar branches of the pulmonary arteries Development of moderate to large pericardial effusion   Nodular pleural metastatic disease with the few foci of the nonmeasurable metastatic disease becoming less prominent however there is increasing infiltration in the anterior mediastinum adjacent to the anterior left pleura along with few nodules in the superior mediastinum suggesting new metastatic disease and progression Increasing sclerosis noted in the T6 vertebra Paratracheal lymph nodes are decreasing in size  I personally discussed this study with EDY GARCIA on 7/23/2018 at 2:17 PM   Workstation performed: [id number]       Labs:   Lab Results   Component Value Date    WBC 11 72 (H) 07/30/2018    HGB 12 8 07/30/2018    HCT 41 2 07/30/2018    MCV 96 07/30/2018     07/30/2018     Lab Results   Component Value Date     07/30/2018    K 4 3 07/30/2018     07/30/2018    CO2 27 07/30/2018    ANIONGAP 6 07/30/2018    BUN 12 07/30/2018    CREATININE 0 62 07/30/2018    GLUCOSE 114 07/30/2018    GLUF 101 (H) 04/18/2018 CALCIUM 8 6 07/30/2018    AST 18 07/25/2018    ALT 29 07/25/2018    ALKPHOS 56 07/25/2018    PROT 6 6 07/25/2018    BILITOT 0 96 07/25/2018    EGFR 84 07/30/2018       No results found for: IRON, TIBC, FERRITIN    No results found for: [id number]      ROS:   Review of Systems   Constitutional: Negative for activity change, appetite change, diaphoresis, fatigue, fever and unexpected weight change  HENT: Negative for facial swelling, hearing loss, rhinorrhea, sinus pain, sinus pressure, sneezing, sore throat and tinnitus  Eyes: Negative for photophobia, pain, discharge, redness, itching and visual disturbance  Respiratory: Negative for apnea and chest tightness  Cardiovascular: Negative for chest pain, palpitations and leg swelling  Gastrointestinal: Negative for abdominal distention, abdominal pain, blood in stool, constipation, diarrhea, nausea, rectal pain and vomiting  Endocrine: Negative for cold intolerance, heat intolerance, polydipsia and polyphagia  Genitourinary: Negative for difficulty urinating, dyspareunia, frequency, hematuria, pelvic pain and urgency  Musculoskeletal: Negative for arthralgias, back pain, gait problem, joint swelling and myalgias  Skin: Negative for color change, pallor and rash  Allergic/Immunologic: Negative for environmental allergies and food allergies  Neurological: Negative for dizziness, tremors, seizures, syncope, speech difficulty, numbness and headaches  Hematological: Negative for adenopathy  Does not bruise/bleed easily  Psychiatric/Behavioral: Negative for agitation, confusion, dysphoric mood, hallucinations and suicidal ideas  Current Medications: Reviewed  Allergies: Reviewed  PMH/FH/SH:  Reviewed      Physical Exam:    Body surface area is 1 69 meters squared      Wt Readings from Last 3 Encounters:   08/21/18 69 9 kg (154 lb)   08/15/18 71 kg (156 lb 9 6 oz)   08/01/18 74 4 kg (164 lb 0 4 oz)        Temp Readings from Last 3 Encounters: 08/21/18 (!) 97 3 °F (36 3 °C) (Tympanic)   08/21/18 98 °F (36 7 °C) (Oral)   08/15/18 (!) 97 4 °F (36 3 °C)        BP Readings from Last 3 Encounters:   08/21/18 108/62   08/21/18 140/72   08/15/18 125/57         Pulse Readings from Last 3 Encounters:   08/21/18 55   08/21/18 59   08/15/18 (!) 54        Physical Exam   Constitutional: She is oriented to person, place, and time  She appears well-developed and well-nourished  No distress  HENT:   Head: Normocephalic and atraumatic  Mouth/Throat: Oropharynx is clear and moist  No oropharyngeal exudate  Eyes: Conjunctivae and EOM are normal  Pupils are equal, round, and reactive to light  No scleral icterus  Neck: Normal range of motion  Neck supple  No tracheal deviation present  No thyromegaly present  Cardiovascular: Normal rate and regular rhythm  Exam reveals no gallop and no friction rub  No murmur heard  Pulmonary/Chest: Effort normal  No respiratory distress  She has no wheezes  She has no rales  She exhibits no tenderness  Dullness to percussion and no breath sounds in the left lower lobe of the lung up to a 1/3 of the feild   Abdominal: Soft  Bowel sounds are normal  She exhibits no distension and no mass  There is no tenderness  There is no rebound and no guarding  Musculoskeletal: Normal range of motion  She exhibits no edema  Lymphadenopathy:     She has no cervical adenopathy  Neurological: She is alert and oriented to person, place, and time  Skin: Skin is warm and dry  No rash noted  She is not diaphoretic  No erythema  No pallor  Psychiatric: She has a normal mood and affect  Her behavior is normal  Judgment and thought content normal    Vitals reviewed  Goals and Barriers:  Current Goal: Minimize effects of disease  Barriers: None  Patient's Capacity to Self Care:  Patient is able to self care      Code Status: [unfilled]

## 2018-08-21 NOTE — PROGRESS NOTES
Hematology Outpatient Follow - Up Note  Stanley Long 80 y o  female MRN: @ Encounter: [id number]        Date:  8/21/2018        Assessment/ Plan:   Heavily pretreated metastatic lung cancer primary in the left lower lobe of the lung with malignant pleural effusion presenting in April 2017 in a patient who never smoked in her life, molecular tests were negative for EGFR mutation, Ross 1 mutation, B Darian mutation, ALK gene rearrangement, PDA expression of 10%, at that time she was treated with Alimta / carboplatin for 6 cycles and then maintenance Alimta with good response clinically and radiologically however later on she had progression of disease in the left pleura with studding, treated with Alimta/carboplatin but she was allergic to carboplatin, with switch treatment to Pembrolizumab from May until August 2018 with progression of disease with malignant pericardial effusion, atrial fibrillation, more studding of the left pleura  Status post pericardiocentesis  1  The patient to be treated with Taxotere 15 milligram/meter squared weekly 3 weeks on 1 week off, side effects of Taxotere that are but not limited to alopecia, nausea, vomiting, fluid retention, pancytopenia with told he agree to proceed  2  Consult IR for Port-A-Cath placement  3  If the patient has progression of disease will treat with gemcitabine  4  Pain management by Dr Wolfgang Mayers she is on fentanyl patch and dexamethasone 1 mg p o  Daily  5  Dexamethasone 10 mg prior to each Taxotere IV  6  CBC prior to each Taxotere  7  Filgrastim 300 mcg if ANC below 1000  8   Aranesp 200 mcg every month if hemoglobin below 9 9 for anemia induced by chemotherapy           HPI: 80-year-old  female who presented to [facility] in May 2017 with dyspnea for the past 4-5 days and pleurisy, with occasional dry cough scan of the thorax showed no evidence of PE, it showed a large loculated left side pleural effusion with nodularity concerning for metastatic disease, there is a concern for a mass versus pneumonia in the collapsed left lower lobe lung underwent left thoracentesis yielding 1 4 L of bloody fluid, pathology showed adenocarcinoma, positive for TTF-1, CK 7 most likely consistent with non-small cell lung cancer patient had a history of left-sided breast cancer in 1992 status post mastectomy she told me she had told lymph node involvement, she was not treated with either chemotherapy, radiation therapy or hormonal therapy has extensive family history of cancer, sister was diagnosed with breast cancer at age 36, another sister was diagnosed with breast cancer at age 72, a brother diagnosed with pancreatic cancer and another brother with lung cancer niece was found to have BRCA gene mutation      Interval History:        Previous Treatment:  Carboplatin AUC 5, Alimta 500 milligram/meter squared every 3 weeks initiated in July 2017 finished 6 cycles in November 2017 and then she received maintenance Alimta from November 2017 until April 2018 with progression of disease  A repeat core biopsy negative for EGFR mutation, Ross 1 mutation, B Darian mutation, ALK gene rearrangement, PDL expression of 10%, no evidence of BRCA1/ 2 mutation, she was treated again with short course of carboplatin and Alimta with allergic reaction to carboplatin then initiated on Pembrolizumab 200 mg flat dose every 3 weeks in May 2018 until August 2018 with admission to the hospital with malignant pleural effusion status post pericardiocentesis 400 mL, CT scan showed studding of the left pleura, more small new nodules of the pleura consistent with progression of disease on Pembrolizumab        Test Results:    Imaging: Xr Chest Portable    Result Date: 7/24/2018  Narrative: CHEST INDICATION:   Follow-up pericardial window   COMPARISON:  Chest x-ray from 7/24/2018 0106 hours EXAM PERFORMED/VIEWS:  XR CHEST PORTABLE  7/24/2018 1441 hours FINDINGS:  Patient is slightly rotated to the right  Scattered surgical clips noted in the left axilla  The heart is enlarged  Mild left pleural thickening versus pleural effusion, stable  Streaky patchy opacity in the left lower lung zone may be related to atelectasis or pneumonia  Osseous structures appear within normal limits for patient age  Impression: Streaky patchy opacity in the left lower lung zone may be related to atelectasis or pneumonia, appears unchanged  Mild left pleural thickening versus small effusion, stable  Workstation performed: [id number]     Xr Chest Portable    Result Date: 7/24/2018  Narrative: CHEST INDICATION:   Shortness of breath, tachypnea  COMPARISON:  7/23/2018 EXAM PERFORMED/VIEWS:  XR CHEST PORTABLE FINDINGS: Heart shadow is enlarged but unchanged from prior exam  Left-sided pleural disease is similar to the previous examination  Mediastinal contours are also unchanged  There is no pneumothorax  Pulmonary vasculature appears more prominent  Left humeral hardware noted     Impression: Pulmonary vasculature appears more prominent than prior study suspicious for mild pulmonary vascular congestion  No other change since earlier study Workstation performed: [id number]     Xr Chest Portable    Result Date: 7/23/2018  Narrative: CHEST INDICATION:   chest pain  Shortness of breath  History of lung carcinoma  COMPARISON:  May 10, 2018, April 17, 2018 and July 19, 2017  EXAM PERFORMED/VIEWS:  XR CHEST PORTABLE FINDINGS: There is widening of the mediastinum which appears to have progressed when compared to prior study  This may represent adenopathy or be secondary to vascular ectasia  Abnormal nodular pleural thickening again seen within the left chest compatible with patient's known history of underlying lung carcinoma  Airspace opacity within the left lung base may represent atelectasis or infiltrate  Right lung is clear  Osseous structures appear within normal limits for patient age       Impression: Abnormal widening of the mediastinum may represent increasing adenopathy  Left basilar airspace opacity may reflect atelectasis or infiltrate  Nodular pleural thickening left chest compatible with known underlying lung malignancy  Workstation performed: [id number]     Ct Head Wo Contrast    Result Date: 7/26/2018  Narrative: CT BRAIN - WITHOUT CONTRAST INDICATION:   Confusion/delirium, altered LOC, unexplained  COMPARISON:  CT brain 3/15/2018  TECHNIQUE:  CT examination of the brain was performed  In addition to axial images, coronal 2D reformatted images were created and submitted for interpretation  Radiation dose length product (DLP) for this visit:  1067 43 mGy-cm   This examination, like all CT scans performed in the [facility], was performed utilizing techniques to minimize radiation dose exposure, including the use of iterative reconstruction and automated exposure control  IMAGE QUALITY:  Diagnostic  FINDINGS: PARENCHYMA: Decreased attenuation is noted in periventricular and subcortical white matter demonstrating an appearance that is statistically most likely to represent mild microangiopathic change; this appearance is similar when compared to most recent prior examination  No CT signs of acute infarction  No intracranial mass, mass effect or midline shift  No acute parenchymal hemorrhage  VENTRICLES AND EXTRA-AXIAL SPACES:  Normal for the patient's age  VISUALIZED ORBITS AND PARANASAL SINUSES:  No acute abnormality involving the orbits  Mild scattered sinus mucosal thickening is noted  No fluid levels are seen  CALVARIUM AND EXTRACRANIAL SOFT TISSUES:  Normal      Impression: No acute intracranial abnormality  Workstation performed: [id number]     Xr Chest Pa Only    Result Date: 7/24/2018  Narrative: CHEST INDICATION:   shortness of breath   COMPARISON:  7/23/2018 at 1150 hours EXAM PERFORMED/VIEWS:  XR CHEST 1 VIEW FINDINGS: Heart shadow is enlarged but unchanged from prior exam  Left pleural disease is similar to the previous examination  Widening of mediastinum noted again similar to prior study  No pneumothorax  Right humeral hardware noted     Impression: No significant interval change since the study performed earlier today Workstation performed: [id number]     Us Right Upper Quadrant    Result Date: 7/25/2018  Narrative: RIGHT UPPER QUADRANT ULTRASOUND INDICATION:  Right upper quadrant pain  Elevated bilirubin  COMPARISON: None  TECHNIQUE:   Real-time ultrasound of the right upper quadrant was performed with a curvilinear transducer with both volumetric sweeps and still imaging techniques  FINDINGS: PANCREAS:  Visualized portions show no abnormality  AORTA AND IVC:  Visualized portions are normal for patient age  LIVER: Mild hepatomegaly is present  Liver is echogenic consistent with fatty infiltration  No evidence of mass  Normal directional flow is present within the portal vein  BILIARY: The gallbladder is normal in caliber  No wall thickening or pericholecystic fluid  Gallbladder sludge is present  No sonographic Silverio's sign  No intrahepatic biliary dilatation  CBD measures 2 mm  No choledocholithiasis  KIDNEY: Right kidney measures 11 1 x 5 6 cm  15 mm left renal cyst is noted  Left kidney is otherwise unremarkable    ASCITES:   None  Impression: Gallbladder sludge  Hepatomegaly and hepatic steatosis  Workstation performed: [id number]     Cta Ed Chest Pe Study    Result Date: 7/23/2018  Narrative: CTA - CHEST WITH IV CONTRAST - PULMONARY ANGIOGRAM INDICATION:   SOB, new onset afib, cancer pt  COMPARISON: March 15, 2018 TECHNIQUE: CTA examination of the chest was performed using angiographic technique according to a protocol specifically tailored to evaluate for pulmonary embolism  Axial, sagittal, and coronal 2D reformatted images were created from the source data and  submitted for interpretation    In addition, coronal 3D MIP postprocessing was performed on the acquisition scanner  Radiation dose length product (DLP) for this visit:  379 mGy-cm   This examination, like all CT scans performed in the [facility], was performed utilizing techniques to minimize radiation dose exposure, including the use of iterative reconstruction and automated exposure control  IV Contrast:  85 mL of iodixanol (VISIPAQUE)  FINDINGS: PULMONARY ARTERIAL TREE:  Evaluation is degraded by streak artifact from the dense contrast in the SVC  There is no occlusive pulmonary embolism seen in the main pulmonary artery and their central lobar branches Evaluation for segmental subsegmental vessels limited LUNGS: No acute consolidation seen  PLEURA:  Patient is known to have nodular the metastatic disease in the left pleura  The nodular implants in the left upper lobe have become more confluent  A nodular implant seen in the left upper lung, in image 76 of series 2, previously is larger There is increasing consolidation seen at the left lung base Mild pleural thickening is also noted on the right side HEART/AORTA:  There is development of moderate large pericardial effusion, new from the previous study this demonstrates attenuation of 13 Hounsfield unit MEDIASTINUM AND RAE:  Anterior mediastinal lymph nodes are seen  The largest a anterior mediastinal lymph node measures about 8 7 mm  Nodularity in the anterior mediastinum noted at the anterior aspect of the left hemithorax, new from the previous study Distention of the superior pericardial recess is seen Lower right paratracheal lymph nodes minimal 9 mm are stable Lower left paratracheal lymph nodes are stable Subcarinal lymph nodes measuring 15 mm are stable CHEST WALL AND LOWER NECK:   Unremarkable   VISUALIZED STRUCTURES IN THE UPPER ABDOMEN:  Liver, spleen, pancreas, right adrenal gland appear unremarkable OSSEOUS STRUCTURES:  No acute compression collapse of the vertebra seen Sclerotic density seen within the in the T6 vertebra suggest bony metastatic disease, this has become larger from the previous study, extending into the left pedicle  Mild sclerosis in the bilateral 2nd ribs is also noted     Impression: Limited study due to degradation by motion plus streak artifact from the dense contrast in the SVC There is no occlusive thrombus in the main pulmonary artery and central lobar branches of the pulmonary arteries Development of moderate to large pericardial effusion  Nodular pleural metastatic disease with the few foci of the nonmeasurable metastatic disease becoming less prominent however there is increasing infiltration in the anterior mediastinum adjacent to the anterior left pleura along with few nodules in the superior mediastinum suggesting new metastatic disease and progression Increasing sclerosis noted in the T6 vertebra Paratracheal lymph nodes are decreasing in size  I personally discussed this study with DEY GARCIA on 7/23/2018 at 2:17 PM   Workstation performed: [id number]       Labs:   Lab Results   Component Value Date    WBC 11 72 (H) 07/30/2018    HGB 12 8 07/30/2018    HCT 41 2 07/30/2018    MCV 96 07/30/2018     07/30/2018     Lab Results   Component Value Date     07/30/2018    K 4 3 07/30/2018     07/30/2018    CO2 27 07/30/2018    ANIONGAP 6 07/30/2018    BUN 12 07/30/2018    CREATININE 0 62 07/30/2018    GLUCOSE 114 07/30/2018    GLUF 101 (H) 04/18/2018    CALCIUM 8 6 07/30/2018    AST 18 07/25/2018    ALT 29 07/25/2018    ALKPHOS 56 07/25/2018    PROT 6 6 07/25/2018    BILITOT 0 96 07/25/2018    EGFR 84 07/30/2018       No results found for: IRON, TIBC, FERRITIN    No results found for: [id number]      ROS:   Review of Systems   Constitutional: Negative for activity change, appetite change, diaphoresis, fatigue, fever and unexpected weight change  HENT: Negative for facial swelling, hearing loss, rhinorrhea, sinus pain, sinus pressure, sneezing, sore throat and tinnitus      Eyes: Negative for photophobia, pain, discharge, redness, itching and visual disturbance  Respiratory: Negative for apnea and chest tightness  Cardiovascular: Negative for chest pain, palpitations and leg swelling  Gastrointestinal: Negative for abdominal distention, abdominal pain, blood in stool, constipation, diarrhea, nausea, rectal pain and vomiting  Endocrine: Negative for cold intolerance, heat intolerance, polydipsia and polyphagia  Genitourinary: Negative for difficulty urinating, dyspareunia, frequency, hematuria, pelvic pain and urgency  Musculoskeletal: Negative for arthralgias, back pain, gait problem, joint swelling and myalgias  Skin: Negative for color change, pallor and rash  Allergic/Immunologic: Negative for environmental allergies and food allergies  Neurological: Negative for dizziness, tremors, seizures, syncope, speech difficulty, numbness and headaches  Hematological: Negative for adenopathy  Does not bruise/bleed easily  Psychiatric/Behavioral: Negative for agitation, confusion, dysphoric mood, hallucinations and suicidal ideas  Current Medications: Reviewed  Allergies: Reviewed  PMH/FH/SH:  Reviewed      Physical Exam:    Body surface area is 1 69 meters squared  Wt Readings from Last 3 Encounters:   08/21/18 69 9 kg (154 lb)   08/15/18 71 kg (156 lb 9 6 oz)   08/01/18 74 4 kg (164 lb 0 4 oz)        Temp Readings from Last 3 Encounters:   08/21/18 (!) 97 3 °F (36 3 °C) (Tympanic)   08/21/18 98 °F (36 7 °C) (Oral)   08/15/18 (!) 97 4 °F (36 3 °C)        BP Readings from Last 3 Encounters:   08/21/18 108/62   08/21/18 140/72   08/15/18 125/57         Pulse Readings from Last 3 Encounters:   08/21/18 55   08/21/18 59   08/15/18 (!) 54        Physical Exam   Constitutional: She is oriented to person, place, and time  She appears well-developed and well-nourished  No distress  HENT:   Head: Normocephalic and atraumatic     Mouth/Throat: Oropharynx is clear and moist  No oropharyngeal exudate  Eyes: Conjunctivae and EOM are normal  Pupils are equal, round, and reactive to light  No scleral icterus  Neck: Normal range of motion  Neck supple  No tracheal deviation present  No thyromegaly present  Cardiovascular: Normal rate and regular rhythm  Exam reveals no gallop and no friction rub  No murmur heard  Pulmonary/Chest: Effort normal  No respiratory distress  She has no wheezes  She has no rales  She exhibits no tenderness  Dullness to percussion and no breath sounds in the left lower lobe of the lung up to a 1/3 of the feild   Abdominal: Soft  Bowel sounds are normal  She exhibits no distension and no mass  There is no tenderness  There is no rebound and no guarding  Musculoskeletal: Normal range of motion  She exhibits no edema  Lymphadenopathy:     She has no cervical adenopathy  Neurological: She is alert and oriented to person, place, and time  Skin: Skin is warm and dry  No rash noted  She is not diaphoretic  No erythema  No pallor  Psychiatric: She has a normal mood and affect  Her behavior is normal  Judgment and thought content normal    Vitals reviewed  Goals and Barriers:  Current Goal: Minimize effects of disease  Barriers: None  Patient's Capacity to Self Care:  Patient is able to self care      Code Status: [unfilled]

## 2018-08-21 NOTE — PROGRESS NOTES
Assessment/Plan:    No problem-specific Assessment & Plan notes found for this encounter  she has some opioid induced constipation and we discussed use of Miralax with goal of having a bowel movement every day  tramadol as needed for breakthrough pain  Acetaminophen 1000 mg q 8  Dexamethasone as adjunct for pain, appetite and low energy  5 Wishes document reviewed and provided  MSW available to provide support  Diagnoses and all orders for this visit:    Paroxysmal atrial fibrillation (HCC)    Pericardial effusion  -     Ambulatory referral to Palliative Care  -     dexamethasone (DECADRON) 1 mg tablet; Take 1 tablet (1 mg total) by mouth daily with breakfast    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 ([address] )  -     Ambulatory referral to Palliative Care  -     traMADol (ULTRAM) 50 mg tablet; Take 1 tablet (50 mg total) by mouth every 6 (six) hours as needed for moderate pain    Type 2 diabetes mellitus without complication, without long-term current use of insulin (HCC)          Subjective:      Patient ID: Marycruz Quiroz is a 80 y o  female  Abimbola Godfrey is an 80year old female who is seen in the outpatient setting at the request of thoracic surgery  Her  and daughter are present  She was seen during her recent hospitalization by the palliative care team  She was diagnosed with NSCLC of the left lower lobe in April of 2017  She has malignant pleural effusion, increasing pulmonary involvement and involvement of T6  She was recently hospitalized for shortness of breath and found to have malignant pericardial effusion with tamponade and required a pericardial window  This occurred despite multiple lines of treatment including most recently with [facility] as immunotherapy  She is feeling better after drainage of her effusion  She verbalizes some understanding of the severity of her stage 4 cancer and that it is not curable   She is hoping to be able to continue some treatment with goal of prolonging her life  She was provided a 5 Wishes document in the hospital but it was never discussed or completed  She is amenable to completing one now  She is agreeable to MSW support  She denies any significant pain other than at the surgical site  She complains mostly about fatigue  She tires easily and cannot do things she used to  Her hope is to regain some endurance and strength  The following portions of the patient's history were reviewed and updated as appropriate: allergies, current medications, past family history, past medical history, past social history, past surgical history and problem list     Review of Systems   Constitutional: Positive for activity change, appetite change and fatigue  Negative for chills, diaphoresis, fever and unexpected weight change  HENT: Negative  Eyes: Negative  Respiratory: Positive for shortness of breath  Cardiovascular: Negative  Gastrointestinal: Positive for constipation  Endocrine: Negative  Genitourinary: Negative  Musculoskeletal: Negative  Skin: Negative  Allergic/Immunologic: Negative  Neurological: Negative  Hematological: Negative  Psychiatric/Behavioral: Negative  Objective:      /72 (BP Location: Right arm, Patient Position: Sitting, Cuff Size: Standard)   Pulse 59   Temp 98 °F (36 7 °C) (Oral)   Resp 18   SpO2 94%          Physical Exam   Constitutional: She appears well-developed and well-nourished  No distress  HENT:   Head: Normocephalic and atraumatic  Right Ear: External ear normal    Left Ear: External ear normal    Nose: Nose normal    Mouth/Throat: Oropharynx is clear and moist  No oropharyngeal exudate  Eyes: Conjunctivae and EOM are normal  Pupils are equal, round, and reactive to light  Right eye exhibits no discharge  Left eye exhibits no discharge  No scleral icterus  Neck: Normal range of motion  Neck supple  No JVD present  No tracheal deviation present  No thyromegaly present  Cardiovascular: Normal rate, regular rhythm, normal heart sounds and intact distal pulses  Pulmonary/Chest: Effort normal and breath sounds normal  No stridor  No respiratory distress  She has no wheezes  She has no rales  She exhibits no tenderness  Abdominal: Soft  Bowel sounds are normal  She exhibits no distension and no mass  There is no tenderness  There is no rebound and no guarding  Musculoskeletal: Normal range of motion  She exhibits no edema, tenderness or deformity  Lymphadenopathy:     She has no cervical adenopathy  Neurological: She is alert  She has normal reflexes  She displays normal reflexes  No cranial nerve deficit  She exhibits normal muscle tone  Coordination normal    Skin: Skin is warm and dry  No rash noted  She is not diaphoretic  No erythema  No pallor  Psychiatric: She has a normal mood and affect  Her behavior is normal  Judgment and thought content normal    Vitals reviewed

## 2018-08-23 NOTE — PATIENT INSTRUCTIONS
Dexamethasone twice a day  Tylenol 1000 mg three times a day  Bowel regimen with Senna or Miralax with goal of daily bowel movement

## 2018-08-27 ENCOUNTER — HOSPITAL ENCOUNTER (OUTPATIENT)
Dept: INFUSION CENTER | Facility: CLINIC | Age: 83
Discharge: HOME | End: 2018-08-27

## 2018-08-27 ENCOUNTER — TELEPHONE (OUTPATIENT)
Dept: CARDIOLOGY CLINIC | Facility: CLINIC | Age: 83
End: 2018-08-27

## 2018-08-27 NOTE — TELEPHONE ENCOUNTER
Received call from [facility], Pts HR 40 today and has been in the low 40s since 8/23, previously in the 50s  BP today 130/74  SOB w/ activity  Denies any dizziness or Lightheadedness  OV 8/29/18  Currently taking Amiodarone 200mg BID, Lopressor 25mg BID  Please advise      C/B [phone]

## 2018-08-27 NOTE — TELEPHONE ENCOUNTER
Spoke with Tejas, patient was supposed to be on metoprolol tartrate 12 5mg twice daily but apparently has been taking 50mg BID  Advised her to hold the dose of metoprolol until she sees me in the office on 8/29  Tejas was going to communicate it to the patient and her   Thank you

## 2018-08-29 ENCOUNTER — OFFICE VISIT (OUTPATIENT)
Dept: CARDIOLOGY CLINIC | Facility: CLINIC | Age: 83
End: 2018-08-29
Payer: MEDICARE

## 2018-08-29 VITALS
SYSTOLIC BLOOD PRESSURE: 122 MMHG | WEIGHT: 152.3 LBS | BODY MASS INDEX: 28.75 KG/M2 | DIASTOLIC BLOOD PRESSURE: 60 MMHG | HEART RATE: 56 BPM | HEIGHT: 61 IN

## 2018-08-29 DIAGNOSIS — I48.0 PAROXYSMAL ATRIAL FIBRILLATION (HCC): Primary | ICD-10-CM

## 2018-08-29 DIAGNOSIS — I31.3 PERICARDIAL EFFUSION: ICD-10-CM

## 2018-08-29 PROCEDURE — 93000 ELECTROCARDIOGRAM COMPLETE: CPT | Performed by: INTERNAL MEDICINE

## 2018-08-29 PROCEDURE — 99214 OFFICE O/P EST MOD 30 MIN: CPT | Performed by: INTERNAL MEDICINE

## 2018-08-29 RX ORDER — AMIODARONE HYDROCHLORIDE 200 MG/1
200 TABLET ORAL DAILY
Qty: 90 TABLET | Refills: 3 | Status: SHIPPED | OUTPATIENT
Start: 2018-08-29 | End: 2018-11-07 | Stop reason: SDUPTHER

## 2018-08-29 NOTE — PROGRESS NOTES
Cardiology Consultation     Lele Sabillon  [id number]  1935  HEART & VASCULAR [facility]  [address] [place] [address] [address]      No diagnosis found  Discussion/Summary: Ms Liz Thomas is a 77-year-old female with past medical history significant for metastatic adenocarcinoma of the lung on chemotherapy, remote history of breast cancer status post lumpectomy in 1992, recent admission in the hospital for malignant pericardial effusion status post window and paroxysmal atrial fibrillation  Patient is here for a post hospital stay follow-up    1  Pericardial effusion with early tamponade secondary to malignant  Effusion status post window on 07/24 with drainage of 500 cc bloody effusion  -  Follow-up echo on 07/30 showed no reaccumulation of effusion  2  Paroxysmal atrial fibrillation in the setting of tamponade  - Patient was in Afib with RVR on presentation and reverted to sinus rhythm and then subsequently had another episode of Afib with RVR at which time she was started on amiodarone and reverted to sinus rhythm and maintained sinus since then  - EKG in the office sinus bradycardia at a rate of 58  Septal infarct  - Patient will be on metoprolol tartrate 25 mg twice daily, amiodarone 200 mg daily  She was taking increased dosages of metoprolol 50 mg twice daily and amiodarone 200 mg twice daily since being discharged from the nursing home  Discussed in detail the current dosage  - Visiting nurse to call my office if she has any further episodes of sinus bradycardia at which time metoprolol dose will be decreased further or stopped  - CHADVASC-3-  Discussed risks benefits of anticoagulation in detail  While she is at increased risk of stroke due to high CHADVASC score, she also has a history of malignant pericardial effusion and is now being started on chemotherapy with Taxotere with known side effects of pancytopenia   Discussed in detail with patient and , they would prefer to continue to defer anticoagulation  Continue Aspirin 81mg daily  3  Metastatic adenocarcinoma of the lung on chemotherapy / left breast cancer status post mastectomy in 80  -  Was on Alimta and carboplatin in the past and most recently on Keytruda and now being started on Taxotere      Follow-up in 3 months  History of Present Illness:  40-year-old female with past medical history significant for Stage IV adenocarcinoma of the lung on chemotherapy with Teresa Vasquez (was on Alimta and Carboplatin in the past)- diagnosed in May 2017 and left breast cancer in 92 s/p mastectomy, hypertension, type 2 diabetes presented to [facility] on 07/23 with symptoms of shortness of breath, also found to be in rapid Afib with RVR, CT chest showed a large pericardial effusion- f/u echo- large loculated circumferential PE with associated hematoma with early diastolic RV collapse- underwent window with drainage of 500cc of blood effusion on 7/24- pathology- malignant pericardial effusion, reverted to sinus rhythm while in hospital but went back to Afib on 7/26, borderline hypotension and did not tolerate CCB/BB and was started on Amiodarone and then metoprolol- given malignant effusion with overall poor prognosis, anticoagulation was deferred and she was maintained on ASA 81mg daily, she reverted back to sinus rhythm and maintained sinus since 7/27  She was discharged on 8/1 to rehab facility and subsequently home  Patient at discharge from [facility] [place] was supposed to be on metoprolol tartrate 25mg BID and Amiodarone 200mg BID until August 3 and then 200mg daily however when she was discharged from rehab, she was sent home on metop 50mg BID and Amio 200mg BID  Received a call from the visiting nurse on 08/27 that the patient was bradycardic in the low 40s  Patient reports no symptoms of chest pain or pressure or palpitations  Reports shortness of breath with exertion which has been stable   No swelling of the lower extremities  No dizziness or lightheadedness  Reports symptoms of fatigue which she says have improved since being on Prednisone and decreasing the dose of metop to 25 BID since Monday  She is also going to be started on chemotherapy with Docetaxel for metastatic adenocarcinoma of the lung  Patient Active Problem List   Diagnosis    Lung cancer ([facility] )    Diabetes mellitus ([place] [address] )    Hypertension    Pericardial effusion    Atrial fibrillation (HCC)    Sepsis ([place] [address] )    HAP (hospital-acquired pneumonia)    Type 2 myocardial infarction ([place] [address] )    Continuous opioid dependence ([place] [address] )    HCAP (healthcare-associated pneumonia)    Pleural metastasis (HCC)     Past Medical History:   Diagnosis Date    TANYA (acute kidney injury) ([place] [address] ) 7/24/2018    Breast cancer, left ([place] [address] )     Diabetes mellitus ([place] [address] )     HAP (hospital-acquired pneumonia) 7/24/2018    Hypertension     Lung cancer ([place] [address] )     Left Lower Lobe     NSTEMI (non-ST elevated myocardial infarction) ([place] [address] ) 7/24/2018    Pericardial effusion     Rapid atrial fibrillation (HCC)      Social History     Social History    Marital status: /Civil Union     Spouse name: N/A    Number of children: N/A    Years of education: N/A     Occupational History    Not on file       Social History Main Topics    Smoking status: Former Smoker     Packs/day: 0 50     Years: 10 00     Quit date: 1960    Smokeless tobacco: Never Used      Comment: Socially     Alcohol use No    Drug use: No    Sexual activity: No     Other Topics Concern    Not on file     Social History Narrative    No narrative on file      Family History   Problem Relation Age of Onset    Heart attack Mother     Breast cancer Father 79    Breast cancer Sister 61    Pancreatic cancer Sister 79    Lung cancer Brother 79        Smoker     Breast cancer Sister 36    Pancreatic cancer Brother 79    Colon cancer Brother 61     Past Surgical History:   Procedure Laterality Date    FRACTURE SURGERY      R arm surgery    MASTECTOMY      L breast with implant    RI INCIS HEART [facility] FOR DRAIN N/A 7/24/2018    Procedure: WINDOW PERICARDIAL   Subxyphoid approach ;  Surgeon: Carlene Stephens MD;  Location: [facility];  Service: Thoracic    REDUCTION MAMMAPLASTY      R breast       Current Outpatient Prescriptions:     acetaminophen (TYLENOL) 500 mg tablet, Take 500 mg by mouth, Disp: , Rfl:     ALPRAZolam (XANAX) 0 5 mg tablet, Take 0 5 mg by mouth daily at bedtime as needed for anxiety, Disp: , Rfl:     amiodarone 200 mg tablet, Take 1 tablet (200 mg total) by mouth 2 (two) times a day with meals, Disp: 30 tablet, Rfl: 0    aspirin (ECOTRIN LOW STRENGTH) 81 mg EC tablet, Take 81 mg by mouth daily, Disp: , Rfl:     citalopram (CeleXA) 20 mg tablet, Take 20 mg by mouth daily, Disp: , Rfl:     cyanocobalamin 1000 MCG tablet, Take 100 mcg by mouth daily, Disp: , Rfl:     dexamethasone (DECADRON) 1 mg tablet, Take 1 tablet (1 mg total) by mouth daily with breakfast, Disp: 30 tablet, Rfl: 0    fentaNYL (DURAGESIC) 12 mcg/hr TD 72 hr patch, Place 1 patch on the skin every third day Max Daily Amount: 1 patch, Disp: 10 patch, Rfl: 0    folic acid (FOLVITE) 1 mg tablet, Take 1 mg by mouth daily, Disp: , Rfl:     HYDROcodone-acetaminophen (XODOL) 5-300 MG per tablet, Take 1 tablet by mouth every 6 (six) hours as needed for moderate pain, Disp: , Rfl:     meclizine (ANTIVERT) 12 5 MG tablet, 1-2 tabs 3 x daily as needed for dizziness, Disp: , Rfl:     metFORMIN (GLUCOPHAGE) 500 mg tablet, Take 500 mg by mouth daily with breakfast, Disp: , Rfl:     metoprolol tartrate (LOPRESSOR) 50 mg tablet, Take 25 mg by mouth every 12 (twelve) hours, Disp: , Rfl:     Omega-3 Fatty Acids (FISH OIL) 500 MG CAPS, Take by mouth daily, Disp: , Rfl:     ondansetron (ZOFRAN-ODT) 4 mg disintegrating tablet, Take 1 tablet (4 mg total) by mouth every 6 (six) hours as needed for nausea or vomiting, Disp: 20 tablet, Rfl: 0    traMADol (ULTRAM) 50 mg tablet, Take 1 tablet (50 mg total) by mouth every 6 (six) hours as needed for moderate pain, Disp: 60 tablet, Rfl: 0  Allergies   Allergen Reactions    Atorvastatin Other (See Comments)    Benzonatate Other (See Comments)    Carboplatin      Pt had allergic reaction during 8th carbo dose    Sulfa Antibiotics     Bactrim [Sulfamethoxazole-Trimethoprim] Rash    Latex Rash    Other Rash     Pt states she gets a rash from surgical tape, she is ok with paper tape         Labs:  Admission on 07/23/2018, Discharged on 08/01/2018   No results displayed because visit has over 200 results        Admission on 07/23/2018, Discharged on 07/23/2018   Component Date Value    WBC 07/23/2018 14 29*    RBC 07/23/2018 4 75     Hemoglobin 07/23/2018 14 8     Hematocrit 07/23/2018 45 3     MCV 07/23/2018 95     MCH 07/23/2018 31 2     MCHC 07/23/2018 32 7     RDW 07/23/2018 13 8     MPV 07/23/2018 11 9     Platelets 10/43/4548 247     nRBC 07/23/2018 0     Neutrophils Relative 07/23/2018 69     Lymphocytes Relative 07/23/2018 19     Monocytes Relative 07/23/2018 10     Eosinophils Relative 07/23/2018 2     Basophils Relative 07/23/2018 1     Neutrophils Absolute 07/23/2018 9 87*    Lymphocytes Absolute 07/23/2018 2 68     Monocytes Absolute 07/23/2018 1 40*    Eosinophils Absolute 07/23/2018 0 27     Basophils Absolute 07/23/2018 0 07     Magnesium 07/23/2018 1 9     Troponin I 07/23/2018 0 20*    TSH 3RD GENERATON 07/23/2018 2 185     Protime 07/23/2018 14 6*    INR 07/23/2018 1 13     PTT 07/23/2018 32     NT-proBNP 07/23/2018 339     Sodium 07/23/2018 137     Potassium 07/23/2018 4 3     Chloride 07/23/2018 100     CO2 07/23/2018 20*    ANION GAP 07/23/2018 17*    BUN 07/23/2018 19     Creatinine 07/23/2018 1 21     Glucose 07/23/2018 224*    Calcium 07/23/2018 9 9     AST 07/23/2018 42     ALT 07/23/2018 51     Alkaline Phosphatase 07/23/2018 76     Total Protein 07/23/2018 8 1     Albumin 07/23/2018 3 4*    Total Bilirubin 07/23/2018 1 72*    eGFR 07/23/2018 42     Ventricular Rate 07/23/2018 177     Atrial Rate 07/23/2018 416     QRSD Interval 07/23/2018 74     QT Interval 07/23/2018 252     QTC Interval 07/23/2018 432     QRS Axis 07/23/2018 0     T Wave Axis 07/23/2018 -23    Appointment on 06/21/2018   Component Date Value    WBC 06/21/2018 12 10*    Adjusted WBC 06/21/2018 12 10*    RBC 06/21/2018 4 97     Hemoglobin 06/21/2018 14 6     Hematocrit 06/21/2018 47 4*    MCV 06/21/2018 95     MCH 06/21/2018 29 3     MCHC 06/21/2018 30 7*    RDW 06/21/2018 16 0*    MPV 06/21/2018 9 0     Platelets 00/54/2617 208     Sodium 06/21/2018 143     Potassium 06/21/2018 4 2     Chloride 06/21/2018 102     CO2 06/21/2018 26     ANION GAP 06/21/2018 15*    BUN 06/21/2018 11     Creatinine 06/21/2018 0 60     Glucose 06/21/2018 116     Calcium 06/21/2018 9 8     AST 06/21/2018 37     ALT 06/21/2018 20     Alkaline Phosphatase 06/21/2018 78     Total Protein 06/21/2018 7 3     Albumin 06/21/2018 3 2*    Total Bilirubin 06/21/2018 0 70     eGFR 06/21/2018 85     TSH 3RD GENERATON 06/21/2018 1 620     Segmented % 06/21/2018 64     Bands % 06/21/2018 0     Lymphocytes % 06/21/2018 30     Monocytes % 06/21/2018 4     Eosinophils % 06/21/2018 2     Basophils % 06/21/2018 0     Neutrophils Absolute 06/21/2018 7 74*    Lymphocytes Absolute 06/21/2018 3 63     Monocytes Absolute 06/21/2018 0 48     Eosinophils Absolute 06/21/2018 0 24     Basophils Absolute 06/21/2018 0 00     Total Counted 06/21/2018 100     Anisocytosis 06/21/2018 Present     Ovalocytes 06/21/2018 Present     Platelet Estimate 13/99/2457 Adequate    Appointment on 05/10/2018   Component Date Value    WBC 05/10/2018 7 50     Adjusted WBC 05/10/2018 7 50     RBC 05/10/2018 4 86     Hemoglobin 05/10/2018 14 2     Hematocrit 05/10/2018 45 6     MCV 05/10/2018 94     MCH 05/10/2018 29 3     MCHC 05/10/2018 31 2*    RDW 05/10/2018 14 1     MPV 05/10/2018 7 5*    Platelets 66/96/4535 348     Sodium 05/10/2018 143     Potassium 05/10/2018 4 2     Chloride 05/10/2018 104     CO2 05/10/2018 24     ANION GAP 05/10/2018 15*    BUN 05/10/2018 13     Creatinine 05/10/2018 0 80     Glucose 05/10/2018 204*    Calcium 05/10/2018 9 9     AST 05/10/2018 30     ALT 05/10/2018 28     Alkaline Phosphatase 05/10/2018 95     Total Protein 05/10/2018 7 5     Albumin 05/10/2018 3 6     Total Bilirubin 05/10/2018 0 70     eGFR 05/10/2018 69     Segmented % 05/10/2018 59     Bands % 05/10/2018 11*    Lymphocytes % 05/10/2018 24     Monocytes % 05/10/2018 4     Eosinophils % 05/10/2018 1     Basophils % 05/10/2018 0     Atypical Lymphocytes % 05/10/2018 1*    Neutrophils Absolute 05/10/2018 5 25     Lymphocytes Absolute 05/10/2018 1 80     Monocytes Absolute 05/10/2018 0 30     Eosinophils Absolute 05/10/2018 0 08     Basophils Absolute 05/10/2018 0 00     Total Counted 05/10/2018 100     Anisocytosis 05/10/2018 Present     Platelet Estimate 61/32/1842 Adequate    Hospital Outpatient Visit on 04/20/2018   Component Date Value    Case Report 04/20/2018                      Value:Surgical Pathology Report                         Case: [id number]                                   Authorizing Provider: Bishnu Hickman MD          Collected:           04/20/2018 0845              Ordering Location:     [address] [place] [address]      Received:            04/20/2018 [address] CAT Scan                                                            Pathologist:           Noé Kaye MD                                                        Specimen:    Lung, Left lower lobe                                                                      Addendum 04/20/2018                      Value: This result contains rich text formatting which cannot be displayed here   Final Diagnosis 04/20/2018                      Value: This result contains rich text formatting which cannot be displayed here   Additional Information 04/20/2018                      Value: This result contains rich text formatting which cannot be displayed here   Intraoperative Consultat* 04/20/2018                      Value: This result contains rich text formatting which cannot be displayed here  [employer] Gross Description 04/20/2018                      Value: This result contains rich text formatting which cannot be displayed here      Clinical Information 04/20/2018                      Value:stage IV adenocarcinoma, send for molecular tests   Hospital Outpatient Visit on 04/20/2018   Component Date Value    Scan Result 04/20/2018  MI PROFILE    Appointment on 04/18/2018   Component Date Value    WBC 04/18/2018 13 50*    RBC 04/18/2018 4 80     Hemoglobin 04/18/2018 14 9     Hematocrit 04/18/2018 47 2*    MCV 04/18/2018 98     MCH 04/18/2018 31 0     MCHC 04/18/2018 31 6     RDW 04/18/2018 13 3     MPV 04/18/2018 11 8     Platelets 66/42/8885 256     nRBC 04/18/2018 0     Neutrophils Relative 04/18/2018 57     Lymphocytes Relative 04/18/2018 25     Monocytes Relative 04/18/2018 10     Eosinophils Relative 04/18/2018 8*    Basophils Relative 04/18/2018 0     Neutrophils Absolute 04/18/2018 7 62     Lymphocytes Absolute 04/18/2018 3 43     Monocytes Absolute 04/18/2018 1 31*    Eosinophils Absolute 04/18/2018 1 01*    Basophils Absolute 04/18/2018 0 06     Sodium 04/18/2018 141     Potassium 04/18/2018 4 0     Chloride 04/18/2018 108     CO2 04/18/2018 24     ANION GAP 04/18/2018 9     BUN 04/18/2018 13     Creatinine 04/18/2018 0 60     Glucose, Fasting 04/18/2018 101*    Calcium 04/18/2018 9 6     AST 04/18/2018 30     ALT 04/18/2018 26     Alkaline Phosphatase 04/18/2018 91     Total Protein 04/18/2018 7 5  Albumin 04/18/2018 3 3*    Total Bilirubin 04/18/2018 0 50     eGFR 04/18/2018 85    Admission on 03/28/2018, Discharged on 03/28/2018   Component Date Value    Protime 03/28/2018 14 0     INR 03/28/2018 1 08     Case Report 03/28/2018                      Value:Surgical Pathology Report                         Case: [id number]                                   Authorizing Provider: Leonor Ortiz MD          Collected:           03/28/2018 1035              [facility]      Received:            03/28/2018 [address]                                                    Pathologist:           Capo Walter MD                                                        Specimen:    Lung, Left Lower Lobe, LLL Lung                                                            Addendum 03/28/2018                      Value: This result contains rich text formatting which cannot be displayed here   Final Diagnosis 03/28/2018                      Value: This result contains rich text formatting which cannot be displayed here   Note 03/28/2018                      Value: This result contains rich text formatting which cannot be displayed here   Additional Information 03/28/2018                      Value: This result contains rich text formatting which cannot be displayed here  [employer] Gross Description 03/28/2018                      Value: This result contains rich text formatting which cannot be displayed here      POC Glucose 03/28/2018 134     Scan Result 03/28/2018 MI PROFILE     Appointment on 03/05/2018   Component Date Value    WBC 03/05/2018 13 75*    RBC 03/05/2018 4 55     Hemoglobin 03/05/2018 14 4     Hematocrit 03/05/2018 44 0     MCV 03/05/2018 97     MCH 03/05/2018 31 6     MCHC 03/05/2018 32 7     RDW 03/05/2018 14 0     MPV 03/05/2018 11 1     Platelets 56/86/4697 299     nRBC 03/05/2018 0     Neutrophils Relative 03/05/2018 79*    Lymphocytes Relative 03/05/2018 13*    Monocytes Relative 03/05/2018 4     Eosinophils Relative 03/05/2018 4     Basophils Relative 03/05/2018 0     Neutrophils Absolute 03/05/2018 10 76*    Lymphocytes Absolute 03/05/2018 1 84     Monocytes Absolute 03/05/2018 0 51     Eosinophils Absolute 03/05/2018 0 48     Basophils Absolute 03/05/2018 0 04     Sodium 03/05/2018 138     Potassium 03/05/2018 4 2     Chloride 03/05/2018 104     CO2 03/05/2018 25     ANION GAP 03/05/2018 9     BUN 03/05/2018 21     Creatinine 03/05/2018 0 76     Glucose 03/05/2018 148*    Calcium 03/05/2018 9 7     AST 03/05/2018 35     ALT 03/05/2018 46     Alkaline Phosphatase 03/05/2018 113     Total Protein 03/05/2018 7 8     Albumin 03/05/2018 3 4*    Total Bilirubin 03/05/2018 0 64     eGFR 03/05/2018 73     CEA 03/05/2018 1 8         Imaging: No results found  ECG:   Sinus bradycardia at a rate of 58  Septal infarct  Review of Systems:  Review of Systems   Constitutional: Positive for fatigue  Negative for activity change, appetite change, chills, diaphoresis and fever  HENT: Negative for congestion  Respiratory: Positive for shortness of breath  Negative for cough, chest tightness and wheezing  Cardiovascular: Negative for chest pain, palpitations and leg swelling  Gastrointestinal: Negative for abdominal pain, diarrhea, nausea and vomiting  Genitourinary: Negative for difficulty urinating and dysuria  Musculoskeletal: Negative for back pain  Skin: Negative for color change and rash  Neurological: Negative for dizziness, syncope, facial asymmetry, weakness, light-headedness, numbness and headaches  Psychiatric/Behavioral: Negative for confusion  There were no vitals filed for this visit  There were no vitals filed for this visit          Physical Exam:  General appearance:  Appears stated age, alert, well appearing and in no distress  HEENT: PERRLA, EOMI, no scleral icterus, no conjunctival pallor  NECK:  Supple, No elevated JVP, no thyromegaly, no carotid bruits  HEART:  Regular rate and rhythm, normal S1/S2, no S3/S4, no murmur or rub  LUNGS:  Clear to auscultation bilaterally  ABDOMEN:  Soft, non-tender, positive bowel sounds, no rebound or guarding, no organomegaly   EXTREMITIES:  No edema  VASCULAR:  Normal pedal pulses   SKIN: No lesions or rashes on exposed skin  NEURO:  CN II-XII intact, no focal deficits

## 2018-08-29 NOTE — PATIENT INSTRUCTIONS
You will be taking Metoprolol tartrate 25mg twice a day and Amiodarone 200mg daily  Start taking Aspirin 81mg daily  Will follow-up in three months

## 2018-08-30 ENCOUNTER — TELEPHONE (OUTPATIENT)
Dept: CARDIOLOGY CLINIC | Facility: CLINIC | Age: 83
End: 2018-08-30

## 2018-08-30 ENCOUNTER — TELEPHONE (OUTPATIENT)
Dept: HEMATOLOGY ONCOLOGY | Facility: CLINIC | Age: 83
End: 2018-08-30

## 2018-08-30 ENCOUNTER — HOSPITAL ENCOUNTER (OUTPATIENT)
Dept: INFUSION CENTER | Facility: CLINIC | Age: 83
Discharge: HOME | End: 2018-08-30
Payer: MEDICARE

## 2018-08-30 VITALS — HEIGHT: 59 IN | BODY MASS INDEX: 30.53 KG/M2 | WEIGHT: 151.46 LBS

## 2018-08-30 LAB
ALBUMIN SERPL BCP-MCNC: 3.3 G/DL (ref 3.5–5.7)
ALP SERPL-CCNC: 99 U/L (ref 46–116)
ALT SERPL W P-5'-P-CCNC: 33 U/L (ref 12–78)
ANION GAP SERPL CALCULATED.3IONS-SCNC: 11 MMOL/L (ref 4–13)
AST SERPL W P-5'-P-CCNC: 32 U/L (ref 5–45)
BASOPHILS # BLD AUTO: 0.1 THOUSANDS/ΜL (ref 0–0.1)
BASOPHILS NFR BLD AUTO: 1 % (ref 0–1)
BILIRUB SERPL-MCNC: 0.6 MG/DL (ref 0.2–1)
BUN SERPL-MCNC: 22 MG/DL (ref 5–25)
CALCIUM SERPL-MCNC: 10.1 MG/DL (ref 8.3–10.1)
CHLORIDE SERPL-SCNC: 104 MMOL/L (ref 98–108)
CO2 SERPL-SCNC: 25 MMOL/L (ref 21–32)
CREAT SERPL-MCNC: 0.7 MG/DL (ref 0.6–1.3)
EOSINOPHIL # BLD AUTO: 1.45 THOUSAND/ΜL (ref 0–0.61)
EOSINOPHIL NFR BLD AUTO: 7 % (ref 0–6)
ERYTHROCYTE [DISTWIDTH] IN BLOOD BY AUTOMATED COUNT: 13.4 % (ref 11.6–15.1)
GFR SERPL CREATININE-BSD FRML MDRD: 80 ML/MIN/1.73SQ M
GLUCOSE SERPL-MCNC: 106 MG/DL (ref 65–140)
HCT VFR BLD AUTO: 48.4 % (ref 34.8–46.1)
HGB BLD-MCNC: 15.3 G/DL (ref 11.5–15.4)
IMM GRANULOCYTES # BLD AUTO: 0.12 THOUSAND/UL (ref 0–0.2)
IMM GRANULOCYTES NFR BLD AUTO: 1 % (ref 0–2)
LYMPHOCYTES # BLD AUTO: 3.72 THOUSANDS/ΜL (ref 0.6–4.47)
LYMPHOCYTES NFR BLD AUTO: 19 % (ref 14–44)
MCH RBC QN AUTO: 29.8 PG (ref 26.8–34.3)
MCHC RBC AUTO-ENTMCNC: 31.6 G/DL (ref 31.4–37.4)
MCV RBC AUTO: 94 FL (ref 82–98)
MONOCYTES # BLD AUTO: 1.89 THOUSAND/ΜL (ref 0.17–1.22)
MONOCYTES NFR BLD AUTO: 10 % (ref 4–12)
NEUTROPHILS # BLD AUTO: 12.7 THOUSANDS/ΜL (ref 1.85–7.62)
NEUTS SEG NFR BLD AUTO: 62 % (ref 43–75)
NRBC BLD AUTO-RTO: 0 /100 WBCS
PLATELET # BLD AUTO: 309 THOUSANDS/UL (ref 149–390)
PMV BLD AUTO: 11.5 FL (ref 8.9–12.7)
POTASSIUM SERPL-SCNC: 4.2 MMOL/L (ref 3.5–5.3)
PROT SERPL-MCNC: 7.7 G/DL (ref 6.4–8.2)
RBC # BLD AUTO: 5.13 MILLION/UL (ref 3.81–5.12)
SODIUM SERPL-SCNC: 140 MMOL/L (ref 136–145)
WBC # BLD AUTO: 19.98 THOUSAND/UL (ref 4.31–10.16)

## 2018-08-30 PROCEDURE — 80053 COMPREHEN METABOLIC PANEL: CPT | Performed by: INTERNAL MEDICINE

## 2018-08-30 PROCEDURE — 85025 COMPLETE CBC W/AUTO DIFF WBC: CPT | Performed by: INTERNAL MEDICINE

## 2018-08-30 RX ORDER — SODIUM CHLORIDE 9 MG/ML
20 INJECTION, SOLUTION INTRAVENOUS CONTINUOUS
Status: DISCONTINUED | OUTPATIENT
Start: 2018-08-30 | End: 2018-08-30

## 2018-08-30 NOTE — TELEPHONE ENCOUNTER
Tejas called back, interaction between ASA & Celexa, Celexa & Dexamethasone and Celexa and Lopressor  [facility] wanted to make you aware

## 2018-08-30 NOTE — PROGRESS NOTES
Patient arrived for her taxotere infusion however she needed same day labs  The lab was not available to process her blood work in time for her treatment  Patient and her son were agreeable to returning tomorrow for her treatment  Labs were drawn peripherally

## 2018-08-31 ENCOUNTER — HOSPITAL ENCOUNTER (OUTPATIENT)
Dept: INFUSION CENTER | Facility: CLINIC | Age: 83
Discharge: HOME | End: 2018-08-31
Payer: MEDICARE

## 2018-08-31 ENCOUNTER — TELEPHONE (OUTPATIENT)
Dept: RADIOLOGY | Facility: HOSPITAL | Age: 83
End: 2018-08-31

## 2018-08-31 VITALS
TEMPERATURE: 97.5 F | HEIGHT: 59 IN | RESPIRATION RATE: 18 BRPM | DIASTOLIC BLOOD PRESSURE: 72 MMHG | SYSTOLIC BLOOD PRESSURE: 122 MMHG | BODY MASS INDEX: 30.53 KG/M2 | HEART RATE: 89 BPM | WEIGHT: 151.46 LBS

## 2018-08-31 DIAGNOSIS — C34.90 MALIGNANT NEOPLASM OF LUNG, UNSPECIFIED LATERALITY, UNSPECIFIED PART OF LUNG (HCC): Primary | ICD-10-CM

## 2018-08-31 PROCEDURE — 96413 CHEMO IV INFUSION 1 HR: CPT

## 2018-08-31 PROCEDURE — 96367 TX/PROPH/DG ADDL SEQ IV INF: CPT

## 2018-08-31 RX ORDER — SODIUM CHLORIDE 9 MG/ML
75 INJECTION, SOLUTION INTRAVENOUS CONTINUOUS
Status: CANCELLED | OUTPATIENT
Start: 2018-08-31

## 2018-08-31 RX ADMIN — DOCETAXEL 26 MG: 20 INJECTION, SOLUTION, CONCENTRATE INTRAVENOUS at 10:45

## 2018-08-31 RX ADMIN — SODIUM CHLORIDE 500 ML: 0.9 INJECTION, SOLUTION INTRAVENOUS at 09:55

## 2018-08-31 RX ADMIN — Medication: at 10:08

## 2018-08-31 NOTE — PLAN OF CARE
Problem: Potential for Falls  Goal: Patient will remain free of falls  INTERVENTIONS:  - Assess patient frequently for physical needs  -  Identify cognitive and physical deficits and behaviors that affect risk of falls    -  [place] fall precautions as indicated by assessment   - Educate patient/family on patient safety including physical limitations  - Instruct patient to call for assistance with activity based on assessment  - Modify environment to reduce risk of injury  - Consider OT/PT consult to assist with strengthening/mobility   Outcome: Progressing

## 2018-09-04 ENCOUNTER — TELEPHONE (OUTPATIENT)
Dept: CARDIOLOGY CLINIC | Facility: CLINIC | Age: 83
End: 2018-09-04

## 2018-09-04 ENCOUNTER — OFFICE VISIT (OUTPATIENT)
Dept: PALLIATIVE MEDICINE | Facility: CLINIC | Age: 83
End: 2018-09-04
Payer: MEDICARE

## 2018-09-04 VITALS
SYSTOLIC BLOOD PRESSURE: 105 MMHG | RESPIRATION RATE: 18 BRPM | DIASTOLIC BLOOD PRESSURE: 52 MMHG | TEMPERATURE: 97.7 F | HEART RATE: 52 BPM

## 2018-09-04 DIAGNOSIS — C34.82 MALIGNANT NEOPLASM OF OVERLAPPING SITES OF LEFT LUNG (HCC): Primary | ICD-10-CM

## 2018-09-04 DIAGNOSIS — I48.0 PAROXYSMAL ATRIAL FIBRILLATION (HCC): ICD-10-CM

## 2018-09-04 DIAGNOSIS — C34.32 MALIGNANT NEOPLASM OF LOWER LOBE OF LEFT LUNG (HCC): ICD-10-CM

## 2018-09-04 DIAGNOSIS — I31.3 PERICARDIAL EFFUSION: ICD-10-CM

## 2018-09-04 PROCEDURE — 99213 OFFICE O/P EST LOW 20 MIN: CPT | Performed by: FAMILY MEDICINE

## 2018-09-04 RX ORDER — ONDANSETRON 4 MG/1
4 TABLET, ORALLY DISINTEGRATING ORAL EVERY 6 HOURS PRN
Qty: 20 TABLET | Refills: 5 | Status: SHIPPED | OUTPATIENT
Start: 2018-09-04 | End: 2018-09-20 | Stop reason: SDUPTHER

## 2018-09-04 NOTE — PROGRESS NOTES
Assessment/Plan:    No problem-specific Assessment & Plan notes found for this encounter  tolerating new chemotherapy so far  Dexamethasone helpful - increased energy and increased appetite  No pain  No constipation  Diagnoses and all orders for this visit:    Malignant neoplasm of overlapping sites of left lung (HCC)    Paroxysmal atrial fibrillation (HCC)    Pericardial effusion          Subjective:      Patient ID: Yessy Christy is a 80 y o  female  Pat Splinter returns with her daughter in outpatient follow-up  PDMP query shows no concerns  She has started taxotere and has had no adverse effefts so far  Dexamethasone has been helpful, she has more energy and a better appetite and she is pleased  No pain issues  No new issues or events  The following portions of the patient's history were reviewed and updated as appropriate: allergies, current medications, past medical history, past social history, past surgical history and problem list     Review of Systems   Constitutional: Negative  HENT: Negative  Eyes: Negative  Respiratory: Negative  Cardiovascular: Negative  Gastrointestinal: Negative  Endocrine: Negative  Genitourinary: Negative  Musculoskeletal: Negative  Skin: Negative  Allergic/Immunologic: Negative  Neurological: Negative  Hematological: Negative  Psychiatric/Behavioral: Negative  Objective:      /52 (BP Location: Right arm, Patient Position: Sitting, Cuff Size: Standard)   Pulse (!) 52   Temp 97 7 °F (36 5 °C) (Oral)   Resp 18          Physical Exam   Constitutional: She is oriented to person, place, and time  She appears well-developed and well-nourished  No distress  HENT:   Head: Normocephalic and atraumatic  Right Ear: External ear normal    Left Ear: External ear normal    Nose: Nose normal    Mouth/Throat: Oropharynx is clear and moist  No oropharyngeal exudate     Eyes: Conjunctivae and EOM are normal  Pupils are equal, round, and reactive to light  Right eye exhibits no discharge  Left eye exhibits no discharge  No scleral icterus  Neck: Normal range of motion  Neck supple  No JVD present  No tracheal deviation present  No thyromegaly present  Cardiovascular: Normal rate, regular rhythm, normal heart sounds and intact distal pulses  Pulmonary/Chest: Effort normal and breath sounds normal  No stridor  Abdominal: Soft  Bowel sounds are normal    Musculoskeletal: Normal range of motion  She exhibits no edema, tenderness or deformity  Lymphadenopathy:     She has no cervical adenopathy  Neurological: She is alert and oriented to person, place, and time  She has normal reflexes  She displays normal reflexes  No cranial nerve deficit  She exhibits normal muscle tone  Coordination normal    Skin: Skin is warm and dry  No rash noted  She is not diaphoretic  No erythema  No pallor  Psychiatric: She has a normal mood and affect  Her behavior is normal  Judgment and thought content normal    Vitals reviewed

## 2018-09-04 NOTE — TELEPHONE ENCOUNTER
Can we decrease metoprolol tartrate to 12 5mg twice daily  Also, can she please let us know when she gets the next set of vitals  Thank you

## 2018-09-05 ENCOUNTER — HOSPITAL ENCOUNTER (OUTPATIENT)
Dept: RADIOLOGY | Facility: HOSPITAL | Age: 83
Discharge: HOME | End: 2018-09-05
Attending: INTERNAL MEDICINE | Admitting: RADIOLOGY
Payer: MEDICARE

## 2018-09-05 VITALS
DIASTOLIC BLOOD PRESSURE: 69 MMHG | TEMPERATURE: 98 F | WEIGHT: 150 LBS | BODY MASS INDEX: 28.32 KG/M2 | HEIGHT: 61 IN | RESPIRATION RATE: 18 BRPM | SYSTOLIC BLOOD PRESSURE: 140 MMHG | OXYGEN SATURATION: 92 % | HEART RATE: 61 BPM

## 2018-09-05 DIAGNOSIS — C34.82 MALIGNANT NEOPLASM OF OVERLAPPING SITES OF LEFT LUNG (HCC): ICD-10-CM

## 2018-09-05 DIAGNOSIS — R11.0 NAUSEA: Primary | ICD-10-CM

## 2018-09-05 PROCEDURE — 36561 INSERT TUNNELED CV CATH: CPT | Performed by: RADIOLOGY

## 2018-09-05 PROCEDURE — 99152 MOD SED SAME PHYS/QHP 5/>YRS: CPT | Performed by: RADIOLOGY

## 2018-09-05 PROCEDURE — 77001 FLUOROGUIDE FOR VEIN DEVICE: CPT

## 2018-09-05 PROCEDURE — 76937 US GUIDE VASCULAR ACCESS: CPT

## 2018-09-05 PROCEDURE — 76937 US GUIDE VASCULAR ACCESS: CPT | Performed by: RADIOLOGY

## 2018-09-05 PROCEDURE — 99152 MOD SED SAME PHYS/QHP 5/>YRS: CPT

## 2018-09-05 PROCEDURE — 77001 FLUOROGUIDE FOR VEIN DEVICE: CPT | Performed by: RADIOLOGY

## 2018-09-05 PROCEDURE — 36561 INSERT TUNNELED CV CATH: CPT

## 2018-09-05 PROCEDURE — C1788 PORT, INDWELLING, IMP: HCPCS

## 2018-09-05 PROCEDURE — 99153 MOD SED SAME PHYS/QHP EA: CPT

## 2018-09-05 RX ORDER — ONDANSETRON 4 MG/1
4 TABLET, FILM COATED ORAL EVERY 8 HOURS PRN
Qty: 20 TABLET | Refills: 0 | Status: SHIPPED | OUTPATIENT
Start: 2018-09-05 | End: 2019-02-15 | Stop reason: HOSPADM

## 2018-09-05 RX ORDER — SODIUM CHLORIDE 9 MG/ML
20 INJECTION, SOLUTION INTRAVENOUS CONTINUOUS
Status: DISCONTINUED | OUTPATIENT
Start: 2018-09-06 | End: 2018-09-09 | Stop reason: HOSPADM

## 2018-09-05 RX ORDER — FENTANYL CITRATE 50 UG/ML
INJECTION, SOLUTION INTRAMUSCULAR; INTRAVENOUS CODE/TRAUMA/SEDATION MEDICATION
Status: DISCONTINUED | OUTPATIENT
Start: 2018-09-05 | End: 2018-09-05 | Stop reason: HOSPADM

## 2018-09-05 RX ORDER — MIDAZOLAM HYDROCHLORIDE 1 MG/ML
INJECTION INTRAMUSCULAR; INTRAVENOUS CODE/TRAUMA/SEDATION MEDICATION
Status: COMPLETED | OUTPATIENT
Start: 2018-09-05 | End: 2018-09-05

## 2018-09-05 RX ORDER — FENTANYL CITRATE 50 UG/ML
INJECTION, SOLUTION INTRAMUSCULAR; INTRAVENOUS CODE/TRAUMA/SEDATION MEDICATION
Status: COMPLETED | OUTPATIENT
Start: 2018-09-05 | End: 2018-09-05

## 2018-09-05 RX ORDER — SODIUM CHLORIDE 9 MG/ML
75 INJECTION, SOLUTION INTRAVENOUS CONTINUOUS
Status: DISCONTINUED | OUTPATIENT
Start: 2018-09-05 | End: 2018-09-05 | Stop reason: HOSPADM

## 2018-09-05 RX ADMIN — FENTANYL CITRATE 50 MCG: 50 INJECTION, SOLUTION INTRAMUSCULAR; INTRAVENOUS at 12:58

## 2018-09-05 RX ADMIN — FENTANYL CITRATE 50 MCG: 50 INJECTION, SOLUTION INTRAMUSCULAR; INTRAVENOUS at 13:04

## 2018-09-05 RX ADMIN — FENTANYL CITRATE 50 MCG: 50 INJECTION, SOLUTION INTRAMUSCULAR; INTRAVENOUS at 13:10

## 2018-09-05 RX ADMIN — SODIUM CHLORIDE 75 ML/HR: 0.9 INJECTION, SOLUTION INTRAVENOUS at 10:43

## 2018-09-05 RX ADMIN — MIDAZOLAM 1 MG: 1 INJECTION INTRAMUSCULAR; INTRAVENOUS at 12:50

## 2018-09-05 RX ADMIN — FENTANYL CITRATE 50 MCG: 50 INJECTION, SOLUTION INTRAMUSCULAR; INTRAVENOUS at 12:50

## 2018-09-05 RX ADMIN — MIDAZOLAM 1 MG: 1 INJECTION INTRAMUSCULAR; INTRAVENOUS at 12:58

## 2018-09-05 NOTE — DISCHARGE INSTRUCTIONS
Implanted Venous Access Port     WHAT YOU NEED TO KNOW:   An implanted venous access port is a device used to give treatments and take blood  It may also be called a central venous access device (CVAD)  The port is a small container that is placed under your skin, usually in your upper chest  The port is attached to a catheter that enters a large vein  DISCHARGE INSTRUCTIONS:   Resume your normal diet  Small sips of flat soda will help with mild nausea  Prevent an infection:   · Wash your hands often  Use soap and water  Clean your hands before and after you care for your port  Remind everyone who cares for your port to wash their hands  · Check your skin for infection every day  Look for redness, swelling, or fluid oozing from the port site  Care for your port:   1  You may shower beginning 48 hours after procedure  2  Change dressing if it becomes wet  3  Remove dressing after 24 hours  Leave glue in place  4  It is normal for some bruising to occur  5  Use Tylenol for pain  6  Limit use of arm on the side that your port was placed  Lift nothing heavier than 5 pounds for 1 week, and then gradually increase activity as tolerated  7  DO NOT apply ointment, lotion or cream to port site until incision is healed  Allow glue to fall off  DO NOT attempt to peel glue from skin even it it begins to flake  8, After the port incision is healed you may swim, bathe  Notify the Interventional Radiologist if you have any of the followin  Fever above 101 F    2  Increased redness or swelling after 1st day  3  Increased pain after 1st day  4  Any sign of infection (drainage from port site, skin separation, hot to touch)  5  Persistent nausea or vomiting  Contact Interventional Radiology at [phone] [facility] PATIENTS: Contact Interventional Radiology at [phone]) ([address]: Contact Interventional Radiology at [phone])

## 2018-09-05 NOTE — H&P (VIEW-ONLY)
Hematology Outpatient Follow - Up Note  Eric Christensen 80 y o  female MRN: @ Encounter: [id number]        Date:  8/21/2018        Assessment/ Plan:   Heavily pretreated metastatic lung cancer primary in the left lower lobe of the lung with malignant pleural effusion presenting in April 2017 in a patient who never smoked in her life, molecular tests were negative for EGFR mutation, Ross 1 mutation, B Darian mutation, ALK gene rearrangement, PDA expression of 10%, at that time she was treated with Alimta / carboplatin for 6 cycles and then maintenance Alimta with good response clinically and radiologically however later on she had progression of disease in the left pleura with studding, treated with Alimta/carboplatin but she was allergic to carboplatin, with switch treatment to Pembrolizumab from May until August 2018 with progression of disease with malignant pericardial effusion, atrial fibrillation, more studding of the left pleura  Status post pericardiocentesis  1  The patient to be treated with Taxotere 15 milligram/meter squared weekly 3 weeks on 1 week off, side effects of Taxotere that are but not limited to alopecia, nausea, vomiting, fluid retention, pancytopenia with told he agree to proceed  2  Consult IR for Port-A-Cath placement  3  If the patient has progression of disease will treat with gemcitabine  4  Pain management by Dr Zahra Bravo she is on fentanyl patch and dexamethasone 1 mg p o  Daily  5  Dexamethasone 10 mg prior to each Taxotere IV  6  CBC prior to each Taxotere  7  Filgrastim 300 mcg if ANC below 1000  8   Aranesp 200 mcg every month if hemoglobin below 9 9 for anemia induced by chemotherapy           HPI: 31-year-old  female who presented to [facility] in May 2017 with dyspnea for the past 4-5 days and pleurisy, with occasional dry cough scan of the thorax showed no evidence of PE, it showed a large loculated left side pleural effusion with nodularity concerning for metastatic disease, there is a concern for a mass versus pneumonia in the collapsed left lower lobe lung underwent left thoracentesis yielding 1 4 L of bloody fluid, pathology showed adenocarcinoma, positive for TTF-1, CK 7 most likely consistent with non-small cell lung cancer patient had a history of left-sided breast cancer in 1992 status post mastectomy she told me she had told lymph node involvement, she was not treated with either chemotherapy, radiation therapy or hormonal therapy has extensive family history of cancer, sister was diagnosed with breast cancer at age 36, another sister was diagnosed with breast cancer at age 72, a brother diagnosed with pancreatic cancer and another brother with lung cancer niece was found to have BRCA gene mutation      Interval History:        Previous Treatment:  Carboplatin AUC 5, Alimta 500 milligram/meter squared every 3 weeks initiated in July 2017 finished 6 cycles in November 2017 and then she received maintenance Alimta from November 2017 until April 2018 with progression of disease  A repeat core biopsy negative for EGFR mutation, Ross 1 mutation, B Darian mutation, ALK gene rearrangement, PDL expression of 10%, no evidence of BRCA1/ 2 mutation, she was treated again with short course of carboplatin and Alimta with allergic reaction to carboplatin then initiated on Pembrolizumab 200 mg flat dose every 3 weeks in May 2018 until August 2018 with admission to the hospital with malignant pleural effusion status post pericardiocentesis 400 mL, CT scan showed studding of the left pleura, more small new nodules of the pleura consistent with progression of disease on Pembrolizumab        Test Results:    Imaging: Xr Chest Portable    Result Date: 7/24/2018  Narrative: CHEST INDICATION:   Follow-up pericardial window   COMPARISON:  Chest x-ray from 7/24/2018 0106 hours EXAM PERFORMED/VIEWS:  XR CHEST PORTABLE  7/24/2018 1441 hours FINDINGS:  Patient is slightly rotated to the right  Scattered surgical clips noted in the left axilla  The heart is enlarged  Mild left pleural thickening versus pleural effusion, stable  Streaky patchy opacity in the left lower lung zone may be related to atelectasis or pneumonia  Osseous structures appear within normal limits for patient age  Impression: Streaky patchy opacity in the left lower lung zone may be related to atelectasis or pneumonia, appears unchanged  Mild left pleural thickening versus small effusion, stable  Workstation performed: [id number]     Xr Chest Portable    Result Date: 7/24/2018  Narrative: CHEST INDICATION:   Shortness of breath, tachypnea  COMPARISON:  7/23/2018 EXAM PERFORMED/VIEWS:  XR CHEST PORTABLE FINDINGS: Heart shadow is enlarged but unchanged from prior exam  Left-sided pleural disease is similar to the previous examination  Mediastinal contours are also unchanged  There is no pneumothorax  Pulmonary vasculature appears more prominent  Left humeral hardware noted     Impression: Pulmonary vasculature appears more prominent than prior study suspicious for mild pulmonary vascular congestion  No other change since earlier study Workstation performed: [id number]     Xr Chest Portable    Result Date: 7/23/2018  Narrative: CHEST INDICATION:   chest pain  Shortness of breath  History of lung carcinoma  COMPARISON:  May 10, 2018, April 17, 2018 and July 19, 2017  EXAM PERFORMED/VIEWS:  XR CHEST PORTABLE FINDINGS: There is widening of the mediastinum which appears to have progressed when compared to prior study  This may represent adenopathy or be secondary to vascular ectasia  Abnormal nodular pleural thickening again seen within the left chest compatible with patient's known history of underlying lung carcinoma  Airspace opacity within the left lung base may represent atelectasis or infiltrate  Right lung is clear  Osseous structures appear within normal limits for patient age       Impression: Abnormal widening of the mediastinum may represent increasing adenopathy  Left basilar airspace opacity may reflect atelectasis or infiltrate  Nodular pleural thickening left chest compatible with known underlying lung malignancy  Workstation performed: [id number]     Ct Head Wo Contrast    Result Date: 7/26/2018  Narrative: CT BRAIN - WITHOUT CONTRAST INDICATION:   Confusion/delirium, altered LOC, unexplained  COMPARISON:  CT brain 3/15/2018  TECHNIQUE:  CT examination of the brain was performed  In addition to axial images, coronal 2D reformatted images were created and submitted for interpretation  Radiation dose length product (DLP) for this visit:  1067 43 mGy-cm   This examination, like all CT scans performed in the [facility], was performed utilizing techniques to minimize radiation dose exposure, including the use of iterative reconstruction and automated exposure control  IMAGE QUALITY:  Diagnostic  FINDINGS: PARENCHYMA: Decreased attenuation is noted in periventricular and subcortical white matter demonstrating an appearance that is statistically most likely to represent mild microangiopathic change; this appearance is similar when compared to most recent prior examination  No CT signs of acute infarction  No intracranial mass, mass effect or midline shift  No acute parenchymal hemorrhage  VENTRICLES AND EXTRA-AXIAL SPACES:  Normal for the patient's age  VISUALIZED ORBITS AND PARANASAL SINUSES:  No acute abnormality involving the orbits  Mild scattered sinus mucosal thickening is noted  No fluid levels are seen  CALVARIUM AND EXTRACRANIAL SOFT TISSUES:  Normal      Impression: No acute intracranial abnormality  Workstation performed: [id number]     Xr Chest Pa Only    Result Date: 7/24/2018  Narrative: CHEST INDICATION:   shortness of breath   COMPARISON:  7/23/2018 at 1150 hours EXAM PERFORMED/VIEWS:  XR CHEST 1 VIEW FINDINGS: Heart shadow is enlarged but unchanged from prior exam  Left pleural disease is similar to the previous examination  Widening of mediastinum noted again similar to prior study  No pneumothorax  Right humeral hardware noted     Impression: No significant interval change since the study performed earlier today Workstation performed: [id number]     Us Right Upper Quadrant    Result Date: 7/25/2018  Narrative: RIGHT UPPER QUADRANT ULTRASOUND INDICATION:  Right upper quadrant pain  Elevated bilirubin  COMPARISON: None  TECHNIQUE:   Real-time ultrasound of the right upper quadrant was performed with a curvilinear transducer with both volumetric sweeps and still imaging techniques  FINDINGS: PANCREAS:  Visualized portions show no abnormality  AORTA AND IVC:  Visualized portions are normal for patient age  LIVER: Mild hepatomegaly is present  Liver is echogenic consistent with fatty infiltration  No evidence of mass  Normal directional flow is present within the portal vein  BILIARY: The gallbladder is normal in caliber  No wall thickening or pericholecystic fluid  Gallbladder sludge is present  No sonographic Silverio's sign  No intrahepatic biliary dilatation  CBD measures 2 mm  No choledocholithiasis  KIDNEY: Right kidney measures 11 1 x 5 6 cm  15 mm left renal cyst is noted  Left kidney is otherwise unremarkable    ASCITES:   None  Impression: Gallbladder sludge  Hepatomegaly and hepatic steatosis  Workstation performed: [id number]     Cta Ed Chest Pe Study    Result Date: 7/23/2018  Narrative: CTA - CHEST WITH IV CONTRAST - PULMONARY ANGIOGRAM INDICATION:   SOB, new onset afib, cancer pt  COMPARISON: March 15, 2018 TECHNIQUE: CTA examination of the chest was performed using angiographic technique according to a protocol specifically tailored to evaluate for pulmonary embolism  Axial, sagittal, and coronal 2D reformatted images were created from the source data and  submitted for interpretation    In addition, coronal 3D MIP postprocessing was performed on the acquisition scanner  Radiation dose length product (DLP) for this visit:  379 mGy-cm   This examination, like all CT scans performed in the [facility], was performed utilizing techniques to minimize radiation dose exposure, including the use of iterative reconstruction and automated exposure control  IV Contrast:  85 mL of iodixanol (VISIPAQUE)  FINDINGS: PULMONARY ARTERIAL TREE:  Evaluation is degraded by streak artifact from the dense contrast in the SVC  There is no occlusive pulmonary embolism seen in the main pulmonary artery and their central lobar branches Evaluation for segmental subsegmental vessels limited LUNGS: No acute consolidation seen  PLEURA:  Patient is known to have nodular the metastatic disease in the left pleura  The nodular implants in the left upper lobe have become more confluent  A nodular implant seen in the left upper lung, in image 76 of series 2, previously is larger There is increasing consolidation seen at the left lung base Mild pleural thickening is also noted on the right side HEART/AORTA:  There is development of moderate large pericardial effusion, new from the previous study this demonstrates attenuation of 13 Hounsfield unit MEDIASTINUM AND RAE:  Anterior mediastinal lymph nodes are seen  The largest a anterior mediastinal lymph node measures about 8 7 mm  Nodularity in the anterior mediastinum noted at the anterior aspect of the left hemithorax, new from the previous study Distention of the superior pericardial recess is seen Lower right paratracheal lymph nodes minimal 9 mm are stable Lower left paratracheal lymph nodes are stable Subcarinal lymph nodes measuring 15 mm are stable CHEST WALL AND LOWER NECK:   Unremarkable   VISUALIZED STRUCTURES IN THE UPPER ABDOMEN:  Liver, spleen, pancreas, right adrenal gland appear unremarkable OSSEOUS STRUCTURES:  No acute compression collapse of the vertebra seen Sclerotic density seen within the in the T6 vertebra suggest bony metastatic disease, this has become larger from the previous study, extending into the left pedicle  Mild sclerosis in the bilateral 2nd ribs is also noted     Impression: Limited study due to degradation by motion plus streak artifact from the dense contrast in the SVC There is no occlusive thrombus in the main pulmonary artery and central lobar branches of the pulmonary arteries Development of moderate to large pericardial effusion  Nodular pleural metastatic disease with the few foci of the nonmeasurable metastatic disease becoming less prominent however there is increasing infiltration in the anterior mediastinum adjacent to the anterior left pleura along with few nodules in the superior mediastinum suggesting new metastatic disease and progression Increasing sclerosis noted in the T6 vertebra Paratracheal lymph nodes are decreasing in size  I personally discussed this study with EDY GARCIA on 7/23/2018 at 2:17 PM   Workstation performed: [id number]       Labs:   Lab Results   Component Value Date    WBC 11 72 (H) 07/30/2018    HGB 12 8 07/30/2018    HCT 41 2 07/30/2018    MCV 96 07/30/2018     07/30/2018     Lab Results   Component Value Date     07/30/2018    K 4 3 07/30/2018     07/30/2018    CO2 27 07/30/2018    ANIONGAP 6 07/30/2018    BUN 12 07/30/2018    CREATININE 0 62 07/30/2018    GLUCOSE 114 07/30/2018    GLUF 101 (H) 04/18/2018    CALCIUM 8 6 07/30/2018    AST 18 07/25/2018    ALT 29 07/25/2018    ALKPHOS 56 07/25/2018    PROT 6 6 07/25/2018    BILITOT 0 96 07/25/2018    EGFR 84 07/30/2018       No results found for: IRON, TIBC, FERRITIN    No results found for: HHEAOLFG05      ROS:   Review of Systems   Constitutional: Negative for activity change, appetite change, diaphoresis, fatigue, fever and unexpected weight change  HENT: Negative for facial swelling, hearing loss, rhinorrhea, sinus pain, sinus pressure, sneezing, sore throat and tinnitus      Eyes: Negative for photophobia, pain, discharge, redness, itching and visual disturbance  Respiratory: Negative for apnea and chest tightness  Cardiovascular: Negative for chest pain, palpitations and leg swelling  Gastrointestinal: Negative for abdominal distention, abdominal pain, blood in stool, constipation, diarrhea, nausea, rectal pain and vomiting  Endocrine: Negative for cold intolerance, heat intolerance, polydipsia and polyphagia  Genitourinary: Negative for difficulty urinating, dyspareunia, frequency, hematuria, pelvic pain and urgency  Musculoskeletal: Negative for arthralgias, back pain, gait problem, joint swelling and myalgias  Skin: Negative for color change, pallor and rash  Allergic/Immunologic: Negative for environmental allergies and food allergies  Neurological: Negative for dizziness, tremors, seizures, syncope, speech difficulty, numbness and headaches  Hematological: Negative for adenopathy  Does not bruise/bleed easily  Psychiatric/Behavioral: Negative for agitation, confusion, dysphoric mood, hallucinations and suicidal ideas  Current Medications: Reviewed  Allergies: Reviewed  PMH/FH/SH:  Reviewed      Physical Exam:    Body surface area is 1 69 meters squared  Wt Readings from Last 3 Encounters:   08/21/18 69 9 kg (154 lb)   08/15/18 71 kg (156 lb 9 6 oz)   08/01/18 74 4 kg (164 lb 0 4 oz)        Temp Readings from Last 3 Encounters:   08/21/18 (!) 97 3 °F (36 3 °C) (Tympanic)   08/21/18 98 °F (36 7 °C) (Oral)   08/15/18 (!) 97 4 °F (36 3 °C)        BP Readings from Last 3 Encounters:   08/21/18 108/62   08/21/18 140/72   08/15/18 125/57         Pulse Readings from Last 3 Encounters:   08/21/18 55   08/21/18 59   08/15/18 (!) 54        Physical Exam   Constitutional: She is oriented to person, place, and time  She appears well-developed and well-nourished  No distress  HENT:   Head: Normocephalic and atraumatic     Mouth/Throat: Oropharynx is clear and moist  No oropharyngeal exudate  Eyes: Conjunctivae and EOM are normal  Pupils are equal, round, and reactive to light  No scleral icterus  Neck: Normal range of motion  Neck supple  No tracheal deviation present  No thyromegaly present  Cardiovascular: Normal rate and regular rhythm  Exam reveals no gallop and no friction rub  No murmur heard  Pulmonary/Chest: Effort normal  No respiratory distress  She has no wheezes  She has no rales  She exhibits no tenderness  Dullness to percussion and no breath sounds in the left lower lobe of the lung up to a 1/3 of the feild   Abdominal: Soft  Bowel sounds are normal  She exhibits no distension and no mass  There is no tenderness  There is no rebound and no guarding  Musculoskeletal: Normal range of motion  She exhibits no edema  Lymphadenopathy:     She has no cervical adenopathy  Neurological: She is alert and oriented to person, place, and time  Skin: Skin is warm and dry  No rash noted  She is not diaphoretic  No erythema  No pallor  Psychiatric: She has a normal mood and affect  Her behavior is normal  Judgment and thought content normal    Vitals reviewed  Goals and Barriers:  Current Goal: Minimize effects of disease  Barriers: None  Patient's Capacity to Self Care:  Patient is able to self care      Code Status: [unfilled]

## 2018-09-05 NOTE — INTERVAL H&P NOTE
Update: (This section must be completed if the H&P was completed greater than 24 hrs to procedure or admission)    H&P reviewed  After examining the patient, I find no changed to the H&P since it had been written  Patient re-evaluated  Accept as history and physical     A/P: Metastatic lung cancer requiring long term IV access form chemotherapy  Will place tunneled chest port      Kapil Mejia MD/September 5, 2018/11:50 AM

## 2018-09-05 NOTE — BRIEF OP NOTE (RAD/CATH)
IR PORT PLACEMENT  Procedure Note    PATIENT NAME: Geronimo Short  : 1935  MRN: [id number]     Pre-op Diagnosis:   1  Malignant neoplasm of overlapping sites of left lung (HCC)      Post-op Diagnosis:   1   Malignant neoplasm of overlapping sites of left lung [facility])        Surgeon:   Lianna Lee MD  Assistants:     No qualified resident was available, Resident is only observing    Estimated Blood Loss: Minimal  Findings: Right IJ tunneled chest port placed with catheter tip in the RA    Specimens: none    Complications:  none    Anesthesia: Conscious sedation and Local    Lianna Lee MD     Date: 2018  Time: 1:23 PM

## 2018-09-06 ENCOUNTER — HOSPITAL ENCOUNTER (OUTPATIENT)
Dept: INFUSION CENTER | Facility: CLINIC | Age: 83
Discharge: HOME | End: 2018-09-06
Payer: MEDICARE

## 2018-09-06 VITALS
HEIGHT: 59 IN | BODY MASS INDEX: 30.27 KG/M2 | TEMPERATURE: 98.6 F | SYSTOLIC BLOOD PRESSURE: 141 MMHG | WEIGHT: 150.13 LBS | HEART RATE: 57 BPM | RESPIRATION RATE: 18 BRPM | DIASTOLIC BLOOD PRESSURE: 76 MMHG

## 2018-09-06 LAB
ANISOCYTOSIS BLD QL SMEAR: PRESENT
BASOPHILS # BLD AUTO: 0 THOUSAND/UL (ref 0–0.1)
BASOPHILS NFR MAR MANUAL: 0 % (ref 0–1)
EOSINOPHIL # BLD AUTO: 0.52 THOUSAND/UL (ref 0–0.61)
EOSINOPHIL NFR BLD MANUAL: 3 % (ref 0–6)
ERYTHROCYTE [DISTWIDTH] IN BLOOD BY AUTOMATED COUNT: 16.7 % (ref 11.6–15.1)
HCT VFR BLD AUTO: 49.8 % (ref 34.8–46.1)
HGB BLD-MCNC: 15.1 G/DL (ref 11.5–15.4)
HYPERCHROMIA BLD QL SMEAR: PRESENT
LYMPHOCYTES # BLD AUTO: 1.55 THOUSAND/UL (ref 0.6–4.47)
LYMPHOCYTES # BLD AUTO: 9 % (ref 14–44)
MCH RBC QN AUTO: 29.1 PG (ref 26.8–34.3)
MCHC RBC AUTO-ENTMCNC: 30.3 G/DL (ref 31.4–37.4)
MCV RBC AUTO: 96 FL (ref 82–98)
MONOCYTES # BLD AUTO: 0.17 THOUSAND/UL (ref 0–1.22)
MONOCYTES NFR BLD AUTO: 1 % (ref 4–12)
MYELOCYTES NFR BLD MANUAL: 1 % (ref 0–1)
NEUTS BAND NFR BLD MANUAL: 4 % (ref 0–8)
NEUTS SEG # BLD: 14.79 THOUSAND/UL (ref 1.81–6.82)
NEUTS SEG NFR BLD AUTO: 82 % (ref 43–75)
OVALOCYTES BLD QL SMEAR: PRESENT
PLATELET # BLD AUTO: 233 THOUSANDS/UL (ref 149–390)
PLATELET BLD QL SMEAR: ADEQUATE
PMV BLD AUTO: 9.8 FL (ref 8.9–12.7)
RBC # BLD AUTO: 5.2 MILLION/UL (ref 3.81–5.12)
TOTAL CELLS COUNTED SPEC: 100
WBC # BLD AUTO: 17.2 THOUSAND/UL (ref 4.31–10.16)
WBC NRBC COR # BLD: 17.2 THOUSAND/UL (ref 4.31–10.16)

## 2018-09-06 PROCEDURE — 96367 TX/PROPH/DG ADDL SEQ IV INF: CPT

## 2018-09-06 PROCEDURE — 85007 BL SMEAR W/DIFF WBC COUNT: CPT | Performed by: INTERNAL MEDICINE

## 2018-09-06 PROCEDURE — 96413 CHEMO IV INFUSION 1 HR: CPT

## 2018-09-06 PROCEDURE — 85027 COMPLETE CBC AUTOMATED: CPT | Performed by: INTERNAL MEDICINE

## 2018-09-06 PROCEDURE — 96361 HYDRATE IV INFUSION ADD-ON: CPT

## 2018-09-06 RX ADMIN — DOCETAXEL 26 MG: 20 INJECTION, SOLUTION, CONCENTRATE INTRAVENOUS at 14:43

## 2018-09-06 RX ADMIN — SODIUM CHLORIDE 500 ML: 0.9 INJECTION, SOLUTION INTRAVENOUS at 13:48

## 2018-09-06 RX ADMIN — DEXAMETHASONE SODIUM PHOSPHATE: 10 INJECTION, SOLUTION INTRAMUSCULAR; INTRAVENOUS at 14:20

## 2018-09-06 NOTE — PLAN OF CARE
Problem: Potential for Falls  Goal: Patient will remain free of falls  INTERVENTIONS:  - Assess patient frequently for physical needs  -  Identify cognitive and physical deficits and behaviors that affect risk of falls    -  Earle fall precautions as indicated by assessment   - Educate patient/family on patient safety including physical limitations  - Instruct patient to call for assistance with activity based on assessment  - Modify environment to reduce risk of injury  - Consider OT/PT consult to assist with strengthening/mobility   Outcome: Progressing

## 2018-09-10 DIAGNOSIS — R52 PAIN: ICD-10-CM

## 2018-09-10 RX ORDER — FENTANYL 12 UG/H
1 PATCH TRANSDERMAL
Qty: 10 PATCH | Refills: 0 | Status: SHIPPED | OUTPATIENT
Start: 2018-09-10 | End: 2018-09-12 | Stop reason: SDUPTHER

## 2018-09-12 DIAGNOSIS — R52 PAIN: Primary | ICD-10-CM

## 2018-09-12 RX ORDER — LIDOCAINE AND PRILOCAINE 25; 25 MG/G; MG/G
CREAM TOPICAL AS NEEDED
Qty: 30 G | Refills: 1 | Status: SHIPPED | OUTPATIENT
Start: 2018-09-12 | End: 2019-02-14

## 2018-09-12 RX ORDER — SODIUM CHLORIDE 9 MG/ML
20 INJECTION, SOLUTION INTRAVENOUS CONTINUOUS
Status: DISCONTINUED | OUTPATIENT
Start: 2018-09-13 | End: 2018-09-16 | Stop reason: HOSPADM

## 2018-09-12 RX ORDER — FENTANYL 12 UG/H
1 PATCH TRANSDERMAL
Qty: 10 PATCH | Refills: 0 | Status: SHIPPED | OUTPATIENT
Start: 2018-09-12 | End: 2018-10-02 | Stop reason: SDUPTHER

## 2018-09-12 NOTE — TELEPHONE ENCOUNTER
SCRIPT FOR FENTANYL PATCH STILL NOT RECEIVED BY [facility] IN [address] [place] [address]  IS NEEDED FOR TOMORROW  MALATHI CALLED [facility] AGAIN 5 MIN PRIOR TO CALLING OFFICE  AND STILL NO SCRIPT RECEIVED  PLEASE CALL DAUGHTER MALATHI BACK AND ADVISE

## 2018-09-12 NOTE — TELEPHONE ENCOUNTER
Pt's daughter Mateo Noyola called  Asking about refill of Fentanyl patches and informed looks like was signed and should be available to   Mateo Noyola also requested script for Emla cream and pended to Dr Ashu Linda

## 2018-09-13 ENCOUNTER — HOSPITAL ENCOUNTER (OUTPATIENT)
Dept: INFUSION CENTER | Facility: CLINIC | Age: 83
Discharge: HOME | End: 2018-09-13
Payer: MEDICARE

## 2018-09-13 VITALS
TEMPERATURE: 98 F | HEART RATE: 59 BPM | WEIGHT: 151.46 LBS | SYSTOLIC BLOOD PRESSURE: 124 MMHG | DIASTOLIC BLOOD PRESSURE: 77 MMHG | OXYGEN SATURATION: 96 % | RESPIRATION RATE: 18 BRPM | BODY MASS INDEX: 30.21 KG/M2

## 2018-09-13 LAB
ANISOCYTOSIS BLD QL SMEAR: PRESENT
BASOPHILS # BLD AUTO: 0 THOUSAND/UL (ref 0–0.1)
BASOPHILS NFR MAR MANUAL: 0 % (ref 0–1)
EOSINOPHIL # BLD AUTO: 0.14 THOUSAND/UL (ref 0–0.61)
EOSINOPHIL NFR BLD MANUAL: 1 % (ref 0–6)
ERYTHROCYTE [DISTWIDTH] IN BLOOD BY AUTOMATED COUNT: 17.8 % (ref 11.6–15.1)
HCT VFR BLD AUTO: 50.4 % (ref 34.8–46.1)
HGB BLD-MCNC: 14.9 G/DL (ref 11.5–15.4)
LYMPHOCYTES # BLD AUTO: 1.55 THOUSAND/UL (ref 0.6–4.47)
LYMPHOCYTES # BLD AUTO: 11 % (ref 14–44)
MCH RBC QN AUTO: 28.5 PG (ref 26.8–34.3)
MCHC RBC AUTO-ENTMCNC: 29.6 G/DL (ref 31.4–37.4)
MCV RBC AUTO: 96 FL (ref 82–98)
MONOCYTES # BLD AUTO: 0.99 THOUSAND/UL (ref 0–1.22)
MONOCYTES NFR BLD AUTO: 7 % (ref 4–12)
NEUTS BAND NFR BLD MANUAL: 2 % (ref 0–8)
NEUTS SEG # BLD: 11.42 THOUSAND/UL (ref 1.81–6.82)
NEUTS SEG NFR BLD AUTO: 79 % (ref 43–75)
PLATELET # BLD AUTO: 200 THOUSANDS/UL (ref 149–390)
PLATELET BLD QL SMEAR: ADEQUATE
PMV BLD AUTO: 9 FL (ref 8.9–12.7)
RBC # BLD AUTO: 5.23 MILLION/UL (ref 3.81–5.12)
TOTAL CELLS COUNTED SPEC: 100
WBC # BLD AUTO: 14.1 THOUSAND/UL (ref 4.31–10.16)
WBC NRBC COR # BLD: 14.1 THOUSAND/UL (ref 4.31–10.16)

## 2018-09-13 PROCEDURE — 96367 TX/PROPH/DG ADDL SEQ IV INF: CPT

## 2018-09-13 PROCEDURE — 85007 BL SMEAR W/DIFF WBC COUNT: CPT | Performed by: INTERNAL MEDICINE

## 2018-09-13 PROCEDURE — 85027 COMPLETE CBC AUTOMATED: CPT | Performed by: INTERNAL MEDICINE

## 2018-09-13 PROCEDURE — 96413 CHEMO IV INFUSION 1 HR: CPT

## 2018-09-13 RX ADMIN — DEXAMETHASONE SODIUM PHOSPHATE: 10 INJECTION, SOLUTION INTRAMUSCULAR; INTRAVENOUS at 14:06

## 2018-09-13 RX ADMIN — Medication 300 UNITS: at 15:40

## 2018-09-13 RX ADMIN — SODIUM CHLORIDE 500 ML: 0.9 INJECTION, SOLUTION INTRAVENOUS at 13:40

## 2018-09-13 RX ADMIN — DOCETAXEL 26 MG: 20 INJECTION, SOLUTION, CONCENTRATE INTRAVENOUS at 14:25

## 2018-09-13 RX ADMIN — SODIUM CHLORIDE 20 ML/HR: 0.9 INJECTION, SOLUTION INTRAVENOUS at 13:40

## 2018-09-16 DIAGNOSIS — I31.3 PERICARDIAL EFFUSION: ICD-10-CM

## 2018-09-17 RX ORDER — DEXAMETHASONE 1 MG
TABLET ORAL
Qty: 30 TABLET | Refills: 0 | Status: SHIPPED | OUTPATIENT
Start: 2018-09-17 | End: 2018-10-09 | Stop reason: SDUPTHER

## 2018-09-20 ENCOUNTER — OFFICE VISIT (OUTPATIENT)
Dept: HEMATOLOGY ONCOLOGY | Facility: CLINIC | Age: 83
End: 2018-09-20
Payer: MEDICARE

## 2018-09-20 VITALS
WEIGHT: 153 LBS | TEMPERATURE: 98.3 F | RESPIRATION RATE: 18 BRPM | OXYGEN SATURATION: 96 % | DIASTOLIC BLOOD PRESSURE: 70 MMHG | BODY MASS INDEX: 30.84 KG/M2 | HEART RATE: 66 BPM | SYSTOLIC BLOOD PRESSURE: 120 MMHG | HEIGHT: 59 IN

## 2018-09-20 DIAGNOSIS — C34.82 MALIGNANT NEOPLASM OF OVERLAPPING SITES OF LEFT LUNG (HCC): ICD-10-CM

## 2018-09-20 DIAGNOSIS — C78.2 PLEURAL METASTASIS (HCC): Primary | ICD-10-CM

## 2018-09-20 PROCEDURE — 99214 OFFICE O/P EST MOD 30 MIN: CPT | Performed by: INTERNAL MEDICINE

## 2018-09-20 RX ORDER — TRAMADOL HYDROCHLORIDE 50 MG/1
TABLET ORAL
COMMUNITY
End: 2018-09-20 | Stop reason: ALTCHOICE

## 2018-09-20 RX ORDER — FENTANYL 12 UG/H
PATCH TRANSDERMAL
COMMUNITY
End: 2018-09-20 | Stop reason: SDUPTHER

## 2018-09-20 RX ORDER — AMIODARONE HYDROCHLORIDE 200 MG/1
TABLET ORAL
COMMUNITY
End: 2018-09-20 | Stop reason: SDUPTHER

## 2018-09-20 RX ORDER — CEFDINIR 300 MG/1
CAPSULE ORAL
COMMUNITY
End: 2018-09-20 | Stop reason: ALTCHOICE

## 2018-09-20 RX ORDER — DEXAMETHASONE 1 MG
TABLET ORAL
COMMUNITY
End: 2018-09-20 | Stop reason: SDUPTHER

## 2018-09-20 RX ORDER — OXYCODONE HYDROCHLORIDE AND ACETAMINOPHEN 5; 325 MG/1; MG/1
TABLET ORAL
COMMUNITY
End: 2018-09-20 | Stop reason: ALTCHOICE

## 2018-09-20 RX ORDER — ONDANSETRON 4 MG/1
TABLET, ORALLY DISINTEGRATING ORAL
COMMUNITY
End: 2018-09-20 | Stop reason: SDUPTHER

## 2018-09-20 NOTE — PROGRESS NOTES
Hematology Outpatient Follow - Up Note  Sly Mercedes 80 y o  female MRN: @ Encounter: [id number]        Date:  9/20/2018        Assessment/ Plan:   Metastatic lung cancer primary in the left lower lobe of the lung with malignant pleural effusion in April 2017 never smoked, molecular tests came back negative for ALK gene rearrangement, EGFR mutation, Ross 1 mutation, B Adrian mutation, PDL expression was 10%  She was treated with Alimta / carboplatin for 6 cycles with excellent response and then maintenance Alimta for 6 cycles with progression of disease in the left pleura and studding of the pleura, she was treated again with Alimta carboplatin but she was allergic to carboplatin, the therapy was changed to second-line therapy with Pembrolizumab from May 2018 until August 2018 with progression of disease with malignant pericardial effusion, atrial fibrillation status post pericardial window  Currently on Taxotere 50 milligram/meter squared weekly 3 weeks on 1 week off, proceed with cycle 2     After 2 months of therapy I will do CT scan of the chest to assess response  Pain management by palliative care           HPI:    26-year-old  female who presented to [facility] in May 2017 with dyspnea for the past 4-5 days and pleurisy, with occasional dry cough scan of the thorax showed no evidence of PE, it showed a large loculated left side pleural effusion with nodularity concerning for metastatic disease, there is a concern for a mass versus pneumonia in the collapsed left lower lobe lung underwent left thoracentesis yielding 1 4 L of bloody fluid, pathology showed adenocarcinoma, positive for TTF-1, CK 7 most likely consistent with non-small cell lung cancer patient had a history of left-sided breast cancer in 1992 status post mastectomy she told me she had told lymph node involvement, she was not treated with either chemotherapy, radiation therapy or hormonal therapy has extensive family history of cancer, sister was diagnosed with breast cancer at age 36, another sister was diagnosed with breast cancer at age 72, a brother diagnosed with pancreatic cancer and another brother with lung cancer niece was found to have BRCA gene mutation    Interval History:        Previous Treatment: Carboplatin AUC 5, Alimta 500 milligram/meter squared every 3 weeks initiated in July 2017 finished 6 cycles in November 2017 and then she received maintenance Alimta from November 2017 until April 2018 with progression of disease  A repeat core biopsy negative for EGFR mutation, Ross 1 mutation, B Darian mutation, ALK gene rearrangement, PDL expression of 10%, no evidence of BRCA1/ 2 mutation, she was treated again with short course of carboplatin and Alimta with allergic reaction to carboplatin then initiated on Pembrolizumab 200 mg flat dose every 3 weeks in May 2018 until August 2018 with admission to the hospital with malignant pleural effusion status post pericardiocentesis 400 mL, CT scan showed studding of the left pleura, more small new nodules of the pleura consistent with progression of disease on Pembrolizumab     ECOG score 1   Current therapy Taxotere 15 milligram/meter squared weekly 3 weeks on 1 week off, she received cycle 1  In September 2018        Test Results:    Imaging: Ir Port Placement    Result Date: 9/5/2018  Narrative: Right Chest Port Insertion CLINICAL HISTORY: Metastatic lung cancer ; need for chemotherapy access  Fluoroscopy time: 0 7 minutes Sedation time: 30 minutes Images: 2 PROCEDURE AND FINDINGS: After obtaining informed consent, the right internal jugular vein was evaluated by ultrasound as a potential access site  The vein is patent, compressible, and free of thrombus  All elements of maximal sterile barrier technique were followed (cap, mask,  sterile gown, sterile gloves, large sterile sheet, hand hygiene, and 2% chlorhexidine for cutaneous antisepsis)   Under ultrasound guidance, a micropuncture needle was used to aspirate the right internal jugular vein through which a 0 018 mandrel was passed under fluoroscopic guidance  A static image was recorded  A 4 Persian micropuncture introducer was placed  Using sharp and blunt dissection, a subcutaneous pocket was created and the catheter tunneled from the pocket to the venotomy site  A 0 035 inch guidewire was advanced centrally; a peel-away sheath was removed and the catheter advanced to the atriocaval junction under fluoroscopy  The catheter was transected and connected to a chest port reservoir and this was sutured into the pocket using 2 pieces of 4-0 Prolene  The pocket was closed with 3-0 Monocryl  The incision was closed using running 3-0 Stratafix suture and Histoacryl  The port was aspirated and flushed with heparinized saline and the patient was returned to the ambulatory care unit for monitoring  Follow-up instructions were given and a sterile dressing was applied  Impression: Successful ultrasound and fluoroscopic-guided placement of right internal jugular vein chest port catheter and reservoir with the tip at the SVC/right atrial junction  Workstation performed: [id number]       Labs:   Lab Results   Component Value Date    WBC 14 10 (H) 09/13/2018    HGB 14 9 09/13/2018    HCT 50 4 (H) 09/13/2018    MCV 96 09/13/2018     09/13/2018     Lab Results   Component Value Date     08/30/2018    K 4 2 08/30/2018     08/30/2018    CO2 25 08/30/2018    BUN 22 08/30/2018    CREATININE 0 70 08/30/2018    GLUF 101 (H) 04/18/2018    CALCIUM 10 1 08/30/2018    AST 32 08/30/2018    ALT 33 08/30/2018    ALKPHOS 99 08/30/2018    EGFR 80 08/30/2018       No results found for: IRON, TIBC, FERRITIN    No results found for: [id number]      ROS:   Review of Systems   Constitutional: Positive for fatigue  Negative for activity change, appetite change, diaphoresis, fever and unexpected weight change     HENT: Negative for facial swelling, hearing loss, rhinorrhea, sinus pain, sinus pressure, sneezing, sore throat and tinnitus  Eyes: Negative for photophobia, pain, discharge, redness, itching and visual disturbance  Respiratory: Positive for shortness of breath  Negative for apnea and chest tightness  Cardiovascular: Negative for chest pain, palpitations and leg swelling  Gastrointestinal: Negative for abdominal distention, abdominal pain, blood in stool, constipation, diarrhea, nausea, rectal pain and vomiting  Endocrine: Negative for cold intolerance, heat intolerance, polydipsia and polyphagia  Genitourinary: Negative for difficulty urinating, dyspareunia, frequency, hematuria, pelvic pain and urgency  Musculoskeletal: Negative for arthralgias, back pain, gait problem, joint swelling and myalgias  Skin: Negative for color change, pallor and rash  Allergic/Immunologic: Negative for environmental allergies and food allergies  Neurological: Negative for dizziness, tremors, seizures, syncope, speech difficulty, numbness and headaches  Hematological: Negative for adenopathy  Does not bruise/bleed easily  Psychiatric/Behavioral: Negative for agitation, confusion, dysphoric mood, hallucinations and suicidal ideas  Current Medications: Reviewed  Allergies: Reviewed  PMH/FH/SH:  Reviewed      Physical Exam:    Body surface area is 1 65 meters squared  Wt Readings from Last 3 Encounters:   09/20/18 69 4 kg (153 lb)   09/13/18 68 7 kg (151 lb 7 3 oz)   09/06/18 68 1 kg (150 lb 2 1 oz)        Temp Readings from Last 3 Encounters:   09/20/18 98 3 °F (36 8 °C) (Tympanic)   09/13/18 98 °F (36 7 °C) (Tympanic)   09/06/18 98 6 °F (37 °C) (Temporal)        BP Readings from Last 3 Encounters:   09/20/18 120/70   09/13/18 124/77   09/06/18 141/76         Pulse Readings from Last 3 Encounters:   09/20/18 66   09/13/18 59   09/06/18 57        Physical Exam   Constitutional: She is oriented to person, place, and time   She appears well-developed and well-nourished  No distress  HENT:   Head: Normocephalic and atraumatic  Mouth/Throat: Oropharynx is clear and moist  No oropharyngeal exudate  Eyes: Conjunctivae and EOM are normal  Pupils are equal, round, and reactive to light  Neck: Normal range of motion  Neck supple  No tracheal deviation present  No thyromegaly present  Cardiovascular: Normal rate and regular rhythm  Exam reveals no gallop and no friction rub  No murmur heard  Pulmonary/Chest: Effort normal  No respiratory distress  She has no wheezes  She has no rales  She exhibits no tenderness  No breath sounds in the left base up with 3rd field of the lung   Abdominal: Soft  Bowel sounds are normal  She exhibits no distension and no mass  There is no tenderness  There is no rebound and no guarding  Musculoskeletal: Normal range of motion  She exhibits no edema  Lymphadenopathy:     She has no cervical adenopathy  Neurological: She is alert and oriented to person, place, and time  Skin: Skin is warm and dry  No rash noted  She is not diaphoretic  No erythema  No pallor  Psychiatric: She has a normal mood and affect  Her behavior is normal  Judgment and thought content normal    Vitals reviewed  Goals and Barriers:  Current Goal: Minimize effects of disease  Barriers: None  Patient's Capacity to Self Care:  Patient is able to self care      Code Status: [unfilled]

## 2018-09-24 ENCOUNTER — TELEPHONE (OUTPATIENT)
Dept: HEMATOLOGY ONCOLOGY | Facility: CLINIC | Age: 83
End: 2018-09-24

## 2018-09-24 NOTE — TELEPHONE ENCOUNTER
Scheduled patient for 9 26 18 at 330pm at [facility]    Spoke with Abby Garcia and made her aware

## 2018-09-24 NOTE — TELEPHONE ENCOUNTER
Patient c/o pain in left shoulder for 4-5 days now  Constant pain upon movement  D/w Dr Joe Madison scheduled CT scan form 10/15/18 to ASAP    Please schedule

## 2018-09-24 NOTE — TELEPHONE ENCOUNTER
Spoke with Elena Davidson , patient's physical therapist  Alerted her that CT scan has been moved up to evaluate causes for increased pain and SOB

## 2018-09-24 NOTE — TELEPHONE ENCOUNTER
dS Herron is the patient therapist and she called to report that the patient is not doing well and she needs to talk to Dr Francis Klein or RAIN Morris  Her phone number is [phone]  Thanks

## 2018-09-25 ENCOUNTER — TELEPHONE (OUTPATIENT)
Dept: PALLIATIVE MEDICINE | Facility: CLINIC | Age: 83
End: 2018-09-25

## 2018-09-25 DIAGNOSIS — G89.3 PAIN DUE TO NEOPLASM: Primary | ICD-10-CM

## 2018-09-25 RX ORDER — OXYCODONE HYDROCHLORIDE 5 MG/1
2.5 TABLET ORAL EVERY 4 HOURS PRN
Qty: 45 TABLET | Refills: 0 | Status: SHIPPED | OUTPATIENT
Start: 2018-09-25 | End: 2019-02-15 | Stop reason: HOSPADM

## 2018-09-25 NOTE — TELEPHONE ENCOUNTER
I eprescribed oxycodone 5 mg tabs, use 1/2 tab as needed every 4 hours for pain   Could someone call her to review this

## 2018-09-26 ENCOUNTER — TRANSCRIBE ORDERS (OUTPATIENT)
Dept: RADIOLOGY | Facility: HOSPITAL | Age: 83
End: 2018-09-26

## 2018-09-26 ENCOUNTER — HOSPITAL ENCOUNTER (OUTPATIENT)
Dept: RADIOLOGY | Facility: HOSPITAL | Age: 83
Discharge: HOME | End: 2018-09-26
Attending: INTERNAL MEDICINE
Payer: MEDICARE

## 2018-09-26 DIAGNOSIS — C34.32 MALIGNANT NEOPLASM OF LOWER LOBE OF LEFT LUNG (HCC): ICD-10-CM

## 2018-09-26 PROCEDURE — 71260 CT THORAX DX C+: CPT

## 2018-09-26 RX ORDER — SODIUM CHLORIDE 9 MG/ML
20 INJECTION, SOLUTION INTRAVENOUS CONTINUOUS
Status: DISCONTINUED | OUTPATIENT
Start: 2018-09-27 | End: 2018-09-30 | Stop reason: HOSPADM

## 2018-09-26 RX ADMIN — IOHEXOL 85 ML: 350 INJECTION, SOLUTION INTRAVENOUS at 15:54

## 2018-09-27 ENCOUNTER — TELEPHONE (OUTPATIENT)
Dept: HEMATOLOGY ONCOLOGY | Facility: CLINIC | Age: 83
End: 2018-09-27

## 2018-09-27 ENCOUNTER — HOSPITAL ENCOUNTER (OUTPATIENT)
Dept: INFUSION CENTER | Facility: CLINIC | Age: 83
Discharge: HOME | End: 2018-09-27
Payer: MEDICARE

## 2018-09-27 VITALS
TEMPERATURE: 98.1 F | SYSTOLIC BLOOD PRESSURE: 126 MMHG | HEART RATE: 59 BPM | RESPIRATION RATE: 18 BRPM | BODY MASS INDEX: 28.3 KG/M2 | HEIGHT: 61 IN | DIASTOLIC BLOOD PRESSURE: 75 MMHG | WEIGHT: 149.91 LBS

## 2018-09-27 LAB
ALBUMIN SERPL BCP-MCNC: 2.7 G/DL (ref 3.5–5.7)
ALP SERPL-CCNC: 84 U/L (ref 46–116)
ALT SERPL W P-5'-P-CCNC: 32 U/L (ref 12–78)
ANION GAP SERPL CALCULATED.3IONS-SCNC: 8 MMOL/L (ref 4–13)
ANISOCYTOSIS BLD QL SMEAR: PRESENT
AST SERPL W P-5'-P-CCNC: 17 U/L (ref 5–45)
BASOPHILS # BLD AUTO: 0 THOUSAND/UL (ref 0–0.1)
BASOPHILS NFR MAR MANUAL: 0 % (ref 0–1)
BILIRUB SERPL-MCNC: 0.7 MG/DL (ref 0.2–1)
BUN SERPL-MCNC: 22 MG/DL (ref 5–25)
CALCIUM SERPL-MCNC: 8.3 MG/DL (ref 8.3–10.1)
CHLORIDE SERPL-SCNC: 102 MMOL/L (ref 98–108)
CO2 SERPL-SCNC: 27 MMOL/L (ref 21–32)
CREAT SERPL-MCNC: 0.6 MG/DL (ref 0.6–1.3)
EOSINOPHIL # BLD AUTO: 0 THOUSAND/UL (ref 0–0.61)
EOSINOPHIL NFR BLD MANUAL: 0 % (ref 0–6)
ERYTHROCYTE [DISTWIDTH] IN BLOOD BY AUTOMATED COUNT: 16.3 % (ref 11.6–15.1)
GFR SERPL CREATININE-BSD FRML MDRD: 85 ML/MIN/1.73SQ M
GLUCOSE SERPL-MCNC: 149 MG/DL (ref 65–140)
HCT VFR BLD AUTO: 47.2 % (ref 34.8–46.1)
HGB BLD-MCNC: 14.8 G/DL (ref 11.5–15.4)
LYMPHOCYTES # BLD AUTO: 1.42 THOUSAND/UL (ref 0.6–4.47)
LYMPHOCYTES # BLD AUTO: 11 % (ref 14–44)
MCH RBC QN AUTO: 28.6 PG (ref 26.8–34.3)
MCHC RBC AUTO-ENTMCNC: 31.4 G/DL (ref 31.4–37.4)
MCV RBC AUTO: 91 FL (ref 82–98)
MONOCYTES # BLD AUTO: 1.16 THOUSAND/UL (ref 0–1.22)
MONOCYTES NFR BLD AUTO: 9 % (ref 4–12)
NEUTS BAND NFR BLD MANUAL: 5 % (ref 0–8)
NEUTS SEG # BLD: 10.32 THOUSAND/UL (ref 1.81–6.82)
NEUTS SEG NFR BLD AUTO: 75 % (ref 43–75)
PLATELET # BLD AUTO: 244 THOUSANDS/UL (ref 149–390)
PLATELET BLD QL SMEAR: ADEQUATE
PMV BLD AUTO: 8.3 FL (ref 8.9–12.7)
POTASSIUM SERPL-SCNC: 4.5 MMOL/L (ref 3.5–5.3)
PROT SERPL-MCNC: 5.7 G/DL (ref 6.4–8.2)
RBC # BLD AUTO: 5.19 MILLION/UL (ref 3.81–5.12)
SODIUM SERPL-SCNC: 137 MMOL/L (ref 136–145)
TOTAL CELLS COUNTED SPEC: 100
WBC # BLD AUTO: 12.9 THOUSAND/UL (ref 4.31–10.16)
WBC NRBC COR # BLD: 12.9 THOUSAND/UL (ref 4.31–10.16)

## 2018-09-27 PROCEDURE — 80053 COMPREHEN METABOLIC PANEL: CPT | Performed by: INTERNAL MEDICINE

## 2018-09-27 PROCEDURE — 96413 CHEMO IV INFUSION 1 HR: CPT

## 2018-09-27 PROCEDURE — 96361 HYDRATE IV INFUSION ADD-ON: CPT

## 2018-09-27 PROCEDURE — 96367 TX/PROPH/DG ADDL SEQ IV INF: CPT

## 2018-09-27 PROCEDURE — 85007 BL SMEAR W/DIFF WBC COUNT: CPT | Performed by: INTERNAL MEDICINE

## 2018-09-27 PROCEDURE — 85027 COMPLETE CBC AUTOMATED: CPT | Performed by: INTERNAL MEDICINE

## 2018-09-27 RX ADMIN — DOCETAXEL 26 MG: 20 INJECTION, SOLUTION, CONCENTRATE INTRAVENOUS at 14:53

## 2018-09-27 RX ADMIN — Medication 300 UNITS: at 16:00

## 2018-09-27 RX ADMIN — SODIUM CHLORIDE 20 ML/HR: 0.9 INJECTION, SOLUTION INTRAVENOUS at 14:28

## 2018-09-27 RX ADMIN — DEXAMETHASONE SODIUM PHOSPHATE: 10 INJECTION, SOLUTION INTRAMUSCULAR; INTRAVENOUS at 14:28

## 2018-09-27 RX ADMIN — SODIUM CHLORIDE 500 ML: 0.9 INJECTION, SOLUTION INTRAVENOUS at 13:59

## 2018-09-27 NOTE — TELEPHONE ENCOUNTER
Patient has progression of disease on recent CT scan  D/w Dr Castro Hardy  Will bring patient in to discuss treatment change vs hospice  Spoke with patient's daughter Prema Tamayo  appt made for Tuesday at 1440 in \[facility]\""  Emotional support offered  Jose D Joseph notified to offer support to daughter

## 2018-09-27 NOTE — PLAN OF CARE
Problem: Potential for Falls  Goal: Patient will remain free of falls  INTERVENTIONS:  - Assess patient frequently for physical needs  -  Identify cognitive and physical deficits and behaviors that affect risk of falls    -  [place] fall precautions as indicated by assessment   - Educate patient/family on patient safety including physical limitations  - Instruct patient to call for assistance with activity based on assessment  - Modify environment to reduce risk of injury  - Consider OT/PT consult to assist with strengthening/mobility   Outcome: Progressing

## 2018-09-27 NOTE — TELEPHONE ENCOUNTER
SPOKE WITH PONCHO IN [facility] CT READING ROOM  HE WILL EXPEDITE READING OF CT SCAN DONE YESTERDAY AS PATIENT IS SYMPTOMATIC

## 2018-09-27 NOTE — PLAN OF CARE
Problem: Potential for Falls  Goal: Patient will remain free of falls  INTERVENTIONS:  - Assess patient frequently for physical needs  -  Identify cognitive and physical deficits and behaviors that affect risk of falls    -  [place] fall precautions as indicated by assessment   - Educate patient/family on patient safety including physical limitations  - Instruct patient to call for assistance with activity based on assessment  - Modify environment to reduce risk of injury  - Consider OT/PT consult to assist with strengthening/mobility   Outcome: Progressing

## 2018-10-02 ENCOUNTER — OFFICE VISIT (OUTPATIENT)
Dept: HEMATOLOGY ONCOLOGY | Facility: CLINIC | Age: 83
End: 2018-10-02
Payer: MEDICARE

## 2018-10-02 VITALS
RESPIRATION RATE: 18 BRPM | HEART RATE: 86 BPM | BODY MASS INDEX: 27.79 KG/M2 | SYSTOLIC BLOOD PRESSURE: 150 MMHG | WEIGHT: 151 LBS | HEIGHT: 62 IN | TEMPERATURE: 97.8 F | DIASTOLIC BLOOD PRESSURE: 80 MMHG | OXYGEN SATURATION: 96 %

## 2018-10-02 DIAGNOSIS — R52 PAIN: ICD-10-CM

## 2018-10-02 DIAGNOSIS — C78.2 PLEURAL METASTASIS (HCC): Primary | ICD-10-CM

## 2018-10-02 PROCEDURE — 99215 OFFICE O/P EST HI 40 MIN: CPT | Performed by: INTERNAL MEDICINE

## 2018-10-02 RX ORDER — FENTANYL 12 UG/H
1 PATCH TRANSDERMAL
Qty: 10 PATCH | Refills: 0 | Status: SHIPPED | OUTPATIENT
Start: 2018-10-02 | End: 2018-10-03 | Stop reason: SDUPTHER

## 2018-10-02 NOTE — PROGRESS NOTES
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 diagnosed in May 2017, molecular tests were negative for EGFR mutation, ALK gene arrangement, Ross 1 mutation, B Darian mutation, PD L1 expression of 10% only  She was treated with Alimta/carboplatin with excellent response initially and later on she relapse on maintenance Alimta, adding carboplatin induced allergic reaction  We change therapy to Pembrolizumab unfortunately she did not have a good response  We added Taxotere in August 2018 unfortunately no response with increase in the studding of the left pleura and more symptoms   Will try gemcitabine 850 milligram/meter squared weekly 3 weeks on 1 week off, CBC, BMP prior to each Gemzar  500 mL normal saline hydration with every chemotherapy  Zofran anti emetics  Follow-up every month with CBC, CMP and office visit  Pleurisy increase fentanyl patch to 25 mcg every 72 hours               HPI: 80-year-old  female who presented to [facility] in May 2017 with dyspnea for the past 4-5 days and pleurisy, with occasional dry cough scan of the thorax showed no evidence of PE, it showed a large loculated left side pleural effusion with nodularity concerning for metastatic disease, there is a concern for a mass versus pneumonia in the collapsed left lower lobe lung underwent left thoracentesis yielding 1 4 L of bloody fluid, pathology showed adenocarcinoma, positive for TTF-1, CK 7 most likely consistent with non-small cell lung cancer patient had a history of left-sided breast cancer in 1992 status post mastectomy she told me she had told lymph node involvement, she was not treated with either chemotherapy, radiation therapy or hormonal therapy has extensive family history of cancer, sister was diagnosed with breast cancer at age 36, another sister was diagnosed with breast cancer at age 72, a brother diagnosed with pancreatic cancer and another brother with lung cancer niece was found to have BRCA gene mutation        molecular tests:negative for targeted mutation such as EGFR, ALK gene rearrangement, Ross 1, B Darian, PD L1 expression of 10%       Previous Treatment:  Carboplatin AUC 5, Alimta 500 milligram/meter squared every 3 weeks initiated in July 2017 finished 6 cycles in November 2017 and then she received maintenance Alimta from November 2017 until April 2018 with progression of disease  A repeat core biopsy negative for EGFR mutation, Ross 1 mutation, B Darian mutation, ALK gene rearrangement, PDL expression of 10%, no evidence of BRCA1/ 2 mutation, she was treated again with short course of carboplatin and Alimta with allergic reaction to carboplatin then initiated on Pembrolizumab 200 mg flat dose every 3 weeks in May 2018 until August 2018 with admission to the hospital with malignant pleural effusion status post pericardiocentesis 400 mL, CT scan showed studding of the left pleura, more small new nodules of the pleura consistent with progression of disease on Pembrolizumab  Taxotere 50 milligram/meter squared weekly 3 weeks on 1 week of cycle 1  In September 2018 after progression of disease, unfortunately after 6 weeks of treatment she has progression of disease with left pleurisy        ECOG score 1    Imaging: Ct Chest W Contrast    Result Date: 9/27/2018  Narrative: CT CHEST WITH IV CONTRAST INDICATION:   C34 32: Malignant neoplasm of lower lobe, left bronchus or lung  COMPARISON:  July 23, 2018 TECHNIQUE: CT examination of the chest was performed  Axial, sagittal, and coronal 2D reformatted images were created from the source data and submitted for interpretation  Radiation dose length product (DLP) for this visit:  291 68 mGy-cm     This examination, like all CT scans performed in the [facility], was performed utilizing techniques to minimize radiation dose exposure, including the use of iterative  reconstruction and automated exposure control  IV Contrast:  85 mL of iohexol (OMNIPAQUE) FINDINGS: LUNGS:  Linear density in the right lower lobe is most suggestive of platelike atelectasis  There are no right-sided lung nodules or masses or suspicious infiltrates or airway obstructions  In the left lower lobe there is consolidation and volume loss  Compared with the previous examination, this seems relatively stable  No discrete lesions are seen within the left upper lobe although there is some consolidation medially in the left upper  lobe left mediastinal border which is stable and appears to be some chronic atelectasis or scarring  PLEURA:  There is extensive peripherally enhanced nodular pleural metastatic disease within the left hemithorax which is much more prominent than on the previous study and favors the posterior lower portion of the chest is also present laterally  The largest individual metastatic lesion of the pleura is 2 1 x 3 0 cm on image 40 series 2  There is a small amount of pleural fluid on the left  There is additional pleural metastatic disease in the medial aspect of the left hemithorax abutting the mediastinal border near the pericardial surface  In particular, on coronal image 74 series 601 there is a relatively prominent metastatic lesion which measures 1 5 x 2 4 cm and is potentially within the pericardium  HEART/GREAT VESSELS:  Heart size appears stable  The pericardial effusion seen on the prior study has diminished although is still present  As above, there is metastatic disease which is visually inseparable from the pericardial surface along the left heart border  No obvious myocardial infiltration appreciated  There is coronary artery calcification  The aorta is atherosclerotic but normal in caliber   MEDIASTINUM AND RAE: There are small mediastinal lymph nodes which appear within normal limits of size in the pretracheal area but there is an abnormally enlarged subcarinal lymph node which is 15 x 28 mm  This is more prominent than on the previous study  CHEST WALL AND LOWER NECK:   There is a left breast implant reconstruction  Visualized portion of right breast is unremarkable  Stable tiny low-density cystic lesion or nodule in the right lobe of the thyroid  Surgical clips in the left axilla  VISUALIZED STRUCTURES IN THE UPPER ABDOMEN:  Tiny hypodense focus in the liver on image 44 series 2 appears stable from earlier exams  Likely a benign cyst   Tiny cyst at the upper pole of the left kidney also noted  No acute upper abdominal pathology seen  OSSEOUS STRUCTURES:  Sclerotic lesion at T6 appears stable from the previous study  There may be some minimal sclerosis at T1 as well  There is mixed lucency and sclerosis in the upper portion of the right side of the manubrium  Developing sclerosis is present in the left scapula  There are degenerative changes of both glenohumeral joints  Impression: Progression of metastatic disease predominantly in the left pleural space with enhancing nodularity evident  Small left pleural effusion  Persistent though diminished pericardial effusion  Metastatic deposit possibly involving the pericardium on the left side  Stable consolidation in the left lung base  Increasing prominence of sclerotic osseous metastases without pathologic fractures identified  Workstation performed: [id number]     Ir Port Placement    Result Date: 9/5/2018  Narrative: Right Chest Port Insertion CLINICAL HISTORY: Metastatic lung cancer ; need for chemotherapy access  Fluoroscopy time: 0 7 minutes Sedation time: 30 minutes Images: 2 PROCEDURE AND FINDINGS: After obtaining informed consent, the right internal jugular vein was evaluated by ultrasound as a potential access site    The vein is patent, compressible, and free of thrombus  All elements of maximal sterile barrier technique were followed (cap, mask,  sterile gown, sterile gloves, large sterile sheet, hand hygiene, and 2% chlorhexidine for cutaneous antisepsis)  Under ultrasound guidance, a micropuncture needle was used to aspirate the right internal jugular vein through which a 0 018 mandrel was passed under fluoroscopic guidance  A static image was recorded  A 4 Albanian micropuncture introducer was placed  Using sharp and blunt dissection, a subcutaneous pocket was created and the catheter tunneled from the pocket to the venotomy site  A 0 035 inch guidewire was advanced centrally; a peel-away sheath was removed and the catheter advanced to the atriocaval junction under fluoroscopy  The catheter was transected and connected to a chest port reservoir and this was sutured into the pocket using 2 pieces of 4-0 Prolene  The pocket was closed with 3-0 Monocryl  The incision was closed using running 3-0 Stratafix suture and Histoacryl  The port was aspirated and flushed with heparinized saline and the patient was returned to the ambulatory care unit for monitoring  Follow-up instructions were given and a sterile dressing was applied  Impression: Successful ultrasound and fluoroscopic-guided placement of right internal jugular vein chest port catheter and reservoir with the tip at the SVC/right atrial junction   Workstation performed: [id number]       Labs:   Lab Results   Component Value Date    WBC 12 90 (H) 09/27/2018    HGB 14 8 09/27/2018    HCT 47 2 (H) 09/27/2018    MCV 91 09/27/2018     09/27/2018     Lab Results   Component Value Date     09/27/2018    K 4 5 09/27/2018     09/27/2018    CO2 27 09/27/2018    BUN 22 09/27/2018    CREATININE 0 60 09/27/2018    GLUF 101 (H) 04/18/2018    CALCIUM 8 3 09/27/2018    AST 17 09/27/2018    ALT 32 09/27/2018    ALKPHOS 84 09/27/2018    EGFR 85 09/27/2018       No results found for: IRON, TIBC, FERRITIN    No results found for: [id number]      ROS:   Review of Systems   Constitutional: Negative for activity change, appetite change, diaphoresis, fatigue, fever and unexpected weight change  HENT: Negative for facial swelling, hearing loss, rhinorrhea, sinus pain, sinus pressure, sneezing, sore throat and tinnitus  Eyes: Negative for photophobia, pain, discharge, redness, itching and visual disturbance  Respiratory: Negative for apnea and chest tightness  Pleurisy of the left chest   Cardiovascular: Negative for chest pain, palpitations and leg swelling  Gastrointestinal: Negative for abdominal distention, abdominal pain, blood in stool, constipation, diarrhea, nausea, rectal pain and vomiting  Endocrine: Negative for cold intolerance, heat intolerance, polydipsia and polyphagia  Genitourinary: Negative for difficulty urinating, dyspareunia, frequency, hematuria, pelvic pain and urgency  Musculoskeletal: Negative for arthralgias, back pain, gait problem, joint swelling and myalgias  Skin: Negative for color change, pallor and rash  Allergic/Immunologic: Negative for environmental allergies and food allergies  Neurological: Negative for dizziness, tremors, seizures, syncope, speech difficulty, numbness and headaches  Hematological: Negative for adenopathy  Does not bruise/bleed easily  Psychiatric/Behavioral: Negative for agitation, confusion, dysphoric mood, hallucinations and suicidal ideas  Current Medications: Reviewed  Allergies: Reviewed  PMH/FH/SH:  Reviewed      Physical Exam:    Body surface area is 1 7 meters squared      Wt Readings from Last 3 Encounters:   10/02/18 68 5 kg (151 lb)   09/27/18 68 kg (149 lb 14 6 oz)   09/20/18 69 4 kg (153 lb)        Temp Readings from Last 3 Encounters:   10/02/18 97 8 °F (36 6 °C) (Tympanic)   09/27/18 98 1 °F (36 7 °C) (Tympanic)   09/20/18 98 3 °F (36 8 °C) (Tympanic)        BP Readings from Last 3 Encounters: 10/02/18 150/80   09/27/18 126/75   09/20/18 120/70         Pulse Readings from Last 3 Encounters:   10/02/18 86   09/27/18 59   09/20/18 66        Physical Exam   Constitutional: She is oriented to person, place, and time  She appears well-developed and well-nourished  No distress  HENT:   Head: Normocephalic and atraumatic  Mouth/Throat: Oropharynx is clear and moist  No oropharyngeal exudate  Eyes: Pupils are equal, round, and reactive to light  Conjunctivae and EOM are normal    Neck: Normal range of motion  Neck supple  No JVD present  No tracheal deviation present  No thyromegaly present  Cardiovascular: Normal rate and regular rhythm  Exam reveals no gallop and no friction rub  No murmur heard  Pulmonary/Chest: Effort normal and breath sounds normal  No respiratory distress  She has no wheezes  She has no rales  She exhibits no tenderness  Abdominal: Soft  Bowel sounds are normal  She exhibits no distension and no mass  There is no tenderness  There is no rebound and no guarding  Musculoskeletal: Normal range of motion  She exhibits no edema  Lymphadenopathy:     She has no cervical adenopathy  Neurological: She is alert and oriented to person, place, and time  Skin: Skin is warm and dry  No rash noted  She is not diaphoretic  No erythema  No pallor  Psychiatric: She has a normal mood and affect  Her behavior is normal  Judgment and thought content normal    Vitals reviewed  Goals and Barriers:  Current Goal: Minimize effects of disease  Barriers: None  Patient's Capacity to Self Care:  Patient is able to self care      Code Status: [unfilled]

## 2018-10-03 DIAGNOSIS — R52 PAIN: ICD-10-CM

## 2018-10-03 RX ORDER — FENTANYL 12 UG/H
PATCH TRANSDERMAL
Qty: 20 PATCH | Refills: 0 | Status: SHIPPED | OUTPATIENT
Start: 2018-10-03 | End: 2018-12-04 | Stop reason: SDUPTHER

## 2018-10-04 ENCOUNTER — HOSPITAL ENCOUNTER (OUTPATIENT)
Dept: INFUSION CENTER | Facility: CLINIC | Age: 83
Discharge: HOME | End: 2018-10-04
Payer: MEDICARE

## 2018-10-04 VITALS
RESPIRATION RATE: 16 BRPM | WEIGHT: 149.91 LBS | BODY MASS INDEX: 28.3 KG/M2 | HEIGHT: 61 IN | DIASTOLIC BLOOD PRESSURE: 67 MMHG | SYSTOLIC BLOOD PRESSURE: 117 MMHG | TEMPERATURE: 97.1 F | OXYGEN SATURATION: 95 % | HEART RATE: 59 BPM

## 2018-10-04 DIAGNOSIS — R52 PAIN: ICD-10-CM

## 2018-10-04 DIAGNOSIS — C78.2 PLEURAL METASTASIS (HCC): ICD-10-CM

## 2018-10-04 LAB
ALBUMIN SERPL BCP-MCNC: 3 G/DL (ref 3.5–5.7)
ALP SERPL-CCNC: 109 U/L (ref 46–116)
ALT SERPL W P-5'-P-CCNC: 40 U/L (ref 12–78)
ANION GAP SERPL CALCULATED.3IONS-SCNC: 11 MMOL/L (ref 4–13)
ANISOCYTOSIS BLD QL SMEAR: PRESENT
AST SERPL W P-5'-P-CCNC: 26 U/L (ref 5–45)
BASOPHILS # BLD AUTO: 0 THOUSAND/UL (ref 0–0.1)
BASOPHILS NFR MAR MANUAL: 0 % (ref 0–1)
BILIRUB SERPL-MCNC: 0.8 MG/DL (ref 0.2–1)
BUN SERPL-MCNC: 22 MG/DL (ref 5–25)
CALCIUM SERPL-MCNC: 9.4 MG/DL (ref 8.3–10.1)
CHLORIDE SERPL-SCNC: 103 MMOL/L (ref 98–108)
CO2 SERPL-SCNC: 28 MMOL/L (ref 21–32)
CREAT SERPL-MCNC: 0.8 MG/DL (ref 0.6–1.3)
EOSINOPHIL # BLD AUTO: 0 THOUSAND/UL (ref 0–0.61)
EOSINOPHIL NFR BLD MANUAL: 0 % (ref 0–6)
ERYTHROCYTE [DISTWIDTH] IN BLOOD BY AUTOMATED COUNT: 15.7 % (ref 11.6–15.1)
GFR SERPL CREATININE-BSD FRML MDRD: 68 ML/MIN/1.73SQ M
GLUCOSE SERPL-MCNC: 152 MG/DL (ref 65–140)
HCT VFR BLD AUTO: 47.6 % (ref 34.8–46.1)
HGB BLD-MCNC: 14.8 G/DL (ref 11.5–15.4)
LYMPHOCYTES # BLD AUTO: 16 % (ref 14–44)
LYMPHOCYTES # BLD AUTO: 2.27 THOUSAND/UL (ref 0.6–4.47)
MCH RBC QN AUTO: 27.7 PG (ref 26.8–34.3)
MCHC RBC AUTO-ENTMCNC: 31.2 G/DL (ref 31.4–37.4)
MCV RBC AUTO: 89 FL (ref 82–98)
MONOCYTES # BLD AUTO: 0.99 THOUSAND/UL (ref 0–1.22)
MONOCYTES NFR BLD AUTO: 7 % (ref 4–12)
NEUTS BAND NFR BLD MANUAL: 7 % (ref 0–8)
NEUTS SEG # BLD: 10.93 THOUSAND/UL (ref 1.81–6.82)
NEUTS SEG NFR BLD AUTO: 70 % (ref 43–75)
PLATELET # BLD AUTO: 261 THOUSANDS/UL (ref 149–390)
PLATELET BLD QL SMEAR: ADEQUATE
PMV BLD AUTO: 8.4 FL (ref 8.9–12.7)
POTASSIUM SERPL-SCNC: 4.4 MMOL/L (ref 3.5–5.3)
PROT SERPL-MCNC: 6.3 G/DL (ref 6.4–8.2)
RBC # BLD AUTO: 5.35 MILLION/UL (ref 3.81–5.12)
SODIUM SERPL-SCNC: 142 MMOL/L (ref 136–145)
TOTAL CELLS COUNTED SPEC: 100
WBC # BLD AUTO: 14.2 THOUSAND/UL (ref 4.31–10.16)
WBC NRBC COR # BLD: 14.2 THOUSAND/UL (ref 4.31–10.16)

## 2018-10-04 PROCEDURE — 85027 COMPLETE CBC AUTOMATED: CPT

## 2018-10-04 PROCEDURE — 85007 BL SMEAR W/DIFF WBC COUNT: CPT

## 2018-10-04 PROCEDURE — 96367 TX/PROPH/DG ADDL SEQ IV INF: CPT

## 2018-10-04 PROCEDURE — 96413 CHEMO IV INFUSION 1 HR: CPT

## 2018-10-04 PROCEDURE — 80053 COMPREHEN METABOLIC PANEL: CPT

## 2018-10-04 RX ADMIN — ONDANSETRON 8 MG: 2 INJECTION INTRAMUSCULAR; INTRAVENOUS at 13:56

## 2018-10-04 RX ADMIN — SODIUM CHLORIDE 500 ML: 0.9 INJECTION, SOLUTION INTRAVENOUS at 13:48

## 2018-10-04 RX ADMIN — Medication 300 UNITS: at 15:02

## 2018-10-04 RX ADMIN — GEMCITABINE 1496 MG: 38 INJECTION INTRAVENOUS at 14:31

## 2018-10-04 NOTE — PROGRESS NOTES
Patient tolerated infusion well  Denies any discomfort  Pt is aware of all her future appointment  Pt discharged in stable condition accompanied by her granddaughter

## 2018-10-05 ENCOUNTER — TELEPHONE (OUTPATIENT)
Dept: HEMATOLOGY ONCOLOGY | Facility: CLINIC | Age: 83
End: 2018-10-05

## 2018-10-08 ENCOUNTER — DOCUMENTATION (OUTPATIENT)
Dept: HEMATOLOGY ONCOLOGY | Facility: CLINIC | Age: 83
End: 2018-10-08

## 2018-10-08 NOTE — PROGRESS NOTES
10/5/2018 received notification from [address] [place] [address],[address] the fentanyl 12 mcg/hr patch needs authorization  Pt is using 2 patches every 72 hrs  Submitted for auth through cover my meds  Pt has [employer] insurance for rx    ID [id number]  BIN # [id number]    Phone number  [phone]    10/8/2018 received approval letter from Leandra champion  [facility] is valid from 7/8/2018 through 10/6/2019    Called [facility] pharmacy @ 9:06 ([phone]) spoke with Sharif Loera who stated the pt already picked up the medication over the weekend       Notified clinical

## 2018-10-09 ENCOUNTER — OFFICE VISIT (OUTPATIENT)
Dept: PALLIATIVE MEDICINE | Facility: CLINIC | Age: 83
End: 2018-10-09
Payer: MEDICARE

## 2018-10-09 VITALS
HEART RATE: 90 BPM | TEMPERATURE: 98 F | DIASTOLIC BLOOD PRESSURE: 82 MMHG | BODY MASS INDEX: 28.05 KG/M2 | HEIGHT: 62 IN | OXYGEN SATURATION: 95 % | SYSTOLIC BLOOD PRESSURE: 152 MMHG | WEIGHT: 152.4 LBS

## 2018-10-09 DIAGNOSIS — I31.3 PERICARDIAL EFFUSION: ICD-10-CM

## 2018-10-09 DIAGNOSIS — C34.82 MALIGNANT NEOPLASM OF OVERLAPPING SITES OF LEFT LUNG (HCC): Primary | ICD-10-CM

## 2018-10-09 DIAGNOSIS — R52 PAIN: ICD-10-CM

## 2018-10-09 DIAGNOSIS — C78.2 PLEURAL METASTASIS (HCC): ICD-10-CM

## 2018-10-09 DIAGNOSIS — L65.9 ALOPECIA: Primary | ICD-10-CM

## 2018-10-09 PROCEDURE — 99213 OFFICE O/P EST LOW 20 MIN: CPT | Performed by: FAMILY MEDICINE

## 2018-10-09 RX ORDER — DEXAMETHASONE 1 MG
1 TABLET ORAL
Qty: 30 TABLET | Refills: 3 | Status: SHIPPED | OUTPATIENT
Start: 2018-10-09 | End: 2019-02-04 | Stop reason: SDUPTHER

## 2018-10-11 ENCOUNTER — HOSPITAL ENCOUNTER (INPATIENT)
Facility: HOSPITAL | Age: 83
LOS: 8 days | Discharge: HOME HEALTH | DRG: 871 | End: 2018-10-19
Attending: EMERGENCY MEDICINE | Admitting: INTERNAL MEDICINE
Payer: MEDICARE

## 2018-10-11 ENCOUNTER — APPOINTMENT (EMERGENCY)
Dept: CT IMAGING | Facility: HOSPITAL | Age: 83
DRG: 871 | End: 2018-10-11
Payer: MEDICARE

## 2018-10-11 ENCOUNTER — HOSPITAL ENCOUNTER (OUTPATIENT)
Dept: INFUSION CENTER | Facility: CLINIC | Age: 83
Discharge: HOME | DRG: 871 | End: 2018-10-11
Payer: MEDICARE

## 2018-10-11 VITALS
BODY MASS INDEX: 28 KG/M2 | DIASTOLIC BLOOD PRESSURE: 77 MMHG | WEIGHT: 153.11 LBS | TEMPERATURE: 98.1 F | RESPIRATION RATE: 18 BRPM | HEART RATE: 69 BPM | OXYGEN SATURATION: 95 % | SYSTOLIC BLOOD PRESSURE: 134 MMHG

## 2018-10-11 DIAGNOSIS — J96.01 ACUTE RESPIRATORY FAILURE WITH HYPOXIA (HCC): ICD-10-CM

## 2018-10-11 DIAGNOSIS — R41.82 ALTERED MENTAL STATUS: ICD-10-CM

## 2018-10-11 DIAGNOSIS — R50.9 FEVER: ICD-10-CM

## 2018-10-11 DIAGNOSIS — J18.9 PNEUMONIA: ICD-10-CM

## 2018-10-11 DIAGNOSIS — R06.03 RESPIRATORY DISTRESS: ICD-10-CM

## 2018-10-11 DIAGNOSIS — A41.9 SEPSIS, DUE TO UNSPECIFIED ORGANISM: ICD-10-CM

## 2018-10-11 DIAGNOSIS — C34.90 LUNG CANCER (HCC): ICD-10-CM

## 2018-10-11 DIAGNOSIS — I31.3 PERICARDIAL EFFUSION: ICD-10-CM

## 2018-10-11 DIAGNOSIS — J18.9 PNEUMONITIS: Primary | ICD-10-CM

## 2018-10-11 LAB
ALBUMIN SERPL BCP-MCNC: 2.2 G/DL (ref 3.5–5)
ALBUMIN SERPL BCP-MCNC: 2.8 G/DL (ref 3.5–5.7)
ALP SERPL-CCNC: 84 U/L (ref 46–116)
ALP SERPL-CCNC: 98 U/L (ref 46–116)
ALT SERPL W P-5'-P-CCNC: 35 U/L (ref 12–78)
ALT SERPL W P-5'-P-CCNC: 41 U/L (ref 12–78)
ANION GAP SERPL CALCULATED.3IONS-SCNC: 10 MMOL/L (ref 4–13)
ANION GAP SERPL CALCULATED.3IONS-SCNC: 14 MMOL/L (ref 4–13)
ANISOCYTOSIS BLD QL SMEAR: PRESENT
APTT PPP: 27 SECONDS (ref 24–36)
AST SERPL W P-5'-P-CCNC: 22 U/L (ref 5–45)
AST SERPL W P-5'-P-CCNC: 25 U/L (ref 5–45)
BACTERIA UR QL AUTO: ABNORMAL /HPF
BASOPHILS # BLD AUTO: 0 THOUSAND/UL (ref 0–0.1)
BASOPHILS # BLD MANUAL: 0 THOUSAND/UL (ref 0–0.1)
BASOPHILS NFR MAR MANUAL: 0 % (ref 0–1)
BASOPHILS NFR MAR MANUAL: 0 % (ref 0–1)
BILIRUB SERPL-MCNC: 0.5 MG/DL (ref 0.2–1)
BILIRUB SERPL-MCNC: 0.8 MG/DL (ref 0.2–1)
BILIRUB UR QL STRIP: NEGATIVE
BUN SERPL-MCNC: 17 MG/DL (ref 5–25)
BUN SERPL-MCNC: 18 MG/DL (ref 5–25)
CALCIUM SERPL-MCNC: 7.7 MG/DL (ref 8.3–10.1)
CALCIUM SERPL-MCNC: 9 MG/DL (ref 8.3–10.1)
CHLORIDE SERPL-SCNC: 104 MMOL/L (ref 100–108)
CHLORIDE SERPL-SCNC: 99 MMOL/L (ref 98–108)
CLARITY UR: CLEAR
CO2 SERPL-SCNC: 25 MMOL/L (ref 21–32)
CO2 SERPL-SCNC: 29 MMOL/L (ref 21–32)
COLOR UR: YELLOW
CREAT SERPL-MCNC: 0.8 MG/DL (ref 0.6–1.3)
CREAT SERPL-MCNC: 0.88 MG/DL (ref 0.6–1.3)
EOSINOPHIL # BLD AUTO: 0.08 THOUSAND/UL (ref 0–0.61)
EOSINOPHIL # BLD MANUAL: 0.06 THOUSAND/UL (ref 0–0.4)
EOSINOPHIL NFR BLD MANUAL: 1 % (ref 0–6)
EOSINOPHIL NFR BLD MANUAL: 1 % (ref 0–6)
ERYTHROCYTE [DISTWIDTH] IN BLOOD BY AUTOMATED COUNT: 14.9 % (ref 11.6–15.1)
ERYTHROCYTE [DISTWIDTH] IN BLOOD BY AUTOMATED COUNT: 15.9 % (ref 11.6–15.1)
GFR SERPL CREATININE-BSD FRML MDRD: 61 ML/MIN/1.73SQ M
GFR SERPL CREATININE-BSD FRML MDRD: 68 ML/MIN/1.73SQ M
GLUCOSE SERPL-MCNC: 110 MG/DL (ref 65–140)
GLUCOSE SERPL-MCNC: 141 MG/DL (ref 65–140)
GLUCOSE UR STRIP-MCNC: ABNORMAL MG/DL
HCT VFR BLD AUTO: 43.4 % (ref 34.8–46.1)
HCT VFR BLD AUTO: 45.2 % (ref 34.8–46.1)
HGB BLD-MCNC: 13.3 G/DL (ref 11.5–15.4)
HGB BLD-MCNC: 14.3 G/DL (ref 11.5–15.4)
HGB UR QL STRIP.AUTO: ABNORMAL
HYPERCHROMIA BLD QL SMEAR: PRESENT
INR PPP: 1.04 (ref 0.86–1.17)
KETONES UR STRIP-MCNC: NEGATIVE MG/DL
LACTATE SERPL-SCNC: 3.5 MMOL/L (ref 0.5–2)
LEUKOCYTE ESTERASE UR QL STRIP: NEGATIVE
LG PLATELETS BLD QL SMEAR: PRESENT
LYMPHOCYTES # BLD AUTO: 0.83 THOUSAND/UL (ref 0.6–4.47)
LYMPHOCYTES # BLD AUTO: 1.11 THOUSAND/UL (ref 0.6–4.47)
LYMPHOCYTES # BLD AUTO: 13 % (ref 14–44)
LYMPHOCYTES # BLD AUTO: 14 % (ref 14–44)
MCH RBC QN AUTO: 27.5 PG (ref 26.8–34.3)
MCH RBC QN AUTO: 29.5 PG (ref 26.8–34.3)
MCHC RBC AUTO-ENTMCNC: 30.8 G/DL (ref 31.4–37.4)
MCHC RBC AUTO-ENTMCNC: 31.6 G/DL (ref 31.4–37.4)
MCV RBC AUTO: 90 FL (ref 82–98)
MCV RBC AUTO: 93 FL (ref 82–98)
MONOCYTES # BLD AUTO: 0.32 THOUSAND/UL (ref 0–1.22)
MONOCYTES # BLD AUTO: 0.51 THOUSAND/UL (ref 0–1.22)
MONOCYTES NFR BLD AUTO: 4 % (ref 4–12)
MONOCYTES NFR BLD: 8 % (ref 4–12)
MYELOCYTES NFR BLD MANUAL: 1 % (ref 0–1)
NEUTROPHILS # BLD MANUAL: 5 THOUSAND/UL (ref 1.85–7.62)
NEUTS BAND NFR BLD MANUAL: 2 % (ref 0–8)
NEUTS BAND NFR BLD MANUAL: 4 % (ref 0–8)
NEUTS SEG # BLD: 6.32 THOUSAND/UL (ref 1.81–6.82)
NEUTS SEG NFR BLD AUTO: 74 % (ref 43–75)
NEUTS SEG NFR BLD AUTO: 78 % (ref 43–75)
NITRITE UR QL STRIP: NEGATIVE
NON-SQ EPI CELLS URNS QL MICRO: ABNORMAL /HPF
NRBC BLD AUTO-RTO: 0 /100 WBCS
OVALOCYTES BLD QL SMEAR: PRESENT
PH UR STRIP.AUTO: 5.5 [PH] (ref 4.5–8)
PLATELET # BLD AUTO: 160 THOUSANDS/UL (ref 149–390)
PLATELET # BLD AUTO: 168 THOUSANDS/UL (ref 149–390)
PLATELET BLD QL SMEAR: ADEQUATE
PLATELET BLD QL SMEAR: ADEQUATE
PMV BLD AUTO: 10 FL (ref 8.9–12.7)
PMV BLD AUTO: 7.1 FL (ref 8.9–12.7)
POTASSIUM SERPL-SCNC: 4.2 MMOL/L (ref 3.5–5.3)
POTASSIUM SERPL-SCNC: 4.3 MMOL/L (ref 3.5–5.3)
PROT SERPL-MCNC: 5.6 G/DL (ref 6.4–8.2)
PROT SERPL-MCNC: 6.1 G/DL (ref 6.4–8.2)
PROT UR STRIP-MCNC: NEGATIVE MG/DL
PROTHROMBIN TIME: 13.7 SECONDS (ref 11.8–14.2)
RBC # BLD AUTO: 4.84 MILLION/UL (ref 3.81–5.12)
RBC # BLD AUTO: 4.85 MILLION/UL (ref 3.81–5.12)
RBC #/AREA URNS AUTO: ABNORMAL /HPF
SODIUM SERPL-SCNC: 139 MMOL/L (ref 136–145)
SODIUM SERPL-SCNC: 142 MMOL/L (ref 136–145)
SP GR UR STRIP.AUTO: 1.02 (ref 1–1.03)
TOTAL CELLS COUNTED SPEC: 100
TOTAL CELLS COUNTED SPEC: 100
UROBILINOGEN UR QL STRIP.AUTO: 0.2 E.U./DL
WBC # BLD AUTO: 6.41 THOUSAND/UL (ref 4.31–10.16)
WBC # BLD AUTO: 7.9 THOUSAND/UL (ref 4.31–10.16)
WBC #/AREA URNS AUTO: ABNORMAL /HPF
WBC NRBC COR # BLD: 7.9 THOUSAND/UL (ref 4.31–10.16)

## 2018-10-11 PROCEDURE — 81001 URINALYSIS AUTO W/SCOPE: CPT | Performed by: EMERGENCY MEDICINE

## 2018-10-11 PROCEDURE — 96413 CHEMO IV INFUSION 1 HR: CPT

## 2018-10-11 PROCEDURE — 80053 COMPREHEN METABOLIC PANEL: CPT | Performed by: EMERGENCY MEDICINE

## 2018-10-11 PROCEDURE — 71260 CT THORAX DX C+: CPT

## 2018-10-11 PROCEDURE — 99223 1ST HOSP IP/OBS HIGH 75: CPT | Performed by: PHYSICIAN ASSISTANT

## 2018-10-11 PROCEDURE — 85610 PROTHROMBIN TIME: CPT | Performed by: EMERGENCY MEDICINE

## 2018-10-11 PROCEDURE — 85027 COMPLETE CBC AUTOMATED: CPT | Performed by: INTERNAL MEDICINE

## 2018-10-11 PROCEDURE — 96365 THER/PROPH/DIAG IV INF INIT: CPT

## 2018-10-11 PROCEDURE — 85730 THROMBOPLASTIN TIME PARTIAL: CPT | Performed by: EMERGENCY MEDICINE

## 2018-10-11 PROCEDURE — 85007 BL SMEAR W/DIFF WBC COUNT: CPT | Performed by: EMERGENCY MEDICINE

## 2018-10-11 PROCEDURE — 87040 BLOOD CULTURE FOR BACTERIA: CPT | Performed by: EMERGENCY MEDICINE

## 2018-10-11 PROCEDURE — 96361 HYDRATE IV INFUSION ADD-ON: CPT

## 2018-10-11 PROCEDURE — 99285 EMERGENCY DEPT VISIT HI MDM: CPT

## 2018-10-11 PROCEDURE — 36415 COLL VENOUS BLD VENIPUNCTURE: CPT | Performed by: EMERGENCY MEDICINE

## 2018-10-11 PROCEDURE — 85007 BL SMEAR W/DIFF WBC COUNT: CPT | Performed by: INTERNAL MEDICINE

## 2018-10-11 PROCEDURE — 93005 ELECTROCARDIOGRAM TRACING: CPT

## 2018-10-11 PROCEDURE — 80053 COMPREHEN METABOLIC PANEL: CPT | Performed by: INTERNAL MEDICINE

## 2018-10-11 PROCEDURE — 85027 COMPLETE CBC AUTOMATED: CPT | Performed by: EMERGENCY MEDICINE

## 2018-10-11 PROCEDURE — 83605 ASSAY OF LACTIC ACID: CPT | Performed by: EMERGENCY MEDICINE

## 2018-10-11 PROCEDURE — 96367 TX/PROPH/DG ADDL SEQ IV INF: CPT

## 2018-10-11 RX ORDER — OXYCODONE HYDROCHLORIDE 5 MG/1
2.5 TABLET ORAL EVERY 4 HOURS PRN
Status: DISCONTINUED | OUTPATIENT
Start: 2018-10-11 | End: 2018-10-19 | Stop reason: HOSPADM

## 2018-10-11 RX ORDER — DEXAMETHASONE 1 MG
1 TABLET ORAL
Status: DISCONTINUED | OUTPATIENT
Start: 2018-10-12 | End: 2018-10-13

## 2018-10-11 RX ORDER — ACETAMINOPHEN 325 MG/1
650 TABLET ORAL ONCE
Status: COMPLETED | OUTPATIENT
Start: 2018-10-11 | End: 2018-10-11

## 2018-10-11 RX ORDER — ASPIRIN 81 MG/1
81 TABLET ORAL DAILY
Status: DISCONTINUED | OUTPATIENT
Start: 2018-10-12 | End: 2018-10-19 | Stop reason: HOSPADM

## 2018-10-11 RX ORDER — FOLIC ACID 1 MG/1
1 TABLET ORAL DAILY
Status: DISCONTINUED | OUTPATIENT
Start: 2018-10-12 | End: 2018-10-19 | Stop reason: HOSPADM

## 2018-10-11 RX ORDER — ONDANSETRON 2 MG/ML
4 INJECTION INTRAMUSCULAR; INTRAVENOUS EVERY 6 HOURS PRN
Status: DISCONTINUED | OUTPATIENT
Start: 2018-10-11 | End: 2018-10-19 | Stop reason: HOSPADM

## 2018-10-11 RX ORDER — VANCOMYCIN HYDROCHLORIDE 1 G/200ML
15 INJECTION, SOLUTION INTRAVENOUS EVERY 24 HOURS
Status: DISCONTINUED | OUTPATIENT
Start: 2018-10-12 | End: 2018-10-12

## 2018-10-11 RX ORDER — SODIUM CHLORIDE 9 MG/ML
75 INJECTION, SOLUTION INTRAVENOUS CONTINUOUS
Status: DISCONTINUED | OUTPATIENT
Start: 2018-10-11 | End: 2018-10-12

## 2018-10-11 RX ORDER — VANCOMYCIN HYDROCHLORIDE 1 G/200ML
15 INJECTION, SOLUTION INTRAVENOUS ONCE
Status: COMPLETED | OUTPATIENT
Start: 2018-10-11 | End: 2018-10-12

## 2018-10-11 RX ORDER — CITALOPRAM 20 MG/1
20 TABLET ORAL DAILY
Status: DISCONTINUED | OUTPATIENT
Start: 2018-10-12 | End: 2018-10-19 | Stop reason: HOSPADM

## 2018-10-11 RX ORDER — AMIODARONE HYDROCHLORIDE 200 MG/1
200 TABLET ORAL DAILY
Status: DISCONTINUED | OUTPATIENT
Start: 2018-10-12 | End: 2018-10-13

## 2018-10-11 RX ORDER — ALPRAZOLAM 0.5 MG/1
0.5 TABLET ORAL
Status: DISCONTINUED | OUTPATIENT
Start: 2018-10-11 | End: 2018-10-19 | Stop reason: HOSPADM

## 2018-10-11 RX ORDER — ACETAMINOPHEN 325 MG/1
500 TABLET ORAL EVERY 6 HOURS PRN
Status: DISCONTINUED | OUTPATIENT
Start: 2018-10-11 | End: 2018-10-19 | Stop reason: HOSPADM

## 2018-10-11 RX ORDER — FENTANYL 12 UG/H
1 PATCH TRANSDERMAL
Status: DISCONTINUED | OUTPATIENT
Start: 2018-10-11 | End: 2018-10-19 | Stop reason: HOSPADM

## 2018-10-11 RX ADMIN — SODIUM CHLORIDE 1000 ML: 0.9 INJECTION, SOLUTION INTRAVENOUS at 22:22

## 2018-10-11 RX ADMIN — SODIUM CHLORIDE 500 ML: 0.9 INJECTION, SOLUTION INTRAVENOUS at 13:45

## 2018-10-11 RX ADMIN — ONDANSETRON 8 MG: 2 INJECTION INTRAMUSCULAR; INTRAVENOUS at 13:57

## 2018-10-11 RX ADMIN — Medication 300 UNITS: at 15:32

## 2018-10-11 RX ADMIN — IOHEXOL 85 ML: 350 INJECTION, SOLUTION INTRAVENOUS at 21:42

## 2018-10-11 RX ADMIN — PIPERACILLIN SODIUM AND TAZOBACTAM SODIUM 3.38 G: 36; 4.5 INJECTION, POWDER, FOR SOLUTION INTRAVENOUS at 22:32

## 2018-10-11 RX ADMIN — GEMCITABINE 1496 MG: 38 INJECTION INTRAVENOUS at 15:01

## 2018-10-11 RX ADMIN — VANCOMYCIN HYDROCHLORIDE 1000 MG: 1 INJECTION, SOLUTION INTRAVENOUS at 23:39

## 2018-10-11 RX ADMIN — ACETAMINOPHEN 650 MG: 325 TABLET, FILM COATED ORAL at 21:58

## 2018-10-11 RX ADMIN — SODIUM CHLORIDE 1000 ML: 0.9 INJECTION, SOLUTION INTRAVENOUS at 21:23

## 2018-10-11 NOTE — PLAN OF CARE
Problem: PAIN - ADULT  Goal: Verbalizes/displays adequate comfort level or baseline comfort level  Interventions:  - Encourage patient to monitor pain and request assistance  - Assess pain using appropriate pain scale  - Administer analgesics based on type and severity of pain and evaluate response  - Implement non-pharmacological measures as appropriate and evaluate response  - Consider cultural and social influences on pain and pain management  - Notify physician/advanced practitioner if interventions unsuccessful or patient reports new pain  Outcome: Progressing      Problem: INFECTION - ADULT  Goal: Absence or prevention of progression during hospitalization  INTERVENTIONS:  - Assess and monitor for signs and symptoms of infection  - Monitor lab/diagnostic results  - Monitor all insertion sites, i e  indwelling lines, tubes, and drains  - Monitor endotracheal (as able) and nasal secretions for changes in amount and color  - [place] appropriate cooling/warming therapies per order  - Administer medications as ordered  - Instruct and encourage patient and family to use good hand hygiene technique  - Identify and instruct in appropriate isolation precautions for identified infection/condition  Outcome: Progressing    Goal: Absence of fever/infection during neutropenic period  INTERVENTIONS:  - Monitor WBC  - Implement neutropenic guidelines  Outcome: Progressing      Problem: Knowledge Deficit  Goal: Patient/family/caregiver demonstrates understanding of disease process, treatment plan, medications, and discharge instructions  Complete learning assessment and assess knowledge base    Interventions:  - Provide teaching at level of understanding  - Provide teaching via preferred learning methods  Outcome: Progressing

## 2018-10-11 NOTE — PROGRESS NOTES
Pt  Tolerated Gemzar and Hydration without adverse event  Future appointments reviewed  Declined AVS   Spoke wtth daughter related to schedule; Chemotherapy infusion appointment scheduled for 10/18/18  Followed by appointment with Dr Radha Franco

## 2018-10-11 NOTE — PROGRESS NOTES
Pt  Denies new symptoms or concerns at this time  Labs obtained as ordered  Hydration and Gemzar ordered today

## 2018-10-12 ENCOUNTER — APPOINTMENT (INPATIENT)
Dept: NON INVASIVE DIAGNOSTICS | Facility: HOSPITAL | Age: 83
DRG: 871 | End: 2018-10-12
Payer: MEDICARE

## 2018-10-12 LAB
ANION GAP SERPL CALCULATED.3IONS-SCNC: 8 MMOL/L (ref 4–13)
ATRIAL RATE: 104 BPM
BUN SERPL-MCNC: 18 MG/DL (ref 5–25)
CALCIUM SERPL-MCNC: 7.9 MG/DL (ref 8.3–10.1)
CHLORIDE SERPL-SCNC: 106 MMOL/L (ref 100–108)
CO2 SERPL-SCNC: 23 MMOL/L (ref 21–32)
CREAT SERPL-MCNC: 0.76 MG/DL (ref 0.6–1.3)
ERYTHROCYTE [DISTWIDTH] IN BLOOD BY AUTOMATED COUNT: 15.4 % (ref 11.6–15.1)
GFR SERPL CREATININE-BSD FRML MDRD: 73 ML/MIN/1.73SQ M
GLUCOSE SERPL-MCNC: 141 MG/DL (ref 65–140)
GLUCOSE SERPL-MCNC: 151 MG/DL (ref 65–140)
GLUCOSE SERPL-MCNC: 157 MG/DL (ref 65–140)
GLUCOSE SERPL-MCNC: 198 MG/DL (ref 65–140)
GLUCOSE SERPL-MCNC: 213 MG/DL (ref 65–140)
GLUCOSE SERPL-MCNC: 223 MG/DL (ref 65–140)
GLUCOSE SERPL-MCNC: 240 MG/DL (ref 65–140)
HCT VFR BLD AUTO: 36.3 % (ref 34.8–46.1)
HGB BLD-MCNC: 11.8 G/DL (ref 11.5–15.4)
LACTATE SERPL-SCNC: 2.2 MMOL/L (ref 0.5–2)
MCH RBC QN AUTO: 29.9 PG (ref 26.8–34.3)
MCHC RBC AUTO-ENTMCNC: 32.5 G/DL (ref 31.4–37.4)
MCV RBC AUTO: 92 FL (ref 82–98)
NT-PROBNP SERPL-MCNC: 1222 PG/ML
P AXIS: 47 DEGREES
PLATELET # BLD AUTO: 133 THOUSANDS/UL (ref 149–390)
PMV BLD AUTO: 10.4 FL (ref 8.9–12.7)
POTASSIUM SERPL-SCNC: 4.2 MMOL/L (ref 3.5–5.3)
PR INTERVAL: 138 MS
PROCALCITONIN SERPL-MCNC: 1.79 NG/ML
QRS AXIS: 76 DEGREES
QRSD INTERVAL: 72 MS
QT INTERVAL: 316 MS
QTC INTERVAL: 413 MS
RBC # BLD AUTO: 3.95 MILLION/UL (ref 3.81–5.12)
SODIUM SERPL-SCNC: 137 MMOL/L (ref 136–145)
T WAVE AXIS: 20 DEGREES
VENTRICULAR RATE: 103 BPM
WBC # BLD AUTO: 6.97 THOUSAND/UL (ref 4.31–10.16)

## 2018-10-12 PROCEDURE — G8987 SELF CARE CURRENT STATUS: HCPCS

## 2018-10-12 PROCEDURE — 93308 TTE F-UP OR LMTD: CPT | Performed by: INTERNAL MEDICINE

## 2018-10-12 PROCEDURE — 82948 REAGENT STRIP/BLOOD GLUCOSE: CPT

## 2018-10-12 PROCEDURE — 97163 PT EVAL HIGH COMPLEX 45 MIN: CPT

## 2018-10-12 PROCEDURE — 85027 COMPLETE CBC AUTOMATED: CPT | Performed by: PHYSICIAN ASSISTANT

## 2018-10-12 PROCEDURE — 97110 THERAPEUTIC EXERCISES: CPT

## 2018-10-12 PROCEDURE — G8979 MOBILITY GOAL STATUS: HCPCS

## 2018-10-12 PROCEDURE — G8978 MOBILITY CURRENT STATUS: HCPCS

## 2018-10-12 PROCEDURE — 99232 SBSQ HOSP IP/OBS MODERATE 35: CPT | Performed by: INTERNAL MEDICINE

## 2018-10-12 PROCEDURE — 99222 1ST HOSP IP/OBS MODERATE 55: CPT | Performed by: INTERNAL MEDICINE

## 2018-10-12 PROCEDURE — 93321 DOPPLER ECHO F-UP/LMTD STD: CPT | Performed by: INTERNAL MEDICINE

## 2018-10-12 PROCEDURE — 97166 OT EVAL MOD COMPLEX 45 MIN: CPT

## 2018-10-12 PROCEDURE — 83880 ASSAY OF NATRIURETIC PEPTIDE: CPT | Performed by: NURSE PRACTITIONER

## 2018-10-12 PROCEDURE — 80048 BASIC METABOLIC PNL TOTAL CA: CPT | Performed by: PHYSICIAN ASSISTANT

## 2018-10-12 PROCEDURE — 93325 DOPPLER ECHO COLOR FLOW MAPG: CPT | Performed by: INTERNAL MEDICINE

## 2018-10-12 PROCEDURE — 93308 TTE F-UP OR LMTD: CPT

## 2018-10-12 PROCEDURE — 93010 ELECTROCARDIOGRAM REPORT: CPT | Performed by: INTERNAL MEDICINE

## 2018-10-12 PROCEDURE — G8988 SELF CARE GOAL STATUS: HCPCS

## 2018-10-12 PROCEDURE — 84145 PROCALCITONIN (PCT): CPT | Performed by: INTERNAL MEDICINE

## 2018-10-12 RX ORDER — ONDANSETRON 2 MG/ML
4 INJECTION INTRAMUSCULAR; INTRAVENOUS ONCE AS NEEDED
Status: DISCONTINUED | OUTPATIENT
Start: 2018-10-12 | End: 2018-10-15 | Stop reason: SDUPTHER

## 2018-10-12 RX ORDER — FUROSEMIDE 10 MG/ML
20 INJECTION INTRAMUSCULAR; INTRAVENOUS ONCE
Status: COMPLETED | OUTPATIENT
Start: 2018-10-12 | End: 2018-10-12

## 2018-10-12 RX ADMIN — ASPIRIN 81 MG: 81 TABLET, COATED ORAL at 09:35

## 2018-10-12 RX ADMIN — SODIUM CHLORIDE 75 ML/HR: 0.9 INJECTION, SOLUTION INTRAVENOUS at 00:42

## 2018-10-12 RX ADMIN — METOPROLOL TARTRATE 12.5 MG: 25 TABLET ORAL at 00:46

## 2018-10-12 RX ADMIN — METOPROLOL TARTRATE 12.5 MG: 25 TABLET ORAL at 21:20

## 2018-10-12 RX ADMIN — FOLIC ACID 1 MG: 1 TABLET ORAL at 09:35

## 2018-10-12 RX ADMIN — INSULIN LISPRO 1 UNITS: 100 INJECTION, SOLUTION INTRAVENOUS; SUBCUTANEOUS at 02:01

## 2018-10-12 RX ADMIN — ALPRAZOLAM 0.5 MG: 0.5 TABLET ORAL at 00:46

## 2018-10-12 RX ADMIN — DEXAMETHASONE 1 MG: 1 TABLET ORAL at 09:37

## 2018-10-12 RX ADMIN — INSULIN LISPRO 1 UNITS: 100 INJECTION, SOLUTION INTRAVENOUS; SUBCUTANEOUS at 21:22

## 2018-10-12 RX ADMIN — PIPERACILLIN SODIUM AND TAZOBACTAM SODIUM 3.38 G: 36; 4.5 INJECTION, POWDER, FOR SOLUTION INTRAVENOUS at 09:39

## 2018-10-12 RX ADMIN — INSULIN LISPRO 2 UNITS: 100 INJECTION, SOLUTION INTRAVENOUS; SUBCUTANEOUS at 17:20

## 2018-10-12 RX ADMIN — ENOXAPARIN SODIUM 40 MG: 40 INJECTION SUBCUTANEOUS at 09:35

## 2018-10-12 RX ADMIN — FUROSEMIDE 20 MG: 10 INJECTION, SOLUTION INTRAMUSCULAR; INTRAVENOUS at 13:59

## 2018-10-12 RX ADMIN — PIPERACILLIN SODIUM AND TAZOBACTAM SODIUM 3.38 G: 36; 4.5 INJECTION, POWDER, FOR SOLUTION INTRAVENOUS at 04:18

## 2018-10-12 RX ADMIN — ALPRAZOLAM 0.5 MG: 0.5 TABLET ORAL at 21:19

## 2018-10-12 RX ADMIN — AMIODARONE HYDROCHLORIDE 200 MG: 200 TABLET ORAL at 09:35

## 2018-10-12 RX ADMIN — FENTANYL 1 PATCH: 12 PATCH, EXTENDED RELEASE TRANSDERMAL at 00:48

## 2018-10-12 RX ADMIN — CITALOPRAM HYDROBROMIDE 20 MG: 20 TABLET ORAL at 09:35

## 2018-10-12 RX ADMIN — INSULIN LISPRO 1 UNITS: 100 INJECTION, SOLUTION INTRAVENOUS; SUBCUTANEOUS at 13:46

## 2018-10-12 NOTE — PHYSICAL THERAPY NOTE
PHYSICAL THERAPY EVALUATION  NAME:  Ravinder Harvey  DATE: 10/12/18    AGE:   80 y o  Mrn:   [id number]  ADMIT DX:  Shortness of breath [R06 02]  Lung cancer (HCC) [C34 90]  Altered mental status [R41 82]  Pneumonia [J18 9]  Pneumonitis [J18 9]  Respiratory distress [R06 03]  Fever [R50 9]    Past Medical History:   Diagnosis Date    TANYA (acute kidney injury) ([facility] ) 7/24/2018    Breast cancer, left ([facility] )     Diabetes mellitus ([facility] )     HAP (hospital-acquired pneumonia) 7/24/2018    Hypertension     Lung cancer ([place] [address] )     Left Lower Lobe     NSTEMI (non-ST elevated myocardial infarction) ([place] [address] ) 7/24/2018    Pericardial effusion     Rapid atrial fibrillation [facility])        Past Surgical History:   Procedure Laterality Date    FRACTURE SURGERY      R arm surgery    IR PORT PLACEMENT  9/5/2018    MASTECTOMY      L breast with implant    [place] INCIS HEART SAC WINDW FOR DRAIN N/A 7/24/2018    Procedure: WINDOW PERICARDIAL  Subxyphoid approach ;  Surgeon: Jose A Riojas MD;  Location: [facility];  Service: Thoracic    REDUCTION MAMMAPLASTY      R breast       Length Of Stay: 1    PHYSICAL THERAPY EVALUATION:      10/12/18 1520   Note Type   Note type Eval only   Pain Assessment   Pain Assessment No/denies pain   Home Living   Type of [address] One level;Stairs to enter with rails  (1 JEN )   Home Equipment Walker;Cane   Additional Comments Pt lives with  who is able to assist pt if needed   Pt - home alone    Prior Function   Level of [place] Independent with ADLs and functional mobility   Lives With Spouse   Receives Help From Family   ADL Assistance Independent   Falls in the last 6 months 0   Vocational Retired   Comments PT reports the use of a RW for ambulation PTA    Restrictions/Precautions   Weight Bearing Precautions Per Order No   Other Precautions Fall Risk;Multiple lines;O2;Telemetry   Cognition   Overall Cognitive Status WFL   Arousal/Participation Alert   Attention Within functional limits   Orientation Level Oriented to person;Oriented to place;Oriented to time   Memory Within functional limits   Following Commands Follows one step commands without difficulty   RUE Assessment   RUE Assessment WFL   LUE Assessment   LUE Assessment X   RLE Assessment   RLE Assessment WFL   Strength RLE   RLE Overall Strength 4-/5   LLE Assessment   LLE Assessment WFL   Strength LLE   LLE Overall Strength 4-/5   Bed Mobility   Supine to Sit 4  Minimal assistance   Additional items Assist x 1; Increased time required;Verbal cues   Sit to Supine 4  Minimal assistance   Additional items Assist x 1; Increased time required;Verbal cues   Transfers   Sit to Stand 4  Minimal assistance   Additional items Assist x 1; Increased time required;Verbal cues   Stand to Sit 4  Minimal assistance   Additional items Assist x 1; Increased time required;Verbal cues   Additional Comments VC needed for hand placement and safety    Ambulation/Elevation   Gait pattern Excessively slow; Short stride; Foward flexed; Inconsistent mallika   Gait Assistance 4  Minimal assist   Additional items Assist x 1   Assistive Device Rolling walker   Distance 5 lateral steps toward HOB   (limited by fatigue )   Balance   Static Sitting Fair +   Static Standing Fair   Ambulatory Fair -   Endurance Deficit   Endurance Deficit Yes   Endurance Deficit Description fatigue   Activity Tolerance   Activity Tolerance Patient limited by fatigue   Nurse Made Aware Pt appopriate to be seen and mobilize per RAIN Carrasco   Assessment   Prognosis Fair   Problem List Decreased strength;Decreased range of motion;Decreased endurance; Impaired balance;Decreased mobility   Assessment Pt is 80 y o  female seen for PT evaluation s/p admit to [facility] on 10/11/2018  Two pt identifiers were used to confirm  Pt presented w/ SOB, chills  Pt was admitted with a primary dx of: sepsis  PT now consulted for assessment of mobility and d/c needs   Pt with Activity as tolerated orders  Pts current co morbidities effecting treatment include: TANYA, Breast cancer, DM, HAP, HTN, Lung cancer, NSTEMI, Pericardial effusion, and rapid Atrial fibrillation, and personal factors including JEN home  Pts current clinical presentation is Unstable/ Unpredictable (high complexity) due to Ongoing medical management for primary dx, Increased reliance on more restrictive AD compared to baseline, Decreased activity tolerance compared to baseline, Fall risk, Ongoing telemetry monitoring, Increased O2 via NC from pts baseline, Continuous pulse oximetry monitoring     Prior to admission, pt was I with ambulation with the use of a RW  Upon evaluation, pt currently is requiring min A for bed mobility; min A for transfers and min A for ambulation w/ RW   Pt denies any lightheadedness or dizziness with ambulation  Pt presents at PT eval functioning below baseline and currently w/ overall mobility deficits 2* to: BLE weakness, decreased ROM, impaired balance, decreased endurance, gait deviations, decreased activity tolerance compared to baseline, fall risk  Pt currently at a fall risk 2* to impairments listed above  Based on the aforementioned PT evaluation, pt will continue to benefit from skilled Acute PT interventions to address stated impairments; to maximize functional mobility; for ongoing pt/ family training; and DME needs  At conclusion of PT session pt returned BTB with phone and call bell within reach  Pt denies any further questions at this time  PT is currently recommending home PT  Pt/ family agreeable to plan and goals as stated on evaluation  PT will continue to follow during hospital stay     Barriers to Discharge Decreased caregiver support   Barriers to Discharge Comments JEN home    Goals   Patient Goals " to get stronger"   STG Expiration Date 10/22/18   Short Term Goal #1 In 10 days pt will complete: 1) Bed mobility skills with mod I to increase safety and independence as well as decrease caregiver burden  2) Functional transfers with mod I to promote increased independence, safety, and QOL in the home environment  3) Ambulate 150' using least restrictive AD with mod I without LOB and stable vitals so that pt can negotiate home environment safely and promote independence with functional mobility and return to PLOF  4) Stair training up/ down 1 step/s using rail/s with S so that pt can enter/negotiate home environment safely and decrease fall risk  5) Improve balance grades to Good to increase safety with all mobility and decrease fall risk  6) Improve BLE strength by 1/2 grade to help increase overall functional mobility and decrease fall risk  7) PT for ongoing pt and family education; DME needs and D/C planning to promote highest level of function in least restrictive environment  Treatment Day 1   Plan   Treatment/Interventions Functional transfer training;LE strengthening/ROM; Elevations; Therapeutic exercise; Endurance training;Patient/family training;Equipment eval/education; Bed mobility;Gait training;Spoke to nursing;OT;Family   PT Frequency Other (Comment)  (4-5x a week )   Recommendation   Recommendation Home PT   Equipment Recommended Walker   PT - OK to Discharge No  (need to increase ambulation distances )   Modified [place] Scale   Modified Mike Scale 4   Barthel Index   Feeding 10   Bathing 0   Grooming Score 5   Dressing Score 5   Bladder Score 10   Bowels Score 10   Toilet Use Score 5   Transfers (Bed/Chair) Score 10   Mobility (Level Surface) Score 0   Stairs Score 0   Barthel Index Score 55   Tx Note:  Time In: 1505  Time Out:1520  Total Time:15min  S: Pt willing to participate in PT session post PT eval "I want to try a few exercises"  O: Pt performed LAQ, Ankle pumps, Hip abduction, Marches 2 sets of 10 reps for each exercise  A: Pt agreeable to take part in PT treatment following evaluation   PT treatment to follow to facilitate therex due to strength deficits/ functional mobility deficits noted during evaluation  Pt assisted OOB to chair with nsg between PT eval and PT treatment session  Pt OOB in chair at time of PT treatment  Pt was able to perform LAQ, Ankle pumps, Hip abduction, Marches  All exercises were performed 2 sets of 10 reps  Pt continues to require VC for proper form and pacing with all exercises  Pt denies any new complaints with therex  At conclusion of PT session pt returned back in chair with all needs within reach  Pt denies any further questions at this time  PT will continue to follow   D/C recommendation when medically cleared is home PT   P: Continue with POC as outlined in pts initial evaluation    Saba Martinez, PT

## 2018-10-12 NOTE — PLAN OF CARE
DISCHARGE PLANNING     Discharge to home or other facility with appropriate resources Progressing        INFECTION - ADULT     Absence or prevention of progression during hospitalization Progressing     Absence of fever/infection during neutropenic period Progressing        Knowledge Deficit     Patient/family/caregiver demonstrates understanding of disease process, treatment plan, medications, and discharge instructions Progressing        METABOLIC, FLUID AND ELECTROLYTES - ADULT     Electrolytes maintained within normal limits Progressing     Fluid balance maintained Progressing     Glucose maintained within target range Progressing        PAIN - ADULT     Verbalizes/displays adequate comfort level or baseline comfort level Progressing        Potential for Falls     Patient will remain free of falls Progressing        Prexisting or High Potential for Compromised Skin Integrity     Skin integrity is maintained or improved Progressing        RESPIRATORY - ADULT     Achieves optimal ventilation and oxygenation Progressing        SAFETY ADULT     Maintain or return to baseline ADL function Progressing     Maintain or return mobility status to optimal level Progressing        SKIN/TISSUE INTEGRITY - ADULT     Skin integrity remains intact Progressing     Incision(s), wounds(s) or drain site(s) healing without S/S of infection Progressing

## 2018-10-12 NOTE — PLAN OF CARE
Problem: OCCUPATIONAL THERAPY ADULT  Goal: Performs self-care activities at highest level of function for planned discharge setting  See evaluation for individualized goals  Treatment Interventions: ADL retraining, Functional transfer training, UE strengthening/ROM, Endurance training, Patient/family training, Equipment evaluation/education, Compensatory technique education, Energy conservation, Activityengagement          See flowsheet documentation for full assessment, interventions and recommendations  Limitation: Decreased ADL status, Decreased UE strength, Decreased endurance, Decreased self-care trans, Decreased high-level ADLs  Prognosis: Good  Assessment: Pt is a 80 y o  female seen for OT evaluation s/p adm to [facility] on 10/11/2018 w/ SOB and chills and dx'd with Sepsis  Comorbidities affecting pts functional performance include a significant PMH of TANYA, Breast cancer, DM, HAP, HTN, Lung cancer, NSTEMI, Pericardial effusion, and rapid Atrial fibrillation  Pt with active OT orders and activity orders for Activity as tolerated  Pt lives with spouse in a one level house with 1 JEN  Pt reports spouse is able to assist as needed and pt (-) home alone  At baseline, pt was I w/ ADLs and functional mobility/transfers w/ use of RW, required assist w/ IADLs, (-) , and reports 0 falls PTA  Upon evaluation, pt currently requires Mod-Min A for LB ADLs, Supervision for UB ADLs, Min A for toileting, Min A for bed mobility, and Min A for functional mobility/transfers 2* the following deficits impacting occupational performance: weakness, decreased ROM, decreased strength, decreased balance and decreased tolerance   These impairments, as well at pts steps to enter environment and difficulty performing ADLS limit pts ability to safely engage in all baseline areas of occupation, including grooming, bathing, dressing, toileting, functional mobility/transfers, community mobility, social participation and leisure activities   Pt scored overall 55/100 on the Barthel Index  Based on the aforementioned OT evaluation, functional performance deficits, and assessments, pt has been identified as a moderate complexity evaluation  Pt to continue to benefit from continued acute OT services during hospital stay to address defined deficits and to maximize level of functional independence in the following Occupational Performance areas: grooming, bathing/shower, toilet hygiene, dressing, socialization, health maintenance, functional mobility, community mobility, clothing management, social participation and transfers to common household surfaces  From OT standpoint, recommend Home OT upon D/C   OT will continue to follow pt 3-5x/wk to address the following goals to  w/in 10-14 days:     OT Discharge Recommendation: Home OT  OT - OK to Discharge: Yes (when medically cleared)

## 2018-10-12 NOTE — PLAN OF CARE
Problem: PHYSICAL THERAPY ADULT  Goal: Performs mobility at highest level of function for planned discharge setting  See evaluation for individualized goals  Treatment/Interventions: Functional transfer training, LE strengthening/ROM, Elevations, Therapeutic exercise, Endurance training, Patient/family training, Equipment eval/education, Bed mobility, Gait training, Spoke to nursing, OT, Family  Equipment Recommended: Silvia Gutierrez       See flowsheet documentation for full assessment, interventions and recommendations  Prognosis: Fair  Problem List: Decreased strength, Decreased range of motion, Decreased endurance, Impaired balance, Decreased mobility  Assessment: Pt is 80 y o  female seen for PT evaluation s/p admit to [facility] on 10/11/2018  Two pt identifiers were used to confirm  Pt presented w/ SOB, chills  Pt was admitted with a primary dx of: sepsis  PT now consulted for assessment of mobility and d/c needs  Pt with Activity as tolerated orders  Pts current co morbidities effecting treatment include: TANYA, Breast cancer, DM, HAP, HTN, Lung cancer, NSTEMI, Pericardial effusion, and rapid Atrial fibrillation, and personal factors including JEN home  Pts current clinical presentation is Unstable/ Unpredictable (high complexity) due to Ongoing medical management for primary dx, Increased reliance on more restrictive AD compared to baseline, Decreased activity tolerance compared to baseline, Fall risk, Ongoing telemetry monitoring, Increased O2 via NC from pts baseline, Continuous pulse oximetry monitoring     Prior to admission, pt was I with ambulation with the use of a RW  Upon evaluation, pt currently is requiring min A for bed mobility; min A for transfers and min A for ambulation w/ RW   Pt denies any lightheadedness or dizziness with ambulation   Pt presents at PT eval functioning below baseline and currently w/ overall mobility deficits 2* to: BLE weakness, decreased ROM, impaired balance, decreased endurance, gait deviations, decreased activity tolerance compared to baseline, fall risk  Pt currently at a fall risk 2* to impairments listed above  Based on the aforementioned PT evaluation, pt will continue to benefit from skilled Acute PT interventions to address stated impairments; to maximize functional mobility; for ongoing pt/ family training; and DME needs  At conclusion of PT session pt returned BTB with phone and call bell within reach  Pt denies any further questions at this time  PT is currently recommending home PT  Pt/ family agreeable to plan and goals as stated on evaluation  PT will continue to follow during hospital stay  Barriers to Discharge: Decreased caregiver support  Barriers to Discharge Comments: JEN home   Recommendation: Home PT     PT - OK to Discharge: No (need to increase ambulation distances )    See flowsheet documentation for full assessment

## 2018-10-12 NOTE — PROGRESS NOTES
[facility] Progress Note  Patient: Martina Dougherty 80 y o  female   MRN: [id number]  PCP: Colbert Libman, DO  Unit/Bed#: ICU 10 Encounter: [id number]  Date Of Visit: 10/12/18    Assessment:  A 80-year-old female who developed acute shortness of breath shortly after undergoing her chemotherapy for her lung cancer and malignant pleural effusion her chemotherapy regime a just been changed and she had undergone chemotherapy that very day came home and was noted by family members to be increasingly short of breath and had shaking chills  Patient is also noted to be confused and forgetful unusual for her  Patient has a known pericardial effusion is on daily dexamethasone  And recent CT imaging has shown tumor progression changing Taxotere to gemcitabine she did present with a low-grade temperature 100 7° and received Zosyn and vancomycin in the ED  She has been taken off oxygen overnight appears still short of breath her BNP was elevated at 1200       Principal Problem:    Sepsis ([facility] [address] )  Active Problems:    Lung cancer ([facility] [address] )    Diabetes mellitus ([facility] [address] )    Hypertension    Pericardial effusion    Atrial fibrillation (HCC)    Continuous opioid dependence ([facility] [address] )      Plan:    · Sepsis based on  fever mild elevation leukocytosis and lactic acid 2 2 will trend on Zosyn and vancomycin with questionable ground-glass opacity and right upper lobe possibly indicative inflammatory pneumonitis check procalcitonin await Pulmonary input  · Pericardial effusion modest sized possibly malignant, elevated BNP will discontinue IV fluids as patient is eating has been afebrile since arrival continue dexamethasone/dyspnea may be more in keeping with CHF  · Atrial fibrillation noted recently presently in sinus rhythm will ask Cardiology input  · Lung cancer with pleural metastasis also question pericardial effusion malignant origin as been followed by Oncology and also palliative care team   And underwent recent change in chemotherapy which could have precipitated her presentation      VTE Pharmacologic Prophylaxis:   Pharmacologic: Enoxaparin (Lovenox)  Mechanical VTE Prophylaxis in Place: Yes    Discussions with Specialists or Other Care Team Provider:  No    Time Spent for Care: 30 minutes  More than 50% of total time spent on counseling and coordination of care as described above  Subjective:   Sitting up out of bed off oxygen presently and seemingly comfortable but still some dyspnea noted at rest denies chest pain or inspiratory pain  Objective:     Vitals:   Temp (24hrs), Av 8 °F (36 6 °C), Min:96 5 °F (35 8 °C), Max:100 7 °F (38 2 °C)    Temp:  [96 5 °F (35 8 °C)-100 7 °F (38 2 °C)] 96 5 °F (35 8 °C)  HR:  [] 66  Resp:  [18-45] 26  BP: ()/(49-84) 96/61  SpO2:  [93 %-96 %] 95 %  Body mass index is 29 48 kg/m²  Input and Output Summary (last 24 hours):        Intake/Output Summary (Last 24 hours) at 10/12/18 1028  Last data filed at 10/12/18 5768   Gross per 24 hour   Intake             2150 ml   Output                0 ml   Net             2150 ml       Physical Exam:     Physical Exam:   General appearance: alert, appears stated age and cooperative  Head: Normocephalic, without obvious abnormality, atraumatic  Lungs: crackles  Heart: regular rate and rhythm  Abdomen: soft, non-tender; bowel sounds normal; no masses,  no organomegaly  Back: negative  Extremities: extremities normal, atraumatic, no cyanosis or edema  Neurologic: Grossly normal      Additional Data:     Labs:      Results from last 7 days  Lab Units 10/12/18  0511 10/11/18  2120   WBC Thousand/uL 6 97 6 41   HEMOGLOBIN g/dL 11 8 14 3   HEMATOCRIT % 36 3 45 2   PLATELETS Thousands/uL 133* 160   LYMPHO PCT %  --  13*   MONO PCT MAN %  --  8   EOSINO PCT MANUAL %  --  1       Results from last 7 days  Lab Units 10/12/18  0511 10/11/18  2155   SODIUM mmol/L 137 139   POTASSIUM mmol/L 4 2 4 2   CHLORIDE mmol/L 106 104   CO2 mmol/L 23 25 BUN mg/dL 18 17   CREATININE mg/dL 0 76 0 88   CALCIUM mg/dL 7 9* 7 7*   ALK PHOS U/L  --  84   ALT U/L  --  41   AST U/L  --  22       Results from last 7 days  Lab Units 10/11/18  2120   INR  1 04       * I Have Reviewed All Lab Data Listed Above  * Additional Pertinent Lab Tests Reviewed: Rui Baker Admission Reviewed    Imaging:  Ct Chest With Contrast    Result Date: 10/11/2018  Narrative: CT CHEST WITH IV CONTRAST INDICATION:   Shortness of breath worsened with exertion throughout the day  Tachycardic, hypoxic with tachypnea  History of lung cancer with malignant pleural effusion  COMPARISON:  CT chest 9/26/2018 TECHNIQUE: CT examination of the chest was performed  Axial, sagittal, and coronal 2D reformatted images were created from the source data and submitted for interpretation  Radiation dose length product (DLP) for this visit:  271 mGy-cm   This examination, like all CT scans performed in the [facility], was performed utilizing techniques to minimize radiation dose exposure, including the use of iterative reconstruction and automated exposure control  IV Contrast:  85 mL of iohexol (OMNIPAQUE) FINDINGS: Please note the study was not tailored as a dedicated CT pulmonary angiogram protocol but as a routine exam   No large emboli within the main pulmonary arteries  LUNGS:  Poorly marginated masslike consolidation and/or atelectasis in the left lower lobe is stable or decreased  This could also reflect some volume averaging with pleural metastases  Some image degradation secondary to respiratory motion  Patchy groundglass density in the right upper lobe could represent infectious or inflammatory pneumonitis  PLEURA:  There is a tiny left pleural effusion again seen  There is persistent nodular studding of the left pleural surface albeit the enhancement has significantly decreased suggesting treatment response  HEART/GREAT VESSELS:  The heart is unchanged in size  A modest sized pericardial effusion is again identified  Tumor implants suspected along the pericardium previously is not currently visible  Coronary artery calcifications  MEDIASTINUM AND RAE:  18 mm prevascular nodule with some central necrosis abutting the medial left pleural surface appears unchanged  14 mm subcarinal short axis lymph node demonstrates decreased enhancement compared to previous study suggesting treatment response  CHEST WALL AND LOWER NECK:   Right chest wall Port-A-Cath  Unchanged right thyroid nodularity  Left breast implant  VISUALIZED STRUCTURES IN THE UPPER ABDOMEN:  Fatty infiltration of the liver  No adrenal nodules seen, noting the inferior left adrenal gland is not fully visualized  OSSEOUS STRUCTURES:  Stable sclerotic osseous metastases  No acute fracture  Multilevel degenerative changes of the spine  Impression: 1  Persistent nodular studding of the left pleural surface although significant decrease in enhancement of the nodularity compatible with treatment response of pleural metastases  Tiny left pleural effusion, unchanged  2  Modest sized pericardial effusion is overall unchanged in size  Apparent pericardial metastasis seen on the previous study is no longer visible on this exam  3  Grossly stable size of subcarinal lymphadenopathy although decreased enhancement suggesting some degree of treatment response  4  Patchy groundglass density in the right upper lobe may represent infectious or inflammatory pneumonitis  5  Stable or diminished appearance of left lower lobe masslike consolidation/atelectasis  6  Stable sclerotic osseous metastases  7  Diffuse fatty infiltration of the liver  Workstation performed: [id number]     Ct Chest W Contrast    Result Date: 9/27/2018  Narrative: CT CHEST WITH IV CONTRAST INDICATION:   C34 32: Malignant neoplasm of lower lobe, left bronchus or lung  COMPARISON:  July 23, 2018 TECHNIQUE: CT examination of the chest was performed  Axial, sagittal, and coronal 2D reformatted images were created from the source data and submitted for interpretation  Radiation dose length product (DLP) for this visit:  291 68 mGy-cm   This examination, like all CT scans performed in the [facility], was performed utilizing techniques to minimize radiation dose exposure, including the use of iterative  reconstruction and automated exposure control  IV Contrast:  85 mL of iohexol (OMNIPAQUE) FINDINGS: LUNGS:  Linear density in the right lower lobe is most suggestive of platelike atelectasis  There are no right-sided lung nodules or masses or suspicious infiltrates or airway obstructions  In the left lower lobe there is consolidation and volume loss  Compared with the previous examination, this seems relatively stable  No discrete lesions are seen within the left upper lobe although there is some consolidation medially in the left upper  lobe left mediastinal border which is stable and appears to be some chronic atelectasis or scarring  PLEURA:  There is extensive peripherally enhanced nodular pleural metastatic disease within the left hemithorax which is much more prominent than on the previous study and favors the posterior lower portion of the chest is also present laterally  The largest individual metastatic lesion of the pleura is 2 1 x 3 0 cm on image 40 series 2  There is a small amount of pleural fluid on the left  There is additional pleural metastatic disease in the medial aspect of the left hemithorax abutting the mediastinal border near the pericardial surface  In particular, on coronal image 74 series 601 there is a relatively prominent metastatic lesion which measures 1 5 x 2 4 cm and is potentially within the pericardium  HEART/GREAT VESSELS:  Heart size appears stable  The pericardial effusion seen on the prior study has diminished although is still present    As above, there is metastatic disease which is visually inseparable from the pericardial surface along the left heart border  No obvious myocardial infiltration appreciated  There is coronary artery calcification  The aorta is atherosclerotic but normal in caliber  MEDIASTINUM AND RAE:  There are small mediastinal lymph nodes which appear within normal limits of size in the pretracheal area but there is an abnormally enlarged subcarinal lymph node which is 15 x 28 mm  This is more prominent than on the previous study  CHEST WALL AND LOWER NECK:   There is a left breast implant reconstruction  Visualized portion of right breast is unremarkable  Stable tiny low-density cystic lesion or nodule in the right lobe of the thyroid  Surgical clips in the left axilla  VISUALIZED STRUCTURES IN THE UPPER ABDOMEN:  Tiny hypodense focus in the liver on image 44 series 2 appears stable from earlier exams  Likely a benign cyst   Tiny cyst at the upper pole of the left kidney also noted  No acute upper abdominal pathology seen  OSSEOUS STRUCTURES:  Sclerotic lesion at T6 appears stable from the previous study  There may be some minimal sclerosis at T1 as well  There is mixed lucency and sclerosis in the upper portion of the right side of the manubrium  Developing sclerosis is present in the left scapula  There are degenerative changes of both glenohumeral joints  Impression: Progression of metastatic disease predominantly in the left pleural space with enhancing nodularity evident  Small left pleural effusion  Persistent though diminished pericardial effusion  Metastatic deposit possibly involving the pericardium on the left side  Stable consolidation in the left lung base  Increasing prominence of sclerotic osseous metastases without pathologic fractures identified   Workstation performed: [id number]     Imaging Reports Reviewed Today Include:  CT of chest reviewed  Imaging Personally Reviewed by Myself Includes:    Procedure: Ct Chest With Contrast    Result Date: 10/11/2018  Narrative: CT CHEST WITH IV CONTRAST INDICATION:   Shortness of breath worsened with exertion throughout the day  Tachycardic, hypoxic with tachypnea  History of lung cancer with malignant pleural effusion  COMPARISON:  CT chest 9/26/2018 TECHNIQUE: CT examination of the chest was performed  Axial, sagittal, and coronal 2D reformatted images were created from the source data and submitted for interpretation  Radiation dose length product (DLP) for this visit:  271 mGy-cm   This examination, like all CT scans performed in the [facility], was performed utilizing techniques to minimize radiation dose exposure, including the use of iterative reconstruction and automated exposure control  IV Contrast:  85 mL of iohexol (OMNIPAQUE) FINDINGS: Please note the study was not tailored as a dedicated CT pulmonary angiogram protocol but as a routine exam   No large emboli within the main pulmonary arteries  LUNGS:  Poorly marginated masslike consolidation and/or atelectasis in the left lower lobe is stable or decreased  This could also reflect some volume averaging with pleural metastases  Some image degradation secondary to respiratory motion  Patchy groundglass density in the right upper lobe could represent infectious or inflammatory pneumonitis  PLEURA:  There is a tiny left pleural effusion again seen  There is persistent nodular studding of the left pleural surface albeit the enhancement has significantly decreased suggesting treatment response  HEART/GREAT VESSELS:  The heart is unchanged in size  A modest sized pericardial effusion is again identified  Tumor implants suspected along the pericardium previously is not currently visible  Coronary artery calcifications  MEDIASTINUM AND RAE:  18 mm prevascular nodule with some central necrosis abutting the medial left pleural surface appears unchanged    14 mm subcarinal short axis lymph node demonstrates decreased enhancement compared to previous study suggesting treatment response  CHEST WALL AND LOWER NECK:   Right chest wall Port-A-Cath  Unchanged right thyroid nodularity  Left breast implant  VISUALIZED STRUCTURES IN THE UPPER ABDOMEN:  Fatty infiltration of the liver  No adrenal nodules seen, noting the inferior left adrenal gland is not fully visualized  OSSEOUS STRUCTURES:  Stable sclerotic osseous metastases  No acute fracture  Multilevel degenerative changes of the spine  Impression: 1  Persistent nodular studding of the left pleural surface although significant decrease in enhancement of the nodularity compatible with treatment response of pleural metastases  Tiny left pleural effusion, unchanged  2  Modest sized pericardial effusion is overall unchanged in size  Apparent pericardial metastasis seen on the previous study is no longer visible on this exam  3  Grossly stable size of subcarinal lymphadenopathy although decreased enhancement suggesting some degree of treatment response  4  Patchy groundglass density in the right upper lobe may represent infectious or inflammatory pneumonitis  5  Stable or diminished appearance of left lower lobe masslike consolidation/atelectasis  6  Stable sclerotic osseous metastases  7  Diffuse fatty infiltration of the liver   Workstation performed: [id number]        Recent Cultures (last 7 days):           Last 24 Hours Medication List:     Current Facility-Administered Medications:  acetaminophen 488 mg Oral Q6H PRN Fawn Le PA-C    ALPRAZolam 0 5 mg Oral HS PRN Fawn Le PA-C    amiodarone 200 mg Oral Daily Fawn Le PA-C    aspirin 81 mg Oral Daily Fawn Le PA-C    citalopram 20 mg Oral Daily Fawn Le PA-C    dexamethasone 1 mg Oral Daily With Breakfast Fawn Le PA-C    enoxaparin 40 mg Subcutaneous Daily Fawn Le PA-C    fentaNYL 1 patch Transdermal Q72H Fawn Le PA-C    folic acid 1 mg Oral Daily Fawn Le PA-C    influenza vaccine 0 5 mL Intramuscular Prior to discharge Anay Valentine MD    insulin lispro 1-5 Units Subcutaneous TID AC Buck Wyatt PA-C    insulin lispro 1-5 Units Subcutaneous HS Buck Wyatt PA-C    metoprolol tartrate 12 5 mg Oral Q12H [address] Buck Wyatt PA-C    ondansetron 4 mg Intravenous Once PRN Isabelsantiago Lloyd DO    ondansetron 4 mg Intravenous Q6H PRN Buck Wyatt PA-C    oxyCODONE 2 5 mg Oral Q4H PRN Buck Wyatt PA-C    piperacillin-tazobactam 3 375 g Intravenous Q6H Buck Wyatt PA-C Last Rate: 3 375 g (10/12/18 0939)   pneumococcal 13-valent conjugate vaccine 0 5 mL Intramuscular Prior to discharge Anay Valentine MD    sodium chloride 75 mL/hr Intravenous Continuous Buck Wyatt PA-C Last Rate: 75 mL/hr (10/12/18 0042)   vancomycin 15 mg/kg Intravenous Q24H Buck Wyatt PA-C      Facility-Administered Medications Ordered in Other Encounters:  heparin lock flush 300 Units Ezra Conway PRN Ash Villatoro MD        Today, Patient Was Seen By: Geronimo Smith MD    ** Please Note: Dragon 360 Dictation voice to text software may have been used in the creation of this document   **

## 2018-10-12 NOTE — SEPSIS NOTE
Sepsis Note   Kaycee Kimbrough 80 y o  female MRN: [id number]  Unit/Bed#: ICU 10 Encounter: [id number]            Initial Sepsis Screening     Row Name 10/11/18 1197                Is the patient's history suggestive of a new or worsening infection? (!)  Yes (Proceed)  -SM        Suspected source of infection pneumonia  -SM        Are two or more of the following signs & symptoms of infection both present and new to the patient? (!)  Yes (Proceed)  -SM        Indicate SIRS criteria Altered mental status; Tachycardia > 90 bpm  -SM        If the answer is yes to both questions, suspicion of sepsis is present          If severe sepsis is present AND tissue hypoperfusion perists in the hour after fluid resuscitation or lactate > 4, the patient meets criteria for SEPTIC SHOCK          Are any of the following organ dysfunction criteria present within 6 hours of suspected infection and SIRS criteria that are NOT considered to be chronic conditions? (!)  Yes  -SM        Organ dysfunction Lactate > 2 0 mmol/L  -SM        Date of presentation of severe sepsis          Time of presentation of severe sepsis          Tissue hypoperfusion persists in the hour after crystalloid fluid administration, evidenced, by either:          Was hypotension present within one hour of the conclusion of crystalloid fluid administration?           Date of presentation of septic shock          Time of presentation of septic shock            User Key  (r) = Recorded By, (t) = Taken By, (c) = Cosigned By    Initials Name Provider Type    ANNA Evans DO Physician

## 2018-10-12 NOTE — PLAN OF CARE
Problem: Potential for Falls  Goal: Patient will remain free of falls  INTERVENTIONS:  - Assess patient frequently for physical needs  -  Identify cognitive and physical deficits and behaviors that affect risk of falls    -  [place] fall precautions as indicated by assessment   - Educate patient/family on patient safety including physical limitations  - Instruct patient to call for assistance with activity based on assessment  - Modify environment to reduce risk of injury  - Consider OT/PT consult to assist with strengthening/mobility   Outcome: Progressing      Problem: Prexisting or High Potential for Compromised Skin Integrity  Goal: Skin integrity is maintained or improved  INTERVENTIONS:  - Identify patients at risk for skin breakdown  - Assess and monitor skin integrity  - Assess and monitor nutrition and hydration status  - Monitor labs (i e  albumin)  - Assess for incontinence   - Turn and reposition patient  - Assist with mobility/ambulation  - Relieve pressure over bony prominences  - Avoid friction and shearing  - Provide appropriate hygiene as needed including keeping skin clean and dry  - Evaluate need for skin moisturizer/barrier cream  - Collaborate with interdisciplinary team (i e  Nutrition, Rehabilitation, etc )   - Patient/family teaching   Outcome: Progressing      Problem: PAIN - ADULT  Goal: Verbalizes/displays adequate comfort level or baseline comfort level  Interventions:  - Encourage patient to monitor pain and request assistance  - Assess pain using appropriate pain scale  - Administer analgesics based on type and severity of pain and evaluate response  - Implement non-pharmacological measures as appropriate and evaluate response  - Consider cultural and social influences on pain and pain management  - Notify physician/advanced practitioner if interventions unsuccessful or patient reports new pain  Outcome: Progressing      Problem: INFECTION - ADULT  Goal: Absence or prevention of progression during hospitalization  INTERVENTIONS:  - Assess and monitor for signs and symptoms of infection  - Monitor lab/diagnostic results  - Monitor all insertion sites, i e  indwelling lines, tubes, and drains  - Monitor endotracheal (as able) and nasal secretions for changes in amount and color  - [place] appropriate cooling/warming therapies per order  - Administer medications as ordered  - Instruct and encourage patient and family to use good hand hygiene technique  - Identify and instruct in appropriate isolation precautions for identified infection/condition  Outcome: Progressing    Goal: Absence of fever/infection during neutropenic period  INTERVENTIONS:  - Monitor WBC  - Implement neutropenic guidelines  Outcome: Progressing      Problem: SAFETY ADULT  Goal: Maintain or return to baseline ADL function  INTERVENTIONS:  -  Assess patient's ability to carry out ADLs; assess patient's baseline for ADL function and identify physical deficits which impact ability to perform ADLs (bathing, care of mouth/teeth, toileting, grooming, dressing, etc )  - Assess/evaluate cause of self-care deficits   - Assess range of motion  - Assess patient's mobility; develop plan if impaired  - Assess patient's need for assistive devices and provide as appropriate  - Encourage maximum independence but intervene and supervise when necessary  ¯ Involve family in performance of ADLs  ¯ Assess for home care needs following discharge   ¯ Request OT consult to assist with ADL evaluation and planning for discharge  ¯ Provide patient education as appropriate  Outcome: Progressing    Goal: Maintain or return mobility status to optimal level  INTERVENTIONS:  - Assess patient's baseline mobility status (ambulation, transfers, stairs, etc )    - Identify cognitive and physical deficits and behaviors that affect mobility  - Identify mobility aids required to assist with transfers and/or ambulation (gait belt, sit-to-stand, lift, walker, cane, etc )  - [place] fall precautions as indicated by assessment  - Record patient progress and toleration of activity level on Mobility SBAR; progress patient to next Phase/Stage  - Instruct patient to call for assistance with activity based on assessment  - Request Rehabilitation consult to assist with strengthening/weightbearing, etc   Outcome: Progressing      Problem: DISCHARGE PLANNING  Goal: Discharge to home or other facility with appropriate resources  INTERVENTIONS:  - Identify barriers to discharge w/patient and caregiver  - Arrange for needed discharge resources and transportation as appropriate  - Identify discharge learning needs (meds, wound care, etc )  - Arrange for interpretive services to assist at discharge as needed  - Refer to Case Management Department for coordinating discharge planning if the patient needs post-hospital services based on physician/advanced practitioner order or complex needs related to functional status, cognitive ability, or social support system  Outcome: Progressing      Problem: Knowledge Deficit  Goal: Patient/family/caregiver demonstrates understanding of disease process, treatment plan, medications, and discharge instructions  Complete learning assessment and assess knowledge base    Interventions:  - Provide teaching at level of understanding  - Provide teaching via preferred learning methods  Outcome: Progressing

## 2018-10-12 NOTE — PLAN OF CARE
Problem: DISCHARGE PLANNING - CARE MANAGEMENT  Goal: Discharge to post-acute care or home with appropriate resources  INTERVENTIONS:  - Conduct assessment to determine patient/family and health care team treatment goals, and need for post-acute services based on payer coverage, community resources, and patient preferences, and barriers to discharge  - Address psychosocial, clinical, and financial barriers to discharge as identified in assessment in conjunction with the patient/family and health care team  - Arrange appropriate level of post-acute services according to patients   needs and preference and payer coverage in collaboration with the physician and health care team  - Communicate with and update the patient/family, physician, and health care team regarding progress on the discharge plan  - Arrange appropriate transportation to post-acute venues  Outcome: Progressing  Pt is open with [facility] for PT  Made a referral back to [facility] for PT and RN

## 2018-10-12 NOTE — SOCIAL WORK
Met with pt and spouse in room and explained role  PCP is Dr Fred Bell  Pt lives in [place] with spouse in a 1 story house with 1 step to enter  Dtr assist pt with showers and pt is independent with other ADLs  Pt amb with RW  Spouse does the cooking and cleaning  DME:  RW, SPC, transport chair, shower bench and BSC  Pt is open with [facility] for PT  Pt uses [facility] in [place]  Pt has no hx of MH or D&A  Pt stated her dtr is her POA   is dtr Anna Childs at [phone]  Family will transport when discharge  Pt is open with [facility] for PT  Made a referral back to [facility] for PT and RN

## 2018-10-12 NOTE — ED NOTES
Family reports patient still has on fentanyl patch placed yesterday       Whitney Delgado RN  10/11/18 [id number]

## 2018-10-12 NOTE — H&P
History and Physical - [facility] Internal Medicine    Patient Information: Ashok Zhang 80 y o  female MRN: [id number]  Unit/Bed#: ED 22 Encounter: [id number]  Admitting Physician: James Davila PA-C  PCP: Fawn Pisano DO  Date of Admission:  10/11/18    Assessment/Plan:    Hospital Problem List:     Principal Problem:    Sepsis ([facility] )  Active Problems:    Lung cancer ([facility] )    Diabetes mellitus ([facility] )    Hypertension    Pericardial effusion    Atrial fibrillation ([facility] )    Continuous opioid dependence ([facility] )      Plan for the Primary Problem(s):  · Sepsis secondary to pneumonia  · Admit to step down  Patient started on vanco/zosyn in ED, will continue  Trend lactic acid  Blood cultures pending  Plan for Additional Problems:   · Lung cancer- had chemo infusion today  Sees palliative care as outpatient  Confirmed DNR/DNI status with patient and family  · DM- hold metformin, order accuchecks with sliding scale  · Pericardial effusion- unchanged  · afib- continue amiodarone  · Narcotic dependence- continue home pain med regimen  VTE Prophylaxis: Enoxaparin (Lovenox)  / sequential compression device   Code Status: DNR/DNI  POLST: There is no POLST form on file for this patient (pre-hospital)    Anticipated Length of Stay:  Patient will be admitted on an Inpatient basis with an anticipated length of stay of  Greater than 2 midnights  Justification for Hospital Stay: patient requires IV antibiotics    Total Time for Visit, including Counseling / Coordination of Care: 45 minutes  Greater than 50% of this total time spent on direct patient counseling and coordination of care  Chief Complaint:   SOB    History of Present Illness:    Ashok Zhang is a 80 y o  female who presents with shortness of breath, acutely worse today  Patient has known lung cancer with malignant pleural effusion  She had chemo today  She sees palliative care as an outpatient   She states that after chemo she was home around 6pm when her family came to visit and noticed she was more short of breath than usual  She also had shaking chills  She seemed somewhat confused and forgetful  She denies chest pain, edema, urinary complaints    Review of Systems:    Review of Systems   Constitutional: Positive for chills, fatigue and fever  HENT: Negative  Eyes: Negative  Respiratory: Positive for shortness of breath  Cardiovascular: Negative  Gastrointestinal: Positive for nausea  Endocrine: Negative  Genitourinary: Negative  Musculoskeletal: Positive for gait problem  Skin: Negative  Allergic/Immunologic: Negative  Psychiatric/Behavioral: Positive for confusion  Past Medical and Surgical History:     Past Medical History:   Diagnosis Date    TANYA (acute kidney injury) ([facility] ) 7/24/2018    Breast cancer, left ([place] [address] )     Diabetes mellitus ([place] [address] )     HAP (hospital-acquired pneumonia) 7/24/2018    Hypertension     Lung cancer ([place] [address] )     Left Lower Lobe     NSTEMI (non-ST elevated myocardial infarction) ([place] [address] ) 7/24/2018    Pericardial effusion     Rapid atrial fibrillation [facility])        Past Surgical History:   Procedure Laterality Date    FRACTURE SURGERY      R arm surgery    IR PORT PLACEMENT  9/5/2018    MASTECTOMY      L breast with implant    LA INCIS HEART SAC WINDW FOR DRAIN N/A 7/24/2018    Procedure: WINDOW PERICARDIAL  Subxyphoid approach ;  Surgeon: Nivia Moran MD;  Location: [facility];  Service: Thoracic    REDUCTION MAMMAPLASTY      R breast       Meds/Allergies:    Prior to Admission medications    Medication Sig Start Date End Date Taking?  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 10/9/18  Yes Fercho Fernandez MD   cyanocobalamin 1000 MCG tablet Take 100 mcg by mouth daily   Yes Historical Provider, MD   dexamethasone (DECADRON) 1 mg tablet Take 1 tablet (1 mg total) by mouth daily with breakfast 10/9/18  Yes Ash Draper MD   fentaNYL (DURAGESIC) 12 mcg/hr TD 72 hr patch Apply 2 patches every 3 days 10/3/18  Yes Fercho Fernandez MD   folic acid (FOLVITE) 1 mg tablet Take 1 mg by mouth daily   Yes Historical Provider, MD   lidocaine-prilocaine (EMLA) cream Apply topically as needed for mild pain 1 hour prior to port access 9/12/18  Yes Fercho Fernandez MD   metFORMIN (GLUCOPHAGE) 500 mg tablet Take 500 mg by mouth daily with breakfast   Yes Historical Provider, MD   metoprolol tartrate (LOPRESSOR) 25 mg tablet Take 0 5 tablets (12 5 mg total) by mouth every 12 (twelve) hours 9/4/18  Yes Adella Cranker, MD   ondansetron (ZOFRAN) 4 mg tablet Take 1 tablet (4 mg total) by mouth every 8 (eight) hours as needed for nausea or vomiting 9/5/18  Yes Fercho Fernandez MD   oxyCODONE (ROXICODONE) 5 mg immediate release tablet Take 0 5 tablets (2 5 mg total) by mouth every 4 (four) hours as needed for moderate pain Max Daily Amount: 15 mg 9/25/18  Yes Marion Bolton MD     I have reviewed home medications with patient personally  Allergies:    Allergies   Allergen Reactions    Atorvastatin Other (See Comments)    Benzonatate Other (See Comments)    Carboplatin      Pt had allergic reaction during 8th carbo dose    Sulfa Antibiotics     Bactrim [Sulfamethoxazole-Trimethoprim] Rash    Latex Rash    Other Rash     Pt states she gets a rash from surgical tape, she is ok with paper tape       Social History:     Marital Status: /Civil Union   Occupation: retired  Patient Pre-hospital Living Situation: ;marlen with   Patient Pre-hospital Level of Mobility: uses cane  Patient Pre-hospital Diet Restrictions: diabetic  Substance Use History:   History   Alcohol Use No     History   Smoking Status    Former Smoker    Packs/day: 0 50    Years: 10 00    Quit date: 1960   Smokeless Tobacco    Never Used     Comment: Socially      History   Drug Use No       Family History:    non-contributory    Physical Exam:     Vitals:   Blood Pressure: 129/71 (10/11/18 2315)  Pulse: 94 (10/11/18 2315)  Temperature: (!) 100 7 °F (38 2 °C) (10/11/18 2100)  Temp Source: Oral (10/11/18 2100)  Respirations: (!) 24 (10/11/18 2315)  Weight - Scale: 73 2 kg (161 lb 6 oz) (10/11/18 2100)  SpO2: 94 % (10/11/18 2315)    Physical Exam   Constitutional: She is oriented to person, place, and time  She appears well-developed and well-nourished  No distress  HENT:   Head: Normocephalic and atraumatic  Eyes: Pupils are equal, round, and reactive to light  Conjunctivae are normal    Neck: Normal range of motion  Neck supple  No tracheal deviation present  No thyromegaly present  Cardiovascular: Normal rate and regular rhythm  Pulmonary/Chest: Effort normal and breath sounds normal  No respiratory distress  She has no wheezes  Abdominal: Soft  Bowel sounds are normal  She exhibits no distension  There is no tenderness  Musculoskeletal: Normal range of motion  She exhibits no edema, tenderness or deformity  Neurological: She is alert and oriented to person, place, and time  Skin: Skin is warm and dry  No rash noted  She is not diaphoretic  No erythema  Psychiatric: She has a normal mood and affect  Her behavior is normal    Vitals reviewed  Additional Data:     Lab Results: I have personally reviewed pertinent reports          Results from last 7 days  Lab Units 10/11/18  2120   WBC Thousand/uL 6 41   HEMOGLOBIN g/dL 14 3   HEMATOCRIT % 45 2   PLATELETS Thousands/uL 160   LYMPHO PCT % 13*   MONO PCT MAN % 8   EOSINO PCT MANUAL % 1       Results from last 7 days  Lab Units 10/11/18  2155   SODIUM mmol/L 139   POTASSIUM mmol/L 4 2   CHLORIDE mmol/L 104   CO2 mmol/L 25   BUN mg/dL 17   CREATININE mg/dL 0 88   CALCIUM mg/dL 7 7*   ALK PHOS U/L 84   ALT U/L 41   AST U/L 22       Results from last 7 days  Lab Units 10/11/18  2120   INR  1 04       Imaging: I have personally reviewed pertinent reports  Ct Chest With Contrast    Result Date: 10/11/2018  Narrative: CT CHEST WITH IV CONTRAST INDICATION:   Shortness of breath worsened with exertion throughout the day  Tachycardic, hypoxic with tachypnea  History of lung cancer with malignant pleural effusion  COMPARISON:  CT chest 9/26/2018 TECHNIQUE: CT examination of the chest was performed  Axial, sagittal, and coronal 2D reformatted images were created from the source data and submitted for interpretation  Radiation dose length product (DLP) for this visit:  271 mGy-cm   This examination, like all CT scans performed in the [facility], was performed utilizing techniques to minimize radiation dose exposure, including the use of iterative reconstruction and automated exposure control  IV Contrast:  85 mL of iohexol (OMNIPAQUE) FINDINGS: Please note the study was not tailored as a dedicated CT pulmonary angiogram protocol but as a routine exam   No large emboli within the main pulmonary arteries  LUNGS:  Poorly marginated masslike consolidation and/or atelectasis in the left lower lobe is stable or decreased  This could also reflect some volume averaging with pleural metastases  Some image degradation secondary to respiratory motion  Patchy groundglass density in the right upper lobe could represent infectious or inflammatory pneumonitis  PLEURA:  There is a tiny left pleural effusion again seen    There is persistent nodular studding of the left pleural surface albeit the enhancement has significantly decreased suggesting treatment response  HEART/GREAT VESSELS:  The heart is unchanged in size  A modest sized pericardial effusion is again identified  Tumor implants suspected along the pericardium previously is not currently visible  Coronary artery calcifications  MEDIASTINUM AND RAE:  18 mm prevascular nodule with some central necrosis abutting the medial left pleural surface appears unchanged  14 mm subcarinal short axis lymph node demonstrates decreased enhancement compared to previous study suggesting treatment response  CHEST WALL AND LOWER NECK:   Right chest wall Port-A-Cath  Unchanged right thyroid nodularity  Left breast implant  VISUALIZED STRUCTURES IN THE UPPER ABDOMEN:  Fatty infiltration of the liver  No adrenal nodules seen, noting the inferior left adrenal gland is not fully visualized  OSSEOUS STRUCTURES:  Stable sclerotic osseous metastases  No acute fracture  Multilevel degenerative changes of the spine  Impression: 1  Persistent nodular studding of the left pleural surface although significant decrease in enhancement of the nodularity compatible with treatment response of pleural metastases  Tiny left pleural effusion, unchanged  2  Modest sized pericardial effusion is overall unchanged in size  Apparent pericardial metastasis seen on the previous study is no longer visible on this exam  3  Grossly stable size of subcarinal lymphadenopathy although decreased enhancement suggesting some degree of treatment response  4  Patchy groundglass density in the right upper lobe may represent infectious or inflammatory pneumonitis  5  Stable or diminished appearance of left lower lobe masslike consolidation/atelectasis  6  Stable sclerotic osseous metastases  7  Diffuse fatty infiltration of the liver  Workstation performed: [id number]     Ct Chest W Contrast    Result Date: 9/27/2018  Narrative: CT CHEST WITH IV CONTRAST INDICATION:   C34 32:  Malignant neoplasm of lower lobe, left bronchus or lung  COMPARISON:  July 23, 2018 TECHNIQUE: CT examination of the chest was performed  Axial, sagittal, and coronal 2D reformatted images were created from the source data and submitted for interpretation  Radiation dose length product (DLP) for this visit:  291 68 mGy-cm   This examination, like all CT scans performed in the [facility], was performed utilizing techniques to minimize radiation dose exposure, including the use of iterative  reconstruction and automated exposure control  IV Contrast:  85 mL of iohexol (OMNIPAQUE) FINDINGS: LUNGS:  Linear density in the right lower lobe is most suggestive of platelike atelectasis  There are no right-sided lung nodules or masses or suspicious infiltrates or airway obstructions  In the left lower lobe there is consolidation and volume loss  Compared with the previous examination, this seems relatively stable  No discrete lesions are seen within the left upper lobe although there is some consolidation medially in the left upper  lobe left mediastinal border which is stable and appears to be some chronic atelectasis or scarring  PLEURA:  There is extensive peripherally enhanced nodular pleural metastatic disease within the left hemithorax which is much more prominent than on the previous study and favors the posterior lower portion of the chest is also present laterally  The largest individual metastatic lesion of the pleura is 2 1 x 3 0 cm on image 40 series 2  There is a small amount of pleural fluid on the left  There is additional pleural metastatic disease in the medial aspect of the left hemithorax abutting the mediastinal border near the pericardial surface  In particular, on coronal image 74 series 601 there is a relatively prominent metastatic lesion which measures 1 5 x 2 4 cm and is potentially within the pericardium  HEART/GREAT VESSELS:  Heart size appears stable    The pericardial effusion seen on the prior study has diminished although is still present  As above, there is metastatic disease which is visually inseparable from the pericardial surface along the left heart border  No obvious myocardial infiltration appreciated  There is coronary artery calcification  The aorta is atherosclerotic but normal in caliber  MEDIASTINUM AND RAE:  There are small mediastinal lymph nodes which appear within normal limits of size in the pretracheal area but there is an abnormally enlarged subcarinal lymph node which is 15 x 28 mm  This is more prominent than on the previous study  CHEST WALL AND LOWER NECK:   There is a left breast implant reconstruction  Visualized portion of right breast is unremarkable  Stable tiny low-density cystic lesion or nodule in the right lobe of the thyroid  Surgical clips in the left axilla  VISUALIZED STRUCTURES IN THE UPPER ABDOMEN:  Tiny hypodense focus in the liver on image 44 series 2 appears stable from earlier exams  Likely a benign cyst   Tiny cyst at the upper pole of the left kidney also noted  No acute upper abdominal pathology seen  OSSEOUS STRUCTURES:  Sclerotic lesion at T6 appears stable from the previous study  There may be some minimal sclerosis at T1 as well  There is mixed lucency and sclerosis in the upper portion of the right side of the manubrium  Developing sclerosis is present in the left scapula  There are degenerative changes of both glenohumeral joints  Impression: Progression of metastatic disease predominantly in the left pleural space with enhancing nodularity evident  Small left pleural effusion  Persistent though diminished pericardial effusion  Metastatic deposit possibly involving the pericardium on the left side  Stable consolidation in the left lung base  Increasing prominence of sclerotic osseous metastases without pathologic fractures identified   Workstation performed: [id number]       EKG, Pathology, and Other Studies Reviewed on Admission:   · EKG: no ischemic changes    Allscripts / Epic Records Reviewed: Yes     ** Please Note: This note has been constructed using a voice recognition system   **

## 2018-10-12 NOTE — NURSING NOTE
Patient out of bed in chair waiting for breakfast  No complaints at this time  Appears comfortable  Reminded to use incentive spirometer frequently and to take deep breaths and cough  Call bell and personal items within close reach  Will monitor

## 2018-10-12 NOTE — OCCUPATIONAL THERAPY NOTE
[address]  Evaluation     Patient Name: Kayy VELAZQUEZ Date: 10/12/2018  Problem List  Patient Active Problem List   Diagnosis    [facility]    Diabetes mellitus ([facility] )    Hypertension    Pericardial effusion    Atrial fibrillation (HCC)    Sepsis ([facility] )    HAP (hospital-acquired pneumonia)    Type 2 myocardial infarction (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[facility] )     Past Medical History  Past Medical History:   Diagnosis Date    TANYA (acute kidney injury) ([facility] ) 7/24/2018    Breast cancer, left ([facility] )     Diabetes mellitus ([facility] )     HAP (hospital-acquired pneumonia) 7/24/2018    Hypertension     Lung cancer ([facility] )     Left Lower Lobe     NSTEMI (non-ST elevated myocardial infarction) ([facility] ) 7/24/2018    Pericardial effusion     Rapid atrial fibrillation [facility])      Past Surgical History  Past Surgical History:   Procedure Laterality Date    FRACTURE SURGERY      R arm surgery    IR PORT PLACEMENT  9/5/2018    MASTECTOMY      L breast with implant    LA INCIS HEART SAC WINDW FOR DRAIN N/A 7/24/2018    Procedure: WINDOW PERICARDIAL  Subxyphoid approach ;  Surgeon: Tiffani Meraz MD;  [facility]: [facility];  Service: Thoracic    REDUCTION MAMMAPLASTY      R breast         10/12/18 1501   Note Type   Note type Eval only   Restrictions/Precautions   Weight Bearing Precautions Per Order No   Other Precautions Fall Risk;Multiple lines;O2;Telemetry   Pain Assessment   Pain Assessment No/denies pain   Pain Score No Pain   Home Living   Type of [address] [place] [address] One level;Stairs to enter without rails  (1 JEN)   Bathroom Shower/Tub Tub/shower unit   Bathroom Toilet Standard   Bathroom Accessibility Accessible   Home Equipment Walker;Cane;Wheelchair-manual   Additional Comments Pt lives with spouse in a one level house with 1 JEN  Pt reports spouse is able to assist as needed and pt (-) home alone  Prior Function   Level of [place] Independent with ADLs and functional mobility; Needs assistance with IADLs   Lives With Spouse   Receives Help From Family   ADL Assistance Independent   IADLs Needs assistance   Falls in the last 6 months 0   Vocational Retired   Comments At baseline, pt was I w/ ADLs and functional mobility/transfers w/ use of RW, required assist w/ IADLs, (-) , and reports 0 falls PTA  Lifestyle   Autonomy At baseline, pt was I w/ ADLs and functional mobility/transfers w/ use of RW, required assist w/ IADLs, (-) , and reports 0 falls PTA  Reciprocal Relationships Lives with spouse   Service to Others Retired   [facility]   ADL   Eating Assistance 6  Modified independent   [facility] 5  [address] [facility] 5  Supervision/Setup   LB [address] 4  [address] 5  Supervision/Setup    [facility] 3  Moderate [address]  4  Minimal Assistance   Bed Mobility   Supine to Sit 4  Minimal assistance   Additional items Assist x 1;HOB elevated; Bedrails; Increased time required;Verbal cues   Sit to Supine 4  Minimal assistance   Additional items Assist x 1; Increased time required;Verbal cues   Additional Comments Pt lying supine at end of session with call bell and phone within reach  All needs met and pt reports no further questions for OT at this time   Transfers   Sit to Stand 4  Minimal assistance   Additional items Assist x 1; Increased time required;Verbal cues   Stand to Sit 4  Minimal assistance   Additional items Assist x 1; Increased time required;Verbal cues   Additional Comments Cues for safe technique and hand placement   Functional Mobility   Functional Mobility 4  Minimal assistance   Additional Comments Assist x1   Additional items Rolling walker   Balance   Static Sitting Fair +   Dynamic Sitting Fair   Static Standing Fair   Dynamic Standing [address] 3,4, & 5 -   Activity Tolerance   Activity Tolerance Patient limited by fatigue   Nurse Made Aware Pt appropriate to be seen per RAIN Carrasco   RUE Assessment   RUE Assessment WFL   RUE Strength   RUE Overall Strength Within Functional Limits - able to perform ADL tasks with strength  (3+/5 throughout)   LUE Assessment   LUE Assessment X  (Decreased shldr flex; Elbow-distal=WFL)   LUE Overall AROM   L Shoulder Flexion approx  90*   LUE Strength   LUE Overall Strength Within Functional Limits - able to perform ADL tasks with strength  (3+/5 throughout)   Hand Function   Gross Motor Coordination Functional   Fine Motor Coordination Functional   Sensation   Light Touch No apparent deficits   Sharp/Dull No apparent deficits   Proprioception   Proprioception No apparent deficits   Vision-Basic Assessment   Current Vision Wears glasses all the time   Vision - Complex Assessment   Ocular Range of Motion [facility]   Acuity Able to read clock/calendar on wall without difficulty   Perception   Inattention/Neglect Appears intact   Cognition   Overall Cognitive Status [facility]   Arousal/Participation Alert; Cooperative   Attention Within functional limits   Orientation Level Oriented X4   Memory Within functional limits   Following Commands Follows one step commands without difficulty   Comments Pt pleasant and cooperative; Engages in conversation appropriately   Assessment   Limitation Decreased ADL status; Decreased UE strength;Decreased endurance;Decreased self-care trans;Decreased high-level ADLs   Prognosis Good   Assessment Pt is a 80 y o  female seen for OT evaluation s/p adm to [facility] w/ SOB and chills and dx'd with Sepsis  Comorbidities affecting pts functional performance include a significant PMH of TANYA, Breast cancer, DM, HAP, HTN, Lung cancer, NSTEMI, Pericardial effusion, and rapid Atrial fibrillation   Pt with active OT orders and activity orders for Activity as tolerated  Pt lives with spouse in a one level house with 1 JEN  Pt reports spouse is able to assist as needed and pt (-) home alone  At baseline, pt was I w/ ADLs and functional mobility/transfers w/ use of RW, required assist w/ IADLs, (-) , and reports 0 falls PTA  Upon evaluation, pt currently requires Mod-Min A for LB ADLs, Supervision for UB ADLs, Min A for toileting, Min A for bed mobility, and Min A for functional mobility/transfers 2* the following deficits impacting occupational performance: weakness, decreased ROM, decreased strength, decreased balance and decreased tolerance  These impairments, as well at pts steps to enter environment and difficulty performing ADLS limit pts ability to safely engage in all baseline areas of occupation, including grooming, bathing, dressing, toileting, functional mobility/transfers, community mobility, social participation  and leisure activities   Pt scored overall 55/100 on the Barthel Index  Based on the aforementioned OT evaluation, functional performance deficits, and assessments, pt has been identified as a moderate complexity evaluation  Pt to continue to benefit from continued acute OT services during hospital stay to address defined deficits and to maximize level of functional independence in the following Occupational Performance areas: grooming, bathing/shower, toilet hygiene, dressing, socialization, health maintenance, functional mobility, community mobility, clothing management, social participation and transfers to common household surfaces  From OT standpoint, recommend Home OT upon D/C  OT will continue to follow pt 3-5x/wk to address the following goals to  w/in 10-14 days:   Goals   Patient Goals To get stronger   LTG Time Frame 10-14   Long Term Goal Please refer to LTGs listed below   Plan   Treatment Interventions ADL retraining;Functional transfer training;UE strengthening/ROM; Endurance training;Patient/family training;Equipment evaluation/education; Compensatory technique education; Energy conservation; Activityengagement   Goal Expiration Date 10/26/18   Treatment Day 0   OT Frequency 3-5x/wk   Recommendation   OT Discharge Recommendation Home OT   OT - OK to Discharge Yes  (when medically cleared)   Barthel Index   Feeding 10   Bathing 0   Grooming Score 5   Dressing Score 5   Bladder Score 10   Bowels Score 10   Toilet Use Score 5   Transfers (Bed/Chair) Score 10   Mobility (Level Surface) Score 0   Stairs Score 0   Barthel Index Score 55   Modified Baker Scale   Modified Mike Scale 4       GOALS    1) Pt will improve activity tolerance to G for min 30 min txment sessions    2) Pt will complete bed mobility at a Mod I level w/ G balance/safety demonstrated     3) Pt will complete UB/LB dressing/self care w/ mod I using adaptive device and DME as needed    4) Pt will complete bathing w/ Mod I w/ use of AE and DME as needed    5) Pt will complete toileting w/ mod I w/ G hygiene/thoroughness using DME as needed    6) Pt will improve functional transfers to Mod I on/off all surfaces using DME as needed w/ G balance/safety     7) Pt will improve functional mobility during ADL/IADL/leisure tasks to Mod I using DME as needed w/ G balance/safety     8) Pt will engage in ongoing cognitive assessment w/ G participation w/ mod I to assist w/ safe d/c planning/recommendations    9) Pt will demonstrate G carryover of pt/caregiver education and training as appropriate w/ mod I w/o cues w/ good tolerance    10) Pt will demonstrate 100% carryover of energy conservation techniques w/ mod I t/o functional I/ADL/leisure tasks w/o cues s/p skilled education     11) Pt will increase BUE strength by 1 MM grade to increase independence in ADLs and transfers      LONNIE Russell/LEONORA

## 2018-10-12 NOTE — UTILIZATION REVIEW
Initial Clinical Review    Admission: Date/Time/Statement: 10/11/18 @ 2259 Inpatient Written     Orders Placed This Encounter   Procedures    Inpatient Admission (expected length of stay for this patient is greater than two midnights)     Standing Status:   Standing     Number of Occurrences:   1     Order Specific Question:   Admitting Physician     Answer:   David Cueto     Order Specific Question:   Level of Care     Answer:   Level 2 Stepdown / HOT [14]     Order Specific Question:   Estimated length of stay     Answer:   More than 2 Midnights     Order Specific Question:   Certification     Answer:   I certify that inpatient services are medically necessary for this patient for a duration of greater than two midnights  See H&P and MD Progress Notes for additional information about the patient's course of treatment  ED: Date/Time/Mode of Arrival:   ED Arrival Information     Expected Arrival Acuity Means of Arrival Escorted By Service Admission Type    - 10/11/2018 20:53 Emergent Ambulance 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 and studding of the pleura diagnosed in May 2017, initially responded well to chemo, later had allergic reaction to chemo and last CT chest 9/26 by Dr Courtney Stephenson showed progression of metastatic disease predominantly in the left pleural space with enhancing nodularity evident  Small left pleural effusion  Persistent though diminished pericardial effusion   Metastatic deposit possibly involving the pericardium on the left side  Stable consolidation in the left lung base  Increasing prominence of sclerotic osseous metastases without pathologic fractures identified      Pt has SOB at baseline, woke up feeling mildly more SOB and worsened with exertion throughout the day  She was doing well until earlier at chemo infusion (R ACW port) when she went from feeling fine to being SOB, chills, confused, forgetful  Pt has dry cough, temp 100 7, tachycardic and hypoxic with tachypnea      ED Vital Signs:   ED Triage Vitals [10/11/18 2100]   Temperature Pulse Respirations Blood Pressure SpO2   (!) 100 7 °F (38 2 °C) (!) 109 (!) 26 152/76 94 %      Temp Source Heart Rate Source Patient Position - Orthostatic VS BP Location FiO2 (%)   Oral Monitor Sitting Right arm --      Pain Score       No Pain        Wt Readings from Last 1 Encounters:   10/12/18 73 1 kg (161 lb 2 5 oz)       Vital Signs (abnormal):   resps 24-45  /84, 86/54, 81/49    Abnormal Labs/Diagnostic Test Results:   CALCIUM 7 7*     ANION GAP 14       LACTIC ACID 3 5       Glucose,  (1/4%)     Blood, UA Trace-Intact       RBC, UA 4-10       NT-proBNP 1,222       CT Chest:  1  Persistent nodular studding of the left pleural surface although significant decrease in enhancement of the nodularity compatible with treatment response of pleural metastases  Tiny left pleural effusion, unchanged  2  Modest sized pericardial effusion is overall unchanged in size  Apparent pericardial metastasis seen on the previous study is no longer visible on this exam  3  Grossly stable size of subcarinal lymphadenopathy although decreased enhancement suggesting some degree of treatment response  4  Patchy groundglass density in the right upper lobe may represent infectious or inflammatory pneumonitis  5  Stable or diminished appearance of left lower lobe masslike consolidation/atelectasis  6  Stable sclerotic osseous metastases   7  Diffuse fatty infiltration of the liver  EKG:  no ischemic changes    ED Treatment:   Medication Administration from 10/11/2018 2053 to 10/12/2018 0027       Date/Time Order Dose Route Action     10/11/2018 2158 acetaminophen (TYLENOL) tablet 650 mg 650 mg Oral Given     10/11/2018 2123 sodium chloride 0 9 % bolus 1,000 mL 1,000 mL Intravenous New Bag     10/11/2018 2142 iohexol (OMNIPAQUE) 350 MG/ML injection (MULTI-DOSE) 85 mL 85 mL Intravenous Given     10/11/2018 2222 sodium chloride 0 9 % bolus 1,000 mL 1,000 mL Intravenous New Bag     10/11/2018 2232 piperacillin-tazobactam (ZOSYN) 3 375 g in sodium chloride 0 9 % 50 mL IVPB 3 375 g Intravenous New Bag     10/11/2018 2339 vancomycin (VANCOCIN) IVPB (premix) 1,000 mg 1,000 mg Intravenous New Bag          Past Medical/Surgical History:    Active Ambulatory Problems     Diagnosis Date Noted    [facility] cancer ([facility] ) 04/18/2018    Diabetes mellitus ([facility] ) 07/23/2018    Hypertension 07/23/2018    Pericardial effusion 07/23/2018    Atrial fibrillation ([facility] ) 07/23/2018    Sepsis ([facility] ) 07/24/2018    HAP (hospital-acquired pneumonia) 07/24/2018    [facility] 07/24/2018    Continuous opioid dependence ([facility] ) 07/24/2018    HCAP (healthcare-associated pneumonia) 07/25/2018    Pleural metastasis [facility] ) 08/21/2018     Resolved Ambulatory Problems     Diagnosis Date Noted    Right upper quadrant pain 07/24/2018    TANYA (acute kidney injury) ([facility] ) 07/24/2018     Past Medical History:   Diagnosis Date    TANYA (acute kidney injury) ([facility] ) 7/24/2018    Breast cancer, left ([facility] )     Diabetes mellitus ([facility] )     HAP (hospital-acquired pneumonia) 7/24/2018    Hypertension     Lung cancer ([facility] )     NSTEMI (non-ST elevated myocardial infarction) ([facility] ) 7/24/2018    Pericardial effusion     Rapid atrial fibrillation (HCC)        Admitting Diagnosis: Shortness of breath [R06 02]  Lung cancer (HCC) [C34 90]  Altered mental status [R41 82]  Pneumonia [J18 9]  Pneumonitis [J18 9]  Respiratory distress [R06 03]  Fever [R50 9]    Age/Sex: 80 y o  female    Assessment/Plan:   Principal Problem:    Sepsis ([facility] )  Active Problems:    L[facility] ([facility] )    Diabetes mellitus ([facility] )    Hypertension    Pericardial effusion    Atrial fibrillation (HCC)    Continuous opioid dependence ([facility] )        Plan for the Primary Problem(s):  · Sepsis secondary to pneumonia  ? Admit to step down  Patient started on vanco/zosyn in ED, will continue  Trend lactic acid  Blood cultures pending      Plan for Additional Problems:   · Lung cancer- had chemo infusion today  Sees palliative care as outpatient  Confirmed DNR/DNI status with patient and family  · DM- hold metformin, order accuchecks with sliding scale  · Pericardial effusion- unchanged  · afib- continue amiodarone  · Narcotic dependence- continue home pain med regimen      VTE Prophylaxis: Enoxaparin (Lovenox)  / sequential compression device      Anticipated Length of Stay:  Patient will be admitted on an Inpatient basis with an anticipated length of stay of  Greater than 2 midnights     Justification for Hospital Stay: patient requires IV antibiotics    Admission Orders:  ICU, Cardio-Pulmonary Monitoring   Aspiration precautions  OOB as destini with assist  Ambulating pulse ox on RA  Accuchecks QAC and QHS with coverage  Echo  O2 NC 2L  Sequential compression device  Blood cxs pending    Scheduled Meds:   Current Facility-Administered Medications:  acetaminophen 488 mg Oral Q6H PRN   ALPRAZolam 0 5 mg Oral HS PRN   amiodarone 200 mg Oral Daily   aspirin 81 mg Oral Daily   citalopram 20 mg Oral Daily   dexamethasone 1 mg Oral Daily With Breakfast   enoxaparin 40 mg Subcutaneous Daily   fentaNYL 1 patch Transdermal I55W   folic acid 1 mg Oral Daily   influenza vaccine 0 5 mL Intramuscular Prior to discharge   insulin lispro 1-5 Units Subcutaneous TID AC   insulin lispro 1-5 Units Subcutaneous HS   metoprolol tartrate 12 5 mg Oral Q12H DIPTI   ondansetron 4 mg Intravenous Once PRN   ondansetron 4 mg Intravenous Q6H PRN   oxyCODONE 2 5 mg Oral Q4H PRN   piperacillin-tazobactam 3 375 g Intravenous Q6H   pneumococcal 13-valent conjugate vaccine 0 5 mL Intramuscular Prior to discharge   vancomycin 15 mg/kg Intravenous Q24H     Continuous Infusions:    PRN Meds:   acetaminophen    ALPRAZolam    influenza vaccine    ondansetron    ondansetron    oxyCODONE    pneumococcal 13-valent conjugate vaccine    Thank you,  [address]  Utilization Review Department  Phone: [phone]; Fax [phone]  ATTENTION: Please call with any questions or concerns to [phone]  and carefully follow the prompts so that you are directed to the right person  Send all requests for admission clinical reviews, approved or denied determinations and any other requests to fax [phone]   All voicemails are confidential

## 2018-10-12 NOTE — CONSULTS
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 and studding of the pleura-originally diagnosed in 2017  1  Imaging not consistent with acute infection-antibiotics discontinued will continue monitor off antibiotic therapy  2  Imaging possibly secondary to acute pulmonary edema, BNP noted to be elevated at 1,222-Lasix 20mg Iv ordered  3  Additional differential pneumonitis secondary to chemotherapy-will reevaluate after lasix treatment  3  Recent Atrial fibrillation s/p pericardial window  1  Currently in SR  2  Cardiology for medical management  3  Echocardiogram ordered  4  Acute on chronic diastolic heart failure  1  BNP noted to be elevated at 1,222-Lasix 20mg Iv ordered  2  Cardiology following  3  Strict I&O  4  Daily weights      Appropriate for transfer to [facility] from a pulmonary standpoint    History of Present Illness   HPI:  Anthony Escobedo is a 80 y o  female seen in consult for suspected pneumonitis and known lung cancer  She has a past medical history significant for:   Poorly differentiated left lower lobe adenocarcinoma-currently receiving treatment with Dr Tita Middleton, history of rapid atrial fibrillation status post pericardial window-currently sinus rhythm, left-sided breast cancer, hypertension  She presented to the hospital on 10/11/2018 after developing chills, increasing shortness of breath   She reports receiving chemotherapy-Gemcitabine that morning without difficulty  Later that evening while eating dinner she developed shaking chills and increasing SOB  She denies development of :   Chest pain, pain with inspiration, fevers, night sweats, nausea, vomiting or diarrhea, headache, dizziness, bronchospasm, hemoptysis, cough or sputum production  After arrival to the ED she was noted per physician noted to be hypoxic and required oxygen therapy  Chest CT relatively unchanged from prior exam although now noted patchy ground-glass opacity in the right upper lobe, concerning for pneumonitis  She also experienced 1 temperature with a T-max of a 100 7°, currently afebrile  While in the ED she was started on vancomycin and Zosyn for suspected pneumonia  Upon evaluation she was transition from nasal cannula to room air and oxygenating above 90%  Clinically she appears well, and feels that her shortness of breath is improved but is not at her baseline  She does report a history of intermittent shortness of breath and mainly dyspnea on exertion  Procalcitonin was noted to be mildly elevated at 1 79, without leukocytosis or fever from the initial 100 7  She currently denies any perceived fevers or chills  BNP was noted to be elevated at 1, 222-cardiology consult an echocardiogram ordered  She does not currently follow-up outpatient with a pulmonologist   She follows outpatient with Dr Wyatt Peter for oncology treatment  She is currently on Decadron per palliative care for weakness, and fatigue  She denies a daily inhaler nebulizer routine  She denies symptoms of:  Obstructive sleep apnea, dysphagia or GERD  She denies recent sick contacts, but she does attempted infusion center  Denies any other recent travel or exposures  Does not wear oxygen at baseline    Review of systems:  12 point review of systems was completed and was otherwise negative except as listed in HPI        Historical Information   Past Medical History:   Diagnosis Date    [facility]/24/2018    [facility] cancer, left ([facility] )     Diabetes mellitus ([facility] )     HAP (hospital-acquired pneumonia) 7/24/2018    Hypertension     Lung cancer ([facility] )     Left Lower Lobe     NSTEMI (non-ST elevated myocardial infarction) ([facility] ) 7/24/2018    Pericardial effusion     Rapid atrial fibrillation [facility])      Past Surgical History:   Procedure Laterality Date    FRACTURE SURGERY      R arm surgery    IR PORT PLACEMENT  9/5/2018    MASTECTOMY      L breast with implant    CT INCIS HEART SAC WINDW FOR DRAIN N/A 7/24/2018    Procedure: WINDOW PERICARDIAL   Subxyphoid approach ;  Surgeon: Yasmin Turk MD;  Location: [facility];  Service: Thoracic    REDUCTION MAMMAPLASTY      R breast     Family History   Problem Relation Age of Onset    Heart attack Mother     Breast cancer Father 79    Breast cancer Sister 61    Pancreatic cancer Sister 79    Lung cancer Brother 79        Smoker     Breast cancer Sister 36    Pancreatic cancer Brother 79    Colon cancer Brother 61       Occupational history: worked in a clothing factory and     Tobacco history: no significant smoking history    Meds/Allergies   Current Facility-Administered Medications   Medication Dose Route Frequency    acetaminophen (TYLENOL) tablet 488 mg  488 mg Oral Q6H PRN    ALPRAZolam (XANAX) tablet 0 5 mg  0 5 mg Oral HS PRN    amiodarone tablet 200 mg  200 mg Oral Daily    aspirin (ECOTRIN LOW STRENGTH) EC tablet 81 mg  81 mg Oral Daily    citalopram (CeleXA) tablet 20 mg  20 mg Oral Daily    dexamethasone (DECADRON) tablet 1 mg  1 mg Oral Daily With Breakfast    enoxaparin (LOVENOX) subcutaneous injection 40 mg  40 mg Subcutaneous Daily    fentaNYL (DURAGESIC) 12 mcg/hr TD 72 hr patch 1 patch  1 patch Transdermal O83O    folic acid (FOLVITE) tablet 1 mg  1 mg Oral Daily    influenza vaccine, high-dose (FLUZONE HIGH-DOSE) IM injection JOSE 0 5 mL  0 5 mL Intramuscular Prior to discharge    insulin lispro (HumaLOG) 100 units/mL subcutaneous injection 1-5 Units  1-5 Units Subcutaneous TID AC    insulin lispro (HumaLOG) 100 units/mL subcutaneous injection 1-5 Units  1-5 Units Subcutaneous HS    metoprolol tartrate (LOPRESSOR) tablet 12 5 mg  12 5 mg Oral Q12H [address]    ondansetron (ZOFRAN) injection 4 mg  4 mg Intravenous Once PRN    ondansetron (ZOFRAN) injection 4 mg  4 mg Intravenous Q6H PRN    oxyCODONE (ROXICODONE) IR tablet 2 5 mg  2 5 mg Oral Q4H PRN    piperacillin-tazobactam (ZOSYN) 3 375 g in sodium chloride 0 9 % 50 mL IVPB  3 375 g Intravenous Q6H    pneumococcal 13-valent conjugate vaccine (PREVNAR-13) IM injection 0 5 mL  0 5 mL Intramuscular Prior to discharge    vancomycin (VANCOCIN) IVPB (premix) 1,000 mg  15 mg/kg Intravenous Q24H     Facility-Administered Medications Ordered in Other Encounters   Medication Dose Route Frequency    heparin lock flush 100 units/mL injection 300 Units  300 Units Intracatheter Q1H PRN     Prescriptions Prior to Admission   Medication    acetaminophen (TYLENOL) 500 mg tablet    ALPRAZolam (XANAX) 0 5 mg tablet    amiodarone 200 mg tablet    aspirin (ECOTRIN LOW STRENGTH) 81 mg EC tablet    citalopram (CeleXA) 20 mg tablet    CRANIAL PROSTHESIS, RX,    cyanocobalamin 1000 MCG tablet    dexamethasone (DECADRON) 1 mg tablet    fentaNYL (DURAGESIC) 12 mcg/hr TD 72 hr patch    folic acid (FOLVITE) 1 mg tablet    lidocaine-prilocaine (EMLA) cream    metFORMIN (GLUCOPHAGE) 500 mg tablet    metoprolol tartrate (LOPRESSOR) 25 mg tablet    ondansetron (ZOFRAN) 4 mg tablet    oxyCODONE (ROXICODONE) 5 mg immediate release tablet     Allergies   Allergen Reactions    Atorvastatin Other (See Comments)    Benzonatate Other (See Comments)    Carboplatin      Pt had allergic reaction during 8th carbo dose    Sulfa Antibiotics     Bactrim [Sulfamethoxazole-Trimethoprim] Rash    Latex Rash    Other Rash     Pt states she gets a rash from surgical tape, she is ok with paper tape       Vitals: Blood pressure 95/55, pulse 72, temperature 98 4 °F (36 9 °C), temperature source Temporal, resp  rate (!) 31, height 5' 2" (1 575 m), weight 73 1 kg (161 lb 2 5 oz), SpO2 91 % , RA, Body mass index is 29 48 kg/m²  Intake/Output Summary (Last 24 hours) at 10/12/18 1204  Last data filed at 10/12/18 1052   Gross per 24 hour   Intake           2962 5 ml   Output                0 ml   Net           2962 5 ml       Physical exam:    General Appearance:    Alert, cooperative, no conversational dyspnea or accessory     muscle use       Head/eyes:    Normocephalic, without obvious abnormality, atraumatic,         PERRL, extraocular muscles intact, no scleral icterus    Nose:   Nares normal, septum midline, mucosa normal, no drainage    or sinus tenderness   Throat:   Moist mucous membranes, no thrush   Neck:   Supple, trachea midline, no adenopathy; no carotid    bruit or JVD   Lungs:     Faint bibasilar rales, no rhonchi or wheezes noted   Chest Wall:    No tenderness or deformity    Heart:    Regular rate and rhythm, S1 and S2 normal, no murmur, rub   or gallop   Abdomen:     Soft, non-tender, bowel sounds active all four quadrants,     no masses, no organomegaly   Extremities:   Extremities normal, atraumatic, no cyanosis or edema   Skin:   Warm, dry, turgor normal, no rashes or lesions   Lymph nodes:   Cervical and supraclavicular nodes normal   Neurologic:   CNII-XII intact, normal strength, non-focal         Labs: I have personally reviewed pertinent lab results  , BNP: No results found for: BNP, CBC:   Lab Results   Component Value Date    WBC 6 97 10/12/2018    HGB 11 8 10/12/2018    HCT 36 3 10/12/2018    MCV 92 10/12/2018     (L) 10/12/2018    ADJUSTEDWBC 7 90 10/11/2018    MCH 29 9 10/12/2018    MCHC 32 5 10/12/2018    RDW 15 4 (H) 10/12/2018    MPV 10 4 10/12/2018    NRBC 0 10/11/2018   , CMP:   Lab Results   Component Value Date     10/12/2018    K 4 2 10/12/2018     10/12/2018    CO2 23 10/12/2018    BUN 18 10/12/2018    CREATININE 0 76 10/12/2018    CALCIUM 7 9 (L) 10/12/2018    AST 22 10/11/2018    ALT 41 10/11/2018    ALKPHOS 84 10/11/2018    EGFR 73 10/12/2018       Imaging and other studies: I have personally reviewed pertinent reports  and I have personally reviewed pertinent films in PACS  Chest CT  10/11/2018    IMPRESSION:     1  Persistent nodular studding of the left pleural surface although significant decrease in enhancement of the nodularity compatible with treatment response of pleural metastases  Tiny left pleural effusion, unchanged      2  Modest sized pericardial effusion is overall unchanged in size  Apparent pericardial metastasis seen on the previous study is no longer visible on this exam      3  Grossly stable size of subcarinal lymphadenopathy although decreased enhancement suggesting some degree of treatment response      4  Patchy groundglass density in the right upper lobe may represent infectious or inflammatory pneumonitis      5  Stable or diminished appearance of left lower lobe masslike consolidation/atelectasis      6  Stable sclerotic osseous metastases  Pulmonary function testing: none    EKG, Pathology, and Other Studies: I have personally reviewed pertinent reports     and I have personally reviewed pertinent films in PACS  Echocardogram 7/24/2018:     EF 70% with grade 1 diastolic dysfunction    Code Status: Level 3 - DNAR and DNI      Lucina Mcallister

## 2018-10-12 NOTE — ED PROVIDER NOTES
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 with malignant pleural effusion and studding of the pleura diagnosed in May 2017, initially responded well to chemo, later had allergic reaction to chemo and last CT chest 9/26 by Dr Winsome Henry showed progression of metastatic disease predominantly in the left pleural space with enhancing nodularity evident  Small left pleural effusion  Persistent though diminished pericardial effusion  Metastatic deposit possibly involving the pericardium on the left side  Stable consolidation in the left lung base  Increasing prominence of sclerotic osseous metastases without pathologic fractures identified  Pt has SOB at baseline, woke up feeling mildly more SOB and worsened with exertion throughout the day  She was doing well until earlier at chemo infusion (R ACW port) when she went from feeling fine to being SOB, chills, confused, forgetful    Pt has dry cough, temp 100 7, tachycardic and hypoxic with tachypnea         History provided by:  Patient and relative (daughter, GD)   used: No    Shortness of Breath   Severity:  Moderate  Onset quality:  Gradual  Duration:  1 day  Timing:  Constant  Progression:  Worsening  Chronicity:  New  Relieved by:  Nothing  Worsened by:  Exertion  Ineffective treatments:  None tried  Associated symptoms: cough and fever    Associated symptoms: no abdominal pain, no chest pain, no headaches, no hemoptysis, no neck pain, no rash, no sore throat, no sputum production, no vomiting and no wheezing    Cough:     Cough characteristics:  Dry    Severity:  Mild    Onset quality:  Gradual    Duration:  1 day    Timing: Constant  Fever:     Max temp PTA:  100 7  Risk factors: hx of cancer        Prior to Admission Medications   Prescriptions Last Dose Informant Patient Reported? Taking?    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   acetaminophen (TYLENOL) 500 mg tablet  Self Yes Yes   Sig: Take 500 mg by mouth   amiodarone 200 mg tablet  Self No Yes   Sig: Take 1 tablet (200 mg total) by mouth daily   aspirin (ECOTRIN LOW STRENGTH) 81 mg EC tablet  Self Yes Yes   Sig: Take 81 mg by mouth daily   citalopram (CeleXA) 20 mg tablet  Self Yes Yes   Sig: Take 20 mg by mouth daily   cyanocobalamin 1000 MCG tablet  Self Yes Yes   Sig: Take 100 mcg by mouth daily   dexamethasone (DECADRON) 1 mg tablet   No Yes   Sig: Take 1 tablet (1 mg total) by mouth daily with breakfast   fentaNYL (DURAGESIC) 12 mcg/hr TD 72 hr patch   No Yes   Sig: Apply 2 patches every 3 days   folic acid (FOLVITE) 1 mg tablet  Self Yes Yes   Sig: Take 1 mg by mouth daily   lidocaine-prilocaine (EMLA) cream  Self No Yes   Sig: Apply topically as needed for mild pain 1 hour prior to port access   metFORMIN (GLUCOPHAGE) 500 mg tablet  Self Yes Yes   Sig: Take 500 mg by mouth daily with breakfast   metoprolol tartrate (LOPRESSOR) 25 mg tablet  Self No Yes   Sig: Take 0 5 tablets (12 5 mg total) by mouth every 12 (twelve) hours   ondansetron (ZOFRAN) 4 mg tablet  Self No Yes   Sig: Take 1 tablet (4 mg total) by mouth every 8 (eight) hours as needed for nausea or vomiting   oxyCODONE (ROXICODONE) 5 mg immediate release tablet  Self No Yes   Sig: Take 0 5 tablets (2 5 mg total) by mouth every 4 (four) hours as needed for moderate pain Max Daily Amount: 15 mg      Facility-Administered Medications: None       Past Medical History:   Diagnosis Date    [facility] 7/24/2018    Breast cancer, left ([facility] )     Diabetes mellitus ([facility] )     HAP (hospital-acquired pneumonia) 7/24/2018    Hypertension     Lung cancer ([facility] )     Left Lower Lobe     NSTEMI (non-ST elevated myocardial infarction) ([facility] ) 7/24/2018    Pericardial effusion     Rapid atrial fibrillation [facility])        Past Surgical History:   Procedure Laterality Date    FRACTURE SURGERY      R arm surgery    IR PORT PLACEMENT  9/5/2018    MASTECTOMY      L breast with implant    MA INCIS HEART SAC WINDW FOR DRAIN N/A 7/24/2018    Procedure: WINDOW PERICARDIAL  Subxyphoid approach ;  Surgeon: Kamala Tobin MD;  [facility]: [facility];  Service: Thoracic    REDUCTION MAMMAPLASTY      R breast       Family History   Problem Relation Age of Onset    Heart attack Mother     Breast cancer Father 79    Breast cancer Sister 61    Pancreatic cancer Sister 79    Lung cancer Brother 79        Smoker     Breast cancer Sister 36    Pancreatic cancer Brother 79    Colon cancer Brother 61     I have reviewed and agree with the history as documented  Social History   Substance Use Topics    Smoking status: Former Smoker     Packs/day: 0 50     Years: 10 00     Quit date: 1960    Smokeless tobacco: Never Used      Comment: Socially     Alcohol use No        Review of Systems   Constitutional: Positive for chills, fatigue and fever  Negative for appetite change  HENT: Negative for congestion and sore throat  Eyes: Negative for visual disturbance  Respiratory: Positive for cough and shortness of breath  Negative for hemoptysis, sputum production and wheezing  Cardiovascular: Negative for chest pain  Gastrointestinal: Negative for abdominal pain, diarrhea, nausea and vomiting  Genitourinary: Negative for dysuria, frequency, vaginal bleeding and vaginal discharge  Musculoskeletal: Negative for back pain, neck pain and neck stiffness  Skin: Negative for pallor and rash  Allergic/Immunologic: Negative for immunocompromised state  Neurological: Negative for seizures, light-headedness and headaches  Psychiatric/Behavioral: Positive for confusion  All other systems reviewed and are negative  Physical Exam  Physical Exam   Constitutional: She is oriented to person, place, and time  She appears well-developed and well-nourished  No distress  HENT:   Head: Normocephalic and atraumatic  Mouth/Throat: Oropharynx is clear and moist    Eyes: Pupils are equal, round, and reactive to light  EOM are normal    Neck: Normal range of motion  Neck supple  Cardiovascular: Normal rate and regular rhythm  No murmur heard  Pulmonary/Chest: She is in respiratory distress (tachypneic)  She has rales (R base)  L base decreased   Abdominal: Soft  Bowel sounds are normal  There is no tenderness  Musculoskeletal: Normal range of motion  She exhibits no edema  Neurological: She is alert and oriented to person, place, and time  Skin: Skin is warm  Capillary refill takes less than 2 seconds  No rash noted  No pallor  Psychiatric: She has a normal mood and affect  Her behavior is normal    Nursing note and vitals reviewed        Vital Signs  ED Triage Vitals [10/11/18 2100]   Temperature Pulse Respirations Blood Pressure SpO2   (!) 100 7 °F (38 2 °C) (!) 109 (!) 26 152/76 94 %      Temp Source Heart Rate Source Patient Position - Orthostatic VS BP Location FiO2 (%)   Oral Monitor Sitting Right arm --      Pain Score       No Pain           Vitals:    10/11/18 2100 10/11/18 2206 10/11/18 2300 10/11/18 2315   BP: 152/76 (!) 174/84  129/71   Pulse: (!) 109 (!) 106 87 94   Patient Position - Orthostatic VS: Sitting Sitting  Lying       Visual Acuity      ED Medications  Medications   vancomycin (VANCOCIN) IVPB (premix) 1,000 mg (1,000 mg Intravenous New Bag 10/11/18 2339)   fentaNYL (DURAGESIC) 12 mcg/hr TD 72 hr patch 1 patch (not administered)   oxyCODONE (ROXICODONE) IR tablet 2 5 mg (not administered)   metoprolol tartrate (LOPRESSOR) tablet 12 5 mg (not administered)   folic acid (FOLVITE) tablet 1 mg (not administered)   dexamethasone (DECADRON) tablet 1 mg (not administered)   citalopram (CeleXA) tablet 20 mg (not administered)   aspirin (ECOTRIN LOW STRENGTH) EC tablet 81 mg (not administered)   amiodarone tablet 200 mg (not administered)   ALPRAZolam (XANAX) tablet 0 5 mg (not administered)   acetaminophen (TYLENOL) tablet 488 mg (not administered)   piperacillin-tazobactam (ZOSYN) 3 375 g in sodium chloride 0 9 % 50 mL IVPB (not administered)   vancomycin (VANCOCIN) IVPB (premix) 1,000 mg (not administered)   sodium chloride 0 9 % infusion (not administered)   ondansetron (ZOFRAN) injection 4 mg (not administered)   enoxaparin (LOVENOX) subcutaneous injection 40 mg (not administered)   insulin lispro (HumaLOG) 100 units/mL subcutaneous injection 1-5 Units (not administered)   insulin lispro (HumaLOG) 100 units/mL subcutaneous injection 1-5 Units (not administered)   acetaminophen (TYLENOL) tablet 650 mg (650 mg Oral Given 10/11/18 2158)   sodium chloride 0 9 % bolus 1,000 mL (0 mL Intravenous Stopped 10/11/18 2331)   iohexol (OMNIPAQUE) 350 MG/ML injection (MULTI-DOSE) 85 mL (85 mL Intravenous Given 10/11/18 2142)   sodium chloride 0 9 % bolus 1,000 mL (0 mL Intravenous Stopped 10/11/18 2345)   piperacillin-tazobactam (ZOSYN) 3 375 g in sodium chloride 0 9 % 50 mL IVPB (0 g Intravenous Stopped 10/11/18 2331)       Diagnostic Studies  Results Reviewed     Procedure Component Value Units Date/Time    Lactic acid, plasma [[id number]]  (Abnormal) Collected:  10/11/18 2338    Lab Status:  Final result Specimen:  Blood from Arm, Right Updated:  10/12/18 0006     LACTIC ACID 2 2 (HH) mmol/L     Narrative:         Result may be elevated if tourniquet was used during collection      Procalcitonin [[id number]]     Lab Status:  No result Specimen:  Blood     Comprehensive metabolic panel [[id number]]  (Abnormal) Collected:  10/11/18 2155    Lab Status:  Final result Specimen:  Blood from Arm, Right Updated:  10/11/18 2233     Sodium 139 mmol/L      Potassium 4 2 mmol/L      Chloride 104 mmol/L      CO2 25 mmol/L      ANION GAP 10 mmol/L      BUN 17 mg/dL      Creatinine 0 88 mg/dL      Glucose 141 (H) mg/dL      Calcium 7 7 (L) mg/dL      AST 22 U/L      ALT 41 U/L      Alkaline Phosphatase 84 U/L      Total Protein 5 6 (L) g/dL      Albumin 2 2 (L) g/dL      Total Bilirubin 0 50 mg/dL      eGFR 61 ml/min/1 73sq m     Narrative:         National Kidney Disease Education Program recommendations are as follows:  GFR calculation is accurate only with a steady state creatinine  Chronic Kidney disease less than 60 ml/min/1 73 sq  meters  Kidney failure less than 15 ml/min/1 73 sq  meters      Urine Microscopic [[id number]]  (Abnormal) Collected:  10/11/18 2206    Lab Status:  Final result Specimen:  Urine from Urine, Clean Catch Updated:  10/11/18 2224     RBC, UA 4-10 (A) /hpf      WBC, UA None Seen /hpf      Epithelial Cells Occasional /hpf      Bacteria, UA Occasional /hpf     UA w Reflex to Microscopic w Reflex to Culture [[id number]]  (Abnormal) Collected:  10/11/18 2206    Lab Status:  Final result Specimen:  Urine from Urine, Clean Catch Updated:  10/11/18 2218     Color, UA Yellow     Clarity, UA Clear     Specific [place], UA 1 020     pH, UA 5 5     Leukocytes, UA Negative     Nitrite, UA Negative     Protein, UA Negative mg/dl      Glucose,  (1/4%) (A) mg/dl      Ketones, UA Negative mg/dl      Urobilinogen, UA 0 2 E U /dl      Bilirubin, UA Negative     Blood, UA Trace-Intact (A)    CBC and differential [[id number]]  (Abnormal) Collected:  10/11/18 2120    Lab Status:  Final result Specimen:  Blood from Arm, Right Updated:  10/11/18 2207     WBC 6 41 Thousand/uL      RBC 4 85 Million/uL      Hemoglobin 14 3 g/dL      Hematocrit 45 2 %      MCV 93 fL      MCH 29 5 pg      MCHC 31 6 g/dL      RDW 15 9 (H) %      MPV 10 0 fL      Platelets 603 Thousands/uL      nRBC 0 /100 WBCs     Narrative: This is an appended report  These results have been appended to a previously verified report  Blood culture #1 [[id number]] Collected:  10/11/18 2155    Lab Status: In process Specimen:  Blood from Arm, Right Updated:  10/11/18 2158    Lactic acid, plasma [[id number]]  (Abnormal) Collected:  10/11/18 2120    Lab Status:  Final result Specimen:  Blood from Arm, Right Updated:  10/11/18 2149     LACTIC ACID 3 5 (HH) mmol/L     Narrative:         Result may be elevated if tourniquet was used during collection  Niharika Sandhu [[id number]]  (Normal) Collected:  10/11/18 2120    Lab Status:  Final result Specimen:  Blood from Arm, Right Updated:  10/11/18 2144     Protime 13 7 seconds      INR 1 04    APTT [[id number]]  (Normal) Collected:  10/11/18 2120    Lab Status:  Final result Specimen:  Blood from Arm, Right Updated:  10/11/18 2144     PTT 27 seconds     Blood culture #2 [[id number]] Collected:  10/11/18 2120    Lab Status: In process Specimen:  Blood from Arm, Right Updated:  10/11/18 2131                 CT chest with contrast   Final Result by Per Walker DO (10/11 2212)      1  Persistent nodular studding of the left pleural surface although significant decrease in enhancement of the nodularity compatible with treatment response of pleural metastases  Tiny left pleural effusion, unchanged  2  Modest sized pericardial effusion is overall unchanged in size  Apparent pericardial metastasis seen on the previous study is no longer visible on this exam       3  Grossly stable size of subcarinal lymphadenopathy although decreased enhancement suggesting some degree of treatment response  4  Patchy groundglass density in the right upper lobe may represent infectious or inflammatory pneumonitis  5  Stable or diminished appearance of left lower lobe masslike consolidation/atelectasis  6  Stable sclerotic osseous metastases  7  Diffuse fatty infiltration of the liver  Workstation performed: [id number]                    Procedures  Procedures       Phone Contacts  ED Phone Contact    ED Course  ED Course as of Oct 12 0031   Thu Oct 11, 2018   2106 Pt seen and examined  79 yo female with lung CA - 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 with malignant pleural effusion and studding of the pleura diagnosed in May 2017, initially responded well to chemo, later had allergic reaction to chemo and last CT chest 9/26 by Dr Shayy Chew showed progression of metastatic disease predominantly in the left pleural space with enhancing nodularity evident  Small left pleural effusion  Persistent though diminished pericardial effusion  Metastatic deposit possibly involving the pericardium on the left side  Stable consolidation in the left lung base  Increasing prominence of sclerotic osseous metastases without pathologic fractures identified  Pt was doing well until earlier at chemo infusion (R ACW port) when she went from feeling fine to being SOB, chills, confused, forgetful  Pt has dry cough, temp 100 7, tachycardic and hypoxic with tachypnea - placed on 3 L NC and sats 95%  Rales R base, decreased L base  Complicated lung hx - will give IVF, tylenol, check labs including lactate and blood cx and CT chest      2134 CBC WNL  2154 Lactate 3 5 - IVF infusing  Will start zosyn and vanco     2214 CT chest - 1  Persistent nodular studding of the left pleural surface although significant decrease in enhancement of the nodularity compatible with treatment response of pleural metastases   Tiny left pleural effusion, unchanged  2  Modest sized pericardial effusion is overall unchanged in size   Apparent pericardial metastasis seen on the previous study is no longer visible on this exam     3  Grossly stable size of subcarinal lymphadenopathy although decreased enhancement suggesting some degree of treatment response      4  Patchy groundglass density in the right upper lobe may represent infectious or inflammatory pneumonitis  5  Stable or diminished appearance of left lower lobe masslike consolidation/atelectasis  6  Stable sclerotic osseous metastases  7  Diffuse fatty infiltration of the liver  Initial Sepsis Screening     Row Name 10/11/18 2130                Is the patient's history suggestive of a new or worsening infection? (!)  Yes (Proceed)  -SM        Suspected source of infection pneumonia  -SM        Are two or more of the following signs & symptoms of infection both present and new to the patient? (!)  Yes (Proceed)  -SM        Indicate SIRS criteria Altered mental status; Tachycardia > 90 bpm  -SM        If the answer is yes to both questions, suspicion of sepsis is present          If severe sepsis is present AND tissue hypoperfusion perists in the hour after fluid resuscitation or lactate > 4, the patient meets criteria for SEPTIC SHOCK          Are any of the following organ dysfunction criteria present within 6 hours of suspected infection and SIRS criteria that are NOT considered to be chronic conditions? (!)  Yes  -SM        Organ dysfunction Lactate > 2 0 mmol/L  -        Date of presentation of severe sepsis          Time of presentation of severe sepsis          Tissue hypoperfusion persists in the hour after crystalloid fluid administration, evidenced, by either:          Was hypotension present within one hour of the conclusion of crystalloid fluid administration?           Date of presentation of septic shock          Time of presentation of septic shock            User Key  (r) = Recorded By, (t) = Taken By, (c) = Cosigned By    Initials Name Provider Type    ANNA Vu DO Physician                  [facility] Time    Disposition  Final diagnoses:   Pneumonitis   Respiratory distress   Fever   Lung cancer ([facility] )   Pneumonia   Altered mental status     Time reflects when diagnosis was documented in both MDM as applicable and the Disposition within this note     Time User Action Codes Description Comment    10/11/2018 10:57 PM Sol OAKES Add [J18 9] Pneumonitis     10/11/2018 10:57 PM Sol OAKES Add [R06 03] Respiratory distress     10/11/2018 10:57 PM Sol OAKES Add [R50 9] Fever     10/11/2018 10:57 PM Sol OAKES Add [C34 90] Lung cancer ([address] )     10/11/2018 10:57 PM Sol OAKES Add [J18 9] Pneumonia     10/11/2018 10:57 PM Donaldo Howell Add [R41 82] Altered mental status       ED Disposition     ED Disposition Condition Comment    Admit  Case was discussed with Garry [facility]  and the patient's admission status was agreed to be Admission Status: inpatient status to the service of [facility]   Follow-up Information    None         Current Discharge Medication List      CONTINUE these medications which have NOT CHANGED    Details   acetaminophen (TYLENOL) 500 mg tablet Take 500 mg by mouth      ALPRAZolam (XANAX) 0 5 mg tablet Take 0 5 mg by mouth daily at bedtime as needed for anxiety      amiodarone 200 mg tablet Take 1 tablet (200 mg total) by mouth daily  Qty: 90 tablet, Refills: 3    Associated Diagnoses: Paroxysmal atrial fibrillation (HCC)      aspirin (ECOTRIN LOW STRENGTH) 81 mg EC tablet Take 81 mg by mouth daily      citalopram (CeleXA) 20 mg tablet Take 20 mg by mouth daily      CRANIAL PROSTHESIS, RX, 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  Qty: 1 each, Refills: 0    Associated Diagnoses:  Alopecia      cyanocobalamin 1000 MCG tablet Take 100 mcg by mouth daily      dexamethasone (DECADRON) 1 mg tablet Take 1 tablet (1 mg total) by mouth daily with breakfast  Qty: 30 tablet, Refills: 3    Associated Diagnoses: Pericardial effusion      fentaNYL (DURAGESIC) 12 mcg/hr TD 72 hr patch Apply 2 patches every 3 days  Qty: 20 patch, Refills: 0    Associated Diagnoses: Pain      folic acid (FOLVITE) 1 mg tablet Take 1 mg by mouth daily lidocaine-prilocaine (EMLA) cream Apply topically as needed for mild pain 1 hour prior to port access  Qty: 30 g, Refills: 1    Associated Diagnoses: Pain      metFORMIN (GLUCOPHAGE) 500 mg tablet Take 500 mg by mouth daily with breakfast      metoprolol tartrate (LOPRESSOR) 25 mg tablet Take 0 5 tablets (12 5 mg total) by mouth every 12 (twelve) hours  Qty: 90 tablet, Refills: 3    Associated Diagnoses: Paroxysmal atrial fibrillation (HCC)      ondansetron (ZOFRAN) 4 mg tablet Take 1 tablet (4 mg total) by mouth every 8 (eight) hours as needed for nausea or vomiting  Qty: 20 tablet, Refills: 0    Associated Diagnoses: Nausea      oxyCODONE (ROXICODONE) 5 mg immediate release tablet Take 0 5 tablets (2 5 mg total) by mouth every 4 (four) hours as needed for moderate pain Max Daily Amount: 15 mg  Qty: 45 tablet, Refills: 0    Associated Diagnoses: Pain due to neoplasm           No discharge procedures on file      ED Provider  Electronically Signed by           Allyssa Moody DO  10/12/18 0031

## 2018-10-12 NOTE — CONSULTS
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 Indication for dexamethasone  Appears to be maintaining sinus rhythm on amiodarone     ======================================================    HPI:  I am seeing this patient in cardiology consultation for:  Pericardial effusion    Yumiko Frazier is a 80 y o  female with diabetes, hypertension, stage IV adenocarcinoma of the lung with history of malignant pleural effusion,, pericardial effusion with early tamponade secondary to malignant effusion status post subxiphoid pericardial window on July 24th with drainage of 500 cc bloody malignant effusion, paroxysmal atrial fibrillation in the setting of tamponade, remote history of breast cancer status post lumpectomy in 1992 presents with shortness of breath and shaking chills  She denies any chest pain, palpitations, dizziness, or syncope          Past Medical History:   Diagnosis Date    TANYA [facility]acute kidney [facility]) ([facility] ) 7/24/2018    Breast cancer, left ([facility] )     Diabetes mellitus ([facility] )     HAP (hospital-acquired pneumonia) 7/24/2018    Hypertension     [facility]     Left Lower Lobe     NSTEMI (non-ST elevated myocardial infarction) ([facility] ) 7/24/2018    Pericardial effusion     Rapid atrial fibrillation (HCC)          Scheduled Meds:  Current Facility-Administered Medications:  acetaminophen 488 mg Oral Q6H PRN Martha Casper PA-C    ALPRAZolam 0 5 mg Oral HS PRN Martha Casper PA-C    amiodarone 200 mg Oral Daily Audra Meehan PA-C    aspirin 81 mg Oral Daily Audra Meehan PA-C    citalopram 20 mg Oral Daily Audra Meehan PA-C    dexamethasone 1 mg Oral Daily With Breakfast Audra Meehan PA-C    enoxaparin 40 mg Subcutaneous Daily Audra Meehan PA-C    fentaNYL 1 patch Transdermal Q72H Audra Meehan PA-C    folic acid 1 mg Oral Daily Audra Meehan PA-C    influenza vaccine 0 5 mL Intramuscular Prior to discharge Blane Marks MD    insulin lispro 1-5 Units Subcutaneous TID AC Audra Meehan PA-C    insulin lispro 1-5 Units Subcutaneous HS Audra Meehan PA-C    metoprolol tartrate 12 5 mg Oral Q12H [address] Adura Meehan PA-C    ondansetron 4 mg Intravenous Once PRN Lai Mirella,     ondansetron 4 mg Intravenous Q6H PRN Audra Meehan PA-C    oxyCODONE 2 5 mg Oral Q4H PRN Audra Meehan PA-C    piperacillin-tazobactam 3 375 g Intravenous Q6H Audra Meehan PA-C Last Rate: 3 375 g (10/12/18 0939)   pneumococcal 13-valent conjugate vaccine 0 5 mL Intramuscular Prior to discharge Blane Marks MD    vancomycin 15 mg/kg Intravenous Q24H Audra Meehan PA-C      Facility-Administered Medications Ordered in Other Encounters:  heparin lock flush 300 Units Intracatheter Q1H PRN Esther Mixon MD     Continuous Infusions:   PRN Meds:   acetaminophen    ALPRAZolam    influenza vaccine    ondansetron    ondansetron    oxyCODONE    pneumococcal 13-valent conjugate vaccine  Allergies   Allergen Reactions    Atorvastatin Other (See Comments)    Benzonatate Other (See Comments)    Carboplatin      Pt had allergic reaction during 8th carbo dose    Sulfa Antibiotics     Bactrim [Sulfamethoxazole-Trimethoprim] Rash    Latex Rash    Other Rash     Pt states she gets a rash from surgical tape, she is ok with paper tape     I reviewed the Home Medication list in the chart       Family History   Problem Relation Age of Onset    Heart attack Mother     Breast cancer Father 79    Breast cancer Sister 61    Pancreatic cancer Sister 79    Lung cancer Brother 79        Smoker     Breast cancer Sister 36    Pancreatic cancer Brother 79    Colon cancer Brother 61       Social History     Social History    Marital status: /Civil Union     Spouse name: N/A    Number of children: N/A    Years of education: N/A     Occupational History    Not on file  Social History Main Topics    Smoking status: Former Smoker     Packs/day: 0 50     Years: 10 00     Quit date: 1960    Smokeless tobacco: Never Used      Comment: Socially     Alcohol use No    Drug use: No    Sexual activity: No     Other Topics Concern    Not on file     Social History Narrative    No narrative on file       Review of Systems - as per HPI, all others reviewed and negative  Vitals:    10/12/18 0720   BP: 96/61   Pulse: 66   Resp: (!) 26   Temp: (!) 96 5 °F (35 8 °C)   SpO2: 95%     I/O       10/10 0701 - 10/11 0700 10/11 0701 - 10/12 0700 10/12 0701 - 10/13 0700    IV Piggyback  2150     Total Intake(mL/kg)  2150 (29 4)     Net   +2150                 Weight (last 2 days)     Date/Time   Weight    10/12/18 0027  73 1 (161 16)    10/11/18 2100  73 2 (161 38)              GEN: NAD, Alert  HEENT: Mucus membranes moist, pink conjunctivae  EYES: Pupils equal, sclera anicteric  NECK: No JVD/HJR, no carotid bruit  CARDIOVASCULAR: RRR, No murmur, rub, gallops S1,S2, no parasternal heave/thrill  LUNGS: Clear To auscultation bilaterally  ABDOMEN: Soft, nondistended, no hepatic systolic pulsation  EXTREMITIES/VASCULAR: No edema    PSYCH: Normal Affect by limited examination  NEURO: Grossly intact by limited examination    HEME: No significant bleeding, bruising, petechia by limited examination  SKIN: No significant rashes by limited examination  MSK:  Normal upper extremity and trunk strengths by limited examination      EKG:  Sinus tachycardia, nonspecific ST changes  TELE:  No events    CT chest shows persistent nodule starting of the left pleural surface    Modest sized pericardial effusion    Patchy ground-glass density in the right upper lobe may represent infectious or inflammatory pneumonitis    Stable or diminished appearance of the left lower lobe masslike consolidation/atelectasis  Stable sclerotic osseous metastasis  Diffuse fatty infiltration of the liver  ECHO:  July 30, 2018 shows no pericardial effusion      Results from last 7 days  Lab Units 10/12/18  0511 10/11/18  2120 10/11/18  1351   WBC Thousand/uL 6 97 6 41 7 90   HEMOGLOBIN g/dL 11 8 14 3 13 3   HEMATOCRIT % 36 3 45 2 43 4   PLATELETS Thousands/uL 133* 160 168   MONO PCT MAN %  --  8 4       Results from last 7 days  Lab Units 10/12/18  0511 10/11/18  2155 10/11/18  1351   SODIUM mmol/L 137 139 142   POTASSIUM mmol/L 4 2 4 2 4 3   CHLORIDE mmol/L 106 104 99   CO2 mmol/L 23 25 29   BUN mg/dL 18 17 18   CREATININE mg/dL 0 76 0 88 0 80   CALCIUM mg/dL 7 9* 7 7* 9 0       Results from last 7 days  Lab Units 10/12/18  0511 10/11/18  2155 10/11/18  1351   SODIUM mmol/L 137 139 142   POTASSIUM mmol/L 4 2 4 2 4 3   CHLORIDE mmol/L 106 104 99   CO2 mmol/L 23 25 29   BUN mg/dL 18 17 18   CREATININE mg/dL 0 76 0 88 0 80   CALCIUM mg/dL 7 9* 7 7* 9 0   ALK PHOS U/L  --  84 98   ALT U/L  --  41 35   AST U/L  --  22 25     No results found for: TROPONINT                Results from last 7 days  Lab Units 10/11/18  2120   INR  1 04               I have personally reviewed the EKG, CXR and Telemetry images directly        Patient Active Problem List    Diagnosis Date Noted    [facility] 08/21/2018     Priority: Low    HCAP (healthcare-associated pneumonia) 07/25/2018     Priority: Low    Sepsis ([facility] ) 07/24/2018     Priority: Low    HAP (hospital-acquired pneumonia) 07/24/2018     Priority: Low    Type 2 myocardial infarction ([facility] ) 07/24/2018     Priority: Low    Continuous opioid dependence ([facility] ) 07/24/2018     Priority: Low    Diabetes mellitus ([facility] ) 07/23/2018     Priority: Low    Hypertension 07/23/2018     Priority: Low    Pericardial effusion 07/23/2018     Priority: Low    Atrial fibrillation ([facility] ) 07/23/2018     Priority: Low    Lung cancer ([facility] ) 04/18/2018     Priority: Low       Portions of the record may have been created with voice recognition software  Occasional wrong word or "sound a like" substitutions may have occurred due to the inherent limitations of voice recognition software  Read the chart carefully and recognize, using context, where substitutions have occurred        Vidhya Canas MD  10/12/2018 10:47 AM

## 2018-10-13 LAB
ANION GAP SERPL CALCULATED.3IONS-SCNC: 5 MMOL/L (ref 4–13)
BASOPHILS # BLD AUTO: 0.01 THOUSANDS/ΜL (ref 0–0.1)
BASOPHILS NFR BLD AUTO: 0 % (ref 0–1)
BUN SERPL-MCNC: 22 MG/DL (ref 5–25)
CALCIUM SERPL-MCNC: 8.6 MG/DL (ref 8.3–10.1)
CHLORIDE SERPL-SCNC: 106 MMOL/L (ref 100–108)
CO2 SERPL-SCNC: 28 MMOL/L (ref 21–32)
CREAT SERPL-MCNC: 0.61 MG/DL (ref 0.6–1.3)
EOSINOPHIL # BLD AUTO: 0.16 THOUSAND/ΜL (ref 0–0.61)
EOSINOPHIL NFR BLD AUTO: 2 % (ref 0–6)
ERYTHROCYTE [DISTWIDTH] IN BLOOD BY AUTOMATED COUNT: 15.4 % (ref 11.6–15.1)
GFR SERPL CREATININE-BSD FRML MDRD: 84 ML/MIN/1.73SQ M
GLUCOSE SERPL-MCNC: 109 MG/DL (ref 65–140)
GLUCOSE SERPL-MCNC: 119 MG/DL (ref 65–140)
GLUCOSE SERPL-MCNC: 182 MG/DL (ref 65–140)
GLUCOSE SERPL-MCNC: 196 MG/DL (ref 65–140)
GLUCOSE SERPL-MCNC: 230 MG/DL (ref 65–140)
HCT VFR BLD AUTO: 35.9 % (ref 34.8–46.1)
HGB BLD-MCNC: 11.5 G/DL (ref 11.5–15.4)
IMM GRANULOCYTES # BLD AUTO: 0.02 THOUSAND/UL (ref 0–0.2)
IMM GRANULOCYTES NFR BLD AUTO: 0 % (ref 0–2)
LACTATE SERPL-SCNC: 2 MMOL/L (ref 0.5–2)
LYMPHOCYTES # BLD AUTO: 1.26 THOUSANDS/ΜL (ref 0.6–4.47)
LYMPHOCYTES NFR BLD AUTO: 18 % (ref 14–44)
MCH RBC QN AUTO: 29.3 PG (ref 26.8–34.3)
MCHC RBC AUTO-ENTMCNC: 32 G/DL (ref 31.4–37.4)
MCV RBC AUTO: 92 FL (ref 82–98)
MONOCYTES # BLD AUTO: 0.07 THOUSAND/ΜL (ref 0.17–1.22)
MONOCYTES NFR BLD AUTO: 1 % (ref 4–12)
NEUTROPHILS # BLD AUTO: 5.37 THOUSANDS/ΜL (ref 1.85–7.62)
NEUTS SEG NFR BLD AUTO: 79 % (ref 43–75)
NRBC BLD AUTO-RTO: 0 /100 WBCS
PLATELET # BLD AUTO: 121 THOUSANDS/UL (ref 149–390)
PMV BLD AUTO: 9.9 FL (ref 8.9–12.7)
POTASSIUM SERPL-SCNC: 3.9 MMOL/L (ref 3.5–5.3)
PROCALCITONIN SERPL-MCNC: 1.6 NG/ML
RBC # BLD AUTO: 3.92 MILLION/UL (ref 3.81–5.12)
SODIUM SERPL-SCNC: 139 MMOL/L (ref 136–145)
WBC # BLD AUTO: 6.89 THOUSAND/UL (ref 4.31–10.16)

## 2018-10-13 PROCEDURE — 84145 PROCALCITONIN (PCT): CPT | Performed by: PHYSICIAN ASSISTANT

## 2018-10-13 PROCEDURE — 99232 SBSQ HOSP IP/OBS MODERATE 35: CPT | Performed by: INTERNAL MEDICINE

## 2018-10-13 PROCEDURE — 90662 IIV NO PRSV INCREASED AG IM: CPT | Performed by: INTERNAL MEDICINE

## 2018-10-13 PROCEDURE — 80048 BASIC METABOLIC PNL TOTAL CA: CPT | Performed by: INTERNAL MEDICINE

## 2018-10-13 PROCEDURE — 82948 REAGENT STRIP/BLOOD GLUCOSE: CPT

## 2018-10-13 PROCEDURE — G0008 ADMIN INFLUENZA VIRUS VAC: HCPCS | Performed by: INTERNAL MEDICINE

## 2018-10-13 PROCEDURE — 85025 COMPLETE CBC W/AUTO DIFF WBC: CPT | Performed by: INTERNAL MEDICINE

## 2018-10-13 PROCEDURE — 83605 ASSAY OF LACTIC ACID: CPT | Performed by: INTERNAL MEDICINE

## 2018-10-13 RX ORDER — FUROSEMIDE 20 MG/1
20 TABLET ORAL EVERY OTHER DAY
Status: DISCONTINUED | OUTPATIENT
Start: 2018-10-15 | End: 2018-10-15

## 2018-10-13 RX ORDER — AMIODARONE HYDROCHLORIDE 200 MG/1
100 TABLET ORAL DAILY
Status: DISCONTINUED | OUTPATIENT
Start: 2018-10-14 | End: 2018-10-19 | Stop reason: HOSPADM

## 2018-10-13 RX ORDER — FUROSEMIDE 10 MG/ML
20 INJECTION INTRAMUSCULAR; INTRAVENOUS DAILY
Status: DISCONTINUED | OUTPATIENT
Start: 2018-10-13 | End: 2018-10-14

## 2018-10-13 RX ADMIN — ASPIRIN 81 MG: 81 TABLET, COATED ORAL at 09:31

## 2018-10-13 RX ADMIN — ALPRAZOLAM 0.5 MG: 0.5 TABLET ORAL at 22:36

## 2018-10-13 RX ADMIN — INSULIN LISPRO 2 UNITS: 100 INJECTION, SOLUTION INTRAVENOUS; SUBCUTANEOUS at 12:32

## 2018-10-13 RX ADMIN — INFLUENZA A VIRUS A/MICHIGAN/45/2015 X-275 (H1N1) ANTIGEN (FORMALDEHYDE INACTIVATED), INFLUENZA A VIRUS A/SINGAPORE/INFIMH-16-0019/2016 IVR-186 (H3N2) ANTIGEN (FORMALDEHYDE INACTIVATED), AND INFLUENZA B VIRUS B/MARYLAND/15/2016 BX-69A (A B/COLORADO/6/2017-LIKE VIRUS) ANTIGEN (FORMALDEHYDE INACTIVATED) 0.5 ML: 60; 60; 60 INJECTION, SUSPENSION INTRAMUSCULAR at 12:31

## 2018-10-13 RX ADMIN — FOLIC ACID 1 MG: 1 TABLET ORAL at 09:31

## 2018-10-13 RX ADMIN — CITALOPRAM HYDROBROMIDE 20 MG: 20 TABLET ORAL at 09:31

## 2018-10-13 RX ADMIN — METOPROLOL TARTRATE 12.5 MG: 25 TABLET ORAL at 21:45

## 2018-10-13 RX ADMIN — AMIODARONE HYDROCHLORIDE 200 MG: 200 TABLET ORAL at 09:31

## 2018-10-13 RX ADMIN — ENOXAPARIN SODIUM 40 MG: 40 INJECTION SUBCUTANEOUS at 09:31

## 2018-10-13 RX ADMIN — INSULIN LISPRO 1 UNITS: 100 INJECTION, SOLUTION INTRAVENOUS; SUBCUTANEOUS at 18:02

## 2018-10-13 RX ADMIN — METOPROLOL TARTRATE 12.5 MG: 25 TABLET ORAL at 09:31

## 2018-10-13 RX ADMIN — FUROSEMIDE 20 MG: 10 INJECTION, SOLUTION INTRAMUSCULAR; INTRAVENOUS at 12:33

## 2018-10-13 RX ADMIN — INSULIN LISPRO 1 UNITS: 100 INJECTION, SOLUTION INTRAVENOUS; SUBCUTANEOUS at 21:44

## 2018-10-13 NOTE — PROGRESS NOTES
Progress Note - Marixa Black 80 y o  female MRN: [id number]    Unit/Bed#: E4 -01 Encounter: [id number]  Subjective:   No chest pain  Dyspnea better  No palpitations, edema or lightheadedness  Mild cough    Objective:   Vitals: Blood pressure 121/63, pulse 69, temperature 98 1 °F (36 7 °C), temperature source Temporal, resp  rate 17, height 5' 2" (1 575 m), weight 70 7 kg (155 lb 13 8 oz), SpO2 97 %  ,Body mass index is 28 51 kg/m²  CBC with diff:   Results from last 7 days  Lab Units 10/13/18  0527   WBC Thousand/uL 6 89   RBC Million/uL 3 92   HEMOGLOBIN g/dL 11 5   HEMATOCRIT % 35 9   MCV fL 92   MCH pg 29 3   MCHC g/dL 32 0   RDW % 15 4*   MPV fL 9 9   PLATELETS Thousands/uL 121*     CMP:   Results from last 7 days  Lab Units 10/13/18  0527  10/11/18  2155   SODIUM mmol/L 139  < > 139   POTASSIUM mmol/L 3 9  < > 4 2   CHLORIDE mmol/L 106  < > 104   CO2 mmol/L 28  < > 25   BUN mg/dL 22  < > 17   CREATININE mg/dL 0 61  < > 0 88   CALCIUM mg/dL 8 6  < > 7 7*   AST U/L  --   --  22   ALT U/L  --   --  41   ALK PHOS U/L  --   --  84   EGFR ml/min/1 73sq m 84  < > 61   < > = values in this interval not displayed  Physical exam:  Lungs-decreased breath sounds especially at the left base with a few scattered rales at the bases no rhonchi or wheezes  Heart-regular without murmur rub or gallop  Abdomen-nontender without mass organomegaly  Extremities-no edema    Assessment:  1  Increased dyspnea  Suspect pneumonitis related to chemotherapy  Perhaps some mild acute diastolic heart failure since proBNP was 1200  Mild improvement with IV diuresis  White count was elevated now normal   Was on steroids as outpatient  Did have a fever on presentation  Not felt to be an infectious process  2 pericardial effusion status post window in July  Small pericardial effusion without tamponade  This was a malignant effusion  3  Paroxysmal atrial fibrillation  On amiodarone 200 mg daily    Also on low-dose metoprolol  4  Lung cancer with known malignant pleural effusion  5  Hypertension  Well controlled on beta-blocker  6  Diabetes mellitus    Plan: Will order furosemide 20 mg every other day  She did receive another dose of IV furosemide      Will reduce amiodarone to 100 mg daily since she has been on this for some time  Continue aspirin as an anticoagulation strategy  Continue low-dose metoprolol  Patient can follow up at her usual interval with Cardiology which will be in late November    Will see here astrid Brantley MD

## 2018-10-13 NOTE — PLAN OF CARE
DISCHARGE PLANNING     Discharge to home or other facility with appropriate resources Progressing        DISCHARGE PLANNING - CARE MANAGEMENT     Discharge to post-acute care or home with appropriate resources Progressing        INFECTION - ADULT     Absence or prevention of progression during hospitalization Progressing     Absence of fever/infection during neutropenic period Progressing        Knowledge Deficit     Patient/family/caregiver demonstrates understanding of disease process, treatment plan, medications, and discharge instructions Progressing        METABOLIC, FLUID AND ELECTROLYTES - ADULT     Electrolytes maintained within normal limits Progressing     Fluid balance maintained Progressing     Glucose maintained within target range Progressing        PAIN - ADULT     Verbalizes/displays adequate comfort level or baseline comfort level Progressing        Potential for Falls     Patient will remain free of falls Progressing        Prexisting or High Potential for Compromised Skin Integrity     Skin integrity is maintained or improved Progressing        RESPIRATORY - ADULT     Achieves optimal ventilation and oxygenation Progressing        SAFETY ADULT     Maintain or return to baseline ADL function Progressing     Maintain or return mobility status to optimal level Progressing        SKIN/TISSUE INTEGRITY - ADULT     Skin integrity remains intact Progressing     Incision(s), wounds(s) or drain site(s) healing without S/S of infection Progressing

## 2018-10-13 NOTE — PROGRESS NOTES
[facility] Progress Note  Patient: Familia Deshpande 80 y o  female   MRN: [id number]  PCP: Paulette Galindo DO  Unit/Bed#: E4 -01 Encounter: [id number]  Date Of Visit: 10/13/18    Assessment:  A 59-year-old female who developed acute shortness of breath shortly after undergoing her chemotherapy for her lung cancer and malignant pleural effusion her chemotherapy regime a just been changed and she had undergone chemotherapy that very day came home and was noted by family members to be increasingly short of breath and had shaking chills  Patient is also noted to be confused and forgetful unusual for her  Patient has a known pericardial effusion is on daily dexamethasone  And recent CT imaging has shown tumor progression changing Taxotere to gemcitabine she did present with a low-grade temperature 100 7° and received Zosyn and vancomycin in the ED  She has been taken off oxygen overnight appears still short of breath her BNP was elevated at 1200       Principal Problem:    Sepsis ([facility] [address] )  Active Problems:    Lung cancer ([facility] [address] )    Diabetes mellitus ([facility] [address] )    Hypertension    Pericardial effusion    Atrial fibrillation (HCC)    Continuous opioid dependence ([facility] [address] )      Plan:    · Sepsis based on  fever mild elevation leukocytosis and lactic acid 2 2 will trend on Zosyn and vancomycin with questionable ground-glass opacity and right upper lobe possibly indicative inflammatory pneumonitis a check procalcitonin was elevated 1 79 seen by Pulmonary and antibiotics discontinued as low likelihood of bacterial infection  supporting a inflammatory pneumonitis  · Pericardial effusion modest sized possibly malignan but present 2D echo shows small loculated effusion t, elevated BNP will discontinue IV fluids as patient is eating has been afebrile since arrival question need for further dexamethasone/dyspnea may be more in keeping with CHF  · Atrial fibrillation noted recently presently in sinus rhythm will ask Cardiology input 2D echocardiogram shows a small loculated effusion lateral wall left ventricle  · Lung cancer with pleural metastasis also question pericardial effusion malignant origin as been followed by Oncology and also palliative care team   And underwent recent change in chemotherapy which could have precipitated her presentation      VTE Pharmacologic Prophylaxis:   Pharmacologic: Enoxaparin (Lovenox)  Mechanical VTE Prophylaxis in Place: Yes    Discussions with Specialists or Other Care Team Provider:  No    Time Spent for Care: 30 minutes  More than 50% of total time spent on counseling and coordination of care as described above  Subjective:   Sitting up out of bed off oxygen presently and seemingly comfortable but still some dyspnea noted at rest denies chest pain or inspiratory pain  He remains on 3 L of nasal cannula O2 had pulse ox checked this morning at 87% on room air    Objective:     Vitals:   Temp (24hrs), Av 1 °F (36 7 °C), Min:97 4 °F (36 3 °C), Max:98 7 °F (37 1 °C)    Temp:  [97 4 °F (36 3 °C)-98 7 °F (37 1 °C)] 98 1 °F (36 7 °C)  HR:  [65-79] 79  Resp:  [16-31] 17  BP: ()/(55-78) 118/78  SpO2:  [90 %-93 %] 90 %  Body mass index is 28 51 kg/m²  Input and Output Summary (last 24 hours):        Intake/Output Summary (Last 24 hours) at 10/13/18 0954  Last data filed at 10/13/18 0500   Gross per 24 hour   Intake           1292 5 ml   Output              800 ml   Net            492 5 ml       Physical Exam:     Physical Exam:   General appearance: alert, appears stated age and cooperative  Head: Normocephalic, without obvious abnormality, atraumatic  Lungs: crackles diminished however with positive jugular venous distension  Heart: regular rate and rhythm  Abdomen: soft, non-tender; bowel sounds normal; no masses,  no organomegaly  Back: negative  Extremities: extremities normal, atraumatic, no cyanosis or edema  Neurologic: Grossly normal      Additional Data: Labs:      Results from last 7 days  Lab Units 10/13/18  0527   WBC Thousand/uL 6 89   HEMOGLOBIN g/dL 11 5   HEMATOCRIT % 35 9   PLATELETS Thousands/uL 121*   NEUTROS PCT % 79*   LYMPHS PCT % 18   MONOS PCT % 1*   EOS PCT % 2       Results from last 7 days  Lab Units 10/13/18  0527  10/11/18  2155   SODIUM mmol/L 139  < > 139   POTASSIUM mmol/L 3 9  < > 4 2   CHLORIDE mmol/L 106  < > 104   CO2 mmol/L 28  < > 25   BUN mg/dL 22  < > 17   CREATININE mg/dL 0 61  < > 0 88   CALCIUM mg/dL 8 6  < > 7 7*   ALK PHOS U/L  --   --  84   ALT U/L  --   --  41   AST U/L  --   --  22   < > = values in this interval not displayed  Results from last 7 days  Lab Units 10/11/18  2120   INR  1 04       * I Have Reviewed All Lab Data Listed Above  * Additional Pertinent Lab Tests Reviewed: [place] [address] Admission Reviewed    Imaging:  Ct Chest With Contrast    Result Date: 10/11/2018  Narrative: CT CHEST WITH IV CONTRAST INDICATION:   Shortness of breath worsened with exertion throughout the day  Tachycardic, hypoxic with tachypnea  History of lung cancer with malignant pleural effusion  COMPARISON:  CT chest 9/26/2018 TECHNIQUE: CT examination of the chest was performed  Axial, sagittal, and coronal 2D reformatted images were created from the source data and submitted for interpretation  Radiation dose length product (DLP) for this visit:  271 mGy-cm   This examination, like all CT scans performed in the [facility], was performed utilizing techniques to minimize radiation dose exposure, including the use of iterative reconstruction and automated exposure control  IV Contrast:  85 mL of iohexol (OMNIPAQUE) FINDINGS: Please note the study was not tailored as a dedicated CT pulmonary angiogram protocol but as a routine exam   No large emboli within the main pulmonary arteries  LUNGS:  Poorly marginated masslike consolidation and/or atelectasis in the left lower lobe is stable or decreased  This could also reflect some volume averaging with pleural metastases  Some image degradation secondary to respiratory motion  Patchy groundglass density in the right upper lobe could represent infectious or inflammatory pneumonitis  PLEURA:  There is a tiny left pleural effusion again seen  There is persistent nodular studding of the left pleural surface albeit the enhancement has significantly decreased suggesting treatment response  HEART/GREAT VESSELS:  The heart is unchanged in size  A modest sized pericardial effusion is again identified  Tumor implants suspected along the pericardium previously is not currently visible  Coronary artery calcifications  MEDIASTINUM AND RAE:  18 mm prevascular nodule with some central necrosis abutting the medial left pleural surface appears unchanged  14 mm subcarinal short axis lymph node demonstrates decreased enhancement compared to previous study suggesting treatment response  CHEST WALL AND LOWER NECK:   Right chest wall Port-A-Cath  Unchanged right thyroid nodularity  Left breast implant  VISUALIZED STRUCTURES IN THE UPPER ABDOMEN:  Fatty infiltration of the liver  No adrenal nodules seen, noting the inferior left adrenal gland is not fully visualized  OSSEOUS STRUCTURES:  Stable sclerotic osseous metastases  No acute fracture  Multilevel degenerative changes of the spine  Impression: 1  Persistent nodular studding of the left pleural surface although significant decrease in enhancement of the nodularity compatible with treatment response of pleural metastases  Tiny left pleural effusion, unchanged  2  Modest sized pericardial effusion is overall unchanged in size  Apparent pericardial metastasis seen on the previous study is no longer visible on this exam  3  Grossly stable size of subcarinal lymphadenopathy although decreased enhancement suggesting some degree of treatment response   4  Patchy groundglass density in the right upper lobe may represent infectious or inflammatory pneumonitis  5  Stable or diminished appearance of left lower lobe masslike consolidation/atelectasis  6  Stable sclerotic osseous metastases  7  Diffuse fatty infiltration of the liver  Workstation performed: [id number]     Ct Chest W Contrast    Result Date: 9/27/2018  Narrative: CT CHEST WITH IV CONTRAST INDICATION:   C34 32: Malignant neoplasm of lower lobe, left bronchus or lung  COMPARISON:  July 23, 2018 TECHNIQUE: CT examination of the chest was performed  Axial, sagittal, and coronal 2D reformatted images were created from the source data and submitted for interpretation  Radiation dose length product (DLP) for this visit:  291 68 mGy-cm   This examination, like all CT scans performed in the [facility], was performed utilizing techniques to minimize radiation dose exposure, including the use of iterative  reconstruction and automated exposure control  IV Contrast:  85 mL of iohexol (OMNIPAQUE) FINDINGS: LUNGS:  Linear density in the right lower lobe is most suggestive of platelike atelectasis  There are no right-sided lung nodules or masses or suspicious infiltrates or airway obstructions  In the left lower lobe there is consolidation and volume loss  Compared with the previous examination, this seems relatively stable  No discrete lesions are seen within the left upper lobe although there is some consolidation medially in the left upper  lobe left mediastinal border which is stable and appears to be some chronic atelectasis or scarring  PLEURA:  There is extensive peripherally enhanced nodular pleural metastatic disease within the left hemithorax which is much more prominent than on the previous study and favors the posterior lower portion of the chest is also present laterally  The largest individual metastatic lesion of the pleura is 2 1 x 3 0 cm on image 40 series 2  There is a small amount of pleural fluid on the left    There is additional pleural metastatic disease in the medial aspect of the left hemithorax abutting the mediastinal border near the pericardial surface  In particular, on coronal image 74 series 601 there is a relatively prominent metastatic lesion which measures 1 5 x 2 4 cm and is potentially within the pericardium  HEART/GREAT VESSELS:  Heart size appears stable  The pericardial effusion seen on the prior study has diminished although is still present  As above, there is metastatic disease which is visually inseparable from the pericardial surface along the left heart border  No obvious myocardial infiltration appreciated  There is coronary artery calcification  The aorta is atherosclerotic but normal in caliber  MEDIASTINUM AND RAE:  There are small mediastinal lymph nodes which appear within normal limits of size in the pretracheal area but there is an abnormally enlarged subcarinal lymph node which is 15 x 28 mm  This is more prominent than on the previous study  CHEST WALL AND LOWER NECK:   There is a left breast implant reconstruction  Visualized portion of right breast is unremarkable  Stable tiny low-density cystic lesion or nodule in the right lobe of the thyroid  Surgical clips in the left axilla  VISUALIZED STRUCTURES IN THE UPPER ABDOMEN:  Tiny hypodense focus in the liver on image 44 series 2 appears stable from earlier exams  Likely a benign cyst   Tiny cyst at the upper pole of the left kidney also noted  No acute upper abdominal pathology seen  OSSEOUS STRUCTURES:  Sclerotic lesion at T6 appears stable from the previous study  There may be some minimal sclerosis at T1 as well  There is mixed lucency and sclerosis in the upper portion of the right side of the manubrium  Developing sclerosis is present in the left scapula  There are degenerative changes of both glenohumeral joints  Impression: Progression of metastatic disease predominantly in the left pleural space with enhancing nodularity evident   Small left pleural effusion  Persistent though diminished pericardial effusion  Metastatic deposit possibly involving the pericardium on the left side  Stable consolidation in the left lung base  Increasing prominence of sclerotic osseous metastases without pathologic fractures identified  Workstation performed: [id number]     Imaging Reports Reviewed Today Include:  CT of chest reviewed  Imaging Personally Reviewed by Myself Includes:    Procedure: Ct Chest With Contrast    Result Date: 10/11/2018  Narrative: CT CHEST WITH IV CONTRAST INDICATION:   Shortness of breath worsened with exertion throughout the day  Tachycardic, hypoxic with tachypnea  History of lung cancer with malignant pleural effusion  COMPARISON:  CT chest 9/26/2018 TECHNIQUE: CT examination of the chest was performed  Axial, sagittal, and coronal 2D reformatted images were created from the source data and submitted for interpretation  Radiation dose length product (DLP) for this visit:  271 mGy-cm   This examination, like all CT scans performed in the [facility], was performed utilizing techniques to minimize radiation dose exposure, including the use of iterative reconstruction and automated exposure control  IV Contrast:  85 mL of iohexol (OMNIPAQUE) FINDINGS: Please note the study was not tailored as a dedicated CT pulmonary angiogram protocol but as a routine exam   No large emboli within the main pulmonary arteries  LUNGS:  Poorly marginated masslike consolidation and/or atelectasis in the left lower lobe is stable or decreased  This could also reflect some volume averaging with pleural metastases  Some image degradation secondary to respiratory motion  Patchy groundglass density in the right upper lobe could represent infectious or inflammatory pneumonitis  PLEURA:  There is a tiny left pleural effusion again seen    There is persistent nodular studding of the left pleural surface albeit the enhancement has significantly decreased suggesting treatment response  HEART/GREAT VESSELS:  The heart is unchanged in size  A modest sized pericardial effusion is again identified  Tumor implants suspected along the pericardium previously is not currently visible  Coronary artery calcifications  MEDIASTINUM AND RAE:  18 mm prevascular nodule with some central necrosis abutting the medial left pleural surface appears unchanged  14 mm subcarinal short axis lymph node demonstrates decreased enhancement compared to previous study suggesting treatment response  CHEST WALL AND LOWER NECK:   Right chest wall Port-A-Cath  Unchanged right thyroid nodularity  Left breast implant  VISUALIZED STRUCTURES IN THE UPPER ABDOMEN:  Fatty infiltration of the liver  No adrenal nodules seen, noting the inferior left adrenal gland is not fully visualized  OSSEOUS STRUCTURES:  Stable sclerotic osseous metastases  No acute fracture  Multilevel degenerative changes of the spine  Impression: 1  Persistent nodular studding of the left pleural surface although significant decrease in enhancement of the nodularity compatible with treatment response of pleural metastases  Tiny left pleural effusion, unchanged  2  Modest sized pericardial effusion is overall unchanged in size  Apparent pericardial metastasis seen on the previous study is no longer visible on this exam  3  Grossly stable size of subcarinal lymphadenopathy although decreased enhancement suggesting some degree of treatment response  4  Patchy groundglass density in the right upper lobe may represent infectious or inflammatory pneumonitis  5  Stable or diminished appearance of left lower lobe masslike consolidation/atelectasis  6  Stable sclerotic osseous metastases  7  Diffuse fatty infiltration of the liver  Workstation performed: [id number]        Recent Cultures (last 7 days):       Results from last 7 days  Lab Units 10/11/18  2155 10/11/18  2120   BLOOD CULTURE  No Growth at 24 hrs   No Growth at 24 hrs  Last 24 Hours Medication List:     Current Facility-Administered Medications:  acetaminophen 488 mg Oral Q6H PRN Fawn Le PA-C   ALPRAZolam 0 5 mg Oral HS PRN Fawn Le PA-C   amiodarone 200 mg Oral Daily Fawn Le PA-C   aspirin 81 mg Oral Daily Fawn Le PA-C   citalopram 20 mg Oral Daily Fawn Le PA-C   dexamethasone 1 mg Oral Daily With Breakfast Fawn Le PA-C   enoxaparin 40 mg Subcutaneous Daily Fawn Le PA-C   fentaNYL 1 patch Transdermal Q72H Fawn Le PA-C   folic acid 1 mg Oral Daily Fawn Le PA-C   influenza vaccine 0 5 mL Intramuscular Prior to discharge Zoya Cabrera MD   insulin lispro 1-5 Units Subcutaneous TID AC Fawn Le PA-C   insulin lispro 1-5 Units Subcutaneous HS Fwan Le PA-C   metoprolol tartrate 12 5 mg Oral Q12H [address] Fawn Le PA-C   ondansetron 4 mg Intravenous Once PRN Nic Henry DO   ondansetron 4 mg Intravenous Q6H PRN Fawn Le PA-C   oxyCODONE 2 5 mg Oral Q4H PRN Fawn Le PA-C   pneumococcal 13-valent conjugate vaccine 0 5 mL Intramuscular Prior to discharge Zoya Cabrera MD     Facility-Administered Medications Ordered in Other Encounters:  heparin lock flush 300 Units Wendy Cruz PRN Ciara Queen MD        Today, Patient Was Seen By: Danny Driver MD    ** Please Note: Dragon 360 Dictation voice to text software may have been used in the creation of this document   **

## 2018-10-13 NOTE — PROGRESS NOTES
Pulmonary Progress Note  Familia Deshpande 80 y o  female MRN: [id number]  Unit/Bed#: E4 -01 Encounter: [id number]      Assesment and Recommendations:    1  Acute hypoxic respiratory failure-now on 3 L nasal cannula - appears stable  Antibiotics stopped yesterday  · Continue diuresis  · Check chest x-ray 10/15/2018-if no improvement will need to consider chemotherapy or amiodarone induced pneumonitis  Will need to consider steroids at that time  · Continue to observe off antibiotics at this time  If any fever, increased white count or symptoms of infection, will need to resume antibiotics  2  Left lower lobe adenocarcinoma with small malignant pleural effusion/pleural studding  · Chemotherapy per Oncology    3  Acute/chronic diastolic congestive heart failure  · Diuresis per primary team and Cardiology    Chief Complaint:  Doing about the same    Subjective: The patient reports that she is about the same as she was yesterday  She continues to have some dyspnea on exertion  She denies any significant cough, wheezing or chest pain  Vitals: Blood pressure 121/63, pulse 69, temperature 98 1 °F (36 7 °C), temperature source Temporal, resp  rate 17, height 5' 2" (1 575 m), weight 70 7 kg (155 lb 13 8 oz), SpO2 97 %  , 3 L nasal cannula, Body mass index is 28 51 kg/m²        Intake/Output Summary (Last 24 hours) at 10/13/18 1319  Last data filed at 10/13/18 0945   Gross per 24 hour   Intake              300 ml   Output             1100 ml   Net             -800 ml       Physical exam:  General:  Patient is awake, alert, non-toxic and in no acute respiratory distress  Neck: No JVD  CV:  Regular, +S1 and S2, No murmurs, gallops or rubs appreciated  Lungs:  Bibasilar crackles, no wheezes appreciated  Abdomen: Soft, +BS, Non-tender, non-distended  Extremities: No clubbing, cyanosis or edema  Neuro: No focal deficits    Labs:   CBC:   Lab Results   Component Value Date    WBC 6 89 10/13/2018    HGB 11 5 10/13/2018    HCT 35 9 10/13/2018    MCV 92 10/13/2018     (L) 10/13/2018    MCH 29 3 10/13/2018    MCHC 32 0 10/13/2018    RDW 15 4 (H) 10/13/2018    MPV 9 9 10/13/2018    NRBC 0 10/13/2018   , CMP:   Lab Results   Component Value Date     10/13/2018    K 3 9 10/13/2018     10/13/2018    CO2 28 10/13/2018    BUN 22 10/13/2018    CREATININE 0 61 10/13/2018    CALCIUM 8 6 10/13/2018    EGFR 84 10/13/2018       H[place] ,       Portions of the record may have been created with voice recognition software  Occasional wrong word or "sound a like" substitutions may have occurred due to the inherent limitations of voice recognition software  Read the chart carefully and recognize, using context, where substitutions have occurred

## 2018-10-14 LAB
GLUCOSE SERPL-MCNC: 106 MG/DL (ref 65–140)
GLUCOSE SERPL-MCNC: 259 MG/DL (ref 65–140)
GLUCOSE SERPL-MCNC: 265 MG/DL (ref 65–140)
GLUCOSE SERPL-MCNC: 95 MG/DL (ref 65–140)
PROCALCITONIN SERPL-MCNC: 0.56 NG/ML
PROCALCITONIN SERPL-MCNC: 1.11 NG/ML

## 2018-10-14 PROCEDURE — 84145 PROCALCITONIN (PCT): CPT | Performed by: INTERNAL MEDICINE

## 2018-10-14 PROCEDURE — 99232 SBSQ HOSP IP/OBS MODERATE 35: CPT | Performed by: INTERNAL MEDICINE

## 2018-10-14 PROCEDURE — 97110 THERAPEUTIC EXERCISES: CPT

## 2018-10-14 PROCEDURE — 84145 PROCALCITONIN (PCT): CPT | Performed by: PHYSICIAN ASSISTANT

## 2018-10-14 PROCEDURE — 97116 GAIT TRAINING THERAPY: CPT

## 2018-10-14 PROCEDURE — 82948 REAGENT STRIP/BLOOD GLUCOSE: CPT

## 2018-10-14 RX ADMIN — ACETAMINOPHEN 488 MG: 325 TABLET, FILM COATED ORAL at 21:49

## 2018-10-14 RX ADMIN — METOPROLOL TARTRATE 12.5 MG: 25 TABLET ORAL at 08:36

## 2018-10-14 RX ADMIN — ASPIRIN 81 MG: 81 TABLET, COATED ORAL at 08:36

## 2018-10-14 RX ADMIN — Medication 3.38 G: at 18:10

## 2018-10-14 RX ADMIN — FOLIC ACID 1 MG: 1 TABLET ORAL at 08:36

## 2018-10-14 RX ADMIN — ENOXAPARIN SODIUM 40 MG: 40 INJECTION SUBCUTANEOUS at 08:37

## 2018-10-14 RX ADMIN — CITALOPRAM HYDROBROMIDE 20 MG: 20 TABLET ORAL at 08:37

## 2018-10-14 RX ADMIN — METOPROLOL TARTRATE 12.5 MG: 25 TABLET ORAL at 21:50

## 2018-10-14 RX ADMIN — ALPRAZOLAM 0.5 MG: 0.5 TABLET ORAL at 21:49

## 2018-10-14 RX ADMIN — AMIODARONE HYDROCHLORIDE 100 MG: 200 TABLET ORAL at 08:36

## 2018-10-14 RX ADMIN — Medication 3.38 G: at 12:17

## 2018-10-14 RX ADMIN — INSULIN LISPRO 2 UNITS: 100 INJECTION, SOLUTION INTRAVENOUS; SUBCUTANEOUS at 12:17

## 2018-10-14 RX ADMIN — Medication 3.38 G: at 23:55

## 2018-10-14 RX ADMIN — INSULIN LISPRO 2 UNITS: 100 INJECTION, SOLUTION INTRAVENOUS; SUBCUTANEOUS at 21:54

## 2018-10-14 NOTE — PROGRESS NOTES
[facility] Internal Medicine Progress Note  Patient: Sarah Wilcox 80 y o  female   MRN: [id number]  PCP: Christine Hamm DO  Unit/Bed#: E4 MS Ziggy-01 Encounter: [id number]  Date Of Visit: 10/14/18    Assessment:  A 80-year-old female who developed acute shortness of breath shortly after undergoing her chemotherapy for her lung cancer and malignant pleural effusion her chemotherapy regime a just been changed and she had undergone chemotherapy that very day came home and was noted by family members to be increasingly short of breath and had shaking chills  Patient is also noted to be confused and forgetful unusual for her  Patient has a known pericardial effusion is on daily dexamethasone  And recent CT imaging has shown tumor progression changing Taxotere to gemcitabine she did present with a low-grade temperature 100 7° and received Zosyn and vancomycin in the ED  She has been taken off oxygen overnight appears still short of breath her BNP was elevated at 1200  Principal Problem:    Sepsis ([facility] [address] )  Active Problems:    Lung cancer ([facility] [address] )    Diabetes mellitus ([facility] [address] )    Hypertension    Pericardial effusion    Atrial fibrillation (HCC)    Continuous opioid dependence ([facility] [address] )      Plan:    · Sepsis based on  fever mild elevation leukocytosis and resolving lactic acidosis mildly elevated procalcitonin 1 79 dropped to 1 6 antibiotics were discontinued    Pulmonary and antibiotics discontinued as low likelihood of bacterial infection  supporting a inflammatory pneumonitis/however redeveloped fever last night and would favor re-initiation of Zosyn recheck procalcitonin  · Pericardial effusion modest sized possibly malignan but present 2D echo shows small loculated effusion t, elevated BNP will discontinue IV fluids as patient is eating has been afebrile since arrival question need for further dexamethasone/dyspnea may be more in keeping with CHF however and not that great response with IV diuresis times 48 hours  · Atrial fibrillation noted recently presently in sinus rhythm will ask Cardiology input 2D echocardiogram shows a small loculated effusion lateral wall left ventricle  · Lung cancer with pleural metastasis also question pericardial effusion malignant origin as been followed by Oncology and also palliative care team   And underwent recent change in chemotherapy which could have precipitated her presentation      VTE Pharmacologic Prophylaxis:   Pharmacologic: Enoxaparin (Lovenox)  Mechanical VTE Prophylaxis in Place: Yes    Discussions with Specialists or Other Care Team Provider:  No    Time Spent for Care: 30 minutes  More than 50% of total time spent on counseling and coordination of care as described above  Subjective:   Sitting up out of bed off oxygen presently and seemingly comfortable but still some dyspnea noted at rest denies chest pain or inspiratory pain  He remains on 3 L of nasal cannula O2 had pulse ox checked this morning at 87% on room air and had fever last night again she denies any cough or production of sputum    Objective:     Vitals:   Temp (24hrs), Av 1 °F (37 3 °C), Min:97 5 °F (36 4 °C), Max:100 4 °F (38 °C)    Temp:  [97 5 °F (36 4 °C)-100 4 °F (38 °C)] 100 4 °F (38 °C)  HR:  [62-80] 75  Resp:  [18-22] 22  BP: (111-127)/(58-86) 111/58  SpO2:  [90 %-98 %] 91 %  Body mass index is 28 1 kg/m²  Input and Output Summary (last 24 hours):        Intake/Output Summary (Last 24 hours) at 10/14/18 0963  Last data filed at 10/14/18 5491   Gross per 24 hour   Intake              720 ml   Output             1450 ml   Net             -730 ml       Physical Exam:     Physical Exam:   General appearance: alert, appears stated age and cooperative  Head: Normocephalic, without obvious abnormality, atraumatic  Lungs: crackles diminished however with positive jugular venous distension  Heart: regular rate and rhythm  Abdomen: soft, non-tender; bowel sounds normal; no masses,  no organomegaly  Back: negative  Extremities: extremities normal, atraumatic, no cyanosis or edema  Neurologic: Grossly normal      Additional Data:     Labs:      Results from last 7 days  Lab Units 10/13/18  0527   WBC Thousand/uL 6 89   HEMOGLOBIN g/dL 11 5   HEMATOCRIT % 35 9   PLATELETS Thousands/uL 121*   NEUTROS PCT % 79*   LYMPHS PCT % 18   MONOS PCT % 1*   EOS PCT % 2       Results from last 7 days  Lab Units 10/13/18  0527  10/11/18  2155   SODIUM mmol/L 139  < > 139   POTASSIUM mmol/L 3 9  < > 4 2   CHLORIDE mmol/L 106  < > 104   CO2 mmol/L 28  < > 25   BUN mg/dL 22  < > 17   CREATININE mg/dL 0 61  < > 0 88   CALCIUM mg/dL 8 6  < > 7 7*   ALK PHOS U/L  --   --  84   ALT U/L  --   --  41   AST U/L  --   --  22   < > = values in this interval not displayed  Results from last 7 days  Lab Units 10/11/18  2120   INR  1 04       * I Have Reviewed All Lab Data Listed Above  * Additional Pertinent Lab Tests Reviewed: Rui 66 Admission Reviewed    Imaging:  Ct Chest With Contrast    Result Date: 10/11/2018  Narrative: CT CHEST WITH IV CONTRAST INDICATION:   Shortness of breath worsened with exertion throughout the day  Tachycardic, hypoxic with tachypnea  History of lung cancer with malignant pleural effusion  COMPARISON:  CT chest 9/26/2018 TECHNIQUE: CT examination of the chest was performed  Axial, sagittal, and coronal 2D reformatted images were created from the source data and submitted for interpretation  Radiation dose length product (DLP) for this visit:  271 mGy-cm   This examination, like all CT scans performed in the [facility], was performed utilizing techniques to minimize radiation dose exposure, including the use of iterative reconstruction and automated exposure control   IV Contrast:  85 mL of iohexol (OMNIPAQUE) FINDINGS: Please note the study was not tailored as a dedicated CT pulmonary angiogram protocol but as a routine exam   No large emboli within the main pulmonary arteries  LUNGS:  Poorly marginated masslike consolidation and/or atelectasis in the left lower lobe is stable or decreased  This could also reflect some volume averaging with pleural metastases  Some image degradation secondary to respiratory motion  Patchy groundglass density in the right upper lobe could represent infectious or inflammatory pneumonitis  PLEURA:  There is a tiny left pleural effusion again seen  There is persistent nodular studding of the left pleural surface albeit the enhancement has significantly decreased suggesting treatment response  HEART/GREAT VESSELS:  The heart is unchanged in size  A modest sized pericardial effusion is again identified  Tumor implants suspected along the pericardium previously is not currently visible  Coronary artery calcifications  MEDIASTINUM AND RAE:  18 mm prevascular nodule with some central necrosis abutting the medial left pleural surface appears unchanged  14 mm subcarinal short axis lymph node demonstrates decreased enhancement compared to previous study suggesting treatment response  CHEST WALL AND LOWER NECK:   Right chest wall Port-A-Cath  Unchanged right thyroid nodularity  Left breast implant  VISUALIZED STRUCTURES IN THE UPPER ABDOMEN:  Fatty infiltration of the liver  No adrenal nodules seen, noting the inferior left adrenal gland is not fully visualized  OSSEOUS STRUCTURES:  Stable sclerotic osseous metastases  No acute fracture  Multilevel degenerative changes of the spine  Impression: 1  Persistent nodular studding of the left pleural surface although significant decrease in enhancement of the nodularity compatible with treatment response of pleural metastases  Tiny left pleural effusion, unchanged  2  Modest sized pericardial effusion is overall unchanged in size    Apparent pericardial metastasis seen on the previous study is no longer visible on this exam  3  Grossly stable size of subcarinal lymphadenopathy although decreased enhancement suggesting some degree of treatment response  4  Patchy groundglass density in the right upper lobe may represent infectious or inflammatory pneumonitis  5  Stable or diminished appearance of left lower lobe masslike consolidation/atelectasis  6  Stable sclerotic osseous metastases  7  Diffuse fatty infiltration of the liver  Workstation performed: [id number]     Ct Chest W Contrast    Result Date: 9/27/2018  Narrative: CT CHEST WITH IV CONTRAST INDICATION:   C34 32: Malignant neoplasm of lower lobe, left bronchus or lung  COMPARISON:  July 23, 2018 TECHNIQUE: CT examination of the chest was performed  Axial, sagittal, and coronal 2D reformatted images were created from the source data and submitted for interpretation  Radiation dose length product (DLP) for this visit:  291 68 mGy-cm   This examination, like all CT scans performed in the [facility], was performed utilizing techniques to minimize radiation dose exposure, including the use of iterative  reconstruction and automated exposure control  IV Contrast:  85 mL of iohexol (OMNIPAQUE) FINDINGS: LUNGS:  Linear density in the right lower lobe is most suggestive of platelike atelectasis  There are no right-sided lung nodules or masses or suspicious infiltrates or airway obstructions  In the left lower lobe there is consolidation and volume loss  Compared with the previous examination, this seems relatively stable  No discrete lesions are seen within the left upper lobe although there is some consolidation medially in the left upper  lobe left mediastinal border which is stable and appears to be some chronic atelectasis or scarring  PLEURA:  There is extensive peripherally enhanced nodular pleural metastatic disease within the left hemithorax which is much more prominent than on the previous study and favors the posterior lower portion of the chest is also present laterally    The largest individual metastatic lesion of the pleura is 2 1 x 3 0 cm on image 40 series 2  There is a small amount of pleural fluid on the left  There is additional pleural metastatic disease in the medial aspect of the left hemithorax abutting the mediastinal border near the pericardial surface  In particular, on coronal image 74 series 601 there is a relatively prominent metastatic lesion which measures 1 5 x 2 4 cm and is potentially within the pericardium  HEART/GREAT VESSELS:  Heart size appears stable  The pericardial effusion seen on the prior study has diminished although is still present  As above, there is metastatic disease which is visually inseparable from the pericardial surface along the left heart border  No obvious myocardial infiltration appreciated  There is coronary artery calcification  The aorta is atherosclerotic but normal in caliber  MEDIASTINUM AND RAE:  There are small mediastinal lymph nodes which appear within normal limits of size in the pretracheal area but there is an abnormally enlarged subcarinal lymph node which is 15 x 28 mm  This is more prominent than on the previous study  CHEST WALL AND LOWER NECK:   There is a left breast implant reconstruction  Visualized portion of right breast is unremarkable  Stable tiny low-density cystic lesion or nodule in the right lobe of the thyroid  Surgical clips in the left axilla  VISUALIZED STRUCTURES IN THE UPPER ABDOMEN:  Tiny hypodense focus in the liver on image 44 series 2 appears stable from earlier exams  Likely a benign cyst   Tiny cyst at the upper pole of the left kidney also noted  No acute upper abdominal pathology seen  OSSEOUS STRUCTURES:  Sclerotic lesion at T6 appears stable from the previous study  There may be some minimal sclerosis at T1 as well  There is mixed lucency and sclerosis in the upper portion of the right side of the manubrium  Developing sclerosis is present in the left scapula    There are degenerative changes of both glenohumeral joints  Impression: Progression of metastatic disease predominantly in the left pleural space with enhancing nodularity evident  Small left pleural effusion  Persistent though diminished pericardial effusion  Metastatic deposit possibly involving the pericardium on the left side  Stable consolidation in the left lung base  Increasing prominence of sclerotic osseous metastases without pathologic fractures identified  Workstation performed: [id number]     Imaging Reports Reviewed Today Include:  CT of chest reviewed  Imaging Personally Reviewed by Myself Includes:    Procedure: Ct Chest With Contrast    Result Date: 10/11/2018  Narrative: CT CHEST WITH IV CONTRAST INDICATION:   Shortness of breath worsened with exertion throughout the day  Tachycardic, hypoxic with tachypnea  History of lung cancer with malignant pleural effusion  COMPARISON:  CT chest 9/26/2018 TECHNIQUE: CT examination of the chest was performed  Axial, sagittal, and coronal 2D reformatted images were created from the source data and submitted for interpretation  Radiation dose length product (DLP) for this visit:  271 mGy-cm   This examination, like all CT scans performed in the [facility], was performed utilizing techniques to minimize radiation dose exposure, including the use of iterative reconstruction and automated exposure control  IV Contrast:  85 mL of iohexol (OMNIPAQUE) FINDINGS: Please note the study was not tailored as a dedicated CT pulmonary angiogram protocol but as a routine exam   No large emboli within the main pulmonary arteries  LUNGS:  Poorly marginated masslike consolidation and/or atelectasis in the left lower lobe is stable or decreased  This could also reflect some volume averaging with pleural metastases  Some image degradation secondary to respiratory motion  Patchy groundglass density in the right upper lobe could represent infectious or inflammatory pneumonitis   PLEURA:  There is a tiny left pleural effusion again seen  There is persistent nodular studding of the left pleural surface albeit the enhancement has significantly decreased suggesting treatment response  HEART/GREAT VESSELS:  The heart is unchanged in size  A modest sized pericardial effusion is again identified  Tumor implants suspected along the pericardium previously is not currently visible  Coronary artery calcifications  MEDIASTINUM AND RAE:  18 mm prevascular nodule with some central necrosis abutting the medial left pleural surface appears unchanged  14 mm subcarinal short axis lymph node demonstrates decreased enhancement compared to previous study suggesting treatment response  CHEST WALL AND LOWER NECK:   Right chest wall Port-A-Cath  Unchanged right thyroid nodularity  Left breast implant  VISUALIZED STRUCTURES IN THE UPPER ABDOMEN:  Fatty infiltration of the liver  No adrenal nodules seen, noting the inferior left adrenal gland is not fully visualized  OSSEOUS STRUCTURES:  Stable sclerotic osseous metastases  No acute fracture  Multilevel degenerative changes of the spine  Impression: 1  Persistent nodular studding of the left pleural surface although significant decrease in enhancement of the nodularity compatible with treatment response of pleural metastases  Tiny left pleural effusion, unchanged  2  Modest sized pericardial effusion is overall unchanged in size  Apparent pericardial metastasis seen on the previous study is no longer visible on this exam  3  Grossly stable size of subcarinal lymphadenopathy although decreased enhancement suggesting some degree of treatment response  4  Patchy groundglass density in the right upper lobe may represent infectious or inflammatory pneumonitis  5  Stable or diminished appearance of left lower lobe masslike consolidation/atelectasis  6  Stable sclerotic osseous metastases  7  Diffuse fatty infiltration of the liver   Workstation performed: [id number] Recent Cultures (last 7 days):       Results from last 7 days  Lab Units 10/11/18  2155 10/11/18  2120   BLOOD CULTURE  No Growth at 48 hrs  No Growth at 48 hrs  Last 24 Hours Medication List:     Current Facility-Administered Medications:  acetaminophen 488 mg Oral Q6H PRN Cy Cornelio, PA-C   ALPRAZolam 0 5 mg Oral HS PRN Cy Cornelio, PA-C   amiodarone 100 mg Oral Daily Saroj Chou MD   aspirin 81 mg Oral Daily Cy Cornelio, PA-C   citalopram 20 mg Oral Daily Cy Cornelio, PA-C   enoxaparin 40 mg Subcutaneous Daily Cy Cornelio, PA-C   fentaNYL 1 patch Transdermal Q72H Cy Cornelio, PA-C   folic acid 1 mg Oral Daily Cy Cornelio, PA-C   furosemide 20 mg Intravenous Daily MD Yumiko Steven Spencerharomny ON 10/15/2018] furosemide 20 mg Oral Every Other Day Saroj Chou MD   insulin lispro 1-5 Units Subcutaneous TID AC Octavio Grimes, PA-C   insulin lispro 1-5 Units Subcutaneous HS Cy Cornelio, PA-C   metoprolol tartrate 12 5 mg Oral Q12H [address] Cy Cornelio, PA-C   ondansetron 4 mg Intravenous Once PRN Juan Carlos Hazel DO   ondansetron 4 mg Intravenous Q6H PRN Cy Cornelio, PA-C   oxyCODONE 2 5 mg Oral Q4H PRN Cy Cornelio, PA-C        Today, Patient Was Seen By: Angie Warren MD    ** Please Note: Dragon 360 Dictation voice to text software may have been used in the creation of this document   **

## 2018-10-14 NOTE — PHYSICAL THERAPY NOTE
Physical Therapy Treatment Note       10/14/18 1631   Pain Assessment   Pain Assessment No/denies pain   Restrictions/Precautions   Other Precautions Multiple lines;O2;Fall Risk  (3l o2 nc)   General   Chart Reviewed Yes   Family/Caregiver Present No   Cognition   Overall Cognitive Status WFL   Arousal/Participation Alert; Cooperative   Attention Within functional limits   Orientation Level Oriented X4   Memory Within functional limits   Following Commands Follows one step commands without difficulty   Subjective   Subjective Pt in bed offers no complaints  Pt agreeable to PT  Pt reports feeling better  Bed Mobility   Supine to Sit 3  Moderate assistance   Additional items Assist x 1;Bedrails;HOB elevated; Increased time required;Verbal cues;LE management   Transfers   Sit to Stand 4  Minimal assistance   Additional items Assist x 1; Increased time required;Verbal cues   Stand to Sit 4  Minimal assistance   Additional items Assist x 1; Armrests; Increased time required;Verbal cues   Ambulation/Elevation   Gait pattern Excessively slow; Inconsistent mallika; Short stride   Gait Assistance 4  Minimal assist   Additional items Assist x 1;Verbal cues   Assistive Device Rolling walker   Distance 70'x1, 100'x1, 75'x1   Balance   Static Sitting Good   Dynamic Sitting Fair +   Static Standing Fair   Dynamic Standing Fair -   Ambulatory Fair -   Endurance Deficit   Endurance Deficit Yes   Endurance Deficit Description wilner shafer   Activity Tolerance   Activity Tolerance Patient limited by fatigue   Exercises   Glute Sets Sitting;20 reps;AROM; Bilateral   Hip Flexion Sitting;20 reps;AROM; Bilateral   Hip Abduction Sitting;20 reps;AROM; Bilateral   Hip Adduction Sitting;20 reps;AROM; Bilateral  (isometric hip add  )   Knee AROM Long Arc Quad Sitting;25 reps;AROM; Bilateral   Ankle Pumps Sitting;20 reps;AROM; Bilateral  (heel/ toe raises)   UE Exercise Sitting;20 reps;AROM; Bilateral  (wrist flex /ext , elbow flex/ext, elbow pron/ supin  )   Equipment Use   Comments SON O2 sat 3l post amb  90 rebounds quickly to 97% on 3l  Assessment   Prognosis Fair   Problem List Decreased strength;Decreased endurance; Impaired balance;Decreased mobility   Assessment Pt demonstrating impaired ability to perform supine to sit requiring mod assist x1 and increased time and cues  Pt currently is requiring min assist for sit to stand and stand to sit transfers and ambulation  Pt progressed with ambulation distances this session with use of rw and min assist x1 Pt requires standing rest breaks due to SON and fatigue  Pt's O2 sat 90% post ambulation, pt quickly rebounds to 97% on 3l with seated rest   Pt performs seated b/l le and b/l ue arom exercises x 20 reps without difficulty  NO gross lob noted with transfers or ambulation on levels  Pt will continue to benefit from skilled inpt PT inorder to maximize functional mobility,  functional outcomes and I for safe d/c to home  Recommend home with HHPt and family support  Goals   Patient Goals " to get stronger "     STG Expiration Date 10/22/18   Treatment Day 2   Plan   Treatment/Interventions Functional transfer training;LE strengthening/ROM; Elevations; Therapeutic exercise; Endurance training;Patient/family training;Equipment eval/education; Bed mobility;Gait training;Spoke to nursing   Progress Progressing toward goals   PT Frequency (4-5x/week)   Recommendation   Recommendation Home PT; Home with family support   PT - OK to Discharge Yes  (to home when goals met)       Anna Ramirez, PTA

## 2018-10-14 NOTE — PLAN OF CARE
Problem: PHYSICAL THERAPY ADULT  Goal: Performs mobility at highest level of function for planned discharge setting  See evaluation for individualized goals  Treatment/Interventions: Functional transfer training, LE strengthening/ROM, Elevations, Therapeutic exercise, Endurance training, Patient/family training, Equipment eval/education, Bed mobility, Gait training, Spoke to nursing, OT, Family  Equipment Recommended: Rigoberto Acosta       See flowsheet documentation for full assessment, interventions and recommendations  Outcome: Progressing  Prognosis: Fair  Problem List: Decreased strength, Decreased endurance, Impaired balance, Decreased mobility  Assessment: Pt demonstrating impaired ability to perform supine to sit requiring mod assist x1 and increased time and cues  Pt currently is requiring min assist for sit to stand and stand to sit transfers and ambulation  Pt progressed with ambulation distances this session with use of rw and min assist x1 Pt requires standing rest breaks due to SON and fatigue  Pt's O2 sat 90% post ambulation, pt quickly rebounds to 97% on 3l with seated rest   Pt performs seated b/l le and b/l ue arom exercises x 20 reps without difficulty  NO gross lob noted with transfers or ambulation on levels  Pt will continue to benefit from skilled inpt PT inorder to maximize functional mobility,  functional outcomes and I for safe d/c to home  Recommend home with HHPt and family support  Barriers to Discharge: Decreased caregiver support  Barriers to Discharge Comments: JEN home   Recommendation: Home PT, Home with family support     PT - OK to Discharge: Yes (to home when goals met)    See flowsheet documentation for full assessment

## 2018-10-15 ENCOUNTER — TELEPHONE (OUTPATIENT)
Dept: HEMATOLOGY ONCOLOGY | Facility: CLINIC | Age: 83
End: 2018-10-15

## 2018-10-15 ENCOUNTER — APPOINTMENT (INPATIENT)
Dept: RADIOLOGY | Facility: HOSPITAL | Age: 83
DRG: 871 | End: 2018-10-15
Payer: MEDICARE

## 2018-10-15 PROBLEM — J96.01 ACUTE RESPIRATORY FAILURE WITH HYPOXIA (HCC): Status: ACTIVE | Noted: 2018-10-15

## 2018-10-15 PROBLEM — R65.10 SIRS (SYSTEMIC INFLAMMATORY RESPONSE SYNDROME) (HCC): Status: ACTIVE | Noted: 2018-07-24

## 2018-10-15 PROBLEM — I48.0 PAROXYSMAL ATRIAL FIBRILLATION (HCC): Status: ACTIVE | Noted: 2018-07-23

## 2018-10-15 LAB
ANION GAP SERPL CALCULATED.3IONS-SCNC: 7 MMOL/L (ref 4–13)
ANISOCYTOSIS BLD QL SMEAR: PRESENT
BASOPHILS # BLD MANUAL: 0.05 THOUSAND/UL (ref 0–0.1)
BASOPHILS NFR MAR MANUAL: 1 % (ref 0–1)
BUN SERPL-MCNC: 18 MG/DL (ref 5–25)
CALCIUM SERPL-MCNC: 8.6 MG/DL (ref 8.3–10.1)
CHLORIDE SERPL-SCNC: 104 MMOL/L (ref 100–108)
CO2 SERPL-SCNC: 29 MMOL/L (ref 21–32)
CREAT SERPL-MCNC: 0.65 MG/DL (ref 0.6–1.3)
EOSINOPHIL # BLD MANUAL: 0.05 THOUSAND/UL (ref 0–0.4)
EOSINOPHIL NFR BLD MANUAL: 1 % (ref 0–6)
ERYTHROCYTE [DISTWIDTH] IN BLOOD BY AUTOMATED COUNT: 15.5 % (ref 11.6–15.1)
GFR SERPL CREATININE-BSD FRML MDRD: 82 ML/MIN/1.73SQ M
GLUCOSE SERPL-MCNC: 147 MG/DL (ref 65–140)
GLUCOSE SERPL-MCNC: 156 MG/DL (ref 65–140)
GLUCOSE SERPL-MCNC: 292 MG/DL (ref 65–140)
GLUCOSE SERPL-MCNC: 92 MG/DL (ref 65–140)
GLUCOSE SERPL-MCNC: 96 MG/DL (ref 65–140)
HCT VFR BLD AUTO: 35.8 % (ref 34.8–46.1)
HGB BLD-MCNC: 11.4 G/DL (ref 11.5–15.4)
LYMPHOCYTES # BLD AUTO: 1.75 THOUSAND/UL (ref 0.6–4.47)
LYMPHOCYTES # BLD AUTO: 36 % (ref 14–44)
MCH RBC QN AUTO: 29.4 PG (ref 26.8–34.3)
MCHC RBC AUTO-ENTMCNC: 31.8 G/DL (ref 31.4–37.4)
MCV RBC AUTO: 92 FL (ref 82–98)
MONOCYTES # BLD AUTO: 0 THOUSAND/UL (ref 0–1.22)
MONOCYTES NFR BLD: 0 % (ref 4–12)
NEUTROPHILS # BLD MANUAL: 2.97 THOUSAND/UL (ref 1.85–7.62)
NEUTS BAND NFR BLD MANUAL: 1 % (ref 0–8)
NEUTS SEG NFR BLD AUTO: 60 % (ref 43–75)
NRBC BLD AUTO-RTO: 0 /100 WBCS
PLATELET # BLD AUTO: 150 THOUSANDS/UL (ref 149–390)
PLATELET BLD QL SMEAR: ABNORMAL
PMV BLD AUTO: 10 FL (ref 8.9–12.7)
POTASSIUM SERPL-SCNC: 4.2 MMOL/L (ref 3.5–5.3)
PROCALCITONIN SERPL-MCNC: 0.63 NG/ML
RBC # BLD AUTO: 3.88 MILLION/UL (ref 3.81–5.12)
RBC MORPH BLD: PRESENT
SODIUM SERPL-SCNC: 140 MMOL/L (ref 136–145)
TOTAL CELLS COUNTED SPEC: 100
VARIANT LYMPHS # BLD AUTO: 1 %
WBC # BLD AUTO: 4.87 THOUSAND/UL (ref 4.31–10.16)

## 2018-10-15 PROCEDURE — 85007 BL SMEAR W/DIFF WBC COUNT: CPT | Performed by: INTERNAL MEDICINE

## 2018-10-15 PROCEDURE — 71046 X-RAY EXAM CHEST 2 VIEWS: CPT

## 2018-10-15 PROCEDURE — 85027 COMPLETE CBC AUTOMATED: CPT | Performed by: INTERNAL MEDICINE

## 2018-10-15 PROCEDURE — 99232 SBSQ HOSP IP/OBS MODERATE 35: CPT | Performed by: INTERNAL MEDICINE

## 2018-10-15 PROCEDURE — 82948 REAGENT STRIP/BLOOD GLUCOSE: CPT

## 2018-10-15 PROCEDURE — 80048 BASIC METABOLIC PNL TOTAL CA: CPT | Performed by: INTERNAL MEDICINE

## 2018-10-15 PROCEDURE — 84145 PROCALCITONIN (PCT): CPT | Performed by: PHYSICIAN ASSISTANT

## 2018-10-15 RX ORDER — FUROSEMIDE 10 MG/ML
20 INJECTION INTRAMUSCULAR; INTRAVENOUS
Status: DISCONTINUED | OUTPATIENT
Start: 2018-10-15 | End: 2018-10-17

## 2018-10-15 RX ORDER — CEFUROXIME AXETIL 250 MG/1
500 TABLET ORAL EVERY 12 HOURS SCHEDULED
Status: DISCONTINUED | OUTPATIENT
Start: 2018-10-15 | End: 2018-10-19 | Stop reason: HOSPADM

## 2018-10-15 RX ADMIN — Medication 3.38 G: at 05:43

## 2018-10-15 RX ADMIN — CITALOPRAM HYDROBROMIDE 20 MG: 20 TABLET ORAL at 08:43

## 2018-10-15 RX ADMIN — FENTANYL 1 PATCH: 12 PATCH, EXTENDED RELEASE TRANSDERMAL at 00:12

## 2018-10-15 RX ADMIN — CEFUROXIME AXETIL 500 MG: 250 TABLET ORAL at 17:31

## 2018-10-15 RX ADMIN — FUROSEMIDE 20 MG: 20 TABLET ORAL at 08:43

## 2018-10-15 RX ADMIN — FUROSEMIDE 20 MG: 10 INJECTION, SOLUTION INTRAMUSCULAR; INTRAVENOUS at 17:33

## 2018-10-15 RX ADMIN — METOPROLOL TARTRATE 12.5 MG: 25 TABLET ORAL at 21:30

## 2018-10-15 RX ADMIN — INSULIN LISPRO 1 UNITS: 100 INJECTION, SOLUTION INTRAVENOUS; SUBCUTANEOUS at 21:31

## 2018-10-15 RX ADMIN — ENOXAPARIN SODIUM 40 MG: 40 INJECTION SUBCUTANEOUS at 08:44

## 2018-10-15 RX ADMIN — ASPIRIN 81 MG: 81 TABLET, COATED ORAL at 08:44

## 2018-10-15 RX ADMIN — INSULIN LISPRO 3 UNITS: 100 INJECTION, SOLUTION INTRAVENOUS; SUBCUTANEOUS at 17:31

## 2018-10-15 RX ADMIN — Medication 3.38 G: at 12:58

## 2018-10-15 RX ADMIN — AMIODARONE HYDROCHLORIDE 100 MG: 200 TABLET ORAL at 08:43

## 2018-10-15 RX ADMIN — METOPROLOL TARTRATE 12.5 MG: 25 TABLET ORAL at 08:45

## 2018-10-15 RX ADMIN — FOLIC ACID 1 MG: 1 TABLET ORAL at 08:44

## 2018-10-15 RX ADMIN — CEFUROXIME AXETIL 500 MG: 250 TABLET ORAL at 21:29

## 2018-10-15 RX ADMIN — ACETAMINOPHEN 488 MG: 325 TABLET, FILM COATED ORAL at 21:27

## 2018-10-15 RX ADMIN — ALPRAZOLAM 0.5 MG: 0.5 TABLET ORAL at 21:30

## 2018-10-15 NOTE — PROGRESS NOTES
Progress Note - Pulmonary   Vipul Garcia 80 y o  female MRN: [id number]  Unit/Bed#: E4 -01 Encounter: [id number]      Assessment/Plan:    1  Acute hypoxic respiratory failure  1  Continue to titrate oxygen to maintain SpO2>88%  2  Pulmonary toilet: incentive spirometry, OOB with increasing ambulation  3  Antibiotics were restarted due to fever noted 10/14 at 0700, procalcitonin decreased to  56 from 1 79 prior to restarting ABX  No sputum or chills per patient  4  CXR ordered-likely fluid volume overload vs  Pneumonitis  5  Repeat CXr in 4-6 weeks  6  Will cover with 4 days of Ceftin to complete ABX course with persistent elevation of procalcitonin  2  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  1  Oncology following outpatient, last chemotherapy treatment 10/11/2018  3  Acute on chronic diastolic heart failure  1  BNP upon admission 1,222  2  + 1922 5 since admission-weight loss of 6lbs since admission  3  Daily weights and strict I&O  4  20mg IV lasix BID from 20 mg PO        Subjective:     Kewsi Brooke was seen sitting in the chair upon entering the room  She denies acute overnight events, but does report a fever the night prior  She denies: chest pain, pain with inspiration, fevers-last 24 hrs, chills, night sweats, nausea, vomiting, diarrhea, headache, dizziness, bronchospasm or hemoptysis  Feels her SOB is improving      Objective:         Vitals: Blood pressure 145/76, pulse 75, temperature 98 1 °F (36 7 °C), resp  rate 20, height 5' 2" (1 575 m), weight 70 4 kg (155 lb 3 3 oz), SpO2 94 %  , 3LNC, Body mass index is 28 39 kg/m²        Intake/Output Summary (Last 24 hours) at 10/15/18 1006  Last data filed at 10/15/18 0045   Gross per 24 hour   Intake              300 ml   Output              650 ml   Net             -350 ml         Physical Exam  Gen: Awake, alert, oriented x 3, no acute distress  HEENT: Mucous membranes moist, no oral lesions, no thrush, oxygen via NC  NECK: No accessory muscle use, JVP not elevated  Cardiac: Regular, single S1, single S2, no murmurs, no rubs, no gallops  Lungs: faint rales noted in the left lower lobe, no rhonchi or wheezes  Abdomen: normoactive bowel sounds, soft nontender, nondistended, no rebound or rigidity, no guarding  Extremities: no cyanosis, no clubbing, no edema    Labs: I have personally reviewed pertinent lab results  , CBC:   Lab Results   Component Value Date    WBC 4 87 10/15/2018    HGB 11 4 (L) 10/15/2018    HCT 35 8 10/15/2018    MCV 92 10/15/2018     10/15/2018    MCH 29 4 10/15/2018    MCHC 31 8 10/15/2018    RDW 15 5 (H) 10/15/2018    MPV 10 0 10/15/2018    NRBC 0 10/15/2018   , CMP:   Lab Results   Component Value Date     10/15/2018    K 4 2 10/15/2018     10/15/2018    CO2 29 10/15/2018    BUN 18 10/15/2018    CREATININE 0 65 10/15/2018    CALCIUM 8 6 10/15/2018    EGFR 82 10/15/2018     Imaging and other studies: repeat CXR pending      Ender Rojas

## 2018-10-15 NOTE — ASSESSMENT & PLAN NOTE
Lab Results   Component Value Date    HGBA1C 6 7 (H) 07/25/2018       Recent Labs      10/14/18   1505  10/14/18   2049  10/15/18   0718  10/15/18   1207   POCGLU  106  265*  96  147*       Blood Sugar Average: Last 72 hrs:  (P) 178 1875     Maintain SSI

## 2018-10-15 NOTE — TELEPHONE ENCOUNTER
Spoke with patient's daughter Cristian Fosteresa  Patient is inpatient  Chemotherapy appt for Thursday cancelled  Patient has f/u with Dr Anay Zhao scheduled for 10/18/18 at 80  Daughter will update office if patient cannot make that appt

## 2018-10-15 NOTE — ASSESSMENT & PLAN NOTE
Secondary to lung cancer, chemotherapy, pleural effusion    Wean off oxygen as tolerated  Treat underlying problems below  Check home O2 eval prior to discharge

## 2018-10-15 NOTE — ASSESSMENT & PLAN NOTE
Status post pericardial window  Repeat echocardiogram showed small effusion without hemodynamic compromise  This is effusion is likely malignant    She is on chemotherapy

## 2018-10-15 NOTE — ASSESSMENT & PLAN NOTE
Due to lung cancer with recent chemotherapy  Repeat chest x-ray showed no definitive infiltrate    Complete antibiotics with Ceftin per Pulmonary recommendation

## 2018-10-15 NOTE — PROGRESS NOTES
Progress Note - Nila Canales 1935, 80 y o  female MRN: [id number]    Unit/Bed#: E4 -01 Encounter: [id number]    Primary Care Provider: Melany Alejandro DO   Date and time admitted to hospital: 10/11/2018  8:58 PM        * Acute respiratory failure with hypoxia [facility])   Assessment & Plan    Secondary to lung cancer, chemotherapy, pleural effusion  Wean off oxygen as tolerated  Treat underlying problems below  Check home O2 eval prior to discharge     SIRS (systemic inflammatory response syndrome) ([facility] )   Assessment & Plan    Due to lung cancer with recent chemotherapy  Repeat chest x-ray showed no definitive infiltrate  Complete antibiotics with Ceftin per Pulmonary recommendation       Lung cancer [facility])   Assessment & Plan    Follow-up with Dr Jeannette Plunkett for further chemotherapy     Pericardial effusion   Assessment & Plan    Status post pericardial window  Repeat echocardiogram showed small effusion without hemodynamic compromise  This is effusion is likely malignant  She is on chemotherapy       Paroxysmal atrial fibrillation (HCC)   Assessment & Plan    Continue amiodarone 100 mg daily     Continuous opioid dependence ([facility] )   Assessment & Plan    Due to cancer related pain  On fentanyl patch      Diabetes mellitus [facility])   Assessment & Plan    Lab Results   Component Value Date    HGBA1C 6 7 (H) 07/25/2018       Recent Labs      10/14/18   1505  10/14/18   2049  10/15/18   0718  10/15/18   1207   POCGLU  106  265*  96  147*       Blood Sugar Average: Last 72 hrs:  (P) 178 1875     Maintain SSI     Hypertension   Assessment & Plan    Stable on metoprolol         VTE Pharmacologic Prophylaxis:   Pharmacologic: Enoxaparin (Lovenox)  Mechanical VTE Prophylaxis in Place: No    Education and Discussions with Family / Patient:  Spoke with daughter Ayanna Fuentes and gave update  I explained the patient's condition and plan in simple terms    She was requesting on Medical Oncology consultation    Communicated with [facility] pulmonary    Time Spent for Care: 30 minutes  More than 50% of total time spent on counseling and coordination of care as described above  Current Length of Stay: 4 day(s)    Current Patient Status: Inpatient   Certification Statement: The patient will continue to require additional inpatient hospital stay due to Shortness of breath    Discharge Plan: To be determined    Code Status: Level 3 - DNAR and DNI      Subjective:   Improved shortness of breath  No fever  Still on oxygen  Objective:     Vitals:   Temp (24hrs), Av 4 °F (36 9 °C), Min:98 1 °F (36 7 °C), Max:98 7 °F (37 1 °C)    Temp:  [98 1 °F (36 7 °C)-98 7 °F (37 1 °C)] 98 1 °F (36 7 °C)  HR:  [61-75] 75  Resp:  [20] 20  BP: ()/(56-76) 145/76  SpO2:  [94 %-98 %] 94 %  Body mass index is 28 39 kg/m²  Input and Output Summary (last 24 hours): Intake/Output Summary (Last 24 hours) at 10/15/18 1527  Last data filed at 10/15/18 0045   Gross per 24 hour   Intake              250 ml   Output              650 ml   Net             -400 ml       Physical Exam:     Physical Exam   Constitutional: She appears well-developed and well-nourished  Obese   HENT:   Head: Normocephalic and atraumatic  Eyes: No scleral icterus  Neck: No JVD present  Cardiovascular: Regular rhythm  No murmur heard  Pulmonary/Chest:   Diminished breath sounds left base  Fine crackles left base   Abdominal: Soft  She exhibits no distension  Musculoskeletal: She exhibits no edema  Neurological: She is alert  Skin: Skin is warm and dry  Psychiatric: She has a normal mood and affect           Additional Data:     Labs:      Results from last 7 days  Lab Units 10/15/18  0541 10/13/18  0527   WBC Thousand/uL 4 87 6 89   HEMOGLOBIN g/dL 11 4* 11 5   HEMATOCRIT % 35 8 35 9   PLATELETS Thousands/uL 150 121*   BANDS PCT % 1  --    NEUTROS PCT %  --  79*   LYMPHS PCT %  --  18   LYMPHO PCT % 36  --    MONOS PCT %  --  1*   MONO PCT MAN % 0*  --    EOS PCT %  --  2   EOSINO PCT MANUAL % 1  --        Results from last 7 days  Lab Units 10/15/18  0541  10/11/18  2155   SODIUM mmol/L 140  < > 139   POTASSIUM mmol/L 4 2  < > 4 2   CHLORIDE mmol/L 104  < > 104   CO2 mmol/L 29  < > 25   BUN mg/dL 18  < > 17   CREATININE mg/dL 0 65  < > 0 88   ANION GAP mmol/L 7  < > 10   CALCIUM mg/dL 8 6  < > 7 7*   ALBUMIN g/dL  --   --  2 2*   TOTAL BILIRUBIN mg/dL  --   --  0 50   ALK PHOS U/L  --   --  84   ALT U/L  --   --  41   AST U/L  --   --  22   < > = values in this interval not displayed  Results from last 7 days  Lab Units 10/11/18  2120   INR  1 04       Results from last 7 days  Lab Units 10/15/18  1207 10/15/18  0718 10/14/18  2049 10/14/18  1505 10/14/18  1139 10/14/18  0718 10/13/18  2111 10/13/18  1558 10/13/18  1124 10/13/18  0717 10/12/18  2059 10/12/18  1643   POC GLUCOSE mg/dl 147* 96 265* 106 259* 95 182* 196* 230* 109 213* 223*           Results from last 7 days  Lab Units 10/15/18  0636 10/14/18  1237 10/14/18  0546 10/13/18  0650 10/13/18  0527 10/12/18  0511 10/11/18  2338 10/11/18  2120   LACTIC ACID mmol/L  --   --   --  2 0  --   --  2 2* 3 5*   PROCALCITONIN ng/ml 0 63* 0 56* 1 11*  --  1 60* 1 79*  --   --            * I Have Reviewed All Lab Data Listed Above  * Additional Pertinent Lab Tests Reviewed: All Labs Within Last 24 Hours Reviewed    Imaging:    Imaging Reports Reviewed Today Include:  Chest x-ray      Recent Cultures (last 7 days):       Results from last 7 days  Lab Units 10/11/18  2155 10/11/18  2120   BLOOD CULTURE  No Growth at 72 hrs  No Growth at 72 hrs         Last 24 Hours Medication List:     Current Facility-Administered Medications:  acetaminophen 488 mg Oral Q6H PRN Kia Bermudez PA-C   ALPRAZolam 0 5 mg Oral HS PRN Kia Bermudez PA-C   amiodarone 100 mg Oral Daily Bobo Alvarado MD   aspirin 81 mg Oral Daily Kia Bermudez PA-C   cefuroxime 500 mg Oral Q12H [facility] SAMANTHA Mena   citalopram 20 mg Oral Daily Meir Gallop, PA-C   enoxaparin 40 mg Subcutaneous Daily Meir Gallop, PA-C   fentaNYL 1 patch Transdermal Q72H Meir Gallop, PA-C   folic acid 1 mg Oral Daily Meir Gallop, PA-C   furosemide 20 mg Intravenous BID (diuretic) Lonita Bio, CRNP   insulin lispro 1-5 Units Subcutaneous TID AC Meirsherlyn Moodyop, PA-C   insulin lispro 1-5 Units Subcutaneous HS Meir Moodyop, PA-C   metoprolol tartrate 12 5 mg Oral Q12H [facility] Meir Moodyop, PA-C   ondansetron 4 mg Intravenous Q6H PRN Meir Gallop, PA-C   oxyCODONE 2 5 mg Oral Q4H PRN Meir Moodyop, PA-C        Today, Patient Was Seen By: Xochitl Toledo MD    ** Please Note: Dictation voice to text software may have been used in the creation of this document   **

## 2018-10-16 LAB
ANION GAP SERPL CALCULATED.3IONS-SCNC: 8 MMOL/L (ref 4–13)
BACTERIA BLD CULT: NORMAL
BASOPHILS # BLD MANUAL: 0 THOUSAND/UL (ref 0–0.1)
BASOPHILS NFR MAR MANUAL: 0 % (ref 0–1)
BUN SERPL-MCNC: 14 MG/DL (ref 5–25)
CALCIUM SERPL-MCNC: 8.5 MG/DL (ref 8.3–10.1)
CHLORIDE SERPL-SCNC: 103 MMOL/L (ref 100–108)
CO2 SERPL-SCNC: 27 MMOL/L (ref 21–32)
CREAT SERPL-MCNC: 0.64 MG/DL (ref 0.6–1.3)
EOSINOPHIL # BLD MANUAL: 0 THOUSAND/UL (ref 0–0.4)
EOSINOPHIL NFR BLD MANUAL: 0 % (ref 0–6)
ERYTHROCYTE [DISTWIDTH] IN BLOOD BY AUTOMATED COUNT: 15.4 % (ref 11.6–15.1)
GFR SERPL CREATININE-BSD FRML MDRD: 83 ML/MIN/1.73SQ M
GLUCOSE SERPL-MCNC: 129 MG/DL (ref 65–140)
GLUCOSE SERPL-MCNC: 145 MG/DL (ref 65–140)
GLUCOSE SERPL-MCNC: 190 MG/DL (ref 65–140)
GLUCOSE SERPL-MCNC: 252 MG/DL (ref 65–140)
GLUCOSE SERPL-MCNC: 289 MG/DL (ref 65–140)
HCT VFR BLD AUTO: 37.4 % (ref 34.8–46.1)
HGB BLD-MCNC: 12.2 G/DL (ref 11.5–15.4)
LYMPHOCYTES # BLD AUTO: 1.68 THOUSAND/UL (ref 0.6–4.47)
LYMPHOCYTES # BLD AUTO: 43 % (ref 14–44)
MCH RBC QN AUTO: 29.5 PG (ref 26.8–34.3)
MCHC RBC AUTO-ENTMCNC: 32.6 G/DL (ref 31.4–37.4)
MCV RBC AUTO: 91 FL (ref 82–98)
METAMYELOCYTES NFR BLD MANUAL: 1 % (ref 0–1)
MONOCYTES # BLD AUTO: 0.08 THOUSAND/UL (ref 0–1.22)
MONOCYTES NFR BLD: 2 % (ref 4–12)
NEUTROPHILS # BLD MANUAL: 1.96 THOUSAND/UL (ref 1.85–7.62)
NEUTS BAND NFR BLD MANUAL: 4 % (ref 0–8)
NEUTS SEG NFR BLD AUTO: 46 % (ref 43–75)
NRBC BLD AUTO-RTO: 1 /100 WBCS
PLATELET # BLD AUTO: 135 THOUSANDS/UL (ref 149–390)
PLATELET BLD QL SMEAR: ADEQUATE
PMV BLD AUTO: 9.6 FL (ref 8.9–12.7)
POTASSIUM SERPL-SCNC: 3.3 MMOL/L (ref 3.5–5.3)
PROCALCITONIN SERPL-MCNC: 0.53 NG/ML
RBC # BLD AUTO: 4.13 MILLION/UL (ref 3.81–5.12)
RBC MORPH BLD: NORMAL
SODIUM SERPL-SCNC: 138 MMOL/L (ref 136–145)
TOTAL CELLS COUNTED SPEC: 100
VARIANT LYMPHS # BLD AUTO: 4 %
WBC # BLD AUTO: 3.91 THOUSAND/UL (ref 4.31–10.16)

## 2018-10-16 PROCEDURE — 97530 THERAPEUTIC ACTIVITIES: CPT

## 2018-10-16 PROCEDURE — 99232 SBSQ HOSP IP/OBS MODERATE 35: CPT | Performed by: INTERNAL MEDICINE

## 2018-10-16 PROCEDURE — 99222 1ST HOSP IP/OBS MODERATE 55: CPT | Performed by: INTERNAL MEDICINE

## 2018-10-16 PROCEDURE — 80048 BASIC METABOLIC PNL TOTAL CA: CPT | Performed by: INTERNAL MEDICINE

## 2018-10-16 PROCEDURE — 85007 BL SMEAR W/DIFF WBC COUNT: CPT | Performed by: INTERNAL MEDICINE

## 2018-10-16 PROCEDURE — 84145 PROCALCITONIN (PCT): CPT | Performed by: PHYSICIAN ASSISTANT

## 2018-10-16 PROCEDURE — 82948 REAGENT STRIP/BLOOD GLUCOSE: CPT

## 2018-10-16 PROCEDURE — 97535 SELF CARE MNGMENT TRAINING: CPT

## 2018-10-16 PROCEDURE — 85027 COMPLETE CBC AUTOMATED: CPT | Performed by: INTERNAL MEDICINE

## 2018-10-16 PROCEDURE — 97110 THERAPEUTIC EXERCISES: CPT

## 2018-10-16 PROCEDURE — 97116 GAIT TRAINING THERAPY: CPT

## 2018-10-16 RX ORDER — METOLAZONE 5 MG/1
5 TABLET ORAL ONCE
Status: COMPLETED | OUTPATIENT
Start: 2018-10-16 | End: 2018-10-16

## 2018-10-16 RX ADMIN — FUROSEMIDE 20 MG: 10 INJECTION, SOLUTION INTRAMUSCULAR; INTRAVENOUS at 09:05

## 2018-10-16 RX ADMIN — ENOXAPARIN SODIUM 40 MG: 40 INJECTION SUBCUTANEOUS at 09:02

## 2018-10-16 RX ADMIN — ALPRAZOLAM 0.5 MG: 0.5 TABLET ORAL at 22:09

## 2018-10-16 RX ADMIN — METOPROLOL TARTRATE 12.5 MG: 25 TABLET ORAL at 21:55

## 2018-10-16 RX ADMIN — ASPIRIN 81 MG: 81 TABLET, COATED ORAL at 09:01

## 2018-10-16 RX ADMIN — ACETAMINOPHEN 488 MG: 325 TABLET, FILM COATED ORAL at 22:09

## 2018-10-16 RX ADMIN — INSULIN LISPRO 1 UNITS: 100 INJECTION, SOLUTION INTRAVENOUS; SUBCUTANEOUS at 12:08

## 2018-10-16 RX ADMIN — AMIODARONE HYDROCHLORIDE 100 MG: 200 TABLET ORAL at 09:00

## 2018-10-16 RX ADMIN — CEFUROXIME AXETIL 500 MG: 250 TABLET ORAL at 09:01

## 2018-10-16 RX ADMIN — FUROSEMIDE 20 MG: 10 INJECTION, SOLUTION INTRAMUSCULAR; INTRAVENOUS at 16:53

## 2018-10-16 RX ADMIN — CEFUROXIME AXETIL 500 MG: 250 TABLET ORAL at 21:54

## 2018-10-16 RX ADMIN — FOLIC ACID 1 MG: 1 TABLET ORAL at 09:01

## 2018-10-16 RX ADMIN — METOLAZONE 5 MG: 5 TABLET ORAL at 14:52

## 2018-10-16 RX ADMIN — ONDANSETRON 4 MG: 2 INJECTION INTRAMUSCULAR; INTRAVENOUS at 10:49

## 2018-10-16 RX ADMIN — INSULIN LISPRO 2 UNITS: 100 INJECTION, SOLUTION INTRAVENOUS; SUBCUTANEOUS at 21:55

## 2018-10-16 RX ADMIN — INSULIN LISPRO 3 UNITS: 100 INJECTION, SOLUTION INTRAVENOUS; SUBCUTANEOUS at 16:53

## 2018-10-16 RX ADMIN — CITALOPRAM HYDROBROMIDE 20 MG: 20 TABLET ORAL at 09:01

## 2018-10-16 NOTE — ASSESSMENT & PLAN NOTE
Discussed with Dr Jessica Ceja who was pleased with her response to her chemotherapy  Plan is to resume chemotherapy as scheduled, possibly on October 18  Continue zofran for nausea and vomiting

## 2018-10-16 NOTE — CONSULTS
Consultation - Gia Moulton 80 y o  female MRN: [id number]    Unit/Bed#: E4 -01 Encounter: [id number]      Assessment/Plan     Assessment:  In summary, this is of 51-year-old female history of advanced lung cancer  She is admitted to the hospital with increasing shortness of breath  She exhibits conversational dyspnea  CT of the chest shows small residual left pleural effusion  Pulmonary select pulmonary pleural nodularity shows significantly decreased enhancement suggesting early treatment response  This is certainly faster and more substantial than I would have expected with her current therapy but attests to her potential response  This indicates the cancer itself is probably contributing little to her worsened dyspnea  I believe her dyspnea relates to advanced age, underlying COPD, persistent pericardial effusion, deconditioning, etc   Treatment for reversible contributors is recommended  Her blood counts are acceptable  I would be inclined to proceed with chemotherapy on schedule, October 18th, unless contraindications arise in the interim  I reviewed the above with Dr Jaylyn Jay  Plan:  See above  History of Present Illness     HPI: Gia Moulton is a 80y o  year old female who presents with Shortness of breath worsening and prompting admission to the hospital   May 2017 patient was found to have a left-sided pleural effusion with nodularity  Thoracentesis showed adenocarcinoma, TTF 1 positive  Actionable mutations were absent  PD L1 10%  She started chemotherapy with Alimta and carboplatin July 2017- November 17, 2017 November 2017-April 2018 Alimta maintenance  Repeat biopsy showed similar findings to the prime biopsy  She was treated briefly with Alimta and carboplatin  [facility] to [facility] in May 2018  August 2018 patient was admitted with increased pleural effusion and pericardial effusion  She underwent pericardiocentesis  Effusions were malignant    She was started on weekly Taxotere 50 mg/m2, 3 on/1 off  After 6 weeks she had pleural progression in September 2018  She was seen in October 2, 2018  Plan was to discontinue Taxotere and institute Gemzar 850 mg/m2, 3 weeks on/1 week off  This was started on October 4th  Chest CT on October 11th showed some decrease in pleural nodularity  Pericardial effusion is essentially stable  Inpatient consult to Oncology  Consult performed by: Kamilah Light ordered by: Ariella Maharaj          Review of Systems   Constitutional: Negative for appetite change, diaphoresis, fatigue and fever  HENT: Negative for sinus pain  Eyes: Negative for discharge  Respiratory: Negative for cough and shortness of breath  Cardiovascular: Negative for chest pain  Gastrointestinal: Negative for abdominal pain, constipation and diarrhea  Endocrine: Negative for cold intolerance  Genitourinary: Negative for difficulty urinating and hematuria  Musculoskeletal: Negative for joint swelling  Skin: Negative for rash  Allergic/Immunologic: Negative for environmental allergies  Neurological: Negative for dizziness and headaches  Hematological: Negative for adenopathy  Psychiatric/Behavioral: Negative for agitation  Historical Information   Past Medical History:   Diagnosis Date    TANYA [facility]acute kidney injury) ([facility] ) 7/24/2018    Breast cancer, left ([facility] )     Diabetes mellitus ([facility] )     HAP (hospital-acquired pneumonia) 7/24/2018    Hypertension     Lung cancer ([facility] )     Left Lower Lobe     NSTEMI (non-ST elevated myocardial infarction) ([facility] ) 7/24/2018    Pericardial effusion     Rapid atrial fibrillation [facility])      Past Surgical History:   Procedure Laterality Date    FRACTURE SURGERY      R arm surgery    IR PORT PLACEMENT  9/5/2018    MASTECTOMY      L breast with implant    WI INCIS HEART SAC WINDW FOR DRAIN N/A 7/24/2018    Procedure: WINDOW PERICARDIAL   Subxyphoid approach ;  Surgeon: Ann Montgomery Bird Carpenter MD;  [facility]: [facility];  Service: Thoracic    REDUCTION MAMMAPLASTY      R breast     Social History   History   Alcohol Use No     History   Drug Use No     History   Smoking Status    Former Smoker    Packs/day: 0 50    Years: 10 00    Quit date: 1960   Smokeless Tobacco    Never Used     Comment: Socially      Family History:   Family History   Problem Relation Age of Onset    Heart attack Mother     Breast cancer Father 79    Breast cancer Sister 61    Pancreatic cancer Sister 79    Lung cancer Brother 79        Smoker     Breast cancer Sister 36    Pancreatic cancer Brother 79    Colon cancer Brother 61       Meds/Allergies   all current active meds have been reviewed  Allergies   Allergen Reactions    Atorvastatin Other (See Comments)    Benzonatate Other (See Comments)    Carboplatin      Pt had allergic reaction during 8th carbo dose    Sulfa Antibiotics     Bactrim [Sulfamethoxazole-Trimethoprim] Rash    Latex Rash    Other Rash     Pt states she gets a rash from surgical tape, she is ok with paper tape       Objective   Vitals: Blood pressure 108/66, pulse 88, temperature 98 7 °F (37 1 °C), temperature source Tympanic, resp  rate 22, height 5' 2" (1 575 m), weight 69 9 kg (154 lb 1 6 oz), SpO2 91 %  Intake/Output Summary (Last 24 hours) at 10/16/18 0855  Last data filed at 10/16/18 0751   Gross per 24 hour   Intake             1005 ml   Output             1800 ml   Net             -795 ml     Invasive Devices     Central Venous Catheter Line            Port A Cath 10/11/18 Right Subclavian 4 days          Peripheral Intravenous Line            Peripheral IV 10/15/18 Right Forearm 1 day                Physical Exam   Constitutional: She is oriented to person, place, and time  She appears well-developed  HENT:   Head: Normocephalic  Eyes: Pupils are equal, round, and reactive to light  Neck: Neck supple  Cardiovascular: Normal rate      No murmur heard   Pulmonary/Chest: No respiratory distress  She has no wheezes  She has no rales  Abdominal: Soft  She exhibits no distension  There is no tenderness  There is no rebound  Musculoskeletal: She exhibits no edema  Lymphadenopathy:     She has no cervical adenopathy  Neurological: She is alert and oriented to person, place, and time  She displays normal reflexes  Skin: Skin is warm  No rash noted  Psychiatric: She has a normal mood and affect  Thought content normal        Lab Results: I have personally reviewed pertinent reports  Imaging Studies: I have personally reviewed pertinent reports  EKG, Pathology, and Other Studies: I have personally reviewed pertinent reports  Code Status: Level 3 - DNAR and DNI  Advance Directive and Living Will:      Power of :    POLST:      Counseling / Coordination of Care  Total floor / unit time spent today 40 minutes  Greater than 50% of total time was spent with the patient and / or family counseling and / or coordination of care  A description of the counseling / coordination of care: see note

## 2018-10-16 NOTE — ASSESSMENT & PLAN NOTE
Lab Results   Component Value Date    HGBA1C 6 7 (H) 07/25/2018       Recent Labs      10/15/18   1638  10/15/18   2117  10/16/18   0723  10/16/18   1054   POCGLU  292*  156*  145*  190*       Blood Sugar Average: Last 72 hrs:  (P) 154 [id number]     Maintain SSI  A1c at goal

## 2018-10-16 NOTE — SOCIAL WORK
TC to dtr and explained about possible home 02 and that RT will do study to determine if the 02 will be needed at home  The study is done on day of discharge and if pt needs 02 she will get tank before she leaves the hospital  Dtr would like an aide with RN and PT for home  A referral for aide was sent to LANETTE BUTCHER

## 2018-10-16 NOTE — ASSESSMENT & PLAN NOTE
Multifactorial Secondary to lung cancer, chemotherapy, pleural effusion, COPD, deconditioning    Wean off oxygen as tolerated  Check home O2 eval prior to discharge  Treat underlying problems below

## 2018-10-16 NOTE — UTILIZATION REVIEW
Continued Stay Review    Date: 10/15/2018    Vital Signs: 99 5 - 73 - 20   139/87   To 100 7 tymp  O2 @ 3 liters - sat 94%    Medications:   Scheduled Meds:   Current Facility-Administered Medications:  acetaminophen 488 mg Oral Q6H PRN Jus Rodríguez PA-C   ALPRAZolam 0 5 mg Oral HS PRN Jus Rodríguez PA-C   amiodarone 100 mg Oral Daily Pablito Mccarthy MD   aspirin 81 mg Oral Daily Jus Rodríguez PA-C   cefuroxime 500 mg Oral Q12H [address] SAMANTHA Elkins   citalopram 20 mg Oral Daily Jus Rodríguez PA-C   enoxaparin 40 mg Subcutaneous Daily Jus Rodríguez PA-C   fentaNYL 1 patch Transdermal Q72H Jus Rodríguez PA-C   folic acid 1 mg Oral Daily Jus Rodríguez PA-C   furosemide 20 mg Intravenous BID (diuretic) SAMANTHA Elkins   insulin lispro 1-5 Units Subcutaneous TID AC Jus Rodríguez PA-C   insulin lispro 1-5 Units Subcutaneous HS Jus Rodríguez PA-C   metoprolol tartrate 12 5 mg Oral Q12H [address] Jus Rodríguez PA-C   ondansetron 4 mg Intravenous Q6H PRN Jus Rodríguez PA-C   oxyCODONE 2 5 mg Oral Q4H PRN Jus Rodríguez PA-C     Continuous Infusions:    PRN Meds:   acetaminophen    ALPRAZolam    ondansetron    oxyCODONE    Abnormal Labs/Diagnostic Results:   Hg 11 4  BS's 147  To 292  Procalcitonin 0 63  CXR: Small left pleural effusion  Mildly vascular congestion  Age/Sex: 80 y o  female   + SON  Weaned O2 to 2 liters    Assessment/Plan: 79 yo female with Acute Hypoxic Resp Failure  Secondary to lung cancer, chemotherapy, pleural effusion  Wean off oxygen as tolerated Check home O2 eval prior to discharge    Repeat chest x-ray showed no definitive infiltrate    Complete antibiotics with Ceftin per Pulmonary recommendation     Lung cancer [facility])   Assessment & Plan     Follow-up with Dr Elbert Hudson for further chemotherapy      Pericardial effusion   Assessment & Plan     Status post pericardial window  Repeat echocardiogram showed small effusion without hemodynamic compromise  This is effusion is likely malignant    She is on chemotherapy         Paroxysmal atrial fibrillation (HCC)   Assessment & Plan     Continue amiodarone 100 mg daily      Continuous opioid dependence ([facility] )   Assessment & Plan     Due to cancer related pain  On fentanyl patch          Discharge Plan: Pikk 20 with [place] Levels once medically cleared

## 2018-10-16 NOTE — PLAN OF CARE
Problem: OCCUPATIONAL THERAPY ADULT  Goal: Performs self-care activities at highest level of function for planned discharge setting  See evaluation for individualized goals  Treatment Interventions: ADL retraining, Functional transfer training, UE strengthening/ROM, Endurance training, Patient/family training, Equipment evaluation/education, Compensatory technique education, Energy conservation, Activityengagement          See flowsheet documentation for full assessment, interventions and recommendations  Outcome: Progressing  Limitation: Decreased ADL status, Decreased UE strength, Decreased endurance, Decreased self-care trans, Decreased high-level ADLs  Prognosis: Good  Assessment: Pt was seen for skilled OT with focus on completion of self care tasks, functional mobility, review of fall prevention, energy conservation, compensatory breathing techs and review of current plan of care  Pt with initial need for redirection to push from base of support to stand at [facility]  Increased SOB and fatigue noted with functional tasks instance for more than 5 mins  SAT dipped to 92% on 2 liters with activities instance  SAT returned to 100% on 2 liters with seated rest break for 3-4 mins  Increased A required to rise from standard toilet with A of staff and grab bar  Pt reports having bedside commode at home and reports understanding need for push from armrests  See above levels of A required for all functional tasks  Pt will benefit from continued use of energy conservation/comp breathing techs, use of AE, bedside commode, family support and home therapies upon discharge        OT Discharge Recommendation: Home OT  OT - OK to Discharge: Yes (when medically cleared)      Comments: Courtney Dyer, [address]

## 2018-10-16 NOTE — OCCUPATIONAL THERAPY NOTE
Occupational Therapy Treatment Note:       10/16/18 1152   Restrictions/Precautions   Weight Bearing Precautions Per Order No   Other Precautions Multiple lines;Telemetry;O2;Fall Risk;Pain   Pain Assessment   Pain Assessment No/denies pain   Pain Score No Pain   Pain Location Shoulder   Pain Orientation Left   ADL   Where Assessed Chair   Grooming Assistance 5  Supervision/Setup   Grooming Deficit Setup   LB Dressing Assistance 5  Supervision/Setup   LB Dressing Deficit Setup;Steadying; Requires assistive device for steadying;Supervision/safety;Verbal cueing; Increased time to complete; Don/doff L sock; Don/doff R sock; Thread RLE into underwear; Thread LLE into underwear;Pull up over hips   LB Dressing Comments Pt with need for cues for safe hand placement with activities  Toileting Assistance  4  Minimal Assistance   Toileting Deficit Setup;Steadying;Verbal cueing; Increased time to complete;Supervison/safety;Use of bedpan/urinal setup; Clothing management down;Grab bar use;Perineal hygiene   Toileting Comments cues for safe hand placement required  Functional Standing Tolerance   Time 5 mins   Activity dynamic stand balance activity  Comments Increased fatigue and SOB noted with activities  Transfers   Sit to Stand 5  Supervision   Additional items Assist x 1; Increased time required;Verbal cues;Armrests   Stand to Sit 4  Minimal assistance   Additional items Assist x 1; Armrests; Increased time required;Verbal cues   Stand pivot 5  Supervision   Additional items Assist x 1   Additional Comments cues for safe hand placement with use of RW required  l   Functional Mobility   Functional Mobility 5  Supervision   Additional Comments x1   Additional items Rolling walker   Toilet Transfers   Toilet Transfer From Rolling walker   Toilet Transfer Type To and from   Toilet Transfer to Standard toilet   Toilet Transfer Technique Stand pivot; Ambulating   Toilet Transfers Verbal cues; Minimal assistance   Toilet Transfers Comments cues for appropriate hand placement with use of grab bar    Cognition   Overall Cognitive Status [facility]   Arousal/Participation Alert; Cooperative   Attention Within functional limits   Orientation Level Oriented X4   Memory Within functional limits   Following Commands Follows multistep commands with increased time or repetition   Comments cues for safety provided  Cognition Assessment Tools Other (Comment)  (cues for safety and to carry over compensatory breathing sowmya)   Additional Activities   Additional Activities Other (Comment)  (reviewed fall prevention/comp breathing techs  )   Additional Activities Comments pt reports having F understanding  Activity Tolerance   Activity Tolerance Patient limited by fatigue;Patient limited by pain   Medical Staff Made Aware reported all findings to nursing staff  Assessment   Assessment Pt was seen for skilled OT with focus on completion of self care tasks, functional mobility, review of fall prevention, energy conservation, compensatory breathing techs and review of current plan of care  Pt with initial need for redirection to push from base of support to stand at [facility]  Increased SOB and fatigue noted with functional tasks instance for more than 5 mins  SAT dipped to 92% on 2 liters with activities instance  SAT returned to 100% on 2 liters with seated rest break for 3-4 mins  Increased A required to rise from standard toilet with A of staff and grab bar  Pt reports having bedside commode at home and reports understanding need for push from armrests  See above levels of A required for all functional tasks  Pt will benefit from continued use of energy conservation/comp breathing techs, use of AE, bedside commode, family support and home therapies upon discharge  Plan   Treatment Interventions ADL retraining;Functional transfer training; Endurance training   Goal Expiration Date 10/26/18   Treatment Day 1   OT Frequency 3-5x/wk   Recommendation   OT Discharge Recommendation Home OT   Barthel Index   Feeding 10   Bathing 0   Grooming Score 5   Dressing Score 5   Bladder Score 10   Bowels Score 10   Toilet Use Score 5   Transfers (Bed/Chair) Score 10   Mobility (Level Surface) Score 0   Stairs Score 0   Barthel Index Score 55   Modified Mike Scale   Modified McCaulley Scale 4   Missy Berger, [address]

## 2018-10-16 NOTE — PHYSICAL THERAPY NOTE
Physical Therapy Treatment Note       10/16/18 1148   Pain Assessment   Pain Assessment No/denies pain   Pain Score No Pain   Restrictions/Precautions   Other Precautions Cognitive; Fall Risk;O2  (2l)   General   Chart Reviewed Yes   Family/Caregiver Present No   Cognition   Arousal/Participation Alert; Cooperative   Attention Within functional limits   Orientation Level Oriented X4   Memory Within functional limits   Following Commands Follows multistep commands with increased time or repetition   Subjective   Subjective " I just threw- up> I'm feeling tired and winded "      Transfers   Sit to Stand 5  Supervision   Additional items Assist x 1; Armrests; Increased time required;Verbal cues   Stand to Sit 5  Supervision   Additional items Assist x 1; Armrests; Increased time required;Verbal cues   Toilet transfer 4  Minimal assistance   Additional items Assist x 1; Increased time required;Standard toilet  (grab bars)   Ambulation/Elevation   Gait pattern Excessively slow; Short stride; Inconsistent mallika   Gait Assistance 5  Supervision   Additional items Assist x 1;Verbal cues   Assistive Device Rolling walker   Distance 80'x2 seated rst between gait trials   Balance   Static Sitting Good   Dynamic Sitting Fair +   Static Standing Fair +   Dynamic Standing Fair   Ambulatory Fair +   Endurance Deficit   Endurance Deficit Yes   Endurance Deficit Description fatigue, son   Activity Tolerance   Activity Tolerance Patient limited by fatigue   Exercises   Hip Flexion Sitting;20 reps;AROM; Bilateral  (SLR in long sitting)   Hip Abduction Sitting;20 reps;AROM; Bilateral   Hip Adduction Sitting;20 reps;AROM; Bilateral   Knee AROM Long Arc Quad Sitting;20 reps;AROM; Bilateral   Ankle Pumps Sitting;20 reps;AROM; Bilateral   Marching Sitting;20 reps;AROM; Bilateral   Equipment Use   Comments SON O2 sat on2 l with ambualtion 92% and at rest 98%  rebounds to 98% w/in 2 minutes    discussed pacing technques with pt and breathing techniques with activity  Assessment   Prognosis Fair   Problem List Decreased strength;Decreased endurance; Impaired balance;Decreased mobility; Decreased safety awareness; Impaired hearing;Pain   Assessment Less assistance for transfers pt performs with supervision A and verbal cues for proper handplacement  Pt requires min assist with transfers to and from toilet with use of grab bar and cues for safe transfer techniques  PT ambulates with use of Rw with supervision and cues for pacing techniques, breathing techniques, cues to remain within walker bounds at all times  Pt requires one seated and one standing rest break due to SON  Pt  desats to 92% on 2L, rebounds to 98% within 2 minutes  No gross lob noted  PT performed seated b/l  Le arom exercises x 20 reps without dififculty  PT will benefit from continued skilled inpt PT inorder to maximize safe functional mobility and independence for safe d/c to home  Recommend Home with HHPT and family support and assistance PRN  Goals   Patient Goals ' To ge stronger and go home  STG Expiration Date 10/22/18   Treatment Day 3   Plan   Treatment/Interventions Functional transfer training;LE strengthening/ROM; Elevations; Therapeutic exercise; Endurance training;Patient/family training;Equipment eval/education; Bed mobility;Gait training;Spoke to nursing;OT;Spoke to MD   Progress Progressing toward goals   PT Frequency (4-5x/week)   Recommendation   Recommendation Home PT; Home with family support   PT - OK to Discharge Yes  (to go home when goals met  )       Justin Jackson PTA

## 2018-10-16 NOTE — ASSESSMENT & PLAN NOTE
Status post pericardial window  Repeat echocardiogram showed small effusion without hemodynamic compromise  This is effusion is malignant    Resume chemotherapy as planned

## 2018-10-16 NOTE — PLAN OF CARE
Problem: PHYSICAL THERAPY ADULT  Goal: Performs mobility at highest level of function for planned discharge setting  See evaluation for individualized goals  Treatment/Interventions: Functional transfer training, LE strengthening/ROM, Elevations, Therapeutic exercise, Endurance training, Patient/family training, Equipment eval/education, Bed mobility, Gait training, Spoke to nursing, OT, Family  Equipment Recommended: Sharla Campos       See flowsheet documentation for full assessment, interventions and recommendations  Outcome: Progressing  Prognosis: Fair  Problem List: Decreased strength, Decreased endurance, Impaired balance, Decreased mobility, Decreased safety awareness, Impaired hearing, Pain  Assessment: Less assistance for transfers pt performs with supervision A and verbal cues for proper handplacement  Pt requires min assist with transfers to and from toilet with use of grab bar and cues for safe transfer techniques  PT ambulates with use of Rw with supervision and cues for pacing techniques, breathing techniques, cues to remain within walker bounds at all times  Pt requires one seated and one standing rest break due to SON  Pt  desats to 92% on 2L, rebounds to 98% within 2 minutes  No gross lob noted  PT performed seated b/l  Le arom exercises x 20 reps without dififculty  PT will benefit from continued skilled inpt PT inorder to maximize safe functional mobility and independence for safe d/c to home  Recommend Home with HHPT and family support and assistance PRN  Barriers to Discharge: Decreased caregiver support  Barriers to Discharge Comments: JEN home   Recommendation: Home PT, Home with family support     PT - OK to Discharge: Yes (to go home when goals met  )    See flowsheet documentation for full assessment

## 2018-10-16 NOTE — PROGRESS NOTES
Progress Note - Kayy Escalante 1935, 80 y o  female MRN: [id number]    Unit/Bed#: E4 -01 Encounter: [id number]    Primary Care Provider: Fely Hemphill DO   Date and time admitted to hospital: 10/11/2018  8:58 PM        * Acute respiratory failure with hypoxia [facility])   Assessment & Plan    Multifactorial Secondary to lung cancer, chemotherapy, pleural effusion, COPD, deconditioning  Wean off oxygen as tolerated  Check home O2 eval prior to discharge  Treat underlying problems below     SIRS (systemic inflammatory response syndrome) (HCC)   Assessment & Plan    Due to lung cancer with recent chemotherapy, possible right upper lung pneumonitis present on admission  Repeat chest x-ray showed no definitive infiltrate  Complete antibiotics with Ceftin per Pulmonary recommendation       Lung cancer [facility])   Assessment & Plan    Discussed with Dr Candelario Wilkinson who was pleased with her response to her chemotherapy  Plan is to resume chemotherapy as scheduled, possibly on October 18  Continue zofran for nausea and vomiting     Pericardial effusion   Assessment & Plan    Status post pericardial window  Repeat echocardiogram showed small effusion without hemodynamic compromise  This is effusion is malignant    Resume chemotherapy as planned     Paroxysmal atrial fibrillation (HCC)   Assessment & Plan    Continue amiodarone 100 mg daily     Continuous opioid dependence ([address] )   Assessment & Plan    Due to cancer related pain  On fentanyl patch      Diabetes mellitus [facility])   Assessment & Plan    Lab Results   Component Value Date    HGBA1C 6 7 (H) 07/25/2018       Recent Labs      10/15/18   1638  10/15/18   2117  10/16/18   0723  10/16/18   1054   POCGLU  292*  156*  145*  190*       Blood Sugar Average: Last 72 hrs:  (P) 176 [id number]     Maintain SSI  A1c at goal     Hypertension   Assessment & Plan    Stable on metoprolol         VTE Pharmacologic Prophylaxis:   Pharmacologic: Enoxaparin (Lovenox)  Mechanical VTE Prophylaxis in Place: Yes    Discussions with Specialists or Other Care Team Provider:  Spoke with Hematology    Education and Discussions with Family / Patient:  Spoke with daughter Patric Buerger and gave update  She does not feel she is able to take care of her mom in her condition due to her weakness and econditioning    Time Spent for Care: 25 minutes  More than 50% of total time spent on counseling and coordination of care as described above  Current Length of Stay: 5 day(s)    Current Patient Status: Inpatient   Certification Statement: The patient will continue to require additional inpatient hospital stay due to dyspnea    Discharge Plan: home pt versus rehab    Code Status: Level 3 - DNAR and DNI      Subjective:   + nausea and vomiting  + Dyspnea on minimal exertion from bathroom     Objective:     Vitals:   Temp (24hrs), Av 3 °F (37 4 °C), Min:97 9 °F (36 6 °C), Max:100 7 °F (38 2 °C)    Temp:  [97 9 °F (36 6 °C)-100 7 °F (38 2 °C)] 99 4 °F (37 4 °C)  HR:  [73-95] 93  Resp:  [18-22] 22  BP: (104-142)/(61-87) 142/83  SpO2:  [91 %-98 %] 95 %  Body mass index is 28 19 kg/m²  Input and Output Summary (last 24 hours): Intake/Output Summary (Last 24 hours) at 10/16/18 1433  Last data filed at 10/16/18 1045   Gross per 24 hour   Intake             1005 ml   Output             1550 ml   Net             -545 ml       Physical Exam:     Physical Exam   Constitutional: She appears well-developed and well-nourished  HENT:   Head: Normocephalic and atraumatic  Eyes: No scleral icterus  Neck: No JVD present  Cardiovascular: Regular rhythm  No murmur heard  Pulmonary/Chest:   Fine crackles at bases  Diminished breath sounds left base   Musculoskeletal: She exhibits no edema  Neurological: She is alert  Skin: Skin is warm  Psychiatric: She has a normal mood and affect           Additional Data:     Labs:      Results from last 7 days  Lab Units 10/16/18  0530  10/13/18  0527   WBC Thousand/uL 3 91*  < > 6 89   HEMOGLOBIN g/dL 12 2  < > 11 5   HEMATOCRIT % 37 4  < > 35 9   PLATELETS Thousands/uL 135*  < > 121*   BANDS PCT % 4  < >  --    NEUTROS PCT %  --   --  79*   LYMPHS PCT %  --   --  18   LYMPHO PCT % 43  < >  --    MONOS PCT %  --   --  1*   MONO PCT MAN % 2*  < >  --    EOS PCT %  --   --  2   EOSINO PCT MANUAL % 0  < >  --    < > = values in this interval not displayed  Results from last 7 days  Lab Units 10/16/18  0530  10/11/18  2155   SODIUM mmol/L 138  < > 139   POTASSIUM mmol/L 3 3*  < > 4 2   CHLORIDE mmol/L 103  < > 104   CO2 mmol/L 27  < > 25   BUN mg/dL 14  < > 17   CREATININE mg/dL 0 64  < > 0 88   ANION GAP mmol/L 8  < > 10   CALCIUM mg/dL 8 5  < > 7 7*   ALBUMIN g/dL  --   --  2 2*   TOTAL BILIRUBIN mg/dL  --   --  0 50   ALK PHOS U/L  --   --  84   ALT U/L  --   --  41   AST U/L  --   --  22   < > = values in this interval not displayed  Results from last 7 days  Lab Units 10/11/18  2120   INR  1 04       Results from last 7 days  Lab Units 10/16/18  1054 10/16/18  0723 10/15/18  2117 10/15/18  1638 10/15/18  1207 10/15/18  0718 10/14/18  2049 10/14/18  1505 10/14/18  1139 10/14/18  0718 10/13/18  2111 10/13/18  1558   POC GLUCOSE mg/dl 190* 145* 156* 292* 147* 96 265* 106 259* 95 182* 196*           Results from last 7 days  Lab Units 10/16/18  0530 10/15/18  0636 10/14/18  1237 10/14/18  0546 10/13/18  0650 10/13/18  0527  10/11/18  2338 10/11/18  2120   LACTIC ACID mmol/L  --   --   --   --  2 0  --   --  2 2* 3 5*   PROCALCITONIN ng/ml 0 53* 0 63* 0 56* 1 11*  --  1 60*  < >  --   --    < > = values in this interval not displayed  * I Have Reviewed All Lab Data Listed Above  * Additional Pertinent Lab Tests Reviewed:  All Labs Within Last 24 Hours Reviewed    Imaging:    Imaging Reports Reviewed Today Include: none      Recent Cultures (last 7 days):       Results from last 7 days  Lab Units 10/11/18  5437 10/11/18  2120   BLOOD CULTURE  No Growth After 4 Days  No Growth After 4 Days  Last 24 Hours Medication List:     Current Facility-Administered Medications:  acetaminophen 488 mg Oral Q6H PRN Marcellina Shell, PA-C   ALPRAZolam 0 5 mg Oral HS PRN Marcellina Shell, PA-C   amiodarone 100 mg Oral Daily Jude Guzman MD   aspirin 81 mg Oral Daily Marcellina Shell, PA-C   cefuroxime 500 mg Oral Q12H [address] SAMANTHA Gomes   citalopram 20 mg Oral Daily Marcellina Shell, PA-C   enoxaparin 40 mg Subcutaneous Daily Marcellina Shell, PA-C   fentaNYL 1 patch Transdermal Q72H Marcellina Shell, PA-C   folic acid 1 mg Oral Daily Marcellina Shell, PA-C   furosemide 20 mg Intravenous BID (diuretic) SAMANTHA Gomes   insulin lispro 1-5 Units Subcutaneous TID AC Marcellina Shell, PA-C   insulin lispro 1-5 Units Subcutaneous HS Marcellina Shell, PA-C   metoprolol tartrate 12 5 mg Oral Q12H [address] Marcellina Shell, PA-C   ondansetron 4 mg Intravenous Q6H PRN Marcellina Shell, PA-C   oxyCODONE 2 5 mg Oral Q4H PRN Marcellina Shell, PA-C        Today, Patient Was Seen By: Justin Duff MD    ** Please Note: Dictation voice to text software may have been used in the creation of this document   **

## 2018-10-16 NOTE — PROGRESS NOTES
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  1  Oncology following:   Chemotherapy on 10/11/2018  3  Acute on chronic diastolic heart failure  1  Repeat chest x-ray demonstrated increasing vascular congestion  2   20 mg IV Lasix ordered b i d   3  Zaroxolyn 5,g once   4  Negative 395 mL over the last 24 hr, 1 lb loss since day prior  5  Daily weights and strict I&O        Subjective:     Tim Hopson was seen in the chair upon entering the room  She denies acute overnight events  Reports her SOB is unchanged  She denies significant cough or sputum production  Denies:   Chest pain, pain inspiration, fevers, chills, night sweats, nausea, vomiting diarrhea, headache, dizziness bronchospasm hemoptysis    Objective:         Vitals: Blood pressure 108/66, pulse 88, temperature 98 7 °F (37 1 °C), temperature source Tympanic, resp  rate 22, height 5' 2" (1 575 m), weight 69 9 kg (154 lb 1 6 oz), SpO2 91 %  , 3LNC, Body mass index is 28 19 kg/m²        Intake/Output Summary (Last 24 hours) at 10/16/18 1002  Last data filed at 10/16/18 0751   Gross per 24 hour   Intake             1005 ml   Output             1200 ml   Net             -195 ml         Physical Exam  Gen: Awake, alert, oriented x 3, no acute distress  HEENT: Mucous membranes moist, no oral lesions, no thrush, oxygen via NC  NECK: No accessory muscle use, JVP not elevated  Cardiac: Regular, single S1, single S2, no murmurs, no rubs, no gallops  Lungs: rales noted in the left base, no rhonchi or wheezes  Abdomen: normoactive bowel sounds, soft nontender, nondistended, no rebound or rigidity, no guarding  Extremities: no cyanosis, no clubbing, no edema    Labs: I have personally reviewed pertinent lab results  , CBC:   Lab Results   Component Value Date    WBC 3 91 (L) 10/16/2018    HGB 12 2 10/16/2018    HCT 37 4 10/16/2018    MCV 91 10/16/2018     (L) 10/16/2018    MCH 29 5 10/16/2018    MCHC 32 6 10/16/2018    RDW 15 4 (H) 10/16/2018    MPV 9 6 10/16/2018    NRBC 1 10/16/2018   , CMP:   Lab Results   Component Value Date     10/16/2018    K 3 3 (L) 10/16/2018     10/16/2018    CO2 27 10/16/2018    BUN 14 10/16/2018    CREATININE 0 64 10/16/2018    CALCIUM 8 5 10/16/2018    EGFR 83 10/16/2018     Imaging and other studies: no new imaging to review today      SAMANTHA Seo

## 2018-10-16 NOTE — ASSESSMENT & PLAN NOTE
Due to lung cancer with recent chemotherapy, possible right upper lung pneumonitis present on admission  Repeat chest x-ray showed no definitive infiltrate    Complete antibiotics with Ceftin per Pulmonary recommendation

## 2018-10-17 LAB
ANION GAP SERPL CALCULATED.3IONS-SCNC: 8 MMOL/L (ref 4–13)
BACTERIA BLD CULT: NORMAL
BASOPHILS # BLD MANUAL: 0 THOUSAND/UL (ref 0–0.1)
BASOPHILS NFR MAR MANUAL: 0 % (ref 0–1)
BUN SERPL-MCNC: 16 MG/DL (ref 5–25)
CALCIUM SERPL-MCNC: 8.8 MG/DL (ref 8.3–10.1)
CHLORIDE SERPL-SCNC: 98 MMOL/L (ref 100–108)
CO2 SERPL-SCNC: 30 MMOL/L (ref 21–32)
CREAT SERPL-MCNC: 0.71 MG/DL (ref 0.6–1.3)
EOSINOPHIL # BLD MANUAL: 0 THOUSAND/UL (ref 0–0.4)
EOSINOPHIL NFR BLD MANUAL: 0 % (ref 0–6)
ERYTHROCYTE [DISTWIDTH] IN BLOOD BY AUTOMATED COUNT: 15.5 % (ref 11.6–15.1)
GFR SERPL CREATININE-BSD FRML MDRD: 79 ML/MIN/1.73SQ M
GLUCOSE SERPL-MCNC: 115 MG/DL (ref 65–140)
GLUCOSE SERPL-MCNC: 134 MG/DL (ref 65–140)
GLUCOSE SERPL-MCNC: 150 MG/DL (ref 65–140)
GLUCOSE SERPL-MCNC: 165 MG/DL (ref 65–140)
GLUCOSE SERPL-MCNC: 206 MG/DL (ref 65–140)
GLUCOSE SERPL-MCNC: 228 MG/DL (ref 65–140)
HCT VFR BLD AUTO: 37.3 % (ref 34.8–46.1)
HGB BLD-MCNC: 12 G/DL (ref 11.5–15.4)
LYMPHOCYTES # BLD AUTO: 2.26 THOUSAND/UL (ref 0.6–4.47)
LYMPHOCYTES # BLD AUTO: 46 % (ref 14–44)
MCH RBC QN AUTO: 29 PG (ref 26.8–34.3)
MCHC RBC AUTO-ENTMCNC: 32.2 G/DL (ref 31.4–37.4)
MCV RBC AUTO: 90 FL (ref 82–98)
MONOCYTES # BLD AUTO: 0.39 THOUSAND/UL (ref 0–1.22)
MONOCYTES NFR BLD: 8 % (ref 4–12)
MYELOCYTES NFR BLD MANUAL: 1 % (ref 0–1)
NEUTROPHILS # BLD MANUAL: 2.07 THOUSAND/UL (ref 1.85–7.62)
NEUTS SEG NFR BLD AUTO: 42 % (ref 43–75)
NRBC BLD AUTO-RTO: 0 /100 WBCS
PLATELET # BLD AUTO: 112 THOUSANDS/UL (ref 149–390)
PLATELET BLD QL SMEAR: ABNORMAL
PMV BLD AUTO: 9.1 FL (ref 8.9–12.7)
POTASSIUM SERPL-SCNC: 3.2 MMOL/L (ref 3.5–5.3)
RBC # BLD AUTO: 4.14 MILLION/UL (ref 3.81–5.12)
RBC MORPH BLD: NORMAL
SODIUM SERPL-SCNC: 136 MMOL/L (ref 136–145)
TOTAL CELLS COUNTED SPEC: 100
VARIANT LYMPHS # BLD AUTO: 3 %
WBC # BLD AUTO: 4.92 THOUSAND/UL (ref 4.31–10.16)

## 2018-10-17 PROCEDURE — 99232 SBSQ HOSP IP/OBS MODERATE 35: CPT | Performed by: INTERNAL MEDICINE

## 2018-10-17 PROCEDURE — 85027 COMPLETE CBC AUTOMATED: CPT | Performed by: INTERNAL MEDICINE

## 2018-10-17 PROCEDURE — 80048 BASIC METABOLIC PNL TOTAL CA: CPT | Performed by: INTERNAL MEDICINE

## 2018-10-17 PROCEDURE — 85007 BL SMEAR W/DIFF WBC COUNT: CPT | Performed by: INTERNAL MEDICINE

## 2018-10-17 PROCEDURE — 82948 REAGENT STRIP/BLOOD GLUCOSE: CPT

## 2018-10-17 RX ORDER — FUROSEMIDE 20 MG/1
20 TABLET ORAL DAILY
Status: DISCONTINUED | OUTPATIENT
Start: 2018-10-18 | End: 2018-10-19 | Stop reason: HOSPADM

## 2018-10-17 RX ORDER — PREGABALIN 100 MG/1
300 CAPSULE ORAL
Status: DISCONTINUED | OUTPATIENT
Start: 2018-10-17 | End: 2018-10-17

## 2018-10-17 RX ORDER — PREGABALIN 50 MG/1
150 CAPSULE ORAL DAILY
Status: DISCONTINUED | OUTPATIENT
Start: 2018-10-18 | End: 2018-10-17

## 2018-10-17 RX ORDER — FUROSEMIDE 20 MG/1
20 TABLET ORAL DAILY
Status: DISCONTINUED | OUTPATIENT
Start: 2018-10-17 | End: 2018-10-17 | Stop reason: SDUPTHER

## 2018-10-17 RX ORDER — FUROSEMIDE 20 MG/1
20 TABLET ORAL DAILY
Status: DISCONTINUED | OUTPATIENT
Start: 2018-10-17 | End: 2018-10-17

## 2018-10-17 RX ADMIN — METOPROLOL TARTRATE 12.5 MG: 25 TABLET ORAL at 09:59

## 2018-10-17 RX ADMIN — FUROSEMIDE 20 MG: 10 INJECTION, SOLUTION INTRAMUSCULAR; INTRAVENOUS at 09:58

## 2018-10-17 RX ADMIN — INSULIN LISPRO 2 UNITS: 100 INJECTION, SOLUTION INTRAVENOUS; SUBCUTANEOUS at 17:50

## 2018-10-17 RX ADMIN — METOPROLOL TARTRATE 12.5 MG: 25 TABLET ORAL at 21:37

## 2018-10-17 RX ADMIN — AMIODARONE HYDROCHLORIDE 100 MG: 200 TABLET ORAL at 09:59

## 2018-10-17 RX ADMIN — CITALOPRAM HYDROBROMIDE 20 MG: 20 TABLET ORAL at 10:01

## 2018-10-17 RX ADMIN — CEFUROXIME AXETIL 500 MG: 250 TABLET ORAL at 10:01

## 2018-10-17 RX ADMIN — INSULIN LISPRO 1 UNITS: 100 INJECTION, SOLUTION INTRAVENOUS; SUBCUTANEOUS at 13:28

## 2018-10-17 RX ADMIN — ENOXAPARIN SODIUM 40 MG: 40 INJECTION SUBCUTANEOUS at 10:01

## 2018-10-17 RX ADMIN — ALPRAZOLAM 0.5 MG: 0.5 TABLET ORAL at 21:42

## 2018-10-17 RX ADMIN — INSULIN LISPRO 1 UNITS: 100 INJECTION, SOLUTION INTRAVENOUS; SUBCUTANEOUS at 13:23

## 2018-10-17 RX ADMIN — FENTANYL 1 PATCH: 12 PATCH, EXTENDED RELEASE TRANSDERMAL at 23:16

## 2018-10-17 RX ADMIN — ONDANSETRON 4 MG: 2 INJECTION INTRAMUSCULAR; INTRAVENOUS at 11:34

## 2018-10-17 RX ADMIN — ASPIRIN 81 MG: 81 TABLET, COATED ORAL at 09:59

## 2018-10-17 RX ADMIN — CEFUROXIME AXETIL 500 MG: 250 TABLET ORAL at 21:37

## 2018-10-17 RX ADMIN — FOLIC ACID 1 MG: 1 TABLET ORAL at 09:59

## 2018-10-17 RX ADMIN — INSULIN LISPRO 1 UNITS: 100 INJECTION, SOLUTION INTRAVENOUS; SUBCUTANEOUS at 21:39

## 2018-10-17 NOTE — PROGRESS NOTES
Progress Note - [place] 1935, 80 y o  female MRN: [id number]    Unit/Bed#: E4 -01 Encounter: [id number]    Primary Care Provider: Kyaw Calix DO   Date and time admitted to hospital: 10/11/2018  8:58 PM        * Acute respiratory failure with hypoxia [facility])   Assessment & Plan    Multifactorial Secondary to lung cancer, chemotherapy, pleural effusion, COPD, deconditioning  Clinically she looks more comfortable in feels better today compared to yesterday  Will order home O2 evaluation today with plan for discharge tomorrow  SIRS (systemic inflammatory response syndrome) (HCC)   Assessment & Plan    Due to lung cancer with recent chemotherapy, possible right upper lung pneumonitis present on admission, can't rule out tracheobronchitis  Patient has improved  She is on day 6 of antibiotic  Continue Ceftin till tomorrow  Lung cancer [facility])   Assessment & Plan    Discussed with Dr Shawn Pickering who was pleased with her response to her chemotherapy  Plan is for her to go home tomorrow and resume outpatient chemotherapy on October 19  Communicated with Dr Shawn Pickering     Pericardial Ruddy Cower post pericardial window  Repeat echocardiogram showed small effusion without hemodynamic compromise  This is effusion is malignant  Ongoing chemotherapy  Anticipate she will be discharged home tomorrow and plan to resume chemotherapy on Friday       Paroxysmal atrial fibrillation (HCC)   Assessment & Plan    Continue amiodarone 100 mg daily     Continuous opioid dependence ([address] )   Assessment & Plan    Due to cancer related pain  On fentanyl patch      Diabetes mellitus [facility])   Assessment & Plan    Lab Results   Component Value Date    HGBA1C 6 7 (H) 07/25/2018       Recent Labs      10/16/18   2045  10/17/18   0855  10/17/18   1141  10/17/18   1312   POCGLU  252*  134  206*  150*       Blood Sugar Average: Last 72 hrs:  (P) 404 [id number]     Maintain SSI  A1c at goal Hypertension   Assessment & Plan    Stable on metoprolol         VTE Pharmacologic Prophylaxis:   Pharmacologic: Enoxaparin (Lovenox)  Mechanical VTE Prophylaxis in Place: Yes    Discussions with Specialists or Other Care Team Provider:  Discussed with case management and hematology    Education and Discussions with Family / Patient: Spoke with daughter and gave update    Time Spent for Care: 25 minutes  More than 50% of total time spent on counseling and coordination of care as described above  Current Length of Stay: 6 day(s)    Current Patient Status: Inpatient   Certification Statement: The patient will continue to require additional inpatient hospital stay due to hypoxia    Discharge Plan: in 24 hours    Code Status: Level 3 - DNAR and DNI      Subjective:   Improved shortness of breath and cough  She feels much better compared to yesterday  She does not want to go to rehab  Still on oxygen    Objective:     Vitals:   Temp (24hrs), Av 9 °F (37 2 °C), Min:98 1 °F (36 7 °C), Max:100 5 °F (38 1 °C)    Temp:  [98 1 °F (36 7 °C)-100 5 °F (38 1 °C)] 98 8 °F (37 1 °C)  HR:  [72-88] 83  Resp:  [18-20] 18  BP: (106-149)/(65-85) 106/65  SpO2:  [93 %-97 %] 97 %  Body mass index is 27 66 kg/m²  Input and Output Summary (last 24 hours): Intake/Output Summary (Last 24 hours) at 10/17/18 1542  Last data filed at 10/17/18 1133   Gross per 24 hour   Intake                0 ml   Output             2400 ml   Net            -2400 ml       Physical Exam:     Physical Exam   Constitutional: She appears well-developed  No distress  Obese   Eyes: No scleral icterus  Neck: No JVD present  Cardiovascular: Regular rhythm  No murmur heard  Pulmonary/Chest: Effort normal    Improved left base crackles and diminished breath sounds  Lungs mostly clear   Abdominal: She exhibits no distension  There is no tenderness  Musculoskeletal: She exhibits no edema  Neurological: She is alert     Skin: Skin is warm and dry  She is not diaphoretic  Psychiatric: She has a normal mood and affect  Additional Data:     Labs:      Results from last 7 days  Lab Units 10/17/18  0527 10/16/18  0530  10/13/18  0527   WBC Thousand/uL 4 92 3 91*  < > 6 89   HEMOGLOBIN g/dL 12 0 12 2  < > 11 5   HEMATOCRIT % 37 3 37 4  < > 35 9   PLATELETS Thousands/uL 112* 135*  < > 121*   BANDS PCT %  --  4  < >  --    NEUTROS PCT %  --   --   --  79*   LYMPHS PCT %  --   --   --  18   LYMPHO PCT % 46* 43  < >  --    MONOS PCT %  --   --   --  1*   MONO PCT MAN % 8 2*  < >  --    EOS PCT %  --   --   --  2   EOSINO PCT MANUAL % 0 0  < >  --    < > = values in this interval not displayed  Results from last 7 days  Lab Units 10/17/18  0527  10/11/18  2155   SODIUM mmol/L 136  < > 139   POTASSIUM mmol/L 3 2*  < > 4 2   CHLORIDE mmol/L 98*  < > 104   CO2 mmol/L 30  < > 25   BUN mg/dL 16  < > 17   CREATININE mg/dL 0 71  < > 0 88   ANION GAP mmol/L 8  < > 10   CALCIUM mg/dL 8 8  < > 7 7*   ALBUMIN g/dL  --   --  2 2*   TOTAL BILIRUBIN mg/dL  --   --  0 50   ALK PHOS U/L  --   --  84   ALT U/L  --   --  41   AST U/L  --   --  22   < > = values in this interval not displayed  Results from last 7 days  Lab Units 10/11/18  2120   INR  1 04       Results from last 7 days  Lab Units 10/17/18  1312 10/17/18  1141 10/17/18  0855 10/16/18  2045 10/16/18  1646 10/16/18  1054 10/16/18  0723 10/15/18  2117 10/15/18  1638 10/15/18  1207 10/15/18  0718 10/14/18  2049   POC GLUCOSE mg/dl 150* 206* 134 252* 289* 190* 145* 156* 292* 147* 96 265*           Results from last 7 days  Lab Units 10/16/18  0530 10/15/18  0636 10/14/18  1237 10/14/18  0546 10/13/18  0650 10/13/18  0527  10/11/18  9718 10/11/18  2120   LACTIC ACID mmol/L  --   --   --   --  2 0  --   --  2 2* 3 5*   PROCALCITONIN ng/ml 0 53* 0 63* 0 56* 1 11*  --  1 60*  < >  --   --    < > = values in this interval not displayed  * I Have Reviewed All Lab Data Listed Above    * Additional Pertinent Lab Tests Reviewed: All Labs Within Last 24 Hours Reviewed    Imaging:    Imaging Reports Reviewed Today Include:  None      Recent Cultures (last 7 days):       Results from last 7 days  Lab Units 10/11/18  2155 10/11/18  2120   BLOOD CULTURE  No Growth After 5 Days  No Growth After 5 Days  Last 24 Hours Medication List:     Current Facility-Administered Medications:  acetaminophen 488 mg Oral Q6H PRN Lova Yaneli, PA-C   ALPRAZolam 0 5 mg Oral HS PRN Lova Yaneli, PA-C   amiodarone 100 mg Oral Daily Vielka Amor MD   aspirin 81 mg Oral Daily Lova Yaneli, PA-C   cefuroxime 500 mg Oral Q12H [address] SAMANTHA Nuñez   citalopram 20 mg Oral Daily Lova Yaneli, PA-ROXANA   enoxaparin 40 mg Subcutaneous Daily Lova Yaneli, PA-C   fentaNYL 1 patch Transdermal Q72H Nirmala Groves, PA-C   folic acid 1 mg Oral Daily Dana Lopez   [START ON 10/18/2018] furosemide 20 mg Oral Daily SAMANTHA Nuñez   insulin lispro 1-5 Units Subcutaneous TID AC Nirmala Groves, PA-C   insulin lispro 1-5 Units Subcutaneous HS Nirmala Groves, PA-C   metoprolol tartrate 12 5 mg Oral Q12H [address] Nirmala Groves, PA-ROXANA   ondansetron 4 mg Intravenous Q6H PRN Lova Yaneli, PA-C   oxyCODONE 2 5 mg Oral Q4H PRN Nirmala Groves, PA-C        Today, Patient Was Seen By: Jesus Villela MD    ** Please Note: Dictation voice to text software may have been used in the creation of this document   **

## 2018-10-17 NOTE — ASSESSMENT & PLAN NOTE
Multifactorial Secondary to lung cancer, chemotherapy, pleural effusion, COPD, deconditioning  Clinically she looks more comfortable in feels better today compared to yesterday  Will order home O2 evaluation today with plan for discharge tomorrow

## 2018-10-17 NOTE — ASSESSMENT & PLAN NOTE
Lab Results   Component Value Date    HGBA1C 6 7 (H) 07/25/2018       Recent Labs      10/16/18   2045  10/17/18   0855  10/17/18   1141  10/17/18   1312   POCGLU  252*  134  206*  150*       Blood Sugar Average: Last 72 hrs:  (P) 286 [id number]     Maintain SSI  A1c at goal

## 2018-10-17 NOTE — PLAN OF CARE
Problem: Potential for Falls  Goal: Patient will remain free of falls  INTERVENTIONS:  - Assess patient frequently for physical needs  -  Identify cognitive and physical deficits and behaviors that affect risk of falls    -  [place] fall precautions as indicated by assessment   - Educate patient/family on patient safety including physical limitations  - Instruct patient to call for assistance with activity based on assessment  - Modify environment to reduce risk of injury  - Consider OT/PT consult to assist with strengthening/mobility   Outcome: Progressing      Problem: Prexisting or High Potential for Compromised Skin Integrity  Goal: Skin integrity is maintained or improved  INTERVENTIONS:  - Identify patients at risk for skin breakdown  - Assess and monitor skin integrity  - Assess and monitor nutrition and hydration status  - Monitor labs (i e  albumin)  - Assess for incontinence   - Turn and reposition patient  - Assist with mobility/ambulation  - Relieve pressure over bony prominences  - Avoid friction and shearing  - Provide appropriate hygiene as needed including keeping skin clean and dry  - Evaluate need for skin moisturizer/barrier cream  - Collaborate with interdisciplinary team (i e  Nutrition, Rehabilitation, etc )   - Patient/family teaching   Outcome: Progressing      Problem: PAIN - ADULT  Goal: Verbalizes/displays adequate comfort level or baseline comfort level  Interventions:  - Encourage patient to monitor pain and request assistance  - Assess pain using appropriate pain scale  - Administer analgesics based on type and severity of pain and evaluate response  - Implement non-pharmacological measures as appropriate and evaluate response  - Consider cultural and social influences on pain and pain management  - Notify physician/advanced practitioner if interventions unsuccessful or patient reports new pain   Outcome: Progressing      Problem: INFECTION - ADULT  Goal: Absence or prevention of progression during hospitalization  INTERVENTIONS:  - Assess and monitor for signs and symptoms of infection  - Monitor lab/diagnostic results  - Monitor all insertion sites, i e  indwelling lines, tubes, and drains  - Monitor endotracheal (as able) and nasal secretions for changes in amount and color  - [place] appropriate cooling/warming therapies per order  - Administer medications as ordered  - Instruct and encourage patient and family to use good hand hygiene technique  - Identify and instruct in appropriate isolation precautions for identified infection/condition   Outcome: Progressing    Goal: Absence of fever/infection during neutropenic period  INTERVENTIONS:  - Monitor WBC  - Implement neutropenic guidelines   Outcome: Progressing      Problem: SAFETY ADULT  Goal: Maintain or return to baseline ADL function  INTERVENTIONS:  -  Assess patient's ability to carry out ADLs; assess patient's baseline for ADL function and identify physical deficits which impact ability to perform ADLs (bathing, care of mouth/teeth, toileting, grooming, dressing, etc )  - Assess/evaluate cause of self-care deficits   - Assess range of motion  - Assess patient's mobility; develop plan if impaired  - Assess patient's need for assistive devices and provide as appropriate  - Encourage maximum independence but intervene and supervise when necessary  ¯ Involve family in performance of ADLs  ¯ Assess for home care needs following discharge   ¯ Request OT consult to assist with ADL evaluation and planning for discharge  ¯ Provide patient education as appropriate   Outcome: Progressing    Goal: Maintain or return mobility status to optimal level  INTERVENTIONS:  - Assess patient's baseline mobility status (ambulation, transfers, stairs, etc )    - Identify cognitive and physical deficits and behaviors that affect mobility  - Identify mobility aids required to assist with transfers and/or ambulation (gait belt, sit-to-stand, lift, walker, cane, etc )  - [place] fall precautions as indicated by assessment  - Record patient progress and toleration of activity level on Mobility SBAR; progress patient to next Phase/Stage  - Instruct patient to call for assistance with activity based on assessment  - Request Rehabilitation consult to assist with strengthening/weightbearing, etc    Outcome: Progressing      Problem: DISCHARGE PLANNING  Goal: Discharge to home or other facility with appropriate resources  INTERVENTIONS:  - Identify barriers to discharge w/patient and caregiver  - Arrange for needed discharge resources and transportation as appropriate  - Identify discharge learning needs (meds, wound care, etc )  - Arrange for interpretive services to assist at discharge as needed  - Refer to Case Management Department for coordinating discharge planning if the patient needs post-hospital services based on physician/advanced practitioner order or complex needs related to functional status, cognitive ability, or social support system   Outcome: Progressing      Problem: Knowledge Deficit  Goal: Patient/family/caregiver demonstrates understanding of disease process, treatment plan, medications, and discharge instructions  Complete learning assessment and assess knowledge base    Interventions:  - Provide teaching at level of understanding  - Provide teaching via preferred learning methods   Outcome: Progressing      Problem: RESPIRATORY - ADULT  Goal: Achieves optimal ventilation and oxygenation  INTERVENTIONS:  - Assess for changes in respiratory status  - Assess for changes in mentation and behavior  - Position to facilitate oxygenation and minimize respiratory effort  - Oxygen administration by appropriate delivery method based on oxygen saturation (per order) or ABGs  - Initiate smoking cessation education as indicated  - Encourage broncho-pulmonary hygiene including cough, deep breathe, Incentive Spirometry  - Assess the need for suctioning and aspirate as needed  - Assess and instruct to report SOB or any respiratory difficulty  - Respiratory Therapy support as indicated   Outcome: Progressing      Problem: METABOLIC, FLUID AND ELECTROLYTES - ADULT  Goal: Electrolytes maintained within normal limits  INTERVENTIONS:  - Monitor labs and assess patient for signs and symptoms of electrolyte imbalances  - Administer electrolyte replacement as ordered  - Monitor response to electrolyte replacements, including repeat lab results as appropriate  - Instruct patient on fluid and nutrition as appropriate   Outcome: Progressing    Goal: Fluid balance maintained  INTERVENTIONS:  - Monitor labs and assess for signs and symptoms of volume excess or deficit  - Monitor I/O and WT  - Instruct patient on fluid and nutrition as appropriate   Outcome: Progressing    Goal: Glucose maintained within target range  INTERVENTIONS:  - Monitor Blood Glucose as ordered  - Assess for signs and symptoms of hyperglycemia and hypoglycemia  - Administer ordered medications to maintain glucose within target range  - Assess nutritional intake and initiate nutrition service referral as needed   Outcome: Progressing      Problem: SKIN/TISSUE INTEGRITY - ADULT  Goal: Skin integrity remains intact  INTERVENTIONS  - Identify patients at risk for skin breakdown  - Assess and monitor skin integrity  - Assess and monitor nutrition and hydration status  - Monitor labs (i e  albumin)  - Assess for incontinence   - Turn and reposition patient  - Assist with mobility/ambulation  - Relieve pressure over bony prominences  - Avoid friction and shearing  - Provide appropriate hygiene as needed including keeping skin clean and dry  - Evaluate need for skin moisturizer/barrier cream  - Collaborate with interdisciplinary team (i e  Nutrition, Rehabilitation, etc )   - Patient/family teaching   Outcome: Progressing    Goal: Incision(s), wounds(s) or drain site(s) healing without S/S of infection  INTERVENTIONS  - Assess and document risk factors for skin impairment   - Assess and document dressing, incision, wound bed, drain sites and surrounding tissue  - Initiate Nutrition services consult and/or wound management as needed   Outcome: Progressing      Problem: DISCHARGE PLANNING - CARE MANAGEMENT  Goal: Discharge to post-acute care or home with appropriate resources  INTERVENTIONS:  - Conduct assessment to determine patient/family and health care team treatment goals, and need for post-acute services based on payer coverage, community resources, and patient preferences, and barriers to discharge  - Address psychosocial, clinical, and financial barriers to discharge as identified in assessment in conjunction with the patient/family and health care team  - Arrange appropriate level of post-acute services according to patients   needs and preference and payer coverage in collaboration with the physician and health care team  - Communicate with and update the patient/family, physician, and health care team regarding progress on the discharge plan  - Arrange appropriate transportation to post-acute venues   Outcome: Progressing

## 2018-10-17 NOTE — PROGRESS NOTES
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 and pleural studding  1  Oncology following  3  Acute on chronic diastolic heart failure  1  20 mg IV Lasix b i d  Continued-hopefully will be appropriate for transition to p o  Lasix tomorrow  2  Continue with strict I&O, and daily weights  3  Weight loss of 1 kg since yesterday        Subjective:     Ezra Hebert was seen in the chair upon entering the room  She denies acute overnight events  She reports improvement in shortness of breath today  She denies significant cough or sputum production  She denies:  Chest pain, pain with inspiration, fevers, chills, night sweats, nausea vomiting diarrhea headache, dizziness, bronchospasm or hemoptysis    Objective:         Vitals: Blood pressure 149/85, pulse 86, temperature 98 2 °F (36 8 °C), temperature source Tympanic, resp  rate 20, height 5' 2" (1 575 m), weight 68 6 kg (151 lb 3 8 oz), SpO2 93 % , , Body mass index is 27 66 kg/m²        Intake/Output Summary (Last 24 hours) at 10/17/18 1058  Last data filed at 10/17/18 0830   Gross per 24 hour   Intake                0 ml   Output             1850 ml   Net            -1850 ml         Physical Exam  Gen: Awake, alert, oriented x 3, no acute distress  HEENT: Mucous membranes moist, no oral lesions, no thrush, oxygen via NC  NECK: No accessory muscle use, JVP not elevated  Cardiac: Regular, single S1, single S2, no murmurs, no rubs, no gallops  Lungs: clear lung sounds bilaterally, no wheezes, rhonchi or rales  Abdomen: normoactive bowel sounds, soft nontender, nondistended, no rebound or rigidity, no guarding  Extremities: no cyanosis, no clubbing, no edema    Labs: I have personally reviewed pertinent lab results  , CBC:   Lab Results   Component Value Date    WBC 4 92 10/17/2018    HGB 12 0 10/17/2018    HCT 37 3 10/17/2018    MCV 90 10/17/2018     (L) 10/17/2018    MCH 29 0 10/17/2018    MCHC 32 2 10/17/2018    RDW 15 5 (H) 10/17/2018    MPV 9 1 10/17/2018    NRBC 0 10/17/2018   , CMP:   Lab Results   Component Value Date     10/17/2018    K 3 2 (L) 10/17/2018    CL 98 (L) 10/17/2018    CO2 30 10/17/2018    BUN 16 10/17/2018    CREATININE 0 71 10/17/2018    CALCIUM 8 8 10/17/2018    EGFR 79 10/17/2018     Imaging and other studies: none      Lucina Thomas

## 2018-10-17 NOTE — ASSESSMENT & PLAN NOTE
Status post pericardial window  Repeat echocardiogram showed small effusion without hemodynamic compromise  This is effusion is malignant  Ongoing chemotherapy  Anticipate she will be discharged home tomorrow and plan to resume chemotherapy on Friday

## 2018-10-17 NOTE — ASSESSMENT & PLAN NOTE
Due to lung cancer with recent chemotherapy, possible right upper lung pneumonitis present on admission, can't rule out tracheobronchitis  Patient has improved  She is on day 6 of antibiotic  Continue Ceftin till tomorrow

## 2018-10-17 NOTE — ASSESSMENT & PLAN NOTE
Discussed with Dr Jose Armando Long who was pleased with her response to her chemotherapy  Plan is for her to go home tomorrow and resume outpatient chemotherapy on October 19  Communicated with Dr Jose Armando Long

## 2018-10-18 ENCOUNTER — APPOINTMENT (INPATIENT)
Dept: NON INVASIVE DIAGNOSTICS | Facility: HOSPITAL | Age: 83
DRG: 871 | End: 2018-10-18
Payer: MEDICARE

## 2018-10-18 PROBLEM — M79.661 RIGHT CALF PAIN: Status: ACTIVE | Noted: 2018-10-18

## 2018-10-18 PROBLEM — R53.1 ASTHENIA DUE TO DISEASE: Status: ACTIVE | Noted: 2018-10-18

## 2018-10-18 LAB
GLUCOSE SERPL-MCNC: 106 MG/DL (ref 65–140)
GLUCOSE SERPL-MCNC: 124 MG/DL (ref 65–140)
GLUCOSE SERPL-MCNC: 150 MG/DL (ref 65–140)
GLUCOSE SERPL-MCNC: 197 MG/DL (ref 65–140)

## 2018-10-18 PROCEDURE — 82948 REAGENT STRIP/BLOOD GLUCOSE: CPT

## 2018-10-18 PROCEDURE — 99232 SBSQ HOSP IP/OBS MODERATE 35: CPT | Performed by: INTERNAL MEDICINE

## 2018-10-18 PROCEDURE — 97116 GAIT TRAINING THERAPY: CPT

## 2018-10-18 PROCEDURE — 93971 EXTREMITY STUDY: CPT | Performed by: SURGERY

## 2018-10-18 PROCEDURE — 93971 EXTREMITY STUDY: CPT

## 2018-10-18 PROCEDURE — 94761 N-INVAS EAR/PLS OXIMETRY MLT: CPT

## 2018-10-18 PROCEDURE — 97110 THERAPEUTIC EXERCISES: CPT

## 2018-10-18 RX ADMIN — AMIODARONE HYDROCHLORIDE 100 MG: 200 TABLET ORAL at 09:03

## 2018-10-18 RX ADMIN — CEFUROXIME AXETIL 500 MG: 250 TABLET ORAL at 09:07

## 2018-10-18 RX ADMIN — INSULIN LISPRO 1 UNITS: 100 INJECTION, SOLUTION INTRAVENOUS; SUBCUTANEOUS at 17:08

## 2018-10-18 RX ADMIN — FOLIC ACID 1 MG: 1 TABLET ORAL at 09:03

## 2018-10-18 RX ADMIN — METOPROLOL TARTRATE 12.5 MG: 25 TABLET ORAL at 21:50

## 2018-10-18 RX ADMIN — CITALOPRAM HYDROBROMIDE 20 MG: 20 TABLET ORAL at 09:03

## 2018-10-18 RX ADMIN — FUROSEMIDE 20 MG: 20 TABLET ORAL at 09:03

## 2018-10-18 RX ADMIN — ALPRAZOLAM 0.5 MG: 0.5 TABLET ORAL at 21:50

## 2018-10-18 RX ADMIN — ASPIRIN 81 MG: 81 TABLET, COATED ORAL at 09:03

## 2018-10-18 RX ADMIN — CEFUROXIME AXETIL 500 MG: 250 TABLET ORAL at 21:50

## 2018-10-18 RX ADMIN — METOPROLOL TARTRATE 12.5 MG: 25 TABLET ORAL at 09:03

## 2018-10-18 RX ADMIN — INSULIN LISPRO 1 UNITS: 100 INJECTION, SOLUTION INTRAVENOUS; SUBCUTANEOUS at 12:41

## 2018-10-18 RX ADMIN — ENOXAPARIN SODIUM 40 MG: 40 INJECTION SUBCUTANEOUS at 09:02

## 2018-10-18 NOTE — ASSESSMENT & PLAN NOTE
Lab Results   Component Value Date    HGBA1C 6 7 (H) 07/25/2018       Recent Labs      10/17/18   1312  10/17/18   1542  10/17/18   2118  10/18/18   0735   POCGLU  150*  228*  165*  106       Blood Sugar Average: Last 72 hrs:  (P) 182 [id number]     Maintain SSI  A1c at goal

## 2018-10-18 NOTE — SOCIAL WORK
Rec a call from dtr with concerns about discharge with 02 needs, being on lasix, question pain in calf could it be a blood clot and pt is not walking  Dtr feels pt might not be ready for discharge today  This information given to MD and MD planning on calling pt  Informed dtr RT will do eval to determine if pt needs 02 at home and this will be order if she needs it  Informed dtr nursing staff were going to walk pt in the hallway  Dtr would like information on private duty in the home  A paper was given to pt about this  Pt stated he amb in the hallway with staff today  CM will f/u

## 2018-10-18 NOTE — PLAN OF CARE
Problem: PHYSICAL THERAPY ADULT  Goal: Performs mobility at highest level of function for planned discharge setting  See evaluation for individualized goals  Treatment/Interventions: Functional transfer training, LE strengthening/ROM, Elevations, Therapeutic exercise, Endurance training, Patient/family training, Equipment eval/education, Bed mobility, Gait training, Spoke to nursing, OT, Family  Equipment Recommended: Obie Castleman       See flowsheet documentation for full assessment, interventions and recommendations  Outcome: Progressing  Prognosis: Fair  Problem List: Decreased strength, Decreased range of motion, Decreased endurance, Impaired balance, Decreased mobility  Assessment: Pt performs transfers with supervision A and verbal cues for proper handplacement  PT ambulates with use of Rw with supervision and cues for pacing techniques, breathing techniques, cues to remain within walker bounds at all times  Pt requires one standing rest break due to SON  Pt  desats to 91% on 1L, rebounds to 94-95% > 1 minute  Pt progressed to stair climbing, pt performed one step x2 with use of b/l rails with close supervision and cues for sequencing  No gross lob noted  PT performed seated b/l [place] arom exercises x 20 reps without dififculty  PT will benefit from continued skilled inpt PT inorder to maximize safe functional mobility and independence for safe d/c to home  Recommend Home with HHPT and family support and assistance PRN  Barriers to Discharge: Decreased caregiver support  Barriers to Discharge Comments: JEN home   Recommendation: Home PT, Home with family support     PT - OK to Discharge: Yes (to home when medically cleared  )    See flowsheet documentation for full assessment

## 2018-10-18 NOTE — ASSESSMENT & PLAN NOTE
Due to lung cancer with recent chemotherapy, possible right upper lung pneumonitis present on admission, can't rule out tracheobronchitis  Patient has improved  She is on day 7 of 7 of antibiotic

## 2018-10-18 NOTE — PROGRESS NOTES
Progress Note - Jaya Webb 1935, 80 y o  female MRN: [id number]    Unit/Bed#: E4 -01 Encounter: [id number]    Primary Care Provider: Mirella Coombs DO   Date and time admitted to hospital: 10/11/2018  8:58 PM        * Acute respiratory failure with hypoxia [facility])   Assessment & Plan    Multifactorial Secondary to lung cancer, chemotherapy, pleural effusion, COPD, deconditioning  Will order home O2 evaluation today      SIRS (systemic inflammatory response syndrome) (HCC)   Assessment & Plan    Due to lung cancer with recent chemotherapy, possible right upper lung pneumonitis present on admission, can't rule out tracheobronchitis  Patient has improved  She is on day 7 of 7 of antibiotic  Lung cancer [facility])   Assessment & Plan    Stage IV  With favorable response to her chemo  She was supposed to undergo her cycle today  This will need to be rescheduled in a few days since she is currently hospitalized     Pericardial effusion   Assessment & Plan    Status post pericardial window  Repeat echocardiogram showed small effusion without hemodynamic compromise  This is effusion is malignant  Ongoing chemotherapy         Paroxysmal atrial fibrillation (HCC)   Assessment & Plan    Continue amiodarone 100 mg daily     Continuous opioid dependence ([facility] [address] )   Assessment & Plan    Due to cancer related pain  On fentanyl patch      Diabetes mellitus [facility])   Assessment & Plan    Lab Results   Component Value Date    HGBA1C 6 7 (H) 07/25/2018       Recent Labs      10/17/18   1312  10/17/18   1542  10/17/18   2118  10/18/18   0735   POCGLU  150*  228*  165*  106       Blood Sugar Average: Last 72 hrs:  (P) 182 [id number]     Maintain SSI  A1c at goal     Right calf pain   Assessment & Plan    Low suspicion for DVT on exam however due to stage IV cancer, she remains at risk  Her daughter is also very worried about this  Will ordered lower extremity Doppler just to rule out DVT     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   Assessment & Plan    Patient's daughter is concerned about her weakness and fatigue  I told her that this is multifactorial due to her stage IV cancer, acute illness, deconditioning  I ask physical therapy to re-evaluate her today  The patient herself does not want to go to inpatient rehab     Hypertension   Assessment & Plan    Stable on metoprolol         VTE Pharmacologic Prophylaxis:   Pharmacologic: Enoxaparin (Lovenox)  Mechanical VTE Prophylaxis in Place: Yes    Patient Centered Rounds: I have performed bedside rounds with nursing staff today  Education and Discussions with Family / Patient:  Spoke with daughter and gave update  Answered her questions and addressed her concerns    Time Spent for Care: 30 minutes  More than 50% of total time spent on counseling and coordination of care as described above  Current Length of Stay: 7 day(s)    Current Patient Status: Inpatient   Certification Statement: The patient will continue to require additional inpatient hospital stay due to Weakness, reported right calf pain    Discharge Plan: To be determined  Planned discharge canceled for today    Code Status: Level 3 - DNAR and DNI      Subjective: The patient complained of right calf pain  She admits feeling weak and not ambulating much    Objective:     Vitals:   Temp (24hrs), Av 2 °F (37 3 °C), Min:97 7 °F (36 5 °C), Max:100 1 °F (37 8 °C)    Temp:  [97 7 °F (36 5 °C)-100 1 °F (37 8 °C)] 97 7 °F (36 5 °C)  HR:  [] 80  Resp:  [18-20] 20  BP: (106-144)/(65-84) 112/78  SpO2:  [96 %-98 %] 98 %  Body mass index is 27 46 kg/m²  Input and Output Summary (last 24 hours): Intake/Output Summary (Last 24 hours) at 10/18/18 1156  Last data filed at 10/17/18 1941   Gross per 24 hour   Intake                0 ml   Output              275 ml   Net             -275 ml       Physical Exam:     Physical Exam   Constitutional: She appears well-developed and well-nourished     HENT:   Head: Normocephalic and atraumatic  Eyes: No scleral icterus  Neck: No JVD present  Cardiovascular: Regular rhythm  Pulmonary/Chest:   Faint crackles at both bases   Abdominal: Soft  She exhibits no distension  There is no tenderness  Musculoskeletal: She exhibits no edema, tenderness or deformity  Bilateral calves are symmetrical without palpable cord or tenderness  Skin: Skin is warm  Psychiatric: She has a normal mood and affect  Additional Data:     Labs:      Results from last 7 days  Lab Units 10/17/18  0527 10/16/18  0530  10/13/18  0527   WBC Thousand/uL 4 92 3 91*  < > 6 89   HEMOGLOBIN g/dL 12 0 12 2  < > 11 5   HEMATOCRIT % 37 3 37 4  < > 35 9   PLATELETS Thousands/uL 112* 135*  < > 121*   BANDS PCT %  --  4  < >  --    NEUTROS PCT %  --   --   --  79*   LYMPHS PCT %  --   --   --  18   LYMPHO PCT % 46* 43  < >  --    MONOS PCT %  --   --   --  1*   MONO PCT MAN % 8 2*  < >  --    EOS PCT %  --   --   --  2   EOSINO PCT MANUAL % 0 0  < >  --    < > = values in this interval not displayed  Results from last 7 days  Lab Units 10/17/18  0527  10/11/18  2155   SODIUM mmol/L 136  < > 139   POTASSIUM mmol/L 3 2*  < > 4 2   CHLORIDE mmol/L 98*  < > 104   CO2 mmol/L 30  < > 25   BUN mg/dL 16  < > 17   CREATININE mg/dL 0 71  < > 0 88   ANION GAP mmol/L 8  < > 10   CALCIUM mg/dL 8 8  < > 7 7*   ALBUMIN g/dL  --   --  2 2*   TOTAL BILIRUBIN mg/dL  --   --  0 50   ALK PHOS U/L  --   --  84   ALT U/L  --   --  41   AST U/L  --   --  22   < > = values in this interval not displayed      Results from last 7 days  Lab Units 10/11/18  2120   INR  1 04       Results from last 7 days  Lab Units 10/18/18  0735 10/17/18  2118 10/17/18  1542 10/17/18  1312 10/17/18  1141 10/17/18  0855 10/16/18  2045 10/16/18  1646 10/16/18  1054 10/16/18  0723 10/15/18  2117 10/15/18  1638   POC GLUCOSE mg/dl 106 165* 228* 150* 206* 134 252* 289* 190* 145* 156* 292*           Results from last 7 days  Lab Units 10/16/18  0587 10/15/18  0636 10/14/18  1237 10/14/18  0546 10/13/18  0650 10/13/18  0527  10/11/18  2338 10/11/18  2120   LACTIC ACID mmol/L  --   --   --   --  2 0  --   --  2 2* 3 5*   PROCALCITONIN ng/ml 0 53* 0 63* 0 56* 1 11*  --  1 60*  < >  --   --    < > = values in this interval not displayed  * I Have Reviewed All Lab Data Listed Above  * Additional Pertinent Lab Tests Reviewed: All Labs Within Last 24 Hours Reviewed    Imaging:    Imaging Reports Reviewed Today Include:  None      Recent Cultures (last 7 days):       Results from last 7 days  Lab Units 10/11/18  2155 10/11/18  2120   BLOOD CULTURE  No Growth After 5 Days  No Growth After 5 Days  Last 24 Hours Medication List:     Current Facility-Administered Medications:  acetaminophen 488 mg Oral Q6H PRN Katiuska Guerrero PA-C   ALPRAZolam 0 5 mg Oral HS PRN Katiuska Guerrero PA-C   amiodarone 100 mg Oral Daily Miranda Mitchell MD   aspirin 81 mg Oral Daily Katiuska Guerrero PA-C   cefuroxime 500 mg Oral Q12H [address] JessicaSAMANTHA Torres   citalopram 20 mg Oral Daily Katiuska Guerrero PA-C   enoxaparin 40 mg Subcutaneous Daily Katiuska Guerrero PA-C   fentaNYL 1 patch Transdermal Q72H Katiuska Guerrero PA-C   folic acid 1 mg Oral Daily Katiuska Guerrero PA-C   furosemide 20 mg Oral Daily SAMANTHA Hernández   insulin lispro 1-5 Units Subcutaneous TID AC Katiuska Guerrero PA-C   insulin lispro 1-5 Units Subcutaneous HS Katiuska Guerrero PA-C   metoprolol tartrate 12 5 mg Oral Q12H [address] Katiuska Guerrero PA-C   ondansetron 4 mg Intravenous Q6H PRN Katiuska Guerrero PA-C   oxyCODONE 2 5 mg Oral Q4H PRN Katiuska Guerrero PA-C        Today, Patient Was Seen By: Ruy Lopez MD    ** Please Note: Dictation voice to text software may have been used in the creation of this document   **

## 2018-10-18 NOTE — PROGRESS NOTES
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 pleural study  1  Oncology following with plans for discharge today and chemotherapy treatment tomorrow  3  Right calf pain  1  Dopplers   4  Acute on chronic diastolic heart failure  1  Transition to p o  Lasix:   Plan to continue 20 mg p o  Lasix for 3 days  2  Continue strict I&Os daily weights:   Continues to demonstrate weight loss        Subjective:   Lying was seen in the bathroom getting cleaned up upon entering the room  She denies acute overnight events  She does report mild pain with palpation to the right calf, which is new today  She denies:  Chest pain, pain with inspiration, fevers, chills, night sweats, nausea, vomiting diarrhea, headache, dizziness, bronchospasm hemoptysis  She denies significant cough or sputum production and feels that her shortness of breath is improved today    Objective:         Vitals: Blood pressure 112/78, pulse 80, temperature 97 7 °F (36 5 °C), temperature source Temporal, resp  rate 20, height 5' 2" (1 575 m), weight 68 1 kg (150 lb 2 1 oz), SpO2 98 %  , 1LNC, Body mass index is 27 46 kg/m²        Intake/Output Summary (Last 24 hours) at 10/18/18 1126  Last data filed at 10/17/18 1941   Gross per 24 hour   Intake                0 ml   Output              875 ml   Net             -875 ml         Physical Exam  Gen: Awake, alert, oriented x 3, no acute distress  HEENT: Mucous membranes moist, no oral lesions, no thrush, oxygen via NC  NECK: No accessory muscle use, JVP not elevated  Cardiac: Regular, single S1, single S2, no murmurs, no rubs, no gallops  Lungs: faint bibasilar rales, no rhonchi or wheezes  Abdomen: normoactive bowel sounds, soft nontender, nondistended, no rebound or rigidity, no guarding  Extremities: no cyanosis, no clubbing, no edema, tenderness posterior right calf-no redness or swelling    Labs: I have personally reviewed pertinent lab results  , CBC: No results found for: WBC, HGB, HCT, MCV, PLT, ADJUSTEDWBC, MCH, MCHC, RDW, MPV, NRBC, CMP: No results found for: NA, K, CL, CO2, ANIONGAP, BUN, CREATININE, GLUCOSE, CALCIUM, AST, ALT, ALKPHOS, PROT, ALBUMIN, BILITOT, EGFR  Imaging and other studies: none      Lucina Mckeon

## 2018-10-18 NOTE — ASSESSMENT & PLAN NOTE
Patient's daughter is concerned about her weakness and fatigue  I told her that this is multifactorial due to her stage IV cancer, acute illness, deconditioning  I ask physical therapy to re-evaluate her today  The patient herself does not want to go to inpatient rehab

## 2018-10-18 NOTE — RESPIRATORY THERAPY NOTE
Home Oxygen Qualifying Test       Patient name: Lennie Martinez        : 1935   Date of Test:  2018  Diagnosis:      Home Oxygen Test:    **Medicare Guidelines require item(s) 1-5 on all ambulatory patients or 1 and 2 on non-ambulatory patients  1   Baseline SPO2 on Room Air at rest 87%  2   SPO2 during exercise on Room Air NA  During exercise monitor SpO2  If SPO2 increases >=89% with ambulation do not add supplemental oxygen  If <= 88% on room air add O2 via NC and titrate patient  Patient must be ambulated with O2 and titrated to > 88% with exertion  3   SPO2 on Oxygen at rest 95% on 3 lpm     4   SPO2 during exercise on Oxygen  93% a liter flow of 3 lpm     5   Exercise performed:        Walked with patient for 4 minutes because patient could not tolerate any longer  [x]  Supplemental Home Oxygen is indicated  []  Client does not qualify for home oxygen        Respiratory Additional Notes    Mor Jones, RT

## 2018-10-18 NOTE — ASSESSMENT & PLAN NOTE
Stage IV  With favorable response to her chemo  She was supposed to undergo her cycle today  This will need to be rescheduled in a few days since she is currently hospitalized

## 2018-10-18 NOTE — SOCIAL WORK
RT eval for home 02 and pt will need oxygen  Referral made to [facility] will f/u with script for MD  Pt received the 02 tank today

## 2018-10-18 NOTE — ASSESSMENT & PLAN NOTE
Status post pericardial window  Repeat echocardiogram showed small effusion without hemodynamic compromise  This is effusion is malignant  Ongoing chemotherapy

## 2018-10-18 NOTE — ASSESSMENT & PLAN NOTE
Low suspicion for DVT on exam however due to stage IV cancer, she remains at risk  Her daughter is also very worried about this  Will ordered lower extremity Doppler just to rule out DVT

## 2018-10-18 NOTE — ASSESSMENT & PLAN NOTE
Multifactorial Secondary to lung cancer, chemotherapy, pleural effusion, COPD, deconditioning    Will order home O2 evaluation today

## 2018-10-18 NOTE — PHYSICAL THERAPY NOTE
Physical Therapy Treatment Note     10/18/18 3389   Pain Assessment   Pain Assessment No/denies pain   Pain Score No Pain   Restrictions/Precautions   Other Precautions O2;Fall Risk;Multiple lines  (1L o2)   General   Chart Reviewed Yes   Response to Previous Treatment Patient with no complaints from previous session  Family/Caregiver Present Yes   Cognition   Overall Cognitive Status WFL   Arousal/Participation Alert   Subjective   Subjective Pt out of bed in chair upon arrival   Pt offers no complaints of pain  Reports breathing is better  Transfers   Sit to Stand 5  Supervision   Additional items Assist x 1; Armrests; Increased time required   Stand to Sit 5  Supervision   Additional items Assist x 1; Armrests; Increased time required   Ambulation/Elevation   Gait pattern Excessively slow; Foward flexed   Gait Assistance 5  Supervision   Additional items Assist x 1   Assistive Device Rolling walker   Distance 60'x2   Stair Management Assistance 5  Supervision   Additional items Assist x 1;Verbal cues; Increased time required   Stair Management Technique Two rails; Foreward;Nonreciprocal   Number of Stairs (1 step x2 )   Balance   Static Sitting Good   Static Standing Fair +   Dynamic Standing Fair +   Ambulatory Fair   Endurance Deficit   Endurance Deficit Yes   Endurance Deficit Description fatigue, mild SON   Activity Tolerance   Activity Tolerance Patient limited by fatigue   Nurse Made Aware DR Alo Davis, RN, Alexandro Marina   Exercises   Glute Sets Sitting;20 reps;AROM; Bilateral   Hip Flexion Sitting;20 reps;AROM; Bilateral   Hip Abduction Sitting;20 reps;AROM; Bilateral   Hip Adduction Sitting;20 reps;AROM; Bilateral   Knee AROM Long Arc Quad Sitting;20 reps;AROM; Bilateral   Ankle Pumps Sitting;20 reps;AROM; Bilateral  (heel/ toe raises sitting)   UE Exercise Sitting;20 reps;AROM; Bilateral  (wrist flex  /ext, elbow flex /ext , elbow pron  / supin )   Equipment Use   Comments SON O2 sat post ambulation 91% on 1 L rebounds to 94-95% > 1 minute  Assessment   Prognosis Fair   Problem List Decreased strength;Decreased range of motion;Decreased endurance; Impaired balance;Decreased mobility   Assessment Pt performs transfers with supervision A and verbal cues for proper handplacement  PT ambulates with use of Rw with supervision and cues for pacing techniques, breathing techniques, cues to remain within walker bounds at all times  Pt requires one standing rest break due to SON  Pt  desats to 91% on 1L, rebounds to 94-95% > 1 minute  Pt progressed to stair climbing, pt performed one step x2 with use of b/l rails with close supervision and cues for sequencing  No gross lob noted  PT performed seated b/l Le arom exercises x 20 reps without dififculty  PT will benefit from continued skilled inpt PT inorder to maximize safe functional mobility and independence for safe d/c to home  Recommend Home with HHPT and family support and assistance PRN  Goals   Patient Goals " to go home "    STG Expiration Date 10/22/18   Treatment Day 4   Plan   Treatment/Interventions Functional transfer training;LE strengthening/ROM; Elevations; Therapeutic exercise; Endurance training;Patient/family training;Equipment eval/education; Bed mobility;Gait training;Spoke to MD;Spoke to nursing   Progress Progressing toward goals   PT Frequency (4-5x/week)   Recommendation   Recommendation Home PT; Home with family support   PT - OK to Discharge Yes  (to home when medically cleared  )         Gianna Sheridan PTA

## 2018-10-19 ENCOUNTER — TELEPHONE (OUTPATIENT)
Dept: HEMATOLOGY ONCOLOGY | Facility: CLINIC | Age: 83
End: 2018-10-19

## 2018-10-19 ENCOUNTER — HOSPITAL ENCOUNTER (OUTPATIENT)
Dept: INFUSION CENTER | Facility: CLINIC | Age: 83
Discharge: HOME | End: 2018-10-19

## 2018-10-19 VITALS
DIASTOLIC BLOOD PRESSURE: 87 MMHG | OXYGEN SATURATION: 96 % | TEMPERATURE: 98.5 F | RESPIRATION RATE: 20 BRPM | WEIGHT: 148.81 LBS | SYSTOLIC BLOOD PRESSURE: 160 MMHG | HEART RATE: 86 BPM | HEIGHT: 62 IN | BODY MASS INDEX: 27.38 KG/M2

## 2018-10-19 LAB
GLUCOSE SERPL-MCNC: 106 MG/DL (ref 65–140)
GLUCOSE SERPL-MCNC: 121 MG/DL (ref 65–140)

## 2018-10-19 PROCEDURE — 82948 REAGENT STRIP/BLOOD GLUCOSE: CPT

## 2018-10-19 PROCEDURE — 99239 HOSP IP/OBS DSCHRG MGMT >30: CPT | Performed by: INTERNAL MEDICINE

## 2018-10-19 RX ORDER — FUROSEMIDE 20 MG/1
20 TABLET ORAL DAILY
Qty: 1 TABLET | Refills: 0 | Status: SHIPPED | OUTPATIENT
Start: 2018-10-20 | End: 2018-10-26 | Stop reason: SDUPTHER

## 2018-10-19 RX ADMIN — ASPIRIN 81 MG: 81 TABLET, COATED ORAL at 11:18

## 2018-10-19 RX ADMIN — CEFUROXIME AXETIL 500 MG: 250 TABLET ORAL at 11:18

## 2018-10-19 RX ADMIN — AMIODARONE HYDROCHLORIDE 100 MG: 200 TABLET ORAL at 09:43

## 2018-10-19 RX ADMIN — FOLIC ACID 1 MG: 1 TABLET ORAL at 11:20

## 2018-10-19 RX ADMIN — METOPROLOL TARTRATE 12.5 MG: 25 TABLET ORAL at 11:19

## 2018-10-19 RX ADMIN — ENOXAPARIN SODIUM 40 MG: 40 INJECTION SUBCUTANEOUS at 09:44

## 2018-10-19 RX ADMIN — CITALOPRAM HYDROBROMIDE 20 MG: 20 TABLET ORAL at 09:44

## 2018-10-19 RX ADMIN — FUROSEMIDE 20 MG: 20 TABLET ORAL at 11:18

## 2018-10-19 NOTE — ASSESSMENT & PLAN NOTE
Due to lung cancer with recent chemotherapy, possible right upper lung pneumonitis present on admission, can't rule out tracheobronchitis    SIRS resolved  Antibiotic course completed

## 2018-10-19 NOTE — SOCIAL WORK
Heart Failure Care Coordination Note  Met with patient and or family to establish a relationship and explain role  Heart Talk: Living with Heart Failure booklet had been given to the patient by staff  Patient's daughter Ayanna Fuentes and  were present  I did give the daughter the [employer] card and phone number  The diagnosis was familiar to the family  The daughter is the major spokesperson and coordinates care  The patient and family were engaged and will continue to follow medical regimen  Highly encouraged follow up office visit with [facility] PCP within a week of discharge  I will attempt to also have an appointment made with [address] [place] [address] Cardiology as soon as possible  I will continue to follow as an inpatient and telephonically on discharge as the patient has a [facility] PCP  The patient will resume care with [facility] on discharge and I will also collaborate with them

## 2018-10-19 NOTE — ASSESSMENT & PLAN NOTE
Multifactorial Secondary to lung cancer, chemotherapy, pleural effusion, COPD, deconditioning  The patient required oxygen at 3 L continuously    She will go home with home oxygen

## 2018-10-19 NOTE — PLAN OF CARE
Problem: Potential for Falls  Goal: Patient will remain free of falls  INTERVENTIONS:  - Assess patient frequently for physical needs  -  Identify cognitive and physical deficits and behaviors that affect risk of falls    -  [employer] fall precautions as indicated by assessment   - Educate patient/family on patient safety including physical limitations  - Instruct patient to call for assistance with activity based on assessment  - Modify environment to reduce risk of injury  - Consider OT/PT consult to assist with strengthening/mobility   Outcome: Adequate for Discharge      Problem: Prexisting or High Potential for Compromised Skin Integrity  Goal: Skin integrity is maintained or improved  INTERVENTIONS:  - Identify patients at risk for skin breakdown  - Assess and monitor skin integrity  - Assess and monitor nutrition and hydration status  - Monitor labs (i e  albumin)  - Assess for incontinence   - Turn and reposition patient  - Assist with mobility/ambulation  - Relieve pressure over bony prominences  - Avoid friction and shearing  - Provide appropriate hygiene as needed including keeping skin clean and dry  - Evaluate need for skin moisturizer/barrier cream  - Collaborate with interdisciplinary team (i e  Nutrition, Rehabilitation, etc )   - Patient/family teaching   Outcome: Adequate for Discharge      Problem: PAIN - ADULT  Goal: Verbalizes/displays adequate comfort level or baseline comfort level  Interventions:  - Encourage patient to monitor pain and request assistance  - Assess pain using appropriate pain scale  - Administer analgesics based on type and severity of pain and evaluate response  - Implement non-pharmacological measures as appropriate and evaluate response  - Consider cultural and social influences on pain and pain management  - Notify physician/advanced practitioner if interventions unsuccessful or patient reports new pain   Outcome: Adequate for Discharge      Problem: INFECTION - ADULT  Goal: Absence or prevention of progression during hospitalization  INTERVENTIONS:  - Assess and monitor for signs and symptoms of infection  - Monitor lab/diagnostic results  - Monitor all insertion sites, i e  indwelling lines, tubes, and drains  - Monitor endotracheal (as able) and nasal secretions for changes in amount and color  - [place] appropriate cooling/warming therapies per order  - Administer medications as ordered  - Instruct and encourage patient and family to use good hand hygiene technique  - Identify and instruct in appropriate isolation precautions for identified infection/condition   Outcome: Adequate for Discharge    Goal: Absence of fever/infection during neutropenic period  INTERVENTIONS:  - Monitor WBC  - Implement neutropenic guidelines   Outcome: Adequate for Discharge      Problem: SAFETY ADULT  Goal: Maintain or return to baseline ADL function  INTERVENTIONS:  -  Assess patient's ability to carry out ADLs; assess patient's baseline for ADL function and identify physical deficits which impact ability to perform ADLs (bathing, care of mouth/teeth, toileting, grooming, dressing, etc )  - Assess/evaluate cause of self-care deficits   - Assess range of motion  - Assess patient's mobility; develop plan if impaired  - Assess patient's need for assistive devices and provide as appropriate  - Encourage maximum independence but intervene and supervise when necessary  ¯ Involve family in performance of ADLs  ¯ Assess for home care needs following discharge   ¯ Request OT consult to assist with ADL evaluation and planning for discharge  ¯ Provide patient education as appropriate   Outcome: Adequate for Discharge    Goal: Maintain or return mobility status to optimal level  INTERVENTIONS:  - Assess patient's baseline mobility status (ambulation, transfers, stairs, etc )    - Identify cognitive and physical deficits and behaviors that affect mobility  - Identify mobility aids required to assist with transfers and/or ambulation (gait belt, sit-to-stand, lift, walker, cane, etc )  - [place] fall precautions as indicated by assessment  - Record patient progress and toleration of activity level on Mobility SBAR; progress patient to next Phase/Stage  - Instruct patient to call for assistance with activity based on assessment  - Request Rehabilitation consult to assist with strengthening/weightbearing, etc    Outcome: Adequate for Discharge      Problem: DISCHARGE PLANNING  Goal: Discharge to home or other facility with appropriate resources  INTERVENTIONS:  - Identify barriers to discharge w/patient and caregiver  - Arrange for needed discharge resources and transportation as appropriate  - Identify discharge learning needs (meds, wound care, etc )  - Arrange for interpretive services to assist at discharge as needed  - Refer to Case Management Department for coordinating discharge planning if the patient needs post-hospital services based on physician/advanced practitioner order or complex needs related to functional status, cognitive ability, or social support system   Outcome: Completed Date Met: 10/19/18      Problem: Knowledge Deficit  Goal: Patient/family/caregiver demonstrates understanding of disease process, treatment plan, medications, and discharge instructions  Complete learning assessment and assess knowledge base    Interventions:  - Provide teaching at level of understanding  - Provide teaching via preferred learning methods   Outcome: Adequate for Discharge      Problem: RESPIRATORY - ADULT  Goal: Achieves optimal ventilation and oxygenation  INTERVENTIONS:  - Assess for changes in respiratory status  - Assess for changes in mentation and behavior  - Position to facilitate oxygenation and minimize respiratory effort  - Oxygen administration by appropriate delivery method based on oxygen saturation (per order) or ABGs  - Initiate smoking cessation education as indicated  - Encourage broncho-pulmonary hygiene including cough, deep breathe, Incentive Spirometry  - Assess the need for suctioning and aspirate as needed  - Assess and instruct to report SOB or any respiratory difficulty  - Respiratory Therapy support as indicated   Outcome: Adequate for Discharge      Problem: METABOLIC, FLUID AND ELECTROLYTES - ADULT  Goal: Electrolytes maintained within normal limits  INTERVENTIONS:  - Monitor labs and assess patient for signs and symptoms of electrolyte imbalances  - Administer electrolyte replacement as ordered  - Monitor response to electrolyte replacements, including repeat lab results as appropriate  - Instruct patient on fluid and nutrition as appropriate   Outcome: Adequate for Discharge    Goal: Fluid balance maintained  INTERVENTIONS:  - Monitor labs and assess for signs and symptoms of volume excess or deficit  - Monitor I/O and WT  - Instruct patient on fluid and nutrition as appropriate   Outcome: Adequate for Discharge    Goal: Glucose maintained within target range  INTERVENTIONS:  - Monitor Blood Glucose as ordered  - Assess for signs and symptoms of hyperglycemia and hypoglycemia  - Administer ordered medications to maintain glucose within target range  - Assess nutritional intake and initiate nutrition service referral as needed   Outcome: Adequate for Discharge      Problem: SKIN/TISSUE INTEGRITY - ADULT  Goal: Skin integrity remains intact  INTERVENTIONS  - Identify patients at risk for skin breakdown  - Assess and monitor skin integrity  - Assess and monitor nutrition and hydration status  - Monitor labs (i e  albumin)  - Assess for incontinence   - Turn and reposition patient  - Assist with mobility/ambulation  - Relieve pressure over bony prominences  - Avoid friction and shearing  - Provide appropriate hygiene as needed including keeping skin clean and dry  - Evaluate need for skin moisturizer/barrier cream  - Collaborate with interdisciplinary team (i e  Nutrition, Rehabilitation, etc )   - Patient/family teaching   Outcome: Adequate for Discharge    Goal: Incision(s), wounds(s) or drain site(s) healing without S/S of infection  INTERVENTIONS  - Assess and document risk factors for skin impairment   - Assess and document dressing, incision, wound bed, drain sites and surrounding tissue  - Initiate Nutrition services consult and/or wound management as needed   Outcome: Adequate for Discharge      Problem: DISCHARGE PLANNING - CARE MANAGEMENT  Goal: Discharge to post-acute care or home with appropriate resources  INTERVENTIONS:  - Conduct assessment to determine patient/family and health care team treatment goals, and need for post-acute services based on payer coverage, community resources, and patient preferences, and barriers to discharge  - Address psychosocial, clinical, and financial barriers to discharge as identified in assessment in conjunction with the patient/family and health care team  - Arrange appropriate level of post-acute services according to patients   needs and preference and payer coverage in collaboration with the physician and health care team  - Communicate with and update the patient/family, physician, and health care team regarding progress on the discharge plan  - Arrange appropriate transportation to post-acute venues   Outcome: Completed Date Met: 10/19/18

## 2018-10-19 NOTE — SOCIAL WORK
MD planning on discharging pt today  Script and eval for home 02 fax by [place] to [address] [place] [address] 02 tank in room  Will f/u with faxing discharge instruction sheets to [address] [place] [address] Rd

## 2018-10-19 NOTE — DISCHARGE INSTRUCTIONS
Take Lasix 20 mg daily for 3 days  Please follow-up with your family doctor for coordination of care and for routine post hospitalization visit  Your scheduled to follow up with the lung doctor on 11/21/2018  You qualified for oxygen and continue oxygen at 3 L at all times

## 2018-10-19 NOTE — ASSESSMENT & PLAN NOTE
Lab Results   Component Value Date    HGBA1C 6 7 (H) 07/25/2018       Recent Labs      10/18/18   1704  10/18/18   2123  10/19/18   0804  10/19/18   1126   POCGLU  150*  124  106  121       Blood Sugar Average: Last 72 hrs:  (P) 330 [id number]     Resume low-dose metformin on discharge  A1c at goal

## 2018-10-19 NOTE — ASSESSMENT & PLAN NOTE
Patient's daughter is concerned about her weakness and fatigue  I told her that this is multifactorial due to her stage IV cancer, acute illness, deconditioning  Home PT and [facility]

## 2018-10-26 ENCOUNTER — TELEPHONE (OUTPATIENT)
Dept: PALLIATIVE MEDICINE | Facility: CLINIC | Age: 83
End: 2018-10-26

## 2018-10-26 DIAGNOSIS — J96.01 ACUTE RESPIRATORY FAILURE WITH HYPOXIA (HCC): ICD-10-CM

## 2018-10-26 RX ORDER — FUROSEMIDE 20 MG/1
20 TABLET ORAL DAILY
Qty: 30 TABLET | Refills: 0 | Status: SHIPPED | OUTPATIENT
Start: 2018-10-26 | End: 2018-11-23 | Stop reason: SDUPTHER

## 2018-10-26 NOTE — TELEPHONE ENCOUNTER
Phone call made to patients daughter and had lengthy discussion with her about patients status and goals of care  She is needing more assistance at home and having a difficult time getting out of home for appointments  She shared she does not feel her father or brother will do well with patient in the home at the end of life  Made appointment for home visit as part of [address] [place] [address] on Wednesday 10/31/18  The patient,, son and daughter will be present during visit

## 2018-10-31 ENCOUNTER — IN HOME VISIT (OUTPATIENT)
Dept: PALLIATIVE MEDICINE | Facility: CLINIC | Age: 83
End: 2018-10-31
Payer: MEDICARE

## 2018-10-31 VITALS
SYSTOLIC BLOOD PRESSURE: 148 MMHG | WEIGHT: 148 LBS | RESPIRATION RATE: 20 BRPM | DIASTOLIC BLOOD PRESSURE: 70 MMHG | BODY MASS INDEX: 27.07 KG/M2 | OXYGEN SATURATION: 97 % | HEART RATE: 72 BPM

## 2018-10-31 DIAGNOSIS — F41.8 ANXIETY ABOUT HEALTH: Primary | ICD-10-CM

## 2018-10-31 PROCEDURE — 99490 CHRNC CARE MGMT STAFF 1ST 20: CPT

## 2018-10-31 RX ORDER — SODIUM CHLORIDE 9 MG/ML
20 INJECTION, SOLUTION INTRAVENOUS CONTINUOUS
Status: DISCONTINUED | OUTPATIENT
Start: 2018-11-01 | End: 2018-11-04 | Stop reason: HOSPADM

## 2018-10-31 NOTE — PROGRESS NOTES
Assessment/Plan:      There are no diagnoses linked to this encounter  Subjective:     Patient ID: Brian Pereira is a 80 y o  female  Visit made to patients home as part of the Palliative Care at Home program through [facility]  Present during the visit were the patient,  and her daughter and son  Goals of Care conversation resulted in reviewing patients goal to attend a wedding this weekend and she wants to continue with chemotherapy  Feels less debilitated than she did 2 weeks ago when returned home from hospital  I did review Palliative Care at home program and the Hospice program as option for her in her home in future if she decided to go without the chemotherapy  Has 5 wishes filled out with daughter as health care proxy  Reviewed filling out of POLST form and they will discuss with rest of family  Lorazepam 0 5mg tablet 1 daily as needed for anxiety beeds refill at [address] [facility] reordered this for patient  Patient has a goal to go to the wedding of her 22year old  Rosanna Aver  She will ask Dr Rylie Garcia for the official advice as to should she go to this in [facility]  [facility] need to gain strength through her therapy program at home first then this may be an acheivable goal      Portable oxygen concentrator needed, Daughter spoke with Andrés Galvez there while this nurse was present  Review of Systems   Constitutional: Positive for fatigue  HENT: Negative  Eyes: Negative  Respiratory:        Lungs clear upon auscultation  Cardiovascular: Negative  Gastrointestinal: Negative  Endocrine: Negative  Genitourinary: Positive for frequency  Musculoskeletal: Negative  Skin: Negative  Allergic/Immunologic: Negative  Neurological: Negative  Hematological: Negative  Psychiatric/Behavioral: Negative            Objective:     Physical Exam Constitutional: She is oriented to person, place, and time  She appears well-nourished  Neck: Normal range of motion  Cardiovascular: Normal rate, regular rhythm and normal heart sounds  Pulmonary/Chest: Breath sounds normal    Abdominal: Soft  Bowel sounds are normal    Musculoskeletal: Normal range of motion  Neurological: She is alert and oriented to person, place, and time  Skin: Skin is warm and dry  Psychiatric: She has a normal mood and affect  Her behavior is normal  Judgment and thought content normal        Plan to revisit 1 month with CRNP to assess any change in goals of care and management with chemotherapy  Sign POLST if needed

## 2018-11-01 ENCOUNTER — HOSPITAL ENCOUNTER (OUTPATIENT)
Dept: INFUSION CENTER | Facility: CLINIC | Age: 83
Discharge: HOME | End: 2018-11-01

## 2018-11-01 ENCOUNTER — OFFICE VISIT (OUTPATIENT)
Dept: HEMATOLOGY ONCOLOGY | Facility: CLINIC | Age: 83
End: 2018-11-01
Payer: MEDICARE

## 2018-11-01 ENCOUNTER — TELEPHONE (OUTPATIENT)
Dept: HEMATOLOGY ONCOLOGY | Facility: CLINIC | Age: 83
End: 2018-11-01

## 2018-11-01 ENCOUNTER — TELEPHONE (OUTPATIENT)
Dept: INFUSION CENTER | Facility: CLINIC | Age: 83
End: 2018-11-01

## 2018-11-01 VITALS
WEIGHT: 150 LBS | HEIGHT: 61 IN | DIASTOLIC BLOOD PRESSURE: 78 MMHG | OXYGEN SATURATION: 99 % | HEART RATE: 63 BPM | BODY MASS INDEX: 28.32 KG/M2 | SYSTOLIC BLOOD PRESSURE: 131 MMHG | RESPIRATION RATE: 18 BRPM

## 2018-11-01 DIAGNOSIS — C34.82 MALIGNANT NEOPLASM OF OVERLAPPING SITES OF LEFT LUNG (HCC): ICD-10-CM

## 2018-11-01 DIAGNOSIS — C80.1 MALIGNANT PERICARDIAL EFFUSION (HCC): ICD-10-CM

## 2018-11-01 DIAGNOSIS — C78.2 PLEURAL METASTASIS (HCC): Primary | ICD-10-CM

## 2018-11-01 DIAGNOSIS — I31.3 MALIGNANT PERICARDIAL EFFUSION (HCC): ICD-10-CM

## 2018-11-01 PROCEDURE — 99214 OFFICE O/P EST MOD 30 MIN: CPT | Performed by: INTERNAL MEDICINE

## 2018-11-01 RX ORDER — ALPRAZOLAM 0.5 MG/1
0.5 TABLET ORAL
Qty: 30 TABLET | Refills: 0 | Status: SHIPPED | OUTPATIENT
Start: 2018-11-01 | End: 2018-12-04 | Stop reason: SDUPTHER

## 2018-11-01 NOTE — TELEPHONE ENCOUNTER
TIME OUT    Spoke to Julissa Plasencia RN    11/1/2018 at [facility] chemotherapy was held today as she has a wedding on 5/3/2018  Patient rescheduled for for Cycle #3 on 11/8/2018  New orders to follow   Michelle Gutierrez, pharmacist, made aware

## 2018-11-01 NOTE — PROGRESS NOTES
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 diagnosed in May 2017, molecular tests were negative for EGFR mutation, ALK gene arrangement, Ross 1 mutation, B Darian mutation, PD L1 expression of 10% only  She was treated with Alimta/carboplatin with excellent response initially and later on she relapse on maintenance Alimta, adding carboplatin induced allergic reaction  We change therapy to Pembrolizumab unfortunately she did not have a good response  We added Taxotere in August 2018 unfortunately no response with increase in the studding of the left pleura and more symptoms   Will try gemcitabine 850 milligram/meter squared weekly 3 weeks on 1 week off, CBC, BMP prior to each Gemzar  500 mL normal saline hydration with every chemotherapy   the therapy was complicated with fever reaction to gemcitabine   Requiring admission to the hospital for 1 week, infection workup was negative, CT scan shows significant decrease and improvement of the studding of the left pleura and decrease in the subcarinal lymphadenopathy  At this time I will change gemcitabine to 1000 milligram/meter squared IV his every other week, the patient to have acetaminophen 650 mg every 8 hours for the next 36 hours of gemcitabine  She will continue on dexamethasone 2 mg p o   Daily  Follow-up in 1 month with CBC, CMP  She declined hospice        HPI:  71-year-old  female who presented to [facility] in May 2017 with dyspnea for the past 4-5 days and pleurisy, with occasional dry cough scan of the thorax showed no evidence of PE, it showed a large loculated left side pleural effusion with nodularity concerning for metastatic disease, there is a concern for a mass versus pneumonia in the collapsed left lower lobe lung underwent left thoracentesis yielding 1 4 L of bloody fluid, pathology showed adenocarcinoma, positive for TTF-1, CK 7 most likely consistent with non-small cell lung cancer patient had a history of left-sided breast cancer in 1992 status post mastectomy she told me she had told lymph node involvement, she was not treated with either chemotherapy, radiation therapy or hormonal therapy has extensive family history of cancer, sister was diagnosed with breast cancer at age 36, another sister was diagnosed with breast cancer at age 72, a brother diagnosed with pancreatic cancer and another brother with lung cancer niece was found to have BRCA gene mutation         molecular tests:negative for targeted mutation such as EGFR, ALK gene rearrangement, Ross 1, B Darian, PD L1 expression of 10%         Previous Treatment:  Carboplatin AUC 5, Alimta 500 milligram/meter squared every 3 weeks initiated in July 2017 finished 6 cycles in November 2017 and then she received maintenance Alimta from November 2017 until April 2018 with progression of disease  A repeat core biopsy negative for EGFR mutation, Ross 1 mutation, B Darain mutation, ALK gene rearrangement, PDL expression of 10%, no evidence of BRCA1/ 2 mutation, she was treated again with short course of carboplatin and Alimta with allergic reaction to carboplatin then initiated on Pembrolizumab 200 mg flat dose every 3 weeks in May 2018 until August 2018 with admission to the hospital with malignant pleural effusion status post pericardiocentesis 400 mL, CT scan showed studding of the left pleura, more small new nodules of the pleura consistent with progression of disease on Pembrolizumab  Taxotere 50 milligram/meter squared weekly 3 weeks on 1 week of cycle 1   In September 2018 after progression of disease, unfortunately after 6 weeks of treatment she has progression of disease with left pleurisy    Interval History:   Few hours after gemcitabine she was admitted to the hospital with fever 100, workup was negative for infection, it was felt this is secondary to gemcitabine, CT scan showed significant decrease in the left-sided pleural studding and subcarinal lymph nodes       current therapy gemcitabine 850 milligram/meter squared weekly 3 weeks on 1 week off, initiated in October 2018        Test Results:    Imaging: Xr Chest Pa & Lateral    Result Date: 10/15/2018  Narrative: CHEST INDICATION:   abnormal CXR  COMPARISON:  July 21, 2018 EXAM PERFORMED/VIEWS:  XR CHEST PA & LATERAL FINDINGS:  Right chest wall Port-A-Cath  Cardiomediastinal silhouette appears unremarkable  Small left pleural effusion  Mild pulmonary vascular congestion  Osseous structures appear within normal limits for patient age  Impression: Small left pleural effusion  Mildly vascular congestion  Workstation performed: [id number]     Ct Chest With Contrast    Result Date: 10/11/2018  Narrative: CT CHEST WITH IV CONTRAST INDICATION:   Shortness of breath worsened with exertion throughout the day  Tachycardic, hypoxic with tachypnea  History of lung cancer with malignant pleural effusion  COMPARISON:  CT chest 9/26/2018 TECHNIQUE: CT examination of the chest was performed  Axial, sagittal, and coronal 2D reformatted images were created from the source data and submitted for interpretation  Radiation dose length product (DLP) for this visit:  271 mGy-cm   This examination, like all CT scans performed in the [facility], was performed utilizing techniques to minimize radiation dose exposure, including the use of iterative reconstruction and automated exposure control  IV Contrast:  85 mL of iohexol (OMNIPAQUE) FINDINGS: Please note the study was not tailored as a dedicated CT pulmonary angiogram protocol but as a routine exam   No large emboli within the main pulmonary arteries   LUNGS:  Poorly marginated masslike consolidation and/or atelectasis in the left lower lobe is stable or decreased  This could also reflect some volume averaging with pleural metastases  Some image degradation secondary to respiratory motion  Patchy groundglass density in the right upper lobe could represent infectious or inflammatory pneumonitis  PLEURA:  There is a tiny left pleural effusion again seen  There is persistent nodular studding of the left pleural surface albeit the enhancement has significantly decreased suggesting treatment response  HEART/GREAT VESSELS:  The heart is unchanged in size  A modest sized pericardial effusion is again identified  Tumor implants suspected along the pericardium previously is not currently visible  Coronary artery calcifications  MEDIASTINUM AND RAE:  18 mm prevascular nodule with some central necrosis abutting the medial left pleural surface appears unchanged  14 mm subcarinal short axis lymph node demonstrates decreased enhancement compared to previous study suggesting treatment response  CHEST WALL AND LOWER NECK:   Right chest wall Port-A-Cath  Unchanged right thyroid nodularity  Left breast implant  VISUALIZED STRUCTURES IN THE UPPER ABDOMEN:  Fatty infiltration of the liver  No adrenal nodules seen, noting the inferior left adrenal gland is not fully visualized  OSSEOUS STRUCTURES:  Stable sclerotic osseous metastases  No acute fracture  Multilevel degenerative changes of the spine  Impression: 1  Persistent nodular studding of the left pleural surface although significant decrease in enhancement of the nodularity compatible with treatment response of pleural metastases  Tiny left pleural effusion, unchanged  2  Modest sized pericardial effusion is overall unchanged in size  Apparent pericardial metastasis seen on the previous study is no longer visible on this exam  3  Grossly stable size of subcarinal lymphadenopathy although decreased enhancement suggesting some degree of treatment response   4  Patchy groundglass density in the right upper lobe may represent infectious or inflammatory pneumonitis  5  Stable or diminished appearance of left lower lobe masslike consolidation/atelectasis  6  Stable sclerotic osseous metastases  7  Diffuse fatty infiltration of the liver  Workstation performed: [id number]     Vas Lower Limb Venous Duplex Study, Unilateral/limited    Result Date: 10/18/2018  Narrative:  [facility] REPORT CLINICAL: Indications: Right Limb Pain [M79 [id number]]  Patient is admitted with pneumonia  She states last night she had pain in her right calf  FINDINGS:  Segment  Right            Left              Impression       Impression       CFV      Normal (Patent)  Normal (Patent)     CONCLUSION: Impression: RIGHT LOWER LIMB No evidence of acute or chronic deep vein thrombosis   No evidence of superficial thrombophlebitis noted  Doppler evaluation shows a normal response to augmentation maneuvers  Popliteal, posterior tibial and anterior tibial arterial Doppler waveforms are biphasic  LEFT LOWER LIMB LIMITED Evaluation shows no evidence of thrombus in the common femoral vein  Doppler evaluation shows a normal response to augmentation maneuvers  SIGNATURE: Electronically Signed by: Emelina Reyes MD, RPVI on 2018-10-18 08:42:38 PM      Labs:   Lab Results   Component Value Date    WBC 4 92 10/17/2018    HGB 12 0 10/17/2018    HCT 37 3 10/17/2018    MCV 90 10/17/2018     (L) 10/17/2018     Lab Results   Component Value Date    K 3 2 (L) 10/17/2018    CL 98 (L) 10/17/2018    CO2 30 10/17/2018    BUN 16 10/17/2018    CREATININE 0 71 10/17/2018    GLUF 101 (H) 04/18/2018    CALCIUM 8 8 10/17/2018    AST 22 10/11/2018    ALT 41 10/11/2018    ALKPHOS 84 10/11/2018    EGFR 79 10/17/2018       No results found for: IRON, TIBC, FERRITIN    No results found for: [id number]      ROS:   Review of Systems   Constitutional: Negative for activity change, appetite change, diaphoresis, fatigue, fever and unexpected weight change     HENT: Negative for facial swelling, hearing loss, rhinorrhea, sinus pain, sinus pressure, sneezing, sore throat and tinnitus  Eyes: Negative for photophobia, pain, discharge, redness, itching and visual disturbance  Respiratory: Positive for shortness of breath ( exertional dyspnea)  Negative for apnea and chest tightness  Cardiovascular: Negative for chest pain, palpitations and leg swelling  Gastrointestinal: Negative for abdominal distention, abdominal pain, blood in stool, constipation, diarrhea, nausea, rectal pain and vomiting  Endocrine: Negative for cold intolerance, heat intolerance, polydipsia and polyphagia  Genitourinary: Negative for difficulty urinating, dyspareunia, frequency, hematuria, pelvic pain and urgency  Musculoskeletal: Negative for arthralgias, back pain, gait problem, joint swelling and myalgias  Skin: Negative for color change, pallor and rash  Allergic/Immunologic: Negative for environmental allergies and food allergies  Neurological: Negative for dizziness, tremors, seizures, syncope, speech difficulty, numbness and headaches  Hematological: Negative for adenopathy  Does not bruise/bleed easily  Psychiatric/Behavioral: Negative for agitation, confusion, dysphoric mood, hallucinations and suicidal ideas  Current Medications: Reviewed  Allergies: Reviewed  PMH/FH/SH:  Reviewed      Physical Exam:    Body surface area is 1 67 meters squared      Wt Readings from Last 3 Encounters:   11/01/18 68 kg (150 lb)   10/31/18 67 1 kg (148 lb)   10/19/18 67 5 kg (148 lb 13 oz)        Temp Readings from Last 3 Encounters:   10/19/18 98 5 °F (36 9 °C) (Tympanic)   10/11/18 98 1 °F (36 7 °C) (Tympanic)   10/09/18 98 °F (36 7 °C) (Oral)        BP Readings from Last 3 Encounters:   11/01/18 131/78   10/31/18 148/70   10/19/18 160/87         Pulse Readings from Last 3 Encounters:   11/01/18 63   10/31/18 72   10/19/18 86        Physical Exam   Constitutional: She is oriented to person, place, and time  She appears well-developed and well-nourished  No distress  HENT:   Head: Normocephalic and atraumatic  Mouth/Throat: Oropharynx is clear and moist  No oropharyngeal exudate  Eyes: Pupils are equal, round, and reactive to light  Conjunctivae and EOM are normal    Neck: Normal range of motion  Neck supple  No JVD present  No tracheal deviation present  No thyromegaly present  Cardiovascular: Normal rate and regular rhythm  Exam reveals no gallop and no friction rub  No murmur heard  Pulmonary/Chest: Effort normal and breath sounds normal  No respiratory distress  She has no wheezes  She has no rales  She exhibits no tenderness  Abdominal: Soft  Bowel sounds are normal  She exhibits no distension and no mass  There is no tenderness  There is no rebound and no guarding  Musculoskeletal: Normal range of motion  She exhibits no edema  Lymphadenopathy:     She has no cervical adenopathy  Neurological: She is alert and oriented to person, place, and time  Skin: Skin is warm and dry  No rash noted  She is not diaphoretic  No erythema  No pallor  Psychiatric: She has a normal mood and affect  Her behavior is normal  Judgment and thought content normal    Vitals reviewed  Goals and Barriers:  Current Goal: Minimize effects of disease  Barriers: None  Patient's Capacity to Self Care:  Patient is able to self care      Code Status: [unfilled]

## 2018-11-07 ENCOUNTER — OFFICE VISIT (OUTPATIENT)
Dept: CARDIOLOGY CLINIC | Facility: CLINIC | Age: 83
End: 2018-11-07
Payer: MEDICARE

## 2018-11-07 VITALS
SYSTOLIC BLOOD PRESSURE: 120 MMHG | BODY MASS INDEX: 27.79 KG/M2 | DIASTOLIC BLOOD PRESSURE: 68 MMHG | HEART RATE: 60 BPM | HEIGHT: 62 IN | WEIGHT: 151 LBS

## 2018-11-07 DIAGNOSIS — I10 ESSENTIAL HYPERTENSION: ICD-10-CM

## 2018-11-07 DIAGNOSIS — I50.32 CHRONIC DIASTOLIC HEART FAILURE (HCC): ICD-10-CM

## 2018-11-07 DIAGNOSIS — I48.0 PAF (PAROXYSMAL ATRIAL FIBRILLATION) (HCC): Primary | ICD-10-CM

## 2018-11-07 DIAGNOSIS — I48.0 PAROXYSMAL ATRIAL FIBRILLATION (HCC): ICD-10-CM

## 2018-11-07 PROCEDURE — 93000 ELECTROCARDIOGRAM COMPLETE: CPT | Performed by: STUDENT IN AN ORGANIZED HEALTH CARE EDUCATION/TRAINING PROGRAM

## 2018-11-07 PROCEDURE — 99214 OFFICE O/P EST MOD 30 MIN: CPT | Performed by: STUDENT IN AN ORGANIZED HEALTH CARE EDUCATION/TRAINING PROGRAM

## 2018-11-07 RX ORDER — SODIUM CHLORIDE 9 MG/ML
20 INJECTION, SOLUTION INTRAVENOUS CONTINUOUS
Status: DISCONTINUED | OUTPATIENT
Start: 2018-11-08 | End: 2018-11-11 | Stop reason: HOSPADM

## 2018-11-07 RX ORDER — AMIODARONE HYDROCHLORIDE 100 MG/1
100 TABLET ORAL DAILY
Qty: 90 TABLET | Refills: 3 | Status: SHIPPED | OUTPATIENT
Start: 2018-11-07 | End: 2019-02-15 | Stop reason: HOSPADM

## 2018-11-07 RX ORDER — ACETAMINOPHEN 325 MG/1
650 TABLET ORAL ONCE
Status: COMPLETED | OUTPATIENT
Start: 2018-11-08 | End: 2018-11-08

## 2018-11-07 NOTE — PROGRESS NOTES
Cardiology Consultation     Edgar Morgan  [id number]  1935  HEART & VASCULAR Chiquis Alex  [facility]  [address] [place] [address] [address]      No diagnosis found  Discussion/Summary: Ms Maeve Lopez is a 59-year-old female with past medical history significant for metastatic adenocarcinoma of the lung on chemotherapy, remote history of breast cancer status post lumpectomy in 1992, admission to the hospital in July for malignant pericardial effusion status post window and paroxysmal atrial fibrillation  She then had a repeat admission in October for shortness of breath deemed to be due to pneumonitis  Patient is here for a follow-up visit  1  Pericardial effusion with early tamponade secondary to malignant  Effusion status post window on 07/24 with drainage of 500 cc bloody effusion  -  Repeat echo on 10/18 showed a small loculated pleural effusion along the lateral aspect of the left ventricle  -  Patient had an episode of dizziness this morning which appears to be related to BPPV  2  Paroxysmal atrial fibrillation in the setting of tamponade  - Patient was in Afib with RVR on presentation in July 2018 and reverted to sinus rhythm and then subsequently had another episode of Afib with RVR at which time she was started on amiodarone and reverted to sinus rhythm and maintained sinus since then  - EKG in the office-  Sinus rhythm at a rate of 65  Left axis deviation   - Continue metoprolol tartrate 12 5 mg twice daily, decrease dose of amiodarone 200 mg daily given suspicion for interstitial pneumonitis  - CHADVASC-3-  Discussed risks benefits of anticoagulation in detail  While she is at increased risk of stroke due to high CHADVASC score, she also has a history of malignant pericardial effusion and is   Chemotherapy with most recent platelet count of 167,040  Patient and the family continue to defer anticoagulation at this point  Continue Aspirin 81mg daily      3  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  -  CT scan from October 2018 reviewed - patchy ground-glass opacities seen mostly in the right lung which may represent interstitial pneumonitis from amiodarone  She was started on amiodarone in July and also has underlying lung disease which can predispose her for amiodarone induced toxicity  -  Decrease dose of amiodarone 100 mg daily  4  Diastolic heart failure  -  Clinically euvolemic and warm  -  Home dry weight of 145-148 lb  -  Continue Lasix 20 mg daily    5  Metastatic adenocarcinoma of the lung on chemotherapy / left breast cancer status post mastectomy in 92  -  Was on Alimta, carboplatin, Keytruda and Taxotere in the past, currently on Gemcitabine       follow-up in 3 months  History of Present Illness:  80-year-old female with past medical history significant for Stage IV adenocarcinoma of the lung on chemotherapy with Gemcitabine since October (Taxotere in August 2018- no response, was on Keytruda from [facility] and Alimta and Carboplatin in the past)- diagnosed in May 2017 and left breast cancer in 92 s/p mastectomy, hypertension, type 2 diabetes presented to [facility] on 07/23 with symptoms of shortness of breath, also found to be in rapid Afib with RVR, CT chest showed a large pericardial effusion- f/u echo- large loculated circumferential PE with associated hematoma with early diastolic RV collapse- underwent window with drainage of 500cc of blood effusion on 7/24- pathology- malignant pericardial effusion, reverted to sinus rhythm while in hospital but went back to Afib on 7/26, borderline hypotension and did not tolerate CCB/BB and was started on Amiodarone and then metoprolol- given malignant effusion with overall poor prognosis, anticoagulation was deferred and she was maintained on ASA 81mg daily, she reverted back to sinus rhythm and maintained sinus since 7/27  During the last clinic visit, patient was doing well     Dose of metoprolol was decreased to 12 5 mg twice daily due to sinus bradycardia  Patient presented to the ED on 10/11/2018 with symptoms of worsening shortness of breath, hypoxia and tachypnea  She was started on broad-spectrum antibiotics for possible sepsis and CT scan showed improvement in her lung nodules  Repeat echo showed a small pericardial effusion without evidence of tamponade  Increasing dyspnea was suspected due to pneumonitis  She was started on furosemide 20 mg every other day  She was discharged on 10/19/2018 on 3 L home oxygen  She was referred for home hospice program however she declined that end of October  Since being discharged, patient had a follow-up with her oncologist at which time she was started on Lasix 40 mg daily  Patient is here for a follow-up visit  She reports doing fine  She reports an episode of dizziness this morning -dizziness was associated with vertigo with started while she was walking in the living room after using the restroom  No loss of consciousness  No tingling or numbness in any part of the body  Dizziness started with sudden change of head motion  Patient reports a history of BPPV  No ringing sensation in the year  No associated chest pain or pressure  No palpitations  No shortness of breath at rest or with daily exertion  No swelling of the lower extremities  Continues to remain on 200 mg of amiodarone  Been compliant with her medications  Lives with  with her daughter and son-in-law, son visiting her every day  Patient is accompanied by her daughter during current clinic visit          Patient Active Problem List   Diagnosis    [facility]    Diabetes mellitus ([facility] )    Hypertension    Pericardial effusion    Paroxysmal atrial fibrillation (HCC)    SIRS (systemic inflammatory response syndrome) (HCC)    HAP (hospital-acquired pneumonia)    Type 2 myocardial infarction ([facility] )    Continuous opioid dependence ([facility] )    HCAP (healthcare-associated pneumonia)    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[address] )    Acute respiratory failure with hypoxia (HCC)    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    Right calf pain     Past Medical History:   Diagnosis Date    TANYA (acute kidney injury) ([place] [address] ) 7/24/2018    [facility] cancer[facility] ([facility] )     Diabetes mellitus ([place] [address] )     HAP (hospital-acquired pneumonia) 7/24/2018    Hypertension     Lung cancer ([place] [address] )     Left Lower Lobe     NSTEMI (non-ST elevated myocardial infarction) ([place] [address] ) 7/24/2018    Pericardial effusion     Rapid atrial fibrillation (HCC)      Social History     Social History    Marital status: /Civil Union     Spouse name: N/A    Number of children: N/A    Years of education: N/A     Occupational History    Not on file  Social History Main Topics    Smoking status: Former Smoker     Packs/day: 0 50     Years: 10 00     Quit date: 1960    Smokeless tobacco: Never Used      Comment: Socially     Alcohol use No    Drug use: No    Sexual activity: No     Other Topics Concern    Not on file     Social History Narrative    No narrative on file      Family History   Problem Relation Age of Onset    Heart attack Mother     Breast cancer Father 79    Breast cancer Sister 61    Pancreatic cancer Sister 79    Lung cancer Brother 79        Smoker     Breast cancer Sister 36    Pancreatic cancer Brother 79    Colon cancer Brother 61     Past Surgical History:   Procedure Laterality Date    FRACTURE SURGERY      R arm surgery    IR PORT PLACEMENT  9/5/2018    MASTECTOMY      L breast with implant    OH INCIS HEART SAC WINDW FOR DRAIN N/A 7/24/2018    Procedure: WINDOW PERICARDIAL   Subxyphoid approach ;  Surgeon: Olga Lidia Sanderson MD;  Location: [facility];  Service: Thoracic    REDUCTION MAMMAPLASTY      R breast       Current Outpatient Prescriptions:     acetaminophen (TYLENOL) 500 mg tablet, Take 500 mg by mouth, Disp: , Rfl:     ALPRAZolam (XANAX) 0 5 mg tablet, Take 1 tablet (0 5 mg total) by mouth daily at bedtime as needed for anxiety, Disp: 30 tablet, Rfl: 0    amiodarone 200 mg tablet, Take 1 tablet (200 mg total) by mouth daily, Disp: 90 tablet, Rfl: 3    aspirin (ECOTRIN LOW STRENGTH) 81 mg EC tablet, Take 81 mg by mouth daily, Disp: , Rfl:     citalopram (CeleXA) 20 mg tablet, Take 20 mg by mouth daily, Disp: , Rfl:     CRANIAL PROSTHESIS, RX,, 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, Disp: 1 each, Rfl: 0    cyanocobalamin 1000 MCG tablet, Take 100 mcg by mouth daily, Disp: , Rfl:     dexamethasone (DECADRON) 1 mg tablet, Take 1 tablet (1 mg total) by mouth daily with breakfast, Disp: 30 tablet, Rfl: 3    fentaNYL (DURAGESIC) 12 mcg/hr TD 72 hr patch, Apply 2 patches every 3 days, Disp: 20 patch, Rfl: 0    folic acid (FOLVITE) 1 mg tablet, Take 1 mg by mouth daily, Disp: , Rfl:     furosemide (LASIX) 20 mg tablet, Take 1 tablet (20 mg total) by mouth daily for 3 days, Disp: 30 tablet, Rfl: 0    lidocaine-prilocaine (EMLA) cream, Apply topically as needed for mild pain 1 hour prior to port access, Disp: 30 g, Rfl: 1    metFORMIN (GLUCOPHAGE) 500 mg tablet, Take 500 mg by mouth daily with breakfast, Disp: , Rfl:     metoprolol tartrate (LOPRESSOR) 25 mg tablet, Take 0 5 tablets (12 5 mg total) by mouth every 12 (twelve) hours, Disp: 90 tablet, Rfl: 3    ondansetron (ZOFRAN) 4 mg tablet, Take 1 tablet (4 mg total) by mouth every 8 (eight) hours as needed for nausea or vomiting, Disp: 20 tablet, Rfl: 0    oxyCODONE (ROXICODONE) 5 mg immediate release tablet, Take 0 5 tablets (2 5 mg total) by mouth every 4 (four) hours as needed for moderate pain Max Daily Amount: 15 mg, Disp: 45 tablet, Rfl: 0  No current facility-administered medications for this visit       Facility-Administered Medications Ordered in Other Visits:     [START ON 11/8/2018] acetaminophen (TYLENOL) tablet 650 mg, 650 mg, Oral, Once, Chun Pratt MD    [START ON 11/8/2018] gemcitabine hcl (GEMZAR) 1,760 mg in sodium chloride 0 9 % 250 mL chemo infusion, 1,760 mg, Intravenous, Once, MD Yasmin Negro  [START ON 11/8/2018] heparin lock flush 100 units/mL injection 300 Units, 300 Units, Intracatheter, Q1H PRN, MD Yasmin Negro  [START ON 11/8/2018] ondansetron (ZOFRAN) 8 mg in sodium chloride 0 9 % 50 mL IVPB, 8 mg, Intravenous, Once, MD Yasmin Negro  Qiana Andrewser ON 11/8/2018] sodium chloride 0 9 % infusion, 20 mL/hr, Intravenous, Continuous, Harish Whiteside MD  Allergies   Allergen Reactions    Atorvastatin Other (See Comments)    Benzonatate Other (See Comments)    Carboplatin      Pt had allergic reaction during 8th carbo dose    Sulfa Antibiotics     Bactrim [Sulfamethoxazole-Trimethoprim] Rash    Latex Rash    Other Rash     Pt states she gets a rash from surgical tape, she is ok with paper tape         Labs:  Admission on 10/11/2018, Discharged on 10/19/2018   No results displayed because visit has over 200 results        Hospital Outpatient Visit on 10/11/2018   Component Date Value    WBC 10/11/2018 7 90     Adjusted WBC 10/11/2018 7 90     RBC 10/11/2018 4 84     Hemoglobin 10/11/2018 13 3     Hematocrit 10/11/2018 43 4     MCV 10/11/2018 90     MCH 10/11/2018 27 5     MCHC 10/11/2018 30 8*    RDW 10/11/2018 14 9     MPV 10/11/2018 7 1*    Platelets 86/45/9098 168     Sodium 10/11/2018 142     Potassium 10/11/2018 4 3     Chloride 10/11/2018 99     CO2 10/11/2018 29     ANION GAP 10/11/2018 14*    BUN 10/11/2018 18     Creatinine 10/11/2018 0 80     Glucose 10/11/2018 110     Calcium 10/11/2018 9 0     AST 10/11/2018 25     ALT 10/11/2018 35     Alkaline Phosphatase 10/11/2018 98     Total Protein 10/11/2018 6 1*    Albumin 10/11/2018 2 8*    Total Bilirubin 10/11/2018 0 80     eGFR 10/11/2018 68     Segmented % 10/11/2018 78*    Bands % 10/11/2018 2     Lymphocytes % 10/11/2018 14     Monocytes % 10/11/2018 4     Eosinophils, % 10/11/2018 1     Basophils % 10/11/2018 0     Myelocytes % 10/11/2018 1     Neutrophils Absolute 10/11/2018 6 32     Lymphocytes Absolute 10/11/2018 1 11     Monocytes Absolute 10/11/2018 0 32     Eosinophils Absolute 10/11/2018 0 08     Basophils Absolute 10/11/2018 0 00     Total Counted 10/11/2018 100     Ovalocytes 10/11/2018 Present     Platelet Estimate 71/89/5498 Adequate     Large Platelet 17/28/1687 Present    Hospital Outpatient Visit on 10/04/2018   Component Date Value    WBC 10/04/2018 14 20*    Adjusted WBC 10/04/2018 14 20*    RBC 10/04/2018 5 35*    Hemoglobin 10/04/2018 14 8     Hematocrit 10/04/2018 47 6*    MCV 10/04/2018 89     MCH 10/04/2018 27 7     MCHC 10/04/2018 31 2*    RDW 10/04/2018 15 7*    MPV 10/04/2018 8 4*    Platelets 14/10/1504 261     Sodium 10/04/2018 142     Potassium 10/04/2018 4 4     Chloride 10/04/2018 103     CO2 10/04/2018 28     ANION GAP 10/04/2018 11     BUN 10/04/2018 22     Creatinine 10/04/2018 0 80     Glucose 10/04/2018 152*    Calcium 10/04/2018 9 4     AST 10/04/2018 26     ALT 10/04/2018 40     Alkaline Phosphatase 10/04/2018 109     Total Protein 10/04/2018 6 3*    Albumin 10/04/2018 3 0*    Total Bilirubin 10/04/2018 0 80     eGFR 10/04/2018 68     Segmented % 10/04/2018 70     Bands % 10/04/2018 7     Lymphocytes % 10/04/2018 16     Monocytes % 10/04/2018 7     Eosinophils, % 10/04/2018 0     Basophils % 10/04/2018 0     Neutrophils Absolute 10/04/2018 10 93*    Lymphocytes Absolute 10/04/2018 2 27     Monocytes Absolute 10/04/2018 0 99     Eosinophils Absolute 10/04/2018 0 00     Basophils Absolute 10/04/2018 0 00     Total Counted 10/04/2018 100     Anisocytosis 10/04/2018 Present     Platelet Estimate 01/75/8826 Adequate    Hospital Outpatient Visit on 09/27/2018   Component Date Value    WBC 09/27/2018 12 90*    Adjusted WBC 09/27/2018 12 90*    RBC 09/27/2018 5 19*    Hemoglobin 09/27/2018 14 8     Hematocrit 09/27/2018 47 2*  MCV 09/27/2018 91     MCH 09/27/2018 28 6     MCHC 09/27/2018 31 4     RDW 09/27/2018 16 3*    MPV 09/27/2018 8 3*    Platelets 75/75/2042 244     Segmented % 09/27/2018 75     Bands % 09/27/2018 5     Lymphocytes % 09/27/2018 11*    Monocytes % 09/27/2018 9     Eosinophils, % 09/27/2018 0     Basophils % 09/27/2018 0     Neutrophils Absolute 09/27/2018 10 32*    Lymphocytes Absolute 09/27/2018 1 42     Monocytes Absolute 09/27/2018 1 16     Eosinophils Absolute 09/27/2018 0 00     Basophils Absolute 09/27/2018 0 00     Total Counted 09/27/2018 100     Anisocytosis 09/27/2018 Present     Platelet Estimate 75/34/9026 Adequate     Sodium 09/27/2018 137     Potassium 09/27/2018 4 5     Chloride 09/27/2018 102     CO2 09/27/2018 27     ANION GAP 09/27/2018 8     BUN 09/27/2018 22     Creatinine 09/27/2018 0 60     Glucose 09/27/2018 149*    Calcium 09/27/2018 8 3     AST 09/27/2018 17     ALT 09/27/2018 32     Alkaline Phosphatase 09/27/2018 84     Total Protein 09/27/2018 5 7*    Albumin 09/27/2018 2 7*    Total Bilirubin 09/27/2018 0 70     eGFR 09/27/2018 85    Hospital Outpatient Visit on 09/13/2018   Component Date Value    WBC 09/13/2018 14 10*    Adjusted WBC 09/13/2018 14 10*    RBC 09/13/2018 5 23*    Hemoglobin 09/13/2018 14 9     Hematocrit 09/13/2018 50 4*    MCV 09/13/2018 96     MCH 09/13/2018 28 5     MCHC 09/13/2018 29 6*    RDW 09/13/2018 17 8*    MPV 09/13/2018 9 0     Platelets 02/00/5592 200     Segmented % 09/13/2018 79*    Bands % 09/13/2018 2     Lymphocytes % 09/13/2018 11*    Monocytes % 09/13/2018 7     Eosinophils, % 09/13/2018 1     Basophils % 09/13/2018 0     Neutrophils Absolute 09/13/2018 11 42*    Lymphocytes Absolute 09/13/2018 1 55     Monocytes Absolute 09/13/2018 0 99     Eosinophils Absolute 09/13/2018 0 14     Basophils Absolute 09/13/2018 0 00     Total Counted 09/13/2018 100     Anisocytosis 09/13/2018 Present     Platelet Estimate 96/35/3817 Adequate    Hospital Outpatient Visit on 09/06/2018   Component Date Value    WBC 09/06/2018 17 20*    Adjusted WBC 09/06/2018 17 20*    RBC 09/06/2018 5 20*    Hemoglobin 09/06/2018 15 1     Hematocrit 09/06/2018 49 8*    MCV 09/06/2018 96     MCH 09/06/2018 29 1     MCHC 09/06/2018 30 3*    RDW 09/06/2018 16 7*    MPV 09/06/2018 9 8     Platelets 13/41/3643 233     Segmented % 09/06/2018 82*    Bands % 09/06/2018 4     Lymphocytes % 09/06/2018 9*    Monocytes % 09/06/2018 1*    Eosinophils, % 09/06/2018 3     Basophils % 09/06/2018 0     Myelocytes % 09/06/2018 1     Neutrophils Absolute 09/06/2018 14 79*    Lymphocytes Absolute 09/06/2018 1 55     Monocytes Absolute 09/06/2018 0 17     Eosinophils Absolute 09/06/2018 0 52     Basophils Absolute 09/06/2018 0 00     Total Counted 09/06/2018 100     Anisocytosis 09/06/2018 Present     Hypochromia 09/06/2018 Present     Ovalocytes 09/06/2018 Present     Platelet Estimate 32/33/3050 Adequate    Hospital Outpatient Visit on 08/30/2018   Component Date Value    Sodium 08/30/2018 140     Potassium 08/30/2018 4 2     Chloride 08/30/2018 104     CO2 08/30/2018 25     ANION GAP 08/30/2018 11     BUN 08/30/2018 22     Creatinine 08/30/2018 0 70     Glucose 08/30/2018 106     Calcium 08/30/2018 10 1     AST 08/30/2018 32     ALT 08/30/2018 33     Alkaline Phosphatase 08/30/2018 99     Total Protein 08/30/2018 7 7     Albumin 08/30/2018 3 3*    Total Bilirubin 08/30/2018 0 60     eGFR 08/30/2018 80     WBC 08/30/2018 19 98*    RBC 08/30/2018 5 13*    Hemoglobin 08/30/2018 15 3     Hematocrit 08/30/2018 48 4*    MCV 08/30/2018 94     MCH 08/30/2018 29 8     MCHC 08/30/2018 31 6     RDW 08/30/2018 13 4     MPV 08/30/2018 11 5     Platelets 42/47/0628 309     nRBC 08/30/2018 0     Neutrophils Relative 08/30/2018 62     Immat GRANS % 08/30/2018 1     Lymphocytes Relative 08/30/2018 19     Monocytes Relative 08/30/2018 10     Eosinophils Relative 08/30/2018 7*    Basophils Relative 08/30/2018 1     Neutrophils Absolute 08/30/2018 12 70*    Immature Grans Absolute 08/30/2018 0 12     Lymphocytes Absolute 08/30/2018 3 72     Monocytes Absolute 08/30/2018 1 89*    Eosinophils Absolute 08/30/2018 1 45*    Basophils Absolute 08/30/2018 0 10    Admission on 07/23/2018, Discharged on 08/01/2018   No results displayed because visit has over 200 results        Admission on 07/23/2018, Discharged on 07/23/2018   Component Date Value    WBC 07/23/2018 14 29*    RBC 07/23/2018 4 75     Hemoglobin 07/23/2018 14 8     Hematocrit 07/23/2018 45 3     MCV 07/23/2018 95     MCH 07/23/2018 31 2     MCHC 07/23/2018 32 7     RDW 07/23/2018 13 8     MPV 07/23/2018 11 9     Platelets 74/75/7170 247     nRBC 07/23/2018 0     Neutrophils Relative 07/23/2018 69     Lymphocytes Relative 07/23/2018 19     Monocytes Relative 07/23/2018 10     Eosinophils Relative 07/23/2018 2     Basophils Relative 07/23/2018 1     Neutrophils Absolute 07/23/2018 9 87*    Lymphocytes Absolute 07/23/2018 2 68     Monocytes Absolute 07/23/2018 1 40*    Eosinophils Absolute 07/23/2018 0 27     Basophils Absolute 07/23/2018 0 07     Magnesium 07/23/2018 1 9     Troponin I 07/23/2018 0 20*    TSH 3RD GENERATON 07/23/2018 2 185     Protime 07/23/2018 14 6*    INR 07/23/2018 1 13     PTT 07/23/2018 32     NT-proBNP 07/23/2018 339     Sodium 07/23/2018 137     Potassium 07/23/2018 4 3     Chloride 07/23/2018 100     CO2 07/23/2018 20*    ANION GAP 07/23/2018 17*    BUN 07/23/2018 19     Creatinine 07/23/2018 1 21     Glucose 07/23/2018 224*    Calcium 07/23/2018 9 9     AST 07/23/2018 42     ALT 07/23/2018 51     Alkaline Phosphatase 07/23/2018 76     Total Protein 07/23/2018 8 1     Albumin 07/23/2018 3 4*    Total Bilirubin 07/23/2018 1 72*    eGFR 07/23/2018 42     Ventricular Rate 07/23/2018 177  Atrial Rate 07/23/2018 416     QRSD Interval 07/23/2018 74     QT Interval 07/23/2018 252     QTC Interval 07/23/2018 432     QRS Axis 07/23/2018 0     T Wave Axis 07/23/2018 -23    Appointment on 06/21/2018   Component Date Value    WBC 06/21/2018 12 10*    Adjusted WBC 06/21/2018 12 10*    RBC 06/21/2018 4 97     Hemoglobin 06/21/2018 14 6     Hematocrit 06/21/2018 47 4*    MCV 06/21/2018 95     MCH 06/21/2018 29 3     MCHC 06/21/2018 30 7*    RDW 06/21/2018 16 0*    MPV 06/21/2018 9 0     Platelets 29/84/3710 208     Sodium 06/21/2018 143     Potassium 06/21/2018 4 2     Chloride 06/21/2018 102     CO2 06/21/2018 26     ANION GAP 06/21/2018 15*    BUN 06/21/2018 11     Creatinine 06/21/2018 0 60     Glucose 06/21/2018 116     Calcium 06/21/2018 9 8     AST 06/21/2018 37     ALT 06/21/2018 20     Alkaline Phosphatase 06/21/2018 78     Total Protein 06/21/2018 7 3     Albumin 06/21/2018 3 2*    Total Bilirubin 06/21/2018 0 70     eGFR 06/21/2018 85     TSH 3RD GENERATON 06/21/2018 1 620     Segmented % 06/21/2018 64     Bands % 06/21/2018 0     Lymphocytes % 06/21/2018 30     Monocytes % 06/21/2018 4     Eosinophils, % 06/21/2018 2     Basophils % 06/21/2018 0     Neutrophils Absolute 06/21/2018 7 74*    Lymphocytes Absolute 06/21/2018 3 63     Monocytes Absolute 06/21/2018 0 48     Eosinophils Absolute 06/21/2018 0 24     Basophils Absolute 06/21/2018 0 00     Total Counted 06/21/2018 100     Anisocytosis 06/21/2018 Present     Ovalocytes 06/21/2018 Present     Platelet Estimate 95/71/0164 Adequate    There may be more visits with results that are not included  Imaging: No results found  ECG:   Sinus rhythm at a rate of 65  Left axis deviation  Review of Systems:  Review of Systems   Constitutional: Positive for fatigue  Negative for activity change, appetite change, chills, diaphoresis and fever  HENT: Negative for congestion      Respiratory: Negative for cough, chest tightness, shortness of breath and wheezing  Cardiovascular: Negative for chest pain, palpitations and leg swelling  Gastrointestinal: Negative for abdominal pain, diarrhea, nausea and vomiting  Genitourinary: Negative for difficulty urinating and dysuria  Musculoskeletal: Negative for back pain  Skin: Negative for color change and rash  Neurological: Positive for dizziness  Negative for syncope, facial asymmetry, weakness, light-headedness, numbness and headaches  Psychiatric/Behavioral: Negative for confusion  There were no vitals filed for this visit  There were no vitals filed for this visit          Physical Exam:  General appearance:  Appears stated age, alert, well appearing and in no distress  HEENT:  PERRLA, EOMI, no scleral icterus, no conjunctival pallor  NECK:  Supple, No elevated JVP, no thyromegaly, no carotid bruits  HEART:  Regular rate and rhythm, normal S1/S2, no S3/S4, no murmur or rub  LUNGS:  Clear to auscultation bilaterally in the upper lung fields, diminished at the bases with crackles in the lower 3rd  ABDOMEN:  Soft, non-tender, positive bowel sounds, no rebound or guarding, no organomegaly   EXTREMITIES:  No edema  VASCULAR:  Normal pedal pulses   SKIN: No lesions or rashes on exposed skin  NEURO:  CN II-XII intact, no focal deficits

## 2018-11-07 NOTE — PATIENT INSTRUCTIONS
Decrease dose of Amiodarone to 100mg daily  Continue Metoprolol tartrate 12 5 mg twice daily  F/u in three months

## 2018-11-08 ENCOUNTER — HOSPITAL ENCOUNTER (OUTPATIENT)
Dept: INFUSION CENTER | Facility: CLINIC | Age: 83
Discharge: HOME | End: 2018-11-08
Payer: MEDICARE

## 2018-11-08 VITALS
DIASTOLIC BLOOD PRESSURE: 77 MMHG | HEIGHT: 61 IN | BODY MASS INDEX: 28.8 KG/M2 | SYSTOLIC BLOOD PRESSURE: 107 MMHG | HEART RATE: 59 BPM | TEMPERATURE: 98 F | RESPIRATION RATE: 20 BRPM | OXYGEN SATURATION: 98 % | WEIGHT: 152.56 LBS

## 2018-11-08 DIAGNOSIS — I31.3 MALIGNANT PERICARDIAL EFFUSION (HCC): ICD-10-CM

## 2018-11-08 DIAGNOSIS — C34.82 MALIGNANT NEOPLASM OF OVERLAPPING SITES OF LEFT LUNG (HCC): ICD-10-CM

## 2018-11-08 DIAGNOSIS — C78.2 PLEURAL METASTASIS (HCC): ICD-10-CM

## 2018-11-08 DIAGNOSIS — C80.1 MALIGNANT PERICARDIAL EFFUSION (HCC): ICD-10-CM

## 2018-11-08 LAB
ALBUMIN SERPL BCP-MCNC: 3 G/DL (ref 3.5–5.7)
ALP SERPL-CCNC: 109 U/L (ref 46–116)
ALT SERPL W P-5'-P-CCNC: 38 U/L (ref 12–78)
ANION GAP SERPL CALCULATED.3IONS-SCNC: 12 MMOL/L (ref 4–13)
ANISOCYTOSIS BLD QL SMEAR: PRESENT
AST SERPL W P-5'-P-CCNC: 35 U/L (ref 5–45)
BASOPHILS # BLD AUTO: 0 THOUSAND/UL (ref 0–0.1)
BASOPHILS NFR MAR MANUAL: 0 % (ref 0–1)
BILIRUB SERPL-MCNC: 0.7 MG/DL (ref 0.2–1)
BUN SERPL-MCNC: 30 MG/DL (ref 5–25)
CALCIUM SERPL-MCNC: 9.5 MG/DL (ref 8.3–10.1)
CHLORIDE SERPL-SCNC: 106 MMOL/L (ref 98–108)
CO2 SERPL-SCNC: 29 MMOL/L (ref 21–32)
CREAT SERPL-MCNC: 0.9 MG/DL (ref 0.6–1.3)
EOSINOPHIL # BLD AUTO: 0.22 THOUSAND/UL (ref 0–0.61)
EOSINOPHIL NFR BLD MANUAL: 1 % (ref 0–6)
ERYTHROCYTE [DISTWIDTH] IN BLOOD BY AUTOMATED COUNT: 19.7 % (ref 11.6–15.1)
GFR SERPL CREATININE-BSD FRML MDRD: 59 ML/MIN/1.73SQ M
GLUCOSE SERPL-MCNC: 199 MG/DL (ref 65–140)
HCT VFR BLD AUTO: 41.2 % (ref 34.8–46.1)
HGB BLD-MCNC: 13.2 G/DL (ref 11.5–15.4)
LYMPHOCYTES # BLD AUTO: 10 % (ref 14–44)
LYMPHOCYTES # BLD AUTO: 2.19 THOUSAND/UL (ref 0.6–4.47)
MCH RBC QN AUTO: 29.8 PG (ref 26.8–34.3)
MCHC RBC AUTO-ENTMCNC: 32.1 G/DL (ref 31.4–37.4)
MCV RBC AUTO: 93 FL (ref 82–98)
MONOCYTES # BLD AUTO: 1.31 THOUSAND/UL (ref 0–1.22)
MONOCYTES NFR BLD AUTO: 6 % (ref 4–12)
NEUTS BAND NFR BLD MANUAL: 11 % (ref 0–8)
NEUTS SEG # BLD: 18.18 THOUSAND/UL (ref 1.81–6.82)
NEUTS SEG NFR BLD AUTO: 72 % (ref 43–75)
PLATELET # BLD AUTO: 251 THOUSANDS/UL (ref 149–390)
PLATELET BLD QL SMEAR: ADEQUATE
PMV BLD AUTO: 9 FL (ref 8.9–12.7)
POTASSIUM SERPL-SCNC: 4.1 MMOL/L (ref 3.5–5.3)
PROT SERPL-MCNC: 6.8 G/DL (ref 6.4–8.2)
RBC # BLD AUTO: 4.44 MILLION/UL (ref 3.81–5.12)
SODIUM SERPL-SCNC: 147 MMOL/L (ref 136–145)
TOTAL CELLS COUNTED SPEC: 100
WBC # BLD AUTO: 21.9 THOUSAND/UL (ref 4.31–10.16)
WBC NRBC COR # BLD: 21.9 THOUSAND/UL (ref 4.31–10.16)

## 2018-11-08 PROCEDURE — 85027 COMPLETE CBC AUTOMATED: CPT

## 2018-11-08 PROCEDURE — 85007 BL SMEAR W/DIFF WBC COUNT: CPT

## 2018-11-08 PROCEDURE — 96367 TX/PROPH/DG ADDL SEQ IV INF: CPT

## 2018-11-08 PROCEDURE — 80053 COMPREHEN METABOLIC PANEL: CPT

## 2018-11-08 PROCEDURE — 96413 CHEMO IV INFUSION 1 HR: CPT

## 2018-11-08 RX ADMIN — ONDANSETRON 8 MG: 2 INJECTION INTRAMUSCULAR; INTRAVENOUS at 14:55

## 2018-11-08 RX ADMIN — ACETAMINOPHEN 650 MG: 325 TABLET, FILM COATED ORAL at 15:23

## 2018-11-08 RX ADMIN — SODIUM CHLORIDE 20 ML/HR: 0.9 INJECTION, SOLUTION INTRAVENOUS at 14:30

## 2018-11-08 RX ADMIN — Medication 300 UNITS: at 16:30

## 2018-11-08 RX ADMIN — GEMCITABINE 1760 MG: 38 INJECTION INTRAVENOUS at 15:30

## 2018-11-08 RX ADMIN — SODIUM CHLORIDE 500 ML: 0.9 INJECTION, SOLUTION INTRAVENOUS at 15:30

## 2018-11-09 NOTE — PLAN OF CARE
Problem: SAFETY ADULT  Goal: Patient will remain free of falls  INTERVENTIONS:  - Assess patient frequently for physical needs  -  Identify cognitive and physical deficits and behaviors that affect risk of falls    -  Carrier fall precautions as indicated by assessment   - Educate patient/family on patient safety including physical limitations  - Instruct patient to call for assistance with activity based on assessment  - Modify environment to reduce risk of injury  - Consider OT/PT consult to assist with strengthening/mobility  Outcome: Progressing

## 2018-11-09 NOTE — PROGRESS NOTES
Presented today for chemotherapy, labs drawn as ordered  Phoned office to report to RN Increased WBC count & request by family for additional hydration

## 2018-11-19 ENCOUNTER — TELEPHONE (OUTPATIENT)
Dept: PULMONOLOGY | Facility: CLINIC | Age: 83
End: 2018-11-19

## 2018-11-19 NOTE — TELEPHONE ENCOUNTER
Patient daughter Razia Davis is requesting a POC  Patient is currently on 3 liters from her recent hospital stay  Please fax order to [facility] and call Razia Davis once completed   [phone]

## 2018-11-19 NOTE — TELEPHONE ENCOUNTER
Spoke to Camille Jimenez to make sure there is nothing we can do at this point as the patient was sent home with oxygen by   [facility] and will have to be tested at the office for medicare to continue with the oxygen stating that the acute situation was fix (treated and taken care of)  Spoke with daughter and she stated that the patient is struggles with poc  That she needs continues oxygen    Spoke to george and she is trying to get the patient more tanks to hold her over until she comes to the office    Called daughter and informed her that they are delivering tanks for her mom today  Daughter called and stated that [employer] delivered 1 hour tanks  Called Camille Jimenez to inform her of the delivery  She is going to look into the order and call me back  Per Camille Jimenez D tanks were delivered and they are good for 2 3 hours x 4  That the patient needs to be on the concentrator while at home not on the D Tanks      Explained to the daughter     Emailed Ade Hedrick the office notes

## 2018-11-21 RX ORDER — ACETAMINOPHEN 325 MG/1
650 TABLET ORAL ONCE
Status: COMPLETED | OUTPATIENT
Start: 2018-11-23 | End: 2018-11-23

## 2018-11-21 RX ORDER — SODIUM CHLORIDE 9 MG/ML
20 INJECTION, SOLUTION INTRAVENOUS CONTINUOUS
Status: DISCONTINUED | OUTPATIENT
Start: 2018-11-23 | End: 2018-11-26 | Stop reason: HOSPADM

## 2018-11-23 ENCOUNTER — OFFICE VISIT (OUTPATIENT)
Dept: PULMONOLOGY | Facility: CLINIC | Age: 83
End: 2018-11-23
Payer: MEDICARE

## 2018-11-23 ENCOUNTER — HOSPITAL ENCOUNTER (OUTPATIENT)
Dept: INFUSION CENTER | Facility: CLINIC | Age: 83
Discharge: HOME | End: 2018-11-23
Payer: MEDICARE

## 2018-11-23 VITALS
SYSTOLIC BLOOD PRESSURE: 126 MMHG | TEMPERATURE: 98.2 F | RESPIRATION RATE: 16 BRPM | HEART RATE: 59 BPM | DIASTOLIC BLOOD PRESSURE: 82 MMHG

## 2018-11-23 VITALS
OXYGEN SATURATION: 98 % | DIASTOLIC BLOOD PRESSURE: 80 MMHG | BODY MASS INDEX: 29.27 KG/M2 | HEIGHT: 61 IN | WEIGHT: 155 LBS | TEMPERATURE: 97.8 F | RESPIRATION RATE: 17 BRPM | SYSTOLIC BLOOD PRESSURE: 108 MMHG | HEART RATE: 65 BPM

## 2018-11-23 DIAGNOSIS — R06.00 DOE (DYSPNEA ON EXERTION): Primary | ICD-10-CM

## 2018-11-23 DIAGNOSIS — C34.32 MALIGNANT NEOPLASM OF LOWER LOBE OF LEFT LUNG (HCC): ICD-10-CM

## 2018-11-23 DIAGNOSIS — J96.01 ACUTE RESPIRATORY FAILURE WITH HYPOXIA (HCC): ICD-10-CM

## 2018-11-23 DIAGNOSIS — J91.0 MALIGNANT PLEURAL EFFUSION: ICD-10-CM

## 2018-11-23 LAB
ANISOCYTOSIS BLD QL SMEAR: PRESENT
BASOPHILS # BLD AUTO: 0 THOUSAND/UL (ref 0–0.1)
BASOPHILS NFR MAR MANUAL: 0 % (ref 0–1)
EOSINOPHIL # BLD AUTO: 0.56 THOUSAND/UL (ref 0–0.61)
EOSINOPHIL NFR BLD MANUAL: 4 % (ref 0–6)
ERYTHROCYTE [DISTWIDTH] IN BLOOD BY AUTOMATED COUNT: 20.7 % (ref 11.6–15.1)
HCT VFR BLD AUTO: 40.6 % (ref 34.8–46.1)
HGB BLD-MCNC: 13 G/DL (ref 11.5–15.4)
LYMPHOCYTES # BLD AUTO: 15 % (ref 14–44)
LYMPHOCYTES # BLD AUTO: 2.1 THOUSAND/UL (ref 0.6–4.47)
MCH RBC QN AUTO: 30.5 PG (ref 26.8–34.3)
MCHC RBC AUTO-ENTMCNC: 31.9 G/DL (ref 31.4–37.4)
MCV RBC AUTO: 96 FL (ref 82–98)
MONOCYTES # BLD AUTO: 1.26 THOUSAND/UL (ref 0–1.22)
MONOCYTES NFR BLD AUTO: 9 % (ref 4–12)
NEUTS BAND NFR BLD MANUAL: 13 % (ref 0–8)
NEUTS SEG # BLD: 10.08 THOUSAND/UL (ref 1.81–6.82)
NEUTS SEG NFR BLD AUTO: 59 % (ref 43–75)
PLATELET # BLD AUTO: 334 THOUSANDS/UL (ref 149–390)
PLATELET BLD QL SMEAR: ADEQUATE
PMV BLD AUTO: 7.8 FL (ref 8.9–12.7)
RBC # BLD AUTO: 4.25 MILLION/UL (ref 3.81–5.12)
TOTAL CELLS COUNTED SPEC: 100
WBC # BLD AUTO: 14 THOUSAND/UL (ref 4.31–10.16)
WBC NRBC COR # BLD: 14 THOUSAND/UL (ref 4.31–10.16)

## 2018-11-23 PROCEDURE — 96361 HYDRATE IV INFUSION ADD-ON: CPT

## 2018-11-23 PROCEDURE — 85007 BL SMEAR W/DIFF WBC COUNT: CPT | Performed by: INTERNAL MEDICINE

## 2018-11-23 PROCEDURE — 96413 CHEMO IV INFUSION 1 HR: CPT

## 2018-11-23 PROCEDURE — 85027 COMPLETE CBC AUTOMATED: CPT | Performed by: INTERNAL MEDICINE

## 2018-11-23 PROCEDURE — 96375 TX/PRO/DX INJ NEW DRUG ADDON: CPT

## 2018-11-23 PROCEDURE — 99214 OFFICE O/P EST MOD 30 MIN: CPT | Performed by: INTERNAL MEDICINE

## 2018-11-23 RX ORDER — FUROSEMIDE 20 MG/1
20 TABLET ORAL DAILY
Qty: 30 TABLET | Refills: 0 | Status: SHIPPED | OUTPATIENT
Start: 2018-11-23 | End: 2018-12-21 | Stop reason: SDUPTHER

## 2018-11-23 RX ADMIN — SODIUM CHLORIDE 20 ML/HR: 0.9 INJECTION, SOLUTION INTRAVENOUS at 12:35

## 2018-11-23 RX ADMIN — Medication 300 UNITS: at 16:10

## 2018-11-23 RX ADMIN — GEMCITABINE 1760 MG: 38 INJECTION INTRAVENOUS at 14:37

## 2018-11-23 RX ADMIN — ACETAMINOPHEN 650 MG: 325 TABLET, FILM COATED ORAL at 14:01

## 2018-11-23 RX ADMIN — SODIUM CHLORIDE 500 ML: 0.9 INJECTION, SOLUTION INTRAVENOUS at 15:08

## 2018-11-23 RX ADMIN — ONDANSETRON 8 MG: 2 INJECTION INTRAMUSCULAR; INTRAVENOUS at 14:00

## 2018-11-23 NOTE — PLAN OF CARE
Problem: PAIN - ADULT  Goal: Verbalizes/displays adequate comfort level or baseline comfort level  Interventions:  - Encourage patient to monitor pain and request assistance  - Assess pain using appropriate pain scale  - Administer analgesics based on type and severity of pain and evaluate response  - Implement non-pharmacological measures as appropriate and evaluate response  - Consider cultural and social influences on pain and pain management  - Notify physician/advanced practitioner if interventions unsuccessful or patient reports new pain  Outcome: Progressing      Problem: INFECTION - ADULT  Goal: Absence or prevention of progression during hospitalization  INTERVENTIONS:  - Assess and monitor for signs and symptoms of infection  - Monitor lab/diagnostic results  - Monitor all insertion sites, i e  indwelling lines, tubes, and drains  - Monitor endotracheal (as able) and nasal secretions for changes in amount and color  - [place] appropriate cooling/warming therapies per order  - Administer medications as ordered  - Instruct and encourage patient and family to use good hand hygiene technique  - Identify and instruct in appropriate isolation precautions for identified infection/condition  Outcome: Progressing    Goal: Absence of fever/infection during neutropenic period  INTERVENTIONS:  - Monitor WBC  - Implement neutropenic guidelines  Outcome: Progressing      Problem: Knowledge Deficit  Goal: Patient/family/caregiver demonstrates understanding of disease process, treatment plan, medications, and discharge instructions  Complete learning assessment and assess knowledge base    Interventions:  - Provide teaching at level of understanding  - Provide teaching via preferred learning methods  Outcome: Progressing

## 2018-11-23 NOTE — PROGRESS NOTES
Pt  Tolerated Gemzar and hydration without adverse event  Future appointments reviewed with Pt  And daughter Emy Child  AVS provided

## 2018-11-23 NOTE — PROGRESS NOTES
Office Progress Note - Pulmonary    Vipul Garcia 80 y o  female MRN: [id number]    Encounter: [id number]      Assessment:  · Dyspnea on exertion  This is mainly due to deconditioning  · Chronic hypoxemic respiratory failure  · Lung cancer  · Malignant pleural effusion  · Chronic diastolic congestive heart failure  Plan:   · Oxygen supplement 24 hr a day  Monitor pulse ox with the pulse oximetry  · Regular walks to improve stamina  · Follow-up as needed  Discussion:   The patient's dyspnea on exertion is multifactorial   It is due to deconditioning  Also the patient has diastolic congestive heart failure which is compensated  This is in addition to the lung cancer  I have recommended to the patient to take regular walks to improve her stamina  She still depends on the oxygen as her O2 saturation today was 84% on room air  She will continue to monitor her O2 saturation with the pulse oximetry at home  She will follow up with Dr Kolby Rivera for her lung cancer  I have not scheduled her for another appointment however I will be happy to see her in the future if the need arises  She already has received the influenza vaccine for this season  Subjective: The patient is here for a follow-up visit  She was seen at [facility] in October because of acute hypoxemic respiratory failure  This was thought to be due to acute on chronic diastolic congestive heart failure  The patient has history of lung cancer and has been on multiple chemotherapy  Also she developed pleural effusion  The patient is under the care of Kenneth Orr  Currently she is receiving the 3rd line chemotherapy according to the daughter from the most recent imaging it seems that it has some partial response to the therapy    The patient is been doing fairly well since she was discharged from the hospital   She has dyspnea on exertion however she recovers well after a period of rest   She denies any significant cough, wheezing or sputum production  Denies any chest pain  She was discharged on home oxygen  She monitors her pulse ox at home and it has been in the 90s  She has no nocturnal symptoms  Review of systems:  A 12 point system review is done and aside from what is stated above the rest of the review of systems is negative  Family history and social history are reviewed  Medications list is reviewed  Vitals: Blood pressure 108/80, pulse 65, temperature 97 8 °F (36 6 °C), temperature source Tympanic, resp  rate 17, height 5' 1" (1 549 m), weight 70 3 kg (155 lb), SpO2 98 %  ,     Physical Exam  Gen: Awake, alert, oriented x 3, no acute distress  HEENT: Mucous membranes moist, no oral lesions, no thrush  NECK: No accessory muscle use, JVP not elevated  Cardiac: Regular, single S1, single S2, no murmurs, no rubs, no gallops  Lungs:   Decreased breath sounds on the left base  No wheezing or rhonchi  Abdomen: normoactive bowel sounds, soft nontender, nondistended, no rebound or rigidity, no guarding  Extremities: no cyanosis, no clubbing, no edema  Neuro:  Grossly nonfocal   Skin:  No rash

## 2018-11-27 ENCOUNTER — TELEPHONE (OUTPATIENT)
Dept: HEMATOLOGY ONCOLOGY | Facility: CLINIC | Age: 83
End: 2018-11-27

## 2018-11-27 NOTE — TELEPHONE ENCOUNTER
Pt's daughter called  Pt has appt on 11/30 with Dr Nova Nuñez in [place] and would like to rs to 12/6 at [facility]  I checked his schedule and Dr Nova Nuñez has nothing available  Wondering if she still needs to be since  Would appreciate if you can call her back  I left pt scheduled for her 11/30 appt as of right now

## 2018-11-27 NOTE — TELEPHONE ENCOUNTER
Called and left a VM for the patient's daughter to call me back  The patient is currently receiving treatment and will need to see a provider prior to her next cycle

## 2018-12-04 ENCOUNTER — IN HOME VISIT (OUTPATIENT)
Dept: PALLIATIVE MEDICINE | Facility: CLINIC | Age: 83
End: 2018-12-04
Payer: MEDICARE

## 2018-12-04 VITALS — DIASTOLIC BLOOD PRESSURE: 80 MMHG | RESPIRATION RATE: 20 BRPM | SYSTOLIC BLOOD PRESSURE: 148 MMHG | HEART RATE: 86 BPM

## 2018-12-04 DIAGNOSIS — C78.2 PLEURAL METASTASIS (HCC): ICD-10-CM

## 2018-12-04 DIAGNOSIS — C34.32 MALIGNANT NEOPLASM OF LOWER LOBE OF LEFT LUNG (HCC): Primary | ICD-10-CM

## 2018-12-04 DIAGNOSIS — G89.3 CANCER RELATED PAIN: ICD-10-CM

## 2018-12-04 DIAGNOSIS — I48.0 PAROXYSMAL ATRIAL FIBRILLATION (HCC): ICD-10-CM

## 2018-12-04 DIAGNOSIS — I50.32 CHRONIC DIASTOLIC HEART FAILURE (HCC): ICD-10-CM

## 2018-12-04 DIAGNOSIS — F41.8 ANXIETY ABOUT HEALTH: ICD-10-CM

## 2018-12-04 DIAGNOSIS — R52 PAIN: ICD-10-CM

## 2018-12-04 PROCEDURE — 99349 HOME/RES VST EST MOD MDM 40: CPT | Performed by: NURSE PRACTITIONER

## 2018-12-04 RX ORDER — ALPRAZOLAM 0.5 MG/1
0.5 TABLET ORAL
Qty: 30 TABLET | Refills: 1 | Status: SHIPPED | OUTPATIENT
Start: 2018-12-04 | End: 2019-02-11 | Stop reason: SDUPTHER

## 2018-12-04 RX ORDER — FENTANYL 12 UG/H
1 PATCH TRANSDERMAL
Qty: 10 PATCH | Refills: 0 | Status: SHIPPED | OUTPATIENT
Start: 2018-12-04 | End: 2019-02-04 | Stop reason: ALTCHOICE

## 2018-12-04 NOTE — PROGRESS NOTES
Palliative and Supportive Care   Winter Amezquita 80 y o  female [id number]    Assessment/Plan:  1  Malignant neoplasm of lower lobe of left lung ([facility] [address] )    2  Pleural metastasis ([facility] [address] )    3  Cancer related pain    4  Anxiety about health    5  Chronic diastolic heart failure (HCC)    6  Paroxysmal atrial fibrillation (HCC)    7  Pain        Requested Prescriptions     Signed Prescriptions Disp Refills    fentaNYL (DURAGESIC) 12 mcg/hr TD 72 hr patch 10 patch 0     Sig: Place 1 patch on the skin every third day Apply 2 patches every 3 days Max Daily Amount: 1 patch    ALPRAZolam (XANAX) 0 5 mg tablet 30 tablet 1     Sig: Take 1 tablet (0 5 mg total) by mouth daily at bedtime as needed for anxiety     Medications Discontinued During This Encounter   Medication Reason    fentaNYL (DURAGESIC) 12 mcg/hr TD 72 hr patch Reorder    ALPRAZolam (XANAX) 0 5 mg tablet Reorder     Cancer related pain:   - Continue Fentanyl TD 12 mcg/hr patch  Q 72    - Dexamethasone 1 mg daily     Anxiety:   - Alprazolam 0 5 mg at bedtime    Representatives have queried the patient's controlled substance dispensing history in the Prescription Drug Monitoring Program in compliance with regulations before I have prescribed any controlled substances  The prescription history is consistent with prescribed therapy and our practice policies  45 minutes were spent face to face with her  with greater than 50% of the time spent in counseling or coordination of care including discussions of instructions for disease self management, treatment instructions and follow up requirements   All of the patient's questions were answered during this discussion      Follow up one month    Subjective:   Chief Complaint  Follow up visit for:  symptom management, pain, neoplasm related, breathlessness, depression or anxiety,   HPI     Winter Amezquita is a 80 y o  female with 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  She is being seen at home today in follow of symptoms related to her lung cancer as part of PALS at [facility] program  She is currently undergoing chemotherapy with single agent Gemcitabine and reports that she is tolerating that well  She denies N/V and states her appetite has been good  No constipation  She fatigues easily and becomes short of breath with exertion  She is currently using O2 1 5L NC continuously and has been working with home PT to help improve her strength and endurance  Her  is her main care taker, her son and daughter visit daily and her daughter helps with her daily grooming needs  She endorses excellent pain control with Fentanyl patch, she is not requiring any additional meds for breakthrough  She is quite thrilled she was able to attend the wedding of a young woman that she reports she practically raised  Overall, in spite of some decreased physical reserve she feels well and wishes to continue with chemotherapy for as long as she can  The following portions of the medical history were reviewed: past medical history, problem list, medication list, and social history      Current Outpatient Prescriptions:     acetaminophen (TYLENOL) 500 mg tablet, Take 500 mg by mouth, Disp: , Rfl:     ALPRAZolam (XANAX) 0 5 mg tablet, Take 1 tablet (0 5 mg total) by mouth daily at bedtime as needed for anxiety, Disp: 30 tablet, Rfl: 1    amiodarone 100 mg tablet, Take 1 tablet (100 mg total) by mouth daily, Disp: 90 tablet, Rfl: 3    aspirin (ECOTRIN LOW STRENGTH) 81 mg EC tablet, Take 81 mg by mouth daily, Disp: , Rfl:     citalopram (CeleXA) 20 mg tablet, Take 20 mg by mouth daily, Disp: , Rfl:     CRANIAL PROSTHESIS, RX,, 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, Disp: 1 each, Rfl: 0    cyanocobalamin 1000 MCG tablet, Take 100 mcg by mouth daily, Disp: , Rfl:     dexamethasone (DECADRON) 1 mg tablet, Take 1 tablet (1 mg total) by mouth daily with breakfast, Disp: 30 tablet, Rfl: 3    fentaNYL (DURAGESIC) 12 mcg/hr TD 72 hr patch, Place 1 patch on the skin every third day Apply 2 patches every 3 days Max Daily Amount: 1 patch, Disp: 10 patch, Rfl: 0    folic acid (FOLVITE) 1 mg tablet, Take 1 mg by mouth daily, Disp: , Rfl:     furosemide (LASIX) 20 mg tablet, TAKE 1 TABLET (20 MG TOTAL) BY MOUTH DAILY FOR 3 DAYS, Disp: 30 tablet, Rfl: 0    lidocaine-prilocaine (EMLA) cream, Apply topically as needed for mild pain 1 hour prior to port access, Disp: 30 g, Rfl: 1    metFORMIN (GLUCOPHAGE) 500 mg tablet, Take 500 mg by mouth daily with breakfast, Disp: , Rfl:     metoprolol tartrate (LOPRESSOR) 25 mg tablet, Take 0 5 tablets (12 5 mg total) by mouth every 12 (twelve) hours, Disp: 90 tablet, Rfl: 3    ondansetron (ZOFRAN) 4 mg tablet, Take 1 tablet (4 mg total) by mouth every 8 (eight) hours as needed for nausea or vomiting, Disp: 20 tablet, Rfl: 0    oxyCODONE (ROXICODONE) 5 mg immediate release tablet, Take 0 5 tablets (2 5 mg total) by mouth every 4 (four) hours as needed for moderate pain Max Daily Amount: 15 mg (Patient not taking: Reported on 11/23/2018 ), Disp: 45 tablet, Rfl: 0  Review of Systems   Constitutional: Positive for fatigue  Respiratory: Positive for shortness of breath  Neurological: Positive for weakness  Psychiatric/Behavioral: The patient is nervous/anxious  All other systems reviewed and are negative  All other systems negative    Objective:  Vital Signs  /80   Pulse 86   Resp 20    Physical Exam   Constitutional: She is oriented to person, place, and time  No distress  Alert, pleasant, appears chronically ill  NAD   HENT:   Head: Normocephalic and atraumatic  Mouth/Throat: No oropharyngeal exudate  Eyes: No scleral icterus  Neck: Normal range of motion  Cardiovascular: Normal rate and regular rhythm  Pulmonary/Chest: Effort normal  No respiratory distress  She has no wheezes  She exhibits no tenderness  Decreased breath sounds left base  Pulse Ox 97% 1L NC   Abdominal: Soft  Bowel sounds are normal    Musculoskeletal: Normal range of motion  She exhibits no edema  Ambulates with walker    Neurological: She is alert and oriented to person, place, and time  Skin: Skin is warm and dry  She is not diaphoretic  Psychiatric: She has a normal mood and affect   Her behavior is normal  Judgment and thought content normal

## 2018-12-05 RX ORDER — ACETAMINOPHEN 325 MG/1
650 TABLET ORAL ONCE
Status: COMPLETED | OUTPATIENT
Start: 2018-12-06 | End: 2018-12-06

## 2018-12-05 RX ORDER — SODIUM CHLORIDE 9 MG/ML
20 INJECTION, SOLUTION INTRAVENOUS CONTINUOUS
Status: DISCONTINUED | OUTPATIENT
Start: 2018-12-06 | End: 2018-12-09 | Stop reason: HOSPADM

## 2018-12-05 NOTE — PROGRESS NOTES
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 diagnosed in May 2017  Molecular tests were negative for EGFR mutation, ALK gene arrangement, Ros1 mutation, BRaf mutation, PD L1 expression of 10% only  She was treated with Alimta/carboplatin with excellent response initially and later on she had relapse on maintenance Alimta, and carboplatin induced allergic reaction  We change therapy to Pembrolizumab 5/2018 unfortunately she did not have a good response  We added Taxotere in August 2018 unfortunately no response with increase in the studding of the left pleura and more symptoms  Taxotere s weekly 3 weeks on 1 week off initiated 10/2018 with 500 mL normal saline hydration with every chemotherapy  Cycle 1 was complicated with fever reaction to gemcitabine, requiring admission to the hospital for 1 week  CT scan 10/11/2018 showed significant decrease and improvement of the studding of the left pleura and decrease in the subcarinal lymphadenopathy      10/19/2018:  Gemzar initiated  With cycle #2, Gemzar was increased to 1000 milligrams/meter squared day 1 day 15 with  acetaminophen 650 mg every 8 hours for the next 36 hours of gemcitabine  Dexamethasone 2 mg p o  Daily continued  She declined hospice    Cycle 3D1 Gemzar 1000mg/m2 D1, D15 due today, 12/6/2018           HPI:  25-year-old  female who presented to [facility] in May 2017 with dyspnea for the past 4-5 days and pleurisy, with occasional dry cough scan of the thorax showed no evidence of PE, it showed a large loculated left side pleural effusion with nodularity concerning for metastatic disease, there is a concern for a mass versus pneumonia in the collapsed left lower lobe lung underwent left thoracentesis yielding 1 4 L of bloody fluid, pathology showed adenocarcinoma, positive for TTF-1, CK 7 most likely consistent with non-small cell lung cancer patient had a history of left-sided breast cancer in 1992 status post mastectomy she told me she had told lymph node involvement, she was not treated with either chemotherapy, radiation therapy or hormonal therapy  She has extensive family history of cancer, sister was diagnosed with breast cancer at age 36, another sister was diagnosed with breast cancer at age 72, a brother diagnosed with pancreatic cancer and another brother with lung cancer niece was found to have BRCA gene mutation        Molecular tests: negative for targeted mutation such as EGFR, ALK gene rearrangement, Ros1, B Darian, PD L1 expression of 10%         Previous Treatment:  Carboplatin AUC 5, Alimta 500 milligram/meter squared every 3 weeks initiated in July 2017 finished 6 cycles in November 2017 and then she received maintenance Alimta from November 2017 until April 2018 with progression of disease  A repeat core biopsy negative for EGFR mutation, Ross 1 mutation, B Darian mutation, ALK gene rearrangement, PDL expression of 10%, no evidence of BRCA1/ 2 mutation, she was treated again with short course of carboplatin and Alimta with allergic reaction to carboplatin then initiated on Pembrolizumab 200 mg flat dose every 3 weeks in May 2018 until August 2018 with admission to the hospital with malignant pleural effusion status post pericardiocentesis 400 mL, CT scan showed studding of the left pleura, more small new nodules of the pleura consistent with progression of disease on Pembrolizumab  Taxotere 50 milligram/meter squared weekly 3 weeks on 1 week of cycle 1  In September 2018 after progression of disease, unfortunately after 6 weeks of treatment she has progression of disease with left pleurisy     Interval History: Here with her family    Dizziness is better since hospitalization  No recurrent fevers  Oral hydration has been minimal   No complaints today  Oxygen requirements at home have decreased        Current Therapy:  gemcitabine 850 milligram/meter squared weekly 3 weeks on 1 week off, initiated in October 2018  Dose increased to 1000mg/m2 D1, D15 with cycle #2               Test Results:        Labs:   Lab Results   Component Value Date    HGB 13 0 11/23/2018    HCT 40 6 11/23/2018    MCV 96 11/23/2018     11/23/2018    WBC 14 00 (H) 11/23/2018    NRBC 0 10/17/2018    BANDSPCT 13 (H) 11/23/2018    ATYLMPCT 3 (H) 10/17/2018     Lab Results   Component Value Date    K 4 1 11/08/2018     11/08/2018    CO2 29 11/08/2018    BUN 30 (H) 11/08/2018    CREATININE 0 90 11/08/2018    GLUF 101 (H) 04/18/2018    CALCIUM 9 5 11/08/2018    AST 35 11/08/2018    ALT 38 11/08/2018    ALKPHOS 109 11/08/2018    EGFR 59 11/08/2018       Imaging: No results found  ROS:  As mentioned in HPI & Interval History otherwise 14 point ROS negative  Allergies: Allergies   Allergen Reactions    Atorvastatin Other (See Comments)    Benzonatate Other (See Comments)    Carboplatin      Pt had allergic reaction during 8th carbo dose    Sulfa Antibiotics     Bactrim [Sulfamethoxazole-Trimethoprim] Rash    Latex Rash    Other Rash     Pt states she gets a rash from surgical tape, she is ok with paper tape     Current Medications: Reviewed  PMH/FH/SH:  Reviewed      Physical Exam:    There is no height or weight on file to calculate BSA      Ht Readings from Last 3 Encounters:   11/23/18 5' 1" (1 549 m)   11/08/18 5' 1" (1 549 m)   11/07/18 5' 2" (1 575 m)        Wt Readings from Last 3 Encounters:   11/23/18 70 3 kg (155 lb)   11/08/18 69 2 kg (152 lb 8 9 oz)   11/07/18 68 5 kg (151 lb)        Temp Readings from Last 3 Encounters:   11/23/18 98 2 °F (36 8 °C) (Tympanic)   11/23/18 97 8 °F (36 6 °C) (Tympanic)   11/08/18 98 °F (36 7 °C) (Tympanic)        BP Readings from Last 3 Encounters:   18 148/80   18 126/82   18 108/80           Physical Exam   Constitutional: She is oriented to person, place, and time  She appears well-developed and well-nourished  No distress  HENT:   Head: Normocephalic and atraumatic  Nose: Nose normal    Mouth/Throat: Oropharynx is clear and moist    Eyes: Pupils are equal, round, and reactive to light  Conjunctivae and EOM are normal  No scleral icterus  Neck: Normal range of motion  Neck supple  No tracheal deviation present  No thyromegaly present  Cardiovascular: Normal rate, regular rhythm, normal heart sounds and intact distal pulses  Exam reveals no gallop and no friction rub  No murmur heard  Pulmonary/Chest: Effort normal  No stridor  No respiratory distress  She has no wheezes  She has no rales  She exhibits no tenderness  Diffusely decreased BS   Abdominal: Soft  Bowel sounds are normal  She exhibits no distension  There is no tenderness  There is no rebound  Musculoskeletal: She exhibits no edema, tenderness or deformity  Lymphadenopathy:     She has no cervical adenopathy  Neurological: She is alert and oriented to person, place, and time  No cranial nerve deficit  Coordination normal    Skin: Skin is warm and dry  No rash noted  She is not diaphoretic  No erythema  No pallor  Psychiatric: She has a normal mood and affect   Her behavior is normal  Judgment and thought content normal        ECO      Emergency Contacts:    [place] [phone],

## 2018-12-06 ENCOUNTER — OFFICE VISIT (OUTPATIENT)
Dept: HEMATOLOGY ONCOLOGY | Facility: CLINIC | Age: 83
End: 2018-12-06
Payer: MEDICARE

## 2018-12-06 ENCOUNTER — HOSPITAL ENCOUNTER (OUTPATIENT)
Dept: INFUSION CENTER | Facility: CLINIC | Age: 83
Discharge: HOME | End: 2018-12-06
Payer: MEDICARE

## 2018-12-06 VITALS
HEART RATE: 68 BPM | SYSTOLIC BLOOD PRESSURE: 144 MMHG | HEIGHT: 61 IN | RESPIRATION RATE: 18 BRPM | BODY MASS INDEX: 29.72 KG/M2 | TEMPERATURE: 98.1 F | DIASTOLIC BLOOD PRESSURE: 83 MMHG | WEIGHT: 157.41 LBS

## 2018-12-06 VITALS
HEART RATE: 70 BPM | SYSTOLIC BLOOD PRESSURE: 138 MMHG | OXYGEN SATURATION: 98 % | TEMPERATURE: 98.2 F | DIASTOLIC BLOOD PRESSURE: 72 MMHG | RESPIRATION RATE: 17 BRPM

## 2018-12-06 DIAGNOSIS — E87.6 HYPOKALEMIA: Primary | ICD-10-CM

## 2018-12-06 DIAGNOSIS — C34.90 MALIGNANT NEOPLASM OF LUNG, UNSPECIFIED LATERALITY, UNSPECIFIED PART OF LUNG (HCC): Primary | ICD-10-CM

## 2018-12-06 LAB
ALBUMIN SERPL BCP-MCNC: 2.1 G/DL (ref 3.5–5.7)
ALP SERPL-CCNC: 73 U/L (ref 46–116)
ALT SERPL W P-5'-P-CCNC: 29 U/L (ref 12–78)
ANION GAP SERPL CALCULATED.3IONS-SCNC: 0 MMOL/L (ref 4–13)
ANISOCYTOSIS BLD QL SMEAR: PRESENT
AST SERPL W P-5'-P-CCNC: 20 U/L (ref 5–45)
BASOPHILS # BLD AUTO: 0 THOUSAND/UL (ref 0–0.1)
BASOPHILS NFR MAR MANUAL: 0 % (ref 0–1)
BILIRUB SERPL-MCNC: 0.5 MG/DL (ref 0.2–1)
BUN SERPL-MCNC: 18 MG/DL (ref 5–25)
CALCIUM SERPL-MCNC: 6.9 MG/DL (ref 8.3–10.1)
CHLORIDE SERPL-SCNC: 117 MMOL/L (ref 98–108)
CO2 SERPL-SCNC: 24 MMOL/L (ref 21–32)
CREAT SERPL-MCNC: 0.6 MG/DL (ref 0.6–1.3)
EOSINOPHIL # BLD AUTO: 0 THOUSAND/UL (ref 0–0.61)
EOSINOPHIL NFR BLD MANUAL: 0 % (ref 0–6)
ERYTHROCYTE [DISTWIDTH] IN BLOOD BY AUTOMATED COUNT: 19.9 % (ref 11.6–15.1)
GFR SERPL CREATININE-BSD FRML MDRD: 85 ML/MIN/1.73SQ M
GLUCOSE SERPL-MCNC: 274 MG/DL (ref 65–140)
HCT VFR BLD AUTO: 40.8 % (ref 34.8–46.1)
HGB BLD-MCNC: 13.1 G/DL (ref 11.5–15.4)
LYMPHOCYTES # BLD AUTO: 1.18 THOUSAND/UL (ref 0.6–4.47)
LYMPHOCYTES # BLD AUTO: 8 % (ref 14–44)
MAGNESIUM SERPL-MCNC: 1.8 MG/DL (ref 1.6–2.6)
MCH RBC QN AUTO: 31.3 PG (ref 26.8–34.3)
MCHC RBC AUTO-ENTMCNC: 32.2 G/DL (ref 31.4–37.4)
MCV RBC AUTO: 97 FL (ref 82–98)
MONOCYTES # BLD AUTO: 0.74 THOUSAND/UL (ref 0–1.22)
MONOCYTES NFR BLD AUTO: 5 % (ref 4–12)
NEUTS BAND NFR BLD MANUAL: 23 % (ref 0–8)
NEUTS SEG # BLD: 12.79 THOUSAND/UL (ref 1.81–6.82)
NEUTS SEG NFR BLD AUTO: 64 % (ref 43–75)
OVALOCYTES BLD QL SMEAR: PRESENT
PLATELET # BLD AUTO: 203 THOUSANDS/UL (ref 149–390)
PLATELET BLD QL SMEAR: ADEQUATE
PMV BLD AUTO: 8.5 FL (ref 8.9–12.7)
POTASSIUM SERPL-SCNC: 2.7 MMOL/L (ref 3.5–5.3)
PROT SERPL-MCNC: 4.5 G/DL (ref 6.4–8.2)
RBC # BLD AUTO: 4.19 MILLION/UL (ref 3.81–5.12)
SODIUM SERPL-SCNC: 141 MMOL/L (ref 136–145)
TOTAL CELLS COUNTED SPEC: 100
WBC # BLD AUTO: 14.7 THOUSAND/UL (ref 4.31–10.16)
WBC NRBC COR # BLD: 14.7 THOUSAND/UL (ref 4.31–10.16)

## 2018-12-06 PROCEDURE — 96366 THER/PROPH/DIAG IV INF ADDON: CPT

## 2018-12-06 PROCEDURE — 83735 ASSAY OF MAGNESIUM: CPT | Performed by: INTERNAL MEDICINE

## 2018-12-06 PROCEDURE — 85027 COMPLETE CBC AUTOMATED: CPT | Performed by: INTERNAL MEDICINE

## 2018-12-06 PROCEDURE — 96413 CHEMO IV INFUSION 1 HR: CPT

## 2018-12-06 PROCEDURE — 80053 COMPREHEN METABOLIC PANEL: CPT | Performed by: INTERNAL MEDICINE

## 2018-12-06 PROCEDURE — 96367 TX/PROPH/DG ADDL SEQ IV INF: CPT

## 2018-12-06 PROCEDURE — 85007 BL SMEAR W/DIFF WBC COUNT: CPT | Performed by: INTERNAL MEDICINE

## 2018-12-06 PROCEDURE — 99214 OFFICE O/P EST MOD 30 MIN: CPT | Performed by: PHYSICIAN ASSISTANT

## 2018-12-06 RX ORDER — POTASSIUM CHLORIDE 1.5 G/1.77G
20 POWDER, FOR SOLUTION ORAL 2 TIMES DAILY
Qty: 60 TABLET | Refills: 1 | Status: SHIPPED | OUTPATIENT
Start: 2018-12-06 | End: 2019-02-14

## 2018-12-06 RX ADMIN — GEMCITABINE 1690 MG: 38 INJECTION INTRAVENOUS at 14:05

## 2018-12-06 RX ADMIN — SODIUM CHLORIDE 20 ML/HR: 0.9 INJECTION, SOLUTION INTRAVENOUS at 12:40

## 2018-12-06 RX ADMIN — Medication 300 UNITS: at 13:04

## 2018-12-06 RX ADMIN — ACETAMINOPHEN 650 MG: 325 TABLET, FILM COATED ORAL at 13:04

## 2018-12-06 RX ADMIN — POTASSIUM CHLORIDE: 149 INJECTION, SOLUTION, CONCENTRATE INTRAVENOUS at 14:38

## 2018-12-06 RX ADMIN — ONDANSETRON 8 MG: 2 INJECTION INTRAMUSCULAR; INTRAVENOUS at 13:43

## 2018-12-06 NOTE — PROGRESS NOTES
Pt  Tolerated Gemzar and Potassium without adverse event  Future appointments reviewed  AVS provided  Appointment scheduled for Monday 12/10/2018 for Potassium level

## 2018-12-06 NOTE — PROGRESS NOTES
Received Critical Value for Potassium 2 7  Other labs reviewed  Labs within normal parameters for chemotherapy ordered today  Notified Yesy Laboy RN who spoke with Dr Carla Irving  Ok to proceed with Gemzar today  Potassium Chloride 20 meq ordered for infusion with post hydration of 500ml NSS over 2 hrs today  Per Heather Soliman, a prescription for Potassium  was called to pharmacy and a follow up Potassium level ordered for Monday 12/10/18  Magnesium level ordered today, obtained and sent to lab  RN also reviewed abnormal Ca+ of 6 9 and Glucose of 274  Heather Soliman will discuss Calcium level with Dr Carla Irving  RN discussed need for appointment with PCP or endocrinologist regarding elevated glucose  Pt  States she takes Glucophage, but does not monitor blood sugars  RN called Pt  Daughter Jonathan Lima with lab results and new orders  Jonathan Lima verbalized understanding

## 2018-12-06 NOTE — PLAN OF CARE
Problem: PAIN - ADULT  Goal: Verbalizes/displays adequate comfort level or baseline comfort level  Interventions:  - Encourage patient to monitor pain and request assistance  - Assess pain using appropriate pain scale  - Administer analgesics based on type and severity of pain and evaluate response  - Implement non-pharmacological measures as appropriate and evaluate response  - Consider cultural and social influences on pain and pain management  - Notify physician/advanced practitioner if interventions unsuccessful or patient reports new pain  Outcome: Progressing      Problem: INFECTION - ADULT  Goal: Absence or prevention of progression during hospitalization  INTERVENTIONS:  - Assess and monitor for signs and symptoms of infection  - Monitor lab/diagnostic results  - Monitor all insertion sites, i e  indwelling lines, tubes, and drains  - Monitor endotracheal (as able) and nasal secretions for changes in amount and color  - [place] appropriate cooling/warming therapies per order  - Administer medications as ordered  - Instruct and encourage patient and family to use good hand hygiene technique  - Identify and instruct in appropriate isolation precautions for identified infection/condition  Outcome: Progressing    Goal: Absence of fever/infection during neutropenic period  INTERVENTIONS:  - Monitor WBC  - Implement neutropenic guidelines  Outcome: Progressing      Problem: Knowledge Deficit  Goal: Patient/family/caregiver demonstrates understanding of disease process, treatment plan, medications, and discharge instructions  Complete learning assessment and assess knowledge base    Interventions:  - Provide teaching at level of understanding  - Provide teaching via preferred learning methods  Outcome: Progressing

## 2018-12-07 ENCOUNTER — TELEPHONE (OUTPATIENT)
Dept: HEMATOLOGY ONCOLOGY | Facility: CLINIC | Age: 83
End: 2018-12-07

## 2018-12-07 DIAGNOSIS — E87.6 HYPOKALEMIA: Primary | ICD-10-CM

## 2018-12-07 RX ORDER — POTASSIUM CHLORIDE 20 MEQ/1
20 TABLET, EXTENDED RELEASE ORAL 2 TIMES DAILY
Qty: 60 TABLET | Refills: 0 | Status: SHIPPED | OUTPATIENT
Start: 2018-12-07 | End: 2019-02-15 | Stop reason: HOSPADM

## 2018-12-07 NOTE — TELEPHONE ENCOUNTER
----- Message from Ivy Zaidi sent at 12/7/2018  8:07 AM EST -----  Regarding: FW: Non-Urgent Medical Question  Contact: [phone]  Please review with Dr Merlyn Jesus  ----- Message -----  From: Gia Moulton  Sent: 12/6/2018   4:14 PM  To: [facility]  Subject: Non-Urgent Medical Question                      Just wondering why the CT Scan with the chalky contrast, and not the regular scan we always get  The lady at the  said pelvis, belly and chest, but was wondering why the belly and pelvis  I didn't know whether we are comparing something, or just a normal scan done  I'm concerned with her drinking that stuff       Thanks in Advance,  Mckayla Judd (daughter)

## 2018-12-07 NOTE — TELEPHONE ENCOUNTER
Pt's daughter questioning the use for barium for CT scan  Normally is done with IV dye, also the prescription ordered yesterday for low potassium is too costly- pharmacy is charging $250  Would like something else   Please address/ advise, thanks

## 2018-12-07 NOTE — TELEPHONE ENCOUNTER
Called the patients daughter to let her know if the patients doesn't want to take the barium if she doesn't want to and also let her know a alternative potassium was sent to the pharmacy today

## 2018-12-07 NOTE — TELEPHONE ENCOUNTER
If patient doesn't want to take oral barium, she can omit  Will send alternative to pharmacy for potassium  Please let her know

## 2018-12-10 ENCOUNTER — HOSPITAL ENCOUNTER (OUTPATIENT)
Dept: INFUSION CENTER | Facility: CLINIC | Age: 83
Discharge: HOME | End: 2018-12-10

## 2018-12-19 RX ORDER — SODIUM CHLORIDE 9 MG/ML
20 INJECTION, SOLUTION INTRAVENOUS CONTINUOUS
Status: DISCONTINUED | OUTPATIENT
Start: 2018-12-20 | End: 2018-12-23 | Stop reason: HOSPADM

## 2018-12-19 RX ORDER — ACETAMINOPHEN 325 MG/1
650 TABLET ORAL ONCE
Status: COMPLETED | OUTPATIENT
Start: 2018-12-20 | End: 2018-12-20

## 2018-12-20 ENCOUNTER — HOSPITAL ENCOUNTER (OUTPATIENT)
Dept: INFUSION CENTER | Facility: CLINIC | Age: 83
Discharge: HOME | End: 2018-12-20
Payer: MEDICARE

## 2018-12-20 VITALS
RESPIRATION RATE: 18 BRPM | SYSTOLIC BLOOD PRESSURE: 124 MMHG | BODY MASS INDEX: 29.51 KG/M2 | WEIGHT: 156.2 LBS | DIASTOLIC BLOOD PRESSURE: 84 MMHG | TEMPERATURE: 97.6 F | HEART RATE: 97 BPM

## 2018-12-20 DIAGNOSIS — C78.2 PLEURAL METASTASIS (HCC): ICD-10-CM

## 2018-12-20 DIAGNOSIS — C34.82 MALIGNANT NEOPLASM OF OVERLAPPING SITES OF LEFT LUNG (HCC): ICD-10-CM

## 2018-12-20 LAB
ALBUMIN SERPL BCP-MCNC: 3.1 G/DL (ref 3.5–5.7)
ALP SERPL-CCNC: 101 U/L (ref 46–116)
ALT SERPL W P-5'-P-CCNC: 39 U/L (ref 12–78)
ANION GAP SERPL CALCULATED.3IONS-SCNC: 13 MMOL/L (ref 4–13)
ANISOCYTOSIS BLD QL SMEAR: PRESENT
AST SERPL W P-5'-P-CCNC: 22 U/L (ref 5–45)
BASOPHILS # BLD MANUAL: 0 THOUSAND/UL (ref 0–0.1)
BASOPHILS NFR MAR MANUAL: 0 % (ref 0–1)
BILIRUB SERPL-MCNC: 0.8 MG/DL (ref 0.2–1)
BUN SERPL-MCNC: 31 MG/DL (ref 5–25)
CALCIUM SERPL-MCNC: 9 MG/DL (ref 8.3–10.1)
CHLORIDE SERPL-SCNC: 100 MMOL/L (ref 98–108)
CO2 SERPL-SCNC: 29 MMOL/L (ref 21–32)
CREAT SERPL-MCNC: 0.9 MG/DL (ref 0.6–1.3)
EOSINOPHIL # BLD MANUAL: 0.34 THOUSAND/UL (ref 0–0.4)
EOSINOPHIL NFR BLD MANUAL: 2 % (ref 0–6)
ERYTHROCYTE [DISTWIDTH] IN BLOOD BY AUTOMATED COUNT: 16.6 % (ref 11.6–15.1)
GFR SERPL CREATININE-BSD FRML MDRD: 59 ML/MIN/1.73SQ M
GLUCOSE SERPL-MCNC: 177 MG/DL (ref 65–140)
HCT VFR BLD AUTO: 42.3 % (ref 34.8–46.1)
HGB BLD-MCNC: 13.5 G/DL (ref 11.5–15.4)
LG PLATELETS BLD QL SMEAR: PRESENT
LYMPHOCYTES # BLD AUTO: 1.53 THOUSAND/UL (ref 0.6–4.47)
LYMPHOCYTES # BLD AUTO: 9 % (ref 14–44)
MCH RBC QN AUTO: 32.8 PG (ref 26.8–34.3)
MCHC RBC AUTO-ENTMCNC: 31.9 G/DL (ref 31.4–37.4)
MCV RBC AUTO: 103 FL (ref 82–98)
MONOCYTES # BLD AUTO: 1.36 THOUSAND/UL (ref 0–1.22)
MONOCYTES NFR BLD: 8 % (ref 4–12)
NEUTROPHILS # BLD MANUAL: 13.81 THOUSAND/UL (ref 1.85–7.62)
NEUTS BAND NFR BLD MANUAL: 17 % (ref 0–8)
NEUTS SEG NFR BLD AUTO: 64 % (ref 43–75)
OVALOCYTES BLD QL SMEAR: PRESENT
PLATELET # BLD AUTO: 280 THOUSANDS/UL (ref 149–390)
PLATELET BLD QL SMEAR: ADEQUATE
PMV BLD AUTO: 11 FL (ref 8.9–12.7)
POTASSIUM SERPL-SCNC: 4.3 MMOL/L (ref 3.5–5.3)
PROT SERPL-MCNC: 6.5 G/DL (ref 6.4–8.2)
RBC # BLD AUTO: 4.12 MILLION/UL (ref 3.81–5.12)
SODIUM SERPL-SCNC: 142 MMOL/L (ref 136–145)
TOTAL CELLS COUNTED SPEC: 100
WBC # BLD AUTO: 17.05 THOUSAND/UL (ref 4.31–10.16)

## 2018-12-20 PROCEDURE — 96413 CHEMO IV INFUSION 1 HR: CPT

## 2018-12-20 PROCEDURE — 96367 TX/PROPH/DG ADDL SEQ IV INF: CPT

## 2018-12-20 PROCEDURE — 80053 COMPREHEN METABOLIC PANEL: CPT

## 2018-12-20 PROCEDURE — 85007 BL SMEAR W/DIFF WBC COUNT: CPT

## 2018-12-20 PROCEDURE — 85027 COMPLETE CBC AUTOMATED: CPT

## 2018-12-20 PROCEDURE — 96361 HYDRATE IV INFUSION ADD-ON: CPT

## 2018-12-20 RX ADMIN — GEMCITABINE 1690 MG: 38 INJECTION INTRAVENOUS at 14:24

## 2018-12-20 RX ADMIN — ACETAMINOPHEN 650 MG: 325 TABLET, FILM COATED ORAL at 13:49

## 2018-12-20 RX ADMIN — SODIUM CHLORIDE 20 ML/HR: 0.9 INJECTION, SOLUTION INTRAVENOUS at 13:29

## 2018-12-20 RX ADMIN — SODIUM CHLORIDE 500 ML: 0.9 INJECTION, SOLUTION INTRAVENOUS at 15:00

## 2018-12-20 RX ADMIN — Medication 300 UNITS: at 16:09

## 2018-12-20 RX ADMIN — ONDANSETRON 8 MG: 2 INJECTION, SOLUTION INTRAMUSCULAR; INTRAVENOUS at 13:49

## 2018-12-20 NOTE — PLAN OF CARE
Problem: Potential for Falls  Goal: Patient will remain free of falls  INTERVENTIONS:  - Assess patient frequently for physical needs  -  Identify cognitive and physical deficits and behaviors that affect risk of falls    -  [place] fall precautions as indicated by assessment   - Educate patient/family on patient safety including physical limitations  - Instruct patient to call for assistance with activity based on assessment  - Modify environment to reduce risk of injury  - Consider OT/PT consult to assist with strengthening/mobility   Outcome: Progressing

## 2018-12-20 NOTE — PROGRESS NOTES
Pt tolerated treatment today without incident    Pt was provided with AVS and left infusion center via wheelchair with her daughter

## 2018-12-20 NOTE — PROGRESS NOTES
WBC of 17 05 reported to [place] [facility], RN  No further orders at this time  OK to proceed with chemotherapy today as planned

## 2018-12-21 ENCOUNTER — TELEPHONE (OUTPATIENT)
Dept: HEMATOLOGY ONCOLOGY | Facility: CLINIC | Age: 83
End: 2018-12-21

## 2018-12-21 DIAGNOSIS — J96.01 ACUTE RESPIRATORY FAILURE WITH HYPOXIA (HCC): ICD-10-CM

## 2018-12-21 RX ORDER — FUROSEMIDE 20 MG/1
20 TABLET ORAL DAILY
Qty: 30 TABLET | Refills: 1 | Status: SHIPPED | OUTPATIENT
Start: 2018-12-21 | End: 2019-02-15 | Stop reason: HOSPADM

## 2018-12-27 ENCOUNTER — TELEPHONE (OUTPATIENT)
Dept: HEMATOLOGY ONCOLOGY | Facility: CLINIC | Age: 83
End: 2018-12-27

## 2018-12-28 ENCOUNTER — DOCUMENTATION (OUTPATIENT)
Dept: HEMATOLOGY ONCOLOGY | Facility: CLINIC | Age: 83
End: 2018-12-28

## 2018-12-28 NOTE — TELEPHONE ENCOUNTER
Faxed a note containing the the room air saturation and recent office note to Cristhian at [phone]

## 2018-12-28 NOTE — PROGRESS NOTES
Patient presented to the infusion center on 12/20/18 and a resting room air pulse ox was obtained  The patient was 88% on room air at rest  The patient does not have pneumonia at this time

## 2018-12-31 ENCOUNTER — TELEPHONE (OUTPATIENT)
Dept: HEMATOLOGY ONCOLOGY | Facility: CLINIC | Age: 83
End: 2018-12-31

## 2018-12-31 NOTE — TELEPHONE ENCOUNTER
Called and spoke with [facility] pharmacy who wants something stating the patient doesn't have pneumonia   Will contact them wednesday

## 2018-12-31 NOTE — TELEPHONE ENCOUNTER
CALL FROM ARCENIO MONROE   ASKING FOR BEIGE   WANTING TO TALK TO HER ABOUT OXYGEN    THEY NEED A CALL BACK FROM COVERING RN    [phone]

## 2019-01-02 RX ORDER — ACETAMINOPHEN 325 MG/1
650 TABLET ORAL ONCE
Status: DISCONTINUED | OUTPATIENT
Start: 2019-01-03 | End: 2019-01-06 | Stop reason: HOSPADM

## 2019-01-02 RX ORDER — SODIUM CHLORIDE 9 MG/ML
20 INJECTION, SOLUTION INTRAVENOUS CONTINUOUS
Status: DISCONTINUED | OUTPATIENT
Start: 2019-01-03 | End: 2019-01-06 | Stop reason: HOSPADM

## 2019-01-03 ENCOUNTER — HOSPITAL ENCOUNTER (OUTPATIENT)
Dept: INFUSION CENTER | Facility: CLINIC | Age: 84
Discharge: HOME | End: 2019-01-03
Payer: MEDICARE

## 2019-01-03 VITALS
WEIGHT: 154.32 LBS | SYSTOLIC BLOOD PRESSURE: 140 MMHG | BODY MASS INDEX: 29.14 KG/M2 | RESPIRATION RATE: 16 BRPM | HEIGHT: 61 IN | HEART RATE: 92 BPM | DIASTOLIC BLOOD PRESSURE: 80 MMHG | TEMPERATURE: 98 F

## 2019-01-03 LAB
ALBUMIN SERPL BCP-MCNC: 3.3 G/DL (ref 3.5–5.7)
ALP SERPL-CCNC: 105 U/L (ref 46–116)
ALT SERPL W P-5'-P-CCNC: 39 U/L (ref 12–78)
ANION GAP SERPL CALCULATED.3IONS-SCNC: 9 MMOL/L (ref 4–13)
ANISOCYTOSIS BLD QL SMEAR: PRESENT
AST SERPL W P-5'-P-CCNC: 23 U/L (ref 5–45)
BASOPHILS # BLD MANUAL: 0 THOUSAND/UL (ref 0–0.1)
BASOPHILS NFR MAR MANUAL: 0 % (ref 0–1)
BILIRUB SERPL-MCNC: 0.6 MG/DL (ref 0.2–1)
BUN SERPL-MCNC: 25 MG/DL (ref 5–25)
CALCIUM SERPL-MCNC: 10 MG/DL (ref 8.3–10.1)
CHLORIDE SERPL-SCNC: 100 MMOL/L (ref 98–108)
CO2 SERPL-SCNC: 30 MMOL/L (ref 21–32)
CREAT SERPL-MCNC: 0.9 MG/DL (ref 0.6–1.3)
EOSINOPHIL # BLD MANUAL: 0.97 THOUSAND/UL (ref 0–0.4)
EOSINOPHIL NFR BLD MANUAL: 6 % (ref 0–6)
ERYTHROCYTE [DISTWIDTH] IN BLOOD BY AUTOMATED COUNT: 15.6 % (ref 11.6–15.1)
GFR SERPL CREATININE-BSD FRML MDRD: 59 ML/MIN/1.73SQ M
GLUCOSE SERPL-MCNC: 179 MG/DL (ref 65–140)
HCT VFR BLD AUTO: 42.7 % (ref 34.8–46.1)
HGB BLD-MCNC: 13.5 G/DL (ref 11.5–15.4)
LG PLATELETS BLD QL SMEAR: PRESENT
LYMPHOCYTES # BLD AUTO: 15 % (ref 14–44)
LYMPHOCYTES # BLD AUTO: 2.43 THOUSAND/UL (ref 0.6–4.47)
MACROCYTES BLD QL AUTO: PRESENT
MCH RBC QN AUTO: 32.8 PG (ref 26.8–34.3)
MCHC RBC AUTO-ENTMCNC: 31.6 G/DL (ref 31.4–37.4)
MCV RBC AUTO: 104 FL (ref 82–98)
MONOCYTES # BLD AUTO: 1.46 THOUSAND/UL (ref 0–1.22)
MONOCYTES NFR BLD: 9 % (ref 4–12)
NEUTROPHILS # BLD MANUAL: 11.35 THOUSAND/UL (ref 1.85–7.62)
NEUTS BAND NFR BLD MANUAL: 17 % (ref 0–8)
NEUTS SEG NFR BLD AUTO: 53 % (ref 43–75)
PLATELET # BLD AUTO: 208 THOUSANDS/UL (ref 149–390)
PLATELET BLD QL SMEAR: ADEQUATE
PMV BLD AUTO: 11.6 FL (ref 8.9–12.7)
POTASSIUM SERPL-SCNC: 4.4 MMOL/L (ref 3.5–5.3)
PROT SERPL-MCNC: 6.8 G/DL (ref 6.4–8.2)
RBC # BLD AUTO: 4.12 MILLION/UL (ref 3.81–5.12)
SODIUM SERPL-SCNC: 139 MMOL/L (ref 136–145)
TOTAL CELLS COUNTED SPEC: 100
WBC # BLD AUTO: 16.21 THOUSAND/UL (ref 4.31–10.16)

## 2019-01-03 PROCEDURE — 96413 CHEMO IV INFUSION 1 HR: CPT

## 2019-01-03 PROCEDURE — 85027 COMPLETE CBC AUTOMATED: CPT | Performed by: INTERNAL MEDICINE

## 2019-01-03 PROCEDURE — 85007 BL SMEAR W/DIFF WBC COUNT: CPT | Performed by: INTERNAL MEDICINE

## 2019-01-03 PROCEDURE — 80053 COMPREHEN METABOLIC PANEL: CPT | Performed by: INTERNAL MEDICINE

## 2019-01-03 PROCEDURE — 96361 HYDRATE IV INFUSION ADD-ON: CPT

## 2019-01-03 PROCEDURE — 96367 TX/PROPH/DG ADDL SEQ IV INF: CPT

## 2019-01-03 RX ADMIN — GEMCITABINE 1690 MG: 38 INJECTION INTRAVENOUS at 13:56

## 2019-01-03 RX ADMIN — ONDANSETRON 8 MG: 2 INJECTION INTRAMUSCULAR; INTRAVENOUS at 12:32

## 2019-01-03 RX ADMIN — SODIUM CHLORIDE 20 ML/HR: 0.9 INJECTION, SOLUTION INTRAVENOUS at 12:15

## 2019-01-03 RX ADMIN — SODIUM CHLORIDE 500 ML: 0.9 INJECTION, SOLUTION INTRAVENOUS at 12:30

## 2019-01-03 NOTE — PLAN OF CARE
Problem: Potential for Falls  Goal: Patient will remain free of falls  INTERVENTIONS:  - Assess patient frequently for physical needs  -  Identify cognitive and physical deficits and behaviors that affect risk of falls    -  [place] fall precautions as indicated by assessment   - Educate patient/family on patient safety including physical limitations  - Instruct patient to call for assistance with activity based on assessment  - Modify environment to reduce risk of injury  - Consider OT/PT consult to assist with strengthening/mobility   Outcome: Progressing

## 2019-01-03 NOTE — PROGRESS NOTES
Patient tolerated chemotherapy   Patient offers no complaints, is aware of future appts and refuses AVS

## 2019-01-08 ENCOUNTER — HOSPITAL ENCOUNTER (OUTPATIENT)
Dept: CT IMAGING | Facility: HOSPITAL | Age: 84
Discharge: HOME | End: 2019-01-08
Payer: MEDICARE

## 2019-01-08 DIAGNOSIS — C34.90 MALIGNANT NEOPLASM OF LUNG, UNSPECIFIED LATERALITY, UNSPECIFIED PART OF LUNG (HCC): ICD-10-CM

## 2019-01-08 PROCEDURE — 71260 CT THORAX DX C+: CPT

## 2019-01-08 PROCEDURE — 74177 CT ABD & PELVIS W/CONTRAST: CPT

## 2019-01-08 RX ADMIN — IOHEXOL 100 ML: 350 INJECTION, SOLUTION INTRAVENOUS at 16:25

## 2019-01-14 NOTE — PROGRESS NOTES
Palliative and Supportive Care   Gia Moulton 80 y o  female [phone]    Assessment/Plan:  1  Malignant neoplasm of lower lobe of left lung ([facility] [address] )    2  Pleural metastasis ([facility] [address] )    3  Cancer related pain    4  Anxiety about health        Requested Prescriptions      No prescriptions requested or ordered in this encounter     There are no discontinued medications  Metastatic Lung cancer:   - Results of recent Cat Scan reviewed with pt and her spouse  Pt was informed of slight progression of left pleural mass  Time spent providing support  Pt will further discuss results and treatment options with Dr Merlyn Jesus, she has an appointment scheduled for later this week  She is not interested in hospice at this time  Additionally, I spoke with pt's daughter Mckayla Whaley and updated her on today's home visit as well as results of CT Scan  Cancer related pain:   - Continue Fentanyl TD 12 mcg/hr patch  Q 72    - Dexamethasone 1 mg daily    - Continue extra-strength Tylenol as needed    - She has oxycodone available if needed  Encouraged to to take if she develops severe pain not well controlled with Tylenol  Anxiety:   - Celexa 20 mg daily   - Alprazolam 0 5 mg at bedtime; instructed that she may take an additional dose if needed during the day  Representatives have queried the patient's controlled substance dispensing history in the Prescription Drug Monitoring Program in compliance with regulations before I have prescribed any controlled substances  The prescription history is consistent with prescribed therapy and our practice policies  60 minutes were spent face to face with her  with greater than 50% of the time spent in counseling or coordination of care including discussions of instructions for disease self management, treatment instructions and follow up requirements   All of the patient's questions were answered during this discussion      Follow up one month    Subjective:   Chief Complaint  Follow up visit for:  symptom management, pain, neoplasm related, breathlessness, depression or anxiety,   HPI     Gisele Swenson is a 80 y o  female with 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 being seen at home today in monthly follow up  Recent CT scan done on 1/8/19 shows tumor progression, she has an appointment with hem/onc later this week to discuss these results and treatment options  She has been having worsening pain in her left chest wall region over the past 2 weeks where she has extensive pleural tumor  Her pain is managed with fentanyl 12 mcg/patch and she has been taking extra-strength tylenol 2-3 times a day for breakthrough with good relief  She does have oxycodone available as well but she is very reluctant to use this  She continues to struggle with anxiety and reports her symptoms have been worse lately d/t waiting to hear about results of CT scan and subconsciously "knowing" her disease has progressed  Her appetite remains good  No constipation issues  She denies shortness of breath at rest but does fatigue easy and develop dyspnea with mild exertion  The following portions of the medical history were reviewed: past medical history, problem list, medication list, and social history      Current Outpatient Prescriptions:     acetaminophen (TYLENOL) 500 mg tablet, Take 500 mg by mouth, Disp: , Rfl:     ALPRAZolam (XANAX) 0 5 mg tablet, Take 1 tablet (0 5 mg total) by mouth daily at bedtime as needed for anxiety, Disp: 30 tablet, Rfl: 1    amiodarone 100 mg tablet, Take 1 tablet (100 mg total) by mouth daily, Disp: 90 tablet, Rfl: 3    aspirin (ECOTRIN LOW STRENGTH) 81 mg EC tablet, Take 81 mg by mouth daily, Disp: , Rfl:     citalopram (CeleXA) 20 mg tablet, Take 20 mg by mouth daily, Disp: , Rfl:     CRANIAL PROSTHESIS, RX,, 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, Disp: 1 each, Rfl: 0    cyanocobalamin 1000 MCG tablet, Take 100 mcg by mouth daily, Disp: , Rfl:     dexamethasone (DECADRON) 1 mg tablet, Take 1 tablet (1 mg total) by mouth daily with breakfast, Disp: 30 tablet, Rfl: 3    fentaNYL (DURAGESIC) 12 mcg/hr TD 72 hr patch, Place 1 patch on the skin every third day Apply 2 patches every 3 days Max Daily Amount: 1 patch, Disp: 10 patch, Rfl: 0    folic acid (FOLVITE) 1 mg tablet, Take 1 mg by mouth daily, Disp: , Rfl:     furosemide (LASIX) 20 mg tablet, Take 1 tablet (20 mg total) by mouth daily for 30 days, Disp: 30 tablet, Rfl: 1    lidocaine-prilocaine (EMLA) cream, Apply topically as needed for mild pain 1 hour prior to port access, Disp: 30 g, Rfl: 1    metFORMIN (GLUCOPHAGE) 500 mg tablet, Take 500 mg by mouth daily with breakfast, Disp: , Rfl:     metoprolol tartrate (LOPRESSOR) 25 mg tablet, Take 0 5 tablets (12 5 mg total) by mouth every 12 (twelve) hours, Disp: 90 tablet, Rfl: 3    ondansetron (ZOFRAN) 4 mg tablet, Take 1 tablet (4 mg total) by mouth every 8 (eight) hours as needed for nausea or vomiting, Disp: 20 tablet, Rfl: 0    oxyCODONE (ROXICODONE) 5 mg immediate release tablet, Take 0 5 tablets (2 5 mg total) by mouth every 4 (four) hours as needed for moderate pain Max Daily Amount: 15 mg, Disp: 45 tablet, Rfl: 0    potassium chloride (K-DUR,KLOR-CON) 20 mEq tablet, Take 1 tablet (20 mEq total) by mouth 2 (two) times a day, Disp: 60 tablet, Rfl: 0    potassium chloride (KLOR-CON) 20 mEq packet, Take 20 mEq by mouth 2 (two) times a day, Disp: 60 tablet, Rfl: 1  Review of Systems   Constitutional: Positive for fatigue  Respiratory: Positive for shortness of breath  Musculoskeletal:        Pain in left chest wall region   Psychiatric/Behavioral: Positive for dysphoric mood  The patient is nervous/anxious  All other systems negative    Objective:  Vital Signs  /80   Pulse 72   Resp 18    Physical Exam   Constitutional: She is oriented to person, place, and time   No distress  HENT:   Head: Normocephalic and atraumatic  Mouth/Throat: Oropharynx is clear and moist  No oropharyngeal exudate  Eyes: Right eye exhibits no discharge  Left eye exhibits no discharge  No scleral icterus  Neck: Normal range of motion  No tracheal deviation present  Cardiovascular: Normal rate and regular rhythm  Pulmonary/Chest: Effort normal  No stridor  No respiratory distress  She has no wheezes  She exhibits tenderness (mild tenderness in left lung region )  Decreased breath sounds at left base  O2 2L NC    Abdominal: Soft  Bowel sounds are normal  She exhibits no distension  There is no tenderness  Musculoskeletal: Normal range of motion  She exhibits no edema  Neurological: She is alert and oriented to person, place, and time  Skin: Skin is warm and dry  She is not diaphoretic  Psychiatric: Her mood appears anxious

## 2019-01-15 ENCOUNTER — IN HOME VISIT (OUTPATIENT)
Dept: PALLIATIVE MEDICINE | Facility: CLINIC | Age: 84
End: 2019-01-15
Payer: MEDICARE

## 2019-01-15 VITALS — DIASTOLIC BLOOD PRESSURE: 80 MMHG | RESPIRATION RATE: 18 BRPM | HEART RATE: 72 BPM | SYSTOLIC BLOOD PRESSURE: 130 MMHG

## 2019-01-15 DIAGNOSIS — C34.32 MALIGNANT NEOPLASM OF LOWER LOBE OF LEFT LUNG (HCC): Primary | ICD-10-CM

## 2019-01-15 DIAGNOSIS — F41.8 ANXIETY ABOUT HEALTH: ICD-10-CM

## 2019-01-15 DIAGNOSIS — C78.2 PLEURAL METASTASIS (HCC): ICD-10-CM

## 2019-01-15 DIAGNOSIS — G89.3 CANCER RELATED PAIN: ICD-10-CM

## 2019-01-15 PROCEDURE — 99350 HOME/RES VST EST HIGH MDM 60: CPT | Performed by: NURSE PRACTITIONER

## 2019-01-16 RX ORDER — SODIUM CHLORIDE 9 MG/ML
20 INJECTION, SOLUTION INTRAVENOUS CONTINUOUS
Status: DISCONTINUED | OUTPATIENT
Start: 2019-01-17 | End: 2019-01-17

## 2019-01-16 RX ORDER — ACETAMINOPHEN 325 MG/1
650 TABLET ORAL ONCE
Status: DISCONTINUED | OUTPATIENT
Start: 2019-01-17 | End: 2019-01-17

## 2019-01-16 NOTE — PROGRESS NOTES
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 diagnosed in May 2017  Molecular tests were negative for EGFR mutation, ALK gene arrangement, Ros1 mutation, BRAF mutation, PD L1 expression of 10% only  She was treated with Alimta/carboplatin with excellent response initially and later on she had relapse on maintenance Alimta, and carboplatin induced allergic reaction  We change therapy to Pembrolizumab 5/2018 unfortunately she did not have a good response  We added Taxotere August 30, 2018 unfortunately no response with increase in the studding of the left pleura identified on 9/26/2018 imaging and more symptoms  At her 10/2/2018 appointment, treatment plan was changed to gemcitabine 850 milligram/meter squared weekly 3 weeks on 1 week off, CBC, BMP prior to each Gemzar with 500 mL normal saline hydration with every chemotherapy    Gemzar weekly 3 weeks on 1 week off initiated 10/4/2018  C1D8  10/11/18  She was admitted later that evening secondary to acute respiratory failure due to multiple reasons including underlying advanced lung cancer, pericardial effusion, acute diastolic congestive heart failure, COPD, and deconditioning, requiring admission to the hospital for 1 week  CT scan 10/11/2018 showed significant decrease and improvement of the studding of the left pleura and decrease in the subcarinal lymphadenopathy compared to 9/26/18 scan        CT C/A/P 1/8/2019: Slight progression of predominantly pleural tumor in the left chest   Reviewed with patient and her family  Copy of report given  Discussed treatment options  She wishes to continue with treatment  Dr Jeannette Plunkett recommends Navelbine 20mg/m2 D1, 8, 15 every 28 days  This is a vesicant    She does have a port placed 9/2018  Potential side effects could include but may not be limited to: Cytopenias, hair loss, diarrhea, fatigue, nausea, vomiting, peripheral neuropathy  Questions asked and answered  Signed informed consent obtained  CBC will be assessed weekly with CMP monthly  Discussed that based on her blood counts, schedule may need to be adjusted  F/U in 4 weeks  2   BLE edema  Scattered crackles, 10lb weight gain  She is advised to increase her lasix to 40mg po daily  Monitor weight daily  Patient has a family friend who is a nurse that will assess her  Call if any increased edema, weight gain, breathing difficulties  She was advised to take care to changing positions as we do not want her to experience orthostatic hypotension            HPI:  80year-old  female who presented to [facility] in May 2017 with dyspnea for the past 4-5 days and pleurisy, with occasional dry cough scan of the thorax showed no evidence of PE, it showed a large loculated left side pleural effusion with nodularity concerning for metastatic disease, there is a concern for a mass versus pneumonia in the collapsed left lower lobe lung underwent left thoracentesis yielding 1 4 L of bloody fluid, pathology showed adenocarcinoma, positive for TTF-1, CK 7 most likely consistent with non-small cell lung cancer patient had a history of left-sided breast cancer in 1992 status post mastectomy she told me she had told lymph node involvement, she was not treated with either chemotherapy, radiation therapy or hormonal therapy    She has extensive family history of cancer, sister was diagnosed with breast cancer at age 36, another sister was diagnosed with breast cancer at age 72, a brother diagnosed with pancreatic cancer and another brother with lung cancer niece was found to have BRCA gene mutation        Molecular tests: negative for targeted mutation such as EGFR, ALK gene rearrangement, Ros1, B Darian, PD L1 expression of 10%         Previous Treatment:  Carboplatin AUC 5, Alimta 500 milligram/meter squared every 3 weeks initiated in July 2017 finished 6 cycles in November 2017 and then she received maintenance Alimta from November 2017 until April 2018 with progression of disease  A repeat core biopsy negative for EGFR mutation, Ross 1 mutation, B Darian mutation, ALK gene rearrangement, PDL expression of 10%, no evidence of BRCA1/ 2 mutation, she was treated again with short course of carboplatin and Alimta with allergic reaction to carboplatin then initiated on Pembrolizumab 200 mg flat dose every 3 weeks in May 2018 until August 2018 with admission to the hospital with malignant pleural effusion status post pericardiocentesis 400 mL, CT scan showed studding of the left pleura, more small new nodules of the pleura consistent with progression of disease on Pembrolizumab  Taxotere 50 milligram/meter squared weekly 3 weeks on 1 week of cycle 1  In September 2018 after progression of disease, unfortunately after 6 weeks of treatment she has progression of disease with left pleurisy  Gemcitabine 850 milligram/meter squared weekly 3 weeks on 1 week off, initiated in October 2018  Dose increased to 1000mg/m2 D1, D15 with cycle #2         Interval History: CT C/A/P 1/8/2019: Slight progression of predominantly pleural tumor in the left chest, but also pleural and parenchymal tumor in the left costophrenic angle, and pleural and mediastinal tumor along the mediastinal border in the left hemithorax  Small pericardial effusion  Metastatic mediastinal lymphadenopathy is similar from previous examinations  No new soft tissue tumor mass in the abdomen or pelvis   Widespread sclerotic osseous metastatic disease, similar in the bones of the chest when compared to October 11, 2018 but progressed when compared to June 12, 2017           Test Results:        Labs:   Lab Results   Component Value Date    HGB 13 5 01/03/2019 HCT 42 7 01/03/2019     (H) 01/03/2019     01/03/2019    WBC 16 21 (H) 01/03/2019    NRBC 0 10/17/2018    BANDSPCT 17 (H) 01/03/2019    ATYLMPCT 3 (H) 10/17/2018     Lab Results   Component Value Date    K 4 4 01/03/2019     01/03/2019    CO2 30 01/03/2019    BUN 25 01/03/2019    CREATININE 0 90 01/03/2019    GLUF 101 (H) 04/18/2018    CALCIUM 10 0 01/03/2019    AST 23 01/03/2019    ALT 39 01/03/2019    ALKPHOS 105 01/03/2019    EGFR 59 01/03/2019       Imaging: Ct Chest Abdomen Pelvis W Contrast    Result Date: 1/13/2019  Narrative: CT CHEST, ABDOMEN AND PELVIS WITH IV CONTRAST INDICATION:   C34 90: Malignant neoplasm of unspecified part of unspecified bronchus or lung  Lung cancer  History of breast cancer  Diabetes  COMPARISON:  Multiple prior examinations including most recent chest CT performed October 11, 2018  PET CT scan performed June 12, 2017  TECHNIQUE: CT examination of the chest, abdomen and pelvis was performed  Axial, sagittal, and coronal 2D reformatted images were created from the source data and submitted for interpretation  Radiation dose length product (DLP) for this visit:  920 mGy-cm   This examination, like all CT scans performed in the [facility], was performed utilizing techniques to minimize radiation dose exposure, including the use of iterative reconstruction and automated exposure control  IV Contrast:  100 mL of iohexol (OMNIPAQUE) Enteric Contrast: Enteric contrast was not administered  FINDINGS: CHEST LUNGS:  There are linear markings in loss of volume in the left hemithorax related to extensive pleural tumor  In the posterior left costophrenic angle there is enhancing masslike density consistent with some combination of pleural tumor and parenchymal  tumor as well as some element of surrounding atelectasis    This mass has a somewhat triangular shape and measures 6 3 x 4 6 cm image 37 of series 2, significantly increased when compared with October 11, 2018 when this mass is estimated at approximately  5 3 x 2 0 cm as measured using similar technique  No other discrete pulmonary parenchymal mass is identified  Emphysematous and atelectatic changes are noted  PLEURA:  Extensive pleural tumor throughout the left hemithorax with the largest pleural and parenchymal combined mass in the left costophrenic angle described above, increased in size from previous examination  Additional discrete pleural-based masses demonstrated in overall increase in size from previous examination with a representative 11 mm lesion in the lateral left upper chest on image 16 of series 2, increased from 7 mm when measured using similar technique on the prior exam a representative mass along the mediastinal pleural surface adjacent to the posterior aortic arch on image 18 of series 2 measuring 2 3 cm is increased in size from 1 8 cm when measured using similar technique on the prior exam  HEART/GREAT VESSELS:  Small pericardial effusion is noted, probably malignant related to mediastinal border tumor invasion of pericardium  Coronary artery calcification is noted  MEDIASTINUM AND RAE:  An enlarged subcarinal node measures 2 5 x 1 5 cm, perhaps minimally increased from 2 2 x 1 4 cm when measured using similar technique on the prior examination  CHEST WALL AND LOWER NECK:   Right chest port is again noted  No axillary lymphadenopathy is seen  Again noted are thyroid nodules, unchanged from prior examination, largest at the lower pole the right thyroid lobe measuring 9 mm  Left breast implant is again noted  ABDOMEN LIVER/BILIARY TREE:  Liver is diffusely decreased in density consistent with fatty change  No CT evidence of suspicious hepatic mass  Normal hepatic contours  No biliary dilatation  GALLBLADDER:  No calcified gallstones  No pericholecystic inflammatory change  SPLEEN:  Unremarkable   PANCREAS:  There is a cyst in the pancreatic head measuring 11 mm, probably unchanged from prior examinations, without associated solid soft tissue enhancement or pancreatic ductal enlargement  There is no abnormal FDG PET uptake in the pancreatic head on PET/CT of June 12, 2017  ADRENAL GLANDS:  Unremarkable  KIDNEYS/URETERS:  Somewhat lobulated renal contours noted bilaterally  Parapelvic small bilateral parapelvic renal cysts are noted  No solid renal mass, urinary tract calculus, or obstructive uropathy  STOMACH AND BOWEL:  Colonic diverticulosis without acute diverticulitis  No bowel obstruction  APPENDIX:  No findings to suggest appendicitis  ABDOMINOPELVIC CAVITY:  No ascites or free intraperitoneal air  No lymphadenopathy  VESSELS:  Atherosclerotic vascular calcifications  No abdominal aortic aneurysm  PELVIS REPRODUCTIVE ORGANS:  Unremarkable for patient's age  URINARY BLADDER:  Unremarkable  ABDOMINAL WALL/INGUINAL REGIONS:  Unremarkable  OSSEOUS STRUCTURES:  Widespread mixed sclerotic and lytic osseous metastatic disease noted throughout the visualized osseous structures  The bony structures of the chest, sclerotic osseous metastatic disease is not significantly changed from October 11, 2018  Specifically, extensive sclerotic changes noted throughout the left scapula, and there is a large sclerotic lesion in the T6 vertebral body  In the bony structures involving the abdomen and pelvis, sclerotic metastatic disease is widely progressed from PET/CT scan of June 12, 2017 with extensive sclerotic tumor is seen in the sacrum and dwaine bilaterally  Impression: Slight progression of predominantly pleural tumor in the left chest, but also pleural and parenchymal tumor in the left costophrenic angle, and pleural and mediastinal tumor along the mediastinal border in the left hemithorax  Small pericardial effusion  Metastatic mediastinal lymphadenopathy is similar from previous examinations  No new soft tissue tumor mass in the abdomen or pelvis   Widespread sclerotic osseous metastatic disease, similar in the bones of the chest when compared to October 11, 2018 but progressed when compared to June 12, 2017  Workstation performed: [id number]           ROS:  As mentioned in HPI & Interval History otherwise 14 point ROS negative  Allergies: Allergies   Allergen Reactions    Atorvastatin Other (See Comments)    Benzonatate Other (See Comments)    Carboplatin      Pt had allergic reaction during 8th carbo dose    Sulfa Antibiotics     Bactrim [Sulfamethoxazole-Trimethoprim] Rash    Latex Rash    Other Rash     Pt states she gets a rash from surgical tape, she is ok with paper tape     Current Medications: Reviewed  PMH/FH/SH:  Reviewed      Physical Exam:    There is no height or weight on file to calculate BSA  Ht Readings from Last 3 Encounters:   01/03/19 5' 0 98" (1 549 m)   12/06/18 5' 1" (1 549 m)   11/23/18 5' 1" (1 549 m)        Wt Readings from Last 3 Encounters:   01/03/19 70 kg (154 lb 5 2 oz)   12/20/18 70 8 kg (156 lb 3 1 oz)   12/06/18 71 4 kg (157 lb 6 5 oz)        Temp Readings from Last 3 Encounters:   01/03/19 98 °F (36 7 °C) (Tympanic)   12/20/18 97 6 °F (36 4 °C) (Tympanic)   12/06/18 98 1 °F (36 7 °C) (Tympanic)        BP Readings from Last 3 Encounters:   01/15/19 130/80   01/03/19 140/80   12/20/18 124/84           Physical Exam   Constitutional: She is oriented to person, place, and time  She appears well-developed and well-nourished  No distress  HENT:   Head: Normocephalic and atraumatic  Nose: Nose normal    Mouth/Throat: Oropharynx is clear and moist    Eyes: Pupils are equal, round, and reactive to light  Conjunctivae and EOM are normal  No scleral icterus  Neck: Normal range of motion  Neck supple  No tracheal deviation present  Cardiovascular: Normal rate, regular rhythm, normal heart sounds and intact distal pulses  Exam reveals no gallop and no friction rub  No murmur heard    Pulmonary/Chest: Effort normal and breath sounds normal  No stridor  No respiratory distress  She has no wheezes  She exhibits no tenderness (left pleura corresponding with tumor)  crackles   Abdominal: Soft  Bowel sounds are normal  She exhibits no distension  There is no tenderness  Musculoskeletal: She exhibits edema (2+ feet)  She exhibits no tenderness or deformity  Lymphadenopathy:     She has no cervical adenopathy  Neurological: She is alert and oriented to person, place, and time  No cranial nerve deficit  Coordination normal    Skin: Skin is warm and dry  No rash noted  She is not diaphoretic  No erythema  No pallor  Psychiatric: She has a normal mood and affect   Her behavior is normal  Judgment and thought content normal        ECO      Emergency Contacts:    [place], [phone],

## 2019-01-17 ENCOUNTER — OFFICE VISIT (OUTPATIENT)
Dept: HEMATOLOGY ONCOLOGY | Facility: CLINIC | Age: 84
End: 2019-01-17
Payer: MEDICARE

## 2019-01-17 ENCOUNTER — HOSPITAL ENCOUNTER (OUTPATIENT)
Dept: INFUSION CENTER | Facility: CLINIC | Age: 84
Discharge: HOME | End: 2019-01-17

## 2019-01-17 VITALS
WEIGHT: 165 LBS | OXYGEN SATURATION: 98 % | TEMPERATURE: 96.8 F | HEART RATE: 73 BPM | BODY MASS INDEX: 30.36 KG/M2 | HEIGHT: 62 IN | SYSTOLIC BLOOD PRESSURE: 130 MMHG | DIASTOLIC BLOOD PRESSURE: 80 MMHG

## 2019-01-17 DIAGNOSIS — C34.32 MALIGNANT NEOPLASM OF LOWER LOBE OF LEFT LUNG (HCC): Primary | ICD-10-CM

## 2019-01-17 PROCEDURE — 99215 OFFICE O/P EST HI 40 MIN: CPT | Performed by: PHYSICIAN ASSISTANT

## 2019-01-24 ENCOUNTER — HOSPITAL ENCOUNTER (OUTPATIENT)
Dept: INFUSION CENTER | Facility: CLINIC | Age: 84
Discharge: HOME | End: 2019-01-24
Payer: MEDICARE

## 2019-01-24 VITALS
BODY MASS INDEX: 30.88 KG/M2 | HEART RATE: 72 BPM | WEIGHT: 163.58 LBS | RESPIRATION RATE: 16 BRPM | TEMPERATURE: 98 F | HEIGHT: 61 IN | DIASTOLIC BLOOD PRESSURE: 80 MMHG | SYSTOLIC BLOOD PRESSURE: 130 MMHG

## 2019-01-24 LAB
ALBUMIN SERPL BCP-MCNC: 3.1 G/DL (ref 3.5–5.7)
ALP SERPL-CCNC: 143 U/L (ref 46–116)
ALT SERPL W P-5'-P-CCNC: 41 U/L (ref 12–78)
ANION GAP SERPL CALCULATED.3IONS-SCNC: 14 MMOL/L (ref 4–13)
AST SERPL W P-5'-P-CCNC: 27 U/L (ref 5–45)
BASOPHILS # BLD MANUAL: 0 THOUSAND/UL (ref 0–0.1)
BASOPHILS NFR MAR MANUAL: 0 % (ref 0–1)
BILIRUB SERPL-MCNC: 0.6 MG/DL (ref 0.2–1)
BUN SERPL-MCNC: 19 MG/DL (ref 5–25)
CALCIUM SERPL-MCNC: 9.6 MG/DL (ref 8.3–10.1)
CHLORIDE SERPL-SCNC: 96 MMOL/L (ref 98–108)
CO2 SERPL-SCNC: 32 MMOL/L (ref 21–32)
CREAT SERPL-MCNC: 0.8 MG/DL (ref 0.6–1.3)
EOSINOPHIL # BLD MANUAL: 1.11 THOUSAND/UL (ref 0–0.4)
EOSINOPHIL NFR BLD MANUAL: 6 % (ref 0–6)
ERYTHROCYTE [DISTWIDTH] IN BLOOD BY AUTOMATED COUNT: 15.4 % (ref 11.6–15.1)
GFR SERPL CREATININE-BSD FRML MDRD: 68 ML/MIN/1.73SQ M
GLUCOSE SERPL-MCNC: 337 MG/DL (ref 65–140)
HCT VFR BLD AUTO: 41.4 % (ref 34.8–46.1)
HGB BLD-MCNC: 13.6 G/DL (ref 11.5–15.4)
LYMPHOCYTES # BLD AUTO: 1.29 THOUSAND/UL (ref 0.6–4.47)
LYMPHOCYTES # BLD AUTO: 7 % (ref 14–44)
MCH RBC QN AUTO: 33.4 PG (ref 26.8–34.3)
MCHC RBC AUTO-ENTMCNC: 32.9 G/DL (ref 31.4–37.4)
MCV RBC AUTO: 102 FL (ref 82–98)
MONOCYTES # BLD AUTO: 1.29 THOUSAND/UL (ref 0–1.22)
MONOCYTES NFR BLD: 7 % (ref 4–12)
NEUTROPHILS # BLD MANUAL: 14.78 THOUSAND/UL (ref 1.85–7.62)
NEUTS BAND NFR BLD MANUAL: 2 % (ref 0–8)
NEUTS SEG NFR BLD AUTO: 78 % (ref 43–75)
PLATELET # BLD AUTO: 328 THOUSANDS/UL (ref 149–390)
PLATELET BLD QL SMEAR: ADEQUATE
PMV BLD AUTO: 10.9 FL (ref 8.9–12.7)
POTASSIUM SERPL-SCNC: 3.9 MMOL/L (ref 3.5–5.3)
PROT SERPL-MCNC: 6.5 G/DL (ref 6.4–8.2)
RBC # BLD AUTO: 4.07 MILLION/UL (ref 3.81–5.12)
RBC MORPH BLD: NORMAL
SODIUM SERPL-SCNC: 142 MMOL/L (ref 136–145)
TOTAL CELLS COUNTED SPEC: 100
WBC # BLD AUTO: 18.47 THOUSAND/UL (ref 4.31–10.16)

## 2019-01-24 PROCEDURE — 96375 TX/PRO/DX INJ NEW DRUG ADDON: CPT

## 2019-01-24 PROCEDURE — 96361 HYDRATE IV INFUSION ADD-ON: CPT

## 2019-01-24 PROCEDURE — 85007 BL SMEAR W/DIFF WBC COUNT: CPT | Performed by: PHYSICIAN ASSISTANT

## 2019-01-24 PROCEDURE — 96409 CHEMO IV PUSH SNGL DRUG: CPT

## 2019-01-24 PROCEDURE — 85027 COMPLETE CBC AUTOMATED: CPT | Performed by: PHYSICIAN ASSISTANT

## 2019-01-24 PROCEDURE — 36415 COLL VENOUS BLD VENIPUNCTURE: CPT | Performed by: PHYSICIAN ASSISTANT

## 2019-01-24 PROCEDURE — 80053 COMPREHEN METABOLIC PANEL: CPT | Performed by: PHYSICIAN ASSISTANT

## 2019-01-24 RX ADMIN — VINORELBINE TARTRATE 34 MG: 10 INJECTION INTRAVENOUS at 14:37

## 2019-01-24 RX ADMIN — SODIUM CHLORIDE 500 ML: 0.9 INJECTION, SOLUTION INTRAVENOUS at 13:30

## 2019-01-24 RX ADMIN — ONDANSETRON 8 MG: 2 INJECTION INTRAMUSCULAR; INTRAVENOUS at 13:45

## 2019-01-24 NOTE — PLAN OF CARE
Problem: Potential for Falls  Goal: Patient will remain free of falls  INTERVENTIONS:  - Assess patient frequently for physical needs  -  Identify cognitive and physical deficits and behaviors that affect risk of falls    -  [place] fall precautions as indicated by assessment   - Educate patient/family on patient safety including physical limitations  - Instruct patient to call for assistance with activity based on assessment  - Modify environment to reduce risk of injury  - Consider OT/PT consult to assist with strengthening/mobility   Outcome: Progressing

## 2019-01-28 ENCOUNTER — TELEPHONE (OUTPATIENT)
Dept: HEMATOLOGY ONCOLOGY | Facility: CLINIC | Age: 84
End: 2019-01-28

## 2019-01-28 NOTE — TELEPHONE ENCOUNTER
Call from Elin Lundberg at [address] [place] [address] stating she completed her initial evaluation of the patient and she will be seeing her 1-2 times per week  She stated the patients BP was 152-86 today and she complained of some sternum pain and rated it as 2 out of 10  Any questions she is reachable at [phone]   Thank you

## 2019-01-29 ENCOUNTER — TELEPHONE (OUTPATIENT)
Dept: PALLIATIVE MEDICINE | Facility: CLINIC | Age: 84
End: 2019-01-29

## 2019-01-29 NOTE — TELEPHONE ENCOUNTER
Pt's daughter called office concerned about pt's Left shoulder pain  She was advised to increase her Fentanyl to 2 patches equally a total of 24mcg  The increase along with her Tylenol has not been very helpful with pain control  She also stated pt doesn't care for Oxycodone as it makes her disoriented and loopy  [facility] wonders if pt would benefit from a Cortisone injection

## 2019-01-29 NOTE — TELEPHONE ENCOUNTER
Pain likely from her cancer and not likely to benefit from injection   She could increase her dexamethasone to 2 mg a day and should schedule visit with Kamran Nunez

## 2019-01-29 NOTE — TELEPHONE ENCOUNTER
She would need to see orthopedics for an injection and may need some xrays fist  She should probably be evaluated in the office first

## 2019-01-30 RX ORDER — DEXTROMETHORPHAN HYDROBROMIDE AND PROMETHAZINE HYDROCHLORIDE 15; 6.25 MG/5ML; MG/5ML
SYRUP ORAL
COMMUNITY
End: 2019-02-15 | Stop reason: HOSPADM

## 2019-01-30 NOTE — TELEPHONE ENCOUNTER
I spoke with Nicole Peters and offered a home visit sooner  At this time, Nicole Peters does not feel that is necessary she will keep my appointment scheduled for 2/12 at 1 pm but will call back if she changes her mind   I reinforced increase of dexamethasone to 2 mg daily, instructed on proper use of Fentanyl patches (she is to place two 12 mcg TD patches every three days) and give Tylenol on a schedule

## 2019-01-31 ENCOUNTER — HOSPITAL ENCOUNTER (OUTPATIENT)
Dept: INFUSION CENTER | Facility: CLINIC | Age: 84
Discharge: HOME | End: 2019-01-31
Payer: MEDICARE

## 2019-01-31 VITALS
OXYGEN SATURATION: 94 % | WEIGHT: 163.58 LBS | RESPIRATION RATE: 18 BRPM | HEART RATE: 78 BPM | SYSTOLIC BLOOD PRESSURE: 148 MMHG | DIASTOLIC BLOOD PRESSURE: 86 MMHG | BODY MASS INDEX: 30.88 KG/M2 | TEMPERATURE: 97.1 F

## 2019-01-31 DIAGNOSIS — C34.32 MALIGNANT NEOPLASM OF LOWER LOBE OF LEFT LUNG (HCC): ICD-10-CM

## 2019-01-31 LAB
BASOPHILS # BLD MANUAL: 0 THOUSAND/UL (ref 0–0.1)
BASOPHILS NFR MAR MANUAL: 0 % (ref 0–1)
EOSINOPHIL # BLD MANUAL: 0.55 THOUSAND/UL (ref 0–0.4)
EOSINOPHIL NFR BLD MANUAL: 5 % (ref 0–6)
ERYTHROCYTE [DISTWIDTH] IN BLOOD BY AUTOMATED COUNT: 14.5 % (ref 11.6–15.1)
HCT VFR BLD AUTO: 42 % (ref 34.8–46.1)
HGB BLD-MCNC: 13 G/DL (ref 11.5–15.4)
LG PLATELETS BLD QL SMEAR: PRESENT
LYMPHOCYTES # BLD AUTO: 1.31 THOUSAND/UL (ref 0.6–4.47)
LYMPHOCYTES # BLD AUTO: 12 % (ref 14–44)
MACROCYTES BLD QL AUTO: PRESENT
MCH RBC QN AUTO: 32.2 PG (ref 26.8–34.3)
MCHC RBC AUTO-ENTMCNC: 31 G/DL (ref 31.4–37.4)
MCV RBC AUTO: 104 FL (ref 82–98)
MONOCYTES # BLD AUTO: 0.44 THOUSAND/UL (ref 0–1.22)
MONOCYTES NFR BLD: 4 % (ref 4–12)
MYELOCYTES NFR BLD MANUAL: 1 % (ref 0–1)
NEUTROPHILS # BLD MANUAL: 8.54 THOUSAND/UL (ref 1.85–7.62)
NEUTS BAND NFR BLD MANUAL: 13 % (ref 0–8)
NEUTS SEG NFR BLD AUTO: 65 % (ref 43–75)
OVALOCYTES BLD QL SMEAR: PRESENT
PLATELET # BLD AUTO: 272 THOUSANDS/UL (ref 149–390)
PLATELET BLD QL SMEAR: ADEQUATE
PMV BLD AUTO: 11.6 FL (ref 8.9–12.7)
RBC # BLD AUTO: 4.04 MILLION/UL (ref 3.81–5.12)
TOTAL CELLS COUNTED SPEC: 100
WBC # BLD AUTO: 10.95 THOUSAND/UL (ref 4.31–10.16)

## 2019-01-31 PROCEDURE — 96361 HYDRATE IV INFUSION ADD-ON: CPT

## 2019-01-31 PROCEDURE — 96409 CHEMO IV PUSH SNGL DRUG: CPT

## 2019-01-31 PROCEDURE — 85027 COMPLETE CBC AUTOMATED: CPT

## 2019-01-31 PROCEDURE — 96367 TX/PROPH/DG ADDL SEQ IV INF: CPT

## 2019-01-31 PROCEDURE — 85007 BL SMEAR W/DIFF WBC COUNT: CPT

## 2019-01-31 RX ADMIN — SODIUM CHLORIDE 500 ML: 0.9 INJECTION, SOLUTION INTRAVENOUS at 13:59

## 2019-01-31 RX ADMIN — VINORELBINE TARTRATE 34 MG: 10 INJECTION INTRAVENOUS at 15:20

## 2019-01-31 RX ADMIN — ONDANSETRON 8 MG: 2 INJECTION INTRAMUSCULAR; INTRAVENOUS at 14:40

## 2019-01-31 NOTE — PROGRESS NOTES
Patient arrived on unit for Day #8 of Navelbine  Denies any present discomforts  O2 intact at 2l/min  CBC drawn  Cleared for chemotherapy as per lab parameters on orders   Treatment initiated

## 2019-01-31 NOTE — PLAN OF CARE
Problem: Potential for Falls  Goal: Patient will remain free of falls  INTERVENTIONS:  - Assess patient frequently for physical needs  -  Identify cognitive and physical deficits and behaviors that affect risk of falls    -  [place] fall precautions as indicated by assessment   - Educate patient/family on patient safety including physical limitations  - Instruct patient to call for assistance with activity based on assessment  - Modify environment to reduce risk of injury  - Consider OT/PT consult to assist with strengthening/mobility   Outcome: Progressing

## 2019-01-31 NOTE — PROGRESS NOTES
Patient tolerated chenotherapy  Denies any discomforts  Aware of next appointment  AVS given to patient    Daughter driving home

## 2019-02-04 ENCOUNTER — TELEPHONE (OUTPATIENT)
Dept: PALLIATIVE MEDICINE | Facility: HOSPITAL | Age: 84
End: 2019-02-04

## 2019-02-04 DIAGNOSIS — G89.3 CANCER RELATED PAIN: ICD-10-CM

## 2019-02-04 DIAGNOSIS — I31.3 PERICARDIAL EFFUSION: ICD-10-CM

## 2019-02-04 DIAGNOSIS — C34.32 MALIGNANT NEOPLASM OF LOWER LOBE OF LEFT LUNG (HCC): Primary | ICD-10-CM

## 2019-02-04 DIAGNOSIS — R52 PAIN: ICD-10-CM

## 2019-02-04 RX ORDER — FENTANYL 25 UG/H
1 PATCH TRANSDERMAL
Qty: 10 PATCH | Refills: 0 | Status: SHIPPED | OUTPATIENT
Start: 2019-02-04 | End: 2019-02-15 | Stop reason: HOSPADM

## 2019-02-04 RX ORDER — DEXAMETHASONE 2 MG/1
2 TABLET ORAL
Qty: 30 TABLET | Refills: 0 | Status: SHIPPED | OUTPATIENT
Start: 2019-02-04 | End: 2019-02-15 | Stop reason: HOSPADM

## 2019-02-04 NOTE — TELEPHONE ENCOUNTER
Pt's daughter Therese Strickland called to update this provider on her mom's symptoms  I advised Therese Bowerss last week to increase her mom's Fentanyl patches to two 12 mcg patches every 3 days and to increase her dexamethasone to 2 mg daily  Per Therese Strickland, theses changes have been helpful in managing her mom's pain  She does report some occasional GI discomfort, denies N/V/constipation  I have instructed her to make sure her mom is taking daily Prilosec to prevent any GI discomfort/gastritis from dexamethasone  Therese Strickland verbalized understanding  New Rx for Fentanyl 25 mcg with instructions to change patch every 72 hours and dexamethasone 2 mg daily with food was e-prescribed to pt's pharmacy,  Pt has home visit scheduled for 2/12 at 1 PM     Therese Strickland knows to call with any additional questions or concerns     Lc Wallace   [address] [facility]  Pager: [phone]

## 2019-02-05 ENCOUNTER — TELEPHONE (OUTPATIENT)
Dept: HEMATOLOGY ONCOLOGY | Facility: CLINIC | Age: 84
End: 2019-02-05

## 2019-02-05 NOTE — TELEPHONE ENCOUNTER
Anni from [facility] called said when she put in the Prilosec it showed and inter action with her Celexa  Discussed this with Enmanuel Chou , he said it was ok to give  Spoke with Anni with the update

## 2019-02-05 NOTE — TELEPHONE ENCOUNTER
Robert Thomas from [facility] [place] called and asked if it was ok that patient took Prilosec  I spoke directly to Dr Burgos and he said Prilosec 20mg once daily is acceptable  I MICHELLE woodard with the above information  I asked to to call the office with any other questions

## 2019-02-06 RX ORDER — SODIUM CHLORIDE 9 MG/ML
20 INJECTION, SOLUTION INTRAVENOUS CONTINUOUS
Status: DISCONTINUED | OUTPATIENT
Start: 2019-02-07 | End: 2019-02-10 | Stop reason: HOSPADM

## 2019-02-07 ENCOUNTER — HOSPITAL ENCOUNTER (OUTPATIENT)
Dept: INFUSION CENTER | Facility: CLINIC | Age: 84
Discharge: HOME | End: 2019-02-07
Payer: MEDICARE

## 2019-02-07 VITALS
OXYGEN SATURATION: 97 % | RESPIRATION RATE: 18 BRPM | DIASTOLIC BLOOD PRESSURE: 84 MMHG | WEIGHT: 160.94 LBS | TEMPERATURE: 98.1 F | SYSTOLIC BLOOD PRESSURE: 129 MMHG | BODY MASS INDEX: 30.38 KG/M2 | HEART RATE: 76 BPM

## 2019-02-07 DIAGNOSIS — R10.9 ABDOMINAL PAIN, UNSPECIFIED ABDOMINAL LOCATION: ICD-10-CM

## 2019-02-07 DIAGNOSIS — C34.32 MALIGNANT NEOPLASM OF LOWER LOBE OF LEFT LUNG (HCC): ICD-10-CM

## 2019-02-07 DIAGNOSIS — R10.9 ABDOMINAL PAIN, UNSPECIFIED ABDOMINAL LOCATION: Primary | ICD-10-CM

## 2019-02-07 LAB
AMYLASE SERPL-CCNC: 18 IU/L (ref 25–115)
BASOPHILS # BLD MANUAL: 0 THOUSAND/UL (ref 0–0.1)
BASOPHILS NFR MAR MANUAL: 0 % (ref 0–1)
EOSINOPHIL # BLD MANUAL: 0.1 THOUSAND/UL (ref 0–0.4)
EOSINOPHIL NFR BLD MANUAL: 2 % (ref 0–6)
ERYTHROCYTE [DISTWIDTH] IN BLOOD BY AUTOMATED COUNT: 14.6 % (ref 11.6–15.1)
HCT VFR BLD AUTO: 38.8 % (ref 34.8–46.1)
HGB BLD-MCNC: 12.3 G/DL (ref 11.5–15.4)
LG PLATELETS BLD QL SMEAR: PRESENT
LIPASE SERPL-CCNC: 82 U/L (ref 73–393)
LYMPHOCYTES # BLD AUTO: 0.73 THOUSAND/UL (ref 0.6–4.47)
LYMPHOCYTES # BLD AUTO: 14 % (ref 14–44)
MACROCYTES BLD QL AUTO: PRESENT
MCH RBC QN AUTO: 32.6 PG (ref 26.8–34.3)
MCHC RBC AUTO-ENTMCNC: 31.7 G/DL (ref 31.4–37.4)
MCV RBC AUTO: 103 FL (ref 82–98)
MONOCYTES # BLD AUTO: 0.21 THOUSAND/UL (ref 0–1.22)
MONOCYTES NFR BLD: 4 % (ref 4–12)
NEUTROPHILS # BLD MANUAL: 4.18 THOUSAND/UL (ref 1.85–7.62)
NEUTS BAND NFR BLD MANUAL: 34 % (ref 0–8)
NEUTS SEG NFR BLD AUTO: 46 % (ref 43–75)
OVALOCYTES BLD QL SMEAR: PRESENT
PLATELET # BLD AUTO: 269 THOUSANDS/UL (ref 149–390)
PLATELET BLD QL SMEAR: ADEQUATE
PMV BLD AUTO: 11.5 FL (ref 8.9–12.7)
RBC # BLD AUTO: 3.77 MILLION/UL (ref 3.81–5.12)
TOTAL CELLS COUNTED SPEC: 100
WBC # BLD AUTO: 5.22 THOUSAND/UL (ref 4.31–10.16)

## 2019-02-07 PROCEDURE — 96413 CHEMO IV INFUSION 1 HR: CPT

## 2019-02-07 PROCEDURE — 82150 ASSAY OF AMYLASE: CPT

## 2019-02-07 PROCEDURE — 96367 TX/PROPH/DG ADDL SEQ IV INF: CPT

## 2019-02-07 PROCEDURE — 83690 ASSAY OF LIPASE: CPT

## 2019-02-07 PROCEDURE — 85027 COMPLETE CBC AUTOMATED: CPT

## 2019-02-07 PROCEDURE — 85007 BL SMEAR W/DIFF WBC COUNT: CPT

## 2019-02-07 RX ADMIN — SODIUM CHLORIDE 500 ML: 0.9 INJECTION, SOLUTION INTRAVENOUS at 15:13

## 2019-02-07 RX ADMIN — VINORELBINE TARTRATE 34 MG: 10 INJECTION INTRAVENOUS at 15:38

## 2019-02-07 RX ADMIN — ONDANSETRON 8 MG: 2 INJECTION INTRAMUSCULAR; INTRAVENOUS at 15:10

## 2019-02-07 RX ADMIN — SODIUM CHLORIDE 20 ML/HR: 0.9 INJECTION, SOLUTION INTRAVENOUS at 15:10

## 2019-02-07 NOTE — PROGRESS NOTES
Pt arrived to unit at 1400 c/o increasing fatigue, generalized weakness, dyspnea on exertion (pulse ox=95% with o2), and abdominal pain rating 6/10  Pt admits to this abdominal pain being constant, severity lessens when she takes prn Tylenol and when new Fentanyl patches are applied q 3 days  Pt does not want to take prn oxycodone d/t feeling disoriented after taking  Pt and daughter admit that abdominal pain has seemed to have worsened since pt increased dose of dexamethasone to 2mg daily on 1/30/19  Pt knows she should be taking Prilosec but does not want to take it  Encouraged pt to take  Pt's abdomen is soft but tender to palpation, +BS all 4 quads  Pt is eating and has 1-2 bowel movements/day  All pt complaints and assessments reported to Dr Kingsley Running via Gucci Aviles RN  Amylase and Lipase blood tests ordered and drawn  OK given to proceed with Juanjo today  CBC drawn today and results within approved parameters

## 2019-02-07 NOTE — PLAN OF CARE
Problem: Potential for Falls  Goal: Patient will remain free of falls  INTERVENTIONS:  - Assess patient frequently for physical needs  -  Identify cognitive and physical deficits and behaviors that affect risk of falls    -  [place] fall precautions as indicated by assessment   - Educate patient/family on patient safety including physical limitations  - Instruct patient to call for assistance with activity based on assessment  - Modify environment to reduce risk of injury  - Consider OT/PT consult to assist with strengthening/mobility   Outcome: Progressing

## 2019-02-07 NOTE — PROGRESS NOTES
Navelbine infusion complete, pt tolerated well without adverse reaction  Pt's abdominal pain persists but she states it is bearable  Pt will comply with taking Prilosec  Pt will f/u with pain mgmt and Dr Mirta Estrella with appts next week  Pt left unit in stable condition via wheelchair accompanied by daughter

## 2019-02-08 ENCOUNTER — TELEPHONE (OUTPATIENT)
Dept: HEMATOLOGY ONCOLOGY | Facility: CLINIC | Age: 84
End: 2019-02-08

## 2019-02-08 NOTE — TELEPHONE ENCOUNTER
Due to pt not feeling well today because of chemo yesterday home visit is being rescheduled til next week

## 2019-02-11 ENCOUNTER — TELEPHONE (OUTPATIENT)
Dept: HEMATOLOGY ONCOLOGY | Facility: CLINIC | Age: 84
End: 2019-02-11

## 2019-02-11 DIAGNOSIS — F41.8 ANXIETY ABOUT HEALTH: ICD-10-CM

## 2019-02-11 RX ORDER — ALPRAZOLAM 0.5 MG/1
0.5 TABLET ORAL
Qty: 30 TABLET | Refills: 1 | Status: SHIPPED | OUTPATIENT
Start: 2019-02-11 | End: 2019-02-15 | Stop reason: HOSPADM

## 2019-02-11 NOTE — TELEPHONE ENCOUNTER
Luna from [facility] OT calling to give an update  Patient respirations are 30 after activity, she gained 6 lbs in the past 2 weeks, in the past week she has become weaker than normal  They want to know if there's any steps you want OT to take while in the home
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
## 2019-02-12 PROBLEM — J18.9 HAP (HOSPITAL-ACQUIRED PNEUMONIA): Status: RESOLVED | Noted: 2018-07-24 | Resolved: 2019-02-12

## 2019-02-12 NOTE — PROGRESS NOTES
Palliative and Supportive Care   Ormaty Santa 80 y o  female [id number]    Assessment/Plan:  1  Malignant neoplasm of lower lobe of left lung ([facility] [address] )    2  Pleural metastasis ([facility] [address] )    3  Cancer related pain    4  Anxiety about health    5  Neoplastic malignant related fatigue    6  Shortness of breath on exertion    7  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    8  Goals of care, counseling/discussion        Requested Prescriptions      No prescriptions requested or ordered in this encounter     There are no discontinued medications  Cancer related pain: Time spent counseling patient on proper use of tylenol and instructed that she should NOT exceed more that 3,000 mg/day  Also spent time educating her on difference between drug dependence and addiction and proper use and administration of opioids to effectively manage her cancer pain   - Fentanyl TD 25 mcg/hr patch  Q 72    - Dexamethasone 2 mg daily; dicussed increasing dose to 4 mg daily  Pt declined at this    - Prilosec 20 mg daily    - Tylenol 1000 mg every 8 hours   - Instructed to take Oxycodone 2 5 mg every 8 hours and Q 4 PRN     Anxiety:   - Celexa 20 mg daily   - Alprazolam 0 5 mg at bedtime; instructed she may take TID as needed      GOC:  Lengthy conversation held today with pt, her spouse and her daughter Candie Palmer regarding pt's declining performance status (ECOG 3), QOL and options for future cares  We discussed her fatigue and worsening symptom burden is multifactorial and related to her age, her cancer, side effects of cancer treatments  We discussed pros and cons of continuing with chemotherapy vs taking a "chemo break" with knowledge that her cancer will continue to grow without treatment vs focusing on her QOL and comfort  Hospice was identified as a means to provide a supportive comfort focused approach in caring for her and supporting her family  Many questions were answered for pt and her family     Cristina Zarco will give her options some consideration and plans to further discuss with Dr Eliseo Pisano, her oncologist tomorrow  Time spent providing support  Representatives have queried the patient's controlled substance dispensing history in the Prescription Drug Monitoring Program in compliance with regulations before I have prescribed any controlled substances  The prescription history is consistent with prescribed therapy and our practice policies  90 minutes were spent face to face with her  and daughter Asha Miller with greater than 50% of the time spent in counseling or coordination of care including discussions of instructions for disease self management, treatment instructions and follow up requirements  Time spent discussing hospice and answering questions about hospice services  All of the patient's questions were answered during this discussion  Follow up in 2 weeks     Subjective:   Chief Complaint  Follow up visit for:  symptom management, pain, neoplasm related, breathlessness, depression or anxiety,   HPI     Gisele Swenson is a 80 y o  female with 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 being seen at home today in monthly follow up  Her last CT scan showed disease progression and her chemotherapy regimen was changed to single agent Navelbine  Since my last visit, her daughter has called our office with reports of worsening pain  Pt's pain regimen was adjusted and she was instructed to increase her Fentanyl patch to 25 mcg every 72 hours and increase her daily dexamethasone dose to 2 mg daily  Today, she appears exhausted and reports symptoms of worsening fatigue, shortness of breath with minimal amounts of exertion  She is spending most of her day lying in her recliner and has very little energy  Her appetite is poor  She is having pain in her left chest wall region where she has extensive pleural tumor, this is now radiating down her side and across her abdomen   She has been taking 1000 mg of tylenol every 4 hours d/t her fears of "becoming addicted" from using oxycodone  However her pain was so severe last evening she did take a dose of oxycodone and was able to sleep  She denies N/V, no constipation  We talked at length today about her QOL, which she feels is extremely poor and options for future cares including continuing with disease directed therapy which is most likely contributing to overall poor state of health vs focusing on her QOL/comfort and hospice was offered as a consideration  She identifies that one of Her goals is to feel well enough to go to [employer] on a Sunday  The following portions of the medical history were reviewed: past medical history, problem list, medication list, and social history      Current Outpatient Medications:     acetaminophen (TYLENOL) 500 mg tablet, Take 500 mg by mouth, Disp: , Rfl:     ALPRAZolam (XANAX) 0 5 mg tablet, Take 1 tablet (0 5 mg total) by mouth daily at bedtime as needed for anxiety, Disp: 30 tablet, Rfl: 1    amiodarone 100 mg tablet, Take 1 tablet (100 mg total) by mouth daily, Disp: 90 tablet, Rfl: 3    aspirin (ECOTRIN LOW STRENGTH) 81 mg EC tablet, Take 81 mg by mouth daily, Disp: , Rfl:     citalopram (CeleXA) 20 mg tablet, Take 20 mg by mouth daily, Disp: , Rfl:     CRANIAL PROSTHESIS, RX,, 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, Disp: 1 each, Rfl: 0    cyanocobalamin 1000 MCG tablet, Take 100 mcg by mouth daily, Disp: , Rfl:     dexamethasone (DECADRON) 2 mg tablet, Take 1 tablet (2 mg total) by mouth daily with breakfast, Disp: 30 tablet, Rfl: 0    fentaNYL (DURAGESIC) 25 mcg/hr, Place 1 patch on the skin every third day Max Daily Amount: 1 patch, Disp: 10 patch, Rfl: 0    folic acid (FOLVITE) 1 mg tablet, Take 1 mg by mouth daily, Disp: , Rfl:     furosemide (LASIX) 20 mg tablet, Take 1 tablet (20 mg total) by mouth daily for 30 days, Disp: 30 tablet, Rfl: 1    lidocaine-prilocaine (EMLA) cream, Apply topically as needed for mild pain 1 hour prior to port access, Disp: 30 g, Rfl: 1    metFORMIN (GLUCOPHAGE) 500 mg tablet, Take 500 mg by mouth daily with breakfast, Disp: , Rfl:     metoprolol tartrate (LOPRESSOR) 25 mg tablet, Take 0 5 tablets (12 5 mg total) by mouth every 12 (twelve) hours, Disp: 90 tablet, Rfl: 3    ondansetron (ZOFRAN) 4 mg tablet, Take 1 tablet (4 mg total) by mouth every 8 (eight) hours as needed for nausea or vomiting, Disp: 20 tablet, Rfl: 0    oxyCODONE (ROXICODONE) 5 mg immediate release tablet, Take 0 5 tablets (2 5 mg total) by mouth every 4 (four) hours as needed for moderate pain Max Daily Amount: 15 mg, Disp: 45 tablet, Rfl: 0    potassium chloride (K-DUR,KLOR-CON) 20 mEq tablet, Take 1 tablet (20 mEq total) by mouth 2 (two) times a day, Disp: 60 tablet, Rfl: 0    potassium chloride (KLOR-CON) 20 mEq packet, Take 20 mEq by mouth 2 (two) times a day (Patient not taking: Reported on 1/17/2019 ), Disp: 60 tablet, Rfl: 1    promethazine-dextromethorphan (PHENERGAN-DM) 6 25-15 mg/5 mL oral syrup, TAKE 1 TEASPOONFUL 4 TIMES DAILY AS NEEDED, Disp: , Rfl:   Review of Systems   Constitutional: Positive for activity change and fatigue  Respiratory: Positive for shortness of breath  Gastrointestinal: Positive for abdominal pain  Musculoskeletal: Positive for gait problem  Skin: Positive for pallor  Neurological: Positive for weakness  Psychiatric/Behavioral: The patient is nervous/anxious  All other systems reviewed and are negative  All other systems negative    Objective:  Vital Signs  /70   Pulse 70   Resp 20    Physical Exam   Constitutional: She is oriented to person, place, and time  No distress  Alert, appears tired, weak, chronically ill  NAD  HENT:   Head: Normocephalic and atraumatic  Mouth/Throat: No oropharyngeal exudate  Eyes: Right eye exhibits no discharge  Left eye exhibits no discharge  No scleral icterus     Neck: No tracheal deviation present  Cardiovascular: Normal rate and regular rhythm  Pulmonary/Chest: Effort normal and breath sounds normal  No stridor  She has no wheezes  She has no rales  She exhibits tenderness (left chest wall region/flank )  Abdominal: Soft  Bowel sounds are normal  She exhibits no distension  There is no tenderness  Musculoskeletal: She exhibits no edema  Neurological: She is alert and oriented to person, place, and time  Skin: Skin is warm and dry  She is not diaphoretic  There is pallor  Psychiatric: Her behavior is normal  Thought content normal  Her mood appears anxious  She exhibits a depressed mood

## 2019-02-13 ENCOUNTER — IN HOME VISIT (OUTPATIENT)
Dept: PALLIATIVE MEDICINE | Facility: CLINIC | Age: 84
End: 2019-02-13
Payer: MEDICARE

## 2019-02-13 VITALS — HEART RATE: 70 BPM | RESPIRATION RATE: 20 BRPM | SYSTOLIC BLOOD PRESSURE: 118 MMHG | DIASTOLIC BLOOD PRESSURE: 70 MMHG

## 2019-02-13 DIAGNOSIS — G89.3 CANCER RELATED PAIN: ICD-10-CM

## 2019-02-13 DIAGNOSIS — R06.02 SHORTNESS OF BREATH ON EXERTION: ICD-10-CM

## 2019-02-13 DIAGNOSIS — Z71.89 GOALS OF CARE, COUNSELING/DISCUSSION: ICD-10-CM

## 2019-02-13 DIAGNOSIS — F41.8 ANXIETY ABOUT HEALTH: ICD-10-CM

## 2019-02-13 DIAGNOSIS — R53.1 ASTHENIA DUE TO DISEASE: ICD-10-CM

## 2019-02-13 DIAGNOSIS — R53.0 NEOPLASTIC MALIGNANT RELATED FATIGUE: ICD-10-CM

## 2019-02-13 DIAGNOSIS — C34.32 MALIGNANT NEOPLASM OF LOWER LOBE OF LEFT LUNG (HCC): Primary | ICD-10-CM

## 2019-02-13 DIAGNOSIS — C78.2 PLEURAL METASTASIS (HCC): ICD-10-CM

## 2019-02-14 ENCOUNTER — APPOINTMENT (EMERGENCY)
Dept: RADIOLOGY | Facility: HOSPITAL | Age: 84
DRG: 871 | End: 2019-02-14
Payer: MEDICARE

## 2019-02-14 ENCOUNTER — TELEPHONE (OUTPATIENT)
Dept: PALLIATIVE MEDICINE | Facility: CLINIC | Age: 84
End: 2019-02-14

## 2019-02-14 ENCOUNTER — HOSPITAL ENCOUNTER (INPATIENT)
Facility: HOSPITAL | Age: 84
LOS: 1 days | DRG: 871 | End: 2019-02-15
Attending: EMERGENCY MEDICINE | Admitting: HOSPITALIST
Payer: MEDICARE

## 2019-02-14 DIAGNOSIS — R93.5 ABNORMAL COMPUTED TOMOGRAPHY ANGIOGRAPHY (CTA) OF ABDOMEN AND PELVIS: ICD-10-CM

## 2019-02-14 DIAGNOSIS — J96.01 ACUTE RESPIRATORY FAILURE WITH HYPOXIA (HCC): ICD-10-CM

## 2019-02-14 DIAGNOSIS — I31.3 PERICARDIAL EFFUSION: ICD-10-CM

## 2019-02-14 DIAGNOSIS — R53.1 WEAKNESS: ICD-10-CM

## 2019-02-14 DIAGNOSIS — R10.9 ABDOMINAL PAIN: ICD-10-CM

## 2019-02-14 DIAGNOSIS — R53.1 ASTHENIA DUE TO DISEASE: ICD-10-CM

## 2019-02-14 DIAGNOSIS — R50.9 FEVER: ICD-10-CM

## 2019-02-14 DIAGNOSIS — C78.2 PLEURAL METASTASIS (HCC): ICD-10-CM

## 2019-02-14 DIAGNOSIS — T39.1X1A ACCIDENTAL ACETAMINOPHEN OVERDOSE, INITIAL ENCOUNTER: ICD-10-CM

## 2019-02-14 DIAGNOSIS — I50.32 CHRONIC DIASTOLIC HEART FAILURE (HCC): ICD-10-CM

## 2019-02-14 DIAGNOSIS — G89.3 CANCER RELATED PAIN: ICD-10-CM

## 2019-02-14 DIAGNOSIS — C34.32 MALIGNANT NEOPLASM OF LOWER LOBE OF LEFT LUNG (HCC): Primary | ICD-10-CM

## 2019-02-14 DIAGNOSIS — N39.0 UTI (URINARY TRACT INFECTION): ICD-10-CM

## 2019-02-14 DIAGNOSIS — R65.21 SEPTIC SHOCK (HCC): ICD-10-CM

## 2019-02-14 DIAGNOSIS — K57.92 DIVERTICULITIS: ICD-10-CM

## 2019-02-14 DIAGNOSIS — C78.02 METASTATIC LUNG CARCINOMA, LEFT (HCC): Chronic | ICD-10-CM

## 2019-02-14 DIAGNOSIS — R00.0 TACHYCARDIA: ICD-10-CM

## 2019-02-14 DIAGNOSIS — E87.2 LACTIC ACIDOSIS: Primary | ICD-10-CM

## 2019-02-14 DIAGNOSIS — A41.9 SEPTIC SHOCK (HCC): ICD-10-CM

## 2019-02-14 DIAGNOSIS — Z85.118 HISTORY OF LUNG CANCER: ICD-10-CM

## 2019-02-14 PROBLEM — F11.20 CONTINUOUS OPIOID DEPENDENCE (HCC): Chronic | Status: ACTIVE | Noted: 2018-07-24

## 2019-02-14 PROBLEM — R74.8 ELEVATED LIVER ENZYMES: Status: ACTIVE | Noted: 2019-02-14

## 2019-02-14 PROBLEM — R79.89 ELEVATED LACTIC ACID LEVEL: Status: ACTIVE | Noted: 2019-02-14

## 2019-02-14 PROBLEM — Z87.09: Status: ACTIVE | Noted: 2019-02-14

## 2019-02-14 PROBLEM — E11.9 TYPE 2 DIABETES MELLITUS, WITHOUT LONG-TERM CURRENT USE OF INSULIN (HCC): Chronic | Status: ACTIVE | Noted: 2018-07-23

## 2019-02-14 PROBLEM — I48.0 PAROXYSMAL ATRIAL FIBRILLATION (HCC): Chronic | Status: ACTIVE | Noted: 2018-07-23

## 2019-02-14 PROBLEM — Z87.09: Chronic | Status: ACTIVE | Noted: 2019-02-14

## 2019-02-14 LAB
ALBUMIN SERPL BCP-MCNC: 2.6 G/DL (ref 3.5–5)
ALP SERPL-CCNC: 150 U/L (ref 46–116)
ALT SERPL W P-5'-P-CCNC: 79 U/L (ref 12–78)
ANION GAP SERPL CALCULATED.3IONS-SCNC: 15 MMOL/L (ref 4–13)
APAP SERPL-MCNC: <2 UG/ML (ref 10–30)
APTT PPP: 24 SECONDS (ref 26–38)
AST SERPL W P-5'-P-CCNC: 82 U/L (ref 5–45)
BACTERIA UR QL AUTO: ABNORMAL /HPF
BASOPHILS # BLD MANUAL: 0 THOUSAND/UL (ref 0–0.1)
BASOPHILS NFR MAR MANUAL: 0 % (ref 0–1)
BILIRUB SERPL-MCNC: 0.95 MG/DL (ref 0.2–1)
BILIRUB UR QL STRIP: NEGATIVE
BUN SERPL-MCNC: 21 MG/DL (ref 5–25)
CALCIUM SERPL-MCNC: 8.3 MG/DL (ref 8.3–10.1)
CHLORIDE SERPL-SCNC: 100 MMOL/L (ref 100–108)
CLARITY UR: CLEAR
CLARITY, POC: ABNORMAL
CO2 SERPL-SCNC: 20 MMOL/L (ref 21–32)
COLOR UR: YELLOW
COLOR, POC: YELLOW
CREAT SERPL-MCNC: 0.88 MG/DL (ref 0.6–1.3)
EOSINOPHIL # BLD MANUAL: 0.31 THOUSAND/UL (ref 0–0.4)
EOSINOPHIL NFR BLD MANUAL: 5 % (ref 0–6)
ERYTHROCYTE [DISTWIDTH] IN BLOOD BY AUTOMATED COUNT: 15.2 % (ref 11.6–15.1)
ETHANOL SERPL-MCNC: <3 MG/DL (ref 0–3)
GFR SERPL CREATININE-BSD FRML MDRD: 61 ML/MIN/1.73SQ M
GIANT PLATELETS BLD QL SMEAR: PRESENT
GLUCOSE SERPL-MCNC: 314 MG/DL (ref 65–140)
GLUCOSE SERPL-MCNC: 359 MG/DL (ref 65–140)
GLUCOSE UR STRIP-MCNC: ABNORMAL MG/DL
HCT VFR BLD AUTO: 39.2 % (ref 34.8–46.1)
HGB BLD-MCNC: 12.2 G/DL (ref 11.5–15.4)
HGB UR QL STRIP.AUTO: ABNORMAL
HYALINE CASTS #/AREA URNS LPF: ABNORMAL /LPF
INR PPP: 1.08 (ref 0.86–1.17)
KETONES UR STRIP-MCNC: NEGATIVE MG/DL
LACTATE SERPL-SCNC: 6.1 MMOL/L (ref 0.5–2)
LACTATE SERPL-SCNC: 7.8 MMOL/L (ref 0.5–2)
LEUKOCYTE ESTERASE UR QL STRIP: ABNORMAL
LYMPHOCYTES # BLD AUTO: 1.45 THOUSAND/UL (ref 0.6–4.47)
LYMPHOCYTES # BLD AUTO: 23 % (ref 14–44)
MCH RBC QN AUTO: 32.4 PG (ref 26.8–34.3)
MCHC RBC AUTO-ENTMCNC: 31.1 G/DL (ref 31.4–37.4)
MCV RBC AUTO: 104 FL (ref 82–98)
MONOCYTES # BLD AUTO: 0.31 THOUSAND/UL (ref 0–1.22)
MONOCYTES NFR BLD: 5 % (ref 4–12)
MYELOCYTES NFR BLD MANUAL: 3 % (ref 0–1)
NEUTROPHILS # BLD MANUAL: 4.03 THOUSAND/UL (ref 1.85–7.62)
NEUTS BAND NFR BLD MANUAL: 17 % (ref 0–8)
NEUTS SEG NFR BLD AUTO: 47 % (ref 43–75)
NITRITE UR QL STRIP: POSITIVE
NON-SQ EPI CELLS URNS QL MICRO: ABNORMAL /HPF
NRBC BLD AUTO-RTO: 1 /100 WBCS
PH UR STRIP.AUTO: 5.5 [PH] (ref 4.5–8)
PLATELET # BLD AUTO: 282 THOUSANDS/UL (ref 149–390)
PLATELET BLD QL SMEAR: ADEQUATE
PLATELET CLUMP BLD QL SMEAR: PRESENT
PMV BLD AUTO: 11.5 FL (ref 8.9–12.7)
POLYCHROMASIA BLD QL SMEAR: PRESENT
POTASSIUM SERPL-SCNC: 4.4 MMOL/L (ref 3.5–5.3)
PROT SERPL-MCNC: 6.3 G/DL (ref 6.4–8.2)
PROT UR STRIP-MCNC: ABNORMAL MG/DL
PROTHROMBIN TIME: 14.1 SECONDS (ref 11.8–14.2)
RBC # BLD AUTO: 3.76 MILLION/UL (ref 3.81–5.12)
RBC #/AREA URNS AUTO: ABNORMAL /HPF
RBC MORPH BLD: PRESENT
SALICYLATES SERPL-MCNC: <3 MG/DL (ref 3–20)
SODIUM SERPL-SCNC: 135 MMOL/L (ref 136–145)
SP GR UR STRIP.AUTO: <=1.005 (ref 1–1.03)
UROBILINOGEN UR QL STRIP.AUTO: 1 E.U./DL
WBC # BLD AUTO: 6.29 THOUSAND/UL (ref 4.31–10.16)
WBC #/AREA URNS AUTO: ABNORMAL /HPF

## 2019-02-14 PROCEDURE — 82948 REAGENT STRIP/BLOOD GLUCOSE: CPT

## 2019-02-14 PROCEDURE — 85007 BL SMEAR W/DIFF WBC COUNT: CPT | Performed by: EMERGENCY MEDICINE

## 2019-02-14 PROCEDURE — 96365 THER/PROPH/DIAG IV INF INIT: CPT

## 2019-02-14 PROCEDURE — 81003 URINALYSIS AUTO W/O SCOPE: CPT

## 2019-02-14 PROCEDURE — 87077 CULTURE AEROBIC IDENTIFY: CPT

## 2019-02-14 PROCEDURE — 87186 SC STD MICRODIL/AGAR DIL: CPT | Performed by: EMERGENCY MEDICINE

## 2019-02-14 PROCEDURE — 96361 HYDRATE IV INFUSION ADD-ON: CPT

## 2019-02-14 PROCEDURE — 80053 COMPREHEN METABOLIC PANEL: CPT | Performed by: EMERGENCY MEDICINE

## 2019-02-14 PROCEDURE — 80320 DRUG SCREEN QUANTALCOHOLS: CPT | Performed by: EMERGENCY MEDICINE

## 2019-02-14 PROCEDURE — 87077 CULTURE AEROBIC IDENTIFY: CPT | Performed by: EMERGENCY MEDICINE

## 2019-02-14 PROCEDURE — 80329 ANALGESICS NON-OPIOID 1 OR 2: CPT | Performed by: EMERGENCY MEDICINE

## 2019-02-14 PROCEDURE — 87086 URINE CULTURE/COLONY COUNT: CPT | Performed by: EMERGENCY MEDICINE

## 2019-02-14 PROCEDURE — 99285 EMERGENCY DEPT VISIT HI MDM: CPT

## 2019-02-14 PROCEDURE — 87040 BLOOD CULTURE FOR BACTERIA: CPT | Performed by: EMERGENCY MEDICINE

## 2019-02-14 PROCEDURE — 81001 URINALYSIS AUTO W/SCOPE: CPT

## 2019-02-14 PROCEDURE — 87086 URINE CULTURE/COLONY COUNT: CPT

## 2019-02-14 PROCEDURE — 87186 SC STD MICRODIL/AGAR DIL: CPT

## 2019-02-14 PROCEDURE — 83605 ASSAY OF LACTIC ACID: CPT | Performed by: EMERGENCY MEDICINE

## 2019-02-14 PROCEDURE — 99223 1ST HOSP IP/OBS HIGH 75: CPT | Performed by: HOSPITALIST

## 2019-02-14 PROCEDURE — 74177 CT ABD & PELVIS W/CONTRAST: CPT

## 2019-02-14 PROCEDURE — 85610 PROTHROMBIN TIME: CPT | Performed by: EMERGENCY MEDICINE

## 2019-02-14 PROCEDURE — 85730 THROMBOPLASTIN TIME PARTIAL: CPT | Performed by: EMERGENCY MEDICINE

## 2019-02-14 PROCEDURE — 85027 COMPLETE CBC AUTOMATED: CPT | Performed by: EMERGENCY MEDICINE

## 2019-02-14 PROCEDURE — 36415 COLL VENOUS BLD VENIPUNCTURE: CPT | Performed by: EMERGENCY MEDICINE

## 2019-02-14 PROCEDURE — 93005 ELECTROCARDIOGRAM TRACING: CPT

## 2019-02-14 PROCEDURE — 87631 RESP VIRUS 3-5 TARGETS: CPT | Performed by: HOSPITALIST

## 2019-02-14 PROCEDURE — 71046 X-RAY EXAM CHEST 2 VIEWS: CPT

## 2019-02-14 RX ORDER — SODIUM CHLORIDE 9 MG/ML
100 INJECTION, SOLUTION INTRAVENOUS CONTINUOUS
Status: DISCONTINUED | OUTPATIENT
Start: 2019-02-14 | End: 2019-02-15

## 2019-02-14 RX ORDER — DOCUSATE SODIUM 100 MG/1
100 CAPSULE, LIQUID FILLED ORAL 2 TIMES DAILY
Status: DISCONTINUED | OUTPATIENT
Start: 2019-02-14 | End: 2019-02-15

## 2019-02-14 RX ORDER — SENNOSIDES 8.6 MG
1 TABLET ORAL DAILY
Status: DISCONTINUED | OUTPATIENT
Start: 2019-02-15 | End: 2019-02-15

## 2019-02-14 RX ORDER — AMIODARONE HYDROCHLORIDE 200 MG/1
100 TABLET ORAL DAILY
Status: DISCONTINUED | OUTPATIENT
Start: 2019-02-15 | End: 2019-02-15

## 2019-02-14 RX ORDER — OXYCODONE HYDROCHLORIDE 5 MG/1
2.5 TABLET ORAL EVERY 4 HOURS PRN
Status: DISCONTINUED | OUTPATIENT
Start: 2019-02-14 | End: 2019-02-15

## 2019-02-14 RX ORDER — ONDANSETRON 2 MG/ML
4 INJECTION INTRAMUSCULAR; INTRAVENOUS EVERY 6 HOURS PRN
Status: DISCONTINUED | OUTPATIENT
Start: 2019-02-14 | End: 2019-02-15

## 2019-02-14 RX ORDER — DEXAMETHASONE 2 MG/1
2 TABLET ORAL
Status: DISCONTINUED | OUTPATIENT
Start: 2019-02-15 | End: 2019-02-15

## 2019-02-14 RX ORDER — ASPIRIN 81 MG/1
81 TABLET ORAL DAILY
Status: DISCONTINUED | OUTPATIENT
Start: 2019-02-15 | End: 2019-02-15

## 2019-02-14 RX ORDER — POLYETHYLENE GLYCOL 3350 17 G/17G
17 POWDER, FOR SOLUTION ORAL DAILY PRN
Status: DISCONTINUED | OUTPATIENT
Start: 2019-02-14 | End: 2019-02-15 | Stop reason: HOSPADM

## 2019-02-14 RX ORDER — IBUPROFEN 400 MG/1
200 TABLET ORAL EVERY 6 HOURS PRN
Status: DISCONTINUED | OUTPATIENT
Start: 2019-02-14 | End: 2019-02-15

## 2019-02-14 RX ORDER — FOLIC ACID 1 MG/1
1 TABLET ORAL DAILY
Status: DISCONTINUED | OUTPATIENT
Start: 2019-02-15 | End: 2019-02-15

## 2019-02-14 RX ORDER — CITALOPRAM 20 MG/1
20 TABLET ORAL DAILY
Status: DISCONTINUED | OUTPATIENT
Start: 2019-02-15 | End: 2019-02-15

## 2019-02-14 RX ORDER — UBIDECARENONE 75 MG
100 CAPSULE ORAL DAILY
Status: DISCONTINUED | OUTPATIENT
Start: 2019-02-15 | End: 2019-02-15

## 2019-02-14 RX ORDER — POTASSIUM CHLORIDE 20 MEQ/1
20 TABLET, EXTENDED RELEASE ORAL 2 TIMES DAILY
Status: DISCONTINUED | OUTPATIENT
Start: 2019-02-15 | End: 2019-02-15

## 2019-02-14 RX ORDER — ALPRAZOLAM 0.5 MG/1
0.5 TABLET ORAL ONCE
Status: COMPLETED | OUTPATIENT
Start: 2019-02-14 | End: 2019-02-14

## 2019-02-14 RX ORDER — FENTANYL 25 UG/H
1 PATCH TRANSDERMAL
Status: DISCONTINUED | OUTPATIENT
Start: 2019-02-17 | End: 2019-02-15

## 2019-02-14 RX ADMIN — SODIUM CHLORIDE 1000 ML: 0.9 INJECTION, SOLUTION INTRAVENOUS at 20:54

## 2019-02-14 RX ADMIN — METOPROLOL TARTRATE 12.5 MG: 25 TABLET ORAL at 23:44

## 2019-02-14 RX ADMIN — SODIUM CHLORIDE 1000 ML: 0.9 INJECTION, SOLUTION INTRAVENOUS at 19:45

## 2019-02-14 RX ADMIN — METRONIDAZOLE 500 MG: 500 INJECTION, SOLUTION INTRAVENOUS at 21:28

## 2019-02-14 RX ADMIN — ALPRAZOLAM 0.5 MG: 0.5 TABLET ORAL at 21:25

## 2019-02-14 RX ADMIN — ACETYLCYSTEINE 10900 MG: 200 INJECTION, SOLUTION INTRAVENOUS at 21:43

## 2019-02-14 RX ADMIN — METRONIDAZOLE 500 MG: 500 INJECTION, SOLUTION INTRAVENOUS at 23:44

## 2019-02-14 RX ADMIN — IOHEXOL 100 ML: 350 INJECTION, SOLUTION INTRAVENOUS at 20:11

## 2019-02-14 RX ADMIN — ACETYLCYSTEINE 3640 MG: 200 INJECTION, SOLUTION INTRAVENOUS at 22:44

## 2019-02-14 RX ADMIN — INSULIN LISPRO 4 UNITS: 100 INJECTION, SOLUTION INTRAVENOUS; SUBCUTANEOUS at 23:44

## 2019-02-14 RX ADMIN — SODIUM CHLORIDE 100 ML/HR: 0.9 INJECTION, SOLUTION INTRAVENOUS at 23:46

## 2019-02-14 RX ADMIN — CEFEPIME HYDROCHLORIDE 2000 MG: 1 INJECTION, POWDER, FOR SOLUTION INTRAMUSCULAR; INTRAVENOUS at 21:00

## 2019-02-14 NOTE — TELEPHONE ENCOUNTER
Per Dr Jasvir Avila and phone call from [address] pt needs Home Hospice Stat  Call placed to [facility] line  Spoke with Gricelda Tai  Formal referral will be sent by this office asap   [facility] to expedite request

## 2019-02-15 ENCOUNTER — APPOINTMENT (INPATIENT)
Dept: RADIOLOGY | Facility: HOSPITAL | Age: 84
DRG: 871 | End: 2019-02-15
Payer: MEDICARE

## 2019-02-15 ENCOUNTER — TELEPHONE (OUTPATIENT)
Dept: HEMATOLOGY ONCOLOGY | Facility: CLINIC | Age: 84
End: 2019-02-15

## 2019-02-15 VITALS
WEIGHT: 160 LBS | HEIGHT: 61 IN | OXYGEN SATURATION: 95 % | HEART RATE: 79 BPM | DIASTOLIC BLOOD PRESSURE: 77 MMHG | BODY MASS INDEX: 30.21 KG/M2 | TEMPERATURE: 98.1 F | SYSTOLIC BLOOD PRESSURE: 130 MMHG | RESPIRATION RATE: 18 BRPM

## 2019-02-15 LAB
ALBUMIN SERPL BCP-MCNC: 2.4 G/DL (ref 3.5–5)
ALP SERPL-CCNC: 112 U/L (ref 46–116)
ALT SERPL W P-5'-P-CCNC: 61 U/L (ref 12–78)
ANION GAP SERPL CALCULATED.3IONS-SCNC: 10 MMOL/L (ref 4–13)
ANISOCYTOSIS BLD QL SMEAR: PRESENT
AST SERPL W P-5'-P-CCNC: 20 U/L (ref 5–45)
ATRIAL RATE: 97 BPM
BASOPHILS # BLD MANUAL: 0 THOUSAND/UL (ref 0–0.1)
BASOPHILS NFR MAR MANUAL: 0 % (ref 0–1)
BILIRUB DIRECT SERPL-MCNC: 0.3 MG/DL (ref 0–0.2)
BILIRUB SERPL-MCNC: 0.63 MG/DL (ref 0.2–1)
BUN SERPL-MCNC: 13 MG/DL (ref 5–25)
CALCIUM SERPL-MCNC: 8.3 MG/DL (ref 8.3–10.1)
CHLORIDE SERPL-SCNC: 103 MMOL/L (ref 100–108)
CO2 SERPL-SCNC: 24 MMOL/L (ref 21–32)
CREAT SERPL-MCNC: 0.69 MG/DL (ref 0.6–1.3)
EOSINOPHIL # BLD MANUAL: 0.05 THOUSAND/UL (ref 0–0.4)
EOSINOPHIL NFR BLD MANUAL: 1 % (ref 0–6)
ERYTHROCYTE [DISTWIDTH] IN BLOOD BY AUTOMATED COUNT: 15.3 % (ref 11.6–15.1)
FLUAV AG SPEC QL: NOT DETECTED
FLUBV AG SPEC QL: NOT DETECTED
GFR SERPL CREATININE-BSD FRML MDRD: 81 ML/MIN/1.73SQ M
GIANT PLATELETS BLD QL SMEAR: PRESENT
GLUCOSE SERPL-MCNC: 184 MG/DL (ref 65–140)
GLUCOSE SERPL-MCNC: 192 MG/DL (ref 65–140)
HCT VFR BLD AUTO: 39.3 % (ref 34.8–46.1)
HGB BLD-MCNC: 12 G/DL (ref 11.5–15.4)
INR PPP: 1.09 (ref 0.86–1.17)
LACTATE SERPL-SCNC: 4.2 MMOL/L (ref 0.5–2)
LYMPHOCYTES # BLD AUTO: 0.88 THOUSAND/UL (ref 0.6–4.47)
LYMPHOCYTES # BLD AUTO: 18 % (ref 14–44)
MAGNESIUM SERPL-MCNC: 1.7 MG/DL (ref 1.6–2.6)
MCH RBC QN AUTO: 31.7 PG (ref 26.8–34.3)
MCHC RBC AUTO-ENTMCNC: 30.5 G/DL (ref 31.4–37.4)
MCV RBC AUTO: 104 FL (ref 82–98)
METAMYELOCYTES NFR BLD MANUAL: 1 % (ref 0–1)
MONOCYTES # BLD AUTO: 0 THOUSAND/UL (ref 0–1.22)
MONOCYTES NFR BLD: 0 % (ref 4–12)
MYELOCYTES NFR BLD MANUAL: 1 % (ref 0–1)
NEUTROPHILS # BLD MANUAL: 3.63 THOUSAND/UL (ref 1.85–7.62)
NEUTS BAND NFR BLD MANUAL: 4 % (ref 0–8)
NEUTS SEG NFR BLD AUTO: 70 % (ref 43–75)
NRBC BLD AUTO-RTO: 1 /100 WBCS
NRBC BLD AUTO-RTO: 3 /100 WBC (ref 0–2)
P AXIS: 47 DEGREES
PLATELET # BLD AUTO: 219 THOUSANDS/UL (ref 149–390)
PLATELET # BLD AUTO: 277 THOUSANDS/UL (ref 149–390)
PLATELET BLD QL SMEAR: ADEQUATE
PMV BLD AUTO: 11 FL (ref 8.9–12.7)
PMV BLD AUTO: 11.3 FL (ref 8.9–12.7)
POLYCHROMASIA BLD QL SMEAR: PRESENT
POTASSIUM SERPL-SCNC: 3.2 MMOL/L (ref 3.5–5.3)
PR INTERVAL: 136 MS
PROCALCITONIN SERPL-MCNC: 11.54 NG/ML
PROMYELOCYTES NFR BLD MANUAL: 1 % (ref 0–0)
PROT SERPL-MCNC: 6.2 G/DL (ref 6.4–8.2)
PROTHROMBIN TIME: 14.2 SECONDS (ref 11.8–14.2)
QRS AXIS: 108 DEGREES
QRSD INTERVAL: 76 MS
QT INTERVAL: 316 MS
QTC INTERVAL: 401 MS
RBC # BLD AUTO: 3.78 MILLION/UL (ref 3.81–5.12)
RBC MORPH BLD: PRESENT
RSV B RNA SPEC QL NAA+PROBE: NOT DETECTED
SMUDGE CELLS BLD QL SMEAR: PRESENT
SODIUM SERPL-SCNC: 137 MMOL/L (ref 136–145)
T WAVE AXIS: 27 DEGREES
VARIANT LYMPHS # BLD AUTO: 4 %
VENTRICULAR RATE: 97 BPM
WBC # BLD AUTO: 4.9 THOUSAND/UL (ref 4.31–10.16)

## 2019-02-15 PROCEDURE — 84145 PROCALCITONIN (PCT): CPT | Performed by: NURSE PRACTITIONER

## 2019-02-15 PROCEDURE — 85007 BL SMEAR W/DIFF WBC COUNT: CPT | Performed by: HOSPITALIST

## 2019-02-15 PROCEDURE — 80076 HEPATIC FUNCTION PANEL: CPT | Performed by: HOSPITALIST

## 2019-02-15 PROCEDURE — 99223 1ST HOSP IP/OBS HIGH 75: CPT | Performed by: NURSE PRACTITIONER

## 2019-02-15 PROCEDURE — 80048 BASIC METABOLIC PNL TOTAL CA: CPT | Performed by: HOSPITALIST

## 2019-02-15 PROCEDURE — 82948 REAGENT STRIP/BLOOD GLUCOSE: CPT

## 2019-02-15 PROCEDURE — 99239 HOSP IP/OBS DSCHRG MGMT >30: CPT | Performed by: NURSE PRACTITIONER

## 2019-02-15 PROCEDURE — 85049 AUTOMATED PLATELET COUNT: CPT | Performed by: HOSPITALIST

## 2019-02-15 PROCEDURE — 83605 ASSAY OF LACTIC ACID: CPT | Performed by: HOSPITALIST

## 2019-02-15 PROCEDURE — 85027 COMPLETE CBC AUTOMATED: CPT | Performed by: HOSPITALIST

## 2019-02-15 PROCEDURE — 93010 ELECTROCARDIOGRAM REPORT: CPT | Performed by: INTERNAL MEDICINE

## 2019-02-15 PROCEDURE — 83735 ASSAY OF MAGNESIUM: CPT | Performed by: HOSPITALIST

## 2019-02-15 PROCEDURE — 71045 X-RAY EXAM CHEST 1 VIEW: CPT

## 2019-02-15 PROCEDURE — 85610 PROTHROMBIN TIME: CPT | Performed by: HOSPITALIST

## 2019-02-15 RX ORDER — ONDANSETRON 2 MG/ML
4 INJECTION INTRAMUSCULAR; INTRAVENOUS EVERY 4 HOURS PRN
Status: DISCONTINUED | OUTPATIENT
Start: 2019-02-15 | End: 2019-02-15 | Stop reason: HOSPADM

## 2019-02-15 RX ORDER — LORAZEPAM 2 MG/ML
0.5 INJECTION INTRAMUSCULAR
Status: DISCONTINUED | OUTPATIENT
Start: 2019-02-15 | End: 2019-02-15 | Stop reason: HOSPADM

## 2019-02-15 RX ORDER — LORAZEPAM 2 MG/ML
0.5 INJECTION INTRAMUSCULAR EVERY 8 HOURS
Status: DISCONTINUED | OUTPATIENT
Start: 2019-02-15 | End: 2019-02-15 | Stop reason: HOSPADM

## 2019-02-15 RX ADMIN — ACETYLCYSTEINE 7260 MG: 200 INJECTION, SOLUTION INTRAVENOUS at 02:30

## 2019-02-15 RX ADMIN — CEFEPIME HYDROCHLORIDE 2000 MG: 1 INJECTION, POWDER, FOR SOLUTION INTRAMUSCULAR; INTRAVENOUS at 06:13

## 2019-02-15 RX ADMIN — AMIODARONE HYDROCHLORIDE 100 MG: 200 TABLET ORAL at 08:29

## 2019-02-15 RX ADMIN — VITAM B12 100 MCG: 100 TAB at 08:28

## 2019-02-15 RX ADMIN — CITALOPRAM HYDROBROMIDE 20 MG: 20 TABLET ORAL at 08:28

## 2019-02-15 RX ADMIN — POTASSIUM CHLORIDE 20 MEQ: 1500 TABLET, EXTENDED RELEASE ORAL at 08:28

## 2019-02-15 RX ADMIN — ONDANSETRON 4 MG: 2 INJECTION INTRAMUSCULAR; INTRAVENOUS at 09:09

## 2019-02-15 RX ADMIN — FOLIC ACID 1 MG: 1 TABLET ORAL at 08:28

## 2019-02-15 RX ADMIN — INSULIN LISPRO 1 UNITS: 100 INJECTION, SOLUTION INTRAVENOUS; SUBCUTANEOUS at 08:28

## 2019-02-15 RX ADMIN — MORPHINE SULFATE 2 MG: 2 INJECTION, SOLUTION INTRAMUSCULAR; INTRAVENOUS at 11:58

## 2019-02-15 RX ADMIN — ENOXAPARIN SODIUM 40 MG: 40 INJECTION SUBCUTANEOUS at 08:28

## 2019-02-15 RX ADMIN — METRONIDAZOLE 500 MG: 500 INJECTION, SOLUTION INTRAVENOUS at 08:29

## 2019-02-15 RX ADMIN — METOPROLOL TARTRATE 12.5 MG: 25 TABLET ORAL at 08:28

## 2019-02-15 RX ADMIN — DEXAMETHASONE 2 MG: 2 TABLET ORAL at 08:28

## 2019-02-15 RX ADMIN — ASPIRIN 81 MG: 81 TABLET, COATED ORAL at 08:28

## 2019-02-15 RX ADMIN — MORPHINE SULFATE 2 MG: 2 INJECTION, SOLUTION INTRAMUSCULAR; INTRAVENOUS at 10:04

## 2019-02-15 NOTE — ED NOTES
Dr Bk Meza verbal order to hold labs until he evaluates patient        Yandel Lennon RN  02/14/19 [id number]
Patient transported to [address] [place] [address], RAIN  02/14/19 2004
operating room

## 2019-02-15 NOTE — ASSESSMENT & PLAN NOTE
· 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 s/p chemo  · palliative medicine following at home, Present to discuss goals of care with family as patient condition has worsened  · Plan to make comfort care and transfer her to the hospice house

## 2019-02-15 NOTE — CONSULTS
Consultation - Palliative Care   Ivory Muller 80 y o  female MRN: [id number]  Unit/Bed#: [facility] 629-01 Encounter: [id number]      Assessment/Plan     Assessment:  Metastatic Left lung cancer  Pleural metastasis  Bone metastasis  Cancer related pain  Generalized weakness   Septic shock  Dyspnea  Goals of care counseling      Plan:  Symptom Management: initiate regimen to maximize comfort  · D/C Fentanyl patch  · Morphine 2 mg IV Q 6 ATC and Q 1 PRN  · Lorazepam 0 5 mg IV Q 8 ATC and Q 1 PRN  · Ondansetron 4 mg IV Q 4 PRN     Goals of care:  · Code Status changed to Level 4 CMO  · Pt lacks insight to severity of her condition and her acuity has compromised her mental status rendering her not competent to make medical decisions  · Pt's Daughter Razia Davis is her designated decision maker  Discussion held with Razia Davis, pt's grand-daughter Libby Crenshaw and family updated on clinical stats, results of CXR showing almost complete white out of left lung field and worsening pleural thickening as result of her known metastatic disease  Pt has + blood cultures, currently being treated for Sepsis  Family does not wish to escalate cares and prefers more comfort focused approach  Hospice has been discussed in past and given acuity of pt's current condition she qualifies for GIP level of hospice cares  · Family in agreement with transition to hospice and transfer to [facility] facility  Razia Davis has spoken to her dad, pt's spouse, he is also in agreement with plan of care  Support provided  · Hospice consult placed  · SLIM updated   · Transport arranged for noon today       History of Present Illness   Physician Requesting Consult: Deborah Chavez DO  Reason for Consult / Principal Problem: GOC  Hx and PE limited by: acutiy of condition  HPI: Ivory Muller is a 80y o  year old female who presents with generalized weakness, change in mental status, + blood cultures and found to be in Septic shock   She is well known to me and is seen at home as part of [facility] palliative care program for her history of metastatic lung cancer with bone and pleural metastasis  She has been progressively declining and when seen at home just a few days ago a long conversation was held with pt and her family about her QOL, which she feels is extremely poor and options for future cares including continuing with disease directed therapy which is most likely contributing to overall poor state of health vs focusing on her QOL/comfort and hospice was offered as a consideration  Pt was considering hospice and met with a hospice liaison in her home yesterday to discuss enrollment  However, pt developed an acute change in her condition and family called 911 to have her evaluated  She presented to ED with tachycardia, febrile, tachypnea, lactic acid elevated all suggestive of sepsis  Imaging revealed diverticulitis and worsening of her pleural metastasis          Inpatient consult to Palliative Care  Consult performed by: SAMANTHA Melchor  Consult ordered by: Nataliya Metcalf MD          Review of Systems   Unable to perform ROS: Acuity of condition       Historical Information   Past Medical History:   Diagnosis Date    TANYA (acute kidney injury) ([facility] [address] ) 7/24/2018    Anxiety about health 12/4/2018    Breast cancer, left ([facility] [address] )     Diabetes mellitus ([facility] [address] )     HAP (hospital-acquired pneumonia) 7/24/2018    Hypertension     Lung cancer ([facility] [address] )     Left Lower Lobe     NSTEMI (non-ST elevated myocardial infarction) ([facility] [address] ) 7/24/2018    Pericardial effusion     Rapid atrial fibrillation [facility])      Past Surgical History:   Procedure Laterality Date    FRACTURE SURGERY      R arm surgery    IR PORT PLACEMENT  9/5/2018    MASTECTOMY      L breast with implant    NM INCIS HEART SAC WINDW FOR DRAIN N/A 7/24/2018    Procedure: WINDOW PERICARDIAL   Subxyphoid approach ;  Surgeon: Edmar Lane MD;  [facility]: [facility];  Service: Thoracic    REDUCTION MAMMAPLASTY R breast     Social History     Socioeconomic History    Marital status: /Civil Union     Spouse name: None    Number of children: None    Years of education: None    Highest education level: None   Occupational History    None   Social Needs    Financial resource strain: None    Food insecurity:     Worry: None     Inability: None    Transportation needs:     Medical: None     Non-medical: None   Tobacco Use    Smoking status: Former Smoker     Packs/day: 0 50     Years: 10 00     Pack years: 5 00     Last attempt to quit: 1960     Years since quittin 1    Smokeless tobacco: Never Used    Tobacco comment: Socially    Substance and Sexual Activity    Alcohol use: No     Alcohol/week: 0 0 oz     Frequency: Never     Drinks per session: Patient refused     Binge frequency: Never    Drug use: No    Sexual activity: Never   Lifestyle    Physical activity:     Days per week: None     Minutes per session: None    Stress: None   Relationships    Social connections:     Talks on phone: None     Gets together: None     Attends [employer] service: None     Active member of club or organization: None     Attends meetings of clubs or organizations: None     Relationship status: None    Intimate partner violence:     Fear of current or ex partner: None     Emotionally abused: None     Physically abused: None     Forced sexual activity: None   Other Topics Concern    None   Social History Narrative    None     Family History   Problem Relation Age of Onset    Heart attack Mother     Breast cancer Father 79    Breast cancer Sister 61    Pancreatic cancer Sister 79    Lung cancer Brother 79        Smoker     Breast cancer Sister 36    Pancreatic cancer Brother 79    Colon cancer Brother 61       Meds/Allergies   all current active meds have been reviewed      Allergies   Allergen Reactions    Atorvastatin Other (See Comments)    Benzonatate Other (See Comments)    Carboplatin      Pt had allergic reaction during 8th carbo dose    Sulfa Antibiotics     Bactrim [Sulfamethoxazole-Trimethoprim] Rash    Latex Rash    Other Rash     Pt states she gets a rash from surgical tape, she is ok with paper tape       Objective     Physical Exam   Constitutional: She appears distressed  Alert, appears ill, toxic, uncomfortable with increased work of breathing  HENT:   Head: Normocephalic and atraumatic  Mouth/Throat: No oropharyngeal exudate  Cardiovascular:   Tachycardic    Pulmonary/Chest: No respiratory distress  Increased working of breathing with accessory muscle usage   Neurological: She is alert  Skin: She is not diaphoretic  There is pallor  Lab Results:   I have personally reviewed pertinent labs  , CBC:   Lab Results   Component Value Date    WBC 4 90 02/15/2019    HGB 12 0 02/15/2019    HCT 39 3 02/15/2019     (H) 02/15/2019     02/15/2019    MCH 31 7 02/15/2019    MCHC 30 5 (L) 02/15/2019    RDW 15 3 (H) 02/15/2019    MPV 11 0 02/15/2019    NRBC 1 02/14/2019   , CMP:   Lab Results   Component Value Date    SODIUM 137 02/15/2019    K 3 2 (L) 02/15/2019     02/15/2019    CO2 24 02/15/2019    BUN 13 02/15/2019    CREATININE 0 69 02/15/2019    CALCIUM 8 3 02/15/2019    AST 20 02/15/2019    ALT 61 02/15/2019    ALKPHOS 112 02/15/2019    EGFR 81 02/15/2019     Imaging Studies: I have personally reviewed pertinent reports  Code Status: Level 4 - Comfort Care  Advance Directive and Living Will:      Power of :    POLST:      Counseling / Coordination of Care  Total floor / unit time spent today 90 minutes  Greater than 50% of total time was spent with the patient and / or family counseling and / or coordination of care   A description of the counseling / coordination of care: discussion with SLIM, discussion with family regarding clinical status, goals of care, change in code status, transition to hospice, coordination of care with bedside RN, hospice team

## 2019-02-15 NOTE — ASSESSMENT & PLAN NOTE
Patient presented with generalized weakness, tachycardia( HR at 109)+ temperature maximum 39  2C+ RR 33+ lactic acid 7 8+ altered mental status + elevated LFTs with CT scan of abdomen pelvis reported findings suggestive of diverticulitis, proctocolitis through the distal sigmoid colon and rectum , gas in urinary bladder  Source of infection most likely intra-abdominal l including diverticulitis and UTI     given patient immunocompromised state ( her chemotherapy just stopped a week ago due to generalized weakness) she admitted to step-down 2 with full sepsis workup and broad-spectrum antibiotic given in the ER  Follow-up sepsis workup deescalate antibiotic pending final results, follow-up influenza and RCV PCR's  Urinalysis positive for nitrate bacteriuria and pyuria  Will continue IV hydration  Repeat lactic acid  Follow-up temperature ,blood pressure, WBC, lactic acid  Infectious disease consult placed  Will defer need of GI versus colorectal surgeon consult to the primary team according to the ER fellow patient family is not pursuing invasive surgical intervention

## 2019-02-15 NOTE — ED ATTENDING ATTESTATION
I, Bee Montero DO, saw and evaluated the patient  I have discussed the patient with the resident/non-physician practitioner and agree with the resident's/non-physician practitioner's findings, Plan of Care, and MDM as documented in the resident's/non-physician practitioner's note, except where noted  All available labs and Radiology studies were reviewed  At this point I agree with the current assessment done in the Emergency Department  I have conducted an independent evaluation of this patient a history and physical is as follows:      Critical Care Time  Procedures     80 yr old fem under chemo for metastatic CA with abd pain for over a week and chemo about a week ago  Chronic diarrhea  Noted to have 34% bands last week  Ex: gen abd tender with voluntary guarding  Heart: tachy  Lungs: regular respiration  Pln: septic and abd pain duval and adm

## 2019-02-15 NOTE — ASSESSMENT & PLAN NOTE
Lab Results   Component Value Date    HGBA1C 6 7 (H) 07/25/2018       No results for input(s): POCGLU in the last 72 hours      Blood Sugar Average: Last 72 hrs:     Metformin held due to increased  lactic acidosis  Continue IV fluids, insulin sliding scale

## 2019-02-15 NOTE — PLAN OF CARE
Problem: DISCHARGE PLANNING - CARE MANAGEMENT  Goal: Discharge to post-acute care or home with appropriate resources  Description  INTERVENTIONS:  - Conduct assessment to determine patient/family and health care team treatment goals, and need for post-acute services based on payer coverage, community resources, and patient preferences, and barriers to discharge  - Address psychosocial, clinical, and financial barriers to discharge as identified in assessment in conjunction with the patient/family and health care team  - Arrange appropriate level of post-acute services according to patient's   needs and preference and payer coverage in collaboration with the physician and health care team  - Communicate with and update the patient/family, physician, and health care team regarding progress on the discharge plan  - Arrange appropriate transportation to post-acute venues  -Patient to be evaluated  Patient to d/c to hospice house today      Outcome: Progressing

## 2019-02-15 NOTE — H&P
H&P- Edgar Morgan 1935, 80 y o  female MRN: [id number]    Unit/Bed#: [facility] 629-01 Encounter: [id number]    Primary Care Provider: Geraldina Hatchet, DO   Date and time admitted to hospital: 2/14/2019  6:38 PM        * Septic shock [facility])  Assessment & Plan  Patient presented with generalized weakness, tachycardia( HR at 109)+ temperature maximum 39  2C+ RR 33+ lactic acid 7 8+ altered mental status + elevated LFTs with CT scan of abdomen pelvis reported findings suggestive of diverticulitis, proctocolitis through the distal sigmoid colon and rectum , gas in urinary bladder  Source of infection most likely intra-abdominal l including diverticulitis and UTI     given patient immunocompromised state ( her chemotherapy just stopped a week ago due to generalized weakness) she admitted to step-down 2 with full sepsis workup and broad-spectrum antibiotic given in the ER  Follow-up sepsis workup deescalate antibiotic pending final results, follow-up influenza and RCV PCR's  Urinalysis positive for nitrate bacteriuria and pyuria  Will continue IV hydration  Repeat lactic acid  Follow-up temperature ,blood pressure, WBC, lactic acid  Infectious disease consult placed  Will defer need of GI versus colorectal surgeon consult to the primary team according to the ER fellow patient family is not pursuing invasive surgical intervention  Increased anion gap metabolic acidosis  Assessment & Plan  Secondary to #1 in the setting of metformin use for diabetes  Will hold metformin  Management as above  Will repeat lactic acid after IV hydration  It was suspicious of possible Tylenol overuse secondary to poorly control pain  Toxicology on-call recommended Acetadote  No need to repeat Tylenol level follow up INR and LFTs in a m  Elevated lactic acid level  Assessment & Plan  Secondary to #1 and dehydration ( urinalysis positive for hyaline cast, lactic acid improved with IV fluids)   Management as above  Continue IV hydration, monitor lactic acid    Metastatic lung carcinoma, left (HCC)  Assessment & Plan  With metastasis to bones and pleura  Patient wollows with Dr Burgos,CT on hold due to the pt's generalized weakness    Cancer related pain  Assessment & Plan  Continue fentanyl patch, Percocet p r n  Palliative/hospice care consult placed    Continuous opioid dependence ([facility] [address] )  Assessment & Plan  Secondary to cancer related pain  Management as above    Hx of chronic respiratory failure  Assessment & Plan  Continue supplemental oxygen as needed    Paroxysmal atrial fibrillation (HCC)  Assessment & Plan  On amiodarone beta-blocker aspirin    Pericardial effusion  Assessment & Plan  Stable, no significant effusion on imaging studies, continue supplemental oxygen as needed    Type 2 diabetes mellitus, without long-term current use of insulin (HCC)  Assessment & Plan  Lab Results   Component Value Date    HGBA1C 6 7 (H) 07/25/2018       No results for input(s): POCGLU in the last 72 hours  Blood Sugar Average: Last 72 hrs:     Metformin held due to increased  lactic acidosis  Continue IV fluids, insulin sliding scale    Elevated liver enzymes  Assessment & Plan  Most likely secondary to # 1  Will repeat LFTs in a m  VTE Prophylaxis: Enoxaparin (Lovenox)  / sequential compression device   Code Status: DNR/DNI  POLST: There is no POLST form on file for this patient (pre-hospital)  Discussion with family:  No family members at bedside    Anticipated Length of Stay:  Patient will be admitted on an Inpatient basis with an anticipated length of stay of  > 2 midnights  Justification for Hospital Stay:  Id and palliative care consult    Total Time for Visit, including Counseling / Coordination of Care: 45 minutes  Greater than 50% of this total time spent on direct patient counseling and coordination of care      Chief Complaint:   Generalized weakness    History of Present Illness:    Familia Deshpande is a 80 y o  female with history of metastatic lung adenocarcinoma ( metastases to pleura and bones), diabetes, NAFLD who presented to the emergency room for evaluation of generalized weakness and altered mental status  Patient is not a good historian she is still alter, awake alert x1  Upon my assessment she reported fever denied abdominal or chest pain, no cough   According to the ER fellow who spoke to family members they noted progressively getting worse generalized weakness over the past 3 weeks  Patient was evaluated by outpatient palliative Care RN  for possible withdrawal of chemotherapy and hospice  Family not pursuing any invasive surgical intervention however they want to see if with conservative management her generalized weakness will be getting better   They also reports that patient was complaining on abdominal pain over the past week, she has diarrhea which at baseline  Family cannot rule out Tylenol overuse due to intractable pain  Tylenol level remains normal toe case was discussed with  on call and Acetadote was given  Further workup in the ER revealed temperature maximum 39 2, lactic acid 7 8, BUN to creatinine above 20, urinalysis came back grossly positive for pyuria, hyaline cast, bacteriuria and nitrates, LFTs slightly elevated above normal   Imaging study revealed diverticulitis with proctocolitis , gas within urinary bladder  Sepsis workup initiated by ER fellow patient started on broad-spectrum antibiotic    repeated lactic acid trending down  Patient admitted to step-down 2 level, remained hemodynamically stable throughout ER course  Upon discussion between ER fellow and family patient is DNR DNI        Review of Systems:    Review of Systems   Constitutional: Positive for activity change and fever         Past Medical and Surgical History:     Past Medical History:   Diagnosis Date    TANYA (acute kidney injury) ([facility] [address] ) 7/24/2018    Anxiety about health 12/4/2018    Breast cancer, left ([facility] [address] )     Diabetes mellitus ([facility] [address] )     HAP (hospital-acquired pneumonia) 7/24/2018    Hypertension     Lung cancer ([facility] [address] )     Left Lower Lobe     [facility] 7/24/2018    Pericardial effusion     Rapid atrial fibrillation [facility])        Past Surgical History:   Procedure Laterality Date    FRACTURE SURGERY      R arm surgery    IR PORT PLACEMENT  9/5/2018    MASTECTOMY      L breast with implant    NC INCIS HEART SAC WINDW FOR DRAIN N/A 7/24/2018    Procedure: WINDOW PERICARDIAL  Subxyphoid approach ;  Surgeon: Norma Francis MD;  Location: [facility];  Service: Thoracic    REDUCTION MAMMAPLASTY      R breast       Meds/Allergies:    Prior to Admission medications    Medication Sig Start Date End Date Taking?  Authorizing Provider   acetaminophen (TYLENOL) 500 mg tablet Take 500 mg by mouth 9/16/16  Yes Historical Provider, MD   ALPRAZolam Praful Gonzalez) 0 5 mg tablet Take 1 tablet (0 5 mg total) by mouth daily at bedtime as needed for anxiety 2/11/19  Yes SAMANTHA Patel   amiodarone 100 mg tablet Take 1 tablet (100 mg total) by mouth daily 11/7/18  Yes Maddy Martin MD   aspirin (ECOTRIN LOW STRENGTH) 81 mg EC tablet Take 81 mg by mouth daily   Yes Historical Provider, MD   citalopram (CeleXA) 20 mg tablet Take 20 mg by mouth daily   Yes Historical Provider, MD   cyanocobalamin 1000 MCG tablet Take 100 mcg by mouth daily   Yes Historical Provider, MD   dexamethasone (DECADRON) 2 mg tablet Take 1 tablet (2 mg total) by mouth daily with breakfast 2/4/19  Yes SAMANTHA Patel   fentaNYL ([address]) 25 mcg/hr Place 1 patch on the skin every third day Max Daily Amount: 1 patch 2/4/19  Yes SAMANTHA Patel   folic acid (FOLVITE) 1 mg tablet Take 1 mg by mouth daily   Yes Historical Provider, MD   metFORMIN (GLUCOPHAGE) 500 mg tablet Take 500 mg by mouth daily with breakfast   Yes Historical Provider, MD   metoprolol tartrate (LOPRESSOR) 25 mg tablet Take 0 5 tablets (12 5 mg total) by mouth every 12 (twelve) hours 9/4/18  Yes Chantal Mejias MD   ondansetron (ZOFRAN) 4 mg tablet Take 1 tablet (4 mg total) by mouth every 8 (eight) hours as needed for nausea or vomiting 9/5/18  Yes Mandy Park MD   oxyCODONE (ROXICODONE) 5 mg immediate release tablet Take 0 5 tablets (2 5 mg total) by mouth every 4 (four) hours as needed for moderate pain Max Daily Amount: 15 mg 9/25/18  Yes Ash Draper MD   potassium chloride (K-DUR,KLOR-CON) 20 mEq tablet Take 1 tablet (20 mEq total) by mouth 2 (two) times a day 12/7/18  Yes Taye Walden PA-C   promethazine-dextromethorphan (PHENERGAN-DM) 6 25-15 mg/5 mL oral syrup TAKE 1 TEASPOONFUL 4 TIMES DAILY AS NEEDED   Yes Historical Provider, MD   lidocaine-prilocaine (EMLA) cream Apply topically as needed for mild pain 1 hour prior to port access 9/12/18 2/14/19 Yes Mandy Park MD   furosemide (LASIX) 20 mg tablet Take 1 tablet (20 mg total) by mouth daily for 30 days 12/21/18 1/20/19  Taye Walden PA-C   CRANIAL PROSTHESIS, RX, 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 10/9/18 2/14/19  Mandy Park MD   potassium chloride (KLOR-CON) 20 mEq packet Take 20 mEq by mouth 2 (two) times a day  Patient not taking: Reported on 1/17/2019 12/6/18 2/14/19  Taye Walden PA-C     I have reviewed home medications with a medical source (PCP, Pharmacy, other)  Allergies:    Allergies   Allergen Reactions    Atorvastatin Other (See Comments)    Benzonatate Other (See Comments)    Carboplatin      Pt had allergic reaction during 8th carbo dose    Sulfa Antibiotics     Bactrim [Sulfamethoxazole-Trimethoprim] Rash    Latex Rash    Other Rash     Pt states she gets a rash from surgical tape, she is ok with paper tape       Social History:     Marital Status: /Civil Union   Occupation: none  Patient Pre-hospital Living Situation: home  Patient Pre-hospital Level of Mobility:  Unknown  Patient Pre-hospital Diet Restrictions:  Unknown  Substance Use History:   Social History     Substance and Sexual Activity   Alcohol Use No    Alcohol/week: 0 0 oz    Frequency: Never    Drinks per session: Patient refused    Binge frequency: Never     Social History     Tobacco Use   Smoking Status Former Smoker    Packs/day: 0 50    Years: 10 00    Pack years: 5 00    Last attempt to quit: [place] Years since quittin 1   Smokeless Tobacco Never Used   Tobacco Comment    Socially      Social History     Substance and Sexual Activity   Drug Use No       Family History:    non-contributory    Physical Exam:     Vitals:   Blood Pressure: 117/63 (02/15/19 0307)  Pulse: 79 (02/15/19 0307)  Temperature: 98 1 °F (36 7 °C) (02/15/19 0307)  Temp Source: Oral (02/15/19 0307)  Respirations: 18 (02/15/19 0307)  Height: 5' 1" (154 9 cm) (02/15/19 0052)  Weight - Scale: 72 6 kg (160 lb) (02/15/19 0052)  SpO2: 95 % (02/15/19 0307)    Physical Exam   Constitutional: No distress  HENT:   Head: Normocephalic  Dry oral mucosa   Eyes: Right eye exhibits no discharge  Left eye exhibits no discharge  Neck: Normal range of motion  Cardiovascular: Regular rhythm  Pulmonary/Chest:   Decreased breath sounds bilaterally   Abdominal: She exhibits no distension  There is no tenderness  Musculoskeletal: She exhibits no tenderness or deformity  Neurological: She is alert  Oriented to her 1st and last name   Skin: Skin is warm  Psychiatric: She has a normal mood and affect  Additional Data:     Lab Results: I have personally reviewed pertinent reports        Results from last 7 days   Lab Units 02/15/19  0229 02/14/19  1910   WBC Thousand/uL  --  6 29   HEMOGLOBIN g/dL  --  12 2   HEMATOCRIT %  --  39 2   PLATELETS Thousands/uL 219 282   BANDS PCT %  --  17*   LYMPHO PCT %  --  23   MONO PCT %  --  5   EOS PCT %  --  5     Results from last 7 days   Lab Units 19   SODIUM mmol/L 135*   POTASSIUM mmol/L 4  4   CHLORIDE mmol/L 100   CO2 mmol/L 20*   BUN mg/dL 21   CREATININE mg/dL 0 88   ANION GAP mmol/L 15*   CALCIUM mg/dL 8 3   ALBUMIN g/dL 2 6*   TOTAL BILIRUBIN mg/dL 0 95   ALK PHOS U/L 150*   ALT U/L 79*   AST U/L 82*   GLUCOSE RANDOM mg/dL 314*     Results from last 7 days   Lab Units 02/14/19  1910   INR  1 08     Results from last 7 days   Lab Units 02/14/19  2309   POC GLUCOSE mg/dl 359*         Results from last 7 days   Lab Units 02/15/19  0229 02/14/19 2057 02/14/19 1910   LACTIC ACID mmol/L 4 2* 6 1* 7 8*       Imaging: I have personally reviewed pertinent reports  CT abdomen pelvis with contrast   Final Result by Devon Hagen MD (02/14 2044)      1  Small outpouching of fluid and gas soft the sigmoid colon with trace adjacent stranding  This may represent acute diverticulitis though the distal sigmoid colon and rectum also appear thickened and infectious or inflammatory proctocolitis is not    excluded  2   Stable left basilar consolidation and pleural nodularity consistent with metastatic disease  3   Severe diffuse hepatic steatosis  4   Gas in urinary bladder  Correlation for recent instrumentation recommended  5   Stable spinal and pelvic osseous metastasis  6   Stable pancreatic cyst             Workstation performed: [id number]         XR chest 2 views    (Results Pending)         Allscripts / Epic Records Reviewed: Yes     ** Please Note: This note has been constructed using a voice recognition system   **

## 2019-02-15 NOTE — SEPSIS NOTE
Sepsis Note   Bjorn Martinez 80 y o  female MRN: [id number]  Unit/Bed#: ED 02 Encounter: [id number]      Initial Sepsis Screening     [address] Name 02/14/19 1959                Is the patient's history suggestive of a new or worsening infection? Yes (Proceed)  (Abnormal)   -CC        Suspected source of infection  acute abdominal infection;urinary tract infection  -CC        Are two or more of the following signs & symptoms of infection both present and new to the patient? Yes (Proceed)  (Abnormal)   -CC        Indicate SIRS criteria  Hyperthemia > 38 3C (100 9F); Altered mental status; Tachycardia > 90 bpm  -CC        If the answer is yes to both questions, suspicion of sepsis is present          If severe sepsis is present AND tissue hypoperfusion perists in the hour after fluid resuscitation or lactate > 4, the patient meets criteria for SEPTIC SHOCK          Are any of the following organ dysfunction criteria present within 6 hours of suspected infection and SIRS criteria that are NOT considered to be chronic conditions? Yes  (Abnormal)   -CC        Organ dysfunction  Lactate > 2 0 mmol/L  -CC        Date of presentation of severe sepsis  02/14/19  -CC        Time of presentation of severe sepsis  1959  -CC        Tissue hypoperfusion persists in the hour after crystalloid fluid administration, evidenced, by either:  * OR * Lactate level is greater to or equal 4 mmol/dL ( ___ mmol/dL in comment field)  -CC        Was hypotension present within one hour of the conclusion of crystalloid fluid administration?   No  -CC        Date of presentation of septic shock  02/14/19  -CC        Time of presentation of septic shock  2000  -CC          User Key  (r) = Recorded By, (t) = Taken By, (c) = Cosigned By    [address] Name Provider Type    Dina Barragan MD Resident               Default Flowsheet Data (last 720 hours)      Sepsis Reassess     [address] Name 02/14/19 2000                   Repeat Volume Status and Tissue Perfusion Assessment Performed    Repeat Volume Status and Tissue Perfusion Assessment Performed  Yes  -CC           Volume Status and Tissue Perfusion Post Fluid Resuscitation * Must Document All *    Vital Signs Reviewed (HR, RR, BP, T)  Yes  -CC        Shock Index Reviewed  Yes  -CC        Arterial Oxygen Saturation Reviewed (POx, SaO2 or SpO2)  Yes (comment %) 96  -CC        Cardio  Normal S1/S2; No murmor;Regular rate and rhythm  -CC        Pulmonary  Tachypnea;Clear to auscultation  (Abnormal)   -CC        Capillary Refill  Brisk  -CC        Peripheral Pulses  Radial  -CC        Peripheral Pulse  +2  -CC        Skin  Warm;Dry;Normal  -CC        Urine output assessed  Adequate  -CC           *OR*   Intensive Monitoring- Must Document One of the Following Four *:    Vital Signs Reviewed          * Central Venous Pressure (CVP or RAP)          * Central Venous Oxygen (SVO2, ScvO2 or Oxygen saturation via central catheter)          * Bedside Cardiovascular US in IVC diameter and % collapse          * Passive Leg Raise OR Crystalloid Challenge            User Key  (r) = Recorded By, (t) = Taken By, (c) = Cosigned By    Initials Name Provider Type    CC William Schwartz MD Resident

## 2019-02-15 NOTE — ASSESSMENT & PLAN NOTE
Secondary to #1 in the setting of metformin use for diabetes  Will hold metformin  Management as above  Will repeat lactic acid after IV hydration  It was suspicious of possible Tylenol overuse secondary to poorly control pain  Toxicology on-call recommended Acetadote  No need to repeat Tylenol level follow up INR and LFTs in a m

## 2019-02-15 NOTE — CONSULTS
Consultation - Medical Toxicology  Matthew Muller 80 y o  female MRN: [id number]  Unit/Bed#: [facility] 629-01 Encounter: [id number]    PATIENT DISCHARGED BY PRIMARY SERVICE PRIOR TO BEDSIDE EVALUATION  Reason for Consult / Principal Problem: transaminitis   Consults  19      ASSESSMENT:  80year-old female with transaminitis and concern for accidental, nonacute acetaminophen toxicity   1  Transaminitis   2  Accidental, nonacute acetaminophen toxicity   3  Sepsis  4  Diverticulitis   5  Mild anion gap metabolic acidosis with elevated lactate      RECOMMENDATIONS:  Please continue supportive care and medical management  Regarding her transaminitis and reported excessive use of acetaminophen during the recent week, early or mild acetaminophen toxicity can not yet be ruled out  An undetectable acetaminophen concentration does NOT rule this out  Please initiate treatment with acetylcysteine  Please check a CMP and an INR in 8-12 hours  Please plan on full 21 hour NAC course unless advised to be truncated by , depending on potential improvement in ALT  NAC dosin mg/kg x 1hr, 12 5 mg/kg/hr x 4 hrs, 6 25 mg/kg/hr x 16 hrs  Discontinuation criteria: clear peak and downtrend of AST and ALT, INR <2, clinical improvement  For further questions, please call [facility]  Service or Patient Access Center to reach the medical  on call  Please see additional teaching note below:  Medical Toxicology Teaching Note  8 [facility]  Acetaminophen Toxicity  Last revised 2017     Acetaminophen (Tylenol) is a nonopiod analgesic and antipyretic medication found in many over-the-counter and prescription products such as Tylenol PM, Norco, Percocet, Nyquil, Vicks Formula 44-D  The recommended maximum daily dose of acetaminophen for adults is 3g/day, and 75-90mg/kg/day for children   Alcoholics may safely take Tylenol in therapeutic doses, but they may be at increased risk for hepatotoxicity in overdose  Mechanism of Toxicity: Acetaminophen is primarily metabolized by the liver  In therapeutic doses, about 90% of acetaminophen is conjugated to nontoxic metabolites (glucoronides and sulfates)  A small portion (<5%) is conjugated by cytochrome P450 enzyme, subunit CYP2E1, to a toxic metabolite, N-acetyl-p-benzoquinoneimine (NAPQI)  This metabolite is further conjugated by glutathione, to nontoxic metabolites eliminated by the kidneys  Liver Injury:  In toxic doses, the usual metabolic pathways are overwhelmed; acetaminophen is shunted to the cytochrome P450 pathway, creating NAPQI  Glutathione stores are depleted and NAPQI is produced  Cellular injury and hepatic necrosis may occur as NAPQI accumulates  Renal Injury:  Cytochrome P450 activity in the kidneys is thought to cause direct renal damage  Renal insufficiency may also develop during fulminant hepatic failure due to hepatorenal syndrome  Renal toxicity is usually associated with liver injury  Pharmacokinetics:  Acetaminophen is rapidly absorbed  Peak levels occur within  minutes with normal doses  Delayed absorption may occur with sustained release products or with co-ingestions that slow the GI tract (opiods, anticholinergics)  The elimination half-life is 1-3 hours after therapeutic doses and may extend to 12 hours after overdose  Toxic Dose:  Toxicity in adults may occur with acute ingestions of 7g, and 200mg/kg in children  Hepatic injury following chronic ingestions may occur at any dose above the daily recommended dose  Clinical Presentation:    Acute Ingestion: Within 8 hrs of an acute ingestion, there are usually few symptoms  Between 8-30 hours after a toxic, acute ingestion, a transaminitis will develop  Nausea, vomiting, and right upper quadrant pain may occur  Within 12-36 hours, worsening AST/ALT develops with elevated bilirubin and INR   The most severe cases will develop fulminant liver failure with hepatic encephalopathy and acidosis, usually within 3-7 days post overdose  The patient should be evaluated for a liver transplantation  Repeated Supra-therapeutic Ingestion: Due to a sub-acute course, patients may present anywhere along a spectrum  normal LFTS to asymptomatic elevation of enzymes to hepatic failure  Diagnosis   Acute Ingestion (Time of Ingestion Known): After an acute ingestion at a known time, obtain a 4-hour post-ingestion serum acetaminophen level and plot the level on the Cami-Humbertos nomogram (see below)  This nomogram is used to predict the likelihood of hepatic toxicity based on the level of acetaminophen between 4 and 24 hours post-ingestion  The nomogram CANNOT be used if the time of ingestion is unknown  The dotted line (Rumack-Humberto line), marking a 4-hour level at 200 mcg/ml, is the original line developed from the study above which hepatic toxicity will probably occur  The solid line (Treatment Line), marking a 4-hour level at 150ug/ml  is the treatment line accepted as the standard of care in the [place] and is 25% lower as a safety margin  If the patients serum APAP level falls above the treatment line, start treatment with N-acetylcysteine (NAC)  (see Treatment below)           Acute Ingestion (Time of Ingestion Unknown) or Repeated Supra-therapeutic Ingestion An acetaminophen level CANNOT be plotted on the Rumack-Humbertos nomogram  Draw an APAP level and AST/ALT at time of presentation  Anyone with an APAP level> 10mcg/ml OR elevated AST/ALT should start NAC  (see Treatment below)     TREATMENT   Emergency and Supportive Care: Treat nausea and vomiting to protect airway and support safe administration of charcoal and NAC, when indicated (see below)  Provide standard supportive care for liver and renal failure  Contact liver transplant team if fulminant hepatic failure occurs     Decontamination:  Administer activated charcoal within 2 hours of ingestion (consider later if extended release preparations)  Use antiemetics for nausea  Activated charcoal does bind to NAC, but the effect is not thought to be clinically significant  Gastric emptying is not recommended  Specific Drugs and Antidotes  Acute Ingestion Treat with NAC if the APAP level falls above the Treatment Line on the nomogram  The maximal benefit occurs if given within 8 hours of acute ingestion  Therefore, it is recommended to empirically start NAC before a level is obtained if there is a reasonable concern of a toxic ingestion presenting close to 8 hours or beyond  In late presenters (>8hrs), start NAC and treat for a full course or longer if LFTS remain abnormal  Treatment maybe stopped when AST/ALT peak and then downtrend, with an INR <2 and patient is clinically well  If abnormal labs persist, continue NAC and call Toxicology  There are two routes of administration for NAC, oral and IV  The treatment protocols are described below  Acute Ingestion (Time of Ingestion Unknown) or Repeated Supra-therapeutic Ingestion   The nomogram CANNOT be used to estimate the risk of hepatotoxicity  At presentation, check a serum APAP level and AST/ALT  If the APAP level is above 10 mcg/ml or the AST/ALT are elevated, start NAC treatment for 12 hours  If abnormalities persist, continue NAC treatment and call toxicology  If the APAP level is undetectable and AST and ALT are downtrending at the end of 12 hours, treatment may be stopped  Intravenous (Acetadote)   Loading dose- 150mg/kg infused over 15-60 minutes   Maintenance Infusion #1- 50mg/kg (12 5mg/kg/hr) over 4 hours   Maintenance Infusion #2 -100mg/kg (6 25 mg/kg/hr) until treatment endpoint   Treatment Endpoint: 20 hours or more   NAC should be continued for the full course  NAC can be stopped when APAP is undetectable, AST/ALT have peaked and are downtrending, and patient appears clinically well   Consultation with a medical  /poison center is recommended before changes in the duration of therapy are made  Acetaminophen Toxicity Dos and Donts   Acute Ingestions   DO give charcoal for decontamination within 2 hours of ingestion if the patient can adequately protect their airway  DO start NAC empirically, i e  without an APAP level, if the ingestion is likely a large overdose presenting at 8 hours or more after ingestion  DO contact the Liver Transplant Team early if liver failure is developing  DO NOT get a level before 4hrs post-ingestion if the time of ingestion is certain in an acute overdose  DO NOT stop NAC therapy until full course is finished or truncated therapy is recommended by the [facility]  Repeated Supra-Therapeutic Ingestions (RSI)   DO ask patients with pain complaints (toothaches, back pain, cancer) about the amount of acetaminophen they use  DO NOT use the Cami-Isaac nomogram to determine if the APAP level is toxic  DO NOT stop NAC therapy until full course is finished or truncated therapy is recommended by the [facility]  NAC Protocols   DO stop IV NAC if an anaphylactoid reaction occurs (rare)  Treat the reaction appropriately and call the [facility] for recommendations on continued NAC therapy  DO give charcoal with oral NAC when charcoal is indicated  References   Rigo LORENZO Acetaminophen  In [facility] [place] EM, [address] et al [facility]  Medical Toxicology 3rd edition  Carmine PA: [address], 2004: pp [phone]  Tali CABRERA Acetaminophen  In Juliann Maid           HPI: Hamilton Manzanares is a 80y o  year old female who presents with abdominal pain and sepsis secondary to diverticulitis  Further history led to discovery of excessive use of acetaminophen to treat abdominal pain this week and her AST and ALT were mildly elevated with concern for acetaminophen toxicity       Review of Systems    Historical Information   Past Medical History:   Diagnosis Date  TANYA (acute kidney injury) ([facility] ) 2018    Anxiety about health 2018    Breast cancer, left ([facility] )     Diabetes mellitus ([facility] )     HAP (hospital-acquired pneumonia) 2018    Hypertension     Lung cancer ([facility] )     Left Lower Lobe     NSTEMI (non-ST elevated myocardial infarction) ([facility] ) 2018    Pericardial effusion     Rapid atrial fibrillation [facility])      Past Surgical History:   Procedure Laterality Date    FRACTURE SURGERY      R arm surgery    IR PORT PLACEMENT  2018    MASTECTOMY      L breast with implant    [place] INCIS HEART SAC WINDW FOR DRAIN N/A 2018    Procedure: WINDOW PERICARDIAL  Subxyphoid approach ;  Surgeon: Cruz Mckeon MD;  Location: [facility];  Service: Thoracic    REDUCTION MAMMAPLASTY      R breast     Social History   Social History     Substance and Sexual Activity   Alcohol Use No     Social History     Substance and Sexual Activity   Drug Use No     Social History     Tobacco Use   Smoking Status Former Smoker    Packs/day: 0 50    Years: 10 00    Pack years: 5 00    Last attempt to quit: [address] Years since quittin 1   Smokeless Tobacco Never Used   Tobacco Comment    Socially      Family History   Problem Relation Age of Onset    Heart attack Mother     Breast cancer Father 79    Breast cancer Sister 61    Pancreatic cancer Sister 79    Lung cancer Brother 79        Smoker     Breast cancer Sister 36    Pancreatic cancer Brother 79    Colon cancer Brother 61        Prior to Admission medications    Medication Sig Start Date End Date Taking?  Authorizing Provider   acetaminophen (TYLENOL) 500 mg tablet Take 500 mg by mouth 16  Yes Historical Provider, MD   ALPRAZolam Irenahandy Singer) 0 5 mg tablet Take 1 tablet (0 5 mg total) by mouth daily at bedtime as needed for anxiety 19  Yes SAMANTHA Jack   amiodarone 100 mg tablet Take 1 tablet (100 mg total) by mouth daily 18  Yes Dayanara Galloway MD   aspirin (ECOTRIN LOW STRENGTH) 81 mg EC tablet Take 81 mg by mouth daily   Yes Historical Provider, MD   citalopram (CeleXA) 20 mg tablet Take 20 mg by mouth daily   Yes Historical Provider, MD   cyanocobalamin 1000 MCG tablet Take 100 mcg by mouth daily   Yes Historical Provider, MD   dexamethasone (DECADRON) 2 mg tablet Take 1 tablet (2 mg total) by mouth daily with breakfast 2/4/19  Yes SAMANTHA Cortez   fentaNYL ([address]) 25 mcg/hr Place 1 patch on the skin every third day Max Daily Amount: 1 patch 2/4/19  Yes SAMANTHA Cortez   folic acid (FOLVITE) 1 mg tablet Take 1 mg by mouth daily   Yes Historical Provider, MD   metFORMIN (GLUCOPHAGE) 500 mg tablet Take 500 mg by mouth daily with breakfast   Yes Historical Provider, MD   metoprolol tartrate (LOPRESSOR) 25 mg tablet Take 0 5 tablets (12 5 mg total) by mouth every 12 (twelve) hours 9/4/18  Yes Severo Smalls MD   ondansetron (ZOFRAN) 4 mg tablet Take 1 tablet (4 mg total) by mouth every 8 (eight) hours as needed for nausea or vomiting 9/5/18  Yes Ulysses Melbourne, MD   oxyCODONE (ROXICODONE) 5 mg immediate release tablet Take 0 5 tablets (2 5 mg total) by mouth every 4 (four) hours as needed for moderate pain Max Daily Amount: 15 mg 9/25/18  Yes Ash Draper MD   potassium chloride (K-DUR,KLOR-CON) 20 mEq tablet Take 1 tablet (20 mEq total) by mouth 2 (two) times a day 12/7/18  Yes Travis Hickman PA-C   promethazine-dextromethorphan (PHENERGAN-DM) 6 25-15 mg/5 mL oral syrup TAKE 1 TEASPOONFUL 4 TIMES DAILY AS NEEDED   Yes Historical Provider, MD   lidocaine-prilocaine (EMLA) cream Apply topically as needed for mild pain 1 hour prior to port access 9/12/18 2/14/19 Yes Ulysses Melbourne, MD   furosemide (LASIX) 20 mg tablet Take 1 tablet (20 mg total) by mouth daily for 30 days 12/21/18 1/20/19  Travis Hickman PA-C   CRANIAL PROSTHESIS, RX, 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 10/9/18 2/14/19  Ulysses Melbourne, MD   potassium chloride (KLOR-CON) 20 mEq packet Take 20 mEq by mouth 2 (two) times a day  Patient not taking: Reported on 1/17/2019 12/6/18 2/14/19  Sarahy Wasserman PA-C       Current Facility-Administered Medications   Medication Dose Route Frequency    acetylcysteine (ACETADOTE) 3,640 mg in dextrose 5 % 500 mL IVPB  50 mg/kg Intravenous Once    [START ON 2/15/2019] acetylcysteine (ACETADOTE) 7,260 mg in dextrose 5 % 1,000 mL IVPB  100 mg/kg Intravenous Once    [START ON 2/15/2019] cefepime (MAXIPIME) 2 g/50 mL dextrose IVPB  2,000 mg Intravenous Q12H    docusate sodium (COLACE) capsule 100 mg  100 mg Oral BID    [START ON 2/15/2019] enoxaparin (LOVENOX) subcutaneous injection 40 mg  40 mg Subcutaneous Daily    ibuprofen (MOTRIN) tablet 200 mg  200 mg Oral Q6H PRN    [START ON 2/15/2019] insulin lispro (HumaLOG) 100 units/mL subcutaneous injection 1-5 Units  1-5 Units Subcutaneous TID AC    insulin lispro (HumaLOG) 100 units/mL subcutaneous injection 1-5 Units  1-5 Units Subcutaneous HS    [START ON 2/15/2019] metroNIDAZOLE (FLAGYL) IVPB (premix) 500 mg  500 mg Intravenous Q8H    ondansetron (ZOFRAN) injection 4 mg  4 mg Intravenous Q6H PRN    polyethylene glycol (MIRALAX) packet 17 g  17 g Oral Daily PRN    [START ON 2/15/2019] senna (SENOKOT) tablet 8 6 mg  1 tablet Oral Daily    sodium chloride 0 9 % infusion  100 mL/hr Intravenous Continuous       Allergies   Allergen Reactions    Atorvastatin Other (See Comments)    Benzonatate Other (See Comments)    Carboplatin      Pt had allergic reaction during 8th carbo dose    Sulfa Antibiotics     Bactrim [Sulfamethoxazole-Trimethoprim] Rash    Latex Rash    Other Rash     Pt states she gets a rash from surgical tape, she is ok with paper tape       Objective       Intake/Output Summary (Last 24 hours) at 2/14/2019 2314  Last data filed at 2/14/2019 2145  Gross per 24 hour   Intake 1150 ml   Output    Net 1150 ml       Invasive Devices:   Peripheral IV 02/14/19 Right Forearm (Active)   Site Assessment Clean;Dry; Intact 2/14/2019  7:48 PM   Dressing Type Transparent 2/14/2019  7:48 PM   Dressing Status Clean;Dry; Intact 2/14/2019  7:48 PM       Peripheral IV 02/14/19 Right Antecubital (Active)   Site Assessment Clean; Intact;Dry 2/14/2019  7:49 PM   Dressing Type Transparent 2/14/2019  7:49 PM   Line Status Blood return noted; Flushed;Saline locked 2/14/2019  7:49 PM   Dressing Status Clean;Dry; Intact 2/14/2019  7:49 PM       Vitals   Vitals:    02/14/19 1930 02/14/19 2032 02/14/19 2128 02/14/19 2200   BP: 115/68 115/68 121/68 95/53   TempSrc:       Pulse: 96 90 86 88   Resp: (!) 33 22 22 22   Patient Position - Orthostatic VS: Lying Lying Lying Lying   Temp:           Physical Exam      Labs:  Results from last 7 days   Lab Units 02/14/19  1910   WBC Thousand/uL 6 29   HEMOGLOBIN g/dL 12 2   HEMATOCRIT % 39 2   PLATELETS Thousands/uL 282   LYMPHO PCT % 23   MONO PCT % 5      Results from last 7 days   Lab Units 02/14/19  1910   POTASSIUM mmol/L 4 4   CHLORIDE mmol/L 100   CO2 mmol/L 20*   BUN mg/dL 21   CREATININE mg/dL 0 88   CALCIUM mg/dL 8 3   ALK PHOS U/L 150*   ALT U/L 79*   AST U/L 82*      Results from last 7 days   Lab Units 02/14/19 1910   INR  1 08   PTT seconds 24*     Results from last 7 days   Lab Units 02/14/19 2057 02/14/19 1910   LACTIC ACID mmol/L 6 1* 7 8*     0   Lab Value Date/Time    TROPONINI 0 31 (H) 07/24/2018 0041    TROPONINI 0 29 (H) 07/23/2018 2346    TROPONINI 0 31 (H) 07/23/2018 2048    TROPONINI 0 20 (H) 07/23/2018 1128         Results from last 7 days   Lab Units 02/14/19  1939   ACETAMINOPHEN LVL ug/mL <2*   ETHANOL LVL mg/dL <3   SALICYLATE LVL mg/dL <3*     Invalid input(s): EXTPREGUR    Imaging Studies: I have personally reviewed pertinent reports          INTERPROFESSIONAL PHONE CONSULTATION SERVICES: 39 MINUTES

## 2019-02-15 NOTE — HOSPICE NOTE
Met with patient, daughter Bal Dumont and grand daughter in patients room  Pt has been approved for inpatient hospice  POA daughter Bal Dumont in agreement with pt going to [facility]  Consents signed and faxed to [facility], original DNR and copies of consents given to [address] [place] [address]  Elease Buerger set up transport for 12pm  NurseDamaris to call report to inpatient unit at [phone] prior to transport  Emotional support provided to pt and family

## 2019-02-15 NOTE — ED PROVIDER NOTES
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 on chemotherapy on gemcitabine and navelbine last received treatment approximately 1 week ago, diabetes, hypertension, CHF who presents with altered mental status and weakness  Over the past 3 weeks, the patient has become progressively weaker  Palliative Care has been involved regarding the patient's care  Family and palliative care are currently discussing goals of life and discontinuing chemotherapy  Family is likely withdrawing chemotherapy at this time  They have also been discussing the possibility of hospice  A hospice nurse came to the house today  However, the patient had an acute change in her weakness and mental status  She required an assistive 5 to get to her commode  She was ultimately brought to the emergency department for evaluation  According to family, she has been complaining of abdominal pain over the past week  The patient has diarrhea at baseline and this has not changed  Family is concerned that she has been taking more Tylenol than prescribed for her pain  I did discuss with the family the possibility of a devastating infection  We did have a discussion regarding the goals of care  Family is setting limits on surgical options  If the infection is surgical, they would not want to pursue surgical options  The patient is DNR/DNI  The family would like to pursue a workup to evaluate for reversible cause of her weakness and fever  Family denies any cough or pain with urination over the past few weeks  Prior to Admission Medications   Prescriptions Last Dose Informant Patient Reported? Taking?    ALPRAZolam (XANAX) 0 5 mg tablet 2/13/2019 at Unknown time  No Yes   Sig: Take 1 tablet (0 5 mg total) by mouth daily at bedtime as needed for anxiety   acetaminophen (TYLENOL) 500 mg tablet 2/13/2019 at Unknown time Self Yes Yes   Sig: Take 500 mg by mouth   amiodarone 100 mg tablet 2/14/2019 at Unknown time Self No Yes   Sig: Take 1 tablet (100 mg total) by mouth daily   aspirin (ECOTRIN LOW STRENGTH) 81 mg EC tablet 2/14/2019 at Unknown time Self Yes Yes   Sig: Take 81 mg by mouth daily   citalopram (CeleXA) 20 mg tablet 2/14/2019 at Unknown time Self Yes Yes   Sig: Take 20 mg by mouth daily   cyanocobalamin 1000 MCG tablet 2/14/2019 at Unknown time Self Yes Yes   Sig: Take 100 mcg by mouth daily   dexamethasone (DECADRON) 2 mg tablet 2/14/2019 at Unknown time  No Yes   Sig: Take 1 tablet (2 mg total) by mouth daily with breakfast   fentaNYL (DURAGESIC) 25 mcg/hr 2/14/2019 at Unknown time  No Yes   Sig: Place 1 patch on the skin every third day Max Daily Amount: 1 patch   folic acid (FOLVITE) 1 mg tablet 2/14/2019 at Unknown time Self Yes Yes   Sig: Take 1 mg by mouth daily   furosemide (LASIX) 20 mg tablet  Self No No   Sig: Take 1 tablet (20 mg total) by mouth daily for 30 days   metFORMIN (GLUCOPHAGE) 500 mg tablet 2/14/2019 at Unknown time Self Yes Yes   Sig: Take 500 mg by mouth daily with breakfast   metoprolol tartrate (LOPRESSOR) 25 mg tablet 2/14/2019 at Unknown time Self No Yes   Sig: Take 0 5 tablets (12 5 mg total) by mouth every 12 (twelve) hours   ondansetron (ZOFRAN) 4 mg tablet  Self No Yes   Sig: Take 1 tablet (4 mg total) by mouth every 8 (eight) hours as needed for nausea or vomiting   oxyCODONE (ROXICODONE) 5 mg immediate release tablet  Self No Yes   Sig: Take 0 5 tablets (2 5 mg total) by mouth every 4 (four) hours as needed for moderate pain Max Daily Amount: 15 mg   potassium chloride (K-DUR,KLOR-CON) 20 mEq tablet 2/14/2019 at Unknown time Self No Yes   Sig: Take 1 tablet (20 mEq total) by mouth 2 (two) times a day   promethazine-dextromethorphan (PHENERGAN-DM) 6 25-15 mg/5 mL oral syrup 2019 at Unknown time  Yes Yes   Sig: TAKE 1 TEASPOONFUL 4 TIMES DAILY AS NEEDED      Facility-Administered Medications: None       Past Medical History:   Diagnosis Date    TANYA (acute kidney injury) ([facility] ) 2018    Anxiety about health 2018    Breast cancer, left ([facility] )     Diabetes mellitus ([facility] )     HAP (hospital-acquired pneumonia) 2018    Hypertension     Lung cancer ([place] [address] )     Left Lower Lobe     NSTEMI (non-ST elevated myocardial infarction) ([place] [address] ) 2018    Pericardial effusion     Rapid atrial fibrillation [facility])        Past Surgical History:   Procedure Laterality Date    FRACTURE SURGERY      R arm surgery    IR PORT PLACEMENT  2018    MASTECTOMY      L breast with implant    ID INCIS HEART SAC WINDW FOR DRAIN N/A 2018    Procedure: WINDOW PERICARDIAL  Subxyphoid approach ;  Surgeon: David Phelps MD;  Location:  MAIN OR;  Service: Thoracic    REDUCTION MAMMAPLASTY      R breast       Family History   Problem Relation Age of Onset    Heart attack Mother     Breast cancer Father 79    Breast cancer Sister 61    Pancreatic cancer Sister 79    Lung cancer Brother 79        Smoker     Breast cancer Sister 36    Pancreatic cancer Brother 79    Colon cancer Brother 61     I have reviewed and agree with the history as documented      Social History     Tobacco Use    Smoking status: Former Smoker     Packs/day: 0 50     Years: 10 00     Pack years: 5 00     Last attempt to quit: 1960     Years since quittin 1    Smokeless tobacco: Never Used    Tobacco comment: Socially    Substance Use Topics    Alcohol use: No    Drug use: No        Review of Systems   Unable to perform ROS: Mental status change       Physical Exam  ED Triage Vitals [19 1847]   Temperature Pulse Respirations Blood Pressure SpO2   (!) 102 6 °F (39 2 °C) (!) 109 18 122/76 94 %      Temp Source Heart Rate Source Patient Position - Orthostatic VS BP Location FiO2 (%) Rectal Monitor Lying Right arm --      Pain Score       --           Orthostatic Vital Signs  Vitals:    02/14/19 1847 02/14/19 1930 02/14/19 2032   BP: 122/76 115/68 115/68   Pulse: (!) 109 96 90   Patient Position - Orthostatic VS: Lying Lying Lying       Physical Exam   Constitutional: She is oriented to person, place, and time  She appears well-developed  She appears listless  She appears ill  No distress  The patient is febrile  HENT:   Mouth/Throat: Uvula is midline, oropharynx is clear and moist and mucous membranes are normal    Eyes: Pupils are equal, round, and reactive to light  Conjunctivae and EOM are normal    Neck: Trachea normal  No thyroid mass and no thyromegaly present  Cardiovascular: Regular rhythm, normal heart sounds, intact distal pulses and normal pulses  Tachycardia present  No murmur heard  Pulmonary/Chest: Effort normal and breath sounds normal    Abdominal: Soft  Normal appearance and bowel sounds are normal  There is generalized tenderness  There is guarding  There is no rebound and no CVA tenderness  Neurological: She is oriented to person, place, and time  She appears listless  Skin: Skin is warm, dry and intact  No evidence of skin breakdown  Psychiatric: She has a normal mood and affect   Her speech is normal and behavior is normal  Thought content normal        ED Medications  Medications   sodium chloride 0 9 % bolus 1,000 mL (1,000 mL Intravenous New Bag 2/14/19 2054)   cefepime (MAXIPIME) 2 g/50 mL dextrose IVPB (2,000 mg Intravenous New Bag 2/14/19 2100)   metroNIDAZOLE (FLAGYL) IVPB (premix) 500 mg (has no administration in time range)   acetylcysteine (ACETADOTE) 10,900 mg in dextrose 5 % 200 mL IVPB (has no administration in time range)   acetylcysteine (ACETADOTE) 7,260 mg in dextrose 5 % 1,000 mL IVPB (has no administration in time range)   acetylcysteine (ACETADOTE) 3,640 mg in dextrose 5 % 500 mL IVPB (has no administration in time range)   ALPRAZolam Durel Alt) tablet 0 5 mg (has no administration in time range)   sodium chloride 0 9 % bolus 1,000 mL (0 mL Intravenous Stopped 2/14/19 2054)   iohexol (OMNIPAQUE) 350 MG/ML injection (MULTI-DOSE) 100 mL (100 mL Intravenous Given 2/14/19 2011)       Diagnostic Studies  Results Reviewed     Procedure Component Value Units Date/Time    Urine culture [[id number]] Collected:  02/14/19 2052    Lab Status: In process Specimen:  Urine, Other Updated:  02/14/19 2111    Urine Microscopic [[id number]] Collected:  02/14/19 2058    Lab Status: In process Specimen:  Urine, Other Updated:  02/14/19 2111    Lactic Acid x2 [[id number]] Collected:  02/14/19 2057    Lab Status:   In process Specimen:  Blood from Arm, Right Updated:  02/14/19 2105    POCT urinalysis dipstick [[id number]]  (Abnormal) Resulted:  02/14/19 2058    Lab Status:  Edited Result - FINAL Specimen:  Urine Updated:  02/14/19 2058     Color, UA Yellow     Clarity, UA Slightly Cloudy    ED Urine Macroscopic [[id number]]  (Abnormal) Collected:  02/14/19 2058    Lab Status:  Final result Specimen:  Urine Updated:  02/14/19 2055     Color, UA Yellow     Clarity, UA Clear     pH, UA 5 5     Leukocytes, UA Trace     Nitrite, UA Positive     Protein, UA 30 (1+) mg/dl      Glucose,  (1/4%) mg/dl      Ketones, UA Negative mg/dl      Urobilinogen, UA 1 0 E U /dl      Bilirubin, UA Negative     Blood, UA Small     Specific [place], UA <=1 005    Narrative:       CLINITEK RESULT    Ethanol [[id number]]  (Normal) Collected:  02/14/19 1939    Lab Status:  Final result Specimen:  Blood from Arm, Right Updated:  02/14/19 2016     Ethanol Lvl <3 mg/dL     Acetaminophen level [[id number]]  (Abnormal) Collected:  02/14/19 1939    Lab Status:  Final result Specimen:  Blood from Arm, Right Updated:  02/14/19 2008     Acetaminophen Level <2 ug/mL     Salicylate level [[id number]]  (Abnormal) Collected:  02/14/19 1939    Lab Status:  Final result Specimen:  Blood from Arm, Right Updated: 94/51/68 9318     Salicylate Lvl <3 mg/dL     CBC and differential [[id number]]  (Abnormal) Collected:  02/14/19 1910    Lab Status:  Final result Specimen:  Blood from Arm, Right Updated:  02/14/19 2006     WBC 6 29 Thousand/uL      RBC 3 76 Million/uL      Hemoglobin 12 2 g/dL      Hematocrit 39 2 %       fL      MCH 32 4 pg      MCHC 31 1 g/dL      RDW 15 2 %      MPV 11 5 fL      Platelets 969 Thousands/uL      nRBC 1 /100 WBCs     Narrative: This is an appended report  These results have been appended to a previously verified report  Lactic Acid x2 [[id number]]  (Abnormal) Collected:  02/14/19 1910    Lab Status:  Final result Specimen:  Blood from Arm, Right Updated:  02/14/19 1958     LACTIC ACID 7 8 mmol/L     Narrative:       Result may be elevated if tourniquet was used during collection  Comprehensive metabolic panel [[id number]]  (Abnormal) Collected:  02/14/19 1910    Lab Status:  Final result Specimen:  Blood from Arm, Right Updated:  02/14/19 1955     Sodium 135 mmol/L      Potassium 4 4 mmol/L      Chloride 100 mmol/L      CO2 20 mmol/L      ANION GAP 15 mmol/L      BUN 21 mg/dL      Creatinine 0 88 mg/dL      Glucose 314 mg/dL      Calcium 8 3 mg/dL      AST 82 U/L      ALT 79 U/L      Alkaline Phosphatase 150 U/L      Total Protein 6 3 g/dL      Albumin 2 6 g/dL      Total Bilirubin 0 95 mg/dL      eGFR 61 ml/min/1 73sq m     Narrative:       National Kidney Disease Education Program recommendations are as follows:  GFR calculation is accurate only with a steady state creatinine  Chronic Kidney disease less than 60 ml/min/1 73 sq  meters  Kidney failure less than 15 ml/min/1 73 sq  meters      APTT [[id number]]  (Abnormal) Collected:  02/14/19 1910    Lab Status:  Final result Specimen:  Blood from Arm, Right Updated:  02/14/19 1949     PTT 24 seconds     Protime-INR [[id number]]  (Normal) Collected:  02/14/19 1910    Lab Status:  Final result Specimen:  Blood from Arm, Right Updated: 02/14/19 1949     Protime 14 1 seconds      INR 1 08    Blood culture #1 [[id number]] Collected:  02/14/19 1939    Lab Status: In process Specimen:  Blood from Arm, Right Updated:  02/14/19 1945    Blood culture #2 [[id number]] Collected:  02/14/19 1916    Lab Status: In process Specimen:  Blood from Arm, Left Updated:  02/14/19 1919                 CT abdomen pelvis with contrast   Final Result by Spencer Dumont MD (02/14 2044)      1  Small outpouching of fluid and gas soft the sigmoid colon with trace adjacent stranding  This may represent acute diverticulitis though the distal sigmoid colon and rectum also appear thickened and infectious or inflammatory proctocolitis is not    excluded  2   Stable left basilar consolidation and pleural nodularity consistent with metastatic disease  3   Severe diffuse hepatic steatosis  4   Gas in urinary bladder  Correlation for recent instrumentation recommended  5   Stable spinal and pelvic osseous metastasis  6   Stable pancreatic cyst             Workstation performed: [id number]         XR chest 2 views    (Results Pending)         Procedures  Procedures      Phone Consults  ED Phone Contact    ED Course           Identification of Seniors at Risk      Most Recent Value   (ISAR) Identification of Seniors at Risk   Before the illness or injury that brought you to the Emergency, did you need someone to help you on a regular basis? 1 Filed at: 02/14/2019 1849   In the last 24 hours, have you needed more help than usual?  0 Filed at: 02/14/2019 1849   Have you been hospitalized for one or more nights during the past 6 months? 1 Filed at: 02/14/2019 1849   In general, do you see well?  0 Filed at: 02/14/2019 1849   In general, do you have serious problems with your memory? 0 Filed at: 02/14/2019 1849   Do you take more than three different medications every day?   1 Filed at: 02/14/2019 1849   ISAR Score  3 Filed at: 02/14/2019 1849 Initial Sepsis Screening     Row Name 02/14/19 1959                Is the patient's history suggestive of a new or worsening infection? Yes (Proceed)  (Abnormal)   -CC        Suspected source of infection  acute abdominal infection;urinary tract infection  -CC        Are two or more of the following signs & symptoms of infection both present and new to the patient? Yes (Proceed)  (Abnormal)   -CC        Indicate SIRS criteria  Hyperthemia > 38 3C (100 9F); Altered mental status; Tachycardia > 90 bpm  -CC        If the answer is yes to both questions, suspicion of sepsis is present          If severe sepsis is present AND tissue hypoperfusion perists in the hour after fluid resuscitation or lactate > 4, the patient meets criteria for SEPTIC SHOCK          Are any of the following organ dysfunction criteria present within 6 hours of suspected infection and SIRS criteria that are NOT considered to be chronic conditions? Yes  (Abnormal)   -CC        Organ dysfunction  Lactate > 2 0 mmol/L  -CC        Date of presentation of severe sepsis  02/14/19  -CC        Time of presentation of severe sepsis  1959  -CC        Tissue hypoperfusion persists in the hour after crystalloid fluid administration, evidenced, by either:  * OR * Lactate level is greater to or equal 4 mmol/dL ( ___ mmol/dL in comment field)  -CC        Was hypotension present within one hour of the conclusion of crystalloid fluid administration?   No  -CC        Date of presentation of septic shock  02/14/19  -CC        Time of presentation of septic shock  2000  -CC          User Key  (r) = Recorded By, (t) = Taken By, (c) = Cosigned By    [address] Name Provider Type    Carrington Solares MD Resident           Default Flowsheet Data (last 720 hours)      Sepsis Reassess     Row Name 02/14/19 2000                   Repeat Volume Status and Tissue Perfusion Assessment Performed    Repeat Volume Status and Tissue Perfusion Assessment Performed  Yes -CC           Volume Status and Tissue Perfusion Post Fluid Resuscitation * Must Document All *    Vital Signs Reviewed (HR, RR, BP, T)  Yes  -CC        Shock Index Reviewed  Yes  -CC        Arterial Oxygen Saturation Reviewed (POx, SaO2 or SpO2)  Yes (comment %) 96  -CC        Cardio  Normal S1/S2; No murmor;Regular rate and rhythm  -CC        Pulmonary  Tachypnea;Clear to auscultation  (Abnormal)   -CC        Capillary Refill  Brisk  -CC        Peripheral Pulses  Radial  -CC        Peripheral Pulse  +2  -CC        Skin  Warm;Dry;Normal  -CC        Urine output assessed  Adequate  -CC           *OR*   Intensive Monitoring- Must Document One of the Following Four *:    Vital Signs Reviewed          * Central Venous Pressure (CVP or RAP)          * Central Venous Oxygen (SVO2, ScvO2 or Oxygen saturation via central catheter)          * Bedside Cardiovascular US in IVC diameter and % collapse          * Passive Leg Raise OR Crystalloid Challenge            User Key  (r) = Recorded By, (t) = Taken By, (c) = Cosigned By    Initials Name Provider Type    CC Kate Hamm MD Resident                MDM  Number of Diagnoses or Management Options  Diagnosis management comments: Concern for intra-abdominal pathology  Sepsis workup with a straight cath urine  CT scan abdomen pelvis with contrast   1 L normal saline  Coma panel for Tylenol level  Disposition pending results  Will reconvene with family after results        Disposition  Final diagnoses:   Lactic acidosis   Diverticulitis   Septic shock (HCC)   Fever   Tachycardia   History of lung cancer   Abdominal pain   Weakness   Accidental acetaminophen overdose, initial encounter   UTI (urinary tract infection)     Time reflects when diagnosis was documented in both MDM as applicable and the Disposition within this note     Time User Action Codes Description Comment    2/14/2019  8:47 PM Kathleen AMADOR Add [E87 2] Lactic acidosis     2/14/2019  8:48 PM Erby Tristen Add [K57 92] Diverticulitis     2/14/2019  8:48 PM Arva Bety P Add [A41 9,  R65 21] Septic shock ([address] )     2/14/2019  8:48 PM Arva Bety P Add [R50 9] Fever     2/14/2019  8:48 PM Arva Bety P Add [R00 0] Tachycardia     2/14/2019  8:48 PM Erby Tristen Add [Z85 118] History of lung cancer     2/14/2019  8:49 PM Arva Bety P Add [R10 9] Abdominal pain     2/14/2019  8:49 PM Arva Bety P Add [R53 1] Weakness     2/14/2019  9:04 PM Arva Bety P Add [T39 1X1A] Accidental acetaminophen overdose, initial encounter     2/14/2019  9:06 PM Erby Tristen Add [N39 0] UTI (urinary tract infection)       ED Disposition     ED Disposition Condition Date/Time Comment    Admit Stable Thu Feb 14, 2019  9:22 PM Case was discussed with Dr Savage Salazar and the patient's admission status was agreed to be Admission Status: inpatient status to the service of Dr Janene Barnes   Follow-up Information    None         Patient's Medications   Discharge Prescriptions    No medications on file     No discharge procedures on file  ED Provider  Attending physically available and evaluated Kentrell Green I managed the patient along with the ED Attending      Electronically Signed by         Yousuf Borrego MD  02/14/19 4624

## 2019-02-15 NOTE — ASSESSMENT & PLAN NOTE
· Patient with dyspnea, tachypnea, pulse ox 89% 2 liters  · Stop IVF  · Titrate oxygen maintain sats>89%  · Check PCXR (prior exam demonstrated Increasing left mid to lower lung airspace opacity and diffuse pleural thickening, corresponding with known metastases)

## 2019-02-15 NOTE — ASSESSMENT & PLAN NOTE
With metastasis to bones and pleura  Patient kevinlows with Dr Burgos,CT on hold due to the pt's generalized weakness

## 2019-02-15 NOTE — UTILIZATION REVIEW
Initial Clinical Review    Admission: Date/Time/Statement: 2/14/19 @ 2123     Orders Placed This Encounter   Procedures    Inpatient Admission     Standing Status:   Standing     Number of Occurrences:   1     Order Specific Question:   Admitting Physician     Answer:   Felicia Mcconnell     Order Specific Question:   Level of Care     Answer:   Level 2 Stepdown / HOT [14]     Order Specific Question:   Estimated length of stay     Answer:   More than 2 Midnights     Order Specific Question:   Certification     Answer:   I certify that inpatient services are medically necessary for this patient for a duration of greater than two midnights  See H&P and MD Progress Notes for additional information about the patient's course of treatment  ED: Date/Time/Mode of Arrival:   ED Arrival Information     Expected Arrival Acuity Means of Arrival Escorted By Service Admission Type    - 2/14/2019 18:38 Emergent Ambulance On license of [facility] Emergency    Arrival Complaint    Weakness        Chief Complaint:   Chief Complaint   Patient presents with    Weakness - Generalized     pt has been recieving chemo and has had increase in lethargy and weakness  pt also reports fever  History of Illness: 25-year-old female with 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 on chemotherapy on gemcitabine and navelbine last received treatment approximately 1 week ago, diabetes, hypertension, CHF who presents with altered mental status and weakness      ED Vital Signs:   ED Triage Vitals   Temperature Pulse Respirations Blood Pressure SpO2   02/14/19 1847 02/14/19 1847 02/14/19 1847 02/14/19 1847 02/14/19 1847   (!) 102 6 °F (39 2 °C) (!) 109 18 122/76 94 %      Temp Source Heart Rate Source Patient Position - Orthostatic VS BP Location FiO2 (%)   02/14/19 1847 02/14/19 1847 02/14/19 1847 02/14/19 1847 --   Rectal Monitor Lying Right arm       Pain Score       02/14/19 2128       No Pain        Wt Readings from Last 1 Encounters:   02/15/19 72 6 kg (160 lb)     O2 3L N/C    Vital Signs (abnormal): Temp = 99    Pertinent Labs/Diagnostic Test Results:   CT Abd/ Pelvis - Small outpouching of fluid and gas soft the sigmoid colon with trace adjacent stranding  This may represent acute diverticulitis though the distal sigmoid colon and rectum also appear thickened and infectious or inflammatory proctocolitis is not excluded  Stable left basilar consolidation and pleural nodularity consistent with metastatic disease  Severe diffuse hepatic steatosis  CXR - Increasing left mid to lower lung airspace opacity and diffuse pleural thickening, corresponding with known metastases    Lactic acid = 7 8    ED Treatment:   Medication Administration from 02/14/2019 1838 to 02/14/2019 2227       Date/Time Order Dose Route Action     02/14/2019 1945 sodium chloride 0 9 % bolus 1,000 mL 1,000 mL Intravenous New Bag     02/14/2019 2011 iohexol (OMNIPAQUE) 350 MG/ML injection (MULTI-DOSE) 100 mL 100 mL Intravenous Given     02/14/2019 2054 sodium chloride 0 9 % bolus 1,000 mL 1,000 mL Intravenous New Bag     02/14/2019 2100 cefepime (MAXIPIME) 2 g/50 mL dextrose IVPB 2,000 mg Intravenous New Bag     02/14/2019 2128 metroNIDAZOLE (FLAGYL) IVPB (premix) 500 mg 500 mg Intravenous New Bag     02/14/2019 2143 acetylcysteine (ACETADOTE) 10,900 mg in dextrose 5 % 200 mL IVPB 10,900 mg Intravenous New Bag     02/14/2019 2125 ALPRAZolam (XANAX) tablet 0 5 mg 0 5 mg Oral Given        Past Medical/Surgical History:    Active Ambulatory Problems     Diagnosis Date Noted    Metastatic lung carcinoma, left ([facility] ) 04/18/2018    Type 2 diabetes mellitus, without long-term current use of insulin ([facility] ) 07/23/2018    Hypertension 07/23/2018    Pericardial effusion 07/23/2018    Paroxysmal atrial fibrillation ([facility] ) 07/23/2018    SIRS (systemic inflammatory response syndrome) (HCC) 07/24/2018    Type 2 myocardial infarction ([facility] ) 07/24/2018    Continuous opioid dependence ([facility] ) 07/24/2018    HCAP (healthcare-associated pneumonia) 07/25/2018    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    Acute respiratory failure with hypoxia ([facility] ) 10/15/2018    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 10/18/2018    Right calf pain 10/18/2018    [facility] ) 11/07/2018    Cancer related pain 12/04/2018    Anxiety about health 12/04/2018    Neoplastic malignant related fatigue 02/13/2019    Shortness of breath on exertion 02/13/2019    Goals of care, counseling/discussion 02/13/2019     Resolved Ambulatory Problems     Diagnosis Date Noted    Right upper quadrant pain 07/24/2018    HAP (hospital-acquired pneumonia) 07/24/2018    TANYA (acute kidney injury) ([facility] ) 07/24/2018     Past Medical History:   Diagnosis Date    TANYA (acute kidney injury) ([place] [address] ) 7/24/2018    Anxiety about health 12/4/2018    Breast cancer, left ([place] [address] )     Diabetes mellitus ([facility] )     HAP (hospital-acquired pneumonia) 7/24/2018    Hypertension     Lung cancer ([place] [address] )     NSTEMI (non-ST elevated myocardial infarction) ([place] [address] ) 7/24/2018    Pericardial effusion     Rapid atrial fibrillation (HCC)      Admitting Diagnosis: Lactic acidosis [E87 2]  Diverticulitis [K57 92]  UTI (urinary tract infection) [N39 0]  Pleural metastasis (HCC) [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]  Weakness [R53 1]  Abdominal pain [R10 9]  Tachycardia [R00 0]  History of lung cancer [Z85 118]  Fever [R50 9]  Accidental acetaminophen overdose, initial encounter [T39 1X1A]  Septic shock (HCC) [A41 9, R65 21]  Abnormal computed tomography angiography (CTA) of abdomen and pelvis [R93 5]     Age/Sex: 80 y o  female     Assessment/Plan:   80y Female to ED with Generalized weakness and altered mental status  According to the ER fellow who spoke to family members they noted progressively getting worse generalized weakness over the past 3 weeks    Patient was evaluated by outpatient palliative Care RN  for possible withdrawal of chemotherapy and hospice  Family not pursuing any invasive surgical intervention however they want to see if with conservative management her generalized weakness will be getting better   They also reports that patient was complaining on abdominal pain over the past week, she has diarrhea which at baseline  Family cannot rule out Tylenol overuse due to intractable pain  Tylenol level remains normal  Case was discussed with  on call and Acetadote was given  Sepsis workup initiated by ER fellow patient started on broad-spectrum antibiotic   repeated lactic acid trending down  Septic shock   Patient presented with generalized weakness, tachycardia( HR at 109)+ temperature maximum 39  2C+ RR 33+ lactic acid 7 8+ altered mental status + elevated LFTs with CT scan of abdomen pelvis reported findings suggestive of diverticulitis, proctocolitis through the distal sigmoid colon and rectum , gas in urinary bladder  Source of infection most likely intra-abdominal l including diverticulitis and UTI     given patient immunocompromised state ( her chemotherapy just stopped a week ago due to generalized weakness) she admitted to step-down 2 with full sepsis workup and broad-spectrum antibiotic given in the ER  Follow-up sepsis workup deescalate antibiotic pending final results, follow-up influenza and RCV PCR's  Urinalysis positive for nitrate bacteriuria and pyuria  IVF  Repeat lactic acid  Follow-up temperature ,blood pressure, WBC, lactic acid  Infectious disease consult placed     Increased anion gap metabolic acidosis  Secondary to #1 in the setting of metformin use for diabetes  Will hold metformin  Will repeat lactic acid after IV hydration  It was suspicious of possible Tylenol overuse secondary to poorly control pain  Toxicology on-call recommended Acetadote     Elevated lactic acid level  Secondary to #1 and dehydration ( urinalysis positive for hyaline cast, lactic acid improved with IV fluids)  Management as above  Continue IV hydration, monitor lactic acid     Metastatic lung carcinoma, left   With metastasis to bones and pleura  Patient wollows with Dr Burgos,CT on hold due to the pt's generalized weakness     Cancer related pain  Continue fentanyl patch, Percocet p r n  Palliative/hospice care consult placed     Continuous opioid dependence   Secondary to cancer related pain     Hx of chronic respiratory failure  Continue supplemental oxygen as needed     Paroxysmal atrial fibrillation   On amiodarone beta-blocker aspirin       Admission Orders:  Bld culture x2  Palliative Care cons  Infectious Disease   Toxicology cons  Referral to Hospice    Scheduled Meds:   amiodarone tablet 100 mg   Dose: 100 mg  Freq: Daily Route: PO    aspirin (ECOTRIN LOW STRENGTH) EC tablet 81 mg   Dose: 81 mg  Freq: Daily Route: PO    cefepime (MAXIPIME) 2 g/50 mL dextrose IVPB   Dose: 2,000 mg  Freq: Every 12 hours Route: IV    cyanocobalamin (VITAMIN B-12) tablet 100 mcg   Dose: 100 mcg  Freq: Daily Route: PO    enoxaparin (LOVENOX) subcutaneous injection 40 mg   Dose: 40 mg  Freq: Daily Route: SC    dexamethasone (DECADRON) tablet 2 mg   Dose: 2 mg  Freq: Daily with breakfast Route: PO    Continuous Infusions:   acetylcysteine (ACETADOTE) 3,640 mg in dextrose 5 % 500 mL IVPB   Dose: 50 mg/kg  Weight Dosing Info: 72 6 kg  Freq:  Once Route: IV    fentaNYL (DURAGESIC) 25 mcg/hr TD 72 hr patch 1 patch   Dose: 1 patch  Freq: Every 72 hours Route: TD    metoprolol tartrate (LOPRESSOR) partial tablet 12 5 mg   Dose: 12 5 mg  Freq: Every 12 hours scheduled Route: PO    metroNIDAZOLE (FLAGYL) IVPB (premix) 500 mg   Dose: 500 mg  Freq: Every 8 hours Route: IV    morphine injection 2 mg   Dose: 2 mg  Freq: Every 6 hours Route: IV    potassium chloride (K-DUR,KLOR-CON) CR tablet 20 mEq   Dose: 20 mEq  Freq: 2 times daily Route: PO    PRN Meds:

## 2019-02-15 NOTE — PLAN OF CARE
Problem: Potential for Falls  Goal: Patient will remain free of falls  Description  INTERVENTIONS:  - Assess patient frequently for physical needs  -  Identify cognitive and physical deficits and behaviors that affect risk of falls  -  [place] fall precautions as indicated by assessment   - Educate patient/family on patient safety including physical limitations  - Instruct patient to call for assistance with activity based on assessment  - Modify environment to reduce risk of injury  - Consider OT/PT consult to assist with strengthening/mobility  2/15/2019 0131 by Irasema Holland RN  Outcome: Progressing  2/15/2019 0130 by Irasema Holland RN  Outcome: Progressing     Problem: Prexisting or High Potential for Compromised Skin Integrity  Goal: Skin integrity is maintained or improved  Description  INTERVENTIONS:  - Identify patients at risk for skin breakdown  - Assess and monitor skin integrity  - Assess and monitor nutrition and hydration status  - Monitor labs (i e  albumin)  - Assess for incontinence   - Turn and reposition patient  - Assist with mobility/ambulation  - Relieve pressure over bony prominences  - Avoid friction and shearing  - Provide appropriate hygiene as needed including keeping skin clean and dry  - Evaluate need for skin moisturizer/barrier cream  - Collaborate with interdisciplinary team (i e  Nutrition, Rehabilitation, etc )   - Patient/family teaching  2/15/2019 0131 by Irasema Holland RN  Outcome: Progressing  2/15/2019 0130 by Irasema Holland RN  Outcome: Progressing     Problem: Nutrition/Hydration-ADULT  Goal: Nutrient/Hydration intake appropriate for improving, restoring or maintaining nutritional needs  Description  Monitor and assess patient's nutrition/hydration status for malnutrition (ex- brittle hair, bruises, dry skin, pale skin and conjunctiva, muscle wasting, smooth red tongue, and disorientation)  Collaborate with interdisciplinary team and initiate plan and interventions as ordered  Monitor patient's weight and dietary intake as ordered or per policy  Utilize nutrition screening tool and intervene per policy  Determine patient's food preferences and provide high-protein, high-caloric foods as appropriate       INTERVENTIONS:  - Monitor oral intake, urinary output, labs, and treatment plans  - Assess nutrition and hydration status and recommend course of action  - Evaluate amount of meals eaten  - Assist patient with eating if necessary   - Allow adequate time for meals  - Recommend/ encourage appropriate diets, oral nutritional supplements, and vitamin/mineral supplements  - Order, calculate, and assess calorie counts as needed  - Recommend, monitor, and adjust tube feedings and TPN/PPN based on assessed needs  - Assess need for intravenous fluids  - Provide specific nutrition/hydration education as appropriate  - Include patient/family/caregiver in decisions related to nutrition  2/15/2019 0131 by Yasmin Cooper RN  Outcome: Progressing  2/15/2019 0130 by Yasmin Cooper RN  Outcome: Progressing     Problem: INFECTION - ADULT  Goal: Absence or prevention of progression during hospitalization  Description  INTERVENTIONS:  - Assess and monitor for signs and symptoms of infection  - Monitor lab/diagnostic results  - Monitor all insertion sites, i e  indwelling lines, tubes, and drains  - Monitor endotracheal (as able) and nasal secretions for changes in amount and color  - [place] appropriate cooling/warming therapies per order  - Administer medications as ordered  - Instruct and encourage patient and family to use good hand hygiene technique  - Identify and instruct in appropriate isolation precautions for identified infection/condition  2/15/2019 0131 by Yasmin Cooper RN  Outcome: Progressing  2/15/2019 0130 by Yasmin Cooper RN  Outcome: Progressing  Goal: Absence of fever/infection during neutropenic period  Description  INTERVENTIONS:  - Monitor WBC  - Implement neutropenic guidelines  2/15/2019 0131 by Andres Gleason RN  Outcome: Progressing  2/15/2019 0130 by Andres Gleason RN  Outcome: Progressing     Problem: SAFETY ADULT  Goal: Maintain or return to baseline ADL function  Description  INTERVENTIONS:  -  Assess patient's ability to carry out ADLs; assess patient's baseline for ADL function and identify physical deficits which impact ability to perform ADLs (bathing, care of mouth/teeth, toileting, grooming, dressing, etc )  - Assess/evaluate cause of self-care deficits   - Assess range of motion  - Assess patient's mobility; develop plan if impaired  - Assess patient's need for assistive devices and provide as appropriate  - Encourage maximum independence but intervene and supervise when necessary  ¯ Involve family in performance of ADLs  ¯ Assess for home care needs following discharge   ¯ Request OT consult to assist with ADL evaluation and planning for discharge  ¯ Provide patient education as appropriate  2/15/2019 0131 by Andres Gleason RN  Outcome: Progressing  2/15/2019 0130 by Andres Gleason RN  Outcome: Progressing  Goal: Maintain or return mobility status to optimal level  Description  INTERVENTIONS:  - Assess patient's baseline mobility status (ambulation, transfers, stairs, etc )    - Identify cognitive and physical deficits and behaviors that affect mobility  - Identify mobility aids required to assist with transfers and/or ambulation (gait belt, sit-to-stand, lift, walker, cane, etc )  - [place] fall precautions as indicated by assessment  - Record patient progress and toleration of activity level on Mobility SBAR; progress patient to next Phase/Stage  - Instruct patient to call for assistance with activity based on assessment  - Request Rehabilitation consult to assist with strengthening/weightbearing, etc   2/15/2019 0131 by Andres Gleason RN  Outcome: Progressing  2/15/2019 0130 by Andres Gleason RN  Outcome: Progressing     Problem: DISCHARGE PLANNING  Goal: Discharge to home or other facility with appropriate resources  Description  INTERVENTIONS:  - Identify barriers to discharge w/patient and caregiver  - Arrange for needed discharge resources and transportation as appropriate  - Identify discharge learning needs (meds, wound care, etc )  - Arrange for interpretive services to assist at discharge as needed  - Refer to Case Management Department for coordinating discharge planning if the patient needs post-hospital services based on physician/advanced practitioner order or complex needs related to functional status, cognitive ability, or social support system  2/15/2019 0131 by Latrice Blake RN  Outcome: Progressing  2/15/2019 0130 by Latrice Blake RN  Outcome: Progressing     Problem: Knowledge Deficit  Goal: Patient/family/caregiver demonstrates understanding of disease process, treatment plan, medications, and discharge instructions  Description  Complete learning assessment and assess knowledge base    Interventions:  - Provide teaching at level of understanding  - Provide teaching via preferred learning methods  2/15/2019 0131 by Latrice Blake RN  Outcome: Progressing  2/15/2019 0130 by Latrice Blake RN  Outcome: Progressing

## 2019-02-15 NOTE — TELEPHONE ENCOUNTER
Haven [place], friend, called on behalf of Karlene Linn  She just wanted to send an FYI to Dr Jay Watters because Florina Polanco was admitted to [facility] for weakness and altered mental state

## 2019-02-15 NOTE — SOCIAL WORK
Cm reviewed patient during care coordination rounds  Cm informed by  hospice liaison that patient is approved for  [facility]  Cm informed by hospice liaison, that patient was open to their services in the community and that no formal hospice consult required  Cm arranged a 12pm transport with [facility] to [address]  Cm also informed that patient's daughter to be present at hospital by 11am  Cm informed medical team and Hospice liaison about time for transport

## 2019-02-15 NOTE — OCCUPATIONAL THERAPY NOTE
OCCUPATIONAL THERAPY SCREEN:    ORDERS RECEIVED  CHART REVIEW COMPLETED  PT PLANNED FOR  HOSPICE HOUSE  NO ACUTE CARE OT NEEDS  D/C OT       Chisé Diacik, MOT, OTR/L

## 2019-02-15 NOTE — ASSESSMENT & PLAN NOTE
Secondary to #1 and dehydration ( urinalysis positive for hyaline cast, lactic acid improved with IV fluids)   Management as above  Continue IV hydration, monitor lactic acid

## 2019-02-16 LAB
BACTERIA UR CULT: ABNORMAL
BACTERIA UR CULT: ABNORMAL

## 2019-02-16 NOTE — PHYSICIAN ADVISOR
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]  Weakness [R53 1]  Abdominal pain [R10 9]  Tachycardia [R00 0]  History of lung cancer [Z85 118]  Fever [R50 9]  Accidental acetaminophen overdose, initial encounter [T39 1X1A]  Septic shock ([address] ) [A41 9, R65 21]  Abnormal computed tomography angiography (CTA) of abdomen and pelvis [R93 5]       There is documentation in the medical record of an expected length of stay of at least 2 midnights  The patient is therefore expected to satisfy the 2 midnight benchmark and given the 2 midnight presumption is appropriate for INPATIENT ADMISSION  Given this expectation of a satisfying stay, CMS instructs us that the patient is most often appropriate for inpatient admission under part A provided medical necessity is documented in the chart  After review of the relevant documentation, labs, vital signs and test results, the patient is appropriate for INPATIENT ADMISSION  Admission to the hospital as an inpatient is a complex decision making process which requires the practitioner to consider the patients presenting complaint, history and physical examination and all relevant testing  With this in mind, in this case, the patient was deemed appropriate for INPATIENT ADMISSION  After review of the documentation and testing available at the time of the admission I concur with this clinical determination of medical necessity  Rationale is as follows: The patient is a 80 yrs old Female who presented to the ED at 2/14/2019  6:38 PM with a chief complaint of Weakness - Generalized (pt has been recieving chemo and has had increase in lethargy and weakness   pt also reports fever  )     Given the need for further hospitalization, and along with the documentation of medical necessity present in the chart, the patient is appropriate for inpatient admission  The patient is expected to satisfy the 2 midnight benchmark, and will require further acute medical care  The patient does have comorbid conditions which increases the risk for significant adverse outcome  Given this the patient is appropriate for inpatient admission  The patients vitals on arrival were ED Triage Vitals   Temperature Pulse Respirations Blood Pressure SpO2   02/14/19 1847 02/14/19 1847 02/14/19 1847 02/14/19 1847 02/14/19 1847   (!) 102 6 °F (39 2 °C) (!) 109 18 122/76 94 %      Temp Source Heart Rate Source Patient Position - Orthostatic VS BP Location FiO2 (%)   02/14/19 1847 02/14/19 1847 02/14/19 1847 02/14/19 1847 --   Rectal Monitor Lying Right arm       Pain Score       02/14/19 2128       No Pain           Past Medical History:   Diagnosis Date    TANYA [facility]acute kidney injury) [facility] ) 7/24/2018    Anxiety about health 12/4/2018    Breast cancer[facility] left [facility]     Diabetes mellitus ([facility] )     HAP (hospital-acquired pneumonia) 7/24/2018    Hypertension     Lung cancer ([facility] )     Left Lower Lobe     NSTEMI (non-ST elevated myocardial infarction) ([facility] ) 7/24/2018    Pericardial effusion     Rapid atrial fibrillation (HCC)      Past Surgical History:   Procedure Laterality Date    FRACTURE SURGERY      R arm surgery    IR PORT PLACEMENT  9/5/2018    MASTECTOMY      L breast with implant    [place] INCIS HEART SAC WINDW FOR DRAIN N/A 7/24/2018    Procedure: WINDOW PERICARDIAL   Subxyphoid approach ;  Surgeon: Olga Lidia Sanderson MD;  [facility]: [facility];  Service: Thoracic    REDUCTION MAMMAPLASTY      R breast           Consults have been placed to:   IP CONSULT TO PALLIATIVE CARE    Vitals:    02/15/19 0307 02/15/19 0313 02/15/19 0753 02/15/19 0754   BP: 117/63 117/63 130/77 130/77   BP Location: Right arm Pulse: 79      Resp: 18      Temp: 98 1 °F (36 7 °C)      TempSrc: Oral      SpO2: 95%      Weight:       Height:           Most recent labs:    Recent Labs     02/15/19  0859 02/15/19  0900   WBC  --  4 90   HGB  --  12 0   HCT  --  39 3   PLT  --  277   K 3 2*  --    CALCIUM 8 3  --    BUN 13  --    CREATININE 0 69  --    INR 1 09  --    AST 20  --    ALT 61  --    ALKPHOS 112  --        Scheduled Meds:  Facility-Administered Medications Ordered in Other Encounters:  albuterol 2 5 mg Nebulization Q2H PRN Ash Draper MD   And       ipratropium 0 5 mg Nebulization Q2H PRN Ash Draper MD   bisacodyl 10 mg Rectal Daily PRN Ash Draper MD   [START ON 2/16/2019] dexamethasone 4 mg Intravenous BID (AM & Afternoon) Ash Draper MD   haloperidol lactate 1 mg Intravenous Q1H PRN Ash Draper MD   LORazepam 0 5 mg Intravenous Q8H [address] Ash Draper MD   LORazepam 0 5 mg Intravenous Q1H PRN Ash Draper MD   morphine injection 2 mg Intravenous Q4H [address] Ash Draper MD   morphine injection 2 mg Intravenous Q30 Min PRN Ash Draper MD     Continuous Infusions:  No current facility-administered medications for this encounter  PRN Meds:      Surgical procedures (if appropriate):

## 2019-02-17 LAB
BACTERIA BLD CULT: ABNORMAL
BACTERIA BLD CULT: ABNORMAL
BACTERIA UR CULT: ABNORMAL
GRAM STN SPEC: ABNORMAL
GRAM STN SPEC: ABNORMAL

## 2019-02-21 ENCOUNTER — HOSPITAL ENCOUNTER (OUTPATIENT)
Dept: INFUSION CENTER | Facility: CLINIC | Age: 84
End: 2019-02-21

## 2020-04-03 NOTE — DISCHARGE SUMMARY
Discharge- Estiven William 1935, 80 y o  female MRN: [id number]    Unit/Bed#: E4 -01 Encounter: [id number]    Primary Care Provider: Danielle Gates DO   Date and time admitted to hospital: 10/11/2018  8:58 PM        * Acute respiratory failure with hypoxia [facility])   Assessment & Plan    Multifactorial Secondary to lung cancer, chemotherapy, pleural effusion, COPD, deconditioning  The patient required oxygen at 3 L continuously  She will go home with home oxygen     SIRS (systemic inflammatory response syndrome) ([facility] [address] )   Assessment & Plan    Due to lung cancer with recent chemotherapy, possible right upper lung pneumonitis present on admission, can't rule out tracheobronchitis  SIRS resolved  Antibiotic course completed         Lung cancer [facility])   Assessment & Plan    Stage IV  With favorable response to her chemo  Resume chemo next week with Dr Schneider Res     Pericardial effusion   Assessment & Plan    Status post pericardial window  Repeat echocardiogram showed small effusion without hemodynamic compromise  This is effusion is malignant  Ongoing chemotherapy         Paroxysmal atrial fibrillation (HCC)   Assessment & Plan    Continue amiodarone 100 mg daily     Continuous opioid dependence ([facility] [address] )   Assessment & Plan    Due to cancer related pain  On fentanyl patch      Diabetes mellitus [facility])   Assessment & Plan    Lab Results   Component Value Date    HGBA1C 6 7 (H) 07/25/2018       Recent Labs      10/18/18   1704  10/18/18   2123  10/19/18   0804  10/19/18   1126   POCGLU  150*  124  106  121       Blood Sugar Average: Last 72 hrs:  (P) 581 [id number]     Resume low-dose metformin on discharge  A1c at goal     Right calf pain   Assessment & Plan    Secondary to muscle strain, resolved  Venous Doppler Negative for DVT     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   Assessment & Plan    Patient's daughter is concerned about her weakness and fatigue  I told her that this is multifactorial due to her stage IV cancer, acute illness, deconditioning  Home PT and VNA     Hypertension   Assessment & Plan    Stable on metoprolol           Discharging Physician / Practitioner: Abraham Smith MD  PCP: Merry Louis DO  Admission Date:   Admission Orders     Ordered        10/11/18 2301  Inpatient Admission (expected length of stay for this patient is greater than two midnights)  Once             Discharge Date: 10/19/18    Resolved Problems  Date Reviewed: 10/19/2018    None          Consultations During Hospital Stay:  · Pulmonary  · Hematology  · Cardiology    Procedures Performed:     · None    Significant Findings / Test Results:     · Chest x-ray 10/15/2018-small left pleural effusion, mild vascular congestion  · CT chest 10/11/2018-  1  Persistent nodular studding of the left pleural surface although significant decrease in enhancement of the nodularity compatible with treatment response of pleural metastases   Tiny left pleural effusion, unchanged  2  Modest sized pericardial effusion is overall unchanged in size   Apparent pericardial metastasis seen on the previous study is no longer visible on this exam     3  Grossly stable size of subcarinal lymphadenopathy although decreased enhancement suggesting some degree of treatment response  4  Patchy groundglass density in the right upper lobe may represent infectious or inflammatory pneumonitis  5  Stable or diminished appearance of left lower lobe masslike consolidation/atelectasis  6  Stable sclerotic osseous metastases  7  Diffuse fatty infiltration of the liver  · Venous Doppler-negative for DVT  · Echocardiogram-small loculated pericardial effusion, normal LV function    Incidental Findings:   · See above     Test Results Pending at Discharge (will require follow up):    · None     Outpatient Tests Requested:  · Chest x-ray prior to pulmonary appointment    Complications:  None    Reason for Admission:  Shortness of breath    Hospital Course: Liseth Gee is a 80 y o  female patient who originally presented to the hospital on 10/11/2018 due to shortness of breath  She has known history of lung cancer stage IV complicated by pericardial effusion status post pericardial window  She has been undergoing chemotherapy and became more short of breath than usual prompting her to be brought to the hospital   She was found to have acute respiratory failure due to multiple reasons including underlying advanced lung cancer, pericardial effusion, acute diastolic congestive heart failure, COPD, and deconditioning  The patient met sepsis criteria on admission and was started on empiric antibiotic  The acute diastolic congestive heart failure/pleural effusion was treated with intravenous Lasix  She was seen by Pulmonary and she has a follow-up scheduled on 11/21/2018  She was seen by Cardiology who felt that she did have acute diastolic congestive heart failure however the pleural effusion also could be due to the underlying cancer  Nonetheless she improved with IV Lasix and she will continue oral Lasix 20 mg daily for 3 more days after discharge  She was seen by Dr Elina Monzon who reviewed her recent scans  He recommended ongoing chemotherapy due to her favorable response to her recent treatment  She was supposed to undergo chemo on 10/18/2018 however she was still in the hospital and was still ill  She will resume her chemotherapy next week  Please see above list of diagnoses and related plan for additional information  Condition at Discharge: good     Discharge Day Visit / Exam:     Subjective:  Ready to go home  No events overnight  No fever or chills    Vitals: Blood Pressure: 160/87 (10/19/18 1113)  Pulse: 86 (10/19/18 1113)  Temperature: 98 5 °F (36 9 °C) (10/19/18 0700)  Temp Source: Tympanic (10/19/18 0700)  Respirations: 20 (10/19/18 1113)  Height: 5' 2" (157 5 cm) (10/12/18 1626)  Weight - Scale: 67 5 kg (148 lb 13 oz) (10/19/18 0600)  SpO2: 96 % (10/19/18 1113)  Exam:   Physical Exam   Constitutional: She appears well-developed  No distress  HENT:   Head: Normocephalic and atraumatic  Eyes: No scleral icterus  Neck: No JVD present  Cardiovascular: Regular rhythm  Pulmonary/Chest:   Diminished breath sounds left base   Abdominal: Soft  She exhibits no distension  There is no tenderness  Musculoskeletal: She exhibits no edema  Neurological: She is alert  Skin: Skin is warm and dry  She is not diaphoretic  Psychiatric: She has a normal mood and affect  Discussion with Family:  Yes spoke with daughter at bedside    Discharge instructions/Information to patient and family:   See after visit summary for information provided to patient and family  Provisions for Follow-Up Care:  See after visit summary for information related to follow-up care and any pertinent home health orders  Disposition:     Home with VNA Services (Reminder: Complete face to face encounter)    Planned Readmission: no     Discharge Statement:  I spent 40 minutes discharging the patient  This time was spent on the day of discharge  I had direct contact with the patient on the day of discharge  Greater than 50% of the total time was spent examining patient, answering all patient questions, arranging and discussing plan of care with patient as well as directly providing post-discharge instructions  Additional time then spent on discharge activities  Discharge Medications:  See after visit summary for reconciled discharge medications provided to patient and family        ** Please Note: This note has been constructed using a voice recognition system ** Respiratory

## 2020-09-11 NOTE — PROGRESS NOTES
Patient arrived on unit  Denies any discomforts  Labs drawn on 9/5/17  Cleared for chemotherapy as per parameters on orders 
Patient tolerated chemotherapy  Denies any discomforts  Patient aware to return tomorrow for hydration  Patient has f/u with Dr Elyn Kanner today   AVS given to patient
absent

## 2020-09-29 NOTE — PHYSICAL THERAPY NOTE
Physical Therapy Tx Session:       07/31/18 4180   Pain Assessment   Pain Assessment 0-10   Pain Score 4   Pain Type Acute pain   Pain Location Back;Buttocks   Pain Orientation Mid;Lower; Posterior   Hospital Pain Intervention(s) Repositioned; Ambulation/increased activity; Emotional support; Environmental changes; Rest   Restrictions/Precautions   Other Precautions Pain; Fall Risk;O2;Telemetry;Multiple lines; Chair Alarm;Cognitive;Hard of hearing   General   Chart Reviewed Yes   Family/Caregiver Present Yes  ( and family in beginninig of session)   Cognition   Overall Cognitive Status Impaired   Arousal/Participation Alert   Attention Attends with cues to redirect   Orientation Level Oriented to person;Oriented to place;Oriented to situation   Following Commands Follows one step commands with increased time or repetition  (2* slow mobility,fatigue,[place],pain)   Subjective   Subjective pt sitting upright in recliner resting comfortably;pt willing and agreeable to work with PT and to participate in therapy intervention;"I am tired and want to rest"  needs inc motivation and encouragement to participate in therapy session   Bed Mobility   Supine to Sit Unable to assess  (pt sitting in chair pre and post mobility)   Transfers   Sit to Stand 4  Minimal assistance   Additional items Assist x 1; Armrests; Increased time required;Verbal cues   Stand to Sit 4  Minimal assistance   Additional items Assist x 1; Armrests; Increased time required;Verbal cues  (premature stand to sit multiple times min verbal instruction)   Toilet transfer 4  Minimal assistance   Additional items Assist x 1; Armrests; Increased time required;Verbal cues; Commode   Additional Comments pt needed min verbal and physical instruction for B hand placement throughout transfers with premature transfer performed    Ambulation/Elevation   Gait pattern Poor UE support; Improper Weight shift;Narrow BRADEN; Forward Flexion; Shuffling; Foward flexed; Short stride; Ataxia; Step to;Excessively slow   Gait Assistance 4  Minimal assist   Additional items Assist x 1;Verbal cues; Tactile cues   Assistive Device Rolling walker  (2 L NC O2 )   Distance 100 feet with use of RW and 2 L NC O2;pt needed 2 static stand rest breaks during mobility lasting 1-2 minutes each rest break  Pt reports inc fatigue and "being tired" throughout mobility with reports of SOB following ambulation and activity   Balance   Static Sitting Good  (in chair postmobility with chair alarm intact)   Dynamic Sitting Poor +   Static Standing Poor +   Dynamic Standing Poor +   Ambulatory Poor +   Endurance Deficit   Endurance Deficit Yes   Endurance Deficit Description use of 2 L NC O2,fatigue and SOB following mobility and activity   Activity Tolerance   Activity Tolerance Patient limited by fatigue;Patient limited by pain  (fair)   Medical Staff Made Aware CM Angelo Munoz and Porfirio)   Nurse Made Aware yes   Assessment   Prognosis Fair   Problem List Decreased strength;Decreased endurance; Impaired balance;Decreased mobility; Decreased safety awareness; Impaired hearing;Obesity; Decreased skin integrity;Pain   Assessment Pt able to inc ambulation distance to 100 feet with use of RW on various surfaces needing minAx1  Pt needed x2 static stand rest breaks during mobility with rest time lasting 1-2 minutes each break  Pt reports fatigue and SOB during and following mobility and activity  Pt needed minAx1 for sit to stand transfers and verbal/physical instruction for proper B hand placement and to prevent premature stand to sit transfers  Dec safety awareness throughout mobility  Pt would cont to benefit from skilled inpt PT services to maximize functional independence   Goals   Patient Goals to rest   STG Expiration Date 08/05/18   Treatment Day 1   Plan   Treatment/Interventions Functional transfer training;LE strengthening/ROM; Therapeutic exercise; Endurance training;Patient/family training;Equipment eval/education; Bed mobility;Gait training;Spoke to nursing;Spoke to case management; Family   Progress Slow progress, decreased activity tolerance   PT Frequency Other (Comment)  (4-6x/week)   Recommendation   Recommendation Other (Comment)  (inpt rhb upon D/C recommended)   Equipment Recommended Walker  (RW)   Skilled PT recommended while in hospital and upon DC to progress pt toward treatment goals  29-Sep-2020 17:41

## 2023-03-06 NOTE — OCCUPATIONAL THERAPY NOTE
"Chief Complaint  Joint Swelling (Left ankle, since 3/4/23)    Subjective        Arslan Butler presents to [facility]  History of Present Illness    The patient presents today for evaluation of left ankle pain. He is accompanied by a female adult.     Left ankle injury/trauma pain  On 03/03/2023, the patient purchased work boots. On 03/04/2023, the patient attended work and reports having feet pain from wearing the new work boots. He reports returning from work and \"she\" noticed that one ankle was swollen double the size of the other ankle. He denies having an injury at work. He is currently taking lithium. He is not able to take ibuprofen or other similar medications for inflammation symptoms. On 03/05/2023, the patient was seen at ER urgent care to receive a prescription for inflammation. His injury was documented as worker's compensation case, but the patient denies having an injury at work. He has already received an x-ray of  the ankle with normal results. He received a prescription of some topical anti-inflammatory, and the medication was practically ineffective. He has been compressing his ankles and applying ice. He reports having pain on the lateral ankle. He denies receiving an examination of his ankle by the physician because \"I didn't sign the worker compensation.\" However, he was referred for an x-ray of the ankle. The patient works 12-hour shifts and walks on concrete. He was recommended to see his PCP.     Subungual hematoma or bleeding, right great toenail   His right great toenail was recently clipped because it was too long. He clipped the right great toenail after wearing the new work boots. He noticed the long right great toenail after wearing the new work boots. The patient developed a subungual hematoma or bleeding underneath the nail. His nail is wiggly.     History of sciatic nerve flare  He reports previously taking Toradol with or without the " OT CANCELLATION NOTE    OT orders received  Chart reviewed  Attempted to see pt for OT eval, however pt requested to wait until after dinner to participate  Checked back after pt finished her dinner and pt refused again  requesting to participate tomorrow instead  Will cont to follow as able          Aristides Gomez, OTR/L "current medications due sciatic nerve flares.      History of diabetes mellitus  The patient reports an A1c of 5.2 percent. His uncle has a history of diabetes mellitus and will have his toe amputated.     Objective   Vital Signs:  /76 (BP Location: Right arm, Patient Position: Sitting, Cuff Size: Adult)   Pulse 86   Temp 98.2 °F (36.8 °C) (Infrared)   Resp 18   Ht 193 cm (76\")   Wt (!) 150 kg (331 lb)   BMI 40.29 kg/m²   Estimated body mass index is 40.29 kg/m² as calculated from the following:    Height as of this encounter: 193 cm (76\").    Weight as of this encounter: 150 kg (331 lb).       Physical Exam  Vitals and nursing note reviewed.   Constitutional:       General: He is not in acute distress.     Appearance: Normal appearance. He is well-developed. He is not ill-appearing.   HENT:      Head: Normocephalic and atraumatic.   Eyes:      General: No scleral icterus.  Neck:      Thyroid: No thyromegaly.   Cardiovascular:      Rate and Rhythm: Normal rate and regular rhythm.   Pulmonary:      Effort: Pulmonary effort is normal.      Breath sounds: Normal breath sounds.   Abdominal:      General: Bowel sounds are normal. There is no distension.      Palpations: Abdomen is soft.      Tenderness: There is no abdominal tenderness.   Musculoskeletal:      Comments: Physical Exam  Feet:      Comments: The patient has good pulses of the left foot. He has tenderness across the left foot and the lateral aspect of the ankle.  He has some mild swelling in the left lateral malleolus. He is tender to tickle volume. He is tender to the palpate. He has no erythema or streaking or bruising. There is some mild tenderness just below the malleolus.    R foot great toenail.  There is minimal recent bleeding from the distal aspect of the nail.  The nail itself is discolored and slightly loose but intact at the base.            Lymphadenopathy:      Cervical: No cervical adenopathy.   Skin:     Capillary Refill: " Capillary refill takes 2 to 3 seconds.      Coloration: Skin is not pale.   Neurological:      Mental Status: He is alert and oriented to person, place, and time.   Psychiatric:         Mood and Affect: Mood normal.         Behavior: Behavior normal.             Class 3 Severe Obesity (BMI >=40). Obesity-related health conditions include the following: dyslipidemias. Obesity is unchanged. BMI is is above average; BMI management plan is completed. We discussed portion control and increasing exercise.        Result Review :                   Assessment and Plan   Diagnoses and all orders for this visit:    1. Acute left ankle pain (Primary)    2. Subungual hematoma of right foot, sequela    Other orders  -     methylPREDNISolone (MEDROL) 4 MG dose pack; Take as directed on package instructions.  Dispense: 21 tablet; Refill: 0          . Left ankle injury/trauma pain    - The patient will continue using the topical anti-inflammatory medication prescribed while at the ER.     - He is recommended to wear an Ace bandage and apply ice on his ankle.   - The patient will be referred to orthopedics if his symptoms are not improving.   - The patient is recommended to discontinue use of the new work boots.   - He will be prescribed a steroid medication, oral. He is recommended to eat healthy while on steroid medication.     History of diabetes mellitus  - The patient's symptoms will be monitored.      Subungual hematoma or bleeding, right great toenail   - The patient is recommended to apply Neosporin at the top of the toe a couple of times daily.   - He is recommended to use a Band-Aid with a gauze around the right great toenail.      Major depressive disorder  - The patient will continue taking lithium.         Follow Up   Return if symptoms worsen or fail to improve.  Patient was given instructions and counseling regarding his condition or for health maintenance advice. Please see specific information pulled into the AVS if  appropriate.     Transcribed from ambient dictation for CORTNEY Baker by Fallon FLANAGAN.  03/06/23   15:54 EST    Patient or patient representative verbalized consent to the visit recording.  I have personally performed the services described in this document as transcribed by the above individual, and it is both accurate and complete.

## 2023-03-07 NOTE — DISCHARGE SUMMARY
Discharge- Peggi Angry 1935, 80 y o  female MRN: [id number]    Unit/Bed#: [facility] 629-01 Encounter: [id number]    Primary Care Provider: Dwight Rios DO   Date and time admitted to hospital: 2/14/2019  6:38 PM        Acute respiratory failure with hypoxia (HCC)  Assessment & Plan  · Patient with dyspnea, tachypnea, pulse ox 89% 2 liters  · Stop IVF  · Titrate oxygen maintain sats>89%  · Check PCXR (prior exam demonstrated Increasing left mid to lower lung airspace opacity and diffuse pleural thickening, corresponding with known metastases)  Metastatic lung carcinoma, left (HCC)  Assessment & Plan  · 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 s/p chemo  · palliative medicine following at home, Present to discuss goals of care with family as patient condition has worsened  · Plan to make comfort care and transfer her to the hospice house          Discharging Physician / Practitioner: Lu Verdin, [facility]   PCP: Dwight Rios DO  Admission Date:   Admission Orders (From admission, onward)    Ordered        02/14/19 2123  Inpatient Admission  Once     Order ID Start Status   [id number] 02/14/19 2123 Completed              Discharge Date: 02/15/19    Resolved Problems  Date Reviewed: 2/15/2019    None          Consultations During Hospital Stay:  · Palliative medicine    Procedures Performed:   · none    Significant Findings / Test Results:   Xr Chest Portable    Result Date: 2/15/2019  Narrative: CHEST INDICATION:   HYPOXIA  COMPARISON:  2/14/2019 EXAM PERFORMED/VIEWS:  XR CHEST PORTABLE FINDINGS:  Right chest wall port catheter is noted  Cardiomediastinal silhouette appears unremarkable  Left pleural thickening related to known metastatic disease again seen  Osseous structures appear within normal limits for patient age  Impression: Left pleural thickening related to known metastatic disease again identified   Workstation performed: [id number]     Xr Chest 2 Views    Result Date: 2/15/2019  Narrative: CHEST INDICATION:   fever, unknown source  COMPARISON:  10/15/2018 EXAM PERFORMED/VIEWS:  XR CHEST PA & LATERAL FINDINGS:  Right chest wall port  Heart shadow is enlarged but unchanged from prior exam  Increasing left mid to lower lung airspace opacity and diffuse pleural thickening, corresponding with known metastases  Osseous structures appear within normal limits for patient age  Known osseous metastases  Impression: Increasing left mid to lower lung airspace opacity and diffuse pleural thickening, corresponding with known metastases  Workstation performed: [id number]     Ct Abdomen Pelvis With Contrast    Result Date: 2/14/2019  Narrative: CT ABDOMEN AND PELVIS WITH IV CONTRAST INDICATION:   fever, abdominal pain  COMPARISON:  1/8/2019 TECHNIQUE:  CT examination of the abdomen and pelvis was performed  Axial, sagittal, and coronal 2D reformatted images were created from the source data and submitted for interpretation  Radiation dose length product (DLP) for this visit:  408 mGy-cm   This examination, like all CT scans performed in the [facility], was performed utilizing techniques to minimize radiation dose exposure, including the use of iterative reconstruction and automated exposure control  IV Contrast:  100 mL of iohexol (OMNIPAQUE) Enteric Contrast:  Enteric contrast was not administered  FINDINGS: ABDOMEN LOWER CHEST:  There is left lower lobe consolidation and pleural nodularity consistent with metastasis as seen previously  LIVER/BILIARY TREE:  There is hepatomegaly with severe hepatic steatosis  GALLBLADDER:  No calcified gallstones  No pericholecystic inflammatory change  SPLEEN:  Unremarkable  PANCREAS:  There is a stable small pancreatic head cyst  ADRENAL GLANDS:  Unremarkable  KIDNEYS/URETERS:  Unremarkable  No hydronephrosis  STOMACH AND BOWEL:  Multiple diverticula are again seen   There is a small outpouching of fluid and gas adjacent to the sigmoid colon best seen on coronal images 110 through 119  There may be trace stranding adjacent to this structure which could represent an inflamed diverticula  The distal sigmoid colon and rectum appear mildly thick-walled which may represent proctocolitis  APPENDIX:  No findings to suggest appendicitis  ABDOMINOPELVIC CAVITY:  No ascites or free intraperitoneal air  No lymphadenopathy  VESSELS:  Unremarkable for patient's age  PELVIS REPRODUCTIVE ORGANS:  There is an enhancing focus in the uterus likely representing a fibroid  There is a probable lower uterine segment fibroid as well  URINARY BLADDER:  There is gas in urinary bladder  Correlation for recent instrumentation recommended  ABDOMINAL WALL/INGUINAL REGIONS:  Unremarkable  OSSEOUS STRUCTURES:  Again seen are multiple sclerotic foci within the lumbar spine and pelvis consistent with osseous metastasis  · Impression: 1  Small outpouching of fluid and gas soft the sigmoid colon with trace adjacent stranding  This may represent acute diverticulitis though the distal sigmoid colon and rectum also appear thickened and infectious or inflammatory proctocolitis is not excluded  2   Stable left basilar consolidation and pleural nodularity consistent with metastatic disease  3   Severe diffuse hepatic steatosis  4   Gas in urinary bladder  Correlation for recent instrumentation recommended  5   Stable spinal and pelvic osseous metastasis  6   Stable pancreatic cyst  Workstation performed: [id number]   ·     Incidental Findings:   · All findings as noted above    Test Results Pending at Discharge (will require follow up):   · none     Outpatient Tests Requested:  · none    Complications:  none    Reason for Admission: weakness    Hospital Course:     Estiven Thomas is a 80 y o  female patient who originally presented to the hospital on 2/14/2019 due to weakness   She was currently being managed by palliative care at home and recently had a goals of care conversation  She was admitted due to weakness as mentioned above in change in mental status  She has history of metastatic lung adenocarcinoma with mets to the pleura and bone, diabetes, non alcoholic fatty liver disease  Her symptoms were noted to be progressively getting worse over about 3 weeks and within the possible 48 hours her symptoms were significantly worsened  There was a goals of care conversation as an outpatient and decision had been made by time of discharge in regards to her level 3 DNR status  Workup in the emergency department indicated a temperature of 102 6, lactic acid of 7 8  Urinalysis came back positive for pyuria bacteria and nitrates  Culture was pending at time of visit  LFTs were slightly elevated above normal and no specific etiology known at this time  Initial chest x-ray showed increasing left mid to lower lung airspace opacity and diffuse pleural thickening corresponding with no metastasis, Chest x-ray showed left pleural thickening related to known metastatic disease  She was admitted for septic shock, she was started on antibiotics,  blood work was obtained and sent  Palliative Medicine was following and was same team that follows her in the outpatient  Patient's medical condition, current plan of care was discussed with her daughter who is the designated decision maker  Palliative medicine focused on symptom management along with chronic disease  Patient was made a level 4 DNR   The patient, initially admitted to the hospital as inpatient, was discharged earlier than expected given the following: as she was made comfort care   Please see above list of diagnoses and related plan for additional information  Condition at Discharge: poor     Discharge Day Visit / Exam:     Subjective:  Patient reports SOB, cough   destini diet no N/V  destini BF  Vitals: Blood Pressure: 130/77 (02/15/19 0754)  Pulse: 79 (02/15/19 0307)  Temperature: 98 1 °F (36 7 °C) (02/15/19 0731)  Temp Source: Oral (02/15/19 0307)  Respirations: 18 (02/15/19 0307)  Height: 5' 1" (154 9 cm) (02/15/19 0052)  Weight - Scale: 72 6 kg (160 lb) (02/15/19 0052)  SpO2: 95 % (02/15/19 0307)  Exam:   Physical Exam   Constitutional: She appears well-developed  No distress  HENT:   Head: Normocephalic  Right Ear: External ear normal    Eyes: Pupils are equal, round, and reactive to light  Neck: Neck supple  Cardiovascular: Normal rate  Pulmonary/Chest: She is in respiratory distress  She has wheezes  She has rales  Abdominal: Soft  Bowel sounds are normal  She exhibits no distension  There is no tenderness  There is no rebound and no guarding  Musculoskeletal: Normal range of motion  She exhibits edema  Neurological: She is alert  Vitals reviewed  Discussion with Family: daughter and grand daughter    Discharge instructions/Information to patient and family:   See after visit summary for information provided to patient and family  Provisions for Follow-Up Care:  See after visit summary for information related to follow-up care and any pertinent home health orders  Disposition:     Home    For Discharges to [address] [facility]:   · Not Applicable to this Patient - Not Applicable to this Patient    Planned Readmission: no     Discharge Statement:  I spent 45 minutes discharging the patient  This time was spent on the day of discharge  I had direct contact with the patient on the day of discharge  Greater than 50% of the total time was spent examining patient, answering all patient questions, arranging and discussing plan of care with patient as well as directly providing post-discharge instructions  Additional time then spent on discharge activities  Discharge Medications:  See after visit summary for reconciled discharge medications provided to patient and family        ** Please Note: This note has been constructed using a voice recognition system ** 3

## 2023-12-12 NOTE — PROGRESS NOTES
Progress note - Palliative and Supportive Care   Cathy Cuellar 80 y o  female [id number]    Assessment:  Lung cancer stage IV  Atrial fib  Pericardial effusion-cytology pending  Cancer related pain  Shortness of breath  Decreased appetite  Hx of Left breast cancer   HTN  DM II    Plan:  1  Symptom management    - continue Fentanyl   - Change tylenol to PRN for mild pain   - reduce tramadol dose to TID PRN for severe pain, given age and co-morbidities would recommend not exceeding 300 mg/day   - bowel regimen to prevent OIC   - recommend global delirium precautions given age and medica co-morbidities    2  Goals - disease directed, to continue Keytruda    Code Status: Level 1 - Full Code    Reason for visit: follow up pain and GOC    Interval history:  Patient feeling well today  She states pain is controlled but admits to feeling quite fatigued and requests to rest  Has used one dose of tramadol in last 24 hours         MEDICATIONS / ALLERGIES:    all current active meds have been reviewed and current meds:   Current Facility-Administered Medications   Medication Dose Route Frequency    acetaminophen (TYLENOL) tablet 650 mg  650 mg Oral Q6H [facility] & long-term    ALPRAZolam (XANAX) tablet 0 5 mg  0 5 mg Oral HS PRN    amiodarone (CORDARONE) 900 mg in dextrose 5 % 500 mL infusion  0 5 mg/min Intravenous Continuous    calcium carbonate (OYSTER SHELL,OSCAL) 500 mg tablet 1 tablet  1 tablet Oral Daily With Breakfast    cefepime (MAXIPIME) 2,000 mg in dextrose 5 % 50 mL IVPB  2,000 mg Intravenous Q12H    citalopram (CeleXA) tablet 20 mg  20 mg Oral Daily    cyanocobalamin (VITAMIN B-12) tablet 100 mcg  100 mcg Oral Daily    docusate sodium (COLACE) capsule 100 mg  100 mg Oral BID    fentaNYL (DURAGESIC) 12 mcg/hr TD 72 hr patch 1 patch  1 patch Transdermal Q72H    fish oil capsule 1,000 mg  1,000 mg Oral Daily    folic acid (FOLVITE) tablet 1 mg  1 mg Oral Daily    insulin lispro (HumaLOG) 100 units/mL subcutaneous Pt requesting to sleep, refusing AM meds and tube feedings. Pt stated he did not sleep last night and will call when he's ready to get up.    injection 2-12 Units  2-12 Units Subcutaneous TID AC    ketorolac (TORADOL) injection 15 mg  15 mg Intravenous Q6H PRN    meclizine (ANTIVERT) tablet 25 mg  25 mg Oral Q8H PRN    metoprolol (LOPRESSOR) injection 5 mg  5 mg Intravenous Q6H PRN    metoprolol tartrate (LOPRESSOR) tablet 25 mg  25 mg Oral Q12H [address]    morphine injection 2 mg  2 mg Intravenous Q4H PRN    ondansetron (ZOFRAN) injection 4 mg  4 mg Intravenous Q6H PRN    ondansetron (ZOFRAN-ODT) dispersible tablet 4 mg  4 mg Oral Q6H PRN    traMADol (ULTRAM) tablet 100 mg  100 mg Oral TID PRN       Allergies   Allergen Reactions    Atorvastatin Other (See Comments)    Benzonatate Other (See Comments)    Carboplatin      Pt had allergic reaction during 8th carbo dose    Sulfa Antibiotics     Bactrim [Sulfamethoxazole-Trimethoprim] Rash    Latex Rash    Other Rash     Pt states she gets a rash from surgical tape, she is ok with paper tape       OBJECTIVE:    Physical Exam    Blood pressure 107/64, pulse 68, temperature 97 7 °F (36 5 °C), temperature source Oral, resp  rate 19, height 5' 1" (1 549 m), weight 76 5 kg (168 lb 10 4 oz), SpO2 94 %  Intake/Output Summary (Last 24 hours) at 07/27/18 1035  Last data filed at 07/27/18 0543   Gross per 24 hour   Intake          1410 69 ml   Output             1180 ml   Net           230 69 ml       Physical Exam   Constitutional: She is oriented to person, place, and time  She appears well-developed  HENT:   Head: Normocephalic and atraumatic  Right Ear: External ear normal    Left Ear: External ear normal    Nose: Nose normal    Eyes: Conjunctivae and EOM are normal  No scleral icterus  Neck: No tracheal deviation present  Cardiovascular: Normal rate  Pulmonary/Chest: Effort normal  No stridor  No respiratory distress  Abdominal: Soft  She exhibits no distension  There is no tenderness  Musculoskeletal: She exhibits no edema     Neurological: She is alert and oriented to person, place, and time  Skin: Skin is warm and dry  Psychiatric: She has a normal mood and affect  Her behavior is normal        Lab Results:   I have personally reviewed pertinent labs  , CBC:   Lab Results   Component Value Date    WBC 11 98 (H) 07/27/2018    HGB 12 4 07/27/2018    HCT 40 3 07/27/2018    MCV 98 07/27/2018     07/27/2018    MCH 30 2 07/27/2018    MCHC 30 8 (L) 07/27/2018    RDW 13 6 07/27/2018    MPV 11 6 07/27/2018    NRBC 0 07/27/2018   , CMP:   No results found for: NA, K, CL, CO2, ANIONGAP, BUN, CREATININE, GLUCOSE, CALCIUM, AST, ALT, ALKPHOS, PROT, ALBUMIN, BILITOT, EGFRprocalcitonin 0 51    Imaging Studies: reviewed    Enio Perez DO  Palliative and Supportive Care  [phone]
